# Patient Record
Sex: FEMALE | Race: WHITE | Employment: OTHER | URBAN - NONMETROPOLITAN AREA
[De-identification: names, ages, dates, MRNs, and addresses within clinical notes are randomized per-mention and may not be internally consistent; named-entity substitution may affect disease eponyms.]

---

## 2018-03-26 ENCOUNTER — TELEPHONE (OUTPATIENT)
Dept: NEUROSURGERY | Age: 63
End: 2018-03-26

## 2018-03-27 ENCOUNTER — TELEPHONE (OUTPATIENT)
Dept: NEUROSURGERY | Age: 63
End: 2018-03-27

## 2018-03-27 NOTE — TELEPHONE ENCOUNTER
Tried calling patient twice and no voicemail, to let patient know that we had her new patient appointment set

## 2018-04-30 ENCOUNTER — TELEPHONE (OUTPATIENT)
Dept: NEUROSURGERY | Age: 63
End: 2018-04-30

## 2018-05-17 ENCOUNTER — LAB (OUTPATIENT)
Dept: LAB | Facility: HOSPITAL | Age: 63
End: 2018-05-17
Attending: PEDIATRICS

## 2018-05-17 ENCOUNTER — TRANSCRIBE ORDERS (OUTPATIENT)
Dept: ADMINISTRATIVE | Facility: HOSPITAL | Age: 63
End: 2018-05-17

## 2018-05-17 ENCOUNTER — LAB (OUTPATIENT)
Dept: LAB | Facility: HOSPITAL | Age: 63
End: 2018-05-17

## 2018-05-17 DIAGNOSIS — E83.52 HYPERCALCEMIA: ICD-10-CM

## 2018-05-17 DIAGNOSIS — N18.6 ANEMIA IN END-STAGE RENAL DISEASE (HCC): ICD-10-CM

## 2018-05-17 DIAGNOSIS — I10 ESSENTIAL HYPERTENSION, MALIGNANT: ICD-10-CM

## 2018-05-17 DIAGNOSIS — D63.1 ANEMIA IN END-STAGE RENAL DISEASE (HCC): ICD-10-CM

## 2018-05-17 DIAGNOSIS — D63.1 ANEMIA IN END-STAGE RENAL DISEASE (HCC): Primary | ICD-10-CM

## 2018-05-17 DIAGNOSIS — N18.6 ANEMIA IN END-STAGE RENAL DISEASE (HCC): Primary | ICD-10-CM

## 2018-05-17 LAB
ALBUMIN SERPL-MCNC: 3.3 G/DL (ref 3.5–5)
ALBUMIN/GLOB SERPL: 1 G/DL (ref 1.1–2.5)
ALP SERPL-CCNC: 53 U/L (ref 24–120)
ALT SERPL W P-5'-P-CCNC: 27 U/L (ref 0–54)
ANION GAP SERPL CALCULATED.3IONS-SCNC: 8 MMOL/L (ref 4–13)
AST SERPL-CCNC: 25 U/L (ref 7–45)
AUTO MIXED CELLS #: 1 10*3/MM3 (ref 0.1–2.6)
AUTO MIXED CELLS %: 16.7 % (ref 0.1–24)
BILIRUB SERPL-MCNC: 0.2 MG/DL (ref 0.1–1)
BUN BLD-MCNC: 52 MG/DL (ref 5–21)
BUN/CREAT SERPL: 5.6
CALCIUM SPEC-SCNC: 9 MG/DL (ref 8.4–10.4)
CHLORIDE SERPL-SCNC: 98 MMOL/L (ref 98–110)
CHOLEST SERPL-MCNC: 233 MG/DL (ref 130–200)
CO2 SERPL-SCNC: 29 MMOL/L (ref 24–31)
CREAT BLD-MCNC: 9.35 MG/DL (ref 0.5–1.4)
ERYTHROCYTE [DISTWIDTH] IN BLOOD BY AUTOMATED COUNT: 12.9 % (ref 12–15)
GFR SERPL CREATININE-BSD FRML MDRD: 4 ML/MIN/1.73
GLOBULIN UR ELPH-MCNC: 3.2 GM/DL
GLUCOSE BLD-MCNC: 168 MG/DL (ref 70–100)
HBA1C MFR BLD: 6.4 %
HCT VFR BLD AUTO: 34.9 % (ref 37–47)
HDLC SERPL-MCNC: 46 MG/DL
HGB BLD-MCNC: 12.4 G/DL (ref 12–16)
LDLC SERPL CALC-MCNC: 143 MG/DL (ref 0–99)
LDLC/HDLC SERPL: 3.1 {RATIO}
LYMPHOCYTES # BLD AUTO: 1 10*3/MM3 (ref 0.8–7)
LYMPHOCYTES NFR BLD AUTO: 16.7 % (ref 15–45)
MCH RBC QN AUTO: 34.4 PG (ref 28–32)
MCHC RBC AUTO-ENTMCNC: 35.5 G/DL (ref 33–36)
MCV RBC AUTO: 96.9 FL (ref 82–98)
NEUTROPHILS # BLD AUTO: 4.1 10*3/MM3 (ref 1.5–8.3)
NEUTROPHILS NFR BLD AUTO: 66.6 % (ref 39–78)
PLATELET # BLD AUTO: 268 10*3/MM3 (ref 130–400)
PMV BLD AUTO: 8.8 FL (ref 6–12)
POTASSIUM BLD-SCNC: 4.1 MMOL/L (ref 3.5–5.3)
PROT SERPL-MCNC: 6.5 G/DL (ref 6.3–8.7)
RBC # BLD AUTO: 3.6 10*6/MM3 (ref 4.2–5.4)
SODIUM BLD-SCNC: 135 MMOL/L (ref 135–145)
TRIGL SERPL-MCNC: 222 MG/DL (ref 0–149)
VLDLC SERPL-MCNC: 44.4 MG/DL
WBC NRBC COR # BLD: 6.1 10*3/MM3 (ref 4.8–10.8)

## 2018-05-17 PROCEDURE — 83036 HEMOGLOBIN GLYCOSYLATED A1C: CPT

## 2018-05-17 PROCEDURE — 85025 COMPLETE CBC W/AUTO DIFF WBC: CPT | Performed by: PEDIATRICS

## 2018-05-17 PROCEDURE — 80061 LIPID PANEL: CPT

## 2018-05-17 PROCEDURE — 36415 COLL VENOUS BLD VENIPUNCTURE: CPT

## 2018-05-17 PROCEDURE — 80053 COMPREHEN METABOLIC PANEL: CPT | Performed by: PEDIATRICS

## 2018-05-22 ENCOUNTER — TRANSCRIBE ORDERS (OUTPATIENT)
Dept: ADMINISTRATIVE | Facility: HOSPITAL | Age: 63
End: 2018-05-22

## 2018-05-22 DIAGNOSIS — Z12.39 SCREENING BREAST EXAMINATION: Primary | ICD-10-CM

## 2018-06-13 ENCOUNTER — HOSPITAL ENCOUNTER (OUTPATIENT)
Dept: MAMMOGRAPHY | Facility: HOSPITAL | Age: 63
Discharge: HOME | End: 2018-06-13
Attending: PEDIATRICS | Admitting: PEDIATRICS

## 2018-06-13 DIAGNOSIS — Z12.39 SCREENING BREAST EXAMINATION: ICD-10-CM

## 2018-06-13 PROCEDURE — 77067 SCR MAMMO BI INCL CAD: CPT

## 2018-06-13 PROCEDURE — 77063 BREAST TOMOSYNTHESIS BI: CPT

## 2018-07-23 ENCOUNTER — OFFICE VISIT (OUTPATIENT)
Dept: NEUROSURGERY | Age: 63
End: 2018-07-23
Payer: MEDICARE

## 2018-07-23 VITALS
SYSTOLIC BLOOD PRESSURE: 126 MMHG | WEIGHT: 172.4 LBS | OXYGEN SATURATION: 98 % | HEIGHT: 63 IN | BODY MASS INDEX: 30.55 KG/M2 | DIASTOLIC BLOOD PRESSURE: 70 MMHG | HEART RATE: 68 BPM

## 2018-07-23 DIAGNOSIS — R56.9 SEIZURE (HCC): Primary | ICD-10-CM

## 2018-07-23 PROCEDURE — G8417 CALC BMI ABV UP PARAM F/U: HCPCS | Performed by: PSYCHIATRY & NEUROLOGY

## 2018-07-23 PROCEDURE — 99203 OFFICE O/P NEW LOW 30 MIN: CPT | Performed by: PSYCHIATRY & NEUROLOGY

## 2018-07-23 PROCEDURE — G8427 DOCREV CUR MEDS BY ELIG CLIN: HCPCS | Performed by: PSYCHIATRY & NEUROLOGY

## 2018-07-23 PROCEDURE — 3017F COLORECTAL CA SCREEN DOC REV: CPT | Performed by: PSYCHIATRY & NEUROLOGY

## 2018-07-23 PROCEDURE — 4004F PT TOBACCO SCREEN RCVD TLK: CPT | Performed by: PSYCHIATRY & NEUROLOGY

## 2018-07-23 RX ORDER — PANTOPRAZOLE SODIUM 40 MG/1
1 TABLET, DELAYED RELEASE ORAL DAILY
COMMUNITY
Start: 2018-06-18

## 2018-07-23 RX ORDER — M-VIT,TX,IRON,MINS/CALC/FOLIC 27MG-0.4MG
1 TABLET ORAL DAILY
COMMUNITY

## 2018-07-23 RX ORDER — LOSARTAN POTASSIUM 50 MG/1
1 TABLET ORAL 2 TIMES DAILY
Refills: 5 | COMMUNITY
Start: 2018-06-02 | End: 2021-10-28 | Stop reason: ALTCHOICE

## 2018-07-23 RX ORDER — POTASSIUM CHLORIDE 750 MG/1
1 TABLET, FILM COATED, EXTENDED RELEASE ORAL DAILY
Refills: 3 | COMMUNITY
Start: 2018-06-07 | End: 2021-11-29

## 2018-07-23 RX ORDER — CALCIUM ACETATE 667 MG/1
1 CAPSULE ORAL DAILY
COMMUNITY
Start: 2018-06-18 | End: 2021-11-29

## 2018-07-23 RX ORDER — BUMETANIDE 1 MG/1
2 TABLET ORAL DAILY
COMMUNITY
End: 2021-10-28 | Stop reason: ALTCHOICE

## 2018-07-23 RX ORDER — SUCRALFATE 1 G/1
1 TABLET ORAL NIGHTLY
Refills: 5 | COMMUNITY
Start: 2018-07-14

## 2018-07-23 RX ORDER — INSULIN GLARGINE 100 [IU]/ML
INJECTION, SOLUTION SUBCUTANEOUS NIGHTLY
COMMUNITY
End: 2021-11-03

## 2018-07-23 RX ORDER — CITALOPRAM 40 MG/1
1 TABLET ORAL DAILY
COMMUNITY
Start: 2018-06-18

## 2018-07-23 RX ORDER — DOXAZOSIN 8 MG/1
1 TABLET ORAL 2 TIMES DAILY
Refills: 3 | COMMUNITY
Start: 2018-05-04 | End: 2021-11-03

## 2018-07-23 RX ORDER — ALLOPURINOL 100 MG/1
1 TABLET ORAL DAILY
COMMUNITY
Start: 2018-06-18

## 2018-07-23 RX ORDER — LANOLIN ALCOHOL/MO/W.PET/CERES
3 CREAM (GRAM) TOPICAL DAILY
COMMUNITY
End: 2021-11-03

## 2018-07-23 RX ORDER — NITROGLYCERIN 0.4 MG/1
0.4 TABLET SUBLINGUAL EVERY 5 MIN PRN
COMMUNITY

## 2018-07-23 RX ORDER — GENTAMICIN SULFATE 1 MG/G
OINTMENT TOPICAL 3 TIMES DAILY
COMMUNITY
End: 2021-11-03

## 2018-07-23 NOTE — PROGRESS NOTES
[facility] Neurology Office Note      Patient:   Robin Sanders  MR#:    [id number]  Account Number:                         YOB: 1955  Date of Evaluation:  7/23/2018  Time of Note:                          11:03 AM  Primary/Referring Physician:  Petra Harrison MD   Consulting Physician:  Benja Perales DO    NEW PATIENT CONSULTATION    Chief Complaint   Patient presents with    Seizures     Referral from [facility] last seizure was Dec       HISTORY OF PRESENT ILLNESS    Robin Sanders is a 58y.o. year old female here for patient had a seizure back in December. Second event, first seizure occurred in 2001, patient was in [place], noted possible food poisoning with that episode. No further events until this past December. Patient felt at the time she had low blood pressure or low blood glucose. Then had a sudden MARGUERITE, with GTC activity noted, no incontinence, no tongue biting. No prior history or TBI, menigitis or encephalitis. She was seen in the ED, and transferred to [facility]. She has ESRD, on peritoneal dialysis. She is not currently on AEDs, she was on Keppra and has since stopped. No events since December. MRI brain largely negative. Long term EEG monitoring was normal.  She does not headaches intermittently, but has not worsened and doing better.        Past Medical History:   Diagnosis Date    CAD (coronary artery disease)     Diabetes mellitus ([address]     ESRD (end stage renal disease) ([address]     Hypertension     PONV (postoperative nausea and vomiting)        Past Surgical History:   Procedure Laterality Date    CHOLECYSTECTOMY      GASTRIC BYPASS SURGERY         Family History   Problem Relation Age of Onset    Coronary Art Dis Father     Arrhythmia Sister     Coronary Art Dis Brother        Social History     Social History    Marital status:      Spouse name: N/A    Number of children: 1    Years of education: N/A     Occupational History    Un-employed      Social [Tramadol]          REVIEW OF SYSTEMS    Constitutional: []Fever []Sweat []Chills [] Recent Injury [x] Denies all unless marked  HEENT:[x]Headache  [] Head Injury/Hearing Loss  [] Sore Throat  [] Ear Ache/Dizziness  [x] Denies all unless marked  Spine:  [] Neck pain  [] Back pain  [] Sciaticia  [x] Denies all unless marked  Cardiovascular:[x]Heart Disease []Chest Pain [] Palpitations  [x] Denies all unless marked  Pulmonary: []Shortness of Breath []Cough   [x] Denies all unless marke  Gastrointestinal: []Nausea  []Vomiting  []Abdominal Pain  [x]Constipation  []Diarrhea  []Dark Bloody Stools  [x] Denies all unless marked  Psychiatric/Behavioral:[x] Depression [] Anxiety [x] Denies all unless marked  Genitourinary:   [] Frequency  [] Urgency  [] Incontinence [] Pain with Urination  [x] Denies all unless marked  Extremities: []Pain  []Swelling  [x] Denies all unless marked  Musculoskeletal: [] Muscle Pain  [] Joint Pain  [x] Arthritis [x] Muscle Cramps [] Muscle Twitches  [x] Denies all unless marked  Sleep: [] Insomnia [x] Snoring [] Restless Legs [] Sleep Apnea  [] Daytime Sleepiness  [x] Denies all unless marked  Skin:[] Rash [] Skin Discoloration [x] Denies all unless marked   Neurological: []Visual Disturbance/Memory Loss [x] Loss of Balance [] Slurred Speech/Weakness [] Seizures  [] Vertigo/Dizziness [x] Denies all unless marked    I have reviewed the above ROS with the patient and agree with the ROS as documented above.        PHYSICAL EXAM    Constitutional    /70   Pulse 68   Ht 5' 3\" (1.6 m)   Wt 172 lb 6.4 oz (78.2 kg)   SpO2 98%   BMI 30.54 kg/m²   General appearance: No acute distress   EYES -   Conjunctiva normal  Pupillary exam as below, see CN exam in the neurologic exam  ENT-    No scars, masses, or lesions over external nose or ears  Hearing normal bilaterally to finger rub  Cardiovascular -   RRR  No clubbing, cyanosis, or edema   Pulmonary-   Good expansion, normal effort without use of accessory muscles  CTA  Musculoskeletal    No significant wasting of muscles noted  Gait as below, see gait exam in the neurologic exam  Muscle strength, tone, stability as below. No bony deformities  Skin    Warm, dry, and intact to inspection and palpation. No rash, erythema, or pallor  Psychiatric    Mood, affect, and behavior appear normal    Memory as below see mental status examination in the neurologic exam    NEUROLOGICAL EXAM    Mental status   [x]Awake, alert, oriented   [x]Affect attention and concentration appear appropriate  [x]Recent and remote memory appears unremarkable  [x]Speech normal without dysarthria or aphasia, comprehension and repetition intact. COMMENTS:    Cranial Nerves []No VF deficit to confrontation  [x]PERRLA, EOMI, no nystagmus, conjugate eye movements, no ptosis  [x]Face symmetric  [x]Facial sensation intact  [x]Tongue midline no atrophy or fasciculations present  [x]Palate midline, hearing to finger rub normal bilaterally  [x]Shoulder shrug and SCM testing normal bilaterally  COMMENTS: Decreased VA left eye (chronic)   Motor   [x]5/5 strength x 4 extremities  [x]Normal bulk and tone  [x]No tremor present  [x]No rigidity or bradykinesia noted  COMMENTS:   Sensory  []Sensation intact to light touch, pin prick, vibration, and proprioception BLE  []Sensation intact to light touch, pin prick, vibration, and proprioception BUE  COMMENTS: Decreased LT, PP, Vib    Coordination [x]FTN normal bilaterally   []HTS normal bilaterally  []JETHRO normal bilaterally. COMMENTS:   Reflexes  [x]Symmetric and non-pathological  [x]Toes down going bilaterally  [x]No clonus present  COMMENTS:   Gait                  [x]Normal steady gait    []Ataxic    []Spastic     []Magnetic     []Shuffling  COMMENTS:       LABS RECORD AND IMAGING REVIEW (As below and per HPI)    [facility] records reviewed. MRI and cEEG negative. ASSESSMENT:    Tammie Sandra is a 58y.o. year old female here for seizure.

## 2018-07-30 ENCOUNTER — LAB (OUTPATIENT)
Dept: LAB | Facility: HOSPITAL | Age: 63
End: 2018-07-30
Attending: PEDIATRICS

## 2018-07-30 ENCOUNTER — HOSPITAL ENCOUNTER (OUTPATIENT)
Dept: CT IMAGING | Facility: HOSPITAL | Age: 63
Discharge: HOME | End: 2018-07-30
Attending: PEDIATRICS | Admitting: PEDIATRICS

## 2018-07-30 ENCOUNTER — TRANSCRIBE ORDERS (OUTPATIENT)
Dept: GENERAL RADIOLOGY | Facility: HOSPITAL | Age: 63
End: 2018-07-30

## 2018-07-30 DIAGNOSIS — L02.818 CUTANEOUS ABSCESS OF OTHER SITE: ICD-10-CM

## 2018-07-30 DIAGNOSIS — G44.209 TENSION-TYPE HEADACHE, NOT INTRACTABLE, UNSPECIFIED CHRONICITY PATTERN: Primary | ICD-10-CM

## 2018-07-30 DIAGNOSIS — G44.209 TENSION-TYPE HEADACHE, NOT INTRACTABLE, UNSPECIFIED CHRONICITY PATTERN: ICD-10-CM

## 2018-07-30 PROCEDURE — 87186 SC STD MICRODIL/AGAR DIL: CPT | Performed by: PEDIATRICS

## 2018-07-30 PROCEDURE — 87147 CULTURE TYPE IMMUNOLOGIC: CPT | Performed by: PEDIATRICS

## 2018-07-30 PROCEDURE — 87205 SMEAR GRAM STAIN: CPT | Performed by: PEDIATRICS

## 2018-07-30 PROCEDURE — 87070 CULTURE OTHR SPECIMN AEROBIC: CPT | Performed by: PEDIATRICS

## 2018-07-30 PROCEDURE — 70450 CT HEAD/BRAIN W/O DYE: CPT

## 2018-07-30 PROCEDURE — 87077 CULTURE AEROBIC IDENTIFY: CPT | Performed by: PEDIATRICS

## 2018-08-01 LAB
BACTERIA SPEC AEROBE CULT: ABNORMAL
BACTERIA SPEC AEROBE CULT: ABNORMAL
GRAM STN SPEC: ABNORMAL
GRAM STN SPEC: ABNORMAL

## 2018-11-02 ENCOUNTER — OFFICE VISIT (OUTPATIENT)
Dept: CARDIOLOGY | Facility: CLINIC | Age: 63
End: 2018-11-02

## 2018-11-02 VITALS
OXYGEN SATURATION: 98 % | SYSTOLIC BLOOD PRESSURE: 132 MMHG | HEIGHT: 63 IN | WEIGHT: 170 LBS | BODY MASS INDEX: 30.12 KG/M2 | DIASTOLIC BLOOD PRESSURE: 80 MMHG | HEART RATE: 54 BPM

## 2018-11-02 DIAGNOSIS — R00.2 PALPITATIONS: Primary | ICD-10-CM

## 2018-11-02 DIAGNOSIS — E78.5 DYSLIPIDEMIA: ICD-10-CM

## 2018-11-02 DIAGNOSIS — I10 ESSENTIAL HYPERTENSION: ICD-10-CM

## 2018-11-02 DIAGNOSIS — I25.10 CORONARY ARTERY DISEASE INVOLVING NATIVE CORONARY ARTERY OF NATIVE HEART WITHOUT ANGINA PECTORIS: ICD-10-CM

## 2018-11-02 DIAGNOSIS — I49.1 PAC (PREMATURE ATRIAL CONTRACTION): ICD-10-CM

## 2018-11-02 PROBLEM — Z79.4 TYPE 2 DIABETES MELLITUS WITH KIDNEY COMPLICATION, WITH LONG-TERM CURRENT USE OF INSULIN (HCC): Status: ACTIVE | Noted: 2018-11-02

## 2018-11-02 PROBLEM — E11.29 TYPE 2 DIABETES MELLITUS WITH KIDNEY COMPLICATION, WITH LONG-TERM CURRENT USE OF INSULIN (HCC): Status: ACTIVE | Noted: 2018-11-02

## 2018-11-02 PROBLEM — N18.6 END STAGE KIDNEY DISEASE (HCC): Status: ACTIVE | Noted: 2018-11-02

## 2018-11-02 PROCEDURE — 99204 OFFICE O/P NEW MOD 45 MIN: CPT | Performed by: INTERNAL MEDICINE

## 2018-11-02 PROCEDURE — 93000 ELECTROCARDIOGRAM COMPLETE: CPT | Performed by: INTERNAL MEDICINE

## 2018-11-02 RX ORDER — ASPIRIN 81 MG/1
81 TABLET ORAL DAILY
COMMUNITY

## 2018-11-02 RX ORDER — GENTAMICIN SULFATE 1 MG/G
1 OINTMENT TOPICAL 3 TIMES DAILY
Status: ON HOLD | COMMUNITY
End: 2021-01-01

## 2018-11-02 RX ORDER — POTASSIUM CHLORIDE 750 MG/1
10 TABLET, FILM COATED, EXTENDED RELEASE ORAL DAILY
COMMUNITY
Start: 2018-06-07 | End: 2021-01-01

## 2018-11-02 RX ORDER — BUMETANIDE 2 MG/1
2 TABLET ORAL 2 TIMES DAILY
Status: ON HOLD | COMMUNITY
End: 2021-01-01

## 2018-11-02 RX ORDER — PANTOPRAZOLE SODIUM 40 MG/1
40 TABLET, DELAYED RELEASE ORAL 2 TIMES DAILY
COMMUNITY
Start: 2018-06-18 | End: 2021-01-01 | Stop reason: HOSPADM

## 2018-11-02 RX ORDER — SIMVASTATIN 40 MG
40 TABLET ORAL NIGHTLY
Qty: 30 TABLET | Refills: 11 | Status: SHIPPED | OUTPATIENT
Start: 2018-11-02 | End: 2019-08-29 | Stop reason: SDUPTHER

## 2018-11-02 RX ORDER — CITALOPRAM 40 MG/1
40 TABLET ORAL DAILY
COMMUNITY
End: 2022-01-01 | Stop reason: HOSPADM

## 2018-11-02 RX ORDER — ALLOPURINOL 100 MG/1
100 TABLET ORAL DAILY
COMMUNITY

## 2018-11-02 RX ORDER — LOSARTAN POTASSIUM 50 MG/1
TABLET ORAL
COMMUNITY
Start: 2018-06-02 | End: 2020-06-05 | Stop reason: ALTCHOICE

## 2018-11-02 NOTE — PROGRESS NOTES
Reason for Visit: Irregular heart rhythm.    HPI:  Jennifer Salcido is a 63 y.o. female is being seen for consultation today at the request of Feng Sarabia MD.  She notices some irregular heart beats and an occasional skipped beats.  This happens randomly.  Occurs more frequently when she drinks caffeine.  She stopped drinking caffeine and the symptoms improved.  She is on the kidney transplant list and is currently on peritoneal dialysis.  She had stents placed at [facility] in 2016 when she was undergoing preoperative testing.  She reports the blockage was only 50% but were trying to prevent any problems with possible kidney transplant.  She reports having a stress test on 9/28/18 that she reports was normal.      Previous Cardiac Testing and Procedures:  - [facility] (05/10/2016) PCI to RCA with 3.0 x 30 and 3.0 x 18 mm Integrity BMS  - Lipid panel (05/17/2018) total cholesterol 233, HDL 46, , triglycerides 222  - Nuclear stress test (9/28/18) negative for ischemia     Patient Active Problem List   Diagnosis   • End stage kidney disease (CMS/[facility])   • Dyslipidemia   • Coronary artery disease involving native coronary artery of native heart without angina pectoris   • Essential hypertension   • Type 2 diabetes mellitus with kidney complication, with long-term current use of insulin (CMS/[facility])       Social History   Substance Use Topics   • Smoking status: Former Smoker     Packs/day: 0.50     Years: 2.00   • Smokeless tobacco: Never Used   • Alcohol use No       Family History   Problem Relation Age of Onset   • Heart disease Father    • Breast cancer Neg Hx        The following portions of the patient's history were reviewed and updated as appropriate: allergies, current medications, past family history, past medical history, past social history, past surgical history and problem list.      Current Outpatient Prescriptions:   •  allopurinol (ZYLOPRIM) 100 MG tablet, allopurinol 100 mg tablet  Take 1 tablet every  day by oral route., Disp: , Rfl:   •  aspirin 81 MG EC tablet, Take 81 mg by mouth Daily., Disp: , Rfl:   •  Bisacodyl (CORRECTIVE LAXATIVE PO), Corrective Laxative  PRN, Disp: , Rfl:   •  bumetanide (BUMEX) 2 MG tablet, Every 12 (Twelve) Hours., Disp: , Rfl:   •  citalopram (CeleXA) 40 MG tablet, citalopram 40 mg tablet  Take 1 tablet every day by oral route., Disp: , Rfl:   •  gentamicin (GARAMYCIN) 0.1 % ointment, Apply  topically to the appropriate area as directed., Disp: , Rfl:   •  Insulin Glargine (LANTUS SC), Lantus U-100 Insulin  25 units every am, Disp: , Rfl:   •  losartan (COZAAR) 50 MG tablet, Take  by mouth., Disp: , Rfl:   •  melatonin 3 MG tablet, melatonin  3 mg at HS, Disp: , Rfl:   •  Multiple Vitamins-Minerals (MULTIVITAMIN ADULT PO), multivitamin, Disp: , Rfl:   •  pantoprazole (PROTONIX) 40 MG EC tablet, Take  by mouth., Disp: , Rfl:   •  potassium chloride (KLOR-CON) 10 MEQ CR tablet, Take  by mouth., Disp: , Rfl:   •  Sucralfate (CARAFATE PO), Take  by mouth., Disp: , Rfl:   •  simvastatin (ZOCOR) 40 MG tablet, Take 1 tablet by mouth Every Night., Disp: 30 tablet, Rfl: 11    Review of Systems   Constitution: Negative for chills, fever and weight loss.   HENT: Negative for sore throat.    Eyes: Negative for blurred vision and visual disturbance.   Cardiovascular: Positive for irregular heartbeat and palpitations. Negative for chest pain, dyspnea on exertion, leg swelling, paroxysmal nocturnal dyspnea and syncope.   Respiratory: Negative for cough and shortness of breath.    Endocrine: Negative for cold intolerance and polyuria.   Skin: Negative for itching and rash.   Musculoskeletal: Negative for joint swelling and myalgias.   Gastrointestinal: Negative for abdominal pain, diarrhea and vomiting.   Genitourinary: Negative for dysuria and hematuria.   Neurological: Negative for dizziness, headaches and numbness.   Psychiatric/Behavioral: Negative for depression. The patient is not  "nervous/anxious.    Allergic/Immunologic: Negative for hives.       Objective   /80 (BP Location: Left arm, Patient Position: Sitting, Cuff Size: Adult)   Pulse 54   Ht 160 cm (63\")   Wt 77.1 kg (170 lb)   SpO2 98%   BMI 30.11 kg/m²   Physical Exam   Constitutional: She is oriented to person, place, and time. She appears well-developed and well-nourished.   HENT:   Head: Normocephalic and atraumatic.   Eyes: Pupils are equal, round, and reactive to light. Conjunctivae and EOM are normal.   Neck: Normal range of motion. Neck supple. No JVD present. No thyromegaly present.   Cardiovascular: Normal rate, regular rhythm and normal heart sounds.    No murmur heard.  Pulmonary/Chest: Effort normal and breath sounds normal. She has no wheezes. She has no rales.   Abdominal: Soft. Bowel sounds are normal. She exhibits distension (Obese). There is no tenderness.   Musculoskeletal: Normal range of motion. She exhibits no edema.   Neurological: She is alert and oriented to person, place, and time. Coordination normal.   Skin: Skin is warm and dry. No rash noted.   Psychiatric: She has a normal mood and affect. Her behavior is normal.       ECG 12 Lead  Date/Time: 11/2/2018 11:13 AM  Performed by: JORGE YODER  Authorized by: JORGE YODER   Comparison: compared with previous ECG from 9/28/2018  Comparison to previous ECG: Premature atrial contraction is no longer present  Rhythm: sinus rhythm  Rate: normal  Clinical impression: low voltage              ICD-10-CM ICD-9-CM   1. Palpitations R00.2 785.1   2. PAC (premature atrial contraction) I49.1 427.61   3. Coronary artery disease involving native coronary artery of native heart without angina pectoris I25.10 414.01   4. Dyslipidemia E78.5 272.4   5. Essential hypertension I10 401.9         Assessment/Plan:  1.  Palpitations: Likely secondary to PACs.  Symptoms have improved with stopping caffeine use.  If symptoms return can consider Holter monitor.    2.  " Premature atrial contractions: Noted on previous EKG.   patient that this is a benign finding.  Can consider Holter monitor if palpitations worsen.    3.  Coronary artery disease: History of PCI at [facility] in 2016 as part of preoperative workup for kidney transplant.  We will obtain previous cardiac records.  She reports having a negative nuclear stress since then in September.    4.  Dyslipidemia: Uncontrolled on last lipid panel from May.  Patient reports intolerance to atorvastatin.  We will try simvastatin.    5.  Essential hypertension: Systolic blood pressure is borderline today at 132 mmHg.  Continue current therapy and monitor.

## 2018-12-21 ENCOUNTER — TELEPHONE (OUTPATIENT)
Dept: CARDIOLOGY | Facility: CLINIC | Age: 63
End: 2018-12-21

## 2018-12-21 NOTE — TELEPHONE ENCOUNTER
She called concerning her blood sugar. She said her blood sugar has increased 30-40 points in the AM and PM. She wants to know if simvastatin is the cause of this. She did not noticed the increase until simvastatin was added. Please advise

## 2018-12-21 NOTE — TELEPHONE ENCOUNTER
It is possible that simvastatin could effect blood sugar control slightly.  In general the benefits of statin therapy far outweigh any changes in her blood sugar and should be continued.  Recommend she follow-up with her PCP for adjustment of her diabetes therapy.

## 2019-08-29 DIAGNOSIS — E78.5 DYSLIPIDEMIA: ICD-10-CM

## 2019-08-30 RX ORDER — SIMVASTATIN 40 MG
TABLET ORAL
Qty: 90 TABLET | Refills: 0 | Status: SHIPPED | OUTPATIENT
Start: 2019-08-30 | End: 2019-11-21 | Stop reason: SDUPTHER

## 2019-09-23 ENCOUNTER — PREP FOR SURGERY (OUTPATIENT)
Dept: OTHER | Facility: HOSPITAL | Age: 64
End: 2019-09-23

## 2019-09-23 ENCOUNTER — OFFICE VISIT (OUTPATIENT)
Dept: CARDIOLOGY | Facility: CLINIC | Age: 64
End: 2019-09-23

## 2019-09-23 VITALS
SYSTOLIC BLOOD PRESSURE: 152 MMHG | HEART RATE: 66 BPM | DIASTOLIC BLOOD PRESSURE: 80 MMHG | HEIGHT: 63 IN | BODY MASS INDEX: 31.71 KG/M2 | OXYGEN SATURATION: 98 % | WEIGHT: 179 LBS

## 2019-09-23 DIAGNOSIS — I10 ESSENTIAL HYPERTENSION: ICD-10-CM

## 2019-09-23 DIAGNOSIS — I25.10 CORONARY ARTERY DISEASE INVOLVING NATIVE CORONARY ARTERY OF NATIVE HEART WITHOUT ANGINA PECTORIS: Primary | ICD-10-CM

## 2019-09-23 DIAGNOSIS — Z76.82 PATIENT AWAITING RENAL TRANSPLANT: ICD-10-CM

## 2019-09-23 DIAGNOSIS — N18.5 STAGE 5 CHRONIC KIDNEY DISEASE NOT ON CHRONIC DIALYSIS (HCC): ICD-10-CM

## 2019-09-23 DIAGNOSIS — E78.5 DYSLIPIDEMIA: ICD-10-CM

## 2019-09-23 PROCEDURE — 99214 OFFICE O/P EST MOD 30 MIN: CPT | Performed by: INTERNAL MEDICINE

## 2019-09-23 RX ORDER — CARVEDILOL 25 MG/1
25 TABLET ORAL 2 TIMES DAILY WITH MEALS
COMMUNITY
End: 2020-06-05

## 2019-09-23 RX ORDER — DOXAZOSIN MESYLATE 4 MG/1
4 TABLET ORAL 2 TIMES DAILY
COMMUNITY
End: 2021-01-01

## 2019-09-23 NOTE — PROGRESS NOTES
Reason for Visit: cardiovascular follow up.    HPI:  Jennifer Salcido is a 64 y.o. female is here today for follow-up.  She has been doing well from a cardiac standpoint.  She denies any chest pain,  dizziness, syncope, PND, or orthopnea.  She notes just very rare flutters in her chest that is brief and does not particularly bother her.  She is excited because she has found a live donor for kidney transplant.  They are going through the testing now to make sure everything is compatible.  She plans to have this done in [place] in the near future.  [place] has reported that she needs to have a heart catheterization done given her history of PCI in 2016.  Her blood pressure has been elevated and she feels like she has a little extra fluid on her than usual.    Previous Cardiac Testing and Procedures:  - C (05/10/2016) PCI to RCA with 3.0 x 30 and 3.0 x 18 mm Integrity BMS  - Lipid panel (05/17/2018) total cholesterol 233, HDL 46, , triglycerides 222  - Nuclear stress test (9/28/18) negative for ischemia     Patient Active Problem List   Diagnosis   • End stage kidney disease (CMS/[facility])   • Dyslipidemia   • Coronary artery disease involving native coronary artery of native heart without angina pectoris   • Essential hypertension   • Type 2 diabetes mellitus with kidney complication, with long-term current use of insulin (CMS/[facility])   • Stage 5 chronic kidney disease not on chronic dialysis (CMS/[facility])   • Patient awaiting renal transplant       Social History     Tobacco Use   • Smoking status: Former Smoker     Packs/day: 0.50     Years: 2.00     Pack years: 1.00   • Smokeless tobacco: Never Used   Substance Use Topics   • Alcohol use: No   • Drug use: No       Family History   Problem Relation Age of Onset   • Heart disease Father    • Breast cancer Neg Hx        The following portions of the patient's history were reviewed and updated as appropriate: allergies, current medications, past family history, past  medical history, past social history, past surgical history and problem list.      Current Outpatient Medications:   •  allopurinol (ZYLOPRIM) 100 MG tablet, allopurinol 100 mg tablet  Take 1 tablet every day by oral route., Disp: , Rfl:   •  aspirin 81 MG EC tablet, Take 81 mg by mouth Daily., Disp: , Rfl:   •  Bisacodyl (CORRECTIVE LAXATIVE PO), Corrective Laxative  PRN, Disp: , Rfl:   •  bumetanide (BUMEX) 2 MG tablet, Every 12 (Twelve) Hours., Disp: , Rfl:   •  carvedilol (COREG) 6.25 MG tablet, Take 6.25 mg by mouth 2 (Two) Times a Day With Meals., Disp: , Rfl:   •  citalopram (CeleXA) 40 MG tablet, citalopram 40 mg tablet  Take 1 tablet every day by oral route., Disp: , Rfl:   •  doxazosin (CARDURA) 4 MG tablet, Take 4 mg by mouth 2 (Two) Times a Day., Disp: , Rfl:   •  gentamicin (GARAMYCIN) 0.1 % ointment, Apply  topically to the appropriate area as directed., Disp: , Rfl:   •  insulin detemir (LEVEMIR) 100 UNIT/ML injection, Inject  under the skin into the appropriate area as directed Daily., Disp: , Rfl:   •  losartan (COZAAR) 50 MG tablet, Take  by mouth., Disp: , Rfl:   •  melatonin 3 MG tablet, melatonin  3 mg at HS, Disp: , Rfl:   •  Multiple Vitamins-Minerals (MULTIVITAMIN ADULT PO), multivitamin, Disp: , Rfl:   •  pantoprazole (PROTONIX) 40 MG EC tablet, Take  by mouth., Disp: , Rfl:   •  potassium chloride (KLOR-CON) 10 MEQ CR tablet, Take  by mouth., Disp: , Rfl:   •  simvastatin (ZOCOR) 40 MG tablet, TAKE 1 TABLET BY MOUTH EVERY DAY AT NIGHT, Disp: 90 tablet, Rfl: 0  •  Sucralfate (CARAFATE PO), Take  by mouth., Disp: , Rfl:     Review of Systems   Constitution: Negative for chills and fever.   Cardiovascular: Negative for chest pain and paroxysmal nocturnal dyspnea.   Respiratory: Negative for cough and shortness of breath.    Skin: Negative for rash.   Gastrointestinal: Negative for abdominal pain and heartburn.   Neurological: Negative for dizziness and numbness.       Objective   /80 (BP  "Location: Left arm, Patient Position: Sitting, Cuff Size: Adult)   Pulse 66   Ht 160 cm (62.99\")   Wt 81.2 kg (179 lb)   SpO2 98%   BMI 31.72 kg/m²   Physical Exam   Constitutional: She is oriented to person, place, and time. She appears well-developed and well-nourished.   HENT:   Head: Normocephalic and atraumatic.   Cardiovascular: Normal rate, regular rhythm and normal heart sounds.   No murmur heard.  Pulmonary/Chest: Effort normal and breath sounds normal.   Musculoskeletal: She exhibits no edema.   Neurological: She is alert and oriented to person, place, and time.   Skin: Skin is warm and dry.   Psychiatric: She has a normal mood and affect.     Procedures      ICD-10-CM ICD-9-CM   1. Coronary artery disease involving native coronary artery of native heart without angina pectoris I25.10 414.01   2. Dyslipidemia E78.5 272.4   3. Essential hypertension I10 401.9   4. Stage 5 chronic kidney disease not on chronic dialysis (CMS/[facility]) N18.5 585.5         Assessment/Plan:  1.  Coronary artery disease: History of PCI to RCA at [facility] in 2016 as part of preoperative workup for kidney transplant.  No current chest pain or other anginal symptoms.  Required to have a repeat heart catheterization prior to kidney transplant.  We will schedule this.  Continue aspirin, carvedilol, and simvastatin.     2.  Dyslipidemia: Continue simvastatin.  Repeat lipid panel.     3.  Essential hypertension: Systolic blood pressure is elevated today.  Will adjust therapy if remains elevated.  Blood pressure likely improve once her kidney transplant.    4.  Chronic kidney disease stage V: She has found a lot of kidney  and is awaiting kidney transplant.  Reportedly needs a heart catheterization prior to transplant.  We will schedule this.  "

## 2019-10-01 ENCOUNTER — HOSPITAL ENCOUNTER (OUTPATIENT)
Facility: HOSPITAL | Age: 64
Discharge: HOME | End: 2019-10-01
Attending: INTERNAL MEDICINE | Admitting: INTERNAL MEDICINE

## 2019-10-01 VITALS
SYSTOLIC BLOOD PRESSURE: 151 MMHG | HEART RATE: 68 BPM | DIASTOLIC BLOOD PRESSURE: 67 MMHG | OXYGEN SATURATION: 98 % | RESPIRATION RATE: 15 BRPM

## 2019-10-01 DIAGNOSIS — Z76.82 PATIENT AWAITING RENAL TRANSPLANT: ICD-10-CM

## 2019-10-01 DIAGNOSIS — N18.5 STAGE 5 CHRONIC KIDNEY DISEASE NOT ON CHRONIC DIALYSIS (HCC): ICD-10-CM

## 2019-10-01 DIAGNOSIS — I25.10 CORONARY ARTERY DISEASE INVOLVING NATIVE CORONARY ARTERY OF NATIVE HEART WITHOUT ANGINA PECTORIS: ICD-10-CM

## 2019-10-01 LAB
ALBUMIN SERPL-MCNC: 3.4 G/DL (ref 3.5–5.2)
ALBUMIN/GLOB SERPL: 1.3 G/DL
ALP SERPL-CCNC: 58 U/L (ref 39–117)
ALT SERPL W P-5'-P-CCNC: 20 U/L (ref 1–33)
ANION GAP SERPL CALCULATED.3IONS-SCNC: 16 MMOL/L (ref 5–15)
AST SERPL-CCNC: 22 U/L (ref 1–32)
BASOPHILS # BLD AUTO: 0.05 10*3/MM3 (ref 0–0.2)
BASOPHILS NFR BLD AUTO: 0.9 % (ref 0–1.5)
BILIRUB SERPL-MCNC: 0.3 MG/DL (ref 0.2–1.2)
BUN BLD-MCNC: 48 MG/DL (ref 8–23)
BUN/CREAT SERPL: 4.4 (ref 7–25)
CALCIUM SPEC-SCNC: 8.9 MG/DL (ref 8.6–10.5)
CHLORIDE SERPL-SCNC: 94 MMOL/L (ref 98–107)
CHOLEST SERPL-MCNC: 186 MG/DL (ref 0–200)
CO2 SERPL-SCNC: 27 MMOL/L (ref 22–29)
CREAT BLD-MCNC: 11.01 MG/DL (ref 0.57–1)
DEPRECATED RDW RBC AUTO: 55.2 FL (ref 37–54)
EOSINOPHIL # BLD AUTO: 0.52 10*3/MM3 (ref 0–0.4)
EOSINOPHIL NFR BLD AUTO: 9.1 % (ref 0.3–6.2)
ERYTHROCYTE [DISTWIDTH] IN BLOOD BY AUTOMATED COUNT: 14.1 % (ref 12.3–15.4)
GFR SERPL CREATININE-BSD FRML MDRD: 4 ML/MIN/1.73
GFR SERPL CREATININE-BSD FRML MDRD: ABNORMAL ML/MIN/{1.73_M2}
GLOBULIN UR ELPH-MCNC: 2.7 GM/DL
GLUCOSE BLD-MCNC: 99 MG/DL (ref 65–99)
HCT VFR BLD AUTO: 39.8 % (ref 34–46.6)
HDLC SERPL-MCNC: 49 MG/DL (ref 40–60)
HGB BLD-MCNC: 12.8 G/DL (ref 12–15.9)
LDLC SERPL CALC-MCNC: 106 MG/DL (ref 0–100)
LDLC/HDLC SERPL: 2.16 {RATIO}
LYMPHOCYTES # BLD AUTO: 0.69 10*3/MM3 (ref 0.7–3.1)
LYMPHOCYTES NFR BLD AUTO: 12 % (ref 19.6–45.3)
MCH RBC QN AUTO: 33.9 PG (ref 26.6–33)
MCHC RBC AUTO-ENTMCNC: 32.2 G/DL (ref 31.5–35.7)
MCV RBC AUTO: 105.3 FL (ref 79–97)
MONOCYTES # BLD AUTO: 0.51 10*3/MM3 (ref 0.1–0.9)
MONOCYTES NFR BLD AUTO: 8.9 % (ref 5–12)
NEUTROPHILS # BLD AUTO: 3.96 10*3/MM3 (ref 1.7–7)
NEUTROPHILS NFR BLD AUTO: 68.9 % (ref 42.7–76)
PLATELET # BLD AUTO: 235 10*3/MM3 (ref 140–450)
PMV BLD AUTO: 10.8 FL (ref 6–12)
POTASSIUM BLD-SCNC: 4.8 MMOL/L (ref 3.5–5.2)
PROT SERPL-MCNC: 6.1 G/DL (ref 6–8.5)
RBC # BLD AUTO: 3.78 10*6/MM3 (ref 3.77–5.28)
SODIUM BLD-SCNC: 137 MMOL/L (ref 136–145)
TRIGL SERPL-MCNC: 157 MG/DL (ref 0–150)
VLDLC SERPL-MCNC: 31.4 MG/DL
WBC NRBC COR # BLD: 5.74 10*3/MM3 (ref 3.4–10.8)

## 2019-10-01 PROCEDURE — 99153 MOD SED SAME PHYS/QHP EA: CPT | Performed by: INTERNAL MEDICINE

## 2019-10-01 PROCEDURE — 93454 CORONARY ARTERY ANGIO S&I: CPT | Performed by: INTERNAL MEDICINE

## 2019-10-01 PROCEDURE — 85025 COMPLETE CBC W/AUTO DIFF WBC: CPT | Performed by: INTERNAL MEDICINE

## 2019-10-01 PROCEDURE — 25010000002 FENTANYL CITRATE (PF) 100 MCG/2ML SOLUTION: Performed by: INTERNAL MEDICINE

## 2019-10-01 PROCEDURE — 99152 MOD SED SAME PHYS/QHP 5/>YRS: CPT | Performed by: INTERNAL MEDICINE

## 2019-10-01 PROCEDURE — 25010000002 MIDAZOLAM PER 1 MG: Performed by: INTERNAL MEDICINE

## 2019-10-01 PROCEDURE — 25010000002 IOPAMIDOL 61 % SOLUTION: Performed by: INTERNAL MEDICINE

## 2019-10-01 PROCEDURE — 80061 LIPID PANEL: CPT | Performed by: INTERNAL MEDICINE

## 2019-10-01 PROCEDURE — C1769 GUIDE WIRE: HCPCS | Performed by: INTERNAL MEDICINE

## 2019-10-01 PROCEDURE — 25010000002 HEPARIN (PORCINE): Performed by: INTERNAL MEDICINE

## 2019-10-01 PROCEDURE — 25010000002 HYDRALAZINE PER 20 MG: Performed by: INTERNAL MEDICINE

## 2019-10-01 PROCEDURE — 80053 COMPREHEN METABOLIC PANEL: CPT | Performed by: INTERNAL MEDICINE

## 2019-10-01 PROCEDURE — 25010000002 ONDANSETRON PER 1 MG: Performed by: INTERNAL MEDICINE

## 2019-10-01 PROCEDURE — 25010000002 HEPARIN (PORCINE) PER 1000 UNITS: Performed by: INTERNAL MEDICINE

## 2019-10-01 PROCEDURE — 25010000002 DIPHENHYDRAMINE PER 50 MG: Performed by: INTERNAL MEDICINE

## 2019-10-01 PROCEDURE — C1894 INTRO/SHEATH, NON-LASER: HCPCS | Performed by: INTERNAL MEDICINE

## 2019-10-01 RX ORDER — MIDAZOLAM HYDROCHLORIDE 1 MG/ML
INJECTION INTRAMUSCULAR; INTRAVENOUS AS NEEDED
Status: DISCONTINUED | OUTPATIENT
Start: 2019-10-01 | End: 2019-10-01 | Stop reason: HOSPADM

## 2019-10-01 RX ORDER — HYDRALAZINE HYDROCHLORIDE 20 MG/ML
INJECTION INTRAMUSCULAR; INTRAVENOUS AS NEEDED
Status: DISCONTINUED | OUTPATIENT
Start: 2019-10-01 | End: 2019-10-01 | Stop reason: HOSPADM

## 2019-10-01 RX ORDER — LIDOCAINE HYDROCHLORIDE 20 MG/ML
INJECTION, SOLUTION INFILTRATION; PERINEURAL AS NEEDED
Status: DISCONTINUED | OUTPATIENT
Start: 2019-10-01 | End: 2019-10-01 | Stop reason: HOSPADM

## 2019-10-01 RX ORDER — FENTANYL CITRATE 50 UG/ML
INJECTION, SOLUTION INTRAMUSCULAR; INTRAVENOUS AS NEEDED
Status: DISCONTINUED | OUTPATIENT
Start: 2019-10-01 | End: 2019-10-01 | Stop reason: HOSPADM

## 2019-10-01 RX ORDER — SODIUM CHLORIDE 9 MG/ML
100 INJECTION, SOLUTION INTRAVENOUS CONTINUOUS
Status: CANCELLED | OUTPATIENT
Start: 2019-10-01

## 2019-10-01 RX ORDER — DIPHENHYDRAMINE HYDROCHLORIDE 50 MG/ML
INJECTION INTRAMUSCULAR; INTRAVENOUS AS NEEDED
Status: DISCONTINUED | OUTPATIENT
Start: 2019-10-01 | End: 2019-10-01 | Stop reason: HOSPADM

## 2019-10-01 RX ORDER — ONDANSETRON 2 MG/ML
INJECTION INTRAMUSCULAR; INTRAVENOUS AS NEEDED
Status: DISCONTINUED | OUTPATIENT
Start: 2019-10-01 | End: 2019-10-01 | Stop reason: HOSPADM

## 2019-11-21 DIAGNOSIS — E78.5 DYSLIPIDEMIA: ICD-10-CM

## 2019-11-21 RX ORDER — SIMVASTATIN 40 MG
TABLET ORAL
Qty: 90 TABLET | Refills: 3 | Status: ON HOLD | OUTPATIENT
Start: 2019-11-21 | End: 2020-06-09

## 2020-01-05 ENCOUNTER — HOSPITAL ENCOUNTER (EMERGENCY)
Facility: HOSPITAL | Age: 65
Discharge: HOME | End: 2020-01-05
Attending: EMERGENCY MEDICINE | Admitting: EMERGENCY MEDICINE

## 2020-01-05 ENCOUNTER — APPOINTMENT (OUTPATIENT)
Dept: GENERAL RADIOLOGY | Facility: HOSPITAL | Age: 65
End: 2020-01-05

## 2020-01-05 VITALS
WEIGHT: 164 LBS | HEIGHT: 63 IN | OXYGEN SATURATION: 98 % | BODY MASS INDEX: 29.06 KG/M2 | HEART RATE: 59 BPM | TEMPERATURE: 99 F | RESPIRATION RATE: 16 BRPM | DIASTOLIC BLOOD PRESSURE: 52 MMHG | SYSTOLIC BLOOD PRESSURE: 139 MMHG

## 2020-01-05 DIAGNOSIS — J11.1 INFLUENZA: Primary | ICD-10-CM

## 2020-01-05 DIAGNOSIS — J40 BRONCHITIS: ICD-10-CM

## 2020-01-05 LAB
ALBUMIN SERPL-MCNC: 2.4 G/DL (ref 3.5–5.2)
ALBUMIN/GLOB SERPL: 0.9 G/DL
ALP SERPL-CCNC: 49 U/L (ref 39–117)
ALT SERPL W P-5'-P-CCNC: 25 U/L (ref 1–33)
ANION GAP SERPL CALCULATED.3IONS-SCNC: 14 MMOL/L (ref 5–15)
APTT PPP: 37 SECONDS (ref 24.1–35)
AST SERPL-CCNC: 28 U/L (ref 1–32)
BASOPHILS # BLD AUTO: 0.02 10*3/MM3 (ref 0–0.2)
BASOPHILS NFR BLD AUTO: 0.3 % (ref 0–1.5)
BILIRUB SERPL-MCNC: 0.5 MG/DL (ref 0.2–1.2)
BUN BLD-MCNC: 58 MG/DL (ref 8–23)
BUN/CREAT SERPL: 4.5 (ref 7–25)
CALCIUM SPEC-SCNC: 8.6 MG/DL (ref 8.6–10.5)
CHLORIDE SERPL-SCNC: 90 MMOL/L (ref 98–107)
CO2 SERPL-SCNC: 29 MMOL/L (ref 22–29)
CREAT BLD-MCNC: 12.89 MG/DL (ref 0.57–1)
D-LACTATE SERPL-SCNC: 0.7 MMOL/L (ref 0.5–2)
DEPRECATED RDW RBC AUTO: 49.8 FL (ref 37–54)
EOSINOPHIL # BLD AUTO: 0.37 10*3/MM3 (ref 0–0.4)
EOSINOPHIL NFR BLD AUTO: 6.3 % (ref 0.3–6.2)
ERYTHROCYTE [DISTWIDTH] IN BLOOD BY AUTOMATED COUNT: 15 % (ref 12.3–15.4)
FLUAV AG NPH QL: POSITIVE
FLUBV AG NPH QL IA: NEGATIVE
GFR SERPL CREATININE-BSD FRML MDRD: 3 ML/MIN/1.73
GFR SERPL CREATININE-BSD FRML MDRD: ABNORMAL ML/MIN/{1.73_M2}
GLOBULIN UR ELPH-MCNC: 2.6 GM/DL
GLUCOSE BLD-MCNC: 91 MG/DL (ref 65–99)
GLUCOSE BLDC GLUCOMTR-MCNC: 53 MG/DL (ref 70–130)
HCT VFR BLD AUTO: 21.1 % (ref 34–46.6)
HGB BLD-MCNC: 7.1 G/DL (ref 12–15.9)
HOLD SPECIMEN: NORMAL
IMM GRANULOCYTES # BLD AUTO: 0.05 10*3/MM3 (ref 0–0.05)
IMM GRANULOCYTES NFR BLD AUTO: 0.9 % (ref 0–0.5)
INR PPP: 1 (ref 0.91–1.09)
LYMPHOCYTES # BLD AUTO: 1.54 10*3/MM3 (ref 0.7–3.1)
LYMPHOCYTES NFR BLD AUTO: 26.2 % (ref 19.6–45.3)
MCH RBC QN AUTO: 30.9 PG (ref 26.6–33)
MCHC RBC AUTO-ENTMCNC: 33.6 G/DL (ref 31.5–35.7)
MCV RBC AUTO: 91.7 FL (ref 79–97)
MONOCYTES # BLD AUTO: 0.47 10*3/MM3 (ref 0.1–0.9)
MONOCYTES NFR BLD AUTO: 8 % (ref 5–12)
NEUTROPHILS # BLD AUTO: 3.43 10*3/MM3 (ref 1.7–7)
NEUTROPHILS NFR BLD AUTO: 58.3 % (ref 42.7–76)
NRBC BLD AUTO-RTO: 0 /100 WBC (ref 0–0.2)
PLATELET # BLD AUTO: 221 10*3/MM3 (ref 140–450)
PMV BLD AUTO: 11.6 FL (ref 6–12)
POTASSIUM BLD-SCNC: 4.2 MMOL/L (ref 3.5–5.2)
PROT SERPL-MCNC: 5 G/DL (ref 6–8.5)
PROTHROMBIN TIME: 13.5 SECONDS (ref 11.9–14.6)
RBC # BLD AUTO: 2.3 10*6/MM3 (ref 3.77–5.28)
SODIUM BLD-SCNC: 133 MMOL/L (ref 136–145)
WBC NRBC COR # BLD: 5.88 10*3/MM3 (ref 3.4–10.8)
WHOLE BLOOD HOLD SPECIMEN: NORMAL
WHOLE BLOOD HOLD SPECIMEN: NORMAL

## 2020-01-05 PROCEDURE — 25010000002 METHYLPREDNISOLONE PER 125 MG: Performed by: EMERGENCY MEDICINE

## 2020-01-05 PROCEDURE — 93010 ELECTROCARDIOGRAM REPORT: CPT | Performed by: INTERNAL MEDICINE

## 2020-01-05 PROCEDURE — 93005 ELECTROCARDIOGRAM TRACING: CPT | Performed by: EMERGENCY MEDICINE

## 2020-01-05 PROCEDURE — 99284 EMERGENCY DEPT VISIT MOD MDM: CPT

## 2020-01-05 PROCEDURE — 94640 AIRWAY INHALATION TREATMENT: CPT

## 2020-01-05 PROCEDURE — 83605 ASSAY OF LACTIC ACID: CPT | Performed by: EMERGENCY MEDICINE

## 2020-01-05 PROCEDURE — 87804 INFLUENZA ASSAY W/OPTIC: CPT | Performed by: EMERGENCY MEDICINE

## 2020-01-05 PROCEDURE — 85025 COMPLETE CBC W/AUTO DIFF WBC: CPT | Performed by: EMERGENCY MEDICINE

## 2020-01-05 PROCEDURE — 85730 THROMBOPLASTIN TIME PARTIAL: CPT | Performed by: EMERGENCY MEDICINE

## 2020-01-05 PROCEDURE — 85610 PROTHROMBIN TIME: CPT | Performed by: EMERGENCY MEDICINE

## 2020-01-05 PROCEDURE — 87040 BLOOD CULTURE FOR BACTERIA: CPT | Performed by: EMERGENCY MEDICINE

## 2020-01-05 PROCEDURE — 80053 COMPREHEN METABOLIC PANEL: CPT | Performed by: EMERGENCY MEDICINE

## 2020-01-05 PROCEDURE — 36415 COLL VENOUS BLD VENIPUNCTURE: CPT

## 2020-01-05 PROCEDURE — 96374 THER/PROPH/DIAG INJ IV PUSH: CPT

## 2020-01-05 PROCEDURE — 71046 X-RAY EXAM CHEST 2 VIEWS: CPT

## 2020-01-05 PROCEDURE — 94799 UNLISTED PULMONARY SVC/PX: CPT

## 2020-01-05 PROCEDURE — 82962 GLUCOSE BLOOD TEST: CPT

## 2020-01-05 RX ORDER — IPRATROPIUM BROMIDE AND ALBUTEROL SULFATE 2.5; .5 MG/3ML; MG/3ML
3 SOLUTION RESPIRATORY (INHALATION) ONCE
Status: COMPLETED | OUTPATIENT
Start: 2020-01-05 | End: 2020-01-05

## 2020-01-05 RX ORDER — AZITHROMYCIN 250 MG/1
250 TABLET, FILM COATED ORAL DAILY
Qty: 6 TABLET | Refills: 0 | Status: SHIPPED | OUTPATIENT
Start: 2020-01-05 | End: 2020-01-20

## 2020-01-05 RX ORDER — ALBUTEROL SULFATE 1.25 MG/3ML
1 SOLUTION RESPIRATORY (INHALATION) EVERY 6 HOURS PRN
Qty: 16 VIAL | Refills: 0 | Status: ON HOLD | OUTPATIENT
Start: 2020-01-05 | End: 2021-01-01

## 2020-01-05 RX ORDER — SODIUM CHLORIDE 0.9 % (FLUSH) 0.9 %
10 SYRINGE (ML) INJECTION AS NEEDED
Status: DISCONTINUED | OUTPATIENT
Start: 2020-01-05 | End: 2020-01-05 | Stop reason: HOSPADM

## 2020-01-05 RX ORDER — METHYLPREDNISOLONE 4 MG/1
TABLET ORAL
Qty: 21 TABLET | Refills: 0 | Status: SHIPPED | OUTPATIENT
Start: 2020-01-05 | End: 2020-01-20

## 2020-01-05 RX ORDER — AZITHROMYCIN 250 MG/1
500 TABLET, FILM COATED ORAL ONCE
Status: COMPLETED | OUTPATIENT
Start: 2020-01-05 | End: 2020-01-05

## 2020-01-05 RX ORDER — METHYLPREDNISOLONE SODIUM SUCCINATE 125 MG/2ML
125 INJECTION, POWDER, LYOPHILIZED, FOR SOLUTION INTRAMUSCULAR; INTRAVENOUS ONCE
Status: COMPLETED | OUTPATIENT
Start: 2020-01-05 | End: 2020-01-05

## 2020-01-05 RX ORDER — ALBUTEROL SULFATE 2.5 MG/3ML
2.5 SOLUTION RESPIRATORY (INHALATION) ONCE
Status: COMPLETED | OUTPATIENT
Start: 2020-01-05 | End: 2020-01-05

## 2020-01-05 RX ADMIN — SODIUM CHLORIDE 250 ML: 9 INJECTION, SOLUTION INTRAVENOUS at 20:43

## 2020-01-05 RX ADMIN — IPRATROPIUM BROMIDE AND ALBUTEROL SULFATE 3 ML: 2.5; .5 SOLUTION RESPIRATORY (INHALATION) at 19:55

## 2020-01-05 RX ADMIN — METHYLPREDNISOLONE SODIUM SUCCINATE 125 MG: 125 INJECTION, POWDER, FOR SOLUTION INTRAMUSCULAR; INTRAVENOUS at 20:43

## 2020-01-05 RX ADMIN — AZITHROMYCIN MONOHYDRATE 500 MG: 250 TABLET ORAL at 20:43

## 2020-01-05 RX ADMIN — ALBUTEROL SULFATE 2.5 MG: 2.5 SOLUTION RESPIRATORY (INHALATION) at 21:03

## 2020-01-06 NOTE — ED PROVIDER NOTES
Subjective   64-year-old female patient presents to the emergency department department feeling weak decreased appetite with a cough and congestion.  She has been having gradually worsening symptoms for 2 weeks.  Cough is nonproductive she said on Thursday she had a temperature of 102 degrees.  She also has been having chills and occasionally she will have cough induced vomiting.  Not really nauseated.  No diarrhea.  Normal bowel movements.  Patient is a peritoneal dialysis patient who does her own dialysis every night at home.  She is waiting for possible transplant.          Review of Systems   Constitutional: Positive for chills, fatigue and fever.   HENT: Positive for congestion.    Respiratory: Positive for cough and shortness of breath.    Cardiovascular: Negative for chest pain.   Gastrointestinal: Negative for abdominal pain, diarrhea, nausea and vomiting.   Genitourinary: Negative.    Musculoskeletal: Positive for myalgias.   Neurological: Negative.    Psychiatric/Behavioral: Negative.        Past Medical History:   Diagnosis Date   • Chronic kidney disease     STAGE 5    • Diabetes mellitus (CMS/[facility])    • GERD (gastroesophageal reflux disease)    • Gout    • Hyperlipidemia    • Hypertension    • Peritoneal dialysis status (CMS/[facility])        Allergies   Allergen Reactions   • Antivert [Meclizine] Nausea And Vomiting   • Augmentin [Amoxicillin-Pot Clavulanate] Nausea And Vomiting   • Codeine Nausea And Vomiting   • Demerol [Meperidine] Nausea Only   • Levaquin [Levofloxacin] Nausea And Vomiting   • Lisinopril Swelling   • Morphine Nausea And Vomiting   • Sulfasalazine Nausea And Vomiting   • Ultram [Tramadol] Mental Status Change       Past Surgical History:   Procedure Laterality Date   • BREAST BIOPSY     • CARDIAC CATHETERIZATION N/A 10/1/2019    Procedure: Left Heart Cath;  Surgeon: Srikanth Coker MD;  Location: [facility] CATH INVASIVE LOCATION;  Service: Cardiology   • CORONARY ANGIOPLASTY WITH STENT  PLACEMENT      X 2   • GASTRIC BYPASS     • HYSTERECTOMY     • OOPHORECTOMY     • SINUS SURGERY     • TUBAL ABDOMINAL LIGATION         Family History   Problem Relation Age of Onset   • Heart disease Father    • Breast cancer Neg Hx        Social History     Socioeconomic History   • Marital status:      Spouse name: Not on file   • Number of children: Not on file   • Years of education: Not on file   • Highest education level: Not on file   Tobacco Use   • Smoking status: Former Smoker     Packs/day: 0.50     Years: 2.00     Pack years: 1.00   • Smokeless tobacco: Never Used   Substance and Sexual Activity   • Alcohol use: No   • Drug use: No   • Sexual activity: Defer           Objective   Physical Exam   Constitutional: She is oriented to person, place, and time. She appears well-developed and well-nourished.   HENT:   Head: Normocephalic and atraumatic.   Eyes: Pupils are equal, round, and reactive to light. EOM are normal.   Neck: Normal range of motion.   Cardiovascular: Normal rate, regular rhythm and normal heart sounds.   Pulmonary/Chest: She has wheezes. She has no rales. She exhibits no tenderness.   Abdominal: There is no tenderness.   Musculoskeletal: Normal range of motion.   Neurological: She is alert and oriented to person, place, and time.   Skin: Skin is warm and dry.   Psychiatric: She has a normal mood and affect. Her behavior is normal.   Nursing note and vitals reviewed.      Procedures           ED Course                                               MDM  Number of Diagnoses or Management Options  Diagnosis management comments: Patient feels like the nebulizer treatment has helped her breathe a bit better.  She still has a harsh cough.  Her influenza test is positive however she is more than 2 days from the onset of her illness.  This should make it so she is not really a candidate for use of Tamiflu or Xofluza.    Was given a second nebulizer treatment and she feels like it is also  helped her breathe better.  Does have a home nebulizer unit but she needs a medicine to put in it.  She briefly became a little bit hypoglycemic her blood sugar dropped to 50 but she has been treated by giving her some orange juice and also crackers and peanut butter.  She is feeling better and wants to go home.       Amount and/or Complexity of Data Reviewed  Decide to obtain previous medical records or to obtain history from someone other than the patient: yes        Final diagnoses:   Influenza   Bronchitis            Juaquin Yip DO  01/05/20 8122

## 2020-01-06 NOTE — ED NOTES
POC GLUCOSE 53. DR CRANDALL AWARE, ORANGE JUICE AND GRAM CRACKERS & PEANUT BUTTER GIVEN. PT AWAKE AND ALERT. WILL CONTINUE TO MONITOR.        Niyah Brizuela RN  01/05/20 2124

## 2020-01-10 LAB
BACTERIA SPEC AEROBE CULT: NORMAL
BACTERIA SPEC AEROBE CULT: NORMAL

## 2020-01-20 ENCOUNTER — APPOINTMENT (OUTPATIENT)
Dept: GENERAL RADIOLOGY | Facility: HOSPITAL | Age: 65
End: 2020-01-20

## 2020-01-20 ENCOUNTER — HOSPITAL ENCOUNTER (EMERGENCY)
Facility: HOSPITAL | Age: 65
Discharge: HOME | End: 2020-01-21
Admitting: EMERGENCY MEDICINE

## 2020-01-20 DIAGNOSIS — D64.9 ANEMIA, UNSPECIFIED TYPE: Primary | ICD-10-CM

## 2020-01-20 LAB
ABO GROUP BLD: NORMAL
ALBUMIN SERPL-MCNC: 3.5 G/DL (ref 3.5–5.2)
ALBUMIN/GLOB SERPL: 1.5 G/DL
ALP SERPL-CCNC: 53 U/L (ref 39–117)
ALT SERPL W P-5'-P-CCNC: 20 U/L (ref 1–33)
ANION GAP SERPL CALCULATED.3IONS-SCNC: 18 MMOL/L (ref 5–15)
AST SERPL-CCNC: 38 U/L (ref 1–32)
BASOPHILS # BLD AUTO: 0.06 10*3/MM3 (ref 0–0.2)
BASOPHILS NFR BLD AUTO: 0.6 % (ref 0–1.5)
BILIRUB SERPL-MCNC: 0.3 MG/DL (ref 0.2–1.2)
BLD GP AB SCN SERPL QL: NEGATIVE
BUN BLD-MCNC: 53 MG/DL (ref 8–23)
BUN/CREAT SERPL: 4.4 (ref 7–25)
CALCIUM SPEC-SCNC: 9.4 MG/DL (ref 8.6–10.5)
CHLORIDE SERPL-SCNC: 88 MMOL/L (ref 98–107)
CO2 SERPL-SCNC: 25 MMOL/L (ref 22–29)
CREAT BLD-MCNC: 12.04 MG/DL (ref 0.57–1)
DEPRECATED RDW RBC AUTO: 59.6 FL (ref 37–54)
EOSINOPHIL # BLD AUTO: 0.55 10*3/MM3 (ref 0–0.4)
EOSINOPHIL NFR BLD AUTO: 5.3 % (ref 0.3–6.2)
ERYTHROCYTE [DISTWIDTH] IN BLOOD BY AUTOMATED COUNT: 17.2 % (ref 12.3–15.4)
GFR SERPL CREATININE-BSD FRML MDRD: 3 ML/MIN/1.73
GFR SERPL CREATININE-BSD FRML MDRD: ABNORMAL ML/MIN/{1.73_M2}
GLOBULIN UR ELPH-MCNC: 2.3 GM/DL
GLUCOSE BLD-MCNC: 146 MG/DL (ref 65–99)
HCT VFR BLD AUTO: 20.9 % (ref 34–46.6)
HGB BLD-MCNC: 6.9 G/DL (ref 12–15.9)
HOLD SPECIMEN: NORMAL
HOLD SPECIMEN: NORMAL
IMM GRANULOCYTES # BLD AUTO: 0.05 10*3/MM3 (ref 0–0.05)
IMM GRANULOCYTES NFR BLD AUTO: 0.5 % (ref 0–0.5)
LYMPHOCYTES # BLD AUTO: 2.04 10*3/MM3 (ref 0.7–3.1)
LYMPHOCYTES NFR BLD AUTO: 19.7 % (ref 19.6–45.3)
MCH RBC QN AUTO: 32.1 PG (ref 26.6–33)
MCHC RBC AUTO-ENTMCNC: 33 G/DL (ref 31.5–35.7)
MCV RBC AUTO: 97.2 FL (ref 79–97)
MONOCYTES # BLD AUTO: 0.71 10*3/MM3 (ref 0.1–0.9)
MONOCYTES NFR BLD AUTO: 6.8 % (ref 5–12)
NEUTROPHILS # BLD AUTO: 6.96 10*3/MM3 (ref 1.7–7)
NEUTROPHILS NFR BLD AUTO: 67.1 % (ref 42.7–76)
NRBC BLD AUTO-RTO: 0 /100 WBC (ref 0–0.2)
PLATELET # BLD AUTO: 356 10*3/MM3 (ref 140–450)
PMV BLD AUTO: 11.1 FL (ref 6–12)
POTASSIUM BLD-SCNC: 4.4 MMOL/L (ref 3.5–5.2)
PROT SERPL-MCNC: 5.8 G/DL (ref 6–8.5)
RBC # BLD AUTO: 2.15 10*6/MM3 (ref 3.77–5.28)
RH BLD: POSITIVE
SODIUM BLD-SCNC: 131 MMOL/L (ref 136–145)
T&S EXPIRATION DATE: NORMAL
WBC NRBC COR # BLD: 10.37 10*3/MM3 (ref 3.4–10.8)
WHOLE BLOOD HOLD SPECIMEN: NORMAL
WHOLE BLOOD HOLD SPECIMEN: NORMAL

## 2020-01-20 PROCEDURE — 85025 COMPLETE CBC W/AUTO DIFF WBC: CPT | Performed by: PHYSICIAN ASSISTANT

## 2020-01-20 PROCEDURE — 86920 COMPATIBILITY TEST SPIN: CPT

## 2020-01-20 PROCEDURE — 86850 RBC ANTIBODY SCREEN: CPT | Performed by: PHYSICIAN ASSISTANT

## 2020-01-20 PROCEDURE — 71046 X-RAY EXAM CHEST 2 VIEWS: CPT

## 2020-01-20 PROCEDURE — 86900 BLOOD TYPING SEROLOGIC ABO: CPT | Performed by: PHYSICIAN ASSISTANT

## 2020-01-20 PROCEDURE — P9016 RBC LEUKOCYTES REDUCED: HCPCS

## 2020-01-20 PROCEDURE — 99285 EMERGENCY DEPT VISIT HI MDM: CPT

## 2020-01-20 PROCEDURE — 80053 COMPREHEN METABOLIC PANEL: CPT | Performed by: PHYSICIAN ASSISTANT

## 2020-01-20 PROCEDURE — 86900 BLOOD TYPING SEROLOGIC ABO: CPT

## 2020-01-20 PROCEDURE — 36430 TRANSFUSION BLD/BLD COMPNT: CPT

## 2020-01-20 PROCEDURE — 86901 BLOOD TYPING SEROLOGIC RH(D): CPT | Performed by: PHYSICIAN ASSISTANT

## 2020-01-20 RX ORDER — SODIUM CHLORIDE 0.9 % (FLUSH) 0.9 %
10 SYRINGE (ML) INJECTION AS NEEDED
Status: DISCONTINUED | OUTPATIENT
Start: 2020-01-20 | End: 2020-01-21 | Stop reason: HOSPADM

## 2020-01-20 RX ORDER — FAMOTIDINE 20 MG/1
20 TABLET, FILM COATED ORAL ONCE
Status: COMPLETED | OUTPATIENT
Start: 2020-01-20 | End: 2020-01-20

## 2020-01-20 RX ORDER — RANITIDINE 150 MG/1
150 TABLET ORAL NIGHTLY
Status: ON HOLD | COMMUNITY
End: 2020-07-14

## 2020-01-20 RX ADMIN — FAMOTIDINE 20 MG: 20 TABLET, FILM COATED ORAL at 19:54

## 2020-01-21 VITALS
DIASTOLIC BLOOD PRESSURE: 74 MMHG | HEIGHT: 63 IN | RESPIRATION RATE: 18 BRPM | WEIGHT: 164 LBS | OXYGEN SATURATION: 98 % | BODY MASS INDEX: 29.06 KG/M2 | TEMPERATURE: 99.2 F | SYSTOLIC BLOOD PRESSURE: 134 MMHG | HEART RATE: 76 BPM

## 2020-01-21 RX ORDER — ONDANSETRON 4 MG/1
4 TABLET, ORALLY DISINTEGRATING ORAL ONCE
Status: COMPLETED | OUTPATIENT
Start: 2020-01-21 | End: 2020-01-21

## 2020-01-21 RX ORDER — PANTOPRAZOLE SODIUM 40 MG/1
40 TABLET, DELAYED RELEASE ORAL ONCE
Status: COMPLETED | OUTPATIENT
Start: 2020-01-21 | End: 2020-01-21

## 2020-01-21 RX ORDER — FAMOTIDINE 10 MG/ML
20 INJECTION, SOLUTION INTRAVENOUS ONCE
Status: DISCONTINUED | OUTPATIENT
Start: 2020-01-21 | End: 2020-01-21

## 2020-01-21 RX ORDER — ONDANSETRON 4 MG/1
4 TABLET, ORALLY DISINTEGRATING ORAL EVERY 6 HOURS PRN
Qty: 12 TABLET | Refills: 0 | Status: ON HOLD | OUTPATIENT
Start: 2020-01-21 | End: 2021-01-01

## 2020-01-21 RX ADMIN — PANTOPRAZOLE SODIUM 40 MG: 40 TABLET, DELAYED RELEASE ORAL at 01:50

## 2020-01-21 RX ADMIN — ONDANSETRON 4 MG: 4 TABLET, ORALLY DISINTEGRATING ORAL at 01:39

## 2020-01-21 NOTE — ED NOTES
Blood bank states they sent blood approx  5-10 min ago.... Tube system down in ED.. NOTIFIED blood blank we had not received blood.  They ([facility]) will notify engineering.     Cheryl Jules, RN  01/20/20 2044

## 2020-01-21 NOTE — ED PROVIDER NOTES
"Subjective   History of Present Illness    Patient is a 64-year-old female presenting to ED with abnormal labs.  Patient reports that she does peritoneal dialysis and follows up with a physician in [place].  Patient reported that she gets her labs done locally and then the results are sent to [facility].  Patient reported that \"the way they stuck me up they keep me at a hemoglobin of 13 but [facility] called me today and said my hemoglobin was six-point something so I needed to come in to get a blood transfusion.\"  Patient reported over the last 3 weeks she has had progressively decreased fatigue noting shortness of breath and easy exhaustion.  Patient denies any known bleeding sources including epistaxis, easy gum bleeding, hematuria, hematemesis, hematochezia, black/bloody stools, or easy bruising and bleeding.  Patient denies any blood noted to her peritoneal dialysis catheter or seen in her bags.  Patient reported she does have some bruises to her left lower quadrant abdomen just surrounding her peritoneal dialysis site which are consistent with her recent needle injections.  Patient denies any new edema including lower extremity edema.  Patient reported she has had a recent endoscopy as well as colonoscopy which found no sources of bleeding.  Patient reported she was seen in the hospital December for vomiting with blood and that was what elicited the GI evaluation.  Patient noted that it is time for her peritoneal dialysis and she is very good at being compliant with it.  Patient reported the only reason she has not yet done it tonight as she knew she had to come to the ED.  Patient denies any medication use prior to arrival.  Patient did note that she was recently seen and evaluated for bronchitis so she assumed her symptoms over the past few weeks were just her recovering from the bronchitis symptoms.  Patient reports significant improvement of her cough and URI symptoms.    Review of Systems "   Constitutional: Positive for fatigue. Negative for chills, diaphoresis and fever.   HENT: Negative.  Negative for congestion, sinus pressure and sore throat.    Respiratory: Positive for shortness of breath. Negative for cough and chest tightness.    Cardiovascular: Negative.  Negative for chest pain and leg swelling.   Gastrointestinal: Negative.  Negative for abdominal pain, nausea and vomiting.   Genitourinary: Negative.  Negative for dysuria and hematuria.   Musculoskeletal: Negative.  Negative for arthralgias and myalgias.   Skin: Negative.  Negative for rash and wound.   Neurological: Positive for weakness (Generalized). Negative for dizziness, syncope and headaches.   Hematological: Does not bruise/bleed easily.   Psychiatric/Behavioral: Negative.    All other systems reviewed and are negative.      Past Medical History:   Diagnosis Date   • Chronic kidney disease     STAGE 5    • Diabetes mellitus (CMS/[facility])    • GERD (gastroesophageal reflux disease)    • Gout    • Hyperlipidemia    • Hypertension    • Peritoneal dialysis status (CMS/[facility])        Allergies   Allergen Reactions   • Antivert [Meclizine] Nausea And Vomiting   • Augmentin [Amoxicillin-Pot Clavulanate] Nausea And Vomiting   • Codeine Nausea And Vomiting   • Demerol [Meperidine] Nausea Only   • Levaquin [Levofloxacin] Nausea And Vomiting   • Lisinopril Swelling   • Morphine Nausea And Vomiting   • Sulfasalazine Nausea And Vomiting   • Ultram [Tramadol] Mental Status Change       Past Surgical History:   Procedure Laterality Date   • BREAST BIOPSY     • CARDIAC CATHETERIZATION N/A 10/1/2019    Procedure: Left Heart Cath;  Surgeon: Srikanth Coker MD;  Location: [facility] CATH INVASIVE LOCATION;  Service: Cardiology   • CORONARY ANGIOPLASTY WITH STENT PLACEMENT      X 2   • GASTRIC BYPASS     • HYSTERECTOMY     • OOPHORECTOMY     • SINUS SURGERY     • TUBAL ABDOMINAL LIGATION         Family History   Problem Relation Age of Onset   • Heart disease  Father    • Breast cancer Neg Hx        Social History     Socioeconomic History   • Marital status:      Spouse name: Not on file   • Number of children: Not on file   • Years of education: Not on file   • Highest education level: Not on file   Tobacco Use   • Smoking status: Former Smoker     Packs/day: 0.50     Years: 2.00     Pack years: 1.00   • Smokeless tobacco: Never Used   Substance and Sexual Activity   • Alcohol use: No   • Drug use: No   • Sexual activity: Defer           Objective   Physical Exam   Constitutional: She is oriented to person, place, and time. She appears well-developed and well-nourished. She is cooperative. No distress.   HENT:   Head: Normocephalic and atraumatic.   Right Ear: External ear normal.   Left Ear: External ear normal.   Mouth/Throat: Uvula is midline, oropharynx is clear and moist and mucous membranes are normal.   Eyes: Pupils are equal, round, and reactive to light. Conjunctivae, EOM and lids are normal. Right conjunctiva is not injected. Left conjunctiva is not injected.   Neck: Normal range of motion. Neck supple.   Cardiovascular: Normal rate, regular rhythm, normal heart sounds, intact distal pulses and normal pulses.   No murmur heard.  Pulses:       Radial pulses are 2+ on the right side, and 2+ on the left side.        Dorsalis pedis pulses are 2+ on the right side, and 2+ on the left side.        Posterior tibial pulses are 2+ on the right side, and 2+ on the left side.   Pulmonary/Chest: Effort normal and breath sounds normal. No respiratory distress. She has no decreased breath sounds. She has no wheezes. She has no rhonchi. She has no rales. She exhibits no bony tenderness.   Abdominal: Soft. Normal appearance and bowel sounds are normal. There is no tenderness. There is no guarding and no CVA tenderness.       Peritoneal dialysis catheter noted to left lower quadrant with no blood or surrounding signs of skin infection.  3 areas of ecchymosis consistent  with recent needle injections just inferior to the periumbilical region.   Musculoskeletal:        Cervical back: Normal.        Thoracic back: Normal.        Lumbar back: Normal.   Lymphadenopathy:     She has no cervical adenopathy.   Neurological: She is alert and oriented to person, place, and time. She has normal strength. Gait normal.   Skin: Skin is warm, dry and intact. Capillary refill takes less than 2 seconds. No rash noted. She is not diaphoretic. There is pallor.   Psychiatric: She has a normal mood and affect. Her speech is normal and behavior is normal. Thought content normal.   Nursing note and vitals reviewed.      Procedures           ED Course  ED Course as of Jan 21 1647   Mon Jan 20, 2020 1933 Critical labs noted of hemoglobin 6.9 and hematocrit 20.9.    [JS]   1933 Reviewed with Dr. Luis John patient's medical history, lab work here today, and plan for transfusion.  Dr. John in agreement and is okay with offering peritoneal dialysis here.    [JS]   1935 Patient resting comfortably in bed at this time.  Reviewed with patient hemoglobin and hematocrit for current levels here.  Discussed need for transfusion.  Discussed with patient that we will be able to transfuse her and then send her home.  Patient reported that she will wait at this time and do her peritoneal dialysis as soon as she gets home, and she will not need assistance doing it here.    [JS]   2028 CXR - mild bronchitis    [JS]   2259 Upon reevaluation patient resting comfortably in bed with no complaints at this time.  Blood is still transfusing.    [JS]   Tue Jan 21, 2020   0112 Upon reevaluation patient resting comfortably in bed.  Second unit FPC done transfusing.  Reviewed with patient significant importance of PCP follow-up later this week for repeat lab work and further evaluation of blood counts.  Discussed with patient importance of following with her primary care provider to discuss further need for colonoscopy  "and evaluation of blood loss.  Discussed with patient significant importance of doing her peritoneal dialysis as soon as she gets home.  Patient without any further questions, concerns, or needs at this time and amenable to continued treatment plan.  At this time patient will be stable for discharge upon completion of the second unit.    [JS]   0125 Patient requesting reflux medication at this time reporting she is starting to feel nauseous and spitting up her \"yellow bile.\"  Patient reported this commonly what happens when she gets \"acidic stomach.\"  Pepcid and Zofran ordered at this time.    [JS]   0207 Upon reevaluation patient with improved color.  Once again reviewed with patient lab results, a positive blood type, and that 2 units were transfused at this time.  Discussed with patient importance of completing her peritoneal dialysis upon return to home.  Discussed significant importance of continuing to follow-up with her specialist for reevaluation of her blood work to assure that her anemia does not rebound.  Discussed return precautions and ability to return to ED at anytime as needed.  Patient without any further questions, concerns, or needs at this time and is stable for discharge.    [JS]      ED Course User Index  [JS] Rachid Gifford PA-C                                       EMR Dragon/transcription disclaimer: Much of this encounter note was created utilizing an electronic transcription/translation of spoken language to printed text.  Electronic translation of spoken language may permit erroneous, or at times, nonsensical words or phrases to be in advertently transcribed; although I have reviewed the note for such errors to the best of my ability, some may still exist.          MDM  Number of Diagnoses or Management Options  Anemia, unspecified type:      Amount and/or Complexity of Data Reviewed  Clinical lab tests: ordered and reviewed  Tests in the radiology section of CPT®: ordered and " reviewed  Decide to obtain previous medical records or to obtain history from someone other than the patient: yes  Obtain history from someone other than the patient: yes  Discuss the patient with other providers: yes    Patient Progress  Patient progress: improved      Final diagnoses:   Anemia, unspecified type            Rachid Gifford PA-C  01/21/20 3902

## 2020-01-21 NOTE — ED NOTES
Pt has severe reflux and takes 2 medications at home for this.  She missed her meds tonight and is having reflux/minor vomiting now     Cheryl Jules RN  01/21/20 0141

## 2020-01-21 NOTE — DISCHARGE INSTRUCTIONS
Anemia    Anemia is a condition in which you do not have enough red blood cells or hemoglobin. Hemoglobin is a substance in red blood cells that carries oxygen. When you do not have enough red blood cells or hemoglobin (are anemic), your body cannot get enough oxygen and your organs may not work properly. As a result, you may feel very tired or have other problems.  What are the causes?  Common causes of anemia include:  · Excessive bleeding. Anemia can be caused by excessive bleeding inside or outside the body, including bleeding from the intestine or from periods in women.  · Poor nutrition.  · Long-lasting (chronic) kidney, thyroid, and liver disease.  · Bone marrow disorders.  · Cancer and treatments for cancer.  · HIV (human immunodeficiency virus) and AIDS (acquired immunodeficiency syndrome).  · Treatments for HIV and AIDS.  · Spleen problems.  · Blood disorders.  · Infections, medicines, and autoimmune disorders that destroy red blood cells.  What are the signs or symptoms?  Symptoms of this condition include:  · Minor weakness.  · Dizziness.  · Headache.  · Feeling heartbeats that are irregular or faster than normal (palpitations).  · Shortness of breath, especially with exercise.  · Paleness.  · Cold sensitivity.  · Indigestion.  · Nausea.  · Difficulty sleeping.  · Difficulty concentrating.  Symptoms may occur suddenly or develop slowly. If your anemia is mild, you may not have symptoms.  How is this diagnosed?  This condition is diagnosed based on:  · Blood tests.  · Your medical history.  · A physical exam.  · Bone marrow biopsy.  Your health care provider may also check your stool (feces) for blood and may do additional testing to look for the cause of your bleeding.  You may also have other tests, including:  · Imaging tests, such as a CT scan or MRI.  · Endoscopy.  · Colonoscopy.  How is this treated?  Treatment for this condition depends on the cause. If you continue to lose a lot of blood, you may  need to be treated at a hospital. Treatment may include:  · Taking supplements of iron, vitamin B12, or folic acid.  · Taking a hormone medicine (erythropoietin) that can help to stimulate red blood cell growth.  · Having a blood transfusion. This may be needed if you lose a lot of blood.  · Making changes to your diet.  · Having surgery to remove your spleen.  Follow these instructions at home:  · Take over-the-counter and prescription medicines only as told by your health care provider.  · Take supplements only as told by your health care provider.  · Follow any diet instructions that you were given.  · Keep all follow-up visits as told by your health care provider. This is important.  Contact a health care provider if:  · You develop new bleeding anywhere in the body.  Get help right away if:  · You are very weak.  · You are short of breath.  · You have pain in your abdomen or chest.  · You are dizzy or feel faint.  · You have trouble concentrating.  · You have bloody or black, tarry stools.  · You vomit repeatedly or you vomit up blood.  Summary  · Anemia is a condition in which you do not have enough red blood cells or enough of a substance in your red blood cells that carries oxygen (hemoglobin).  · Symptoms may occur suddenly or develop slowly.  · If your anemia is mild, you may not have symptoms.  · This condition is diagnosed with blood tests as well as a medical history and physical exam. Other tests may be needed.  · Treatment for this condition depends on the cause of the anemia.  This information is not intended to replace advice given to you by your health care provider. Make sure you discuss any questions you have with your health care provider.  Document Released: 01/25/2006 Document Revised: 01/19/2018 Document Reviewed: 01/19/2018  [employer] Interactive Patient Education © 2019 [employer]    Blood Transfusion, Adult    A blood transfusion is a procedure in which you receive donated blood,  including plasma, platelets, and red blood cells, through an IV tube. You may need a blood transfusion because of illness, surgery, or injury. The blood may come from a donor. You may also be able to donate blood for yourself (autologous blood donation) before a surgery if you know that you might require a blood transfusion.  The blood given in a transfusion is made up of different types of cells. You may receive:  · Red blood cells. These carry oxygen to the cells in the body.  · White blood cells. These help you fight infections.  · Platelets. These help your blood to clot.  · Plasma. This is the liquid part of your blood and it helps with fluid imbalances.  If you have hemophilia or another clotting disorder, you may also receive other types of blood products.  Tell a health care provider about:  · Any allergies you have.  · All medicines you are taking, including vitamins, herbs, eye drops, creams, and over-the-counter medicines.  · Any problems you or family members have had with anesthetic medicines.  · Any blood disorders you have.  · Any surgeries you have had.  · Any medical conditions you have, including any recent fever or cold symptoms.  · Whether you are pregnant or may be pregnant.  · Any previous reactions you have had during a blood transfusion.  What are the risks?  Generally, this is a safe procedure. However, problems may occur, including:  · Having an allergic reaction to something in the donated blood. Hives and itching may be symptoms of this type of reaction.  · Fever. This may be a reaction to the white blood cells in the transfused blood. Nausea or chest pain may accompany a fever.  · Iron overload. This can happen from having many transfusions.  · Transfusion-related acute lung injury (TRALI). This is a rare reaction that causes lung damage. The cause is not known. TRALI can occur within hours of a transfusion or several days later.  · Sudden (acute) or delayed hemolytic reactions. This  happens if your blood does not match the cells in your transfusion. Your body’s defense system (immune system) may try to attack the new cells. This complication is rare. The symptoms include fever, chills, nausea, and low back pain or chest pain.  · Infection or disease transmission. This is rare.  What happens before the procedure?  · You will have a blood test to determine your blood type. This is necessary to know what kind of blood your body will accept and to match it to the donor blood.  · If you are going to have a planned surgery, you may be able to do an autologous blood donation. This may be done in case you need to have a transfusion.  · If you have had an allergic reaction to a transfusion in the past, you may be given medicine to help prevent a reaction. This medicine may be given to you by mouth or through an IV tube.  · You will have your temperature, blood pressure, and pulse monitored before the transfusion.  · Follow instructions from your health care provider about eating and drinking restrictions.  · Ask your health care provider about:  ? Changing or stopping your regular medicines. This is especially important if you are taking diabetes medicines or blood thinners.  ? Taking medicines such as aspirin and ibuprofen. These medicines can thin your blood. Do not take these medicines before your procedure if your health care provider instructs you not to.  What happens during the procedure?  · An IV tube will be inserted into one of your veins.  · The bag of donated blood will be attached to your IV tube. The blood will then enter through your vein.  · Your temperature, blood pressure, and pulse will be monitored regularly during the transfusion. This monitoring is done to detect early signs of a transfusion reaction.  · If you have any signs or symptoms of a reaction, your transfusion will be stopped and you may be given medicine.  · When the transfusion is complete, your IV tube will be  removed.  · Pressure may be applied to the IV site for a few minutes.  · A bandage (dressing) will be applied.  The procedure may vary among health care providers and hospitals.  What happens after the procedure?  · Your temperature, blood pressure, heart rate, breathing rate, and blood oxygen level will be monitored often.  · Your blood may be tested to see how you are responding to the transfusion.  · You may be warmed with fluids or blankets to maintain a normal body temperature.  Summary  · A blood transfusion is a procedure in which you receive donated blood, including plasma, platelets, and red blood cells, through an IV tube.  · Your temperature, blood pressure, and pulse will be monitored before, during, and after the transfusion.  · Your blood may be tested after the transfusion to see how your body has responded.  This information is not intended to replace advice given to you by your health care provider. Make sure you discuss any questions you have with your health care provider.  Document Released: 12/15/2001 Document Revised: 09/14/2017 Document Reviewed: 09/14/2017  [employer] Interactive Patient Education © 2019 [employer]

## 2020-01-22 LAB
ABO + RH BLD: NORMAL
ABO + RH BLD: NORMAL
BH BB BLOOD EXPIRATION DATE: NORMAL
BH BB BLOOD EXPIRATION DATE: NORMAL
BH BB BLOOD TYPE BARCODE: 6200
BH BB BLOOD TYPE BARCODE: 6200
BH BB DISPENSE STATUS: NORMAL
BH BB DISPENSE STATUS: NORMAL
BH BB PRODUCT CODE: NORMAL
BH BB PRODUCT CODE: NORMAL
BH BB UNIT NUMBER: NORMAL
BH BB UNIT NUMBER: NORMAL
CROSSMATCH INTERPRETATION: NORMAL
CROSSMATCH INTERPRETATION: NORMAL
UNIT  ABO: NORMAL
UNIT  ABO: NORMAL
UNIT  RH: NORMAL
UNIT  RH: NORMAL

## 2020-06-04 ENCOUNTER — TELEPHONE (OUTPATIENT)
Dept: CARDIOLOGY | Facility: CLINIC | Age: 65
End: 2020-06-04

## 2020-06-04 NOTE — TELEPHONE ENCOUNTER
Patient called and left a voicemail stating she was recently at [place] and had a heart attack.  She is scheduled to see Dr. Coker Monday, but is experiencing weakness and does not feel well.  She is asking if she can be seen sooner than Monday.  Can you check and call her if so?  She can be reached at [phone].  Thank you!

## 2020-06-05 ENCOUNTER — OFFICE VISIT (OUTPATIENT)
Dept: CARDIOLOGY | Facility: CLINIC | Age: 65
End: 2020-06-05

## 2020-06-05 VITALS
WEIGHT: 160 LBS | DIASTOLIC BLOOD PRESSURE: 58 MMHG | SYSTOLIC BLOOD PRESSURE: 98 MMHG | OXYGEN SATURATION: 99 % | HEART RATE: 76 BPM | HEIGHT: 63 IN | BODY MASS INDEX: 28.35 KG/M2

## 2020-06-05 DIAGNOSIS — I10 ESSENTIAL HYPERTENSION: ICD-10-CM

## 2020-06-05 DIAGNOSIS — E78.5 DYSLIPIDEMIA: ICD-10-CM

## 2020-06-05 DIAGNOSIS — I31.39 PERICARDIAL EFFUSION: Primary | ICD-10-CM

## 2020-06-05 DIAGNOSIS — N18.6 STAGE 5 CHRONIC KIDNEY DISEASE ON CHRONIC DIALYSIS (HCC): ICD-10-CM

## 2020-06-05 DIAGNOSIS — I25.10 CORONARY ARTERY DISEASE INVOLVING NATIVE CORONARY ARTERY OF NATIVE HEART WITHOUT ANGINA PECTORIS: ICD-10-CM

## 2020-06-05 DIAGNOSIS — Z99.2 STAGE 5 CHRONIC KIDNEY DISEASE ON CHRONIC DIALYSIS (HCC): ICD-10-CM

## 2020-06-05 PROCEDURE — 99214 OFFICE O/P EST MOD 30 MIN: CPT | Performed by: INTERNAL MEDICINE

## 2020-06-05 PROCEDURE — 93000 ELECTROCARDIOGRAM COMPLETE: CPT | Performed by: INTERNAL MEDICINE

## 2020-06-05 RX ORDER — FAMOTIDINE 20 MG/1
20 TABLET, FILM COATED ORAL DAILY
Status: ON HOLD | COMMUNITY
End: 2021-01-01 | Stop reason: SDUPTHER

## 2020-06-05 RX ORDER — HYDRALAZINE HYDROCHLORIDE 25 MG/1
25 TABLET, FILM COATED ORAL DAILY PRN
COMMUNITY
End: 2021-01-01

## 2020-06-05 RX ORDER — IRBESARTAN 300 MG/1
300 TABLET ORAL DAILY
Status: ON HOLD | COMMUNITY
End: 2021-01-01 | Stop reason: SDUPTHER

## 2020-06-05 NOTE — PROGRESS NOTES
Reason for Visit: cardiovascular follow up.    HPI:  Jennifer Salcido is a 64 y.o. female is here today for follow-up.  She previously had a heart catheterization in September prior to her kidney transplant.  This demonstrated severe three-vessel coronary artery disease.  She reports she was recently transferred to [facility] over Mother's Day weekend due to cardiac issues.  She reports that an emergent pericardiocentesis was performed at [facility].  She has not felt well since discharge.  She is told if symptoms return she would need a pericardial window.  There are only limited records available from [facility].  Since discharge she has been feeling tired, weak, no energy, pleuritic chest pain.  She has continued to undergo dialysis regularly.    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    Patient Active Problem List   Diagnosis   • End stage kidney disease (CMS/[facility])   • Dyslipidemia   • Coronary artery disease involving native coronary artery of native heart without angina pectoris   • Essential hypertension   • Type 2 diabetes mellitus with kidney complication, with long-term current use of insulin (CMS/[facility])   • Stage 5 chronic kidney disease on chronic dialysis (CMS/[facility])   • Patient awaiting renal transplant       Social History     Tobacco Use   • Smoking status: Former Smoker     Packs/day: 0.50     Years: 2.00     Pack years: 1.00   • Smokeless tobacco: Never Used   Substance Use Topics   • Alcohol use: No   • Drug use: No       Family History   Problem Relation Age of Onset   • Heart  disease Father    • Breast cancer Neg Hx        The following portions of the patient's history were reviewed and updated as appropriate: allergies, current medications, past family history, past medical history, past social history, past surgical history and problem list.      Current Outpatient Medications:   •  albuterol (ACCUNEB) 1.25 MG/3ML nebulizer solution, Take 3 mL by nebulization Every 6 (Six) Hours As Needed for Wheezing., Disp: 16 vial, Rfl: 0  •  allopurinol (ZYLOPRIM) 100 MG tablet, allopurinol 100 mg tablet  Take 1 tablet every day by oral route., Disp: , Rfl:   •  aspirin 81 MG EC tablet, Take 81 mg by mouth Daily., Disp: , Rfl:   •  Bisacodyl (CORRECTIVE LAXATIVE PO), Corrective Laxative  PRN, Disp: , Rfl:   •  bumetanide (BUMEX) 2 MG tablet, Every 12 (Twelve) Hours., Disp: , Rfl:   •  Calcium 500-125 MG-UNIT tablet, Take  by mouth., Disp: , Rfl:   •  citalopram (CeleXA) 40 MG tablet, citalopram 40 mg tablet  Take 1 tablet every day by oral route., Disp: , Rfl:   •  doxazosin (CARDURA) 4 MG tablet, Take 4 mg by mouth 2 (Two) Times a Day., Disp: , Rfl:   •  famotidine (PEPCID) 10 MG tablet, Take 10 mg by mouth 2 (Two) Times a Day., Disp: , Rfl:   •  gentamicin (GARAMYCIN) 0.1 % ointment, Apply  topically to the appropriate area as directed., Disp: , Rfl:   •  hydrALAZINE (APRESOLINE) 25 MG tablet, Take 25 mg by mouth 3 (Three) Times a Day., Disp: , Rfl:   •  insulin detemir (LEVEMIR) 100 UNIT/ML injection, Inject  under the skin into the appropriate area as directed Daily., Disp: , Rfl:   •  irbesartan (AVAPRO) 150 MG tablet, Take 150 mg by mouth Every Night., Disp: , Rfl:   •  melatonin 3 MG tablet, melatonin  3 mg at HS, Disp: , Rfl:   •  Multiple Vitamins-Minerals (MULTIVITAMIN ADULT PO), multivitamin, Disp: , Rfl:   •  ondansetron ODT (ZOFRAN-ODT) 4 MG disintegrating tablet, Take 1 tablet by mouth Every 6 (Six) Hours As Needed for Nausea or Vomiting., Disp: 12 tablet, Rfl: 0  •  pantoprazole  "(PROTONIX) 40 MG EC tablet, Take  by mouth., Disp: , Rfl:   •  potassium chloride (KLOR-CON) 10 MEQ CR tablet, Take  by mouth., Disp: , Rfl:   •  raNITIdine (ZANTAC) 150 MG tablet, Take 150 mg by mouth Every Night., Disp: , Rfl:   •  simvastatin (ZOCOR) 40 MG tablet, TAKE 1 TABLET BY MOUTH EVERY DAY AT NIGHT, Disp: 90 tablet, Rfl: 3    Review of Systems   Constitution: Positive for malaise/fatigue. Negative for chills and fever.   Cardiovascular: Positive for chest pain (pleuritic). Negative for paroxysmal nocturnal dyspnea.   Respiratory: Positive for shortness of breath. Negative for cough.    Skin: Negative for rash.   Musculoskeletal: Positive for muscle weakness.   Gastrointestinal: Positive for nausea. Negative for abdominal pain and heartburn.   Neurological: Negative for dizziness and numbness.   Psychiatric/Behavioral: Positive for depression.       Objective   BP 98/58 (BP Location: Left arm, Patient Position: Sitting, Cuff Size: Adult)   Pulse 76   Ht 160 cm (62.99\")   Wt 72.6 kg (160 lb)   SpO2 99%   BMI 28.35 kg/m²   Physical Exam   Constitutional: She is oriented to person, place, and time. She appears well-developed and well-nourished.   HENT:   Head: Normocephalic and atraumatic.   Cardiovascular: Normal rate, regular rhythm and normal heart sounds.   No murmur heard.  Pulmonary/Chest: Effort normal and breath sounds normal.   Abdominal: She exhibits no distension.   Musculoskeletal: She exhibits no edema.   Neurological: She is alert and oriented to person, place, and time.   Skin: Skin is warm and dry.   Psychiatric: She has a normal mood and affect.       ECG 12 Lead  Date/Time: 6/5/2020 10:25 AM  Performed by: Srikanth Coker MD  Authorized by: Srikanth Coker MD   Comparison: compared with previous ECG from 1/5/2020  Rhythm: sinus rhythm  Rate: normal  QRS axis: right  Other findings: non-specific ST-T wave changes and poor R wave progression              ICD-10-CM ICD-9-CM   1. " Pericardial effusion I31.3 423.9   2. Coronary artery disease involving native coronary artery of native heart without angina pectoris I25.10 414.01   3. Dyslipidemia E78.5 272.4   4. Essential hypertension I10 401.9   5. Stage 5 chronic kidney disease on chronic dialysis (CMS/[facility]) N18.6 585.6    Z99.2 V45.11         Assessment/Plan:  1.  Pericardial effusion: Recent episode of pericardial tamponade status post pericardiocentesis at [place].  Will obtain recent cardiac records from [place].  Order repeat echo for surveillance, particularly since she has felt poorly recently with a variety of nonspecific symptoms.    2.  Coronary artery disease: History of PCI to RCA at [place] in 2016 as part of preoperative workup for kidney transplant.  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   She denies any anginal symptoms and her only chest pain complaints are pleuritic is most likely due to her pericardial disease.  Continue aspirin and simvastatin.     3.  Dyslipidemia: Continue simvastatin.  Repeat lipid panel.     4.  Essential hypertension: Blood pressure remains low normal today for medication.     5.  Chronic kidney disease stage V: She had found a kidney donor but kidney transplant is on hold due to current issues.  Continue dialysis.

## 2020-06-07 ENCOUNTER — APPOINTMENT (OUTPATIENT)
Dept: CT IMAGING | Facility: HOSPITAL | Age: 65
End: 2020-06-07

## 2020-06-07 ENCOUNTER — APPOINTMENT (OUTPATIENT)
Dept: GENERAL RADIOLOGY | Facility: HOSPITAL | Age: 65
End: 2020-06-07

## 2020-06-07 ENCOUNTER — HOSPITAL ENCOUNTER (EMERGENCY)
Facility: HOSPITAL | Age: 65
Discharge: HOME | End: 2020-06-07
Admitting: EMERGENCY MEDICINE

## 2020-06-07 VITALS
SYSTOLIC BLOOD PRESSURE: 147 MMHG | BODY MASS INDEX: 28.35 KG/M2 | RESPIRATION RATE: 16 BRPM | DIASTOLIC BLOOD PRESSURE: 81 MMHG | OXYGEN SATURATION: 100 % | HEART RATE: 68 BPM | HEIGHT: 63 IN | WEIGHT: 160 LBS | TEMPERATURE: 98.1 F

## 2020-06-07 DIAGNOSIS — R06.02 SOB (SHORTNESS OF BREATH): ICD-10-CM

## 2020-06-07 DIAGNOSIS — R07.9 CHEST PAIN, UNSPECIFIED TYPE: ICD-10-CM

## 2020-06-07 DIAGNOSIS — I30.9 ACUTE PERICARDITIS, UNSPECIFIED TYPE: Primary | ICD-10-CM

## 2020-06-07 LAB
ALBUMIN SERPL-MCNC: 3.2 G/DL (ref 3.5–5.2)
ALBUMIN/GLOB SERPL: 0.9 G/DL
ALP SERPL-CCNC: 65 U/L (ref 39–117)
ALT SERPL W P-5'-P-CCNC: 10 U/L (ref 1–33)
ANION GAP SERPL CALCULATED.3IONS-SCNC: 17 MMOL/L (ref 5–15)
APTT PPP: 32 SECONDS (ref 24.1–35)
AST SERPL-CCNC: 17 U/L (ref 1–32)
BACTERIA UR QL AUTO: ABNORMAL /HPF
BASOPHILS # BLD AUTO: 0.08 10*3/MM3 (ref 0–0.2)
BASOPHILS NFR BLD AUTO: 0.8 % (ref 0–1.5)
BILIRUB SERPL-MCNC: 0.5 MG/DL (ref 0.2–1.2)
BILIRUB UR QL STRIP: NEGATIVE
BUN BLD-MCNC: 47 MG/DL (ref 8–23)
BUN/CREAT SERPL: 4.1 (ref 7–25)
CALCIUM SPEC-SCNC: 10.1 MG/DL (ref 8.6–10.5)
CHLORIDE SERPL-SCNC: 88 MMOL/L (ref 98–107)
CLARITY UR: CLEAR
CO2 SERPL-SCNC: 27 MMOL/L (ref 22–29)
COLOR UR: YELLOW
CREAT BLD-MCNC: 11.56 MG/DL (ref 0.57–1)
CRP SERPL-MCNC: 11.97 MG/DL (ref 0–0.5)
D-LACTATE SERPL-SCNC: 1.1 MMOL/L (ref 0.5–2)
DEPRECATED RDW RBC AUTO: 54.1 FL (ref 37–54)
EOSINOPHIL # BLD AUTO: 0.53 10*3/MM3 (ref 0–0.4)
EOSINOPHIL NFR BLD AUTO: 5 % (ref 0.3–6.2)
ERYTHROCYTE [DISTWIDTH] IN BLOOD BY AUTOMATED COUNT: 16.2 % (ref 12.3–15.4)
ERYTHROCYTE [SEDIMENTATION RATE] IN BLOOD: 53 MM/HR (ref 0–20)
GFR SERPL CREATININE-BSD FRML MDRD: 3 ML/MIN/1.73
GFR SERPL CREATININE-BSD FRML MDRD: ABNORMAL ML/MIN/{1.73_M2}
GLOBULIN UR ELPH-MCNC: 3.7 GM/DL
GLUCOSE BLD-MCNC: 132 MG/DL (ref 65–99)
GLUCOSE UR STRIP-MCNC: NEGATIVE MG/DL
HCT VFR BLD AUTO: 33.6 % (ref 34–46.6)
HGB BLD-MCNC: 11.2 G/DL (ref 12–15.9)
HGB UR QL STRIP.AUTO: NEGATIVE
HOLD SPECIMEN: NORMAL
HOLD SPECIMEN: NORMAL
HYALINE CASTS UR QL AUTO: ABNORMAL /LPF
IMM GRANULOCYTES # BLD AUTO: 0.13 10*3/MM3 (ref 0–0.05)
IMM GRANULOCYTES NFR BLD AUTO: 1.2 % (ref 0–0.5)
INR PPP: 1.05 (ref 0.91–1.09)
KETONES UR QL STRIP: NEGATIVE
LEUKOCYTE ESTERASE UR QL STRIP.AUTO: ABNORMAL
LYMPHOCYTES # BLD AUTO: 1.39 10*3/MM3 (ref 0.7–3.1)
LYMPHOCYTES NFR BLD AUTO: 13.1 % (ref 19.6–45.3)
MCH RBC QN AUTO: 30.7 PG (ref 26.6–33)
MCHC RBC AUTO-ENTMCNC: 33.3 G/DL (ref 31.5–35.7)
MCV RBC AUTO: 92.1 FL (ref 79–97)
MONOCYTES # BLD AUTO: 0.84 10*3/MM3 (ref 0.1–0.9)
MONOCYTES NFR BLD AUTO: 7.9 % (ref 5–12)
NEUTROPHILS # BLD AUTO: 7.66 10*3/MM3 (ref 1.7–7)
NEUTROPHILS NFR BLD AUTO: 72 % (ref 42.7–76)
NITRITE UR QL STRIP: NEGATIVE
NRBC BLD AUTO-RTO: 0 /100 WBC (ref 0–0.2)
NT-PROBNP SERPL-MCNC: ABNORMAL PG/ML (ref 5–900)
PH UR STRIP.AUTO: >=9 [PH] (ref 5–8)
PLATELET # BLD AUTO: 347 10*3/MM3 (ref 140–450)
PMV BLD AUTO: 10.5 FL (ref 6–12)
POTASSIUM BLD-SCNC: 4.6 MMOL/L (ref 3.5–5.2)
PROCALCITONIN SERPL-MCNC: 0.22 NG/ML (ref 0.1–0.25)
PROT SERPL-MCNC: 6.9 G/DL (ref 6–8.5)
PROT UR QL STRIP: ABNORMAL
PROTHROMBIN TIME: 13.3 SECONDS (ref 11.9–14.6)
RBC # BLD AUTO: 3.65 10*6/MM3 (ref 3.77–5.28)
RBC # UR: ABNORMAL /HPF
REF LAB TEST METHOD: ABNORMAL
SODIUM BLD-SCNC: 132 MMOL/L (ref 136–145)
SP GR UR STRIP: 1.01 (ref 1–1.03)
SQUAMOUS #/AREA URNS HPF: ABNORMAL /HPF
TROPONIN T SERPL-MCNC: 0.27 NG/ML (ref 0–0.03)
UROBILINOGEN UR QL STRIP: ABNORMAL
WBC NRBC COR # BLD: 10.63 10*3/MM3 (ref 3.4–10.8)
WBC UR QL AUTO: ABNORMAL /HPF
WHOLE BLOOD HOLD SPECIMEN: NORMAL
WHOLE BLOOD HOLD SPECIMEN: NORMAL

## 2020-06-07 PROCEDURE — 71046 X-RAY EXAM CHEST 2 VIEWS: CPT

## 2020-06-07 PROCEDURE — 71275 CT ANGIOGRAPHY CHEST: CPT

## 2020-06-07 PROCEDURE — 96375 TX/PRO/DX INJ NEW DRUG ADDON: CPT

## 2020-06-07 PROCEDURE — 81001 URINALYSIS AUTO W/SCOPE: CPT | Performed by: PHYSICIAN ASSISTANT

## 2020-06-07 PROCEDURE — 96374 THER/PROPH/DIAG INJ IV PUSH: CPT

## 2020-06-07 PROCEDURE — 70450 CT HEAD/BRAIN W/O DYE: CPT

## 2020-06-07 PROCEDURE — 85651 RBC SED RATE NONAUTOMATED: CPT | Performed by: PHYSICIAN ASSISTANT

## 2020-06-07 PROCEDURE — 84145 PROCALCITONIN (PCT): CPT | Performed by: PHYSICIAN ASSISTANT

## 2020-06-07 PROCEDURE — 93005 ELECTROCARDIOGRAM TRACING: CPT | Performed by: FAMILY MEDICINE

## 2020-06-07 PROCEDURE — 84484 ASSAY OF TROPONIN QUANT: CPT | Performed by: PHYSICIAN ASSISTANT

## 2020-06-07 PROCEDURE — 83880 ASSAY OF NATRIURETIC PEPTIDE: CPT | Performed by: PHYSICIAN ASSISTANT

## 2020-06-07 PROCEDURE — 93010 ELECTROCARDIOGRAM REPORT: CPT | Performed by: INTERNAL MEDICINE

## 2020-06-07 PROCEDURE — 85025 COMPLETE CBC W/AUTO DIFF WBC: CPT | Performed by: PHYSICIAN ASSISTANT

## 2020-06-07 PROCEDURE — 87040 BLOOD CULTURE FOR BACTERIA: CPT | Performed by: PHYSICIAN ASSISTANT

## 2020-06-07 PROCEDURE — 85730 THROMBOPLASTIN TIME PARTIAL: CPT | Performed by: PHYSICIAN ASSISTANT

## 2020-06-07 PROCEDURE — 80053 COMPREHEN METABOLIC PANEL: CPT | Performed by: PHYSICIAN ASSISTANT

## 2020-06-07 PROCEDURE — 83605 ASSAY OF LACTIC ACID: CPT | Performed by: PHYSICIAN ASSISTANT

## 2020-06-07 PROCEDURE — 99284 EMERGENCY DEPT VISIT MOD MDM: CPT

## 2020-06-07 PROCEDURE — 85610 PROTHROMBIN TIME: CPT | Performed by: PHYSICIAN ASSISTANT

## 2020-06-07 PROCEDURE — 25010000002 MORPHINE PER 10 MG: Performed by: FAMILY MEDICINE

## 2020-06-07 PROCEDURE — 93005 ELECTROCARDIOGRAM TRACING: CPT | Performed by: PHYSICIAN ASSISTANT

## 2020-06-07 PROCEDURE — 0 IOPAMIDOL PER 1 ML: Performed by: PHYSICIAN ASSISTANT

## 2020-06-07 PROCEDURE — 86140 C-REACTIVE PROTEIN: CPT | Performed by: PHYSICIAN ASSISTANT

## 2020-06-07 PROCEDURE — 25010000002 ONDANSETRON PER 1 MG: Performed by: FAMILY MEDICINE

## 2020-06-07 RX ORDER — ASPIRIN 81 MG/1
324 TABLET, CHEWABLE ORAL ONCE
Status: COMPLETED | OUTPATIENT
Start: 2020-06-07 | End: 2020-06-07

## 2020-06-07 RX ORDER — ONDANSETRON 2 MG/ML
4 INJECTION INTRAMUSCULAR; INTRAVENOUS ONCE
Status: COMPLETED | OUTPATIENT
Start: 2020-06-07 | End: 2020-06-07

## 2020-06-07 RX ORDER — COLCHICINE 0.6 MG/1
0.6 TABLET ORAL ONCE
Status: COMPLETED | OUTPATIENT
Start: 2020-06-07 | End: 2020-06-07

## 2020-06-07 RX ORDER — COLCHICINE 0.6 MG/1
0.6 TABLET ORAL DAILY
Qty: 10 TABLET | Refills: 0 | Status: SHIPPED | OUTPATIENT
Start: 2020-06-07 | End: 2020-06-17

## 2020-06-07 RX ORDER — SODIUM CHLORIDE 0.9 % (FLUSH) 0.9 %
10 SYRINGE (ML) INJECTION AS NEEDED
Status: DISCONTINUED | OUTPATIENT
Start: 2020-06-07 | End: 2020-06-08 | Stop reason: HOSPADM

## 2020-06-07 RX ADMIN — IOPAMIDOL 125 ML: 755 INJECTION, SOLUTION INTRAVENOUS at 20:55

## 2020-06-07 RX ADMIN — COLCHICINE 0.6 MG: 0.6 TABLET, FILM COATED ORAL at 21:32

## 2020-06-07 RX ADMIN — ONDANSETRON HYDROCHLORIDE 4 MG: 2 SOLUTION INTRAMUSCULAR; INTRAVENOUS at 21:20

## 2020-06-07 RX ADMIN — MORPHINE SULFATE 4 MG: 4 INJECTION, SOLUTION INTRAMUSCULAR; INTRAVENOUS at 21:20

## 2020-06-07 RX ADMIN — ASPIRIN 81 MG 324 MG: 81 TABLET ORAL at 20:02

## 2020-06-08 ENCOUNTER — HOSPITAL ENCOUNTER (OUTPATIENT)
Dept: CARDIOLOGY | Facility: HOSPITAL | Age: 65
Discharge: HOME | End: 2020-06-08
Admitting: INTERNAL MEDICINE

## 2020-06-08 VITALS
SYSTOLIC BLOOD PRESSURE: 137 MMHG | DIASTOLIC BLOOD PRESSURE: 57 MMHG | WEIGHT: 160.05 LBS | HEIGHT: 63 IN | BODY MASS INDEX: 28.36 KG/M2

## 2020-06-08 LAB
BH CV ECHO MEAS - AO MAX PG (FULL): 4 MMHG
BH CV ECHO MEAS - AO MAX PG: 9.6 MMHG
BH CV ECHO MEAS - AO MEAN PG (FULL): 3 MMHG
BH CV ECHO MEAS - AO MEAN PG: 6 MMHG
BH CV ECHO MEAS - AO ROOT AREA (BSA CORRECTED): 1.4
BH CV ECHO MEAS - AO ROOT AREA: 4.9 CM^2
BH CV ECHO MEAS - AO ROOT DIAM: 2.5 CM
BH CV ECHO MEAS - AO V2 MAX: 155 CM/SEC
BH CV ECHO MEAS - AO V2 MEAN: 114 CM/SEC
BH CV ECHO MEAS - AO V2 VTI: 34 CM
BH CV ECHO MEAS - AVA(I,A): 2.3 CM^2
BH CV ECHO MEAS - AVA(I,D): 2.3 CM^2
BH CV ECHO MEAS - AVA(V,A): 2.4 CM^2
BH CV ECHO MEAS - AVA(V,D): 2.4 CM^2
BH CV ECHO MEAS - BSA(HAYCOCK): 1.8 M^2
BH CV ECHO MEAS - BSA: 1.8 M^2
BH CV ECHO MEAS - BZI_BMI: 28.3 KILOGRAMS/M^2
BH CV ECHO MEAS - BZI_METRIC_HEIGHT: 160 CM
BH CV ECHO MEAS - BZI_METRIC_WEIGHT: 72.6 KG
BH CV ECHO MEAS - EDV(CUBED): 181.3 ML
BH CV ECHO MEAS - EDV(MOD-SP4): 82.1 ML
BH CV ECHO MEAS - EDV(TEICH): 157.5 ML
BH CV ECHO MEAS - EF(CUBED): 72.7 %
BH CV ECHO MEAS - EF(MOD-SP4): 51 %
BH CV ECHO MEAS - EF(TEICH): 63.8 %
BH CV ECHO MEAS - ESV(CUBED): 49.4 ML
BH CV ECHO MEAS - ESV(MOD-SP4): 40.2 ML
BH CV ECHO MEAS - ESV(TEICH): 57 ML
BH CV ECHO MEAS - FS: 35.2 %
BH CV ECHO MEAS - IVS/LVPW: 0.96
BH CV ECHO MEAS - IVSD: 1.2 CM
BH CV ECHO MEAS - LA DIMENSION: 4.6 CM
BH CV ECHO MEAS - LA/AO: 1.8
BH CV ECHO MEAS - LV DIASTOLIC VOL/BSA (35-75): 46.7 ML/M^2
BH CV ECHO MEAS - LV MASS(C)D: 283.8 GRAMS
BH CV ECHO MEAS - LV MASS(C)DI: 161.3 GRAMS/M^2
BH CV ECHO MEAS - LV MAX PG: 5.6 MMHG
BH CV ECHO MEAS - LV MEAN PG: 3 MMHG
BH CV ECHO MEAS - LV SYSTOLIC VOL/BSA (12-30): 22.9 ML/M^2
BH CV ECHO MEAS - LV V1 MAX: 118 CM/SEC
BH CV ECHO MEAS - LV V1 MEAN: 85.3 CM/SEC
BH CV ECHO MEAS - LV V1 VTI: 25 CM
BH CV ECHO MEAS - LVIDD: 5.7 CM
BH CV ECHO MEAS - LVIDS: 3.7 CM
BH CV ECHO MEAS - LVLD AP4: 7.3 CM
BH CV ECHO MEAS - LVLS AP4: 5.7 CM
BH CV ECHO MEAS - LVOT AREA (M): 3.1 CM^2
BH CV ECHO MEAS - LVOT AREA: 3.1 CM^2
BH CV ECHO MEAS - LVOT DIAM: 2 CM
BH CV ECHO MEAS - LVPWD: 1.2 CM
BH CV ECHO MEAS - MV A MAX VEL: 119 CM/SEC
BH CV ECHO MEAS - MV DEC TIME: 0.34 SEC
BH CV ECHO MEAS - MV E MAX VEL: 76 CM/SEC
BH CV ECHO MEAS - MV E/A: 0.64
BH CV ECHO MEAS - RAP SYSTOLE: 5 MMHG
BH CV ECHO MEAS - RVSP: 24.9 MMHG
BH CV ECHO MEAS - SI(AO): 94.9 ML/M^2
BH CV ECHO MEAS - SI(CUBED): 75 ML/M^2
BH CV ECHO MEAS - SI(LVOT): 44.7 ML/M^2
BH CV ECHO MEAS - SI(MOD-SP4): 23.8 ML/M^2
BH CV ECHO MEAS - SI(TEICH): 57.1 ML/M^2
BH CV ECHO MEAS - SV(AO): 166.9 ML
BH CV ECHO MEAS - SV(CUBED): 131.9 ML
BH CV ECHO MEAS - SV(LVOT): 78.5 ML
BH CV ECHO MEAS - SV(MOD-SP4): 41.9 ML
BH CV ECHO MEAS - SV(TEICH): 100.5 ML
BH CV ECHO MEAS - TR MAX VEL: 223 CM/SEC
LEFT ATRIUM VOLUME INDEX: 32.5 ML/M2
LEFT ATRIUM VOLUME: 57.2 CM3
MAXIMAL PREDICTED HEART RATE: 156 BPM
STRESS TARGET HR: 133 BPM

## 2020-06-08 PROCEDURE — 93321 DOPPLER ECHO F-UP/LMTD STD: CPT | Performed by: INTERNAL MEDICINE

## 2020-06-08 PROCEDURE — 93325 DOPPLER ECHO COLOR FLOW MAPG: CPT | Performed by: INTERNAL MEDICINE

## 2020-06-08 PROCEDURE — 93321 DOPPLER ECHO F-UP/LMTD STD: CPT

## 2020-06-08 PROCEDURE — 93308 TTE F-UP OR LMTD: CPT

## 2020-06-08 PROCEDURE — 93325 DOPPLER ECHO COLOR FLOW MAPG: CPT

## 2020-06-08 PROCEDURE — 93308 TTE F-UP OR LMTD: CPT | Performed by: INTERNAL MEDICINE

## 2020-06-08 NOTE — DISCHARGE INSTRUCTIONS
It is very important that you follow up with Dr. Coker's office to let them know of your findings of pericarditis and for close follow up and monitoring.   Pericarditis, as we discussed, is an irritation/inflammation of the lining around your heart. This was caused by the manipulation and stress your heart has recently undergone.   Please read below for further information regarding pericarditis.         Pericarditis    Pericarditis is inflammation of the pericardium. The pericardium is a thin, double-layered, fluid-filled sac that surrounds your heart. The pericardium protects and holds your heart in your chest cavity.  Inflammation of your pericardium can cause rubbing (friction) between the two layers when your heart beats. Fluid may also build up between the layers of the sac (pericardial effusion).  There are three types of this condition:  · Acute pericarditis. Inflammation develops suddenly and causes pericardial effusion.  · Chronic pericarditis. Inflammation may develop slowly, or it may continue after acute pericarditis and last longer than 6 months.  · Constrictive pericarditis (rare). The layers of the pericardium stiffen and develop scar tissue. The scar tissue thickens and sticks together. This makes it difficult for the heart to work in a normal way.  In most cases, pericarditis is acute and not serious. Chronic pericarditis and constrictive pericarditis are more serious and may require treatment.  What are the causes?  In most cases, the cause of this condition is not known. If a cause is found, it may be:  · An infection from a virus.  · A heart attack (myocardial infarction).  · Problems after open-heart surgery.  · Chest injury.  · Autoimmune disorder. The body's defense system (immune system) attacks healthy tissues. Examples include lupus or rheumatoid arthritis.  · Kidney failure.  · Underactive thyroid gland (hypothyroidism).  · Cancer that has spread (metastasized) to the pericardium from  another part of the body.  · Treatment using high-energy X-rays (radiation).  · Certain medicines, including some seizure medicines, blood thinners, heart medicines, and antibiotics.  · Infection from a fungus or bacteria (rare).  What increases the risk?  The following factors may make you more likely to develop this condition:  · Being male.  · Being 20-50 years old.  · Having had pericarditis before.  · Having had a recent upper respiratory tract infection.  What are the signs or symptoms?  The main symptom of this condition is chest pain. The chest pain may:  · Be in the center or the left side of your chest.  · Not go away with rest.  · Last for many hours or days.  · Worsen when you lie down and get better when you sit up and lean forward.  · Worsen when you swallow.  · Move to your back, neck, or shoulder.  Other symptoms may include:  · A chronic, dry cough.  · Heart palpitations. These may feel like rapid, fluttering, or pounding heartbeats.  · Dizziness or fainting.  · Tiredness or fatigue.  · Fever.  · Rapid breathing.  · Shortness of breath when lying down.  How is this diagnosed?  This condition is diagnosed based on medical history, physical exam, and diagnostic tests. During your physical exam, your health care provider will listen for friction while your heart beats (pericardial rub). You may also have tests, including:  · Blood tests to look for signs of infection and inflammation.  · Electrocardiogram (ECG). This test measures the electrical activity in your heart.  · Echocardiogram. This uses sound waves to make images of your heart.  · CT scan.  · MRI.  · Culture of pericardial fluid.  · Removing a tissue sample of the pericardium to look at under a microscope (biopsy).  If tests show that you may have constrictive pericarditis, a small, thin tube may be inserted into your heart to confirm the diagnosis (cardiac catheterization).  How is this treated?  Treatment for this condition depends on the  cause and type of pericarditis that you have. In most cases, acute pericarditis will clear up on its own. Treatment may include:  · Limiting physical activity.  · Medicines, such as:  ? NSAIDs to relieve pain and inflammation.  ? Steroids to reduce inflammation.  ? Colchicine to relieve pain and inflammation.  · A procedure to remove fluid using a needle (pericardiocentesis) if fluid buildup puts pressure on the heart.  · Surgery to remove part of the pericardium if constrictive pericarditis develops.  If another condition is causing your pericarditis, you may also need treatment for that condition.  Follow these instructions at home:  · Limit your activity as told by your health care provider until your symptoms improve. This usually includes:  ? Resting or sitting for most of the day.  ? No long walks.  ? No exercise.  ? No sports.  · Athletes may need to limit competition for several months.  · Do not use any products that contain nicotine or tobacco, such as cigarettes and e-cigarettes. If you need help quitting, ask your health care provider.  · Eat a heart-healthy diet. Ask your dietitian what foods are healthy for your heart.  · Take over-the-counter and prescription medicines only as told by your health care provider.  ? If you were prescribed an antibiotic medicine, take it as told by your health care provider. Do not stop taking the antibiotic even if you start to feel better.  · Keep all follow-up visits as told by your health care provider. This is important.  Contact a health care provider if:  · You continue to have symptoms of pericarditis.  · You develop new symptoms of pericarditis.  · Your symptoms get worse.  · You have a fever.  Get help right away if:  · You have worsening chest pain.   · You have difficulty breathing.  · You pass out.  These symptoms may represent a serious problem that is an emergency. Do not wait to see if the symptoms will go away. Get medical help right away. Call your  local emergency services (911 in the [place] Do not drive yourself to the hospital.  Summary  · Pericarditis is inflammation of the pericardium.  · The pericardium is a thin, double-layered, fluid-filled sac that surrounds your heart.  · The main symptom of this condition is chest pain.  · Treatment for this condition depends on the cause and type of pericarditis that you have.  This information is not intended to replace advice given to you by your health care provider. Make sure you discuss any questions you have with your health care provider.  Document Released: 06/13/2002 Document Revised: 01/24/2019 Document Reviewed: 01/24/2019  [employer] Patient Education © 2020 [employer]

## 2020-06-08 NOTE — ED PROVIDER NOTES
"Subjective   History of Present Illness    Patient is a 64-year-old female presenting to ED with chest pain.  Patient has a known history significant for ESRD on peritoneal dialysis, type 2 diabetes, coronary artery disease, cardiac stents, hypertension, hyperlipidemia, as well as gastric bypass.  Patient presents today with midsternal chest pain as well as worsening left-sided neck pain for the past 3 days.  Patient reported that her pain comes and goes however it never completely resolves.  Patient reported that she is also had significant nausea but denies any emesis.  Patient reports she has had progressively worsening shortness of breath as well as weakness which is exacerbated upon exertion.  Patient reported she is concerned that she is collecting fluid around her heart or in her lungs.  2 weeks ago patient was seen and had to be transferred emergently to [place] over Mother's Day weekend due to pericarditis as well as pericardial effusions.  At that time patient had a pericardiocentesis performed and reported that while she did feel better after this procedure since being discharged from [place] she has never felt \"well.\"  Patient is concerned because she was advised that if the fluid continues to reaccumulate she would be a candidate for a pericardial window and she feels that she is developing symptoms similar to this.  Patient denies any new development of fevers, chills, diaphoresis, /GI complaints, or any new injuries sustained.      Records reviewed show patient saw her cardiologist in the office on 6/5/2020.  At that time there was noted to be limited records available from [place] however patient did complain that since being discharged she had felt tired, weak, no energy, as well as pleuritic chest pain.  Patient is still routinely undergoing dialysis.    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  - LVEF is 40% by the Cunningham's method.  - Normal RV size with low normal systolic function  - Small pericardial effusion posterolateral to the LV. No tamponade physiology.    Labs:   - Peritoneal fluid 5/22: no evidence of peritonitis on cell counts, bacterial cultures finalized: no growth (5/21 and 5/22)  - Pericardial fluid 5/21: AFB and fungal culture NGTD, pending      Review of Systems   Constitutional: Positive for fatigue. Negative for chills, diaphoresis and fever.   HENT: Negative.    Respiratory: Positive for shortness of breath. Negative for cough and chest tightness.    Cardiovascular: Positive for chest pain and leg swelling (No change).   Gastrointestinal: Positive for nausea. Negative for abdominal pain and vomiting.   Genitourinary: Negative.    Musculoskeletal: Positive for neck pain (Left-sided).   Skin: Negative.  Negative for rash and wound.   Neurological: Positive for weakness (Generalized). Negative for dizziness, syncope and headaches.   Psychiatric/Behavioral: Negative.    All other systems reviewed and are negative.      Past Medical History:   Diagnosis Date   • Chronic kidney disease     STAGE 5    • Coronary artery disease    • Diabetes mellitus (CMS/[facility])    • Dialysis patient (CMS/[facility])    • Dyslipidemia 11/2/2018   • GERD (gastroesophageal reflux disease)    • Gout    • Hyperlipidemia    • Hypertension    • Peritoneal dialysis status (CMS/[facility])    • Type 2 diabetes mellitus with  kidney complication, with long-term current use of insulin (CMS/[facility]) 11/2/2018       Allergies   Allergen Reactions   • Antivert [Meclizine] Nausea And Vomiting   • Augmentin [Amoxicillin-Pot Clavulanate] Nausea And Vomiting   • Codeine Nausea And Vomiting   • Demerol [Meperidine] Nausea Only   • Levaquin [Levofloxacin] Nausea And Vomiting   • Lisinopril Swelling   • Morphine Nausea And Vomiting   • Sulfasalazine Nausea And Vomiting   • Ultram [Tramadol] Mental Status Change       Past Surgical History:   Procedure Laterality Date   • BREAST BIOPSY     • CARDIAC CATHETERIZATION N/A 10/1/2019    Procedure: Left Heart Cath;  Surgeon: Srikanth Coker MD;  Location:  [facility] CATH INVASIVE LOCATION;  Service: Cardiology   • CORONARY ANGIOPLASTY WITH STENT PLACEMENT      X 2   • GASTRIC BYPASS     • HYSTERECTOMY     • OOPHORECTOMY     • SINUS SURGERY     • TUBAL ABDOMINAL LIGATION         Family History   Problem Relation Age of Onset   • Heart disease Father    • Breast cancer Neg Hx        Social History     Socioeconomic History   • Marital status:      Spouse name: Not on file   • Number of children: Not on file   • Years of education: Not on file   • Highest education level: Not on file   Tobacco Use   • Smoking status: Former Smoker     Packs/day: 0.00     Years: 2.00     Pack years: 0.00   • Smokeless tobacco: Never Used   Substance and Sexual Activity   • Alcohol use: No   • Drug use: No   • Sexual activity: Defer           Objective   Physical Exam   Constitutional: She is oriented to person, place, and time. She appears well-developed and well-nourished.  Non-toxic appearance. She appears ill. She appears distressed.   HENT:   Head: Normocephalic and atraumatic.   Eyes: Pupils are equal, round, and reactive to light. EOM are normal.   Neck: Normal range of motion. Neck supple.   Cardiovascular: Normal rate, regular rhythm, intact distal pulses and normal pulses. Exam reveals distant heart sounds.   Murmur  heard.   Systolic murmur is present with a grade of 2/6.  Pulses:       Radial pulses are 2+ on the right side, and 2+ on the left side.        Posterior tibial pulses are 2+ on the right side, and 2+ on the left side.   Pulmonary/Chest: Effort normal. She has decreased breath sounds in the right lower field and the left lower field. She has no wheezes.   Abdominal: Soft. Bowel sounds are normal. There is no tenderness.   Musculoskeletal: Normal range of motion.        Right lower leg: Normal. She exhibits edema (1+). She exhibits no tenderness.        Left lower leg: Normal. She exhibits edema (1+). She exhibits no tenderness.   Neurological: She is alert and oriented to person, place, and time.   Skin: Skin is warm and dry. Capillary refill takes less than 2 seconds. She is not diaphoretic. No cyanosis. No pallor.   Psychiatric: Her behavior is normal. Her mood appears anxious. She is not agitated.   Nursing note and vitals reviewed.      Procedures           ED Course  ED Course as of Jun 17 1130   Sun Jun 07, 2020 1916 Patient in XR at this time.     [JS]   1949 Troponin elevated to 0.273.    [JS]   1950 CMP with evidence of elevated BUN at 47, elevated creatinine 11.56, hyponatremia 132, decreased chloride 88, GFR 3, anion gap 17.  CBC with no leukocytosis, H&H stables, platlets WNL.  CXR shows: Relative shallow inspiration with diminished lung volumes.  Interstitial prominence in perihilar lower lobe regions, suspect  hypoventilation and mild atelectatic changes. No parenchymal  infiltration or evidence of overt heart failure.    [JS]   1953 BNP elevated at [address]957.    [JS]   1959 Cardiology consulted    [JS]   2004 Phone discussion with Dr. Varma, cardiologist.  Feels that he is in agreement that this is acute pericarditis.  Recommends treatment with high-dose NSAIDs being that patient's pain is pleuritic in nature.  Given patient's recent needle to the chest procedure she is now having the pleuritic pain  which could have likely caused the pericarditis.  Dr. Varma request call back once remainder of labs, including inflammatory markers have resulted for further discussion of pericarditis versus other acute cardiac pathology.  Dr. Varma in agreement with continued work-up at this time with no further recommendations.    [JS]   2015 CRP elevated at 11.97. Sed rate elevated at 53.    [JS]   2017 Inflammatory markers elevated at this time with a CRP of 11.97 and sed rate of 53.  Additional phone discussion with Dr. Varma.  Is in agreement with CTA scans for further evaluation.  Dr. Varma reports that if the effusion pericardial is large, that means the pericardial fluid has reaccumulated quickly and will need to require patient to be transferred to [place] for further intervention.  Dr. Varma reports that if the pericardial fluid is small that is okay at this time and consistent with patient's recent interventions as well as consistent with diagnosis of acute pericarditis and patient at that time is safe and stable for pericarditis treatment with colchicine as well as high-dose NSAIDs and outpatient follow-up.    [JS]   2020 Discussed case with Dr. Luis John.  Dr. John in agreement with treatment plan at this time.    [JS]   2031 Per RN patient is reporting increased pain.  Will order morphine at this time.  Reviewed with patient previous reactions and patient reports that as long as she takes Zofran the morphine is fine as it just causes her GI distress and no anaphylactic or hive-like reactions.    [JS]   2108 Lactic acid WNL.    [JS]   2112 Upon reevaluation at this time discussed with patient continued pending CTA dictation.  Patient is amenable at this time with continued outpatient treatment should the pericardial effusion be small in nature and is aware that if there is large fluid reaccumulating or any other new concerns that she may need an required transfer back to [place].  Patient  with no further questions, concerns, or needs at this time.    [JS]   2120 Chest CTA shows: . No CT angiographic evidence of pulmonary embolus or acute aortic  pathology.  2. Small pericardial effusion versus thickening measuring 7 mm in  thickness.  3. Small bilateral pleural effusions with mild bilateral groundglass  opacities. Acute interstitial infectious or inflammatory change or mild  pulmonary edema considered. Correlate with patient presentation.  4. Mild abdominal ascites.  5. Hiatal hernia.    Head CT shows:  No CT evidence of an acute intracranial process.        [JS]   2121 Reviewed with Dr. John diagnosis of acute pericarditis as well as chest CTA findings.  Dr. John in agreement that patient is safe and stable for outpatient follow-up with close cardiac follow-up.    [JS]   2148 Upon reevaluation at this time patient reports she is feeling better.  Reviewed with patient once again all the labs performed here as well as the chest CTA.  Discussed with patient importance of treatment for acute pericarditis as well as described mechanism of acquiring this pathology.  Discussed with patient importance of very strict and close follow-up with her not only her primary care provider but as well as her cardiologist and specialist at [place].  Discussed very strict return precautions and need for immediate return to ED should she develop any new or worsening symptoms.  Discussed with patient that she needs to be reevaluated within the next 24 to 48 hours and if she cannot get in with her primary care provider or cardiologist she will need to return to the ED for reevaluation to assure continued improvement if not resolution of symptoms.  Patient is very appreciative for help her today with no further questions, concerns, or needs at this time and is now stable for discharge.    [JS]      ED Course User Index  [JS] Rachid Gifford PA-C                                           MDM  Number of Diagnoses or  Management Options  Acute pericarditis, unspecified type:   Chest pain, unspecified type:   SOB (shortness of breath):      Amount and/or Complexity of Data Reviewed  Clinical lab tests: ordered and reviewed  Tests in the radiology section of CPT®: ordered and reviewed  Tests in the medicine section of CPT®: reviewed and ordered  Decide to obtain previous medical records or to obtain history from someone other than the patient: yes  Review and summarize past medical records: yes  Discuss the patient with other providers: yes (Dr. John (attending), Dr. Varma (cardiologist))    Patient Progress  Patient progress: improved      Final diagnoses:   Acute pericarditis, unspecified type   SOB (shortness of breath)   Chest pain, unspecified type            Rachid Gifford PA-C  06/17/20 [id number]

## 2020-06-09 ENCOUNTER — APPOINTMENT (OUTPATIENT)
Dept: GENERAL RADIOLOGY | Facility: HOSPITAL | Age: 65
End: 2020-06-09

## 2020-06-09 ENCOUNTER — HOSPITAL ENCOUNTER (INPATIENT)
Facility: HOSPITAL | Age: 65
LOS: 1 days | Discharge: HOME | End: 2020-06-10
Attending: EMERGENCY MEDICINE | Admitting: FAMILY MEDICINE

## 2020-06-09 DIAGNOSIS — N39.0 URINARY TRACT INFECTION WITHOUT HEMATURIA, SITE UNSPECIFIED: ICD-10-CM

## 2020-06-09 DIAGNOSIS — A41.9 SEPSIS, DUE TO UNSPECIFIED ORGANISM, UNSPECIFIED WHETHER ACUTE ORGAN DYSFUNCTION PRESENT (HCC): Primary | ICD-10-CM

## 2020-06-09 PROBLEM — E11.649 TYPE 2 DIABETES MELLITUS WITH HYPOGLYCEMIA, WITHOUT LONG-TERM CURRENT USE OF INSULIN (HCC): Status: ACTIVE | Noted: 2020-06-09

## 2020-06-09 LAB
ALBUMIN SERPL-MCNC: 3.2 G/DL (ref 3.5–5.2)
ALBUMIN/GLOB SERPL: 0.9 G/DL
ALP SERPL-CCNC: 64 U/L (ref 39–117)
ALT SERPL W P-5'-P-CCNC: 9 U/L (ref 1–33)
ANION GAP SERPL CALCULATED.3IONS-SCNC: 19 MMOL/L (ref 5–15)
APPEARANCE FLD: CLEAR
AST SERPL-CCNC: 15 U/L (ref 1–32)
B PARAPERT DNA SPEC QL NAA+PROBE: NOT DETECTED
B PERT DNA SPEC QL NAA+PROBE: NOT DETECTED
BACTERIA UR QL AUTO: ABNORMAL /HPF
BASOPHILS # BLD AUTO: 0.06 10*3/MM3 (ref 0–0.2)
BASOPHILS NFR BLD AUTO: 0.5 % (ref 0–1.5)
BILIRUB SERPL-MCNC: 0.3 MG/DL (ref 0.2–1.2)
BILIRUB UR QL STRIP: NEGATIVE
BUN BLD-MCNC: 56 MG/DL (ref 8–23)
BUN/CREAT SERPL: 4.7 (ref 7–25)
C PNEUM DNA NPH QL NAA+NON-PROBE: NOT DETECTED
CALCIUM SPEC-SCNC: 9.7 MG/DL (ref 8.6–10.5)
CHLORIDE SERPL-SCNC: 89 MMOL/L (ref 98–107)
CLARITY UR: CLEAR
CO2 SERPL-SCNC: 24 MMOL/L (ref 22–29)
COLOR FLD: COLORLESS
COLOR UR: YELLOW
CREAT BLD-MCNC: 11.87 MG/DL (ref 0.57–1)
D-LACTATE SERPL-SCNC: 0.9 MMOL/L (ref 0.5–2)
DEPRECATED RDW RBC AUTO: 55 FL (ref 37–54)
EOSINOPHIL # BLD AUTO: 0.23 10*3/MM3 (ref 0–0.4)
EOSINOPHIL NFR BLD AUTO: 1.8 % (ref 0.3–6.2)
ERYTHROCYTE [DISTWIDTH] IN BLOOD BY AUTOMATED COUNT: 16.5 % (ref 12.3–15.4)
FLUAV H1 2009 PAND RNA NPH QL NAA+PROBE: NOT DETECTED
FLUAV H1 HA GENE NPH QL NAA+PROBE: NOT DETECTED
FLUAV H3 RNA NPH QL NAA+PROBE: NOT DETECTED
FLUAV SUBTYP SPEC NAA+PROBE: NOT DETECTED
FLUBV RNA ISLT QL NAA+PROBE: NOT DETECTED
GFR SERPL CREATININE-BSD FRML MDRD: 3 ML/MIN/1.73
GFR SERPL CREATININE-BSD FRML MDRD: ABNORMAL ML/MIN/{1.73_M2}
GLOBULIN UR ELPH-MCNC: 3.6 GM/DL
GLUCOSE BLD-MCNC: 83 MG/DL (ref 65–99)
GLUCOSE BLDC GLUCOMTR-MCNC: 100 MG/DL (ref 70–130)
GLUCOSE BLDC GLUCOMTR-MCNC: 113 MG/DL (ref 70–130)
GLUCOSE BLDC GLUCOMTR-MCNC: 71 MG/DL (ref 70–130)
GLUCOSE BLDC GLUCOMTR-MCNC: 97 MG/DL (ref 70–130)
GLUCOSE UR STRIP-MCNC: NEGATIVE MG/DL
HADV DNA SPEC NAA+PROBE: NOT DETECTED
HBA1C MFR BLD: 6.7 % (ref 4.8–5.6)
HCOV 229E RNA SPEC QL NAA+PROBE: NOT DETECTED
HCOV HKU1 RNA SPEC QL NAA+PROBE: NOT DETECTED
HCOV NL63 RNA SPEC QL NAA+PROBE: NOT DETECTED
HCOV OC43 RNA SPEC QL NAA+PROBE: NOT DETECTED
HCT VFR BLD AUTO: 33.8 % (ref 34–46.6)
HGB BLD-MCNC: 11 G/DL (ref 12–15.9)
HGB UR QL STRIP.AUTO: NEGATIVE
HMPV RNA NPH QL NAA+NON-PROBE: NOT DETECTED
HOLD SPECIMEN: NORMAL
HPIV1 RNA SPEC QL NAA+PROBE: NOT DETECTED
HPIV2 RNA SPEC QL NAA+PROBE: NOT DETECTED
HPIV3 RNA NPH QL NAA+PROBE: NOT DETECTED
HPIV4 P GENE NPH QL NAA+PROBE: NOT DETECTED
HYALINE CASTS UR QL AUTO: ABNORMAL /LPF
IMM GRANULOCYTES # BLD AUTO: 0.17 10*3/MM3 (ref 0–0.05)
IMM GRANULOCYTES NFR BLD AUTO: 1.3 % (ref 0–0.5)
KETONES UR QL STRIP: NEGATIVE
LEUKOCYTE ESTERASE UR QL STRIP.AUTO: ABNORMAL
LYMPHOCYTES # BLD AUTO: 0.96 10*3/MM3 (ref 0.7–3.1)
LYMPHOCYTES NFR BLD AUTO: 7.5 % (ref 19.6–45.3)
M PNEUMO IGG SER IA-ACNC: NOT DETECTED
MCH RBC QN AUTO: 30.1 PG (ref 26.6–33)
MCHC RBC AUTO-ENTMCNC: 32.5 G/DL (ref 31.5–35.7)
MCV RBC AUTO: 92.3 FL (ref 79–97)
MONOCYTES # BLD AUTO: 0.54 10*3/MM3 (ref 0.1–0.9)
MONOCYTES NFR BLD AUTO: 4.2 % (ref 5–12)
NEUTROPHILS # BLD AUTO: 10.85 10*3/MM3 (ref 1.7–7)
NEUTROPHILS NFR BLD AUTO: 84.7 % (ref 42.7–76)
NITRITE UR QL STRIP: NEGATIVE
NRBC BLD AUTO-RTO: 0 /100 WBC (ref 0–0.2)
PH UR STRIP.AUTO: 8.5 [PH] (ref 5–8)
PLATELET # BLD AUTO: 361 10*3/MM3 (ref 140–450)
PMV BLD AUTO: 10.3 FL (ref 6–12)
POTASSIUM BLD-SCNC: 4.9 MMOL/L (ref 3.5–5.2)
PROCALCITONIN SERPL-MCNC: 0.22 NG/ML (ref 0.1–0.25)
PROT SERPL-MCNC: 6.8 G/DL (ref 6–8.5)
PROT UR QL STRIP: ABNORMAL
PTH-INTACT SERPL-MCNC: 116.3 PG/ML (ref 15–65)
RBC # BLD AUTO: 3.66 10*6/MM3 (ref 3.77–5.28)
RBC # FLD AUTO: 0 /MM3
RBC # UR: ABNORMAL /HPF
REF LAB TEST METHOD: ABNORMAL
RHINOVIRUS RNA SPEC NAA+PROBE: NOT DETECTED
RSV RNA NPH QL NAA+NON-PROBE: NOT DETECTED
SODIUM BLD-SCNC: 132 MMOL/L (ref 136–145)
SP GR UR STRIP: 1.02 (ref 1–1.03)
SQUAMOUS #/AREA URNS HPF: ABNORMAL /HPF
TRANS CELLS #/AREA URNS HPF: ABNORMAL /HPF
TROPONIN T SERPL-MCNC: 0.21 NG/ML (ref 0–0.03)
UROBILINOGEN UR QL STRIP: ABNORMAL
WBC # FLD AUTO: 1 /MM3
WBC CLUMPS # UR AUTO: ABNORMAL /HPF
WBC NRBC COR # BLD: 12.81 10*3/MM3 (ref 3.4–10.8)
WBC UR QL AUTO: ABNORMAL /HPF
WHOLE BLOOD HOLD SPECIMEN: NORMAL
WHOLE BLOOD HOLD SPECIMEN: NORMAL

## 2020-06-09 PROCEDURE — 85025 COMPLETE CBC W/AUTO DIFF WBC: CPT | Performed by: EMERGENCY MEDICINE

## 2020-06-09 PROCEDURE — 71045 X-RAY EXAM CHEST 1 VIEW: CPT

## 2020-06-09 PROCEDURE — 81001 URINALYSIS AUTO W/SCOPE: CPT | Performed by: EMERGENCY MEDICINE

## 2020-06-09 PROCEDURE — 87040 BLOOD CULTURE FOR BACTERIA: CPT | Performed by: EMERGENCY MEDICINE

## 2020-06-09 PROCEDURE — 87070 CULTURE OTHR SPECIMN AEROBIC: CPT | Performed by: FAMILY MEDICINE

## 2020-06-09 PROCEDURE — 99284 EMERGENCY DEPT VISIT MOD MDM: CPT

## 2020-06-09 PROCEDURE — 84145 PROCALCITONIN (PCT): CPT | Performed by: EMERGENCY MEDICINE

## 2020-06-09 PROCEDURE — 80053 COMPREHEN METABOLIC PANEL: CPT | Performed by: EMERGENCY MEDICINE

## 2020-06-09 PROCEDURE — 25010000002 PIPERACILLIN SOD-TAZOBACTAM PER 1 G: Performed by: EMERGENCY MEDICINE

## 2020-06-09 PROCEDURE — 83605 ASSAY OF LACTIC ACID: CPT | Performed by: EMERGENCY MEDICINE

## 2020-06-09 PROCEDURE — 83036 HEMOGLOBIN GLYCOSYLATED A1C: CPT | Performed by: FAMILY MEDICINE

## 2020-06-09 PROCEDURE — 25010000002 VANCOMYCIN 10 G RECONSTITUTED SOLUTION: Performed by: EMERGENCY MEDICINE

## 2020-06-09 PROCEDURE — 0100U HC BIOFIRE FILMARRAY RESP PANEL 2: CPT | Performed by: EMERGENCY MEDICINE

## 2020-06-09 PROCEDURE — 93005 ELECTROCARDIOGRAM TRACING: CPT | Performed by: EMERGENCY MEDICINE

## 2020-06-09 PROCEDURE — 87205 SMEAR GRAM STAIN: CPT | Performed by: FAMILY MEDICINE

## 2020-06-09 PROCEDURE — 89050 BODY FLUID CELL COUNT: CPT | Performed by: FAMILY MEDICINE

## 2020-06-09 PROCEDURE — 83970 ASSAY OF PARATHORMONE: CPT | Performed by: INTERNAL MEDICINE

## 2020-06-09 PROCEDURE — 93010 ELECTROCARDIOGRAM REPORT: CPT | Performed by: INTERNAL MEDICINE

## 2020-06-09 PROCEDURE — 87086 URINE CULTURE/COLONY COUNT: CPT | Performed by: EMERGENCY MEDICINE

## 2020-06-09 PROCEDURE — 82962 GLUCOSE BLOOD TEST: CPT

## 2020-06-09 PROCEDURE — 84484 ASSAY OF TROPONIN QUANT: CPT | Performed by: EMERGENCY MEDICINE

## 2020-06-09 RX ORDER — CITALOPRAM 20 MG/1
40 TABLET ORAL DAILY
Status: DISCONTINUED | OUTPATIENT
Start: 2020-06-09 | End: 2020-06-10 | Stop reason: HOSPADM

## 2020-06-09 RX ORDER — SODIUM CHLORIDE 0.9 % (FLUSH) 0.9 %
10 SYRINGE (ML) INJECTION AS NEEDED
Status: DISCONTINUED | OUTPATIENT
Start: 2020-06-09 | End: 2020-06-10 | Stop reason: HOSPADM

## 2020-06-09 RX ORDER — LOSARTAN POTASSIUM 50 MG/1
50 TABLET ORAL NIGHTLY
Status: DISCONTINUED | OUTPATIENT
Start: 2020-06-09 | End: 2020-06-10 | Stop reason: HOSPADM

## 2020-06-09 RX ORDER — BUMETANIDE 1 MG/1
2 TABLET ORAL 2 TIMES DAILY
Status: DISCONTINUED | OUTPATIENT
Start: 2020-06-09 | End: 2020-06-10 | Stop reason: HOSPADM

## 2020-06-09 RX ORDER — SODIUM CHLORIDE 0.9 % (FLUSH) 0.9 %
10 SYRINGE (ML) INJECTION EVERY 12 HOURS SCHEDULED
Status: DISCONTINUED | OUTPATIENT
Start: 2020-06-09 | End: 2020-06-10 | Stop reason: HOSPADM

## 2020-06-09 RX ORDER — FAMOTIDINE 20 MG/1
20 TABLET, FILM COATED ORAL DAILY
Status: DISCONTINUED | OUTPATIENT
Start: 2020-06-10 | End: 2020-06-09

## 2020-06-09 RX ORDER — ACETAMINOPHEN 325 MG/1
650 TABLET ORAL EVERY 4 HOURS PRN
Status: DISCONTINUED | OUTPATIENT
Start: 2020-06-09 | End: 2020-06-10 | Stop reason: HOSPADM

## 2020-06-09 RX ORDER — ASPIRIN 81 MG/1
81 TABLET ORAL DAILY
Status: DISCONTINUED | OUTPATIENT
Start: 2020-06-09 | End: 2020-06-10 | Stop reason: HOSPADM

## 2020-06-09 RX ORDER — LEVOCETIRIZINE DIHYDROCHLORIDE 5 MG/1
5 TABLET, FILM COATED ORAL EVERY EVENING
COMMUNITY
End: 2021-01-01

## 2020-06-09 RX ORDER — FOLIC ACID/VIT B COMPLEX AND C 0.8 MG
1 TABLET ORAL DAILY
COMMUNITY
End: 2021-01-01

## 2020-06-09 RX ORDER — DEXTROSE MONOHYDRATE 25 G/50ML
25 INJECTION, SOLUTION INTRAVENOUS
Status: DISCONTINUED | OUTPATIENT
Start: 2020-06-09 | End: 2020-06-10 | Stop reason: HOSPADM

## 2020-06-09 RX ORDER — NICOTINE POLACRILEX 4 MG
15 LOZENGE BUCCAL
Status: DISCONTINUED | OUTPATIENT
Start: 2020-06-09 | End: 2020-06-10 | Stop reason: HOSPADM

## 2020-06-09 RX ORDER — FAMOTIDINE 20 MG/1
20 TABLET, FILM COATED ORAL NIGHTLY
Status: DISCONTINUED | OUTPATIENT
Start: 2020-06-09 | End: 2020-06-10 | Stop reason: HOSPADM

## 2020-06-09 RX ORDER — BISACODYL 10 MG
10 SUPPOSITORY, RECTAL RECTAL DAILY PRN
COMMUNITY

## 2020-06-09 RX ORDER — ALLOPURINOL 100 MG/1
100 TABLET ORAL DAILY
Status: DISCONTINUED | OUTPATIENT
Start: 2020-06-09 | End: 2020-06-10 | Stop reason: HOSPADM

## 2020-06-09 RX ORDER — SIMVASTATIN 40 MG
40 TABLET ORAL NIGHTLY
COMMUNITY
End: 2020-12-21

## 2020-06-09 RX ORDER — FAMOTIDINE 20 MG/1
20 TABLET, FILM COATED ORAL
Status: DISCONTINUED | OUTPATIENT
Start: 2020-06-09 | End: 2020-06-09

## 2020-06-09 RX ORDER — ATORVASTATIN CALCIUM 10 MG/1
20 TABLET, FILM COATED ORAL DAILY
Status: DISCONTINUED | OUTPATIENT
Start: 2020-06-09 | End: 2020-06-10 | Stop reason: HOSPADM

## 2020-06-09 RX ADMIN — LOSARTAN POTASSIUM 50 MG: 50 TABLET, FILM COATED ORAL at 20:31

## 2020-06-09 RX ADMIN — BUMETANIDE 2 MG: 1 TABLET ORAL at 20:31

## 2020-06-09 RX ADMIN — ATORVASTATIN CALCIUM 20 MG: 10 TABLET, FILM COATED ORAL at 08:30

## 2020-06-09 RX ADMIN — BUMETANIDE 2 MG: 1 TABLET ORAL at 08:30

## 2020-06-09 RX ADMIN — VANCOMYCIN HYDROCHLORIDE 1500 MG: 10 INJECTION, POWDER, LYOPHILIZED, FOR SOLUTION INTRAVENOUS at 05:08

## 2020-06-09 RX ADMIN — ASPIRIN 81 MG: 81 TABLET ORAL at 08:30

## 2020-06-09 RX ADMIN — SODIUM CHLORIDE 1000 ML: 9 INJECTION, SOLUTION INTRAVENOUS at 03:59

## 2020-06-09 RX ADMIN — ALLOPURINOL 100 MG: 100 TABLET ORAL at 08:30

## 2020-06-09 RX ADMIN — FAMOTIDINE 20 MG: 20 TABLET, FILM COATED ORAL at 08:30

## 2020-06-09 RX ADMIN — FAMOTIDINE 20 MG: 20 TABLET, FILM COATED ORAL at 20:37

## 2020-06-09 RX ADMIN — PIPERACILLIN SODIUM AND TAZOBACTAM SODIUM 3.38 G: 3; .375 INJECTION, POWDER, LYOPHILIZED, FOR SOLUTION INTRAVENOUS at 04:01

## 2020-06-09 RX ADMIN — CITALOPRAM 40 MG: 20 TABLET, FILM COATED ORAL at 08:30

## 2020-06-09 NOTE — PLAN OF CARE
Problem: Patient Care Overview  Goal: Plan of Care Review  Outcome: Ongoing (interventions implemented as appropriate)  Flowsheets (Taken 6/9/2020 2147)  Progress: improving  Outcome Summary: Patient alert and orientated today, scant urine, BG less than 150 today, tolerating diet, IID, awaiting urine culture to determine antibiotics, VSS, patient on peritoneal dialysis today started per Dialysis, no c.o pain, will continue to monitor.

## 2020-06-09 NOTE — PROGRESS NOTES
The patient was admitted earlier today by Dr. Kelsey with a chief complaint of weakness likely secondary to a persistently low blood sugar secondary to long-acting insulin.  She has been placed on a consistent carbohydrate diet and long-acting insulin has been held.  Hemoglobin A1c is in good control at 6.7%.  Nephrology has been consulted for continuation of peritoneal dialysis.  There was initial concern for urinary tract infection however there was only trace of bacteria on the urinalysis with 7-12 squamous epithelial cells and 21-30 WBCs indicating a contaminated specimen.  Urine culture is in progress.  IV antibiotics can be discontinued empirically until cultures confirm presence or absence of bacterial growth.  If her blood sugars remain stable, it is likely she can be discharged home tomorrow.    Cl Heart,   06/09/20  17:20

## 2020-06-09 NOTE — ED PROVIDER NOTES
Subjective   63 y/o female arrives for evaluation of generalized weakness. She is a pertioneal dialysis patient with treatments daily. She states around 2300 on 6/8 she was getting ready to do her treatments when she felt very weak and laid on her bed. She states she awoke a few hours later sweaty and thus called 911. Upon EMS arrival they did find her to have a low temperature (unabel to get orally). She arrives in [facility], she notes only weakness but denies any specific pain, fevers, chills, nausea, vomiting or diarrhea. She notes she was diagnosed recently by DR. Coker with percarditis and notes pain upon deep inspiration but states this is unchanged. She denies abdominal pain, flank or back pain or urinary complaints. She denies any cough or ill contacts, COVID-19 exposure. She arrives in [facility].         Family, social and past history reviewed as below, prior documentation of H and Ps and other documentation are reviewed:    Past Medical History:  No date: Chronic kidney disease      Comment:  STAGE 5   No date: Coronary artery disease  No date: Diabetes mellitus (CMS/[facility])  No date: Dialysis patient (CMS/[facility])  No date: GERD (gastroesophageal reflux disease)  No date: Gout  No date: Hyperlipidemia  No date: Hypertension  No date: Peritoneal dialysis status (CMS/[facility])    Past Surgical History:  No date: BREAST BIOPSY  10/1/2019: CARDIAC CATHETERIZATION; N/A      Comment:  Procedure: Left Heart Cath;  Surgeon: Srikanth Coker MD;  Location: [facility] CATH INVASIVE LOCATION;  Service:                Cardiology  No date: CORONARY ANGIOPLASTY WITH STENT PLACEMENT      Comment:  X 2  No date: GASTRIC BYPASS  No date: HYSTERECTOMY  No date: OOPHORECTOMY  No date: SINUS SURGERY  No date: TUBAL ABDOMINAL LIGATION    Social History    Socioeconomic History      Marital status:       Spouse name: Not on file      Number of children: Not on file      Years of education: Not on file      Highest education level:  Not on file    Tobacco Use      Smoking status: Former Smoker        Packs/day: 0.00        Years: 2.00        Pack years: 0      Smokeless tobacco: Never Used    Substance and Sexual Activity      Alcohol use: No      Drug use: No      Sexual activity: Defer      Family history: reviewed and noncontributgory           Review of Systems   All other systems reviewed and are negative.      Past Medical History:   Diagnosis Date   • Chronic kidney disease     STAGE 5    • Coronary artery disease    • Diabetes mellitus (CMS/[facility])    • Dialysis patient (CMS/[facility])    • GERD (gastroesophageal reflux disease)    • Gout    • Hyperlipidemia    • Hypertension    • Peritoneal dialysis status (CMS/[facility])        Allergies   Allergen Reactions   • Antivert [Meclizine] Nausea And Vomiting   • Augmentin [Amoxicillin-Pot Clavulanate] Nausea And Vomiting   • Codeine Nausea And Vomiting   • Demerol [Meperidine] Nausea Only   • Levaquin [Levofloxacin] Nausea And Vomiting   • Lisinopril Swelling   • Morphine Nausea And Vomiting   • Sulfasalazine Nausea And Vomiting   • Ultram [Tramadol] Mental Status Change       Past Surgical History:   Procedure Laterality Date   • BREAST BIOPSY     • CARDIAC CATHETERIZATION N/A 10/1/2019    Procedure: Left Heart Cath;  Surgeon: Srikanth Coker MD;  Location:  [facility] CATH INVASIVE LOCATION;  Service: Cardiology   • CORONARY ANGIOPLASTY WITH STENT PLACEMENT      X 2   • GASTRIC BYPASS     • HYSTERECTOMY     • OOPHORECTOMY     • SINUS SURGERY     • TUBAL ABDOMINAL LIGATION         Family History   Problem Relation Age of Onset   • Heart disease Father    • Breast cancer Neg Hx        Social History     Socioeconomic History   • Marital status:      Spouse name: Not on file   • Number of children: Not on file   • Years of education: Not on file   • Highest education level: Not on file   Tobacco Use   • Smoking status: Former Smoker     Packs/day: 0.00     Years: 2.00     Pack years: 0.00   • Smokeless  tobacco: Never Used   Substance and Sexual Activity   • Alcohol use: No   • Drug use: No   • Sexual activity: Defer           Objective   Physical Exam   Constitutional: She is oriented to person, place, and time. She appears well-developed and well-nourished.   HENT:   Head: Normocephalic and atraumatic.   Eyes: Pupils are equal, round, and reactive to light. Conjunctivae and EOM are normal.   Neck: Normal range of motion. Neck supple.   Cardiovascular: Regular rhythm, normal heart sounds and intact distal pulses. Bradycardia present.   Pulmonary/Chest: Effort normal and breath sounds normal. No stridor. No respiratory distress. She has no wheezes. She has no rales. She exhibits no tenderness.   Abdominal: Soft. Bowel sounds are normal. She exhibits no distension and no mass. There is no tenderness. There is no rebound and no guarding. No hernia.   PD cath seen without overt signs of infection, no drainage.    Musculoskeletal: Normal range of motion.   Neurological: She is alert and oriented to person, place, and time.   Skin: Skin is warm. Capillary refill takes less than 2 seconds. No rash noted.   Psychiatric: She has a normal mood and affect.   Vitals reviewed.      ECG 12 Lead    Date/Time: 6/9/2020 4:08 AM  Performed by: Abhay Morin MD  Authorized by: Abhay Morin MD   Interpreted by physician  Rhythm: sinus bradycardia  Rate: bradycardic  BPM: 57  QRS axis: normal  Comments: QTC: 523                 ED Course  ED Course as of Jun 09 0524   Tue Jun 09, 2020   0314 Given hypothermia will start broad spectum abx.     []   0357 Unchanged troponin I    []   0441 UTI seen likely explaining her sepsis. Will admit for IVF and abx.     []   0523 She has no abdominal pain to make me think of SBP.     []      ED Course User Index  [] Abhay Morin MD            XR Chest 1 View   ED Interpretation   No acute cardiopulmonary process.               Labs Reviewed   COMPREHENSIVE  METABOLIC PANEL - Abnormal; Notable for the following components:       Result Value    BUN 56 (*)     Creatinine 11.87 (*)     Sodium 132 (*)     Chloride 89 (*)     Albumin 3.20 (*)     eGFR Non African Amer 3 (*)     BUN/Creatinine Ratio 4.7 (*)     Anion Gap 19.0 (*)     All other components within normal limits    Narrative:     GFR Normal >60  Chronic Kidney Disease <60  Kidney Failure <15     URINALYSIS W/ CULTURE IF INDICATED - Abnormal; Notable for the following components:    pH, UA 8.5 (*)     Protein, UA >=300 mg/dL (3+) (*)     Leuk Esterase, UA Moderate (2+) (*)     All other components within normal limits   TROPONIN (IN-HOUSE) - Abnormal; Notable for the following components:    Troponin T 0.214 (*)     All other components within normal limits    Narrative:     Troponin T Reference Range:  <= 0.03 ng/mL-   Negative for AMI  >0.03 ng/mL-     Abnormal for myocardial necrosis.  Clinicians would have to utilize clinical acumen, EKG, Troponin and serial changes to determine if it is an Acute Myocardial Infarction or myocardial injury due to an underlying chronic condition.       Results may be falsely decreased if patient taking Biotin.     CBC WITH AUTO DIFFERENTIAL - Abnormal; Notable for the following components:    WBC 12.81 (*)     RBC 3.66 (*)     Hemoglobin 11.0 (*)     Hematocrit 33.8 (*)     RDW 16.5 (*)     RDW-SD 55.0 (*)     Neutrophil % 84.7 (*)     Lymphocyte % 7.5 (*)     Monocyte % 4.2 (*)     Immature Grans % 1.3 (*)     Neutrophils, Absolute 10.85 (*)     Immature Grans, Absolute 0.17 (*)     All other components within normal limits   URINALYSIS, MICROSCOPIC ONLY - Abnormal; Notable for the following components:    RBC, UA 0-2 (*)     WBC, UA 21-30 (*)     Bacteria, UA Trace (*)     Squamous Epithelial Cells, UA 7-12 (*)     Transitional Epithelial Cells, UA 7-12 (*)     All other components within normal limits   RESPIRATORY PANEL, PCR - Normal    Narrative:     The coronavirus on the  "RVP is NOT COVID-19 and is NOT indicative of infection with COVID-19.    PROCALCITONIN - Normal    Narrative:     As a Marker for Sepsis (Non-Neonates):   1. <0.5 ng/mL represents a low risk of severe sepsis and/or septic shock.  1. >2 ng/mL represents a high risk of severe sepsis and/or septic shock.    As a Marker for Lower Respiratory Tract Infections that require antibiotic therapy:  PCT on Admission     Antibiotic Therapy             6-12 Hrs later  > 0.5                Strongly Recommended            >0.25 - <0.5         Recommended  0.1 - 0.25           Discouraged                   Remeasure/reassess PCT  <0.1                 Strongly Discouraged          Remeasure/reassess PCT      As 28 day mortality risk marker: \"Change in Procalcitonin Result\" (> 80 % or <=80 %) if Day 0 (or Day 1) and Day 4 values are available. Refer to http://www.Hiveoopct-calculator.com/   Change in PCT <=80 %   A decrease of PCT levels below or equal to 80 % defines a positive change in PCT test result representing a higher risk for 28-day all-cause mortality of patients diagnosed with severe sepsis or septic shock.  Change in PCT > 80 %   A decrease of PCT levels of more than 80 % defines a negative change in PCT result representing a lower risk for 28-day all-cause mortality of patients diagnosed with severe sepsis or septic shock.                Results may be falsely decreased if patient taking Biotin.    LACTIC ACID, PLASMA - Normal   POCT GLUCOSE FINGERSTICK - Normal   BLOOD CULTURE WITH LEIDY   BLOOD CULTURE WITH LEIDY   URINE CULTURE   BODY FLUID CULTURE   RAINBOW DRAW    Narrative:     The following orders were created for panel order [place] Draw.  Procedure                               Abnormality         Status                     ---------                               -----------         ------                     Light Blue Top[[id number]]                                   Final result               Green Top " (Gel)[[id number]]                                  Final result               Lavender Top[[id number]]                                     Final result               Red Top[[id number]]                                          Final result               Gray Top - Ice[[id number]]                                   Final result                 Please view results for these tests on the individual orders.   GRAY TOP - ICE   BODY FLUID CELL COUNT WITH DIFFERENTIAL    Narrative:     The following orders were created for panel order Body Fluid Cell Count With Differential - Peritoneal Fluid, Peritoneum.  Procedure                               Abnormality         Status                     ---------                               -----------         ------                     Body fluid cell count - ...[[id number]]                                                   Please view results for these tests on the individual orders.   CELL COUNT CSF   BODY FLUID CELL COUNT   POCT GLUCOSE FINGERSTICK   LIGHT BLUE TOP   GREEN TOP   LAVENDER TOP   RED TOP   CBC AND DIFFERENTIAL    Narrative:     The following orders were created for panel order CBC & Differential.  Procedure                               Abnormality         Status                     ---------                               -----------         ------                     CBC Auto Differential[[id number]]        Abnormal            Final result                 Please view results for these tests on the individual orders.   CELL COUNT WITH DIFFERENTIAL, CSF    Narrative:     The following orders were created for panel order Cell Count With Differential, CSF.  Procedure                               Abnormality         Status                     ---------                               -----------         ------                     Cell Count, CSF - Cerebr...[[id number]]                                                   Please view results for these tests on the individual  orders.                                       MDM    Final diagnoses:   Sepsis, due to unspecified organism, unspecified whether acute organ dysfunction present (CMS/[facility])   Urinary tract infection without hematuria, site unspecified            Abhay Morin MD  06/09/20 4781       Abhay Morin MD  06/09/20 3212

## 2020-06-09 NOTE — PROGRESS NOTES
"Pharmacy Renal Dose Conversion    Jennifer Salcido is a 64 y.o. year old female  160 cm (63\") 73.7 kg (162 lb 6.4 oz)    Estimated Creatinine Clearance: 4.6 mL/min (A) (by C-G formula based on SCr of 11.87 mg/dL (H)).   Lab Results   Component Value Date    CREATININE 11.87 (H) 06/09/2020    CREATININE 11.56 (H) 06/07/2020    CREATININE 12.04 (H) 01/20/2020   End stage renal disease. Patient is on peritoneal dialysis nightly for last several years.     PLAN  Based on prescribing information provided by the drug , Famotidine 20mg po BIDac,  has been changed to Famotidine 20mg po daily.    Adjusted per the directives and guidelines established by [facility] Committee and [facility] Medical Executive Committee.  Pharmacy will continue to monitor daily and make further adjustment(s) accordingly.    Sravan Hernandez, PharmD  06/09/2012:29    "

## 2020-06-09 NOTE — PLAN OF CARE
Problem: Patient Care Overview  Goal: Plan of Care Review  Outcome: Ongoing (interventions implemented as appropriate)  Flowsheets (Taken 6/9/2020 4806)  Progress: no change  Plan of Care Reviewed With: patient  Outcome Summary: Patient brought to the ER by ems patient went to start peritoneal dialysis but woke up sweaty in bed and had not started dialysis; patient believes her bg dropped; ate something before ems arrived bg was 71 upon arrival and patient was hypothermic; admitting dx sepsis due to uti; up with assist; iv abx; troponin elevated; patient states she had a heart attack on mothers day; awaiting orders

## 2020-06-09 NOTE — CONSULTS
Nephrology ([facility]) Consult Note      Patient:  Jennifer Salcido  YOB: 1955  Date of Service: 6/9/2020  MRN: [id number]   Acct: [id number]   Primary Care Physician: Akhil Golden DO  Advance Directive:   Code Status and Medical Interventions:   Ordered at: 06/09/20 0621     Code Status:    CPR     Medical Interventions (Level of Support Prior to Arrest):    Full     Admit Date: 6/9/2020       Hospital Day: 0  Referring Provider: Magen Kelsey,*      Patient personally seen and examined.  Complete chart including Consults, Notes, Operative Reports, Labs, Cardiology, and Radiology studies reviewed as able.        Subjective:  Jennifer Salcido is a 64 y.o. female  whom we were consulted for end stage renal disease. Patient is on peritoneal dialysis nightly for last several years. She gets her PD through Dr Hyatt at [facility]. No recent issue with dialysis. History of type 2 diabetes, hypertension, coronary artery disease.  Patient has had a complex recent medical history she was admitted to [facility] following a NSTEMI last month further complicated by cardiac tamponade; required emergent pericardiocentesis on 5/21.  Last night she felt weak and apparently passed out for a few hours; when she woke up she called 911.  On arrival to emergency department she was hypoglycemic and hypothermic. Denies recent fever/chills. Currently patient is awake and alert. Denies pain or dyspnea at time of exam. No abdominal pain, denies n/v/d.      Allergies:  Antivert [meclizine]; Augmentin [amoxicillin-pot clavulanate]; Codeine; Demerol [meperidine]; Levaquin [levofloxacin]; Lisinopril; Morphine; Sulfasalazine; and Ultram [tramadol]    Home Meds:  Medications Prior to Admission   Medication Sig Dispense Refill Last Dose   • albuterol (ACCUNEB) 1.25 MG/3ML nebulizer solution Take 3 mL by nebulization Every 6 (Six) Hours As Needed for Wheezing. 16 vial 0 Taking    • allopurinol (ZYLOPRIM) 100 MG tablet allopurinol 100 mg tablet   Take 1 tablet every day by oral route.   Taking   • aspirin 81 MG EC tablet Take 81 mg by mouth Daily.   Taking   • Bisacodyl (CORRECTIVE LAXATIVE PO) Corrective Laxative   PRN   Taking   • bumetanide (BUMEX) 2 MG tablet Every 12 (Twelve) Hours.   Taking   • Calcium 500-125 MG-UNIT tablet Take  by mouth.   Taking   • citalopram (CeleXA) 40 MG tablet citalopram 40 mg tablet   Take 1 tablet every day by oral route.   Taking   • colchicine 0.6 MG tablet Take 1 tablet by mouth Daily for 10 days. 10 tablet 0    • doxazosin (CARDURA) 4 MG tablet Take 4 mg by mouth 2 (Two) Times a Day.   Taking   • famotidine (PEPCID) 10 MG tablet Take 10 mg by mouth 2 (Two) Times a Day.   Taking   • gentamicin (GARAMYCIN) 0.1 % ointment Apply  topically to the appropriate area as directed.   Taking   • hydrALAZINE (APRESOLINE) 25 MG tablet Take 25 mg by mouth Daily As Needed.   Taking   • insulin detemir (LEVEMIR) 100 UNIT/ML injection Inject 18 Units under the skin into the appropriate area as directed Daily.   Taking   • irbesartan (AVAPRO) 150 MG tablet Take 150 mg by mouth Every Night.   Taking   • melatonin 3 MG tablet melatonin   3 mg at HS   Taking   • ondansetron ODT (ZOFRAN-ODT) 4 MG disintegrating tablet Take 1 tablet by mouth Every 6 (Six) Hours As Needed for Nausea or Vomiting. 12 tablet 0 Taking   • pantoprazole (PROTONIX) 40 MG EC tablet Take  by mouth 2 (two) times a day.   Taking   • potassium chloride (KLOR-CON) 10 MEQ CR tablet Take  by mouth.   Taking   • raNITIdine (ZANTAC) 150 MG tablet Take 150 mg by mouth Every Night.   Taking   • simvastatin (ZOCOR) 40 MG tablet TAKE 1 TABLET BY MOUTH EVERY DAY AT NIGHT 90 tablet 3 Taking   • Multiple Vitamins-Minerals (MULTIVITAMIN ADULT PO) multivitamin   Taking       Medicines:  Current Facility-Administered Medications   Medication Dose Route Frequency Provider Last Rate Last Dose   • acetaminophen (TYLENOL)  tablet 650 mg  650 mg Oral Q4H PRN Magen Kelsey MD       • allopurinol (ZYLOPRIM) tablet 100 mg  100 mg Oral Daily Magen Kelsey MD   100 mg at 06/09/20 0830   • aspirin EC tablet 81 mg  81 mg Oral Daily Magen Kelsey MD   81 mg at 06/09/20 0830   • atorvastatin (LIPITOR) tablet 20 mg  20 mg Oral Daily Magen Kelsey MD   20 mg at 06/09/20 0830   • bumetanide (BUMEX) tablet 2 mg  2 mg Oral BID Magen Kelsey MD   2 mg at 06/09/20 0830   • citalopram (CeleXA) tablet 40 mg  40 mg Oral Daily Magen Kelsey MD   40 mg at 06/09/20 0830   • dextrose (D50W) 25 g/ 50mL Intravenous Solution 25 g  25 g Intravenous Q15 Min PRN Magen Kelsey MD       • dextrose (GLUTOSE) oral gel 15 g  15 g Oral Q15 Min PRN Magen Kelsey MD       • famotidine (PEPCID) tablet 20 mg  20 mg Oral BID AC Magen Kelsey MD   20 mg at 06/09/20 0830   • glucagon (human recombinant) (GLUCAGEN DIAGNOSTIC) injection 1 mg  1 mg Subcutaneous Q15 Min PRN Magen Kelsey MD       • insulin lispro (humaLOG) injection 2-7 Units  2-7 Units Subcutaneous TID AC Magen Kelsey MD       • losartan (COZAAR) tablet 50 mg  50 mg Oral Nightly Magen Kelsey MD       • sodium chloride 0.9 % flush 10 mL  10 mL Intravenous PRN Emergency, Triage Protocol, MD       • sodium chloride 0.9 % flush 10 mL  10 mL Intravenous Q12H Magen Kelsey MD       • sodium chloride 0.9 % flush 10 mL  10 mL Intravenous PRN Magen Kelsey MD           Past Medical History:  Past Medical History:   Diagnosis Date   • Chronic kidney disease     STAGE 5    • Coronary artery disease    • Diabetes mellitus (CMS/HCC)    • Dialysis patient (CMS/HCC)    • GERD (gastroesophageal reflux disease)    • Gout    • Hyperlipidemia    • Hypertension    • Peritoneal dialysis status (CMS/HCC)        Past Surgical History:  Past Surgical History:   Procedure Laterality  "Date   • BREAST BIOPSY     • CARDIAC CATHETERIZATION N/A 10/1/2019    Procedure: Left Heart Cath;  Surgeon: Srikanth Coker MD;  Location: [facility] CATH INVASIVE LOC[facility];  Service: Cardiology   • CORONARY ANGIOPLASTY WITH STENT PLACEMENT      X 2   • GASTRIC BYPASS     • HYSTERECTOMY     • OOPHORECTOMY     • SINUS SURGERY     • TUBAL ABDOMINAL LIGATION         Family History  Family History   Problem Relation Age of Onset   • Heart disease Father    • Breast cancer Neg Hx        Social History  Social History     Socioeconomic History   • Marital status:      Spouse name: Not on file   • Number of children: Not on file   • Years of education: Not on file   • Highest education level: Not on file   Tobacco Use   • Smoking status: Former Smoker     Packs/day: 0.00     Years: 2.00     Pack years: 0.00   • Smokeless tobacco: Never Used   Substance and Sexual Activity   • Alcohol use: No   • Drug use: No   • Sexual activity: Defer         Review of Systems:  History obtained from chart review and the patient  General ROS: No fever or chills  Respiratory ROS: No cough, shortness of breath, wheezing  Cardiovascular ROS: No chest pain or palpitations  Gastrointestinal ROS: No abdominal pain or melena  Genito-Urinary ROS: No dysuria or hematuria  Neurological ROS: no headache or dizziness  14 point ROS reviewed with the patient and negative except as noted above and in the HPI unless unable to obtain.    Objective:  Patient Vitals for the past 24 hrs:   BP Temp Temp src Pulse Resp SpO2 Height Weight   06/09/20 0805 142/58 97.8 °F (36.6 °C) -- 62 16 100 % -- --   06/09/20 0604 144/54 97.6 °F (36.4 °C) Oral 69 16 100 % 160 cm (63\") 73.7 kg (162 lb 6.4 oz)   06/09/20 0543 -- 97.4 °F (36.3 °C) Oral -- -- -- -- --   06/09/20 0530 114/61 -- -- -- -- -- -- --   06/09/20 0529 -- -- -- 65 -- 95 % -- --   06/09/20 0500 108/62 -- -- -- -- -- -- --   06/09/20 0459 -- -- -- 63 -- 94 % -- --   06/09/20 0406 -- 97.4 °F (36.3 °C) " "Oral -- -- -- -- --   06/09/20 0304 110/55 -- -- -- -- -- -- --   06/09/20 0300 110/55 -- -- 59 -- 99 % -- --   06/09/20 0246 -- 94.3 °F (34.6 °C) Rectal -- -- -- -- --   06/09/20 0235 -- 94.3 °F (34.6 °C) Rectal 59 13 100 % 160 cm (63\") 72.6 kg (160 lb)       Intake/Output Summary (Last 24 hours) at 6/9/2020 0933  Last data filed at 6/9/2020 0539  Gross per 24 hour   Intake 2000 ml   Output --   Net 2000 ml     General: awake/alert    Neck: supple, no JVD  Chest:  clear to auscultation bilaterally without respiratory distress  CVS: regular rate and rhythm  Abdominal: soft, nontender, positive bowel sounds  Extremities: no cyanosis or edema  Skin: warm and dry without rash  Neuro: no focal motor deficits    Labs:  Results from last 7 days   Lab Units 06/09/20  0239 06/07/20  1905   WBC 10*3/mm3 12.81* 10.63   HEMOGLOBIN g/dL 11.0* 11.2*   HEMATOCRIT % 33.8* 33.6*   PLATELETS 10*3/mm3 361 347         Results from last 7 days   Lab Units 06/09/20  0239 06/07/20  1905   SODIUM mmol/L 132* 132*   POTASSIUM mmol/L 4.9 4.6   CHLORIDE mmol/L 89* 88*   CO2 mmol/L 24.0 27.0   BUN mg/dL 56* 47*   CREATININE mg/dL 11.87* 11.56*   CALCIUM mg/dL 9.7 10.1   BILIRUBIN mg/dL 0.3 0.5   ALK PHOS U/L 64 65   ALT (SGPT) U/L 9 10   AST (SGOT) U/L 15 17   GLUCOSE mg/dL 83 132*       Radiology:   Imaging Results (Last 72 Hours)     Procedure Component Value Units Date/Time    XR Chest 1 View [[id number]] Collected:  06/09/20 0657     Updated:  06/09/20 0701    Narrative:       XR CHEST 1 VW-     Indication: sepsis     Comparison: 06/07/2020     Findings:     Cardiac silhouette appears mildly enlarged but stable. No pleural  effusion, visible pneumothorax, or focal consolidation. No acute osseous  finding.       Impression:       Impression:     No acute findings. Mild cardiomegaly.  This report was finalized on 06/09/2020 06:58 by Dr. Sravan Carlisle MD.          Culture:  Blood Culture   Date Value Ref Range Status   06/07/2020 No growth " at 24 hours  Preliminary   06/07/2020 No growth at 24 hours  Preliminary         Assessment   1.  End stage renal disease on peritoneal dialysis  2.  Type 2 diabetes  3.  Benign hypertension  4.  Recent prior NSTEMI  5.  Pericarditis  6.  Hyponatremia     Plan:  1.  Will resume routine PD treatments tonight  2.  Monitor labs  3.  Further assessment and plan pending Dr Stallings's evaluation of patient      Thank you for the consult, we appreciate the opportunity to provide care to your patients.  Feel free to contact me if I can be of any further assistance.      Dick Fisher, APRN  6/9/2020  09:33

## 2020-06-09 NOTE — H&P
[facility]  HISTORY AND PHYSICAL    Date of Admission: 6/9/2020  Primary Care Physician: Akhil Golden DO    Subjective     Chief Complaint: Weakness    History of Present Illness  64 year old female with PMH of CKD V on PD, CAD, DM ID, GERD, gout, HTN that presents with complaints of weakness and near syncope last night feeling like having low blood sugar. She states felt very weak and could not call for help for a few hours, when she was able to called her son from next door and he took her blood sugar. She ate a protein bar and by then it was 68.     Denies fever or chills. Has a little pain in abdomen and reflux.    Review of Systems   Otherwise complete ROS reviewed and negative except as mentioned in the HPI.    Past Medical History:   Past Medical History:   Diagnosis Date   • Chronic kidney disease     STAGE 5    • Coronary artery disease    • Diabetes mellitus (CMS/HCC)    • Dialysis patient (CMS/[facility])    • GERD (gastroesophageal reflux disease)    • Gout    • Hyperlipidemia    • Hypertension    • Peritoneal dialysis status (CMS/[facility])      Past Surgical History:  Past Surgical History:   Procedure Laterality Date   • BREAST BIOPSY     • CARDIAC CATHETERIZATION N/A 10/1/2019    Procedure: Left Heart Cath;  Surgeon: Srikanth Coker MD;  Location: [facility] CATH INVASIVE LOCATION;  Service: Cardiology   • CORONARY ANGIOPLASTY WITH STENT PLACEMENT      X 2   • GASTRIC BYPASS     • HYSTERECTOMY     • OOPHORECTOMY     • SINUS SURGERY     • TUBAL ABDOMINAL LIGATION       Social History:  reports that she has quit smoking. She smoked 0.00 packs per day for 2.00 years. She has never used smokeless tobacco. She reports that she does not drink alcohol or use drugs.    Family History: family history includes Heart disease in her father.       Allergies:  Allergies   Allergen Reactions   • Antivert [Meclizine] Nausea And Vomiting   • Augmentin [Amoxicillin-Pot  Clavulanate] Nausea And Vomiting   • Codeine Nausea And Vomiting   • Demerol [Meperidine] Nausea Only   • Levaquin [Levofloxacin] Nausea And Vomiting   • Lisinopril Swelling   • Morphine Nausea And Vomiting   • Sulfasalazine Nausea And Vomiting   • Ultram [Tramadol] Mental Status Change     Medications:  Prior to Admission medications    Medication Sig Start Date End Date Taking? Authorizing Provider   albuterol (ACCUNEB) 1.25 MG/3ML nebulizer solution Take 3 mL by nebulization Every 6 (Six) Hours As Needed for Wheezing. 1/5/20  Yes Juaquin Yip DO   allopurinol (ZYLOPRIM) 100 MG tablet allopurinol 100 mg tablet   Take 1 tablet every day by oral route.   Yes Elmer Thao MD   aspirin 81 MG EC tablet Take 81 mg by mouth Daily.   Yes Elmer Thao MD   Bisacodyl (CORRECTIVE LAXATIVE PO) Corrective Laxative   PRN   Yes Elmer Thao MD   bumetanide (BUMEX) 2 MG tablet Every 12 (Twelve) Hours.   Yes Elmer Thao MD   Calcium 500-125 MG-UNIT tablet Take  by mouth.   Yes Elmer Thao MD   citalopram (CeleXA) 40 MG tablet citalopram 40 mg tablet   Take 1 tablet every day by oral route.   Yes Elmer Thao MD   colchicine 0.6 MG tablet Take 1 tablet by mouth Daily for 10 days. 6/7/20 6/17/20 Yes Rachid Gifford PA-C   doxazosin (CARDURA) 4 MG tablet Take 4 mg by mouth 2 (Two) Times a Day.   Yes Elmer Thao MD   famotidine (PEPCID) 10 MG tablet Take 10 mg by mouth 2 (Two) Times a Day.   Yes Elmer Thao MD   gentamicin (GARAMYCIN) 0.1 % ointment Apply  topically to the appropriate area as directed.   Yes Elmer Thao MD   hydrALAZINE (APRESOLINE) 25 MG tablet Take 25 mg by mouth Daily As Needed.   Yes Elmer Thao MD   insulin detemir (LEVEMIR) 100 UNIT/ML injection Inject 18 Units under the skin into the appropriate area as directed Daily.   Yes Elmer Thao MD   irbesartan (AVAPRO) 150 MG tablet Take 150 mg by mouth Every  "Night.   Yes Elmer Thao MD   melatonin 3 MG tablet melatonin   3 mg at HS   Yes Elmer Thao MD   ondansetron ODT (ZOFRAN-ODT) 4 MG disintegrating tablet Take 1 tablet by mouth Every 6 (Six) Hours As Needed for Nausea or Vomiting. 1/21/20  Yes Rachid Gifford PA-C   pantoprazole (PROTONIX) 40 MG EC tablet Take  by mouth 2 (two) times a day. 6/18/18  Yes Elmer Thao MD   potassium chloride (KLOR-CON) 10 MEQ CR tablet Take  by mouth. 6/7/18  Yes Elmer Thao MD   raNITIdine (ZANTAC) 150 MG tablet Take 150 mg by mouth Every Night.   Yes Elmer Thao MD   simvastatin (ZOCOR) 40 MG tablet TAKE 1 TABLET BY MOUTH EVERY DAY AT NIGHT 11/21/19  Yes Srikanth Coker MD   Multiple Vitamins-Minerals (MULTIVITAMIN ADULT PO) multivitamin    ProviderElmer MD     Objective     Vital Signs: /55 (BP Location: Right arm, Patient Position: Sitting)   Pulse 59   Temp 97.4 °F (36.3 °C) (Oral)   Resp 13   Ht 160 cm (63\")   Wt 72.6 kg (160 lb)   SpO2 99%   BMI 28.34 kg/m²   Physical Exam   Constitutional: She is oriented to person, place, and time. She appears well-developed and well-nourished. No distress.   HENT:   Head: Normocephalic and atraumatic.   Right Ear: External ear normal.   Left Ear: External ear normal.   Eyes: Pupils are equal, round, and reactive to light. EOM are normal. No scleral icterus.   Neck: Normal range of motion. Neck supple. No JVD present. No thyromegaly present.   Cardiovascular: Normal rate, regular rhythm and normal heart sounds.   No murmur heard.  Pulmonary/Chest: Effort normal and breath sounds normal. No stridor. No respiratory distress. She has no wheezes.   Abdominal: Soft. Bowel sounds are normal. She exhibits no distension and no mass. There is no tenderness. There is no guarding.   PD catheter in place, fluid appears clear   Musculoskeletal: Normal range of motion. She exhibits no edema.   Neurological: She is alert and oriented to " person, place, and time. She displays normal reflexes. No cranial nerve deficit. She exhibits normal muscle tone. Coordination normal.   Skin: Skin is warm. Capillary refill takes less than 2 seconds. She is not diaphoretic. No erythema.   Psychiatric: She has a normal mood and affect.     Results Reviewed:  Lab Results (last 24 hours)     Procedure Component Value Units Date/Time    Urinalysis With Culture If Indicated - Urine, Clean Catch [[id number]]  (Abnormal) Collected:  06/09/20 0344    Specimen:  Urine, Clean Catch Updated:  06/09/20 0431     Color, UA Yellow     Appearance, UA Clear     pH, UA 8.5     Specific Gravity, UA 1.016     Glucose, UA Negative     Ketones, UA Negative     Bilirubin, UA Negative     Blood, UA Negative     Protein, UA >=300 mg/dL (3+)     Leuk Esterase, UA Moderate (2+)     Nitrite, UA Negative     Urobilinogen, UA 0.2 E.U./dL    Urinalysis, Microscopic Only - Urine, Clean Catch [[id number]]  (Abnormal) Collected:  06/09/20 0344    Specimen:  Urine, Clean Catch Updated:  06/09/20 0430     RBC, UA 0-2 /HPF      WBC, UA 21-30 /HPF      Bacteria, UA Trace /HPF      Squamous Epithelial Cells, UA 7-12 /HPF      Transitional Epithelial Cells, UA 7-12 /HPF      Hyaline Casts, UA None Seen /LPF      WBC Clumps, UA Small/1+ /HPF      Methodology Manual Light Microscopy    Urine Culture - Urine, Urine, Clean Catch [[id number]] Collected:  06/09/20 0344    Specimen:  Urine, Clean Catch Updated:  06/09/20 0430    Respiratory Panel, PCR - Swab, Nasopharynx [[id number]] Collected:  06/09/20 0406    Specimen:  Swab from Nasopharynx Updated:  06/09/20 0413    Blood Culture With LEIDY - Blood, Arm, Left [[id number]] Collected:  06/09/20 0351    Specimen:  Blood from Arm, Left Updated:  06/09/20 0406    Blood Culture With LEIDY - Blood, Arm, Left [[id number]] Collected:  06/09/20 0250    Specimen:  Blood from Arm, Left Updated:  06/09/20 0405    [place] Draw [[id number]] Collected:  06/09/20 0239    Specimen:   Blood Updated:  06/09/20 0346    Narrative:       The following orders were created for panel order [place] Draw.  Procedure                               Abnormality         Status                     ---------                               -----------         ------                     Light Blue Top[[id number]]                                   Final result               Green Top (Gel)[[id number]]                                  Final result               Lavender Top[[id number]]                                     Final result               Red Top[[id number]]                                          Final result               Gray Top - Ice[[id number]]                                   Final result                 Please view results for these tests on the individual orders.    Light Blue Top [[id number]] Collected:  06/09/20 0239    Specimen:  Blood Updated:  06/09/20 0346     Extra Tube hold for add-on     Comment: Auto resulted       Lavender Top [[id number]] Collected:  06/09/20 0239    Specimen:  Blood Updated:  06/09/20 0346     Extra Tube hold for add-on     Comment: Auto resulted       Gray Top - Ice [[id number]] Collected:  06/09/20 0239    Specimen:  Blood Updated:  06/09/20 0346     Extra Tube Hold for add-ons.     Comment: Auto resulted.       Green Top (Gel) [[id number]] Collected:  06/09/20 0239    Specimen:  Blood Updated:  06/09/20 0346     Extra Tube Hold for add-ons.     Comment: Auto resulted.       Red Top [[id number]] Collected:  06/09/20 0239    Specimen:  Blood Updated:  06/09/20 0346     Extra Tube Hold for add-ons.     Comment: Auto resulted.       Troponin [[id number]]  (Abnormal) Collected:  06/09/20 0239    Specimen:  Blood Updated:  06/09/20 0344     Troponin T 0.214 ng/mL     Narrative:       Troponin T Reference Range:  <= 0.03 ng/mL-   Negative for AMI  >0.03 ng/mL-     Abnormal for myocardial necrosis.  Clinicians would have to utilize clinical acumen, EKG, Troponin and serial changes to  "determine if it is an Acute Myocardial Infarction or myocardial injury due to an underlying chronic condition.       Results may be falsely decreased if patient taking Biotin.      Procalcitonin [[id number]]  (Normal) Collected:  06/09/20 0239    Specimen:  Blood Updated:  06/09/20 0334     Procalcitonin 0.22 ng/mL     Narrative:       As a Marker for Sepsis (Non-Neonates):   1. <0.5 ng/mL represents a low risk of severe sepsis and/or septic shock.  1. >2 ng/mL represents a high risk of severe sepsis and/or septic shock.    As a Marker for Lower Respiratory Tract Infections that require antibiotic therapy:  PCT on Admission     Antibiotic Therapy             6-12 Hrs later  > 0.5                Strongly Recommended            >0.25 - <0.5         Recommended  0.1 - 0.25           Discouraged                   Remeasure/reassess PCT  <0.1                 Strongly Discouraged          Remeasure/reassess PCT      As 28 day mortality risk marker: \"Change in Procalcitonin Result\" (> 80 % or <=80 %) if Day 0 (or Day 1) and Day 4 values are available. Refer to http://www.[employer]-pct-calculator.com/   Change in PCT <=80 %   A decrease of PCT levels below or equal to 80 % defines a positive change in PCT test result representing a higher risk for 28-day all-cause mortality of patients diagnosed with severe sepsis or septic shock.  Change in PCT > 80 %   A decrease of PCT levels of more than 80 % defines a negative change in PCT result representing a lower risk for 28-day all-cause mortality of patients diagnosed with severe sepsis or septic shock.                Results may be falsely decreased if patient taking Biotin.     Comprehensive Metabolic Panel [[id number]]  (Abnormal) Collected:  06/09/20 0239    Specimen:  Blood Updated:  06/09/20 0329     Glucose 83 mg/dL      BUN 56 mg/dL      Creatinine 11.87 mg/dL      Sodium 132 mmol/L      Potassium 4.9 mmol/L      Chloride 89 mmol/L      CO2 24.0 mmol/L      Calcium 9.7 mg/dL  "     Total Protein 6.8 g/dL      Albumin 3.20 g/dL      ALT (SGPT) 9 U/L      AST (SGOT) 15 U/L      Alkaline Phosphatase 64 U/L      Total Bilirubin 0.3 mg/dL      eGFR Non African Amer 3 mL/min/1.73      Comment: <15 Indicative of kidney failure.        eGFR   Amer --     Comment: <15 Indicative of kidney failure.        Globulin 3.6 gm/dL      A/G Ratio 0.9 g/dL      BUN/Creatinine Ratio 4.7     Anion Gap 19.0 mmol/L     Narrative:       GFR Normal >60  Chronic Kidney Disease <60  Kidney Failure <15      Lactic Acid, Plasma [[id number]]  (Normal) Collected:  06/09/20 0239    Specimen:  Blood Updated:  06/09/20 0326     Lactate 0.9 mmol/L     CBC & Differential [[id number]] Collected:  06/09/20 0239    Specimen:  Blood Updated:  06/09/20 0314    Narrative:       The following orders were created for panel order CBC & Differential.  Procedure                               Abnormality         Status                     ---------                               -----------         ------                     CBC Auto Differential[[id number]]        Abnormal            Final result                 Please view results for these tests on the individual orders.    CBC Auto Differential [[id number]]  (Abnormal) Collected:  06/09/20 0239    Specimen:  Blood Updated:  06/09/20 0314     WBC 12.81 10*3/mm3      RBC 3.66 10*6/mm3      Hemoglobin 11.0 g/dL      Hematocrit 33.8 %      MCV 92.3 fL      MCH 30.1 pg      MCHC 32.5 g/dL      RDW 16.5 %      RDW-SD 55.0 fl      MPV 10.3 fL      Platelets 361 10*3/mm3      Neutrophil % 84.7 %      Lymphocyte % 7.5 %      Monocyte % 4.2 %      Eosinophil % 1.8 %      Basophil % 0.5 %      Immature Grans % 1.3 %      Neutrophils, Absolute 10.85 10*3/mm3      Lymphocytes, Absolute 0.96 10*3/mm3      Monocytes, Absolute 0.54 10*3/mm3      Eosinophils, Absolute 0.23 10*3/mm3      Basophils, Absolute 0.06 10*3/mm3      Immature Grans, Absolute 0.17 10*3/mm3      nRBC 0.0 /100 WBC     POC  Glucose Once [[id number]]  (Normal) Collected:  06/09/20 0244    Specimen:  Blood Updated:  06/09/20 0255     Glucose 71 mg/dL      Comment: : [id number] Lizzy Dean ID: [id number]           Imaging Results (Last 24 Hours)     Procedure Component Value Units Date/Time    XR Chest 1 View [[id number]] Resulted:  06/09/20 0340     Updated:  06/09/20 0341        I have personally reviewed and interpreted the radiology studies and ECG obtained at time of admission.     Assessment / Plan     Assessment:   Active Hospital Problems    Diagnosis   • **Type 2 diabetes mellitus with hypoglycemia, without long-term current use of insulin (CMS/[facility])   • End stage kidney disease (CMS/[facility])   • Coronary artery disease involving native coronary artery of native heart without angina pectoris   • Essential hypertension     ? Sepsis  ? UTI      Plan:    Admit to medical floor. Vitals every 4 hours.  Up add nancy. Diabetic diet.  Hypoglycemia protocol. Hold Levemir.  Urine culture. Rocephin empirically. Check PD fluid cell count and differential.   Home medications reconciled.        Code Status: Full     I discussed the patient's findings and my recommendations with the patient    Estimated length of stay over 2 midnights    Patient seen and examined by me on see below.    Magen Kelsey MD   06/09/20   05:08

## 2020-06-10 VITALS
SYSTOLIC BLOOD PRESSURE: 129 MMHG | TEMPERATURE: 98.9 F | WEIGHT: 162.4 LBS | DIASTOLIC BLOOD PRESSURE: 59 MMHG | HEIGHT: 63 IN | OXYGEN SATURATION: 96 % | HEART RATE: 63 BPM | RESPIRATION RATE: 16 BRPM | BODY MASS INDEX: 28.77 KG/M2

## 2020-06-10 PROBLEM — Z99.2 ESRD ON DIALYSIS (HCC): Status: ACTIVE | Noted: 2018-11-02

## 2020-06-10 LAB
ANION GAP SERPL CALCULATED.3IONS-SCNC: 15 MMOL/L (ref 5–15)
BACTERIA SPEC AEROBE CULT: NORMAL
BASOPHILS # BLD AUTO: 0.08 10*3/MM3 (ref 0–0.2)
BASOPHILS NFR BLD AUTO: 1.4 % (ref 0–1.5)
BUN BLD-MCNC: 50 MG/DL (ref 8–23)
BUN/CREAT SERPL: 4.4 (ref 7–25)
CALCIUM SPEC-SCNC: 8.2 MG/DL (ref 8.6–10.5)
CHLORIDE SERPL-SCNC: 93 MMOL/L (ref 98–107)
CO2 SERPL-SCNC: 25 MMOL/L (ref 22–29)
CREAT BLD-MCNC: 11.29 MG/DL (ref 0.57–1)
DEPRECATED RDW RBC AUTO: 55.3 FL (ref 37–54)
EOSINOPHIL # BLD AUTO: 0.61 10*3/MM3 (ref 0–0.4)
EOSINOPHIL NFR BLD AUTO: 10.7 % (ref 0.3–6.2)
ERYTHROCYTE [DISTWIDTH] IN BLOOD BY AUTOMATED COUNT: 16.6 % (ref 12.3–15.4)
GFR SERPL CREATININE-BSD FRML MDRD: 3 ML/MIN/1.73
GFR SERPL CREATININE-BSD FRML MDRD: ABNORMAL ML/MIN/{1.73_M2}
GLUCOSE BLD-MCNC: 121 MG/DL (ref 65–99)
GLUCOSE BLDC GLUCOMTR-MCNC: 107 MG/DL (ref 70–130)
GLUCOSE BLDC GLUCOMTR-MCNC: 107 MG/DL (ref 70–130)
HCT VFR BLD AUTO: 26.9 % (ref 34–46.6)
HGB BLD-MCNC: 8.8 G/DL (ref 12–15.9)
IMM GRANULOCYTES # BLD AUTO: 0.09 10*3/MM3 (ref 0–0.05)
IMM GRANULOCYTES NFR BLD AUTO: 1.6 % (ref 0–0.5)
LYMPHOCYTES # BLD AUTO: 1.82 10*3/MM3 (ref 0.7–3.1)
LYMPHOCYTES NFR BLD AUTO: 31.8 % (ref 19.6–45.3)
MCH RBC QN AUTO: 30.7 PG (ref 26.6–33)
MCHC RBC AUTO-ENTMCNC: 32.7 G/DL (ref 31.5–35.7)
MCV RBC AUTO: 93.7 FL (ref 79–97)
MONOCYTES # BLD AUTO: 0.47 10*3/MM3 (ref 0.1–0.9)
MONOCYTES NFR BLD AUTO: 8.2 % (ref 5–12)
NEUTROPHILS # BLD AUTO: 2.65 10*3/MM3 (ref 1.7–7)
NEUTROPHILS NFR BLD AUTO: 46.3 % (ref 42.7–76)
NRBC BLD AUTO-RTO: 0 /100 WBC (ref 0–0.2)
PHOSPHATE SERPL-MCNC: 5.2 MG/DL (ref 2.5–4.5)
PLATELET # BLD AUTO: 308 10*3/MM3 (ref 140–450)
PMV BLD AUTO: 10.6 FL (ref 6–12)
POTASSIUM BLD-SCNC: 4.9 MMOL/L (ref 3.5–5.2)
RBC # BLD AUTO: 2.87 10*6/MM3 (ref 3.77–5.28)
SODIUM BLD-SCNC: 133 MMOL/L (ref 136–145)
WBC NRBC COR # BLD: 5.72 10*3/MM3 (ref 3.4–10.8)

## 2020-06-10 PROCEDURE — 85025 COMPLETE CBC W/AUTO DIFF WBC: CPT | Performed by: FAMILY MEDICINE

## 2020-06-10 PROCEDURE — 82962 GLUCOSE BLOOD TEST: CPT

## 2020-06-10 PROCEDURE — 80048 BASIC METABOLIC PNL TOTAL CA: CPT | Performed by: FAMILY MEDICINE

## 2020-06-10 PROCEDURE — 84100 ASSAY OF PHOSPHORUS: CPT | Performed by: INTERNAL MEDICINE

## 2020-06-10 RX ADMIN — ACETAMINOPHEN 650 MG: 325 TABLET, FILM COATED ORAL at 12:02

## 2020-06-10 RX ADMIN — ASPIRIN 81 MG: 81 TABLET ORAL at 08:15

## 2020-06-10 RX ADMIN — ATORVASTATIN CALCIUM 20 MG: 10 TABLET, FILM COATED ORAL at 08:15

## 2020-06-10 RX ADMIN — SODIUM CHLORIDE, PRESERVATIVE FREE 10 ML: 5 INJECTION INTRAVENOUS at 08:15

## 2020-06-10 RX ADMIN — BUMETANIDE 2 MG: 1 TABLET ORAL at 08:15

## 2020-06-10 RX ADMIN — CITALOPRAM 40 MG: 20 TABLET, FILM COATED ORAL at 08:15

## 2020-06-10 RX ADMIN — ALLOPURINOL 100 MG: 100 TABLET ORAL at 08:15

## 2020-06-10 NOTE — DISCHARGE SUMMARY
[facility]  DISCHARGE SUMMARY       Date of Admission: 6/9/2020  Date of Discharge:  6/10/2020  Primary Care Physician: Akhil Golden,     Discharge Diagnoses:  Active Hospital Problems    Diagnosis   • **Type 2 diabetes mellitus with hypoglycemia, without long-term current use of insulin (CMS/[facility])   • ESRD on dialysis (CMS/[facility])   • Coronary artery disease involving native coronary artery of native heart without angina pectoris   • Essential hypertension         Presenting Problem/History of Present Illness:  Sepsis, due to unspecified organism, unspecified whether acute organ dysfunction present (CMS/[facility]) [A41.9]     Chief Complaint on Day of Discharge:   No complaint    History of Present Illness on Day of Discharge:   Patient has had no further hypoglycemia.  She tolerated her diet well and is appropriate for discharge home.    Hospital Course  64 year old female with PMH of CKD V on PD, CAD, DM ID, GERD, gout, HTN that presents with complaints of weakness and near syncope last night feeling like having low blood sugar. She states felt very weak and could not call for help for a few hours, when she was able to called her son from next door and he took her blood sugar. She ate a protein bar and by then it was 68.      Denies fever or chills. Has a little pain in abdomen and reflux.  Plan:    Admit to medical floor. Vitals every 4 hours.  Up add nancy. Diabetic diet.  Hypoglycemia protocol. Hold Levemir.  Urine culture. Rocephin empirically. Check PD fluid cell count and differential.   Home medications reconciled.      Consults:   Nephrology:  Assessment and plan:   1.  End stage renal disease on peritoneal dialysis  2.  Type 2 diabetes  3.  Benign hypertension  4.  Recent prior NSTEMI  5.  Pericarditis  6.  Hyponatremia      Plan:   1.  Will resume routine PD treatments tonight  2.  Monitor labs    Pertinent Test Results:   Lab Results (last 7 days)       Procedure Component Value Units Date/Time    Body Fluid Culture - Body Fluid, Peritoneum [[id number]] Collected:  06/09/20 1640    Specimen:  Body Fluid from Peritoneum Updated:  06/10/20 1155     Body Fluid Culture No growth     Gram Stain No WBCs or organisms seen    POC Glucose Once [[id number]]  (Normal) Collected:  06/10/20 1113    Specimen:  Blood Updated:  06/10/20 1143     Glucose 107 mg/dL      Comment: : [id number] [employer] ID: [id number]       Urine Culture - Urine, Urine, Clean Catch [[id number]] Collected:  06/09/20 0344    Specimen:  Urine, Clean Catch Updated:  06/10/20 1043     Urine Culture >100,000 CFU/mL Mixed Nixon Isolated    Narrative:       Specimen contains mixed organisms of questionable pathogenicity which indicates contamination with commensal nixon.  Further identification is unlikely to provide clinically useful information.  Suggest recollection.    POC Glucose Once [[id number]]  (Normal) Collected:  06/10/20 0759    Specimen:  Blood Updated:  06/10/20 0819     Glucose 107 mg/dL      Comment: : [id number] [employer] ID: [id number]       CBC & Differential [[id number]] Collected:  06/10/20 0631    Specimen:  Blood Updated:  06/10/20 0753    Narrative:       The following orders were created for panel order CBC & Differential.  Procedure                               Abnormality         Status                     ---------                               -----------         ------                     CBC Auto Differential[[id number]]        Abnormal            Final result                 Please view results for these tests on the individual orders.    CBC Auto Differential [[id number]]  (Abnormal) Collected:  06/10/20 0631    Specimen:  Blood Updated:  06/10/20 0753     WBC 5.72 10*3/mm3      RBC 2.87 10*6/mm3      Hemoglobin 8.8 g/dL      Hematocrit 26.9 %      MCV 93.7 fL      MCH 30.7 pg      MCHC 32.7 g/dL      RDW 16.6 %      RDW-SD 55.3 fl      MPV 10.6 fL       Platelets 308 10*3/mm3      Neutrophil % 46.3 %      Lymphocyte % 31.8 %      Monocyte % 8.2 %      Eosinophil % 10.7 %      Basophil % 1.4 %      Immature Grans % 1.6 %      Neutrophils, Absolute 2.65 10*3/mm3      Lymphocytes, Absolute 1.82 10*3/mm3      Monocytes, Absolute 0.47 10*3/mm3      Eosinophils, Absolute 0.61 10*3/mm3      Basophils, Absolute 0.08 10*3/mm3      Immature Grans, Absolute 0.09 10*3/mm3      nRBC 0.0 /100 WBC     Narrative:       Er admit yesterday    Basic Metabolic Panel [[id number]]  (Abnormal) Collected:  06/10/20 0631    Specimen:  Blood Updated:  06/10/20 0727     Glucose 121 mg/dL      BUN 50 mg/dL      Creatinine 11.29 mg/dL      Sodium 133 mmol/L      Potassium 4.9 mmol/L      Chloride 93 mmol/L      CO2 25.0 mmol/L      Calcium 8.2 mg/dL      eGFR   Amer --     Comment: <15 Indicative of kidney failure.        eGFR Non African Amer 3 mL/min/1.73      Comment: <15 Indicative of kidney failure.        BUN/Creatinine Ratio 4.4     Anion Gap 15.0 mmol/L     Narrative:       GFR Normal >60  Chronic Kidney Disease <60  Kidney Failure <15      Phosphorus [[id number]]  (Abnormal) Collected:  06/10/20 0631    Specimen:  Blood Updated:  06/10/20 0727     Phosphorus 5.2 mg/dL     Blood Culture With LEIDY - Blood, Arm, Left [[id number]] Collected:  06/09/20 0250    Specimen:  Blood from Arm, Left Updated:  06/10/20 0415     Blood Culture No growth at 24 hours    Blood Culture With LEIDY - Blood, Arm, Left [[id number]] Collected:  06/09/20 0351    Specimen:  Blood from Arm, Left Updated:  06/10/20 0415     Blood Culture No growth at 24 hours    Body Fluid Cell Count With Differential - Peritoneal Fluid, Peritoneum [[id number]] Collected:  06/09/20 1640    Specimen:  Peritoneal Fluid from Peritoneum Updated:  06/09/20 1810    Narrative:       The following orders were created for panel order Body Fluid Cell Count With Differential - Peritoneal Fluid, Peritoneum.  Procedure                                Abnormality         Status                     ---------                               -----------         ------                     Body fluid cell count - ...[[id number]]                      Final result                 Please view results for these tests on the individual orders.    Body fluid cell count - Peritoneal Fluid, Peritoneum [[id number]] Collected:  06/09/20 1640    Specimen:  Peritoneal Fluid from Peritoneum Updated:  06/09/20 1810     Color, Fluid Colorless     Appearance, Fluid Clear     WBC, Fluid 1 /mm3      RBC, Fluid 0 /mm3     Narrative:       Differential not indicated.    POC Glucose Once [[id number]]  (Normal) Collected:  06/09/20 1650    Specimen:  Blood Updated:  06/09/20 1712     Glucose 100 mg/dL      Comment: : [id number] [employer] ID: [id number]       PTH, Intact [[id number]]  (Abnormal) Collected:  06/09/20 1441    Specimen:  Blood Updated:  06/09/20 1505     PTH, Intact 116.3 pg/mL     Narrative:       Results may be falsely decreased if patient taking Biotin.      POC Glucose Once [[id number]]  (Normal) Collected:  06/09/20 1058    Specimen:  Blood Updated:  06/09/20 1121     Glucose 113 mg/dL      Comment: : [id number] [employer] ID: [id number]       Hemoglobin A1c [[id number]]  (Abnormal) Collected:  06/09/20 0808    Specimen:  Blood Updated:  06/09/20 0912     Hemoglobin A1C 6.70 %     Narrative:       Hemoglobin A1C Ranges:    Increased Risk for Diabetes  5.7% to 6.4%  Diabetes                     >= 6.5%  Diabetic Goal                < 7.0%    POC Glucose Once [[id number]]  (Normal) Collected:  06/09/20 0753    Specimen:  Blood Updated:  06/09/20 0814     Glucose 97 mg/dL      Comment: : [id number] [employer] ID: [id number]       Respiratory Panel, PCR - Swab, Nasopharynx [[id number]]  (Normal) Collected:  06/09/20 0406    Specimen:  Swab from Nasopharynx Updated:  06/09/20 0515     ADENOVIRUS, PCR Not Detected     Coronavirus 229E Not  Detected     Coronavirus HKU1 Not Detected     Coronavirus NL63 Not Detected     Coronavirus OC43 Not Detected     Human Metapneumovirus Not Detected     Human Rhinovirus/Enterovirus Not Detected     Influenza B PCR Not Detected     Parainfluenza Virus 1 Not Detected     Parainfluenza Virus 2 Not Detected     Parainfluenza Virus 3 Not Detected     Parainfluenza Virus 4 Not Detected     Bordetella pertussis pcr Not Detected     Influenza A H1 2009 PCR Not Detected     Chlamydophila pneumoniae PCR Not Detected     Mycoplasma pneumo by PCR Not Detected     Influenza A PCR Not Detected     Influenza A H3 Not Detected     Influenza A H1 Not Detected     RSV, PCR Not Detected     Bordetella parapertussis PCR Not Detected    Narrative:       The coronavirus on the RVP is NOT COVID-19 and is NOT indicative of infection with COVID-19.     Urinalysis With Culture If Indicated - Urine, Clean Catch [[id number]]  (Abnormal) Collected:  06/09/20 0344    Specimen:  Urine, Clean Catch Updated:  06/09/20 0431     Color, UA Yellow     Appearance, UA Clear     pH, UA 8.5     Specific Gravity, UA 1.016     Glucose, UA Negative     Ketones, UA Negative     Bilirubin, UA Negative     Blood, UA Negative     Protein, UA >=300 mg/dL (3+)     Leuk Esterase, UA Moderate (2+)     Nitrite, UA Negative     Urobilinogen, UA 0.2 E.U./dL    Urinalysis, Microscopic Only - Urine, Clean Catch [[id number]]  (Abnormal) Collected:  06/09/20 0344    Specimen:  Urine, Clean Catch Updated:  06/09/20 0430     RBC, UA 0-2 /HPF      WBC, UA 21-30 /HPF      Bacteria, UA Trace /HPF      Squamous Epithelial Cells, UA 7-12 /HPF      Transitional Epithelial Cells, UA 7-12 /HPF      Hyaline Casts, UA None Seen /LPF      WBC Clumps, UA Small/1+ /HPF      Methodology Manual Light Microscopy    [place] Draw [[id number]] Collected:  06/09/20 0239    Specimen:  Blood Updated:  06/09/20 0346    Narrative:       The following orders were created for panel order [place]  Draw.  Procedure                               Abnormality         Status                     ---------                               -----------         ------                     Light Blue Top[[id number]]                                   Final result               Green Top (Gel)[[id number]]                                  Final result               Lavender Top[[id number]]                                     Final result               Red Top[[id number]]                                          Final result               Gray Top - Ice[[id number]]                                   Final result                 Please view results for these tests on the individual orders.    Light Blue Top [[id number]] Collected:  06/09/20 0239    Specimen:  Blood Updated:  06/09/20 0346     Extra Tube hold for add-on     Comment: Auto resulted       Lavender Top [[id number]] Collected:  06/09/20 0239    Specimen:  Blood Updated:  06/09/20 0346     Extra Tube hold for add-on     Comment: Auto resulted       Gray Top - Ice [[id number]] Collected:  06/09/20 0239    Specimen:  Blood Updated:  06/09/20 0346     Extra Tube Hold for add-ons.     Comment: Auto resulted.       Green Top (Gel) [[id number]] Collected:  06/09/20 0239    Specimen:  Blood Updated:  06/09/20 0346     Extra Tube Hold for add-ons.     Comment: Auto resulted.       Red Top [[id number]] Collected:  06/09/20 0239    Specimen:  Blood Updated:  06/09/20 0346     Extra Tube Hold for add-ons.     Comment: Auto resulted.       Troponin [[id number]]  (Abnormal) Collected:  06/09/20 0239    Specimen:  Blood Updated:  06/09/20 0344     Troponin T 0.214 ng/mL     Narrative:       Troponin T Reference Range:  <= 0.03 ng/mL-   Negative for AMI  >0.03 ng/mL-     Abnormal for myocardial necrosis.  Clinicians would have to utilize clinical acumen, EKG, Troponin and serial changes to determine if it is an Acute Myocardial Infarction or myocardial injury due to an underlying chronic  "condition.       Results may be falsely decreased if patient taking Biotin.      Procalcitonin [[id number]]  (Normal) Collected:  06/09/20 0239    Specimen:  Blood Updated:  06/09/20 0334     Procalcitonin 0.22 ng/mL     Narrative:       As a Marker for Sepsis (Non-Neonates):   1. <0.5 ng/mL represents a low risk of severe sepsis and/or septic shock.  1. >2 ng/mL represents a high risk of severe sepsis and/or septic shock.    As a Marker for Lower Respiratory Tract Infections that require antibiotic therapy:  PCT on Admission     Antibiotic Therapy             6-12 Hrs later  > 0.5                Strongly Recommended            >0.25 - <0.5         Recommended  0.1 - 0.25           Discouraged                   Remeasure/reassess PCT  <0.1                 Strongly Discouraged          Remeasure/reassess PCT      As 28 day mortality risk marker: \"Change in Procalcitonin Result\" (> 80 % or <=80 %) if Day 0 (or Day 1) and Day 4 values are available. Refer to http://[email or web address]ipct-calculator.com/   Change in PCT <=80 %   A decrease of PCT levels below or equal to 80 % defines a positive change in PCT test result representing a higher risk for 28-day all-cause mortality of patients diagnosed with severe sepsis or septic shock.  Change in PCT > 80 %   A decrease of PCT levels of more than 80 % defines a negative change in PCT result representing a lower risk for 28-day all-cause mortality of patients diagnosed with severe sepsis or septic shock.                Results may be falsely decreased if patient taking Biotin.     Comprehensive Metabolic Panel [[id number]]  (Abnormal) Collected:  06/09/20 0239    Specimen:  Blood Updated:  06/09/20 0329     Glucose 83 mg/dL      BUN 56 mg/dL      Creatinine 11.87 mg/dL      Sodium 132 mmol/L      Potassium 4.9 mmol/L      Chloride 89 mmol/L      CO2 24.0 mmol/L      Calcium 9.7 mg/dL      Total Protein 6.8 g/dL      Albumin 3.20 g/dL      ALT (SGPT) 9 U/L      AST (SGOT) 15 U/L      " Alkaline Phosphatase 64 U/L      Total Bilirubin 0.3 mg/dL      eGFR Non African Amer 3 mL/min/1.73      Comment: <15 Indicative of kidney failure.        eGFR   Amer --     Comment: <15 Indicative of kidney failure.        Globulin 3.6 gm/dL      A/G Ratio 0.9 g/dL      BUN/Creatinine Ratio 4.7     Anion Gap 19.0 mmol/L     Narrative:       GFR Normal >60  Chronic Kidney Disease <60  Kidney Failure <15      Lactic Acid, Plasma [[id number]]  (Normal) Collected:  06/09/20 0239    Specimen:  Blood Updated:  06/09/20 0326     Lactate 0.9 mmol/L     CBC & Differential [[id number]] Collected:  06/09/20 0239    Specimen:  Blood Updated:  06/09/20 0314    Narrative:       The following orders were created for panel order CBC & Differential.  Procedure                               Abnormality         Status                     ---------                               -----------         ------                     CBC Auto Differential[[id number]]        Abnormal            Final result                 Please view results for these tests on the individual orders.    CBC Auto Differential [[id number]]  (Abnormal) Collected:  06/09/20 0239    Specimen:  Blood Updated:  06/09/20 0314     WBC 12.81 10*3/mm3      RBC 3.66 10*6/mm3      Hemoglobin 11.0 g/dL      Hematocrit 33.8 %      MCV 92.3 fL      MCH 30.1 pg      MCHC 32.5 g/dL      RDW 16.5 %      RDW-SD 55.0 fl      MPV 10.3 fL      Platelets 361 10*3/mm3      Neutrophil % 84.7 %      Lymphocyte % 7.5 %      Monocyte % 4.2 %      Eosinophil % 1.8 %      Basophil % 0.5 %      Immature Grans % 1.3 %      Neutrophils, Absolute 10.85 10*3/mm3      Lymphocytes, Absolute 0.96 10*3/mm3      Monocytes, Absolute 0.54 10*3/mm3      Eosinophils, Absolute 0.23 10*3/mm3      Basophils, Absolute 0.06 10*3/mm3      Immature Grans, Absolute 0.17 10*3/mm3      nRBC 0.0 /100 WBC     POC Glucose Once [[id number]]  (Normal) Collected:  06/09/20 0244    Specimen:  Blood Updated:  06/09/20  "0255     Glucose 71 mg/dL      Comment: : [id number] [employer]eter ID: [id number]           Imaging Results (Last 7 Days)     Procedure Component Value Units Date/Time    XR Chest 1 View [[id number]] Collected:  06/09/20 0657     Updated:  06/09/20 0701    Narrative:       XR CHEST 1 VW-     Indication: sepsis     Comparison: 06/07/2020     Findings:     Cardiac silhouette appears mildly enlarged but stable. No pleural  effusion, visible pneumothorax, or focal consolidation. No acute osseous  finding.       Impression:       Impression:     No acute findings. Mild cardiomegaly.  This report was finalized on 06/09/2020 06:58 by Dr. Sravan Carlisle MD.            Condition on Discharge:    Stable with no further hypoglycemia    Physical Exam on Discharge:  /59 (BP Location: Left arm, Patient Position: Lying)   Pulse 63   Temp 98.9 °F (37.2 °C)   Resp 16   Ht 160 cm (63\")   Wt 73.7 kg (162 lb 6.4 oz)   SpO2 96%   BMI 28.77 kg/m²   Physical Exam     Constitutional: She is oriented to person, place, and time. She appears well-developed and well-nourished. No distress.   HENT:   Head: Normocephalic and atraumatic.   Right Ear: External ear normal.   Left Ear: External ear normal.   Eyes: Pupils are equal, round, and reactive to light. EOM are normal. No scleral icterus.   Neck: Normal range of motion. Neck supple. No JVD present. No thyromegaly present.   Cardiovascular: Normal rate, regular rhythm and normal heart sounds.   No murmur heard.  Pulmonary/Chest: Effort normal and breath sounds normal. No stridor. No respiratory distress. She has no wheezes.   Abdominal: Soft. Bowel sounds are normal. She exhibits no distension and no mass. There is no tenderness. There is no guarding.   PD catheter in place, fluid appears clear   Musculoskeletal: Normal range of motion. She exhibits no edema.   Neurological: She is alert and oriented to person, place, and time. She displays normal reflexes. No cranial " nerve deficit. She exhibits normal muscle tone. Coordination normal.   Skin: Skin is warm. Capillary refill takes less than 2 seconds. She is not diaphoretic. No erythema.   Psychiatric: She has a normal mood and affect.       Discharge Disposition:  Home or Self Care    Discharge Medications:     Discharge Medications      Changes to Medications      Instructions Start Date   insulin detemir 100 UNIT/ML injection  Commonly known as:  LEVEMIR  What changed:  how much to take   12 Units, Subcutaneous, Daily         Continue These Medications      Instructions Start Date   albuterol 1.25 MG/3ML nebulizer solution  Commonly known as:  ACCUNEB   1.25 mg, Nebulization, Every 6 Hours PRN      allopurinol 100 MG tablet  Commonly known as:  ZYLOPRIM   100 mg, Oral, Daily      aspirin 81 MG EC tablet   81 mg, Oral, Daily      b complex-vitamin c-folic acid 0.8 MG tablet tablet   1 tablet, Oral, Daily      bumetanide 2 MG tablet  Commonly known as:  BUMEX   2 mg, Oral, 2 Times Daily      Calcium 500-125 MG-UNIT tablet   1 tablet, Oral, Daily      citalopram 40 MG tablet  Commonly known as:  CeleXA   40 mg, Oral, Daily      colchicine 0.6 MG tablet   0.6 mg, Oral, Daily      doxazosin 4 MG tablet  Commonly known as:  CARDURA   4 mg, Oral, 2 Times Daily      Dulcolax 10 MG suppository  Generic drug:  bisacodyl   10 mg, Rectal, Daily PRN      famotidine 20 MG tablet  Commonly known as:  PEPCID   20 mg, Oral, 2 Times Daily      gentamicin 0.1 % ointment  Commonly known as:  GARAMYCIN   1 application, Topical, 3 Times Daily      hydrALAZINE 25 MG tablet  Commonly known as:  APRESOLINE   25 mg, Oral, Daily PRN      irbesartan 150 MG tablet  Commonly known as:  AVAPRO   150 mg, Oral, Nightly      KLOR-CON 10 MEQ CR tablet  Generic drug:  potassium chloride   10 mEq, Oral, Daily      levocetirizine 5 MG tablet  Commonly known as:  XYZAL   5 mg, Oral, Every Evening      melatonin 3 MG tablet   3 mg, Oral, Nightly PRN      MULTIVITAMIN  ADULT PO   1 tablet, Oral, Daily      ondansetron ODT 4 MG disintegrating tablet  Commonly known as:  ZOFRAN-ODT   4 mg, Oral, Every 6 Hours PRN      pantoprazole 40 MG EC tablet  Commonly known as:  PROTONIX   40 mg, Oral, 2 times daily      raNITIdine 150 MG tablet  Commonly known as:  ZANTAC   150 mg, Oral, Nightly      Zocor 40 MG tablet  Generic drug:  simvastatin   40 mg, Oral, Nightly             Discharge Diet:   Diet Instructions     Diet: Consistent Carbohydrate; Thin      Discharge Diet:  Consistent Carbohydrate    Fluid Consistency:  Thin          Discharge Care Plan / Instructions:   Discharge home    Activity at Discharge:   Activity Instructions     Activity as Tolerated            Follow-up Appointments:  Follow-up with primary care physician next week       Cl Heart DO  06/10/20  15:06    Time: Discharge Less than 30 min    Please note that part of this note may be an electronic transcription/translation of spoken language to printed text using the Dragon Dictation System. Efforts were made to edit the dictations, but occasionally words are mistranscribed.

## 2020-06-10 NOTE — PROGRESS NOTES
Nephrology ([facility]) Progress Note      Patient:  Jennifer Salcido  YOB: 1955  Date of Service: 6/10/2020  MRN: [id number]   Acct: [id number]   Primary Care Physician: Akhil Golden DO  Advance Directive:   Code Status and Medical Interventions:   Ordered at: 06/09/20 0621     Code Status:    CPR     Medical Interventions (Level of Support Prior to Arrest):    Full     Admit Date: 6/9/2020       Hospital Day: 1  Referring Provider: Magen Kelsey,*      Patient personally seen and examined.  Complete chart including Consults, Notes, Operative Reports, Labs, Cardiology, and Radiology studies reviewed as able.        Subjective:  Jennifer Salcido is a 64 y.o. female  whom we were consulted for end stage renal disease. Patient is on peritoneal dialysis nightly for last several years. She gets her PD through Dr Hyatt at [facility]. No recent issue with dialysis. History of type 2 diabetes, hypertension, coronary artery disease.  Patient has had a complex recent medical history she was admitted to [facility] following a NSTEMI last month further complicated by cardiac tamponade; required emergent pericardiocentesis on 5/21.  Last night she felt weak and apparently passed out for a few hours; when she woke up she called 911.  On arrival to emergency department she was hypoglycemic and hypothermic. Denies recent fever/chills.     Today feeling better. Tolerated PD well overnight.    Allergies:  Antivert [meclizine]; Augmentin [amoxicillin-pot clavulanate]; Codeine; Demerol [meperidine]; Levaquin [levofloxacin]; Lisinopril; Morphine; Sulfasalazine; and Ultram [tramadol]    Home Meds:  Medications Prior to Admission   Medication Sig Dispense Refill Last Dose   • albuterol (ACCUNEB) 1.25 MG/3ML nebulizer solution Take 3 mL by nebulization Every 6 (Six) Hours As Needed for Wheezing. 16 vial 0 Taking   • allopurinol (ZYLOPRIM) 100 MG tablet Take 100 mg by  mouth Daily.   Taking   • aspirin 81 MG EC tablet Take 81 mg by mouth Daily.   Taking   • b complex-vitamin c-folic acid (NEPHRO-ORIANA) 0.8 MG tablet tablet Take 1 tablet by mouth Daily.      • bisacodyl (Dulcolax) 10 MG suppository Insert 10 mg into the rectum Daily As Needed for Constipation.      • bumetanide (BUMEX) 2 MG tablet Take 2 mg by mouth 2 (Two) Times a Day.   Taking   • Calcium 500-125 MG-UNIT tablet Take 1 tablet by mouth Daily.   Taking   • citalopram (CeleXA) 40 MG tablet Take 40 mg by mouth Daily.   Taking   • colchicine 0.6 MG tablet Take 1 tablet by mouth Daily for 10 days. 10 tablet 0    • doxazosin (CARDURA) 4 MG tablet Take 4 mg by mouth 2 (Two) Times a Day.   Taking   • famotidine (PEPCID) 20 MG tablet Take 20 mg by mouth 2 (Two) Times a Day.   Taking   • gentamicin (GARAMYCIN) 0.1 % ointment Apply 1 application topically to the appropriate area as directed 3 (Three) Times a Day.   Taking   • hydrALAZINE (APRESOLINE) 25 MG tablet Take 25 mg by mouth Daily As Needed.   Taking   • insulin detemir (LEVEMIR) 100 UNIT/ML injection Inject 18 Units under the skin into the appropriate area as directed Daily.   Taking   • irbesartan (AVAPRO) 150 MG tablet Take 150 mg by mouth Every Night.   Taking   • levocetirizine (XYZAL) 5 MG tablet Take 5 mg by mouth Every Evening.      • melatonin 3 MG tablet Take 3 mg by mouth At Night As Needed for Sleep.   Taking   • ondansetron ODT (ZOFRAN-ODT) 4 MG disintegrating tablet Take 1 tablet by mouth Every 6 (Six) Hours As Needed for Nausea or Vomiting. 12 tablet 0 Taking   • pantoprazole (PROTONIX) 40 MG EC tablet Take 40 mg by mouth 2 (two) times a day.   Taking   • potassium chloride (KLOR-CON) 10 MEQ CR tablet Take 10 mEq by mouth Daily.   Taking   • raNITIdine (ZANTAC) 150 MG tablet Take 150 mg by mouth Every Night.   Taking   • simvastatin (Zocor) 40 MG tablet Take 40 mg by mouth Every Night.      • Multiple Vitamins-Minerals (MULTIVITAMIN ADULT PO) Take 1  tablet by mouth Daily.   Taking       Medicines:  Current Facility-Administered Medications   Medication Dose Route Frequency Provider Last Rate Last Dose   • acetaminophen (TYLENOL) tablet 650 mg  650 mg Oral Q4H PRN Magen Kelsey MD       • allopurinol (ZYLOPRIM) tablet 100 mg  100 mg Oral Daily Magen Kelsey MD   100 mg at 06/10/20 0815   • aspirin EC tablet 81 mg  81 mg Oral Daily Magen Kelsey MD   81 mg at 06/10/20 0815   • atorvastatin (LIPITOR) tablet 20 mg  20 mg Oral Daily Magen Kelsey MD   20 mg at 06/10/20 0815   • bumetanide (BUMEX) tablet 2 mg  2 mg Oral BID Magen Kelsey MD   2 mg at 06/10/20 0815   • citalopram (CeleXA) tablet 40 mg  40 mg Oral Daily Magen Kelsey MD   40 mg at 06/10/20 0815   • dextrose (D50W) 25 g/ 50mL Intravenous Solution 25 g  25 g Intravenous Q15 Min PRN Magen Kelsey MD       • dextrose (GLUTOSE) oral gel 15 g  15 g Oral Q15 Min PRN Magen Kelsey MD       • famotidine (PEPCID) tablet 20 mg  20 mg Oral Nightly Cl Heart DO   20 mg at 06/09/20 2037   • glucagon (human recombinant) (GLUCAGEN DIAGNOSTIC) injection 1 mg  1 mg Subcutaneous Q15 Min PRN Magen Kelsey MD       • insulin lispro (humaLOG) injection 2-7 Units  2-7 Units Subcutaneous TID AC Magen Kelsey MD       • losartan (COZAAR) tablet 50 mg  50 mg Oral Nightly Magen Kelsey MD   50 mg at 06/09/20 2031   • sodium chloride 0.9 % flush 10 mL  10 mL Intravenous PRN Emergency, Triage Protocol, MD       • sodium chloride 0.9 % flush 10 mL  10 mL Intravenous Q12H Magen Kelsey MD   10 mL at 06/10/20 0815   • sodium chloride 0.9 % flush 10 mL  10 mL Intravenous PRN Magen Kelsey MD           Past Medical History:  Past Medical History:   Diagnosis Date   • Chronic kidney disease     STAGE 5    • Coronary artery disease    • Diabetes mellitus (CMS/HCC)    • Dialysis patient  (CMS/HCC)    • GERD (gastroesophageal reflux disease)    • Gout    • Hyperlipidemia    • Hypertension    • Peritoneal dialysis status (CMS/HCC)        Past Surgical History:  Past Surgical History:   Procedure Laterality Date   • BREAST BIOPSY     • CARDIAC CATHETERIZATION N/A 10/1/2019    Procedure: Left Heart Cath;  Surgeon: Srikanth Coker MD;  Location:  [facility] CATH INVASIVE LOCATION;  Service: Cardiology   • CORONARY ANGIOPLASTY WITH STENT PLACEMENT      X 2   • GASTRIC BYPASS     • HYSTERECTOMY     • OOPHORECTOMY     • SINUS SURGERY     • TUBAL ABDOMINAL LIGATION         Family History  Family History   Problem Relation Age of Onset   • Heart disease Father    • Breast cancer Neg Hx        Social History  Social History     Socioeconomic History   • Marital status:      Spouse name: Not on file   • Number of children: Not on file   • Years of education: Not on file   • Highest education level: Not on file   Tobacco Use   • Smoking status: Former Smoker     Packs/day: 0.00     Years: 2.00     Pack years: 0.00   • Smokeless tobacco: Never Used   Substance and Sexual Activity   • Alcohol use: No   • Drug use: No   • Sexual activity: Defer         Review of Systems:  History obtained from chart review and the patient  General ROS: No fever or chills  Respiratory ROS: No cough, shortness of breath, wheezing  Cardiovascular ROS: No chest pain or palpitations  Gastrointestinal ROS: No abdominal pain or melena  Genito-Urinary ROS: No dysuria or hematuria    Objective:  Patient Vitals for the past 24 hrs:   BP Temp Temp src Pulse Resp SpO2   06/10/20 0835 135/58 98 °F (36.7 °C) -- 66 16 98 %   06/10/20 0321 110/56 98.4 °F (36.9 °C) Oral 57 14 96 %   06/09/20 2343 116/54 98.7 °F (37.1 °C) Oral 61 14 95 %   06/09/20 1941 120/61 98.7 °F (37.1 °C) Oral 63 16 98 %   06/09/20 1526 111/48 98.5 °F (36.9 °C) -- 64 16 97 %   06/09/20 1119 117/59 98.2 °F (36.8 °C) -- 51 16 95 %       Intake/Output Summary (Last 24 hours)  at 6/10/2020 0935  Last data filed at 6/10/2020 0321  Gross per 24 hour   Intake 600 ml   Output --   Net 600 ml     General: awake/alert    Neck: supple, no JVD  Chest:  clear to auscultation bilaterally without respiratory distress  CVS: regular rate and rhythm  Abdominal: soft, nontender, positive bowel sounds  Extremities: no cyanosis or edema  Skin: warm and dry without rash  Neuro: no focal motor deficits    Labs:  Results from last 7 days   Lab Units 06/10/20  0631 06/09/20  0239 06/07/20  1905   WBC 10*3/mm3 5.72 12.81* 10.63   HEMOGLOBIN g/dL 8.8* 11.0* 11.2*   HEMATOCRIT % 26.9* 33.8* 33.6*   PLATELETS 10*3/mm3 308 361 347         Results from last 7 days   Lab Units 06/10/20  0631 06/09/20  0239 06/07/20  1905   SODIUM mmol/L 133* 132* 132*   POTASSIUM mmol/L 4.9 4.9 4.6   CHLORIDE mmol/L 93* 89* 88*   CO2 mmol/L 25.0 24.0 27.0   BUN mg/dL 50* 56* 47*   CREATININE mg/dL 11.29* 11.87* 11.56*   CALCIUM mg/dL 8.2* 9.7 10.1   BILIRUBIN mg/dL  --  0.3 0.5   ALK PHOS U/L  --  64 65   ALT (SGPT) U/L  --  9 10   AST (SGOT) U/L  --  15 17   GLUCOSE mg/dL 121* 83 132*       Radiology:   Imaging Results (Last 72 Hours)     Procedure Component Value Units Date/Time    XR Chest 1 View [[id number]] Collected:  06/09/20 0657     Updated:  06/09/20 0701    Narrative:       XR CHEST 1 VW-     Indication: sepsis     Comparison: 06/07/2020     Findings:     Cardiac silhouette appears mildly enlarged but stable. No pleural  effusion, visible pneumothorax, or focal consolidation. No acute osseous  finding.       Impression:       Impression:     No acute findings. Mild cardiomegaly.  This report was finalized on 06/09/2020 06:58 by Dr. Sravan Carlisle MD.          Culture:  Blood Culture   Date Value Ref Range Status   06/09/2020 No growth at 24 hours  Preliminary   06/09/2020 No growth at 24 hours  Preliminary   06/07/2020 No growth at 2 days  Preliminary   06/07/2020 No growth at 2 days  Preliminary         Assessment   1.   End stage renal disease on peritoneal dialysis  2.  Type 2 diabetes  3.  Benign hypertension  4.  Recent prior NSTEMI  5.  anemia of CKD  6.  Hyponatremia     Plan:  1.  Continue routine PD nightly  2.  Monitor labs      Dick Fisher, JESSIE  6/10/2020  09:35

## 2020-06-10 NOTE — PROGRESS NOTES
[facility] Planning Assessment  [facility]     Patient Name: Jennifer Salcido  MRN: [id number]  Today's Date: 6/10/2020    Admit Date: 6/9/2020    Discharge Needs Assessment     Row Name 06/10/20 1040       Living Environment    Lives With  alone    Current Living Arrangements  home/apartment/condo    Primary Care Provided by  self    Provides Primary Care For  no one    Family Caregiver if Needed  child(jeromy), adult    Quality of Family Relationships  helpful;involved;supportive    Able to Return to Prior Arrangements  yes       Resource/Environmental Concerns    Resource/Environmental Concerns  none       Transition Planning    Patient/Family Anticipates Transition to  home    Patient/Family Anticipated Services at Transition  none    Transportation Anticipated  family or friend will provide       Discharge Needs Assessment    Readmission Within the Last 30 Days  no previous admission in last 30 days    Concerns to be Addressed  denies needs/concerns at this time    Equipment Currently Used at Home  shower chair    Anticipated Changes Related to Illness  none    Equipment Needed After Discharge  none    Discharge Coordination/Progress  Pt lives at home alone. She lives in an apartment and her son lives next door. He is able to help her if needed. Pt denies needs and does not want home health. She does have rx coverage.         Discharge Plan    No documentation.       Destination      Coordination has not been started for this encounter.      Durable Medical Equipment      Coordination has not been started for this encounter.      Dialysis/Infusion      Coordination has not been started for this encounter.      Home Medical Care      Coordination has not been started for this encounter.      Therapy      Coordination has not been started for this encounter.      Community Resources      Coordination has not been started for this encounter.          Demographic Summary    No documentation.       Functional Status    No  documentation.       Psychosocial    No documentation.       Abuse/Neglect    No documentation.       Legal    No documentation.       Substance Abuse    No documentation.       Patient Forms    No documentation.           KELSEY Portillo

## 2020-06-10 NOTE — NURSING NOTE
No complaints of pain or nausea; IID; Peritoneal dialysis; up with assist; resting between care; VSS; Will cont to monitor

## 2020-06-11 ENCOUNTER — READMISSION MANAGEMENT (OUTPATIENT)
Dept: CALL CENTER | Facility: HOSPITAL | Age: 65
End: 2020-06-11

## 2020-06-11 ENCOUNTER — HOSPITAL ENCOUNTER (OUTPATIENT)
Dept: CARDIOLOGY | Facility: HOSPITAL | Age: 65
Discharge: HOME | End: 2020-06-11

## 2020-06-11 DIAGNOSIS — R07.9 CHEST PAIN, UNSPECIFIED TYPE: ICD-10-CM

## 2020-06-11 RX ORDER — DOBUTAMINE HYDROCHLORIDE 100 MG/100ML
10-50 INJECTION INTRAVENOUS CONTINUOUS
Status: DISCONTINUED | OUTPATIENT
Start: 2020-06-11 | End: 2020-06-12 | Stop reason: HOSPADM

## 2020-06-11 NOTE — NURSING NOTE
Patient arrived for stress echo today after being discharged from hospital for pericarditis.  Has appointment with Dr. Coker in am.  Dr Coker notified states to postpone stress for today till after he sees the patient.  Patient updated of above and discharged home.

## 2020-06-11 NOTE — OUTREACH NOTE
Prep Survey      Responses   [facility] patient discharged from?  [place]   Is LACE score < 7 ?  No   Eligibility  Readm Mgmt   Discharge diagnosis  Type 2 diabetes mellitus with hypoglycemia, without long-term current use of insulin    Does the patient have one of the following disease processes/diagnoses(primary or secondary)?  Other   Does the patient have Home health ordered?  No   Is there a DME ordered?  No   Prep survey completed?  Yes          Cece Conroy RN

## 2020-06-12 ENCOUNTER — OFFICE VISIT (OUTPATIENT)
Dept: CARDIOLOGY | Facility: CLINIC | Age: 65
End: 2020-06-12

## 2020-06-12 ENCOUNTER — READMISSION MANAGEMENT (OUTPATIENT)
Dept: CALL CENTER | Facility: HOSPITAL | Age: 65
End: 2020-06-12

## 2020-06-12 VITALS
WEIGHT: 167 LBS | SYSTOLIC BLOOD PRESSURE: 112 MMHG | HEART RATE: 76 BPM | OXYGEN SATURATION: 98 % | BODY MASS INDEX: 29.59 KG/M2 | DIASTOLIC BLOOD PRESSURE: 60 MMHG | HEIGHT: 63 IN

## 2020-06-12 DIAGNOSIS — I25.119 CORONARY ARTERY DISEASE INVOLVING NATIVE CORONARY ARTERY OF NATIVE HEART WITH ANGINA PECTORIS (HCC): ICD-10-CM

## 2020-06-12 DIAGNOSIS — I31.39 PERICARDIAL EFFUSION: ICD-10-CM

## 2020-06-12 DIAGNOSIS — I30.9 ACUTE PERICARDITIS, UNSPECIFIED TYPE: Primary | ICD-10-CM

## 2020-06-12 DIAGNOSIS — N18.6 ESRD ON DIALYSIS (HCC): ICD-10-CM

## 2020-06-12 DIAGNOSIS — Z99.2 ESRD ON DIALYSIS (HCC): ICD-10-CM

## 2020-06-12 DIAGNOSIS — I10 ESSENTIAL HYPERTENSION: ICD-10-CM

## 2020-06-12 LAB
BACTERIA FLD CULT: NORMAL
BACTERIA SPEC AEROBE CULT: NORMAL
BACTERIA SPEC AEROBE CULT: NORMAL
GRAM STN SPEC: NORMAL

## 2020-06-12 PROCEDURE — 99214 OFFICE O/P EST MOD 30 MIN: CPT | Performed by: INTERNAL MEDICINE

## 2020-06-12 NOTE — OUTREACH NOTE
Medical Week 1 Survey      Responses   [facility] patient discharged from?  [place]   COVID-19 Test Status  Not tested   Does the patient have one of the following disease processes/diagnoses(primary or secondary)?  Other   Is there a successful TCM telephone encounter documented?  No   Week 1 attempt successful?  No   Unsuccessful attempts  Attempt 1          Kimberlee Reardon RN

## 2020-06-12 NOTE — PROGRESS NOTES
Reason for Visit: cardiovascular follow up.    HPI:  Jennifer Salcido is a 64 y.o. female is here today for hospital follow-up.  She was recently admitted from 6/9/2020 to 6/10/2020.  She was told she might have a UTI and sepsis.  Patient also reports some hypoglycemia.  She was given colchicine for possible pericarditis.  She has about 5 more days of this.  She feels like her energy level has been getting better.  She has some mild discomfort within her chest when she moves a certain way.  Overall, she feels much better.  Her breathing is at baseline.  She reports mild dyspnea on exertion.  She denies any palpitations, dizziness, syncope, PND, or orthopnea.  Her blood pressure has been normal.  She previously followed with CT surgery at [place] and wants to see CT surgery here.  She reports she can't get her kidney transplant until her CAD is fixed.      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  - Nuclear stress (12/6/2019) normal myocardial perfusion with no evidence of ischemia, EF 70%  - Echo (5/22/2020) EF 40%, normal RV size with low normal systolic function, small pericardial effusion the posterior lateral LV with no tamponade physiology  - Echo (5/24/2020) compared to echo from 5/20/2020 the pericardial collection along the posterior lateral LV appears similar, no evidence of hemodynamic compromise  - Echo (6/8/2020) EF 51-55%, grade 1 diastolic dysfunction, mild LA enlargement, mild MR, no significant pericardial effusion    Patient Active Problem List   Diagnosis   • ESRD on  dialysis (CMS/[facility])   • Coronary artery disease involving native coronary artery of native heart with angina pectoris (CMS/[facility])   • Essential hypertension   • Type 2 diabetes mellitus with hypoglycemia, without long-term current use of insulin (CMS/[facility])       Social History     Tobacco Use   • Smoking status: Former Smoker     Packs/day: 0.00     Years: 2.00     Pack years: 0.00   • Smokeless tobacco: Never Used   Substance Use Topics   • Alcohol use: No   • Drug use: No       Family History   Problem Relation Age of Onset   • Heart disease Father    • Breast cancer Neg Hx        The following portions of the patient's history were reviewed and updated as appropriate: allergies, current medications, past family history, past medical history, past social history, past surgical history and problem list.      Current Outpatient Medications:   •  albuterol (ACCUNEB) 1.25 MG/3ML nebulizer solution, Take 3 mL by nebulization Every 6 (Six) Hours As Needed for Wheezing., Disp: 16 vial, Rfl: 0  •  allopurinol (ZYLOPRIM) 100 MG tablet, Take 100 mg by mouth Daily., Disp: , Rfl:   •  aspirin 81 MG EC tablet, Take 81 mg by mouth Daily., Disp: , Rfl:   •  b complex-vitamin c-folic acid (NEPHRO-ORIANA) 0.8 MG tablet tablet, Take 1 tablet by mouth Daily., Disp: , Rfl:   •  bisacodyl (Dulcolax) 10 MG suppository, Insert 10 mg into the rectum Daily As Needed for Constipation., Disp: , Rfl:   •  bumetanide (BUMEX) 2 MG tablet, Take 2 mg by mouth 2 (Two) Times a Day., Disp: , Rfl:   •  Calcium 500-125 MG-UNIT tablet, Take 1 tablet by mouth Daily., Disp: , Rfl:   •  citalopram (CeleXA) 40 MG tablet, Take 40 mg by mouth Daily., Disp: , Rfl:   •  colchicine 0.6 MG tablet, Take 1 tablet by mouth Daily for 10 days., Disp: 10 tablet, Rfl: 0  •  doxazosin (CARDURA) 4 MG tablet, Take 4 mg by mouth 2 (Two) Times a Day., Disp: , Rfl:   •  famotidine (PEPCID) 20 MG tablet, Take 20 mg by mouth 2 (Two) Times a Day., Disp: , Rfl:   •  gentamicin  "(GARAMYCIN) 0.1 % ointment, Apply 1 application topically to the appropriate area as directed 3 (Three) Times a Day., Disp: , Rfl:   •  hydrALAZINE (APRESOLINE) 25 MG tablet, Take 25 mg by mouth Daily As Needed., Disp: , Rfl:   •  insulin detemir (LEVEMIR) 100 UNIT/ML injection, Inject 12 Units under the skin into the appropriate area as directed Daily., Disp: , Rfl: 12  •  irbesartan (AVAPRO) 150 MG tablet, Take 150 mg by mouth Every Night., Disp: , Rfl:   •  levocetirizine (XYZAL) 5 MG tablet, Take 5 mg by mouth Every Evening., Disp: , Rfl:   •  melatonin 3 MG tablet, Take 3 mg by mouth At Night As Needed for Sleep., Disp: , Rfl:   •  Multiple Vitamins-Minerals (MULTIVITAMIN ADULT PO), Take 1 tablet by mouth Daily., Disp: , Rfl:   •  ondansetron ODT (ZOFRAN-ODT) 4 MG disintegrating tablet, Take 1 tablet by mouth Every 6 (Six) Hours As Needed for Nausea or Vomiting., Disp: 12 tablet, Rfl: 0  •  pantoprazole (PROTONIX) 40 MG EC tablet, Take 40 mg by mouth 2 (two) times a day., Disp: , Rfl:   •  potassium chloride (KLOR-CON) 10 MEQ CR tablet, Take 10 mEq by mouth Daily., Disp: , Rfl:   •  raNITIdine (ZANTAC) 150 MG tablet, Take 150 mg by mouth Every Night., Disp: , Rfl:   •  simvastatin (Zocor) 40 MG tablet, Take 40 mg by mouth Every Night., Disp: , Rfl:   No current facility-administered medications for this visit.     Review of Systems   Constitution: Negative for chills and fever.   Cardiovascular: Positive for dyspnea on exertion. Negative for chest pain and paroxysmal nocturnal dyspnea.   Respiratory: Positive for shortness of breath. Negative for cough.    Skin: Negative for rash.   Gastrointestinal: Negative for abdominal pain and heartburn.   Neurological: Negative for dizziness and numbness.       Objective   /60 (BP Location: Left arm, Patient Position: Sitting, Cuff Size: Adult)   Pulse 76   Ht 160 cm (62.99\")   Wt 75.8 kg (167 lb)   SpO2 98%   BMI 29.59 kg/m²   Physical Exam   Constitutional: " She is oriented to person, place, and time. She appears well-developed and well-nourished.   HENT:   Head: Normocephalic and atraumatic.   Cardiovascular: Normal rate, regular rhythm and normal heart sounds.   No murmur heard.  Pulmonary/Chest: Effort normal and breath sounds normal.   Musculoskeletal: She exhibits no edema.   Neurological: She is alert and oriented to person, place, and time.   Skin: Skin is warm and dry.   Psychiatric: She has a normal mood and affect.     Procedures      ICD-10-CM ICD-9-CM   1. Acute pericarditis, unspecified type I30.9 420.90   2. Pericardial effusion I31.3 423.9   3. Coronary artery disease involving native coronary artery of native heart with angina pectoris (CMS/HCC) I25.119 414.01     413.9   4. Essential hypertension I10 401.9   5. ESRD on dialysis (CMS/HCC) N18.6 585.6    Z99.2 V45.11         Assessment/Plan:  1. Pericarditis: Symptoms consistent with pericarditis and are improving on colchicine.  Complete therapy as prescribed.      2.  Pericardial effusion: Recent episode of pericardial tamponade status post pericardiocentesis at [facility] in May.  Pericardial effusion has resolved on repeat echo.       3.  Coronary artery disease: History of PCI to RCA at [facility] in 2016.  Three vessel disease on recent C from 9/2019.  Has mild dyspnea on exertional and also appears to have mildly reduced systolic function on recent echo from [facility] in May and borderline low on repeat echo from 5/8/2020 which may be ischemic.  Previously was seen by CT surgery at [facility] for revascularization but wants to transition care here.  She can't get her kidney transplant until she undergoes revascularization.   CT surgery referral placed.  Continue aspirin and simvastatin.     3.  Dyslipidemia: Continue simvastatin.       4.  Essential hypertension: Blood pressure is within normal limits.     5.  Chronic kidney disease stage V: She had found a kidney donor but kidney transplant is  on hold until cardiac issues are resolved.

## 2020-06-14 LAB
BACTERIA SPEC AEROBE CULT: NORMAL
BACTERIA SPEC AEROBE CULT: NORMAL

## 2020-06-15 ENCOUNTER — TELEPHONE (OUTPATIENT)
Dept: CARDIOLOGY | Facility: CLINIC | Age: 65
End: 2020-06-15

## 2020-06-15 ENCOUNTER — READMISSION MANAGEMENT (OUTPATIENT)
Dept: CALL CENTER | Facility: HOSPITAL | Age: 65
End: 2020-06-15

## 2020-06-15 NOTE — OUTREACH NOTE
Medical Week 1 Survey      Responses   [facility] patient discharged from?  [facility]   COVID-19 Test Status  Not tested   Does the patient have one of the following disease processes/diagnoses(primary or secondary)?  Other   Is there a successful TCM telephone encounter documented?  No   Week 1 attempt successful?  Yes   Call start time  1151   Call end time  1203   General alerts for this patient  Patient is RN.   Meds reviewed with patient/caregiver?  Yes   Is the patient having any side effects they believe may be caused by any medication additions or changes?  No   Does the patient have all medications ordered at discharge?  N/A   Is the patient taking all medications as directed (includes completed medication regime)?  Yes   Comments regarding appointments  PCP appt 06/18/20, nephrologist at [facility] 06/20/20   Does the patient have a primary care provider?   Yes   Does the patient have an appointment with their PCP within 7 days of discharge?  Greater than 7 days   What is preventing the patient from scheduling follow up appointments within 7 days of discharge?  Earlier appointment not available   Nursing Interventions  Verified appointment date/time/provider   Has the patient kept scheduled appointments due by today?  Yes   Comments  Has already seen Dr Coker. Has appt with Dr Wood 09/02/20 for evaluation for CABG or stents.   Has home health visited the patient within 72 hours of discharge?  N/A   Pulse Ox monitoring  None   Psychosocial issues?  No   Psychosocial comments  Son lives in apartment next door-has several friends to help her.   Did the patient receive a copy of their discharge instructions?  Yes   Nursing interventions  Reviewed instructions with patient   What is the patient's perception of their health status since discharge?  Improving   Is the patient/caregiver able to teach back signs and symptoms related to disease process for when to call PCP?  Yes   Is the patient/caregiver able to  "teach back signs and symptoms related to disease process for when to call 911?  Yes   Is the patient/caregiver able to teach back the hierarchy of who to call/visit for symptoms/problems? PCP, Specialist, Home health nurse, Urgent Care, ED, 911  Yes   Week 1 call completed?  Yes   Wrap up additional comments  States is improving-states does peritoneal dialysis every night along with BP, weight checks. States weighs 165 today, BS 84, BP \"good\". States appetite better. 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          Mellisa Kirby, RN  "

## 2020-06-15 NOTE — TELEPHONE ENCOUNTER
----- Message from Srikanth Coker MD sent at 6/5/2020 10:24 AM CDT -----  Please obtain records from [facility] including cardiology consult note, echo, any cath procedures, etc.  I can't get them in Care Everywhere.

## 2020-06-19 ENCOUNTER — APPOINTMENT (OUTPATIENT)
Dept: CARDIOLOGY | Facility: HOSPITAL | Age: 65
End: 2020-06-19

## 2020-06-22 ENCOUNTER — READMISSION MANAGEMENT (OUTPATIENT)
Dept: CALL CENTER | Facility: HOSPITAL | Age: 65
End: 2020-06-22

## 2020-06-22 NOTE — OUTREACH NOTE
Medical Week 2 Survey      Responses   [facility] patient discharged from?  [facility]   COVID-19 Test Status  Not tested   Does the patient have one of the following disease processes/diagnoses(primary or secondary)?  Other   Week 2 attempt successful?  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   Discharge diagnosis  Type 2 diabetes mellitus with hypoglycemia, without long-term current use of insulin    Call end time  1403   Is patient permission given to speak with other caregiver?  No   Meds reviewed with patient/caregiver?  Yes   Is the patient having any side effects they believe may be caused by any medication additions or changes?  No   Does the patient have all medications ordered at discharge?  N/A   Is the patient taking all medications as directed (includes completed medication regime)?  Yes   Comments regarding appointments  She has seen the PCP and the nephrologist.   Does the patient have a primary care provider?   Yes   Does the patient have an appointment with their PCP within 7 days of discharge?  Greater than 7 days   What is preventing the patient from scheduling follow up appointments within 7 days of discharge?  Earlier appointment not available   Nursing Interventions  Verified appointment date/time/provider   Has the patient kept scheduled appointments due by today?  Yes   Comments  She has an appt 09/02/20 for evalution to see Dr. Wood. The next time we spaek with her she would like the phone number to the office.    Has home health visited the patient within 72 hours of discharge?  N/A   Pulse Ox monitoring  None   Psychosocial issues?  No   Psychosocial comments  She is visiting her brother in [facility].    Did the patient receive a copy of their discharge instructions?  Yes   Nursing interventions  Reviewed instructions with patient   What is the patient's perception of their health status since discharge?  Improving   Is the patient/caregiver able to  teach back signs and symptoms related to disease process for when to call PCP?  Yes   Is the patient/caregiver able to teach back signs and symptoms related to disease process for when to call 911?  Yes   Is the patient/caregiver able to teach back the hierarchy of who to call/visit for symptoms/problems? PCP, Specialist, Home health nurse, Urgent Care, ED, 911  Yes   Week 2 Call Completed?  Yes   Wrap up additional comments  She is an RN doing well with peritoneal dialysis.           Trista Lopez, RN

## 2020-06-23 ENCOUNTER — APPOINTMENT (OUTPATIENT)
Dept: CARDIOLOGY | Facility: HOSPITAL | Age: 65
End: 2020-06-23

## 2020-06-29 ENCOUNTER — READMISSION MANAGEMENT (OUTPATIENT)
Dept: CALL CENTER | Facility: HOSPITAL | Age: 65
End: 2020-06-29

## 2020-06-29 NOTE — OUTREACH NOTE
Medical Week 3 Survey      Responses   [facility] patient discharged from?  [facility]   COVID-19 Test Status  Not tested   Does the patient have one of the following disease processes/diagnoses(primary or secondary)?  Other   Week 3 attempt successful?  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   Discharge diagnosis  Type 2 diabetes mellitus with hypoglycemia, without long-term current use of insulin    Meds reviewed with patient/caregiver?  Yes   Is the patient having any side effects they believe may be caused by any medication additions or changes?  No   Does the patient have all medications ordered at discharge?  Yes   Is the patient taking all medications as directed (includes completed medication regime)?  Yes   Does the patient have a primary care provider?   Yes   Comments regarding PCP  Saw Dr. Golden a couple of weeks ago and has seen Dr. Coker.     Has the patient kept scheduled appointments due by today?  Yes   Comments  Telephone number for Dr. Wood given.     Has home health visited the patient within 72 hours of discharge?  N/A   Pulse Ox monitoring  None   Did the patient receive a copy of their discharge instructions?  Yes   Nursing interventions  Reviewed instructions with patient   What is the patient's perception of their health status since discharge?  Same   Week 3 Call Completed?  Yes   Wrap up additional comments  She is going to call her PCP for an appointment tomorrow to check out her episodes of dizziness.  She states she does not have anyone to take her today but will call 911 if things get worse.            Isis Morales RN

## 2020-06-30 ENCOUNTER — TELEPHONE (OUTPATIENT)
Dept: CARDIOLOGY | Facility: CLINIC | Age: 65
End: 2020-06-30

## 2020-07-01 NOTE — TELEPHONE ENCOUNTER
Patient has been placed on call list. Should a cancellation occur, we will move up her appointment. Thanks.

## 2020-07-06 ENCOUNTER — READMISSION MANAGEMENT (OUTPATIENT)
Dept: CALL CENTER | Facility: HOSPITAL | Age: 65
End: 2020-07-06

## 2020-07-06 NOTE — OUTREACH NOTE
Medical Week 4 Survey      Responses   [facility] patient discharged from?  [facility]   Does the patient have one of the following disease processes/diagnoses(primary or secondary)?  Other   Week 4 attempt successful?  No          Rupa Reza RN

## 2020-07-07 ENCOUNTER — PREP FOR SURGERY (OUTPATIENT)
Dept: OTHER | Facility: HOSPITAL | Age: 65
End: 2020-07-07

## 2020-07-07 ENCOUNTER — TRANSCRIBE ORDERS (OUTPATIENT)
Dept: ADMINISTRATIVE | Facility: HOSPITAL | Age: 65
End: 2020-07-07

## 2020-07-07 ENCOUNTER — TELEPHONE (OUTPATIENT)
Dept: CARDIOLOGY | Facility: CLINIC | Age: 65
End: 2020-07-07

## 2020-07-07 DIAGNOSIS — I25.118 CORONARY ARTERY DISEASE OF NATIVE ARTERY OF NATIVE HEART WITH STABLE ANGINA PECTORIS (HCC): Primary | ICD-10-CM

## 2020-07-07 DIAGNOSIS — Z01.818 PREOP TESTING: Primary | ICD-10-CM

## 2020-07-07 RX ORDER — SODIUM CHLORIDE 0.9 % (FLUSH) 0.9 %
3 SYRINGE (ML) INJECTION EVERY 12 HOURS SCHEDULED
Status: CANCELLED | OUTPATIENT
Start: 2020-07-07

## 2020-07-07 RX ORDER — SODIUM CHLORIDE 0.9 % (FLUSH) 0.9 %
10 SYRINGE (ML) INJECTION AS NEEDED
Status: CANCELLED | OUTPATIENT
Start: 2020-07-07

## 2020-07-07 NOTE — TELEPHONE ENCOUNTER
Abiola MOSHER from cardiothoracic came by to let you know that this pt has appt with them on 7/14. Dr. Wood wants pt to have another heart cath prior to appt. Please advise

## 2020-07-07 NOTE — TELEPHONE ENCOUNTER
Srikanth Coker MD Boone, Mary G, MA   Caller: Unspecified (Today,  9:27 AM)             Next available, would have to catheter would be on 7/14/2020, which would be the day of the appointment.  If that works I could get it scheduled for that morning, possibly before the appointment.  If not, then the heart cath would have to be after the appointment.

## 2020-07-11 ENCOUNTER — LAB (OUTPATIENT)
Dept: LAB | Facility: HOSPITAL | Age: 65
End: 2020-07-11

## 2020-07-11 PROCEDURE — C9803 HOPD COVID-19 SPEC COLLECT: HCPCS | Performed by: INTERNAL MEDICINE

## 2020-07-11 PROCEDURE — U0003 INFECTIOUS AGENT DETECTION BY NUCLEIC ACID (DNA OR RNA); SEVERE ACUTE RESPIRATORY SYNDROME CORONAVIRUS 2 (SARS-COV-2) (CORONAVIRUS DISEASE [COVID-19]), AMPLIFIED PROBE TECHNIQUE, MAKING USE OF HIGH THROUGHPUT TECHNOLOGIES AS DESCRIBED BY CMS-2020-01-R: HCPCS | Performed by: INTERNAL MEDICINE

## 2020-07-12 LAB
COVID LABCORP PRIORITY: NORMAL
SARS-COV-2 RNA RESP QL NAA+PROBE: NOT DETECTED

## 2020-07-14 ENCOUNTER — HOSPITAL ENCOUNTER (OUTPATIENT)
Facility: HOSPITAL | Age: 65
Discharge: HOME | End: 2020-07-14
Attending: INTERNAL MEDICINE | Admitting: INTERNAL MEDICINE

## 2020-07-14 VITALS
BODY MASS INDEX: 28.56 KG/M2 | HEART RATE: 59 BPM | HEIGHT: 63 IN | SYSTOLIC BLOOD PRESSURE: 151 MMHG | RESPIRATION RATE: 13 BRPM | WEIGHT: 161.2 LBS | TEMPERATURE: 97.6 F | OXYGEN SATURATION: 96 % | DIASTOLIC BLOOD PRESSURE: 71 MMHG

## 2020-07-14 DIAGNOSIS — I25.118 CORONARY ARTERY DISEASE OF NATIVE ARTERY OF NATIVE HEART WITH STABLE ANGINA PECTORIS (HCC): ICD-10-CM

## 2020-07-14 LAB
ALBUMIN SERPL-MCNC: 3.6 G/DL (ref 3.5–5.2)
ALBUMIN/GLOB SERPL: 1.2 G/DL
ALP SERPL-CCNC: 47 U/L (ref 39–117)
ALT SERPL W P-5'-P-CCNC: 16 U/L (ref 1–33)
ANION GAP SERPL CALCULATED.3IONS-SCNC: 19 MMOL/L (ref 5–15)
AST SERPL-CCNC: 24 U/L (ref 1–32)
BASOPHILS # BLD AUTO: 0.08 10*3/MM3 (ref 0–0.2)
BASOPHILS NFR BLD AUTO: 0.8 % (ref 0–1.5)
BILIRUB SERPL-MCNC: 0.4 MG/DL (ref 0–1.2)
BUN SERPL-MCNC: 47 MG/DL (ref 8–23)
BUN/CREAT SERPL: 4.4 (ref 7–25)
CALCIUM SPEC-SCNC: 10.6 MG/DL (ref 8.6–10.5)
CHLORIDE SERPL-SCNC: 90 MMOL/L (ref 98–107)
CHOLEST SERPL-MCNC: 153 MG/DL (ref 0–200)
CO2 SERPL-SCNC: 26 MMOL/L (ref 22–29)
CREAT SERPL-MCNC: 10.76 MG/DL (ref 0.57–1)
DEPRECATED RDW RBC AUTO: 55.6 FL (ref 37–54)
EOSINOPHIL # BLD AUTO: 0.72 10*3/MM3 (ref 0–0.4)
EOSINOPHIL NFR BLD AUTO: 7.6 % (ref 0.3–6.2)
ERYTHROCYTE [DISTWIDTH] IN BLOOD BY AUTOMATED COUNT: 15.3 % (ref 12.3–15.4)
GFR SERPL CREATININE-BSD FRML MDRD: 4 ML/MIN/1.73
GFR SERPL CREATININE-BSD FRML MDRD: ABNORMAL ML/MIN/{1.73_M2}
GLOBULIN UR ELPH-MCNC: 3.1 GM/DL
GLUCOSE SERPL-MCNC: 74 MG/DL (ref 65–99)
HCT VFR BLD AUTO: 25.9 % (ref 34–46.6)
HDLC SERPL-MCNC: 50 MG/DL (ref 40–60)
HGB BLD-MCNC: 8.7 G/DL (ref 12–15.9)
IMM GRANULOCYTES # BLD AUTO: 0.07 10*3/MM3 (ref 0–0.05)
IMM GRANULOCYTES NFR BLD AUTO: 0.7 % (ref 0–0.5)
LDLC SERPL CALC-MCNC: 75 MG/DL (ref 0–100)
LDLC/HDLC SERPL: 1.5 {RATIO}
LYMPHOCYTES # BLD AUTO: 1.52 10*3/MM3 (ref 0.7–3.1)
LYMPHOCYTES NFR BLD AUTO: 16 % (ref 19.6–45.3)
MCH RBC QN AUTO: 33.3 PG (ref 26.6–33)
MCHC RBC AUTO-ENTMCNC: 33.6 G/DL (ref 31.5–35.7)
MCV RBC AUTO: 99.2 FL (ref 79–97)
MONOCYTES # BLD AUTO: 0.47 10*3/MM3 (ref 0.1–0.9)
MONOCYTES NFR BLD AUTO: 4.9 % (ref 5–12)
NEUTROPHILS NFR BLD AUTO: 6.66 10*3/MM3 (ref 1.7–7)
NEUTROPHILS NFR BLD AUTO: 70 % (ref 42.7–76)
NRBC BLD AUTO-RTO: 0 /100 WBC (ref 0–0.2)
PLATELET # BLD AUTO: 332 10*3/MM3 (ref 140–450)
PMV BLD AUTO: 10.1 FL (ref 6–12)
POTASSIUM SERPL-SCNC: 3.5 MMOL/L (ref 3.5–5.2)
PROT SERPL-MCNC: 6.7 G/DL (ref 6–8.5)
RBC # BLD AUTO: 2.61 10*6/MM3 (ref 3.77–5.28)
SODIUM SERPL-SCNC: 135 MMOL/L (ref 136–145)
TRIGL SERPL-MCNC: 141 MG/DL (ref 0–150)
VLDLC SERPL-MCNC: 28.2 MG/DL
WBC # BLD AUTO: 9.52 10*3/MM3 (ref 3.4–10.8)

## 2020-07-14 PROCEDURE — 99152 MOD SED SAME PHYS/QHP 5/>YRS: CPT | Performed by: INTERNAL MEDICINE

## 2020-07-14 PROCEDURE — C1894 INTRO/SHEATH, NON-LASER: HCPCS | Performed by: INTERNAL MEDICINE

## 2020-07-14 PROCEDURE — 85025 COMPLETE CBC W/AUTO DIFF WBC: CPT | Performed by: INTERNAL MEDICINE

## 2020-07-14 PROCEDURE — 25010000002 DIPHENHYDRAMINE PER 50 MG: Performed by: INTERNAL MEDICINE

## 2020-07-14 PROCEDURE — 25010000002 FENTANYL CITRATE (PF) 100 MCG/2ML SOLUTION: Performed by: INTERNAL MEDICINE

## 2020-07-14 PROCEDURE — 0 IOPAMIDOL PER 1 ML: Performed by: INTERNAL MEDICINE

## 2020-07-14 PROCEDURE — 80061 LIPID PANEL: CPT | Performed by: INTERNAL MEDICINE

## 2020-07-14 PROCEDURE — 25010000002 HEPARIN (PORCINE): Performed by: INTERNAL MEDICINE

## 2020-07-14 PROCEDURE — 93458 L HRT ARTERY/VENTRICLE ANGIO: CPT | Performed by: INTERNAL MEDICINE

## 2020-07-14 PROCEDURE — 99153 MOD SED SAME PHYS/QHP EA: CPT | Performed by: INTERNAL MEDICINE

## 2020-07-14 PROCEDURE — S0260 H&P FOR SURGERY: HCPCS | Performed by: INTERNAL MEDICINE

## 2020-07-14 PROCEDURE — C1769 GUIDE WIRE: HCPCS | Performed by: INTERNAL MEDICINE

## 2020-07-14 PROCEDURE — 25010000002 MIDAZOLAM HCL (PF) 5 MG/5ML SOLUTION: Performed by: INTERNAL MEDICINE

## 2020-07-14 PROCEDURE — 80053 COMPREHEN METABOLIC PANEL: CPT | Performed by: INTERNAL MEDICINE

## 2020-07-14 PROCEDURE — 25010000002 HEPARIN (PORCINE) PER 1000 UNITS: Performed by: INTERNAL MEDICINE

## 2020-07-14 RX ORDER — MIDAZOLAM HYDROCHLORIDE 1 MG/ML
INJECTION, SOLUTION INTRAMUSCULAR; INTRAVENOUS AS NEEDED
Status: DISCONTINUED | OUTPATIENT
Start: 2020-07-14 | End: 2020-07-14 | Stop reason: HOSPADM

## 2020-07-14 RX ORDER — FENTANYL CITRATE 50 UG/ML
INJECTION, SOLUTION INTRAMUSCULAR; INTRAVENOUS AS NEEDED
Status: DISCONTINUED | OUTPATIENT
Start: 2020-07-14 | End: 2020-07-14 | Stop reason: HOSPADM

## 2020-07-14 RX ORDER — SODIUM CHLORIDE 0.9 % (FLUSH) 0.9 %
3 SYRINGE (ML) INJECTION EVERY 12 HOURS SCHEDULED
Status: CANCELLED | OUTPATIENT
Start: 2020-07-14

## 2020-07-14 RX ORDER — SODIUM CHLORIDE 0.9 % (FLUSH) 0.9 %
10 SYRINGE (ML) INJECTION AS NEEDED
Status: CANCELLED | OUTPATIENT
Start: 2020-07-14

## 2020-07-14 RX ORDER — SODIUM CHLORIDE 0.9 % (FLUSH) 0.9 %
10 SYRINGE (ML) INJECTION AS NEEDED
Status: DISCONTINUED | OUTPATIENT
Start: 2020-07-14 | End: 2020-07-14 | Stop reason: HOSPADM

## 2020-07-14 RX ORDER — SODIUM CHLORIDE 0.9 % (FLUSH) 0.9 %
3 SYRINGE (ML) INJECTION EVERY 12 HOURS SCHEDULED
Status: DISCONTINUED | OUTPATIENT
Start: 2020-07-14 | End: 2020-07-14 | Stop reason: HOSPADM

## 2020-07-14 RX ORDER — DIPHENHYDRAMINE HYDROCHLORIDE 50 MG/ML
INJECTION INTRAMUSCULAR; INTRAVENOUS AS NEEDED
Status: DISCONTINUED | OUTPATIENT
Start: 2020-07-14 | End: 2020-07-14 | Stop reason: HOSPADM

## 2020-07-14 RX ORDER — ACETAMINOPHEN 325 MG/1
650 TABLET ORAL EVERY 4 HOURS PRN
Status: CANCELLED | OUTPATIENT
Start: 2020-07-14

## 2020-07-14 NOTE — H&P
Chief Complaint: chest pain    HPI: Ms. Salcido is a 64-year-old female with significant past medical history of end-stage kidney disease on peritoneal dialysis and awaiting kidney transplant, coronary artery disease, hypertension, diabetes mellitus type 2, hyperlipidemia, GERD, and gout.  She has a kidney donor for her kidney transplant but needs revascularization before she can receive her kidney transplant.  She was previously seen by cardiothoracic surgery at [facility] and recently decided transferred her CT surgery care here to [facility].  Repeat cardiac catheterization has been requested for further evaluation of her coronary artery disease for interval change compared to her last cardiac catheterization on 9/23/2019.      Patient Active Problem List   Diagnosis   • ESRD on dialysis (CMS/[facility])   • Coronary artery disease involving native coronary artery of native heart with angina pectoris (CMS/[facility])   • Essential hypertension   • Type 2 diabetes mellitus with hypoglycemia, without long-term current use of insulin (CMS/[facility])   • Coronary artery disease of native artery of native heart with stable angina pectoris (CMS/[facility])       Past Medical History:   Diagnosis Date   • Chronic kidney disease     STAGE 5    • Coronary artery disease    • Diabetes mellitus (CMS/[facility])    • Dialysis patient (CMS/[facility])    • Dyslipidemia 11/2/2018   • GERD (gastroesophageal reflux disease)    • Gout    • Hyperlipidemia    • Hypertension    • Peritoneal dialysis status (CMS/[facility])    • Type 2 diabetes mellitus with kidney complication, with long-term current use of insulin (CMS/[facility]) 11/2/2018     Past Surgical History:   Procedure Laterality Date   • BREAST BIOPSY     • CARDIAC CATHETERIZATION N/A 10/1/2019    Procedure: Left Heart Cath;  Surgeon: Srikanth Coker MD;  Location: [facility] CATH INVASIVE LOCATION;  Service: Cardiology   • CORONARY ANGIOPLASTY WITH STENT PLACEMENT      X 2   • GASTRIC BYPASS     • HYSTERECTOMY    "  • OOPHORECTOMY     • SINUS SURGERY     • TUBAL ABDOMINAL LIGATION         The following portions of the patient's history were reviewed and updated as appropriate: allergies, current medications, past family history, past medical history, past social history, past surgical history and problem list.    Social History     Socioeconomic History   • Marital status:      Spouse name: Not on file   • Number of children: Not on file   • Years of education: Not on file   • Highest education level: Not on file   Tobacco Use   • Smoking status: Former Smoker     Packs/day: 0.00     Years: 2.00     Pack years: 0.00   • Smokeless tobacco: Never Used   Substance and Sexual Activity   • Alcohol use: No   • Drug use: No   • Sexual activity: Defer       Family History   Problem Relation Age of Onset   • Heart disease Father    • Breast cancer Neg Hx        Review of Systems: A 12 system review of systems was completed and is negative except stated in HPI.     Objective   /61   Pulse 61 Comment: sinus  Temp 97.6 °F (36.4 °C) (Temporal)   Resp 12   Ht 160 cm (63\")   Wt 73.1 kg (161 lb 3.2 oz)   SpO2 100%   BMI 28.56 kg/m²     Physical Exam:  Physical Exam   Constitutional: She is oriented to person, place, and time. She appears well-developed and well-nourished.   HENT:   Head: Normocephalic and atraumatic.   Cardiovascular: Normal rate, regular rhythm and normal heart sounds.   No murmur heard.  Pulmonary/Chest: Effort normal and breath sounds normal.   Musculoskeletal: She exhibits no edema.   Neurological: She is alert and oriented to person, place, and time.   Skin: Skin is warm and dry.   Psychiatric: She has a normal mood and affect.       Lab Results   Component Value Date    TROPONINI 0.18 (H) 05/21/2020    TROPONINT 0.214 (C) 06/09/2020     Lab Results   Component Value Date    GLUCOSE 74 07/14/2020    CALCIUM 10.6 (H) 07/14/2020     (L) 07/14/2020    K 3.5 07/14/2020    CO2 26.0 07/14/2020    CL " 90 (L) 07/14/2020    BUN 47 (H) 07/14/2020    CREATININE 10.76 (H) 07/14/2020    EGFRIFAFRI  07/14/2020      Comment:      <15 Indicative of kidney failure.    EGFRIFNONA 4 (L) 07/14/2020    BCR 4.4 (L) 07/14/2020    ANIONGAP 19.0 (H) 07/14/2020     Lab Results   Component Value Date    WBC 9.52 07/14/2020    HGB 8.7 (L) 07/14/2020    HCT 25.9 (L) 07/14/2020    MCV 99.2 (H) 07/14/2020     07/14/2020     Lab Results   Component Value Date    CHOL 153 07/14/2020    CHOL 186 10/01/2019    CHOL 233 (H) 05/17/2018     Lab Results   Component Value Date    TRIG 141 07/14/2020    TRIG 47 05/21/2020    TRIG 157 (H) 10/01/2019     Lab Results   Component Value Date    HDL 50 07/14/2020    HDL 37 (L) 05/21/2020    HDL 49 10/01/2019     No components found for: LDLCALC  Lab Results   Component Value Date    LDL 75 07/14/2020    LDL 54 05/21/2020     (H) 10/01/2019         Assessment:  1.  Coronary artery disease: Three-vessel disease on last cardiac catheterization from 9/2019.  2.  End-stage kidney disease on peritoneal dialysis: Awaiting kidney transplant.  3.  Dyslipidemia  4.  Essential pretension  5.  Recent pericarditis    Plan:  -Coronary angiography today

## 2020-07-22 ENCOUNTER — OFFICE VISIT (OUTPATIENT)
Dept: CARDIAC SURGERY | Facility: CLINIC | Age: 65
End: 2020-07-22

## 2020-07-22 VITALS
WEIGHT: 164.2 LBS | OXYGEN SATURATION: 95 % | HEIGHT: 63 IN | BODY MASS INDEX: 29.09 KG/M2 | DIASTOLIC BLOOD PRESSURE: 60 MMHG | HEART RATE: 62 BPM | SYSTOLIC BLOOD PRESSURE: 114 MMHG

## 2020-07-22 DIAGNOSIS — I25.118 CORONARY ARTERY DISEASE OF NATIVE ARTERY OF NATIVE HEART WITH STABLE ANGINA PECTORIS (HCC): Primary | ICD-10-CM

## 2020-07-22 DIAGNOSIS — I79.8 OTHER DISORDERS OF ARTERIES, ARTERIOLES AND CAPILLARIES IN DISEASES CLASSIFIED ELSEWHERE (HCC): ICD-10-CM

## 2020-07-22 DIAGNOSIS — E11.649 TYPE 2 DIABETES MELLITUS WITH HYPOGLYCEMIA WITHOUT COMA, WITH LONG-TERM CURRENT USE OF INSULIN (HCC): ICD-10-CM

## 2020-07-22 DIAGNOSIS — Z99.2 ESRD ON DIALYSIS (HCC): ICD-10-CM

## 2020-07-22 DIAGNOSIS — Z79.4 TYPE 2 DIABETES MELLITUS WITH HYPOGLYCEMIA WITHOUT COMA, WITH LONG-TERM CURRENT USE OF INSULIN (HCC): ICD-10-CM

## 2020-07-22 DIAGNOSIS — I10 ESSENTIAL HYPERTENSION: ICD-10-CM

## 2020-07-22 DIAGNOSIS — N18.6 ESRD ON DIALYSIS (HCC): ICD-10-CM

## 2020-07-22 PROCEDURE — 99214 OFFICE O/P EST MOD 30 MIN: CPT | Performed by: NURSE PRACTITIONER

## 2020-07-22 NOTE — PROGRESS NOTES
Subjective   Chief Complaint   Patient presents with   • Coronary Artery Disease     New patient from [facility].        Patient ID: Jennifer Salcido is a 64 y.o. female who is here to discuss consideration for coronary artery bypass grafting.     History of Present Illness  Ms. Salcido is a 64 year old female with a complex medical history significant for hypertension, type 2 diabetes (most recent A1C 6.7), end stage renal disease requiring peritoneal dialysis, GERD, gout, and coronary artery disease.  Gastric bypass in 2004, total hysterectomy 2003, cholecystectomy, sinus surgery.  She did recently have an episode of hypoglycemia that required admission.  She routinely follows with cardiology and nephrology at [facility].  In 09/2019 she was considered for kidney transplant at [facility] for which they did find a compatible donor.  It was recommended that she have left heart catheterization prior to transplant and this was performed 10/2019.  It was determined at that time that she had severe three vessel disease.  She was further evaluated by [facility] specialist to determine the best plan for her moving forward in regard to CAD in light of the need for kidney transplant.  She has since then decided she would like her cardiac care done here locally due to her inability to drive and the need to set up transportation.  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  She does follow with an eye dr regularly.   It was recommended that she have an updated cardiac cath which demonstrated ongoing 3 vessel disease.  She does have a history of PCI in 2016.  She denies chest pain, palpitations, or dyspnea.  She was referred to cardiothoracic surgery for consideration of surgical revascularization.  She is a former smoker quitting 12 years ago, does not drink alcohol, no recreational drug use.      The following portions of the patient's history were reviewed and updated as appropriate:  "allergies, current medications, past family history, past medical history, past social history, past surgical history and problem list.    Review of Systems   Constitutional: Negative for activity change, diaphoresis, fatigue and unexpected weight change.   HENT: Negative for trouble swallowing and voice change.    Eyes: Negative for visual disturbance (unchanged).   Respiratory: Negative for cough, chest tightness, shortness of breath and wheezing.    Cardiovascular: Negative for chest pain, palpitations and leg swelling.   Gastrointestinal: Positive for constipation. Negative for abdominal distention, abdominal pain, diarrhea, nausea and vomiting.   Musculoskeletal: Negative for arthralgias and myalgias.   Skin: Negative for color change, pallor, rash and wound.   Allergic/Immunologic: Negative for immunocompromised state.   Neurological: Negative for dizziness, tremors and seizures.   Hematological: Does not bruise/bleed easily.   Psychiatric/Behavioral: Negative for agitation, confusion and sleep disturbance.       Objective   Visit Vitals  /60 (BP Location: Left arm, Patient Position: Sitting, Cuff Size: Adult)   Pulse 62   Ht 160 cm (63\")   Wt 74.5 kg (164 lb 3.2 oz)   SpO2 95%   BMI 29.09 kg/m²       Physical Exam   Constitutional: She is oriented to person, place, and time. She appears well-developed and well-nourished.   HENT:   Head: Normocephalic.   Eyes: Pupils are equal, round, and reactive to light.   Neck: No JVD present.   Cardiovascular: Normal rate, regular rhythm, normal heart sounds and intact distal pulses.   No murmur heard.  Pulmonary/Chest: Effort normal and breath sounds normal. She has no wheezes. She has no rales.   Abdominal: Soft. She exhibits no distension. There is no tenderness.   PD site is C/D/I   Musculoskeletal: She exhibits no edema or tenderness.   Lymphadenopathy:     She has no cervical adenopathy.   Neurological: She is alert and oriented to person, place, and time.   "   Skin: Skin is warm and dry.   Paper thin skin with poor turgor   Psychiatric: She has a normal mood and affect. Her behavior is normal. Judgment and thought content normal.   Vitals reviewed.            Assessment/Plan      Independent review of left heart cath by myself and Dr. Wood demonstrated severe triple vessel disease.      Jennifer was seen today for coronary artery disease.    Diagnoses and all orders for this visit:    Coronary artery disease of native artery of native heart with stable angina pectoris (CMS/[facility])  -     US Carotid Bilateral; Future  -     Pulmonary Function Test; Future  -     US Vein Mapping Bilateral; Future    Essential hypertension    ESRD on dialysis (CMS/[facility])  -     Ambulatory Referral to Nephrology    Type 2 diabetes mellitus with hypoglycemia without coma, with long-term current use of insulin (CMS/[facility])    Other disorders of arteries, arterioles and capillaries in diseases classified elsewhere (CMS/[facility])   -     US Carotid Bilateral; Future         I discussed the patient's findings and my recommendations with the patient.  We discussed the options for treatment of coronary artery disease to include medical therapy, coronary stenting, and surgical revascularization.  We discussed the pros and cons of each option and how it pertains to the current case.  The STS Risk score was calculated using the STS Risk Calculator and discussed with the patient.  Coronary artery bypass grafting is best option given patient's findings and risk factors.  We discussed the operative conduct and expected hospital and outpatient recovery.  Risks were discussed to include but not limited to bleeding, infection, stroke, heart attack, need for additional procedures, anesthesia risk, organ dysfunction and/or death, prolonged mechanical ventilation, prolonged ICU stay, chronic pain syndromes, sternal nonunion, and/or death.  We discussed the need to utilize all medical treatments additionally prescribed post  surgery.  I have discussed the need to establish care with nephrology locally in order to manage dialysis immediately prior to surgery as well as during the recovery process.  She is agreeable to this plan.  She will need additional pre cardiac surgery planning tests to include bilateral carotid ultrasound, pulmonary function tests, and bilateral vein mapping.  She has recently had left heart cath, transthoracic echocardiogram, and CTA of the chest.  These results were reviewed in the office with the patient as well as Dr. Wood. The patient understands the risks, benefits, and alternatives and she wishes to proceed forward with surgery.    Patient's Body mass index is 29.09 kg/m². BMI is above normal parameters. Recommendations include: educational material, nutrition counseling and referral to primary care.    I have congratulated Jennifer Salcido on successful smoking cessation.   She is a former smoker having quit 12 years ago.  I have educated her on the risk of diseases from using tobacco products such as cancer, COPD and heart diease.     I spent 5 minutes counseling the patient.    Advance Care Planning   ACP discussion was held with the patient during this visit. Patient does not have an advance directive, declines further assistance.    I have ordered needed testing and we will call the patient when this has been scheduled.   Refer to nephrology locally, this has also been ordered  Will plan for follow up in the office with Dr. Wood after all testing has been completed and she has been evaluated by nephrology.  The patient is agreeable to this plan.

## 2020-07-24 ENCOUNTER — TRANSCRIBE ORDERS (OUTPATIENT)
Dept: LAB | Facility: HOSPITAL | Age: 65
End: 2020-07-24

## 2020-07-24 DIAGNOSIS — Z01.818 OTHER SPECIFIED PRE-OPERATIVE EXAMINATION: Primary | ICD-10-CM

## 2020-07-27 ENCOUNTER — TELEPHONE (OUTPATIENT)
Dept: CARDIOLOGY | Facility: CLINIC | Age: 65
End: 2020-07-27

## 2020-07-27 NOTE — TELEPHONE ENCOUNTER
"Pt calling requesting to speak with \"Dr Wood's nurse\" about a situation that has come up.  Pt states she may have to postpone surgery until after the first of the year because she has found out she may have to move.  Requests call back.  Can reach her at #[phone]/maykel  "

## 2020-07-27 NOTE — TELEPHONE ENCOUNTER
Called and spoke with patient. Patient is having to move but will be staying local. I recommended that she go ahead and keep upcoming appointments for testing and to meet with Dr. Wood and they can determine a plan from there. Patient is agreeable.

## 2020-08-24 ENCOUNTER — TELEPHONE (OUTPATIENT)
Dept: CARDIOLOGY | Facility: CLINIC | Age: 65
End: 2020-08-24

## 2020-08-25 ENCOUNTER — LAB (OUTPATIENT)
Dept: LAB | Facility: HOSPITAL | Age: 65
End: 2020-08-25

## 2020-08-25 DIAGNOSIS — Z01.818 OTHER SPECIFIED PRE-OPERATIVE EXAMINATION: ICD-10-CM

## 2020-08-25 PROCEDURE — U0003 INFECTIOUS AGENT DETECTION BY NUCLEIC ACID (DNA OR RNA); SEVERE ACUTE RESPIRATORY SYNDROME CORONAVIRUS 2 (SARS-COV-2) (CORONAVIRUS DISEASE [COVID-19]), AMPLIFIED PROBE TECHNIQUE, MAKING USE OF HIGH THROUGHPUT TECHNOLOGIES AS DESCRIBED BY CMS-2020-01-R: HCPCS

## 2020-08-25 PROCEDURE — C9803 HOPD COVID-19 SPEC COLLECT: HCPCS

## 2020-08-26 LAB
COVID LABCORP PRIORITY: NORMAL
SARS-COV-2 RNA RESP QL NAA+PROBE: NOT DETECTED

## 2020-08-27 NOTE — TELEPHONE ENCOUNTER
Pt calling to check status on her nephrology referral.  States she has not heard anything on this.  Can reach her at #[phone]/maykel  
Pt was also notified that she should hear something from their office soon.   
Referral has been re-faxed. 
Yes

## 2020-08-28 ENCOUNTER — HOSPITAL ENCOUNTER (OUTPATIENT)
Dept: ULTRASOUND IMAGING | Facility: HOSPITAL | Age: 65
Discharge: HOME | End: 2020-08-28

## 2020-08-28 ENCOUNTER — HOSPITAL ENCOUNTER (OUTPATIENT)
Dept: PULMONOLOGY | Facility: HOSPITAL | Age: 65
Discharge: HOME | End: 2020-08-28

## 2020-08-28 ENCOUNTER — HOSPITAL ENCOUNTER (OUTPATIENT)
Dept: ULTRASOUND IMAGING | Facility: HOSPITAL | Age: 65
Discharge: HOME | End: 2020-08-28
Admitting: NURSE PRACTITIONER

## 2020-08-28 DIAGNOSIS — I25.118 CORONARY ARTERY DISEASE OF NATIVE ARTERY OF NATIVE HEART WITH STABLE ANGINA PECTORIS (HCC): ICD-10-CM

## 2020-08-28 DIAGNOSIS — I79.8 OTHER DISORDERS OF ARTERIES, ARTERIOLES AND CAPILLARIES IN DISEASES CLASSIFIED ELSEWHERE (HCC): ICD-10-CM

## 2020-08-28 LAB
ARTERIAL PATENCY WRIST A: ABNORMAL
ATMOSPHERIC PRESS: 744 MMHG
BASE EXCESS BLDA CALC-SCNC: -1 MMOL/L (ref 0–2)
BDY SITE: ABNORMAL
BODY TEMPERATURE: 37 C
HCO3 BLDA-SCNC: 24 MMOL/L (ref 20–26)
Lab: ABNORMAL
MODALITY: ABNORMAL
PCO2 BLDA: 40.4 MM HG (ref 35–45)
PCO2 TEMP ADJ BLD: 40.4 MM HG (ref 35–45)
PH BLDA: 7.38 PH UNITS (ref 7.35–7.45)
PH, TEMP CORRECTED: 7.38 PH UNITS (ref 7.35–7.45)
PO2 BLDA: 90.2 MM HG (ref 83–108)
PO2 TEMP ADJ BLD: 90.2 MM HG (ref 83–108)
SAO2 % BLDCOA: 95.9 % (ref 94–99)
VENTILATOR MODE: ABNORMAL

## 2020-08-28 PROCEDURE — 94060 EVALUATION OF WHEEZING: CPT

## 2020-08-28 PROCEDURE — 94060 EVALUATION OF WHEEZING: CPT | Performed by: INTERNAL MEDICINE

## 2020-08-28 PROCEDURE — 94726 PLETHYSMOGRAPHY LUNG VOLUMES: CPT

## 2020-08-28 PROCEDURE — 93880 EXTRACRANIAL BILAT STUDY: CPT

## 2020-08-28 PROCEDURE — 93880 EXTRACRANIAL BILAT STUDY: CPT | Performed by: SURGERY

## 2020-08-28 PROCEDURE — 93970 EXTREMITY STUDY: CPT | Performed by: SURGERY

## 2020-08-28 PROCEDURE — 94729 DIFFUSING CAPACITY: CPT

## 2020-08-28 PROCEDURE — 94729 DIFFUSING CAPACITY: CPT | Performed by: INTERNAL MEDICINE

## 2020-08-28 PROCEDURE — 94726 PLETHYSMOGRAPHY LUNG VOLUMES: CPT | Performed by: INTERNAL MEDICINE

## 2020-08-28 PROCEDURE — 36600 WITHDRAWAL OF ARTERIAL BLOOD: CPT

## 2020-08-28 PROCEDURE — 93970 EXTREMITY STUDY: CPT

## 2020-08-28 PROCEDURE — 82803 BLOOD GASES ANY COMBINATION: CPT

## 2020-08-28 RX ORDER — ALBUTEROL SULFATE 2.5 MG/3ML
2.5 SOLUTION RESPIRATORY (INHALATION) ONCE
Status: COMPLETED | OUTPATIENT
Start: 2020-08-28 | End: 2020-08-28

## 2020-08-28 RX ADMIN — ALBUTEROL SULFATE 2.5 MG: 2.5 SOLUTION RESPIRATORY (INHALATION) at 11:39

## 2020-09-11 ENCOUNTER — TELEPHONE (OUTPATIENT)
Dept: CARDIAC SURGERY | Facility: CLINIC | Age: 65
End: 2020-09-11

## 2020-09-28 ENCOUNTER — OFFICE VISIT (OUTPATIENT)
Dept: CARDIOLOGY | Facility: CLINIC | Age: 65
End: 2020-09-28

## 2020-09-28 VITALS
DIASTOLIC BLOOD PRESSURE: 66 MMHG | OXYGEN SATURATION: 98 % | BODY MASS INDEX: 29.59 KG/M2 | SYSTOLIC BLOOD PRESSURE: 118 MMHG | HEIGHT: 63 IN | HEART RATE: 51 BPM | WEIGHT: 167 LBS

## 2020-09-28 DIAGNOSIS — I25.118 CORONARY ARTERY DISEASE OF NATIVE ARTERY OF NATIVE HEART WITH STABLE ANGINA PECTORIS (HCC): Primary | ICD-10-CM

## 2020-09-28 DIAGNOSIS — Z99.2 ESRD ON DIALYSIS (HCC): ICD-10-CM

## 2020-09-28 DIAGNOSIS — E78.5 DYSLIPIDEMIA: ICD-10-CM

## 2020-09-28 DIAGNOSIS — I10 ESSENTIAL HYPERTENSION: ICD-10-CM

## 2020-09-28 DIAGNOSIS — N18.6 ESRD ON DIALYSIS (HCC): ICD-10-CM

## 2020-09-28 PROCEDURE — 99214 OFFICE O/P EST MOD 30 MIN: CPT | Performed by: INTERNAL MEDICINE

## 2020-09-28 NOTE — PROGRESS NOTES
Reason for Visit: cardiovascular follow up.    HPI:  Jennifer Salcido is a 65 y.o. female is here today for 3-month follow-up.  Left heart catheterization was repeated in July at the request of CT surgery to further evaluate her coronary artery disease.  It appears unchanged compared to previous heart cath 9/2019.  Her appointment with CT surgery was rescheduled to October.  She has been doing well for the most part.  She remains chest pain free.  Her nephrologist put her back on carvedilol to control her blood pressure.  She hasn't felt a difference other than her heart rate has been lower.  She is planning on moving in the near future but not sure if she can wait to do this till after her surgery or before given lifting restrictions that will be in place.    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  - Nuclear stress (12/6/2019) normal myocardial perfusion with no evidence of ischemia, EF 70%  - Echo (5/22/2020) EF 40%, normal RV size with low normal systolic function, small pericardial effusion the posterior lateral LV with no tamponade physiology  - Echo (5/24/2020) compared to echo from 5/20/2020 the pericardial collection along the posterior lateral LV appears similar, no evidence of hemodynamic compromise  - Echo (6/8/2020) EF 51-55%, grade 1 diastolic dysfunction, mild LA enlargement, mild MR, no significant pericardial effusion  - LHC (7/14/2020) severe three-vessel CAD, 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, normal left heart filling pressures  - Carotid ultrasound  (8/28/2020) less than 50% bilaterally  - PFT (8/28/2020) normal spirometry, normal lung volumes, diffusion capacity    Patient Active Problem List   Diagnosis   • ESRD on dialysis (CMS/[facility])   • Coronary artery disease involving native coronary artery of native heart with angina pectoris (CMS/[facility])   • Essential hypertension   • Type 2 diabetes mellitus with hypoglycemia, without long-term current use of insulin (CMS/[facility])   • Coronary artery disease of native artery of native heart with stable angina pectoris (CMS/[facility])   • Dyslipidemia       Social History     Tobacco Use   • Smoking status: Former Smoker     Packs/day: 0.00     Years: 2.00     Pack years: 0.00   • Smokeless tobacco: Never Used   Substance Use Topics   • Alcohol use: No   • Drug use: No       Family History   Problem Relation Age of Onset   • Heart disease Father    • Breast cancer Neg Hx        The following portions of the patient's history were reviewed and updated as appropriate: allergies, current medications, past family history, past medical history, past social history, past surgical history and problem list.      Current Outpatient Medications:   •  albuterol (ACCUNEB) 1.25 MG/3ML nebulizer solution, Take 3 mL by nebulization Every 6 (Six) Hours As Needed for Wheezing., Disp: 16 vial, Rfl: 0  •  allopurinol (ZYLOPRIM) 100 MG tablet, Take 100 mg by mouth Daily., Disp: , Rfl:   •  aspirin 81 MG EC tablet, Take 81 mg by mouth Daily., Disp: , Rfl:   •  b complex-vitamin c-folic acid (NEPHRO-ORIANA) 0.8 MG tablet tablet, Take 1 tablet by mouth Daily., Disp: , Rfl:   •  bisacodyl (Dulcolax) 10 MG suppository, Insert 10 mg into the rectum Daily As Needed for Constipation., Disp: , Rfl:   •  bumetanide (BUMEX) 2 MG tablet, Take 2 mg by mouth 2 (Two) Times a Day., Disp: , Rfl:   •  Calcium 500-125 MG-UNIT tablet, Take 1 tablet by mouth Daily., Disp: , Rfl:   •  citalopram (CeleXA) 40 MG tablet, Take 40 mg by mouth Daily., Disp: , Rfl:   •  doxazosin  "(CARDURA) 4 MG tablet, Take 4 mg by mouth 2 (Two) Times a Day., Disp: , Rfl:   •  famotidine (PEPCID) 20 MG tablet, Take 20 mg by mouth 2 (Two) Times a Day., Disp: , Rfl:   •  gentamicin (GARAMYCIN) 0.1 % ointment, Apply 1 application topically to the appropriate area as directed 3 (Three) Times a Day., Disp: , Rfl:   •  hydrALAZINE (APRESOLINE) 25 MG tablet, Take 25 mg by mouth Daily As Needed., Disp: , Rfl:   •  insulin detemir (LEVEMIR) 100 UNIT/ML injection, Inject 12 Units under the skin into the appropriate area as directed Daily., Disp: , Rfl: 12  •  irbesartan (AVAPRO) 150 MG tablet, Take 150 mg by mouth Every Night., Disp: , Rfl:   •  levocetirizine (XYZAL) 5 MG tablet, Take 5 mg by mouth Every Evening., Disp: , Rfl:   •  melatonin 3 MG tablet, Take 3 mg by mouth At Night As Needed for Sleep., Disp: , Rfl:   •  Multiple Vitamins-Minerals (MULTIVITAMIN ADULT PO), Take 1 tablet by mouth Daily., Disp: , Rfl:   •  ondansetron ODT (ZOFRAN-ODT) 4 MG disintegrating tablet, Take 1 tablet by mouth Every 6 (Six) Hours As Needed for Nausea or Vomiting., Disp: 12 tablet, Rfl: 0  •  pantoprazole (PROTONIX) 40 MG EC tablet, Take 40 mg by mouth 2 (two) times a day., Disp: , Rfl:   •  potassium chloride (KLOR-CON) 10 MEQ CR tablet, Take 10 mEq by mouth Daily., Disp: , Rfl:   •  simvastatin (Zocor) 40 MG tablet, Take 40 mg by mouth Every Night., Disp: , Rfl:     Review of Systems   Constitution: Negative for chills and fever.   Cardiovascular: Negative for chest pain and paroxysmal nocturnal dyspnea.   Respiratory: Negative for cough and shortness of breath.    Skin: Negative for rash.   Gastrointestinal: Negative for abdominal pain and heartburn.   Neurological: Negative for dizziness and numbness.       Objective   /66 (BP Location: Left arm, Patient Position: Sitting, Cuff Size: Adult)   Pulse 51   Ht 160 cm (62.99\")   Wt 75.8 kg (167 lb)   SpO2 98%   BMI 29.59 kg/m²   Constitutional:       Appearance: " Well-developed.   Eyes:      General: Lids are normal. Vision grossly intact.   HENT:      Head: Normocephalic and atraumatic.   Neck:      Musculoskeletal: Neck supple.      Vascular: No carotid bruit.   Pulmonary:      Effort: Pulmonary effort is normal.      Breath sounds: Normal breath sounds.   Cardiovascular:      Normal rate. Regular rhythm.   Skin:     General: Skin is warm and dry.   Neurological:      Mental Status: Alert and oriented to person, place, and time.   Psychiatric:         Attention and Perception: Attention normal.         Mood and Affect: Mood normal.         Speech: Speech normal.       Procedures      ICD-10-CM ICD-9-CM   1. Coronary artery disease of native artery of native heart with stable angina pectoris (CMS/[facility])  I25.118 414.01     413.9   2. Dyslipidemia  E78.5 272.4   3. Essential hypertension  I10 401.9   4. ESRD on dialysis (CMS/[facility])  N18.6 585.6    Z99.2 V45.11         Assessment/Plan:  1. Coronary artery disease: History of PCI to RCA at [place] in 2016.  Three vessel disease on [facility] from 9/2019 and 7/2020.  Only mild dyspnea on exertion with any chest pain.   Upcoming appointment with CT surgery in October.  Continue aspirin, carvedilol, and simvastatin.     2.  Dyslipidemia: Continue simvastatin.       3.  Essential hypertension: Patient reports blood pressure is better controlled after the addition of carvedilol by nephrology.  Blood pressure is normal today.     4.  Chronic kidney disease stage V: On dialysis.  He has a kidney donor but transplant is on hold until she gets revascularized via CABG.

## 2020-10-21 ENCOUNTER — OFFICE VISIT (OUTPATIENT)
Dept: CARDIAC SURGERY | Facility: CLINIC | Age: 65
End: 2020-10-21

## 2020-10-21 VITALS
SYSTOLIC BLOOD PRESSURE: 126 MMHG | WEIGHT: 170.4 LBS | HEART RATE: 54 BPM | OXYGEN SATURATION: 99 % | HEIGHT: 63 IN | DIASTOLIC BLOOD PRESSURE: 74 MMHG | BODY MASS INDEX: 30.19 KG/M2

## 2020-10-21 DIAGNOSIS — I25.119 CORONARY ARTERY DISEASE INVOLVING NATIVE CORONARY ARTERY OF NATIVE HEART WITH ANGINA PECTORIS (HCC): Primary | ICD-10-CM

## 2020-10-21 DIAGNOSIS — N18.6 ESRD ON DIALYSIS (HCC): ICD-10-CM

## 2020-10-21 DIAGNOSIS — E11.649 TYPE 2 DIABETES MELLITUS WITH HYPOGLYCEMIA WITHOUT COMA, WITHOUT LONG-TERM CURRENT USE OF INSULIN (HCC): ICD-10-CM

## 2020-10-21 DIAGNOSIS — Z99.2 ESRD ON DIALYSIS (HCC): ICD-10-CM

## 2020-10-21 PROCEDURE — 99214 OFFICE O/P EST MOD 30 MIN: CPT | Performed by: THORACIC SURGERY (CARDIOTHORACIC VASCULAR SURGERY)

## 2020-10-21 RX ORDER — CARVEDILOL 25 MG/1
25 TABLET ORAL 2 TIMES DAILY WITH MEALS
COMMUNITY
End: 2021-01-01 | Stop reason: HOSPADM

## 2020-11-09 NOTE — PROGRESS NOTES
Subjective   Chief Complaint   Patient presents with   • Coronary Artery Disease     Patient is here for a follow up.        Patient ID: Jennifer Salcido is a 65 y.o. female who is here to discuss consideration for coronary artery bypass grafting.     Coronary Artery Disease  Pertinent negatives include no chest pain, chest tightness, dizziness, leg swelling, palpitations or shortness of breath.     She has been previously seen by Katheryn history is best summarized below by her:  Ms. Salcido is a 64 year old female with a complex medical history significant for hypertension, type 2 diabetes (most recent A1C 6.7), end stage renal disease requiring peritoneal dialysis, GERD, gout, and coronary artery disease.  Gastric bypass in 2004, total hysterectomy 2003, cholecystectomy, sinus surgery.  She did recently have an episode of hypoglycemia that required admission.  She routinely follows with cardiology and nephrology at [place].  In 09/2019 she was considered for kidney transplant at [facility] for which they did find a compatible donor.  It was recommended that she have left heart catheterization prior to transplant and this was performed 10/2019.  It was determined at that time that she had severe three vessel disease.  She was further evaluated by [facility] specialist to determine the best plan for her moving forward in regard to CAD in light of the need for kidney transplant.  She has since then decided she would like her cardiac care done here locally due to her inability to drive and the need to set up transportation.  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  She does follow with an eye dr regularly.   It was recommended that she have an updated cardiac cath which demonstrated ongoing 3 vessel disease.  She does have a history of PCI in 2016.  She denies chest pain, palpitations, or dyspnea.  She was referred to cardiothoracic surgery for consideration of surgical  "revascularization.  Her work-up has been completed.  She is a former smoker quitting 12 years ago, does not drink alcohol, no recreational drug use.      In the interim her work-up has been completed and she returns to discuss final decision making as to CABG.  She has been declined an offer for kidney transplantation.  She denies chest pain.  She denies dyspnea.  No lower extremity edema.    The following portions of the patient's history were reviewed and updated as appropriate: allergies, current medications, past family history, past medical history, past social history, past surgical history and problem list.    Review of Systems   Constitutional: Negative for activity change, diaphoresis, fatigue and unexpected weight change.   HENT: Negative for trouble swallowing and voice change.    Eyes: Negative for visual disturbance (unchanged).   Respiratory: Negative for cough, chest tightness, shortness of breath and wheezing.    Cardiovascular: Negative for chest pain, palpitations and leg swelling.   Gastrointestinal: Positive for constipation. Negative for abdominal distention, abdominal pain, diarrhea, nausea and vomiting.   Musculoskeletal: Negative for arthralgias and myalgias.   Skin: Negative for color change, pallor, rash and wound.   Allergic/Immunologic: Negative for immunocompromised state.   Neurological: Negative for dizziness, tremors and seizures.   Hematological: Does not bruise/bleed easily.   Psychiatric/Behavioral: Negative for agitation, confusion and sleep disturbance.       Objective   Visit Vitals  /74 (BP Location: Left arm, Patient Position: Sitting, Cuff Size: Adult)   Pulse 54   Ht 160 cm (62.99\")   Wt 77.3 kg (170 lb 6.4 oz)   SpO2 99%   BMI 30.19 kg/m²       Physical Exam   Constitutional: She is oriented to person, place, and time. She appears well-developed.   HENT:   Head: Normocephalic.   Eyes: Pupils are equal, round, and reactive to light.   Neck: No JVD present.   "   Cardiovascular: Normal rate, regular rhythm and normal heart sounds.   No murmur heard.  Pulmonary/Chest: Effort normal and breath sounds normal. She has no wheezes. She has no rales.   Abdominal: Soft. She exhibits no distension. There is no abdominal tenderness.   PD site is C/D/I   Musculoskeletal: No tenderness.   Lymphadenopathy:     She has no cervical adenopathy.   Neurological: She is alert and oriented to person, place, and time.   Skin: Skin is warm and dry.   Paper thin skin   Psychiatric: Her behavior is normal. Judgment and thought content normal.   Vitals reviewed.            Assessment/Plan        Diagnoses and all orders for this visit:    1. Coronary artery disease involving native coronary artery of native heart with angina pectoris (CMS/[facility]) (Primary)    2. Type 2 diabetes mellitus with hypoglycemia without coma, without long-term current use of insulin (CMS/[facility])    3. ESRD on dialysis (CMS/[facility])         I discussed the patient's findings and my recommendations with the patient.  It was her impression that there is no longer need to undergo coronary artery bypass grafting since she has been declined the offer for kidney transplantation.  I discussed that she does have severe three-vessel coronary artery disease in the setting of diabetes mellitus.  I discussed that bypass grafting would confer a survival advantage.  From a coronary point of view she is an elective case.  We discussed the options for treatment of coronary artery disease to include medical therapy, coronary stenting, and surgical revascularization.  We discussed the pros and cons of each option and how it pertains to the current case.  We discussed that I would agree that she should undergo maximal medical therapy regardless of decision-making and that PCI is not of benefit at this time.  That being said bypass grafting is stated above would confer benefit.  The STS Risk score was calculated using the STS Risk Calculator and  discussed with the patient.  Coronary artery bypass grafting is best option given patient's findings and risk factors.  We discussed the operative conduct and expected hospital and outpatient recovery.  Risks were discussed to include but not limited to bleeding, infection, stroke, heart attack, need for additional procedures, anesthesia risk, organ dysfunction and/or failure, prolonged mechanical ventilation, prolonged ICU stay, chronic pain syndromes, sternal nonunion, and/or death.  We discussed the need to utilize all medical treatments additionally prescribed post surgery.    At this time she does not want to undergo bypass grafting.  She may be agreeable to reconsider after [facility] Angel.  As such we will follow-up with her at that time but I have asked that she contact me if she has any change in her symptoms specifically chest pain, dyspnea, or lower extremity edema etc. etc.  All question answered the best my ability and she is agreeable to consider continue medical manage at this time with the possibility of bypass grafting early next year.  This plan is not unreasonable as long as she does communicate changes in her symptomatology.  I encouraged her to speak with Dr. Coker as well.      Patient's Body mass index is 30.19 kg/m². BMI is above normal parameters. Recommendations include: educational material, nutrition counseling and referral to primary care.    I have congratulated Jennifer Salcido on successful smoking cessation.   She is a former smoker having quit 12 years ago.  I have educated her on the risk of diseases from using tobacco products such as cancer, COPD and heart diease2 minutes smoking cessation confirmation.     Advance Care Planning   ACP discussion was held with the patient during this visit. Patient does not have an advance directive, declines further assistance.

## 2020-12-16 ENCOUNTER — APPOINTMENT (OUTPATIENT)
Dept: CT IMAGING | Facility: HOSPITAL | Age: 65
End: 2020-12-16

## 2020-12-16 ENCOUNTER — APPOINTMENT (OUTPATIENT)
Dept: GENERAL RADIOLOGY | Facility: HOSPITAL | Age: 65
End: 2020-12-16

## 2020-12-16 ENCOUNTER — HOSPITAL ENCOUNTER (EMERGENCY)
Facility: HOSPITAL | Age: 65
Discharge: HOME | End: 2020-12-16
Attending: EMERGENCY MEDICINE | Admitting: EMERGENCY MEDICINE

## 2020-12-16 VITALS
BODY MASS INDEX: 29.77 KG/M2 | WEIGHT: 168 LBS | SYSTOLIC BLOOD PRESSURE: 168 MMHG | HEIGHT: 63 IN | TEMPERATURE: 98.2 F | RESPIRATION RATE: 18 BRPM | OXYGEN SATURATION: 100 % | HEART RATE: 67 BPM | DIASTOLIC BLOOD PRESSURE: 80 MMHG

## 2020-12-16 DIAGNOSIS — R55 NEAR SYNCOPE: Primary | ICD-10-CM

## 2020-12-16 LAB
ALBUMIN SERPL-MCNC: 3 G/DL (ref 3.5–5.2)
ALBUMIN/GLOB SERPL: 1.1 G/DL
ALP SERPL-CCNC: 62 U/L (ref 39–117)
ALT SERPL W P-5'-P-CCNC: 21 U/L (ref 1–33)
ANION GAP SERPL CALCULATED.3IONS-SCNC: 13 MMOL/L (ref 5–15)
AST SERPL-CCNC: 25 U/L (ref 1–32)
BASOPHILS # BLD AUTO: 0.06 10*3/MM3 (ref 0–0.2)
BASOPHILS NFR BLD AUTO: 0.8 % (ref 0–1.5)
BILIRUB SERPL-MCNC: 0.3 MG/DL (ref 0–1.2)
BUN SERPL-MCNC: 49 MG/DL (ref 8–23)
BUN/CREAT SERPL: 4.6 (ref 7–25)
CALCIUM SPEC-SCNC: 7.8 MG/DL (ref 8.6–10.5)
CHLORIDE SERPL-SCNC: 99 MMOL/L (ref 98–107)
CO2 SERPL-SCNC: 27 MMOL/L (ref 22–29)
CREAT SERPL-MCNC: 10.72 MG/DL (ref 0.57–1)
DEPRECATED RDW RBC AUTO: 48.9 FL (ref 37–54)
EOSINOPHIL # BLD AUTO: 0.81 10*3/MM3 (ref 0–0.4)
EOSINOPHIL NFR BLD AUTO: 10.7 % (ref 0.3–6.2)
ERYTHROCYTE [DISTWIDTH] IN BLOOD BY AUTOMATED COUNT: 14.6 % (ref 12.3–15.4)
GFR SERPL CREATININE-BSD FRML MDRD: 4 ML/MIN/1.73
GFR SERPL CREATININE-BSD FRML MDRD: ABNORMAL ML/MIN/{1.73_M2}
GLOBULIN UR ELPH-MCNC: 2.8 GM/DL
GLUCOSE BLDC GLUCOMTR-MCNC: 222 MG/DL (ref 70–130)
GLUCOSE BLDC GLUCOMTR-MCNC: 69 MG/DL (ref 70–130)
GLUCOSE SERPL-MCNC: 120 MG/DL (ref 65–99)
HCT VFR BLD AUTO: 30.7 % (ref 34–46.6)
HGB BLD-MCNC: 9.9 G/DL (ref 12–15.9)
HOLD SPECIMEN: NORMAL
HOLD SPECIMEN: NORMAL
IMM GRANULOCYTES # BLD AUTO: 0.03 10*3/MM3 (ref 0–0.05)
IMM GRANULOCYTES NFR BLD AUTO: 0.4 % (ref 0–0.5)
LYMPHOCYTES # BLD AUTO: 1.79 10*3/MM3 (ref 0.7–3.1)
LYMPHOCYTES NFR BLD AUTO: 23.7 % (ref 19.6–45.3)
MAGNESIUM SERPL-MCNC: 3.3 MG/DL (ref 1.6–2.4)
MCH RBC QN AUTO: 30.3 PG (ref 26.6–33)
MCHC RBC AUTO-ENTMCNC: 32.2 G/DL (ref 31.5–35.7)
MCV RBC AUTO: 93.9 FL (ref 79–97)
MONOCYTES # BLD AUTO: 0.67 10*3/MM3 (ref 0.1–0.9)
MONOCYTES NFR BLD AUTO: 8.9 % (ref 5–12)
NEUTROPHILS NFR BLD AUTO: 4.2 10*3/MM3 (ref 1.7–7)
NEUTROPHILS NFR BLD AUTO: 55.5 % (ref 42.7–76)
NRBC BLD AUTO-RTO: 0 /100 WBC (ref 0–0.2)
PHOSPHATE SERPL-MCNC: 6.3 MG/DL (ref 2.5–4.5)
PLATELET # BLD AUTO: 192 10*3/MM3 (ref 140–450)
PMV BLD AUTO: 11.1 FL (ref 6–12)
POTASSIUM SERPL-SCNC: 4.7 MMOL/L (ref 3.5–5.2)
PROT SERPL-MCNC: 5.8 G/DL (ref 6–8.5)
RBC # BLD AUTO: 3.27 10*6/MM3 (ref 3.77–5.28)
SODIUM SERPL-SCNC: 139 MMOL/L (ref 136–145)
TROPONIN T SERPL-MCNC: 0.09 NG/ML (ref 0–0.03)
WBC # BLD AUTO: 7.56 10*3/MM3 (ref 3.4–10.8)
WHOLE BLOOD HOLD SPECIMEN: NORMAL
WHOLE BLOOD HOLD SPECIMEN: NORMAL

## 2020-12-16 PROCEDURE — 83735 ASSAY OF MAGNESIUM: CPT | Performed by: EMERGENCY MEDICINE

## 2020-12-16 PROCEDURE — 71045 X-RAY EXAM CHEST 1 VIEW: CPT

## 2020-12-16 PROCEDURE — 84484 ASSAY OF TROPONIN QUANT: CPT | Performed by: EMERGENCY MEDICINE

## 2020-12-16 PROCEDURE — 80053 COMPREHEN METABOLIC PANEL: CPT | Performed by: EMERGENCY MEDICINE

## 2020-12-16 PROCEDURE — 93005 ELECTROCARDIOGRAM TRACING: CPT | Performed by: EMERGENCY MEDICINE

## 2020-12-16 PROCEDURE — 93010 ELECTROCARDIOGRAM REPORT: CPT | Performed by: INTERNAL MEDICINE

## 2020-12-16 PROCEDURE — 99284 EMERGENCY DEPT VISIT MOD MDM: CPT

## 2020-12-16 PROCEDURE — 82962 GLUCOSE BLOOD TEST: CPT

## 2020-12-16 PROCEDURE — 85025 COMPLETE CBC W/AUTO DIFF WBC: CPT

## 2020-12-16 PROCEDURE — 99283 EMERGENCY DEPT VISIT LOW MDM: CPT

## 2020-12-16 PROCEDURE — 70450 CT HEAD/BRAIN W/O DYE: CPT

## 2020-12-16 PROCEDURE — 84100 ASSAY OF PHOSPHORUS: CPT | Performed by: EMERGENCY MEDICINE

## 2020-12-16 RX ORDER — SODIUM CHLORIDE 0.9 % (FLUSH) 0.9 %
10 SYRINGE (ML) INJECTION AS NEEDED
Status: DISCONTINUED | OUTPATIENT
Start: 2020-12-16 | End: 2020-12-16 | Stop reason: HOSPADM

## 2020-12-17 LAB
QT INTERVAL: 478 MS
QTC INTERVAL: 448 MS

## 2020-12-17 NOTE — ED PROVIDER NOTES
Subjective   Patient says that she had an episode today where she bent over to do something and when she stood back up she felt very lightheaded and dizzy like she might pass out.  Her right eye seem to go great where she could not see out of it well for a while.  This lasted for 30 minutes to an hour but is gradually resolved.  It is mostly gone though there may still be a little abnormal vision in her right eye.  She says she been feeling generally bad for the last 3 days with just malaise and fatigue.  She does not know why.  She already has diabetes and diabetic retinopathy and has a lot of visual difficulties anyway.  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  She denies any chest pains or palpitations or cough or congestion or fever or chills.  She denies any vomiting or diarrhea.  She does do peritoneal dialysis overnight.  She did that last night also.      History provided by:  Patient   used: No    Syncope  Episode history:  Single  Most recent episode:  Today  Duration:  1 hour  Timing:  Constant  Progression:  Resolved  Chronicity:  New  Context: normal activity    Context: not blood draw, not bowel movement, not dehydration, not exertion, not inactivity, not medication change, not sight of blood, not sitting down, not standing up and not urination    Witnessed: no    Relieved by:  Nothing  Worsened by:  Nothing  Ineffective treatments:  None tried  Associated symptoms: dizziness, malaise/fatigue, visual change and weakness    Associated symptoms: no anxiety, no confusion, no diaphoresis, no focal sensory loss, no focal weakness, no palpitations, no recent fall, no recent surgery, no rectal bleeding and no seizures        Review of Systems   Constitutional: Positive for malaise/fatigue. Negative for diaphoresis.   HENT: Negative.    Respiratory: Negative.    Cardiovascular: Positive for syncope. Negative for palpitations.   Gastrointestinal: Negative.    Genitourinary: Negative.     Musculoskeletal: Negative.    Skin: Negative.    Neurological: Positive for dizziness and weakness. Negative for focal weakness and seizures.   Psychiatric/Behavioral: Negative.  Negative for confusion.   All other systems reviewed and are negative.      Past Medical History:   Diagnosis Date   • Chronic kidney disease     STAGE 5    • Coronary artery disease    • Diabetes mellitus (CMS/[facility])    • Dialysis patient (CMS/HCC)    • Dyslipidemia 11/2/2018   • GERD (gastroesophageal reflux disease)    • Gout    • Hyperlipidemia    • Hypertension    • Peritoneal dialysis status (CMS/HCC)    • Type 2 diabetes mellitus with kidney complication, with long-term current use of insulin (CMS/HCC) 11/2/2018       Allergies   Allergen Reactions   • Augmentin [Amoxicillin-Pot Clavulanate] Nausea And Vomiting   • Codeine Nausea And Vomiting   • Demerol [Meperidine] Nausea Only   • Levaquin [Levofloxacin] Nausea And Vomiting   • Lisinopril Swelling   • Morphine Nausea And Vomiting   • Sulfasalazine Nausea And Vomiting   • Ultram [Tramadol] Mental Status Change       Past Surgical History:   Procedure Laterality Date   • BREAST BIOPSY     • CARDIAC CATHETERIZATION N/A 10/1/2019    Procedure: Left Heart Cath;  Surgeon: Srikanth Coker MD;  Location:  [facility] CATH INVASIVE LOCATION;  Service: Cardiology   • CARDIAC CATHETERIZATION N/A 7/14/2020    Procedure: Left Heart Cath;  Surgeon: Srikanth Coker MD;  Location:  [facility] CATH INV[facility] LOC[facility];  Service: Cardiology;  Laterality: N/A;   • CORONARY ANGIOPLASTY WITH STENT PLACEMENT      X 2   • GASTRIC BYPASS     • HYSTERECTOMY     • OOPHORECTOMY     • SINUS SURGERY     • TUBAL ABDOMINAL LIGATION         Family History   Problem Relation Age of Onset   • Heart disease Father    • Breast cancer Neg Hx        Social History     Socioeconomic History   • Marital status:      Spouse name: Not on file   • Number of children: Not on file   • Years of education: Not on file   • Highest  education level: Not on file   Tobacco Use   • Smoking status: Former Smoker     Packs/day: 0.00     Years: 2.00     Pack years: 0.00   • Smokeless tobacco: Never Used   Substance and Sexual Activity   • Alcohol use: No   • Drug use: No   • Sexual activity: Defer       Prior to Admission medications    Medication Sig Start Date End Date Taking? Authorizing Provider   albuterol (ACCUNEB) 1.25 MG/3ML nebulizer solution Take 3 mL by nebulization Every 6 (Six) Hours As Needed for Wheezing. 1/5/20   Juaquin Yip DO   allopurinol (ZYLOPRIM) 100 MG tablet Take 100 mg by mouth Daily.    Elmer Thao MD   aspirin 81 MG EC tablet Take 81 mg by mouth Daily.    Elmer Thao MD   b complex-vitamin c-folic acid (NEPHRO-ORIANA) 0.8 MG tablet tablet Take 1 tablet by mouth Daily.    Elmer Thao MD   bisacodyl (Dulcolax) 10 MG suppository Insert 10 mg into the rectum Daily As Needed for Constipation.    Elmer Thao MD   bumetanide (BUMEX) 2 MG tablet Take 2 mg by mouth 2 (Two) Times a Day.    Elmer Thao MD   Calcium 500-125 MG-UNIT tablet Take 1 tablet by mouth Daily.    Elmer Thao MD   carvedilol (COREG) 25 MG tablet Take 25 mg by mouth 2 (Two) Times a Day With Meals.    Elmer Thao MD   citalopram (CeleXA) 40 MG tablet Take 40 mg by mouth Daily.    Elmer Thao MD   doxazosin (CARDURA) 4 MG tablet Take 4 mg by mouth 2 (Two) Times a Day.    Elmer Thao MD   famotidine (PEPCID) 20 MG tablet Take 20 mg by mouth 2 (Two) Times a Day.    Elmer Thao MD   gentamicin (GARAMYCIN) 0.1 % ointment Apply 1 application topically to the appropriate area as directed 3 (Three) Times a Day.    Elmer Thao MD   hydrALAZINE (APRESOLINE) 25 MG tablet Take 25 mg by mouth Daily As Needed.    Elmer Thao MD   insulin detemir (LEVEMIR) 100 UNIT/ML injection Inject 12 Units under the skin into the appropriate area as directed Daily.  6/10/20   Cl Heart DO   irbesartan (AVAPRO) 150 MG tablet Take 150 mg by mouth Every Night.    Elmer Thao MD   levocetirizine (XYZAL) 5 MG tablet Take 5 mg by mouth Every Evening.    Elmer Thao MD   melatonin 3 MG tablet Take 3 mg by mouth At Night As Needed for Sleep.    Elmer Thao MD   Multiple Vitamins-Minerals (MULTIVITAMIN ADULT PO) Take 1 tablet by mouth Daily.    Elmer Thao MD   ondansetron ODT (ZOFRAN-ODT) 4 MG disintegrating tablet Take 1 tablet by mouth Every 6 (Six) Hours As Needed for Nausea or Vomiting. 1/21/20   Rachid Gifford PA-C   pantoprazole (PROTONIX) 40 MG EC tablet Take 40 mg by mouth 2 (two) times a day. 6/18/18   Elmer Thao MD   potassium chloride (KLOR-CON) 10 MEQ CR tablet Take 10 mEq by mouth Daily. 6/7/18   Elmer Thao MD   simvastatin (Zocor) 40 MG tablet Take 40 mg by mouth Every Night.    Elmer Thao MD       Medications   sodium chloride 0.9 % flush 10 mL (has no administration in time range)       Vitals:    12/16/20 1941   BP: 149/64   Pulse: 65   Resp: 18   Temp:    SpO2: 100%         Objective   Physical Exam  Vitals signs and nursing note reviewed.   Constitutional:       Appearance: Normal appearance.   HENT:      Head: Normocephalic and atraumatic.   Eyes:      Extraocular Movements: Extraocular movements intact.      Pupils: Pupils are equal, round, and reactive to light.   Neck:      Musculoskeletal: Normal range of motion and neck supple.   Cardiovascular:      Rate and Rhythm: Normal rate and regular rhythm.   Pulmonary:      Effort: Pulmonary effort is normal.      Breath sounds: Normal breath sounds.   Musculoskeletal: Normal range of motion.   Skin:     General: Skin is warm and dry.   Neurological:      General: No focal deficit present.      Mental Status: She is alert and oriented to person, place, and time.   Psychiatric:         Mood and Affect: Mood normal.         Behavior:  Behavior normal.         Procedures         Lab Results (last 24 hours)     Procedure Component Value Units Date/Time    CBC & Differential [[id number]]  (Abnormal) Collected: 12/16/20 1651    Specimen: Blood Updated: 12/16/20 1704    Narrative:      The following orders were created for panel order CBC & Differential.  Procedure                               Abnormality         Status                     ---------                               -----------         ------                     CBC Auto Differential[[id number]]        Abnormal            Final result                 Please view results for these tests on the individual orders.    Comprehensive Metabolic Panel [[id number]]  (Abnormal) Collected: 12/16/20 1651    Specimen: Blood Updated: 12/16/20 1729     Glucose 120 mg/dL      BUN 49 mg/dL      Creatinine 10.72 mg/dL      Sodium 139 mmol/L      Potassium 4.7 mmol/L      Chloride 99 mmol/L      CO2 27.0 mmol/L      Calcium 7.8 mg/dL      Total Protein 5.8 g/dL      Albumin 3.00 g/dL      ALT (SGPT) 21 U/L      AST (SGOT) 25 U/L      Alkaline Phosphatase 62 U/L      Total Bilirubin 0.3 mg/dL      eGFR Non African Amer 4 mL/min/1.73      Comment: <15 Indicative of kidney failure.        eGFR   Amer --     Comment: <15 Indicative of kidney failure.        Globulin 2.8 gm/dL      A/G Ratio 1.1 g/dL      BUN/Creatinine Ratio 4.6     Anion Gap 13.0 mmol/L     Narrative:      GFR Normal >60  Chronic Kidney Disease <60  Kidney Failure <15      Troponin [[id number]]  (Abnormal) Collected: 12/16/20 1651    Specimen: Blood Updated: 12/16/20 1731     Troponin T 0.089 ng/mL     Narrative:      Troponin T Reference Range:  <= 0.03 ng/mL-   Negative for AMI  >0.03 ng/mL-     Abnormal for myocardial necrosis.  Clinicians would have to utilize clinical acumen, EKG, Troponin and serial changes to determine if it is an Acute Myocardial Infarction or myocardial injury due to an underlying chronic condition.       Results  may be falsely decreased if patient taking Biotin.      Magnesium [[id number]]  (Abnormal) Collected: 12/16/20 1651    Specimen: Blood Updated: 12/16/20 1723     Magnesium 3.3 mg/dL     CBC Auto Differential [[id number]]  (Abnormal) Collected: 12/16/20 1651    Specimen: Blood Updated: 12/16/20 1704     WBC 7.56 10*3/mm3      RBC 3.27 10*6/mm3      Hemoglobin 9.9 g/dL      Hematocrit 30.7 %      MCV 93.9 fL      MCH 30.3 pg      MCHC 32.2 g/dL      RDW 14.6 %      RDW-SD 48.9 fl      MPV 11.1 fL      Platelets 192 10*3/mm3      Neutrophil % 55.5 %      Lymphocyte % 23.7 %      Monocyte % 8.9 %      Eosinophil % 10.7 %      Basophil % 0.8 %      Immature Grans % 0.4 %      Neutrophils, Absolute 4.20 10*3/mm3      Lymphocytes, Absolute 1.79 10*3/mm3      Monocytes, Absolute 0.67 10*3/mm3      Eosinophils, Absolute 0.81 10*3/mm3      Basophils, Absolute 0.06 10*3/mm3      Immature Grans, Absolute 0.03 10*3/mm3      nRBC 0.0 /100 WBC     Phosphorus [[id number]]  (Abnormal) Collected: 12/16/20 1651    Specimen: Blood Updated: 12/16/20 1754     Phosphorus 6.3 mg/dL     POC Glucose Once [[id number]]  (Abnormal) Collected: 12/16/20 1818    Specimen: Blood Updated: 12/16/20 1830     Glucose 69 mg/dL      Comment: : [id number] Kiran Willson Wally)Meter ID: [id number]       POC Glucose Once [[id number]]  (Abnormal) Collected: 12/16/20 1924    Specimen: Blood Updated: 12/16/20 1935     Glucose 222 mg/dL      Comment: : [id number] Paul MyersMeter ID: [id number]             CT Head Without Contrast   Final Result       1. Mild cerebral and cerebellar volume loss with chronic microvascular   disease but no evidence of acute intracranial process.           This report was finalized on 12/16/2020 19:59 by Dr. Je Lyn MD.      XR Chest 1 View   Final Result   Impression: No evidence of acute cardiopulmonary disease.   This report was finalized on 12/16/2020 17:38 by Dr. Je Lyn MD.          ED Course  ED  Course as of Dec 16 2015   Wed Dec 16, 2020   2014 I told the patient that her testing all look baseline for her.  Her renal functions are elevated but no more than usual.  That also is treated for her other lab work.  Her CT scan does not show anything acute.  I told her her near syncopal episode is probably just an episode where her blood pressure needed to her head when she stood up.  Quickly but I did not have a good explanation for the fatigue and things that she been feeling lately.  I did not do a Covid test because she did not present with Covid symptoms and she does not really want one done.  She is discharged in stable condition.    [TR]      ED Course User Index  [TR] Juaquin John Jr., MD          MDM  Number of Diagnoses or Management Options  Near syncope: new and requires workup     Amount and/or Complexity of Data Reviewed  Clinical lab tests: ordered and reviewed  Tests in the radiology section of CPT®: reviewed and ordered  Tests in the medicine section of CPT®: ordered and reviewed  Decide to obtain previous medical records or to obtain history from someone other than the patient: yes    Risk of Complications, Morbidity, and/or Mortality  Presenting problems: moderate  Diagnostic procedures: moderate  Management options: moderate    Patient Progress  Patient progress: stable      Final diagnoses:   Near syncope          Juaquin John Jr., MD  12/16/20 2016

## 2020-12-21 RX ORDER — SIMVASTATIN 40 MG
TABLET ORAL
Qty: 90 TABLET | Refills: 3 | Status: ON HOLD | OUTPATIENT
Start: 2020-12-21 | End: 2021-01-01

## 2020-12-30 ENCOUNTER — HOSPITAL ENCOUNTER (EMERGENCY)
Facility: HOSPITAL | Age: 65
Discharge: HOME | End: 2020-12-31
Attending: INTERNAL MEDICINE | Admitting: INTERNAL MEDICINE

## 2020-12-30 DIAGNOSIS — D64.9 ACUTE ON CHRONIC ANEMIA: Primary | ICD-10-CM

## 2020-12-30 LAB
ALBUMIN SERPL-MCNC: 2.9 G/DL (ref 3.5–5.2)
ALBUMIN/GLOB SERPL: 1.1 G/DL
ALP SERPL-CCNC: 61 U/L (ref 39–117)
ALT SERPL W P-5'-P-CCNC: 17 U/L (ref 1–33)
ANION GAP SERPL CALCULATED.3IONS-SCNC: 14 MMOL/L (ref 5–15)
AST SERPL-CCNC: 19 U/L (ref 1–32)
BACTERIA UR QL AUTO: ABNORMAL /HPF
BASOPHILS # BLD AUTO: 0.04 10*3/MM3 (ref 0–0.2)
BASOPHILS NFR BLD AUTO: 0.5 % (ref 0–1.5)
BILIRUB SERPL-MCNC: 0.4 MG/DL (ref 0–1.2)
BILIRUB UR QL STRIP: NEGATIVE
BUN SERPL-MCNC: 57 MG/DL (ref 8–23)
BUN/CREAT SERPL: 4.9 (ref 7–25)
CALCIUM SPEC-SCNC: 10.5 MG/DL (ref 8.6–10.5)
CHLORIDE SERPL-SCNC: 93 MMOL/L (ref 98–107)
CLARITY UR: ABNORMAL
CO2 SERPL-SCNC: 27 MMOL/L (ref 22–29)
COLOR UR: YELLOW
CREAT SERPL-MCNC: 11.62 MG/DL (ref 0.57–1)
DEPRECATED RDW RBC AUTO: 50.5 FL (ref 37–54)
EOSINOPHIL # BLD AUTO: 0.67 10*3/MM3 (ref 0–0.4)
EOSINOPHIL NFR BLD AUTO: 8.9 % (ref 0.3–6.2)
ERYTHROCYTE [DISTWIDTH] IN BLOOD BY AUTOMATED COUNT: 14.8 % (ref 12.3–15.4)
GFR SERPL CREATININE-BSD FRML MDRD: 3 ML/MIN/1.73
GFR SERPL CREATININE-BSD FRML MDRD: ABNORMAL ML/MIN/{1.73_M2}
GLOBULIN UR ELPH-MCNC: 2.6 GM/DL
GLUCOSE SERPL-MCNC: 87 MG/DL (ref 65–99)
GLUCOSE UR STRIP-MCNC: NEGATIVE MG/DL
HCT VFR BLD AUTO: 23.9 % (ref 34–46.6)
HGB BLD-MCNC: 7.8 G/DL (ref 12–15.9)
HGB UR QL STRIP.AUTO: NEGATIVE
HOLD SPECIMEN: NORMAL
HOLD SPECIMEN: NORMAL
HYALINE CASTS UR QL AUTO: ABNORMAL /LPF
IMM GRANULOCYTES # BLD AUTO: 0.02 10*3/MM3 (ref 0–0.05)
IMM GRANULOCYTES NFR BLD AUTO: 0.3 % (ref 0–0.5)
KETONES UR QL STRIP: NEGATIVE
LEUKOCYTE ESTERASE UR QL STRIP.AUTO: ABNORMAL
LYMPHOCYTES # BLD AUTO: 1.9 10*3/MM3 (ref 0.7–3.1)
LYMPHOCYTES NFR BLD AUTO: 25.4 % (ref 19.6–45.3)
MCH RBC QN AUTO: 30.7 PG (ref 26.6–33)
MCHC RBC AUTO-ENTMCNC: 32.6 G/DL (ref 31.5–35.7)
MCV RBC AUTO: 94.1 FL (ref 79–97)
MONOCYTES # BLD AUTO: 0.62 10*3/MM3 (ref 0.1–0.9)
MONOCYTES NFR BLD AUTO: 8.3 % (ref 5–12)
NEUTROPHILS NFR BLD AUTO: 4.24 10*3/MM3 (ref 1.7–7)
NEUTROPHILS NFR BLD AUTO: 56.6 % (ref 42.7–76)
NITRITE UR QL STRIP: NEGATIVE
NRBC BLD AUTO-RTO: 0 /100 WBC (ref 0–0.2)
PH UR STRIP.AUTO: 7.5 [PH] (ref 5–8)
PLATELET # BLD AUTO: 205 10*3/MM3 (ref 140–450)
PMV BLD AUTO: 11.2 FL (ref 6–12)
POTASSIUM SERPL-SCNC: 4.2 MMOL/L (ref 3.5–5.2)
PROT SERPL-MCNC: 5.5 G/DL (ref 6–8.5)
PROT UR QL STRIP: ABNORMAL
RBC # BLD AUTO: 2.54 10*6/MM3 (ref 3.77–5.28)
RBC # UR: ABNORMAL /HPF
REF LAB TEST METHOD: ABNORMAL
SARS-COV-2 RNA PNL SPEC NAA+PROBE: NOT DETECTED
SODIUM SERPL-SCNC: 134 MMOL/L (ref 136–145)
SP GR UR STRIP: 1.01 (ref 1–1.03)
SQUAMOUS #/AREA URNS HPF: ABNORMAL /HPF
TROPONIN T SERPL-MCNC: 0.11 NG/ML (ref 0–0.03)
UROBILINOGEN UR QL STRIP: ABNORMAL
WBC # BLD AUTO: 7.49 10*3/MM3 (ref 3.4–10.8)
WBC UR QL AUTO: ABNORMAL /HPF
WHOLE BLOOD HOLD SPECIMEN: NORMAL
WHOLE BLOOD HOLD SPECIMEN: NORMAL

## 2020-12-30 PROCEDURE — 86923 COMPATIBILITY TEST ELECTRIC: CPT

## 2020-12-30 PROCEDURE — 86850 RBC ANTIBODY SCREEN: CPT | Performed by: INTERNAL MEDICINE

## 2020-12-30 PROCEDURE — 81001 URINALYSIS AUTO W/SCOPE: CPT | Performed by: INTERNAL MEDICINE

## 2020-12-30 PROCEDURE — 87635 SARS-COV-2 COVID-19 AMP PRB: CPT | Performed by: INTERNAL MEDICINE

## 2020-12-30 PROCEDURE — 99283 EMERGENCY DEPT VISIT LOW MDM: CPT

## 2020-12-30 PROCEDURE — 93005 ELECTROCARDIOGRAM TRACING: CPT | Performed by: INTERNAL MEDICINE

## 2020-12-30 PROCEDURE — 84484 ASSAY OF TROPONIN QUANT: CPT | Performed by: INTERNAL MEDICINE

## 2020-12-30 PROCEDURE — 86900 BLOOD TYPING SEROLOGIC ABO: CPT | Performed by: INTERNAL MEDICINE

## 2020-12-30 PROCEDURE — 93010 ELECTROCARDIOGRAM REPORT: CPT | Performed by: EMERGENCY MEDICINE

## 2020-12-30 PROCEDURE — 86901 BLOOD TYPING SEROLOGIC RH(D): CPT | Performed by: INTERNAL MEDICINE

## 2020-12-30 PROCEDURE — 80053 COMPREHEN METABOLIC PANEL: CPT | Performed by: INTERNAL MEDICINE

## 2020-12-30 PROCEDURE — C9803 HOPD COVID-19 SPEC COLLECT: HCPCS

## 2020-12-30 PROCEDURE — 36415 COLL VENOUS BLD VENIPUNCTURE: CPT

## 2020-12-30 PROCEDURE — 85025 COMPLETE CBC W/AUTO DIFF WBC: CPT | Performed by: INTERNAL MEDICINE

## 2020-12-30 RX ORDER — CALCIUM ACETATE 667 MG/1
667 CAPSULE ORAL 3 TIMES DAILY
COMMUNITY
End: 2021-01-01

## 2020-12-30 RX ORDER — DOCUSATE SODIUM 100 MG/1
200 CAPSULE, LIQUID FILLED ORAL DAILY PRN
COMMUNITY

## 2020-12-31 ENCOUNTER — APPOINTMENT (OUTPATIENT)
Dept: CT IMAGING | Facility: HOSPITAL | Age: 65
End: 2020-12-31

## 2020-12-31 VITALS
SYSTOLIC BLOOD PRESSURE: 160 MMHG | DIASTOLIC BLOOD PRESSURE: 67 MMHG | RESPIRATION RATE: 17 BRPM | OXYGEN SATURATION: 98 % | HEIGHT: 63 IN | WEIGHT: 172 LBS | HEART RATE: 54 BPM | TEMPERATURE: 97.9 F | BODY MASS INDEX: 30.48 KG/M2

## 2020-12-31 LAB
ABO GROUP BLD: NORMAL
BLD GP AB SCN SERPL QL: NEGATIVE
DEPRECATED RDW RBC AUTO: 49.9 FL (ref 37–54)
ERYTHROCYTE [DISTWIDTH] IN BLOOD BY AUTOMATED COUNT: 15 % (ref 12.3–15.4)
HCT VFR BLD AUTO: 23.8 % (ref 34–46.6)
HGB BLD-MCNC: 8.2 G/DL (ref 12–15.9)
MCH RBC QN AUTO: 31.5 PG (ref 26.6–33)
MCHC RBC AUTO-ENTMCNC: 34.5 G/DL (ref 31.5–35.7)
MCV RBC AUTO: 91.5 FL (ref 79–97)
PLATELET # BLD AUTO: 165 10*3/MM3 (ref 140–450)
PMV BLD AUTO: 11.7 FL (ref 6–12)
QT INTERVAL: 458 MS
QTC INTERVAL: 438 MS
RBC # BLD AUTO: 2.6 10*6/MM3 (ref 3.77–5.28)
RH BLD: POSITIVE
T&S EXPIRATION DATE: NORMAL
WBC # BLD AUTO: 7.89 10*3/MM3 (ref 3.4–10.8)

## 2020-12-31 PROCEDURE — 70450 CT HEAD/BRAIN W/O DYE: CPT

## 2020-12-31 PROCEDURE — 85027 COMPLETE CBC AUTOMATED: CPT | Performed by: INTERNAL MEDICINE

## 2020-12-31 PROCEDURE — 36430 TRANSFUSION BLD/BLD COMPNT: CPT

## 2020-12-31 PROCEDURE — 86900 BLOOD TYPING SEROLOGIC ABO: CPT

## 2020-12-31 PROCEDURE — 96374 THER/PROPH/DIAG INJ IV PUSH: CPT

## 2020-12-31 PROCEDURE — P9016 RBC LEUKOCYTES REDUCED: HCPCS

## 2020-12-31 PROCEDURE — 25010000002 HYDRALAZINE PER 20 MG: Performed by: INTERNAL MEDICINE

## 2020-12-31 RX ORDER — HYDRALAZINE HYDROCHLORIDE 20 MG/ML
10 INJECTION INTRAMUSCULAR; INTRAVENOUS ONCE
Status: COMPLETED | OUTPATIENT
Start: 2020-12-31 | End: 2020-12-31

## 2020-12-31 RX ADMIN — HYDRALAZINE HYDROCHLORIDE 10 MG: 20 INJECTION INTRAMUSCULAR; INTRAVENOUS at 01:22

## 2021-01-01 ENCOUNTER — PREP FOR SURGERY (OUTPATIENT)
Dept: OTHER | Facility: HOSPITAL | Age: 66
End: 2021-01-01

## 2021-01-01 ENCOUNTER — APPOINTMENT (OUTPATIENT)
Dept: GENERAL RADIOLOGY | Facility: HOSPITAL | Age: 66
End: 2021-01-01

## 2021-01-01 ENCOUNTER — TELEPHONE (OUTPATIENT)
Dept: VASCULAR SURGERY | Facility: CLINIC | Age: 66
End: 2021-01-01

## 2021-01-01 ENCOUNTER — APPOINTMENT (OUTPATIENT)
Dept: ULTRASOUND IMAGING | Facility: HOSPITAL | Age: 66
End: 2021-01-01

## 2021-01-01 ENCOUNTER — HOSPITAL ENCOUNTER (OUTPATIENT)
Facility: HOSPITAL | Age: 66
Setting detail: OBSERVATION
LOS: 1 days | Discharge: HOME | End: 2021-05-20
Attending: FAMILY MEDICINE | Admitting: INTERNAL MEDICINE

## 2021-01-01 ENCOUNTER — ANESTHESIA EVENT (OUTPATIENT)
Dept: PERIOP | Facility: HOSPITAL | Age: 66
End: 2021-01-01

## 2021-01-01 ENCOUNTER — HOSPITAL ENCOUNTER (OUTPATIENT)
Dept: GENERAL RADIOLOGY | Facility: HOSPITAL | Age: 66
Discharge: HOME | End: 2021-07-28
Admitting: NURSE PRACTITIONER

## 2021-01-01 ENCOUNTER — TELEPHONE (OUTPATIENT)
Dept: CARDIAC SURGERY | Facility: CLINIC | Age: 66
End: 2021-01-01

## 2021-01-01 ENCOUNTER — APPOINTMENT (OUTPATIENT)
Dept: INTERVENTIONAL RADIOLOGY/VASCULAR | Facility: HOSPITAL | Age: 66
End: 2021-01-01

## 2021-01-01 ENCOUNTER — READMISSION MANAGEMENT (OUTPATIENT)
Dept: CALL CENTER | Facility: HOSPITAL | Age: 66
End: 2021-01-01

## 2021-01-01 ENCOUNTER — OFFICE VISIT (OUTPATIENT)
Dept: CARDIAC SURGERY | Facility: CLINIC | Age: 66
End: 2021-01-01

## 2021-01-01 ENCOUNTER — HOSPITAL ENCOUNTER (EMERGENCY)
Facility: HOSPITAL | Age: 66
Discharge: HOME | End: 2021-10-03
Attending: EMERGENCY MEDICINE | Admitting: EMERGENCY MEDICINE

## 2021-01-01 ENCOUNTER — OFFICE VISIT (OUTPATIENT)
Dept: WOUND CARE | Facility: HOSPITAL | Age: 66
End: 2021-01-01

## 2021-01-01 ENCOUNTER — APPOINTMENT (OUTPATIENT)
Dept: CARDIOLOGY | Facility: HOSPITAL | Age: 66
End: 2021-01-01

## 2021-01-01 ENCOUNTER — APPOINTMENT (OUTPATIENT)
Dept: PREADMISSION TESTING | Facility: HOSPITAL | Age: 66
End: 2021-01-01

## 2021-01-01 ENCOUNTER — TRANSCRIBE ORDERS (OUTPATIENT)
Dept: ADMINISTRATIVE | Facility: HOSPITAL | Age: 66
End: 2021-01-01

## 2021-01-01 ENCOUNTER — HOSPITAL ENCOUNTER (OUTPATIENT)
Dept: GENERAL RADIOLOGY | Facility: HOSPITAL | Age: 66
Discharge: HOME | End: 2021-05-05

## 2021-01-01 ENCOUNTER — ANESTHESIA (OUTPATIENT)
Dept: PERIOP | Facility: HOSPITAL | Age: 66
End: 2021-01-01

## 2021-01-01 ENCOUNTER — OFFICE VISIT (OUTPATIENT)
Dept: VASCULAR SURGERY | Facility: CLINIC | Age: 66
End: 2021-01-01

## 2021-01-01 ENCOUNTER — HOSPITAL ENCOUNTER (INPATIENT)
Facility: HOSPITAL | Age: 66
LOS: 14 days | Discharge: NURSING, REHAB OR LONG-TERM CARE | End: 2021-12-23
Attending: EMERGENCY MEDICINE | Admitting: FAMILY MEDICINE

## 2021-01-01 ENCOUNTER — TELEPHONE (OUTPATIENT)
Dept: CARDIOLOGY | Facility: CLINIC | Age: 66
End: 2021-01-01

## 2021-01-01 ENCOUNTER — APPOINTMENT (OUTPATIENT)
Dept: CT IMAGING | Facility: HOSPITAL | Age: 66
End: 2021-01-01

## 2021-01-01 ENCOUNTER — HOSPITAL ENCOUNTER (EMERGENCY)
Facility: HOSPITAL | Age: 66
Discharge: HOME | End: 2021-06-21
Admitting: EMERGENCY MEDICINE

## 2021-01-01 ENCOUNTER — LAB (OUTPATIENT)
Dept: LAB | Facility: HOSPITAL | Age: 66
End: 2021-01-01

## 2021-01-01 ENCOUNTER — HOSPITAL ENCOUNTER (OUTPATIENT)
Dept: GENERAL RADIOLOGY | Facility: HOSPITAL | Age: 66
Discharge: HOME | End: 2021-04-16

## 2021-01-01 ENCOUNTER — HOSPITAL ENCOUNTER (OUTPATIENT)
Dept: CT IMAGING | Facility: HOSPITAL | Age: 66
Discharge: HOME | End: 2021-03-11
Admitting: NURSE PRACTITIONER

## 2021-01-01 ENCOUNTER — TRANSCRIBE ORDERS (OUTPATIENT)
Dept: LAB | Facility: HOSPITAL | Age: 66
End: 2021-01-01

## 2021-01-01 ENCOUNTER — HOSPITAL ENCOUNTER (OUTPATIENT)
Dept: GENERAL RADIOLOGY | Facility: HOSPITAL | Age: 66
Discharge: HOME | End: 2021-02-26
Admitting: NURSE PRACTITIONER

## 2021-01-01 ENCOUNTER — HOSPITAL ENCOUNTER (OUTPATIENT)
Dept: PULMONOLOGY | Facility: HOSPITAL | Age: 66
Discharge: HOME | End: 2021-04-16

## 2021-01-01 ENCOUNTER — HOSPITAL ENCOUNTER (INPATIENT)
Facility: HOSPITAL | Age: 66
LOS: 6 days | Discharge: HOME HEALTH | End: 2021-04-28
Attending: THORACIC SURGERY (CARDIOTHORACIC VASCULAR SURGERY) | Admitting: THORACIC SURGERY (CARDIOTHORACIC VASCULAR SURGERY)

## 2021-01-01 ENCOUNTER — HOSPITAL ENCOUNTER (OUTPATIENT)
Dept: CARDIOLOGY | Facility: HOSPITAL | Age: 66
Setting detail: SURGERY ADMIT
Discharge: HOME | End: 2021-04-22

## 2021-01-01 ENCOUNTER — HOSPITAL ENCOUNTER (INPATIENT)
Facility: HOSPITAL | Age: 66
LOS: 7 days | Discharge: HOME HEALTH | End: 2021-06-11
Attending: EMERGENCY MEDICINE | Admitting: THORACIC SURGERY (CARDIOTHORACIC VASCULAR SURGERY)

## 2021-01-01 ENCOUNTER — HOSPITAL ENCOUNTER (OUTPATIENT)
Facility: HOSPITAL | Age: 66
Discharge: HOME | End: 2021-03-23
Attending: INTERNAL MEDICINE | Admitting: INTERNAL MEDICINE

## 2021-01-01 ENCOUNTER — OFFICE VISIT (OUTPATIENT)
Dept: CARDIOLOGY | Facility: CLINIC | Age: 66
End: 2021-01-01

## 2021-01-01 ENCOUNTER — PRE-ADMISSION TESTING (OUTPATIENT)
Dept: PREADMISSION TESTING | Facility: HOSPITAL | Age: 66
End: 2021-01-01

## 2021-01-01 ENCOUNTER — HOSPITAL ENCOUNTER (OUTPATIENT)
Facility: HOSPITAL | Age: 66
Setting detail: HOSPITAL OUTPATIENT SURGERY
Discharge: HOME | End: 2021-04-01
Attending: SURGERY | Admitting: SURGERY

## 2021-01-01 ENCOUNTER — OUTSIDE FACILITY SERVICE (OUTPATIENT)
Dept: CARDIOLOGY | Facility: CLINIC | Age: 66
End: 2021-01-01

## 2021-01-01 VITALS
WEIGHT: 168.2 LBS | DIASTOLIC BLOOD PRESSURE: 76 MMHG | HEIGHT: 63 IN | OXYGEN SATURATION: 99 % | BODY MASS INDEX: 29.8 KG/M2 | HEART RATE: 75 BPM | SYSTOLIC BLOOD PRESSURE: 152 MMHG

## 2021-01-01 VITALS
WEIGHT: 174.16 LBS | DIASTOLIC BLOOD PRESSURE: 60 MMHG | HEART RATE: 60 BPM | BODY MASS INDEX: 30.86 KG/M2 | HEIGHT: 63 IN | SYSTOLIC BLOOD PRESSURE: 147 MMHG | RESPIRATION RATE: 18 BRPM | OXYGEN SATURATION: 100 %

## 2021-01-01 VITALS
OXYGEN SATURATION: 100 % | HEART RATE: 65 BPM | BODY MASS INDEX: 29.23 KG/M2 | SYSTOLIC BLOOD PRESSURE: 139 MMHG | DIASTOLIC BLOOD PRESSURE: 80 MMHG | HEIGHT: 63 IN | WEIGHT: 165 LBS

## 2021-01-01 VITALS
HEART RATE: 58 BPM | TEMPERATURE: 97.8 F | BODY MASS INDEX: 30.23 KG/M2 | SYSTOLIC BLOOD PRESSURE: 151 MMHG | OXYGEN SATURATION: 98 % | HEIGHT: 63 IN | WEIGHT: 170.6 LBS | RESPIRATION RATE: 18 BRPM | DIASTOLIC BLOOD PRESSURE: 75 MMHG

## 2021-01-01 VITALS
BODY MASS INDEX: 30.55 KG/M2 | SYSTOLIC BLOOD PRESSURE: 153 MMHG | WEIGHT: 172.4 LBS | OXYGEN SATURATION: 99 % | HEIGHT: 63 IN | HEART RATE: 64 BPM | RESPIRATION RATE: 20 BRPM | DIASTOLIC BLOOD PRESSURE: 61 MMHG

## 2021-01-01 VITALS
WEIGHT: 152 LBS | SYSTOLIC BLOOD PRESSURE: 110 MMHG | OXYGEN SATURATION: 94 % | DIASTOLIC BLOOD PRESSURE: 60 MMHG | HEART RATE: 96 BPM | HEIGHT: 63 IN | BODY MASS INDEX: 26.93 KG/M2

## 2021-01-01 VITALS
HEIGHT: 63 IN | HEART RATE: 87 BPM | SYSTOLIC BLOOD PRESSURE: 152 MMHG | WEIGHT: 183.2 LBS | OXYGEN SATURATION: 95 % | DIASTOLIC BLOOD PRESSURE: 90 MMHG | BODY MASS INDEX: 32.46 KG/M2

## 2021-01-01 VITALS
DIASTOLIC BLOOD PRESSURE: 86 MMHG | RESPIRATION RATE: 16 BRPM | OXYGEN SATURATION: 100 % | HEART RATE: 72 BPM | SYSTOLIC BLOOD PRESSURE: 130 MMHG | WEIGHT: 150 LBS | TEMPERATURE: 98.6 F | HEIGHT: 63 IN | BODY MASS INDEX: 26.58 KG/M2

## 2021-01-01 VITALS
SYSTOLIC BLOOD PRESSURE: 110 MMHG | BODY MASS INDEX: 27.36 KG/M2 | OXYGEN SATURATION: 97 % | WEIGHT: 154.4 LBS | HEART RATE: 87 BPM | HEIGHT: 63 IN | DIASTOLIC BLOOD PRESSURE: 70 MMHG

## 2021-01-01 VITALS
TEMPERATURE: 97 F | HEART RATE: 51 BPM | DIASTOLIC BLOOD PRESSURE: 53 MMHG | SYSTOLIC BLOOD PRESSURE: 133 MMHG | OXYGEN SATURATION: 98 % | RESPIRATION RATE: 16 BRPM

## 2021-01-01 VITALS
SYSTOLIC BLOOD PRESSURE: 109 MMHG | WEIGHT: 186 LBS | RESPIRATION RATE: 18 BRPM | TEMPERATURE: 98.3 F | BODY MASS INDEX: 32.96 KG/M2 | OXYGEN SATURATION: 100 % | HEART RATE: 61 BPM | HEIGHT: 63 IN | DIASTOLIC BLOOD PRESSURE: 52 MMHG

## 2021-01-01 VITALS
HEIGHT: 63 IN | WEIGHT: 170 LBS | BODY MASS INDEX: 30.12 KG/M2 | OXYGEN SATURATION: 98 % | DIASTOLIC BLOOD PRESSURE: 70 MMHG | SYSTOLIC BLOOD PRESSURE: 124 MMHG | HEART RATE: 56 BPM

## 2021-01-01 VITALS
OXYGEN SATURATION: 99 % | BODY MASS INDEX: 26.22 KG/M2 | SYSTOLIC BLOOD PRESSURE: 110 MMHG | WEIGHT: 148 LBS | HEART RATE: 87 BPM | HEIGHT: 63 IN | DIASTOLIC BLOOD PRESSURE: 60 MMHG

## 2021-01-01 VITALS
BODY MASS INDEX: 30.79 KG/M2 | HEIGHT: 63 IN | WEIGHT: 173.8 LBS | DIASTOLIC BLOOD PRESSURE: 54 MMHG | OXYGEN SATURATION: 98 % | HEART RATE: 55 BPM | SYSTOLIC BLOOD PRESSURE: 122 MMHG

## 2021-01-01 VITALS
DIASTOLIC BLOOD PRESSURE: 68 MMHG | WEIGHT: 171.2 LBS | SYSTOLIC BLOOD PRESSURE: 136 MMHG | HEIGHT: 63 IN | OXYGEN SATURATION: 100 % | BODY MASS INDEX: 30.33 KG/M2 | HEART RATE: 54 BPM

## 2021-01-01 VITALS
RESPIRATION RATE: 20 BRPM | WEIGHT: 156.1 LBS | HEIGHT: 63 IN | DIASTOLIC BLOOD PRESSURE: 67 MMHG | SYSTOLIC BLOOD PRESSURE: 179 MMHG | BODY MASS INDEX: 27.66 KG/M2 | OXYGEN SATURATION: 95 % | HEART RATE: 65 BPM | TEMPERATURE: 98.6 F

## 2021-01-01 VITALS
SYSTOLIC BLOOD PRESSURE: 152 MMHG | BODY MASS INDEX: 32.48 KG/M2 | HEART RATE: 74 BPM | OXYGEN SATURATION: 99 % | HEIGHT: 63 IN | RESPIRATION RATE: 18 BRPM | WEIGHT: 183.3 LBS | DIASTOLIC BLOOD PRESSURE: 66 MMHG | TEMPERATURE: 98.6 F

## 2021-01-01 VITALS
HEIGHT: 63 IN | RESPIRATION RATE: 16 BRPM | SYSTOLIC BLOOD PRESSURE: 127 MMHG | HEART RATE: 70 BPM | BODY MASS INDEX: 25.48 KG/M2 | TEMPERATURE: 98.6 F | OXYGEN SATURATION: 97 % | WEIGHT: 143.8 LBS | DIASTOLIC BLOOD PRESSURE: 56 MMHG

## 2021-01-01 VITALS
DIASTOLIC BLOOD PRESSURE: 52 MMHG | RESPIRATION RATE: 16 BRPM | HEART RATE: 86 BPM | BODY MASS INDEX: 27.73 KG/M2 | HEIGHT: 63 IN | SYSTOLIC BLOOD PRESSURE: 116 MMHG | WEIGHT: 156.5 LBS | TEMPERATURE: 98 F | OXYGEN SATURATION: 97 %

## 2021-01-01 VITALS
DIASTOLIC BLOOD PRESSURE: 50 MMHG | SYSTOLIC BLOOD PRESSURE: 93 MMHG | BODY MASS INDEX: 25.76 KG/M2 | OXYGEN SATURATION: 98 % | HEART RATE: 61 BPM | HEIGHT: 63 IN | WEIGHT: 145.4 LBS

## 2021-01-01 VITALS
BODY MASS INDEX: 25.87 KG/M2 | WEIGHT: 146 LBS | OXYGEN SATURATION: 96 % | SYSTOLIC BLOOD PRESSURE: 112 MMHG | HEIGHT: 63 IN | DIASTOLIC BLOOD PRESSURE: 72 MMHG | HEART RATE: 62 BPM

## 2021-01-01 DIAGNOSIS — Z99.2 ESRD ON DIALYSIS: ICD-10-CM

## 2021-01-01 DIAGNOSIS — N18.6 TYPE 2 DIABETES MELLITUS WITH CHRONIC KIDNEY DISEASE ON CHRONIC DIALYSIS, WITHOUT LONG-TERM CURRENT USE OF INSULIN: ICD-10-CM

## 2021-01-01 DIAGNOSIS — E11.22 TYPE 2 DIABETES MELLITUS WITH CHRONIC KIDNEY DISEASE ON CHRONIC DIALYSIS, WITHOUT LONG-TERM CURRENT USE OF INSULIN (HCC): ICD-10-CM

## 2021-01-01 DIAGNOSIS — Z11.59 SCREENING FOR VIRAL DISEASE: Primary | ICD-10-CM

## 2021-01-01 DIAGNOSIS — I25.119 CORONARY ARTERY DISEASE INVOLVING NATIVE CORONARY ARTERY OF NATIVE HEART WITH ANGINA PECTORIS (HCC): Primary | ICD-10-CM

## 2021-01-01 DIAGNOSIS — Z74.09 IMPAIRED MOBILITY: ICD-10-CM

## 2021-01-01 DIAGNOSIS — Z99.2 ESRD ON DIALYSIS (HCC): ICD-10-CM

## 2021-01-01 DIAGNOSIS — R55 SYNCOPE, UNSPECIFIED SYNCOPE TYPE: Primary | ICD-10-CM

## 2021-01-01 DIAGNOSIS — J90 PLEURAL EFFUSION ON RIGHT: Primary | ICD-10-CM

## 2021-01-01 DIAGNOSIS — I73.9 PERIPHERAL VASCULAR DISEASE: ICD-10-CM

## 2021-01-01 DIAGNOSIS — Z99.2 END STAGE RENAL DISEASE ON DIALYSIS (HCC): ICD-10-CM

## 2021-01-01 DIAGNOSIS — I25.10 CORONARY ARTERY DISEASE INVOLVING NATIVE CORONARY ARTERY OF NATIVE HEART WITHOUT ANGINA PECTORIS: ICD-10-CM

## 2021-01-01 DIAGNOSIS — I25.10 CORONARY ARTERY DISEASE INVOLVING NATIVE CORONARY ARTERY OF NATIVE HEART WITHOUT ANGINA PECTORIS: Primary | ICD-10-CM

## 2021-01-01 DIAGNOSIS — K22.2 STRICTURE AND STENOSIS OF ESOPHAGUS: Primary | ICD-10-CM

## 2021-01-01 DIAGNOSIS — I25.118 CORONARY ARTERY DISEASE OF NATIVE ARTERY OF NATIVE HEART WITH STABLE ANGINA PECTORIS (HCC): ICD-10-CM

## 2021-01-01 DIAGNOSIS — N18.6 END STAGE RENAL DISEASE ON DIALYSIS (HCC): ICD-10-CM

## 2021-01-01 DIAGNOSIS — I10 ESSENTIAL HYPERTENSION: ICD-10-CM

## 2021-01-01 DIAGNOSIS — Z01.818 PRE-OP TESTING: Primary | ICD-10-CM

## 2021-01-01 DIAGNOSIS — T87.89 NON-HEALING WOUND OF AMPUTATION STUMP (HCC): Primary | ICD-10-CM

## 2021-01-01 DIAGNOSIS — N18.6 ESRD ON DIALYSIS: ICD-10-CM

## 2021-01-01 DIAGNOSIS — I31.9 CHRONIC PERICARDITIS, UNSPECIFIED COMPLICATION STATUS, UNSPECIFIED TYPE: ICD-10-CM

## 2021-01-01 DIAGNOSIS — Z79.899 ON STATIN THERAPY: ICD-10-CM

## 2021-01-01 DIAGNOSIS — N18.6 END STAGE RENAL DISEASE: ICD-10-CM

## 2021-01-01 DIAGNOSIS — Z78.9 DECREASED ACTIVITIES OF DAILY LIVING (ADL): ICD-10-CM

## 2021-01-01 DIAGNOSIS — E78.5 DYSLIPIDEMIA: ICD-10-CM

## 2021-01-01 DIAGNOSIS — N18.6 ESRD ON DIALYSIS (HCC): ICD-10-CM

## 2021-01-01 DIAGNOSIS — R06.02 SOB (SHORTNESS OF BREATH): ICD-10-CM

## 2021-01-01 DIAGNOSIS — R06.02 SHORTNESS OF BREATH: ICD-10-CM

## 2021-01-01 DIAGNOSIS — I25.119 CORONARY ARTERY DISEASE INVOLVING NATIVE CORONARY ARTERY OF NATIVE HEART WITH ANGINA PECTORIS (HCC): ICD-10-CM

## 2021-01-01 DIAGNOSIS — E87.70 HYPERVOLEMIA, UNSPECIFIED HYPERVOLEMIA TYPE: Primary | ICD-10-CM

## 2021-01-01 DIAGNOSIS — D64.9 CHRONIC ANEMIA: ICD-10-CM

## 2021-01-01 DIAGNOSIS — Z99.2 TYPE 2 DIABETES MELLITUS WITH CHRONIC KIDNEY DISEASE ON CHRONIC DIALYSIS, WITHOUT LONG-TERM CURRENT USE OF INSULIN (HCC): ICD-10-CM

## 2021-01-01 DIAGNOSIS — I99.8 ISCHEMIA OF FOOT: ICD-10-CM

## 2021-01-01 DIAGNOSIS — Z87.891 FORMER SMOKER: ICD-10-CM

## 2021-01-01 DIAGNOSIS — E11.649 TYPE 2 DIABETES MELLITUS WITH HYPOGLYCEMIA WITHOUT COMA, WITHOUT LONG-TERM CURRENT USE OF INSULIN (HCC): ICD-10-CM

## 2021-01-01 DIAGNOSIS — Z98.84 STATUS POST GASTRIC BYPASS FOR OBESITY: ICD-10-CM

## 2021-01-01 DIAGNOSIS — E66.09 CLASS 1 OBESITY DUE TO EXCESS CALORIES WITH SERIOUS COMORBIDITY AND BODY MASS INDEX (BMI) OF 31.0 TO 31.9 IN ADULT: ICD-10-CM

## 2021-01-01 DIAGNOSIS — Z99.2 END STAGE RENAL DISEASE ON DIALYSIS (HCC): Primary | ICD-10-CM

## 2021-01-01 DIAGNOSIS — L89.313 PRESSURE ULCER OF RIGHT BUTTOCK, STAGE 3: ICD-10-CM

## 2021-01-01 DIAGNOSIS — R42 DIZZINESS: ICD-10-CM

## 2021-01-01 DIAGNOSIS — I25.118 CORONARY ARTERY DISEASE OF NATIVE ARTERY OF NATIVE HEART WITH STABLE ANGINA PECTORIS (HCC): Primary | ICD-10-CM

## 2021-01-01 DIAGNOSIS — Z01.818 PREOPERATIVE EVALUATION TO RULE OUT SURGICAL CONTRAINDICATION: ICD-10-CM

## 2021-01-01 DIAGNOSIS — Z99.2 TYPE 2 DIABETES MELLITUS WITH CHRONIC KIDNEY DISEASE ON CHRONIC DIALYSIS, WITHOUT LONG-TERM CURRENT USE OF INSULIN: ICD-10-CM

## 2021-01-01 DIAGNOSIS — E11.51 SMALL VESSEL ARTERIAL DISEASE DUE TO TYPE 2 DIABETES MELLITUS: ICD-10-CM

## 2021-01-01 DIAGNOSIS — N18.6 END STAGE RENAL DISEASE ON DIALYSIS (HCC): Primary | ICD-10-CM

## 2021-01-01 DIAGNOSIS — N18.9 CHRONIC KIDNEY DISEASE, UNSPECIFIED CKD STAGE: ICD-10-CM

## 2021-01-01 DIAGNOSIS — E11.22 TYPE 2 DIABETES MELLITUS WITH CHRONIC KIDNEY DISEASE ON CHRONIC DIALYSIS, WITHOUT LONG-TERM CURRENT USE OF INSULIN: ICD-10-CM

## 2021-01-01 DIAGNOSIS — E66.3 OVERWEIGHT (BMI 25.0-29.9): ICD-10-CM

## 2021-01-01 DIAGNOSIS — T87.89 OTHER COMPLICATIONS OF AMPUTATION STUMP: ICD-10-CM

## 2021-01-01 DIAGNOSIS — J90 PLEURAL EFFUSION, LEFT: Primary | ICD-10-CM

## 2021-01-01 DIAGNOSIS — E87.6 HYPOKALEMIA: Primary | ICD-10-CM

## 2021-01-01 DIAGNOSIS — R55 SYNCOPE AND COLLAPSE: ICD-10-CM

## 2021-01-01 DIAGNOSIS — J90 PLEURAL EFFUSION: ICD-10-CM

## 2021-01-01 DIAGNOSIS — E87.70 HYPERVOLEMIA, UNSPECIFIED HYPERVOLEMIA TYPE: ICD-10-CM

## 2021-01-01 DIAGNOSIS — E87.1 HYPONATREMIA: ICD-10-CM

## 2021-01-01 DIAGNOSIS — E11.628 TYPE 2 DIABETES MELLITUS WITH OTHER SKIN COMPLICATIONS: ICD-10-CM

## 2021-01-01 DIAGNOSIS — I99.8 ISCHEMIC LEG: ICD-10-CM

## 2021-01-01 DIAGNOSIS — J90 PLEURAL EFFUSION ON RIGHT: ICD-10-CM

## 2021-01-01 DIAGNOSIS — K22.2 STRICTURE AND STENOSIS OF ESOPHAGUS: ICD-10-CM

## 2021-01-01 DIAGNOSIS — N18.6 TYPE 2 DIABETES MELLITUS WITH CHRONIC KIDNEY DISEASE ON CHRONIC DIALYSIS, WITHOUT LONG-TERM CURRENT USE OF INSULIN (HCC): ICD-10-CM

## 2021-01-01 LAB
25(OH)D3 SERPL-MCNC: 63 NG/ML (ref 30–100)
A-A DO2: 171.9 MMHG
A-A DO2: 186.4 MMHG
A-A DO2: 259.3 MMHG
A-A DO2: 329.4 MMHG
A-A DO2: 417.8 MMHG
A-A DO2: 97.1 MMHG
ABO GROUP BLD: NORMAL
ALBUMIN FLD-MCNC: 2.2 G/DL
ALBUMIN SERPL-MCNC: 1.7 G/DL (ref 3.5–5.2)
ALBUMIN SERPL-MCNC: 1.9 G/DL (ref 3.5–5.2)
ALBUMIN SERPL-MCNC: 1.9 G/DL (ref 3.5–5.2)
ALBUMIN SERPL-MCNC: 2.4 G/DL (ref 3.5–5.2)
ALBUMIN SERPL-MCNC: 2.4 G/DL (ref 3.5–5.2)
ALBUMIN SERPL-MCNC: 2.5 G/DL (ref 3.5–5.2)
ALBUMIN SERPL-MCNC: 2.6 G/DL (ref 3.5–5.2)
ALBUMIN SERPL-MCNC: 2.8 G/DL (ref 3.5–5.2)
ALBUMIN SERPL-MCNC: 2.9 G/DL (ref 3.5–5.2)
ALBUMIN SERPL-MCNC: 3 G/DL (ref 3.5–5.2)
ALBUMIN SERPL-MCNC: 3 G/DL (ref 3.5–5.2)
ALBUMIN SERPL-MCNC: 3.1 G/DL (ref 3.5–5.2)
ALBUMIN SERPL-MCNC: 3.1 G/DL (ref 3.5–5.2)
ALBUMIN SERPL-MCNC: 3.2 G/DL (ref 3.5–5.2)
ALBUMIN/GLOB SERPL: 0.6 G/DL
ALBUMIN/GLOB SERPL: 0.7 G/DL
ALBUMIN/GLOB SERPL: 0.9 G/DL
ALBUMIN/GLOB SERPL: 1 G/DL
ALBUMIN/GLOB SERPL: 1.2 G/DL
ALBUMIN/GLOB SERPL: 1.2 G/DL
ALP SERPL-CCNC: 106 U/L (ref 39–117)
ALP SERPL-CCNC: 108 U/L (ref 39–117)
ALP SERPL-CCNC: 114 U/L (ref 39–117)
ALP SERPL-CCNC: 116 U/L (ref 39–117)
ALP SERPL-CCNC: 119 U/L (ref 39–117)
ALP SERPL-CCNC: 122 U/L (ref 39–117)
ALP SERPL-CCNC: 124 U/L (ref 39–117)
ALP SERPL-CCNC: 124 U/L (ref 39–117)
ALP SERPL-CCNC: 69 U/L (ref 39–117)
ALP SERPL-CCNC: 70 U/L (ref 39–117)
ALP SERPL-CCNC: 80 U/L (ref 39–117)
ALP SERPL-CCNC: 89 U/L (ref 39–117)
ALP SERPL-CCNC: 98 U/L (ref 39–117)
ALT SERPL W P-5'-P-CCNC: 13 U/L (ref 1–33)
ALT SERPL W P-5'-P-CCNC: 14 U/L (ref 1–33)
ALT SERPL W P-5'-P-CCNC: 15 U/L (ref 1–33)
ALT SERPL W P-5'-P-CCNC: 6 U/L (ref 1–33)
ALT SERPL W P-5'-P-CCNC: 6 U/L (ref 1–33)
ALT SERPL W P-5'-P-CCNC: 7 U/L (ref 1–33)
ALT SERPL W P-5'-P-CCNC: 7 U/L (ref 1–33)
ALT SERPL W P-5'-P-CCNC: <5 U/L (ref 1–33)
ANION GAP SERPL CALCULATED.3IONS-SCNC: 10 MMOL/L (ref 5–15)
ANION GAP SERPL CALCULATED.3IONS-SCNC: 11 MMOL/L (ref 5–15)
ANION GAP SERPL CALCULATED.3IONS-SCNC: 12 MMOL/L (ref 5–15)
ANION GAP SERPL CALCULATED.3IONS-SCNC: 13 MMOL/L (ref 5–15)
ANION GAP SERPL CALCULATED.3IONS-SCNC: 14 MMOL/L (ref 5–15)
ANION GAP SERPL CALCULATED.3IONS-SCNC: 15 MMOL/L (ref 5–15)
ANION GAP SERPL CALCULATED.3IONS-SCNC: 16 MMOL/L (ref 5–15)
ANION GAP SERPL CALCULATED.3IONS-SCNC: 17 MMOL/L (ref 5–15)
ANION GAP SERPL CALCULATED.3IONS-SCNC: 19 MMOL/L (ref 5–15)
ANION GAP SERPL CALCULATED.3IONS-SCNC: 7 MMOL/L (ref 5–15)
ANION GAP SERPL CALCULATED.3IONS-SCNC: 8 MMOL/L (ref 5–15)
ANION GAP SERPL CALCULATED.3IONS-SCNC: 9 MMOL/L (ref 5–15)
ANION GAP SERPL CALCULATED.3IONS-SCNC: 9 MMOL/L (ref 5–15)
APPEARANCE FLD: ABNORMAL
APTT PPP: 34.6 SECONDS (ref 24.1–35)
APTT PPP: 38.3 SECONDS (ref 24.1–35)
APTT PPP: 40 SECONDS (ref 24.1–35)
APTT PPP: 40.4 SECONDS (ref 24.1–35)
APTT PPP: 46.2 SECONDS (ref 24.1–35)
APTT PPP: 49 SECONDS (ref 24.1–35)
APTT PPP: 49.7 SECONDS (ref 24.1–35)
APTT PPP: 56.6 SECONDS (ref 24.1–35)
ARTERIAL PATENCY WRIST A: ABNORMAL
ARTERIAL PATENCY WRIST A: POSITIVE
AST SERPL-CCNC: 15 U/L (ref 1–32)
AST SERPL-CCNC: 17 U/L (ref 1–32)
AST SERPL-CCNC: 18 U/L (ref 1–32)
AST SERPL-CCNC: 18 U/L (ref 1–32)
AST SERPL-CCNC: 19 U/L (ref 1–32)
AST SERPL-CCNC: 19 U/L (ref 1–32)
AST SERPL-CCNC: 20 U/L (ref 1–32)
AST SERPL-CCNC: 23 U/L (ref 1–32)
AST SERPL-CCNC: 23 U/L (ref 1–32)
AST SERPL-CCNC: 26 U/L (ref 1–32)
AST SERPL-CCNC: 31 U/L (ref 1–32)
ATMOSPHERIC PRESS: 749 MMHG
ATMOSPHERIC PRESS: 753 MMHG
ATMOSPHERIC PRESS: 754 MMHG
ATMOSPHERIC PRESS: 755 MMHG
ATMOSPHERIC PRESS: 756 MMHG
ATMOSPHERIC PRESS: 757 MMHG
ATMOSPHERIC PRESS: 757 MMHG
BACTERIA FLD CULT: NORMAL
BACTERIA SPEC ANAEROBE CULT: NORMAL
BASE EXCESS BLDA CALC-SCNC: -0.7 MMOL/L (ref 0–2)
BASE EXCESS BLDA CALC-SCNC: -1.2 MMOL/L (ref 0–2)
BASE EXCESS BLDA CALC-SCNC: -2 MMOL/L (ref 0–2)
BASE EXCESS BLDA CALC-SCNC: -2.2 MMOL/L (ref 0–2)
BASE EXCESS BLDA CALC-SCNC: -2.3 MMOL/L (ref 0–2)
BASE EXCESS BLDA CALC-SCNC: -2.8 MMOL/L (ref 0–2)
BASE EXCESS BLDA CALC-SCNC: -4.1 MMOL/L (ref 0–2)
BASE EXCESS BLDA CALC-SCNC: 0.3 MMOL/L (ref 0–2)
BASE EXCESS BLDA CALC-SCNC: 0.8 MMOL/L (ref 0–2)
BASE EXCESS BLDA CALC-SCNC: 0.8 MMOL/L (ref 0–2)
BASE EXCESS BLDA CALC-SCNC: 1.6 MMOL/L (ref 0–2)
BASE EXCESS BLDA CALC-SCNC: 1.7 MMOL/L (ref 0–2)
BASE EXCESS BLDA CALC-SCNC: 3 MMOL/L (ref 0–2)
BASOPHILS # BLD AUTO: 0.01 10*3/MM3 (ref 0–0.2)
BASOPHILS # BLD AUTO: 0.02 10*3/MM3 (ref 0–0.2)
BASOPHILS # BLD AUTO: 0.02 10*3/MM3 (ref 0–0.2)
BASOPHILS # BLD AUTO: 0.05 10*3/MM3 (ref 0–0.2)
BASOPHILS # BLD AUTO: 0.06 10*3/MM3 (ref 0–0.2)
BASOPHILS # BLD AUTO: 0.07 10*3/MM3 (ref 0–0.2)
BASOPHILS # BLD AUTO: 0.09 10*3/MM3 (ref 0–0.2)
BASOPHILS NFR BLD AUTO: 0.1 % (ref 0–1.5)
BASOPHILS NFR BLD AUTO: 0.2 % (ref 0–1.5)
BASOPHILS NFR BLD AUTO: 0.3 % (ref 0–1.5)
BASOPHILS NFR BLD AUTO: 0.4 % (ref 0–1.5)
BASOPHILS NFR BLD AUTO: 0.7 % (ref 0–1.5)
BASOPHILS NFR BLD AUTO: 0.7 % (ref 0–1.5)
BASOPHILS NFR BLD AUTO: 0.8 % (ref 0–1.5)
BASOPHILS NFR BLD AUTO: 1 % (ref 0–1.5)
BASOPHILS NFR BLD AUTO: 1.1 % (ref 0–1.5)
BDY SITE: ABNORMAL
BDY SITE: NORMAL
BH BB BLOOD EXPIRATION DATE: NORMAL
BH BB BLOOD TYPE BARCODE: 6200
BH BB DISPENSE STATUS: NORMAL
BH BB PRODUCT CODE: NORMAL
BH BB UNIT NUMBER: NORMAL
BILIRUB SERPL-MCNC: 0.3 MG/DL (ref 0–1.2)
BILIRUB SERPL-MCNC: 0.4 MG/DL (ref 0–1.2)
BILIRUB SERPL-MCNC: 0.6 MG/DL (ref 0–1.2)
BLD GP AB SCN SERPL QL: NEGATIVE
BODY TEMPERATURE: 37 C
BUN SERPL-MCNC: 10 MG/DL (ref 8–23)
BUN SERPL-MCNC: 12 MG/DL (ref 8–23)
BUN SERPL-MCNC: 14 MG/DL (ref 8–23)
BUN SERPL-MCNC: 22 MG/DL (ref 8–23)
BUN SERPL-MCNC: 23 MG/DL (ref 8–23)
BUN SERPL-MCNC: 25 MG/DL (ref 8–23)
BUN SERPL-MCNC: 26 MG/DL (ref 8–23)
BUN SERPL-MCNC: 26 MG/DL (ref 8–23)
BUN SERPL-MCNC: 28 MG/DL (ref 8–23)
BUN SERPL-MCNC: 29 MG/DL (ref 8–23)
BUN SERPL-MCNC: 31 MG/DL (ref 8–23)
BUN SERPL-MCNC: 34 MG/DL (ref 8–23)
BUN SERPL-MCNC: 36 MG/DL (ref 8–23)
BUN SERPL-MCNC: 37 MG/DL (ref 8–23)
BUN SERPL-MCNC: 40 MG/DL (ref 8–23)
BUN SERPL-MCNC: 40 MG/DL (ref 8–23)
BUN SERPL-MCNC: 41 MG/DL (ref 8–23)
BUN SERPL-MCNC: 43 MG/DL (ref 8–23)
BUN SERPL-MCNC: 44 MG/DL (ref 8–23)
BUN SERPL-MCNC: 46 MG/DL (ref 8–23)
BUN SERPL-MCNC: 47 MG/DL (ref 8–23)
BUN SERPL-MCNC: 48 MG/DL (ref 8–23)
BUN SERPL-MCNC: 49 MG/DL (ref 8–23)
BUN SERPL-MCNC: 50 MG/DL (ref 8–23)
BUN SERPL-MCNC: 52 MG/DL (ref 8–23)
BUN SERPL-MCNC: 53 MG/DL (ref 8–23)
BUN SERPL-MCNC: 58 MG/DL (ref 8–23)
BUN SERPL-MCNC: 60 MG/DL (ref 8–23)
BUN SERPL-MCNC: 8 MG/DL (ref 8–23)
BUN/CREAT SERPL: 2.5 (ref 7–25)
BUN/CREAT SERPL: 3.5 (ref 7–25)
BUN/CREAT SERPL: 3.7 (ref 7–25)
BUN/CREAT SERPL: 3.9 (ref 7–25)
BUN/CREAT SERPL: 4 (ref 7–25)
BUN/CREAT SERPL: 4.1 (ref 7–25)
BUN/CREAT SERPL: 4.2 (ref 7–25)
BUN/CREAT SERPL: 4.3 (ref 7–25)
BUN/CREAT SERPL: 4.4 (ref 7–25)
BUN/CREAT SERPL: 4.5 (ref 7–25)
BUN/CREAT SERPL: 4.7 (ref 7–25)
BUN/CREAT SERPL: 4.7 (ref 7–25)
BUN/CREAT SERPL: 4.8 (ref 7–25)
BUN/CREAT SERPL: 4.9 (ref 7–25)
BUN/CREAT SERPL: 5 (ref 7–25)
BUN/CREAT SERPL: 5 (ref 7–25)
BUN/CREAT SERPL: 5.1 (ref 7–25)
BUN/CREAT SERPL: 5.3 (ref 7–25)
BUN/CREAT SERPL: 5.6 (ref 7–25)
BUN/CREAT SERPL: 5.6 (ref 7–25)
BUN/CREAT SERPL: 5.7 (ref 7–25)
BUN/CREAT SERPL: 5.8 (ref 7–25)
BUN/CREAT SERPL: 5.9 (ref 7–25)
BUN/CREAT SERPL: 6.1 (ref 7–25)
CA-I BLD-MCNC: 3.85 MG/DL (ref 4.6–5.4)
CA-I BLD-MCNC: 3.93 MG/DL (ref 4.6–5.4)
CA-I BLD-MCNC: 4 MG/DL (ref 4.6–5.4)
CA-I BLD-MCNC: 4.36 MG/DL (ref 4.6–5.4)
CA-I BLD-MCNC: 4.38 MG/DL (ref 4.6–5.4)
CA-I BLD-MCNC: 4.5 MG/DL (ref 4.6–5.4)
CA-I BLD-MCNC: 4.54 MG/DL (ref 4.6–5.4)
CALCIUM SPEC-SCNC: 7.6 MG/DL (ref 8.6–10.5)
CALCIUM SPEC-SCNC: 7.9 MG/DL (ref 8.6–10.5)
CALCIUM SPEC-SCNC: 7.9 MG/DL (ref 8.6–10.5)
CALCIUM SPEC-SCNC: 8.1 MG/DL (ref 8.6–10.5)
CALCIUM SPEC-SCNC: 8.2 MG/DL (ref 8.6–10.5)
CALCIUM SPEC-SCNC: 8.4 MG/DL (ref 8.6–10.5)
CALCIUM SPEC-SCNC: 8.5 MG/DL (ref 8.6–10.5)
CALCIUM SPEC-SCNC: 8.6 MG/DL (ref 8.6–10.5)
CALCIUM SPEC-SCNC: 8.7 MG/DL (ref 8.6–10.5)
CALCIUM SPEC-SCNC: 8.8 MG/DL (ref 8.6–10.5)
CALCIUM SPEC-SCNC: 8.9 MG/DL (ref 8.6–10.5)
CALCIUM SPEC-SCNC: 9 MG/DL (ref 8.6–10.5)
CALCIUM SPEC-SCNC: 9 MG/DL (ref 8.6–10.5)
CALCIUM SPEC-SCNC: 9.1 MG/DL (ref 8.6–10.5)
CALCIUM SPEC-SCNC: 9.2 MG/DL (ref 8.6–10.5)
CALCIUM SPEC-SCNC: 9.3 MG/DL (ref 8.6–10.5)
CALCIUM SPEC-SCNC: 9.3 MG/DL (ref 8.6–10.5)
CALCIUM SPEC-SCNC: 9.4 MG/DL (ref 8.6–10.5)
CALCIUM SPEC-SCNC: 9.4 MG/DL (ref 8.6–10.5)
CHLORIDE SERPL-SCNC: 101 MMOL/L (ref 98–107)
CHLORIDE SERPL-SCNC: 102 MMOL/L (ref 98–107)
CHLORIDE SERPL-SCNC: 103 MMOL/L (ref 98–107)
CHLORIDE SERPL-SCNC: 104 MMOL/L (ref 98–107)
CHLORIDE SERPL-SCNC: 90 MMOL/L (ref 98–107)
CHLORIDE SERPL-SCNC: 91 MMOL/L (ref 98–107)
CHLORIDE SERPL-SCNC: 92 MMOL/L (ref 98–107)
CHLORIDE SERPL-SCNC: 93 MMOL/L (ref 98–107)
CHLORIDE SERPL-SCNC: 94 MMOL/L (ref 98–107)
CHLORIDE SERPL-SCNC: 95 MMOL/L (ref 98–107)
CHLORIDE SERPL-SCNC: 96 MMOL/L (ref 98–107)
CHLORIDE SERPL-SCNC: 96 MMOL/L (ref 98–107)
CHLORIDE SERPL-SCNC: 97 MMOL/L (ref 98–107)
CHLORIDE SERPL-SCNC: 97 MMOL/L (ref 98–107)
CHLORIDE SERPL-SCNC: 98 MMOL/L (ref 98–107)
CHLORIDE SERPL-SCNC: 99 MMOL/L (ref 98–107)
CHOLEST SERPL-MCNC: 141 MG/DL (ref 0–200)
CK SERPL-CCNC: 36 U/L (ref 20–180)
CK SERPL-CCNC: 85 U/L (ref 20–180)
CO2 SERPL-SCNC: 20 MMOL/L (ref 22–29)
CO2 SERPL-SCNC: 21 MMOL/L (ref 22–29)
CO2 SERPL-SCNC: 21 MMOL/L (ref 22–29)
CO2 SERPL-SCNC: 22 MMOL/L (ref 22–29)
CO2 SERPL-SCNC: 23 MMOL/L (ref 22–29)
CO2 SERPL-SCNC: 23 MMOL/L (ref 22–29)
CO2 SERPL-SCNC: 24 MMOL/L (ref 22–29)
CO2 SERPL-SCNC: 25 MMOL/L (ref 22–29)
CO2 SERPL-SCNC: 26 MMOL/L (ref 22–29)
CO2 SERPL-SCNC: 27 MMOL/L (ref 22–29)
CO2 SERPL-SCNC: 28 MMOL/L (ref 22–29)
CO2 SERPL-SCNC: 29 MMOL/L (ref 22–29)
CO2 SERPL-SCNC: 30 MMOL/L (ref 22–29)
COHGB MFR BLD: 0.3 % (ref 0–5)
COHGB MFR BLD: 0.4 % (ref 0–5)
COHGB MFR BLD: 0.6 % (ref 0–5)
COHGB MFR BLD: 0.7 % (ref 0–5)
COHGB MFR BLD: 0.7 % (ref 0–5)
COHGB MFR BLD: 1 % (ref 0–5)
COLOR FLD: ABNORMAL
CPAP: 5 CMH2O
CREAT SERPL-MCNC: 10.33 MG/DL (ref 0.57–1)
CREAT SERPL-MCNC: 10.44 MG/DL (ref 0.57–1)
CREAT SERPL-MCNC: 10.53 MG/DL (ref 0.57–1)
CREAT SERPL-MCNC: 10.54 MG/DL (ref 0.57–1)
CREAT SERPL-MCNC: 10.67 MG/DL (ref 0.57–1)
CREAT SERPL-MCNC: 11.35 MG/DL (ref 0.57–1)
CREAT SERPL-MCNC: 11.63 MG/DL (ref 0.57–1)
CREAT SERPL-MCNC: 11.8 MG/DL (ref 0.57–1)
CREAT SERPL-MCNC: 11.81 MG/DL (ref 0.57–1)
CREAT SERPL-MCNC: 2.14 MG/DL (ref 0.57–1)
CREAT SERPL-MCNC: 2.78 MG/DL (ref 0.57–1)
CREAT SERPL-MCNC: 3.15 MG/DL (ref 0.57–1)
CREAT SERPL-MCNC: 4.05 MG/DL (ref 0.57–1)
CREAT SERPL-MCNC: 4.88 MG/DL (ref 0.57–1)
CREAT SERPL-MCNC: 4.99 MG/DL (ref 0.57–1)
CREAT SERPL-MCNC: 5.05 MG/DL (ref 0.57–1)
CREAT SERPL-MCNC: 5.71 MG/DL (ref 0.57–1)
CREAT SERPL-MCNC: 6.1 MG/DL (ref 0.57–1)
CREAT SERPL-MCNC: 6.13 MG/DL (ref 0.57–1)
CREAT SERPL-MCNC: 6.17 MG/DL (ref 0.57–1)
CREAT SERPL-MCNC: 6.28 MG/DL (ref 0.57–1)
CREAT SERPL-MCNC: 6.55 MG/DL (ref 0.57–1)
CREAT SERPL-MCNC: 6.57 MG/DL (ref 0.57–1)
CREAT SERPL-MCNC: 6.82 MG/DL (ref 0.57–1)
CREAT SERPL-MCNC: 8.22 MG/DL (ref 0.57–1)
CREAT SERPL-MCNC: 8.5 MG/DL (ref 0.57–1)
CREAT SERPL-MCNC: 8.63 MG/DL (ref 0.57–1)
CREAT SERPL-MCNC: 8.74 MG/DL (ref 0.57–1)
CREAT SERPL-MCNC: 8.96 MG/DL (ref 0.57–1)
CREAT SERPL-MCNC: 9.16 MG/DL (ref 0.57–1)
CREAT SERPL-MCNC: 9.2 MG/DL (ref 0.57–1)
CREAT SERPL-MCNC: 9.27 MG/DL (ref 0.57–1)
CREAT SERPL-MCNC: 9.53 MG/DL (ref 0.57–1)
CREAT SERPL-MCNC: 9.67 MG/DL (ref 0.57–1)
CREAT SERPL-MCNC: 9.67 MG/DL (ref 0.57–1)
CREAT SERPL-MCNC: 9.89 MG/DL (ref 0.57–1)
CROSSMATCH INTERPRETATION: NORMAL
CYTO UR: NORMAL
D-LACTATE SERPL-SCNC: 1.2 MMOL/L (ref 0.5–2)
D-LACTATE SERPL-SCNC: 1.3 MMOL/L (ref 0.5–2)
D-LACTATE SERPL-SCNC: 3.2 MMOL/L (ref 0.5–2)
DEPRECATED RDW RBC AUTO: 48.8 FL (ref 37–54)
DEPRECATED RDW RBC AUTO: 49.4 FL (ref 37–54)
DEPRECATED RDW RBC AUTO: 50.2 FL (ref 37–54)
DEPRECATED RDW RBC AUTO: 50.4 FL (ref 37–54)
DEPRECATED RDW RBC AUTO: 50.6 FL (ref 37–54)
DEPRECATED RDW RBC AUTO: 50.7 FL (ref 37–54)
DEPRECATED RDW RBC AUTO: 51.3 FL (ref 37–54)
DEPRECATED RDW RBC AUTO: 51.8 FL (ref 37–54)
DEPRECATED RDW RBC AUTO: 52 FL (ref 37–54)
DEPRECATED RDW RBC AUTO: 52.1 FL (ref 37–54)
DEPRECATED RDW RBC AUTO: 52.2 FL (ref 37–54)
DEPRECATED RDW RBC AUTO: 52.3 FL (ref 37–54)
DEPRECATED RDW RBC AUTO: 52.5 FL (ref 37–54)
DEPRECATED RDW RBC AUTO: 53.1 FL (ref 37–54)
DEPRECATED RDW RBC AUTO: 54 FL (ref 37–54)
DEPRECATED RDW RBC AUTO: 54.4 FL (ref 37–54)
DEPRECATED RDW RBC AUTO: 54.8 FL (ref 37–54)
DEPRECATED RDW RBC AUTO: 55.5 FL (ref 37–54)
DEPRECATED RDW RBC AUTO: 55.9 FL (ref 37–54)
DEPRECATED RDW RBC AUTO: 56.2 FL (ref 37–54)
DEPRECATED RDW RBC AUTO: 56.3 FL (ref 37–54)
DEPRECATED RDW RBC AUTO: 56.3 FL (ref 37–54)
DEPRECATED RDW RBC AUTO: 56.7 FL (ref 37–54)
DEPRECATED RDW RBC AUTO: 57.3 FL (ref 37–54)
DEPRECATED RDW RBC AUTO: 58.1 FL (ref 37–54)
DEPRECATED RDW RBC AUTO: 58.2 FL (ref 37–54)
DEPRECATED RDW RBC AUTO: 58.9 FL (ref 37–54)
DEPRECATED RDW RBC AUTO: 61.5 FL (ref 37–54)
DEPRECATED RDW RBC AUTO: 62.2 FL (ref 37–54)
EOSINOPHIL # BLD AUTO: 0.13 10*3/MM3 (ref 0–0.4)
EOSINOPHIL # BLD AUTO: 0.33 10*3/MM3 (ref 0–0.4)
EOSINOPHIL # BLD AUTO: 0.4 10*3/MM3 (ref 0–0.4)
EOSINOPHIL # BLD AUTO: 0.43 10*3/MM3 (ref 0–0.4)
EOSINOPHIL # BLD AUTO: 0.47 10*3/MM3 (ref 0–0.4)
EOSINOPHIL # BLD AUTO: 0.47 10*3/MM3 (ref 0–0.4)
EOSINOPHIL # BLD AUTO: 0.54 10*3/MM3 (ref 0–0.4)
EOSINOPHIL # BLD AUTO: 0.58 10*3/MM3 (ref 0–0.4)
EOSINOPHIL # BLD AUTO: 0.61 10*3/MM3 (ref 0–0.4)
EOSINOPHIL # BLD AUTO: 0.64 10*3/MM3 (ref 0–0.4)
EOSINOPHIL # BLD AUTO: 0.65 10*3/MM3 (ref 0–0.4)
EOSINOPHIL # BLD AUTO: 0.66 10*3/MM3 (ref 0–0.4)
EOSINOPHIL # BLD AUTO: 0.7 10*3/MM3 (ref 0–0.4)
EOSINOPHIL # BLD AUTO: 0.71 10*3/MM3 (ref 0–0.4)
EOSINOPHIL NFR BLD AUTO: 1.5 % (ref 0.3–6.2)
EOSINOPHIL NFR BLD AUTO: 10.4 % (ref 0.3–6.2)
EOSINOPHIL NFR BLD AUTO: 2.6 % (ref 0.3–6.2)
EOSINOPHIL NFR BLD AUTO: 3.8 % (ref 0.3–6.2)
EOSINOPHIL NFR BLD AUTO: 4.5 % (ref 0.3–6.2)
EOSINOPHIL NFR BLD AUTO: 4.7 % (ref 0.3–6.2)
EOSINOPHIL NFR BLD AUTO: 5.1 % (ref 0.3–6.2)
EOSINOPHIL NFR BLD AUTO: 5.4 % (ref 0.3–6.2)
EOSINOPHIL NFR BLD AUTO: 5.7 % (ref 0.3–6.2)
EOSINOPHIL NFR BLD AUTO: 6.7 % (ref 0.3–6.2)
EOSINOPHIL NFR BLD AUTO: 6.8 % (ref 0.3–6.2)
EOSINOPHIL NFR BLD AUTO: 7.2 % (ref 0.3–6.2)
EOSINOPHIL NFR BLD AUTO: 8 % (ref 0.3–6.2)
EOSINOPHIL NFR BLD AUTO: 9.7 % (ref 0.3–6.2)
EOSINOPHIL NFR FLD MANUAL: 12 %
ERYTHROCYTE [DISTWIDTH] IN BLOOD BY AUTOMATED COUNT: 13.6 % (ref 12.3–15.4)
ERYTHROCYTE [DISTWIDTH] IN BLOOD BY AUTOMATED COUNT: 13.8 % (ref 12.3–15.4)
ERYTHROCYTE [DISTWIDTH] IN BLOOD BY AUTOMATED COUNT: 13.9 % (ref 12.3–15.4)
ERYTHROCYTE [DISTWIDTH] IN BLOOD BY AUTOMATED COUNT: 13.9 % (ref 12.3–15.4)
ERYTHROCYTE [DISTWIDTH] IN BLOOD BY AUTOMATED COUNT: 14 % (ref 12.3–15.4)
ERYTHROCYTE [DISTWIDTH] IN BLOOD BY AUTOMATED COUNT: 14.1 % (ref 12.3–15.4)
ERYTHROCYTE [DISTWIDTH] IN BLOOD BY AUTOMATED COUNT: 14.2 % (ref 12.3–15.4)
ERYTHROCYTE [DISTWIDTH] IN BLOOD BY AUTOMATED COUNT: 14.3 % (ref 12.3–15.4)
ERYTHROCYTE [DISTWIDTH] IN BLOOD BY AUTOMATED COUNT: 14.3 % (ref 12.3–15.4)
ERYTHROCYTE [DISTWIDTH] IN BLOOD BY AUTOMATED COUNT: 14.4 % (ref 12.3–15.4)
ERYTHROCYTE [DISTWIDTH] IN BLOOD BY AUTOMATED COUNT: 14.5 % (ref 12.3–15.4)
ERYTHROCYTE [DISTWIDTH] IN BLOOD BY AUTOMATED COUNT: 14.5 % (ref 12.3–15.4)
ERYTHROCYTE [DISTWIDTH] IN BLOOD BY AUTOMATED COUNT: 14.7 % (ref 12.3–15.4)
ERYTHROCYTE [DISTWIDTH] IN BLOOD BY AUTOMATED COUNT: 14.8 % (ref 12.3–15.4)
ERYTHROCYTE [DISTWIDTH] IN BLOOD BY AUTOMATED COUNT: 14.9 % (ref 12.3–15.4)
ERYTHROCYTE [DISTWIDTH] IN BLOOD BY AUTOMATED COUNT: 15 % (ref 12.3–15.4)
ERYTHROCYTE [DISTWIDTH] IN BLOOD BY AUTOMATED COUNT: 15.1 % (ref 12.3–15.4)
ERYTHROCYTE [DISTWIDTH] IN BLOOD BY AUTOMATED COUNT: 15.4 % (ref 12.3–15.4)
ERYTHROCYTE [DISTWIDTH] IN BLOOD BY AUTOMATED COUNT: 15.5 % (ref 12.3–15.4)
ERYTHROCYTE [DISTWIDTH] IN BLOOD BY AUTOMATED COUNT: 15.7 % (ref 12.3–15.4)
ERYTHROCYTE [DISTWIDTH] IN BLOOD BY AUTOMATED COUNT: 16 % (ref 12.3–15.4)
ERYTHROCYTE [DISTWIDTH] IN BLOOD BY AUTOMATED COUNT: 16 % (ref 12.3–15.4)
ERYTHROCYTE [DISTWIDTH] IN BLOOD BY AUTOMATED COUNT: 16.1 % (ref 12.3–15.4)
ERYTHROCYTE [DISTWIDTH] IN BLOOD BY AUTOMATED COUNT: 16.2 % (ref 12.3–15.4)
ERYTHROCYTE [DISTWIDTH] IN BLOOD BY AUTOMATED COUNT: 16.5 % (ref 12.3–15.4)
ERYTHROCYTE [DISTWIDTH] IN BLOOD BY AUTOMATED COUNT: 16.7 % (ref 12.3–15.4)
ERYTHROCYTE [DISTWIDTH] IN BLOOD BY AUTOMATED COUNT: 17.2 % (ref 12.3–15.4)
ERYTHROCYTE [DISTWIDTH] IN BLOOD BY AUTOMATED COUNT: 17.2 % (ref 12.3–15.4)
ERYTHROCYTE [DISTWIDTH] IN BLOOD BY AUTOMATED COUNT: 17.4 % (ref 12.3–15.4)
ERYTHROCYTE [DISTWIDTH] IN BLOOD BY AUTOMATED COUNT: 17.6 % (ref 12.3–15.4)
ERYTHROCYTE [DISTWIDTH] IN BLOOD BY AUTOMATED COUNT: 17.9 % (ref 12.3–15.4)
FIBRINOGEN PPP-MCNC: 250 MG/DL (ref 240–460)
FUNGUS WND CULT: NORMAL
GAS FLOW AIRWAY: 1.3 LPM
GAS FLOW AIRWAY: 2 LPM
GFR SERPL CREATININE-BSD FRML MDRD: 11 ML/MIN/1.73
GFR SERPL CREATININE-BSD FRML MDRD: 15 ML/MIN/1.73
GFR SERPL CREATININE-BSD FRML MDRD: 17 ML/MIN/1.73
GFR SERPL CREATININE-BSD FRML MDRD: 23 ML/MIN/1.73
GFR SERPL CREATININE-BSD FRML MDRD: 3 ML/MIN/1.73
GFR SERPL CREATININE-BSD FRML MDRD: 4 ML/MIN/1.73
GFR SERPL CREATININE-BSD FRML MDRD: 5 ML/MIN/1.73
GFR SERPL CREATININE-BSD FRML MDRD: 6 ML/MIN/1.73
GFR SERPL CREATININE-BSD FRML MDRD: 7 ML/MIN/1.73
GFR SERPL CREATININE-BSD FRML MDRD: 9 ML/MIN/1.73
GFR SERPL CREATININE-BSD FRML MDRD: ABNORMAL ML/MIN/{1.73_M2}
GLOBULIN UR ELPH-MCNC: 2.5 GM/DL
GLOBULIN UR ELPH-MCNC: 2.6 GM/DL
GLOBULIN UR ELPH-MCNC: 2.6 GM/DL
GLOBULIN UR ELPH-MCNC: 2.8 GM/DL
GLOBULIN UR ELPH-MCNC: 3 GM/DL
GLOBULIN UR ELPH-MCNC: 3.1 GM/DL
GLOBULIN UR ELPH-MCNC: 3.2 GM/DL
GLOBULIN UR ELPH-MCNC: 3.5 GM/DL
GLOBULIN UR ELPH-MCNC: 3.5 GM/DL
GLOBULIN UR ELPH-MCNC: 3.7 GM/DL
GLOBULIN UR ELPH-MCNC: 3.8 GM/DL
GLUCOSE BLDC GLUCOMTR-MCNC: 100 MG/DL (ref 70–130)
GLUCOSE BLDC GLUCOMTR-MCNC: 101 MG/DL (ref 70–130)
GLUCOSE BLDC GLUCOMTR-MCNC: 104 MG/DL (ref 70–130)
GLUCOSE BLDC GLUCOMTR-MCNC: 105 MG/DL (ref 70–130)
GLUCOSE BLDC GLUCOMTR-MCNC: 106 MG/DL (ref 70–130)
GLUCOSE BLDC GLUCOMTR-MCNC: 106 MG/DL (ref 70–130)
GLUCOSE BLDC GLUCOMTR-MCNC: 107 MG/DL (ref 70–130)
GLUCOSE BLDC GLUCOMTR-MCNC: 109 MG/DL (ref 70–130)
GLUCOSE BLDC GLUCOMTR-MCNC: 111 MG/DL (ref 70–130)
GLUCOSE BLDC GLUCOMTR-MCNC: 112 MG/DL (ref 70–130)
GLUCOSE BLDC GLUCOMTR-MCNC: 113 MG/DL (ref 70–130)
GLUCOSE BLDC GLUCOMTR-MCNC: 114 MG/DL (ref 70–130)
GLUCOSE BLDC GLUCOMTR-MCNC: 114 MG/DL (ref 70–130)
GLUCOSE BLDC GLUCOMTR-MCNC: 117 MG/DL (ref 70–130)
GLUCOSE BLDC GLUCOMTR-MCNC: 117 MG/DL (ref 70–130)
GLUCOSE BLDC GLUCOMTR-MCNC: 118 MG/DL (ref 70–130)
GLUCOSE BLDC GLUCOMTR-MCNC: 119 MG/DL (ref 70–130)
GLUCOSE BLDC GLUCOMTR-MCNC: 120 MG/DL (ref 70–130)
GLUCOSE BLDC GLUCOMTR-MCNC: 122 MG/DL (ref 70–130)
GLUCOSE BLDC GLUCOMTR-MCNC: 122 MG/DL (ref 70–130)
GLUCOSE BLDC GLUCOMTR-MCNC: 123 MG/DL (ref 70–130)
GLUCOSE BLDC GLUCOMTR-MCNC: 123 MG/DL (ref 70–130)
GLUCOSE BLDC GLUCOMTR-MCNC: 124 MG/DL (ref 70–130)
GLUCOSE BLDC GLUCOMTR-MCNC: 125 MG/DL (ref 70–130)
GLUCOSE BLDC GLUCOMTR-MCNC: 127 MG/DL (ref 70–130)
GLUCOSE BLDC GLUCOMTR-MCNC: 129 MG/DL (ref 70–130)
GLUCOSE BLDC GLUCOMTR-MCNC: 131 MG/DL (ref 70–130)
GLUCOSE BLDC GLUCOMTR-MCNC: 132 MG/DL (ref 70–130)
GLUCOSE BLDC GLUCOMTR-MCNC: 135 MG/DL (ref 70–130)
GLUCOSE BLDC GLUCOMTR-MCNC: 136 MG/DL (ref 70–130)
GLUCOSE BLDC GLUCOMTR-MCNC: 136 MG/DL (ref 70–130)
GLUCOSE BLDC GLUCOMTR-MCNC: 140 MG/DL (ref 70–130)
GLUCOSE BLDC GLUCOMTR-MCNC: 140 MG/DL (ref 70–130)
GLUCOSE BLDC GLUCOMTR-MCNC: 141 MG/DL (ref 70–130)
GLUCOSE BLDC GLUCOMTR-MCNC: 141 MG/DL (ref 70–130)
GLUCOSE BLDC GLUCOMTR-MCNC: 142 MG/DL (ref 70–130)
GLUCOSE BLDC GLUCOMTR-MCNC: 142 MG/DL (ref 70–130)
GLUCOSE BLDC GLUCOMTR-MCNC: 143 MG/DL (ref 70–130)
GLUCOSE BLDC GLUCOMTR-MCNC: 144 MG/DL (ref 70–130)
GLUCOSE BLDC GLUCOMTR-MCNC: 145 MG/DL (ref 70–130)
GLUCOSE BLDC GLUCOMTR-MCNC: 146 MG/DL (ref 70–130)
GLUCOSE BLDC GLUCOMTR-MCNC: 149 MG/DL (ref 70–130)
GLUCOSE BLDC GLUCOMTR-MCNC: 150 MG/DL (ref 70–130)
GLUCOSE BLDC GLUCOMTR-MCNC: 150 MG/DL (ref 70–130)
GLUCOSE BLDC GLUCOMTR-MCNC: 152 MG/DL (ref 70–130)
GLUCOSE BLDC GLUCOMTR-MCNC: 153 MG/DL (ref 70–130)
GLUCOSE BLDC GLUCOMTR-MCNC: 153 MG/DL (ref 70–130)
GLUCOSE BLDC GLUCOMTR-MCNC: 154 MG/DL (ref 70–130)
GLUCOSE BLDC GLUCOMTR-MCNC: 155 MG/DL (ref 70–130)
GLUCOSE BLDC GLUCOMTR-MCNC: 155 MG/DL (ref 70–130)
GLUCOSE BLDC GLUCOMTR-MCNC: 156 MG/DL (ref 70–130)
GLUCOSE BLDC GLUCOMTR-MCNC: 157 MG/DL (ref 70–130)
GLUCOSE BLDC GLUCOMTR-MCNC: 158 MG/DL (ref 70–130)
GLUCOSE BLDC GLUCOMTR-MCNC: 160 MG/DL (ref 70–130)
GLUCOSE BLDC GLUCOMTR-MCNC: 162 MG/DL (ref 70–130)
GLUCOSE BLDC GLUCOMTR-MCNC: 163 MG/DL (ref 70–130)
GLUCOSE BLDC GLUCOMTR-MCNC: 165 MG/DL (ref 70–130)
GLUCOSE BLDC GLUCOMTR-MCNC: 166 MG/DL (ref 70–130)
GLUCOSE BLDC GLUCOMTR-MCNC: 168 MG/DL (ref 70–130)
GLUCOSE BLDC GLUCOMTR-MCNC: 168 MG/DL (ref 70–130)
GLUCOSE BLDC GLUCOMTR-MCNC: 169 MG/DL (ref 70–130)
GLUCOSE BLDC GLUCOMTR-MCNC: 171 MG/DL (ref 70–130)
GLUCOSE BLDC GLUCOMTR-MCNC: 173 MG/DL (ref 70–130)
GLUCOSE BLDC GLUCOMTR-MCNC: 174 MG/DL (ref 70–130)
GLUCOSE BLDC GLUCOMTR-MCNC: 176 MG/DL (ref 70–130)
GLUCOSE BLDC GLUCOMTR-MCNC: 181 MG/DL (ref 70–130)
GLUCOSE BLDC GLUCOMTR-MCNC: 183 MG/DL (ref 70–130)
GLUCOSE BLDC GLUCOMTR-MCNC: 187 MG/DL (ref 70–130)
GLUCOSE BLDC GLUCOMTR-MCNC: 207 MG/DL (ref 70–130)
GLUCOSE BLDC GLUCOMTR-MCNC: 208 MG/DL (ref 70–130)
GLUCOSE BLDC GLUCOMTR-MCNC: 215 MG/DL (ref 70–130)
GLUCOSE BLDC GLUCOMTR-MCNC: 216 MG/DL (ref 70–130)
GLUCOSE BLDC GLUCOMTR-MCNC: 227 MG/DL (ref 70–130)
GLUCOSE BLDC GLUCOMTR-MCNC: 236 MG/DL (ref 70–130)
GLUCOSE BLDC GLUCOMTR-MCNC: 239 MG/DL (ref 70–130)
GLUCOSE BLDC GLUCOMTR-MCNC: 245 MG/DL (ref 70–130)
GLUCOSE BLDC GLUCOMTR-MCNC: 251 MG/DL (ref 70–130)
GLUCOSE BLDC GLUCOMTR-MCNC: 264 MG/DL (ref 70–130)
GLUCOSE BLDC GLUCOMTR-MCNC: 267 MG/DL (ref 70–130)
GLUCOSE BLDC GLUCOMTR-MCNC: 28 MG/DL (ref 70–130)
GLUCOSE BLDC GLUCOMTR-MCNC: 30 MG/DL (ref 70–130)
GLUCOSE BLDC GLUCOMTR-MCNC: 31 MG/DL (ref 70–130)
GLUCOSE BLDC GLUCOMTR-MCNC: 31 MG/DL (ref 70–130)
GLUCOSE BLDC GLUCOMTR-MCNC: 35 MG/DL (ref 70–130)
GLUCOSE BLDC GLUCOMTR-MCNC: 36 MG/DL (ref 70–130)
GLUCOSE BLDC GLUCOMTR-MCNC: 391 MG/DL (ref 70–130)
GLUCOSE BLDC GLUCOMTR-MCNC: 43 MG/DL (ref 70–130)
GLUCOSE BLDC GLUCOMTR-MCNC: 53 MG/DL (ref 70–130)
GLUCOSE BLDC GLUCOMTR-MCNC: 56 MG/DL (ref 70–130)
GLUCOSE BLDC GLUCOMTR-MCNC: 56 MG/DL (ref 70–130)
GLUCOSE BLDC GLUCOMTR-MCNC: 58 MG/DL (ref 70–130)
GLUCOSE BLDC GLUCOMTR-MCNC: 59 MG/DL (ref 70–130)
GLUCOSE BLDC GLUCOMTR-MCNC: 71 MG/DL (ref 70–130)
GLUCOSE BLDC GLUCOMTR-MCNC: 75 MG/DL (ref 70–130)
GLUCOSE BLDC GLUCOMTR-MCNC: 77 MG/DL (ref 70–130)
GLUCOSE BLDC GLUCOMTR-MCNC: 80 MG/DL (ref 70–130)
GLUCOSE BLDC GLUCOMTR-MCNC: 81 MG/DL (ref 70–130)
GLUCOSE BLDC GLUCOMTR-MCNC: 81 MG/DL (ref 70–130)
GLUCOSE BLDC GLUCOMTR-MCNC: 82 MG/DL (ref 70–130)
GLUCOSE BLDC GLUCOMTR-MCNC: 83 MG/DL (ref 70–130)
GLUCOSE BLDC GLUCOMTR-MCNC: 86 MG/DL (ref 70–130)
GLUCOSE BLDC GLUCOMTR-MCNC: 88 MG/DL (ref 70–130)
GLUCOSE BLDC GLUCOMTR-MCNC: 94 MG/DL (ref 70–130)
GLUCOSE BLDC GLUCOMTR-MCNC: 97 MG/DL (ref 70–130)
GLUCOSE BLDC GLUCOMTR-MCNC: 98 MG/DL (ref 70–130)
GLUCOSE FLD-MCNC: 104 MG/DL
GLUCOSE SERPL-MCNC: 101 MG/DL (ref 65–99)
GLUCOSE SERPL-MCNC: 103 MG/DL (ref 65–99)
GLUCOSE SERPL-MCNC: 110 MG/DL (ref 65–99)
GLUCOSE SERPL-MCNC: 111 MG/DL (ref 65–99)
GLUCOSE SERPL-MCNC: 117 MG/DL (ref 65–99)
GLUCOSE SERPL-MCNC: 124 MG/DL (ref 65–99)
GLUCOSE SERPL-MCNC: 125 MG/DL (ref 65–99)
GLUCOSE SERPL-MCNC: 126 MG/DL (ref 65–99)
GLUCOSE SERPL-MCNC: 129 MG/DL (ref 65–99)
GLUCOSE SERPL-MCNC: 135 MG/DL (ref 65–99)
GLUCOSE SERPL-MCNC: 136 MG/DL (ref 65–99)
GLUCOSE SERPL-MCNC: 137 MG/DL (ref 65–99)
GLUCOSE SERPL-MCNC: 137 MG/DL (ref 65–99)
GLUCOSE SERPL-MCNC: 138 MG/DL (ref 65–99)
GLUCOSE SERPL-MCNC: 139 MG/DL (ref 65–99)
GLUCOSE SERPL-MCNC: 143 MG/DL (ref 65–99)
GLUCOSE SERPL-MCNC: 147 MG/DL (ref 65–99)
GLUCOSE SERPL-MCNC: 151 MG/DL (ref 65–99)
GLUCOSE SERPL-MCNC: 152 MG/DL (ref 65–99)
GLUCOSE SERPL-MCNC: 152 MG/DL (ref 65–99)
GLUCOSE SERPL-MCNC: 154 MG/DL (ref 65–99)
GLUCOSE SERPL-MCNC: 154 MG/DL (ref 65–99)
GLUCOSE SERPL-MCNC: 156 MG/DL (ref 65–99)
GLUCOSE SERPL-MCNC: 156 MG/DL (ref 65–99)
GLUCOSE SERPL-MCNC: 177 MG/DL (ref 65–99)
GLUCOSE SERPL-MCNC: 182 MG/DL (ref 65–99)
GLUCOSE SERPL-MCNC: 186 MG/DL (ref 65–99)
GLUCOSE SERPL-MCNC: 193 MG/DL (ref 65–99)
GLUCOSE SERPL-MCNC: 201 MG/DL (ref 65–99)
GLUCOSE SERPL-MCNC: 205 MG/DL (ref 65–99)
GLUCOSE SERPL-MCNC: 212 MG/DL (ref 65–99)
GLUCOSE SERPL-MCNC: 214 MG/DL (ref 65–99)
GLUCOSE SERPL-MCNC: 378 MG/DL (ref 65–99)
GLUCOSE SERPL-MCNC: 41 MG/DL (ref 65–99)
GLUCOSE SERPL-MCNC: 62 MG/DL (ref 65–99)
GLUCOSE SERPL-MCNC: 84 MG/DL (ref 65–99)
GLUCOSE SERPL-MCNC: 93 MG/DL (ref 65–99)
GLUCOSE SERPL-MCNC: 95 MG/DL (ref 65–99)
GRAM STN SPEC: NORMAL
GRAM STN SPEC: NORMAL
HBA1C MFR BLD: 5.2 % (ref 4.8–5.6)
HBV CORE IGM SERPL QL IA: NORMAL
HBV SURFACE AB SER RIA-ACNC: REACTIVE
HBV SURFACE AG SERPL QL IA: NORMAL
HCO3 BLDA-SCNC: 20.4 MMOL/L (ref 20–26)
HCO3 BLDA-SCNC: 21 MMOL/L (ref 20–26)
HCO3 BLDA-SCNC: 21.7 MMOL/L (ref 20–26)
HCO3 BLDA-SCNC: 22.3 MMOL/L (ref 20–26)
HCO3 BLDA-SCNC: 22.5 MMOL/L (ref 20–26)
HCO3 BLDA-SCNC: 23.2 MMOL/L (ref 20–26)
HCO3 BLDA-SCNC: 23.8 MMOL/L (ref 20–26)
HCO3 BLDA-SCNC: 25 MMOL/L (ref 20–26)
HCO3 BLDA-SCNC: 25.3 MMOL/L (ref 20–26)
HCO3 BLDA-SCNC: 25.4 MMOL/L (ref 20–26)
HCO3 BLDA-SCNC: 25.5 MMOL/L (ref 20–26)
HCO3 BLDA-SCNC: 27.2 MMOL/L (ref 20–26)
HCO3 BLDA-SCNC: 27.8 MMOL/L (ref 20–26)
HCT VFR BLD AUTO: 19.6 % (ref 34–46.6)
HCT VFR BLD AUTO: 21 % (ref 34–46.6)
HCT VFR BLD AUTO: 21.3 % (ref 34–46.6)
HCT VFR BLD AUTO: 21.7 % (ref 34–46.6)
HCT VFR BLD AUTO: 21.8 % (ref 34–46.6)
HCT VFR BLD AUTO: 22.1 % (ref 34–46.6)
HCT VFR BLD AUTO: 22.6 % (ref 34–46.6)
HCT VFR BLD AUTO: 23.2 % (ref 34–46.6)
HCT VFR BLD AUTO: 23.9 % (ref 34–46.6)
HCT VFR BLD AUTO: 23.9 % (ref 34–46.6)
HCT VFR BLD AUTO: 24.9 % (ref 34–46.6)
HCT VFR BLD AUTO: 25.4 % (ref 34–46.6)
HCT VFR BLD AUTO: 25.9 % (ref 34–46.6)
HCT VFR BLD AUTO: 26 % (ref 34–46.6)
HCT VFR BLD AUTO: 26.1 % (ref 34–46.6)
HCT VFR BLD AUTO: 26.3 % (ref 34–46.6)
HCT VFR BLD AUTO: 26.3 % (ref 34–46.6)
HCT VFR BLD AUTO: 26.6 % (ref 34–46.6)
HCT VFR BLD AUTO: 26.7 % (ref 34–46.6)
HCT VFR BLD AUTO: 26.9 % (ref 34–46.6)
HCT VFR BLD AUTO: 27 % (ref 34–46.6)
HCT VFR BLD AUTO: 28.6 % (ref 34–46.6)
HCT VFR BLD AUTO: 29.7 % (ref 34–46.6)
HCT VFR BLD AUTO: 30.3 % (ref 34–46.6)
HCT VFR BLD AUTO: 30.3 % (ref 34–46.6)
HCT VFR BLD AUTO: 30.5 % (ref 34–46.6)
HCT VFR BLD AUTO: 31.2 % (ref 34–46.6)
HCT VFR BLD AUTO: 32 % (ref 34–46.6)
HCT VFR BLD AUTO: 32.3 % (ref 34–46.6)
HCT VFR BLD AUTO: 32.5 % (ref 34–46.6)
HCT VFR BLD AUTO: 32.6 % (ref 34–46.6)
HCT VFR BLD AUTO: 33 % (ref 34–46.6)
HCT VFR BLD CALC: 20.6 % (ref 38–51)
HCT VFR BLD CALC: 23.7 % (ref 38–51)
HCT VFR BLD CALC: 24.5 % (ref 38–51)
HCT VFR BLD CALC: 24.5 % (ref 38–51)
HCT VFR BLD CALC: 25.4 % (ref 38–51)
HCT VFR BLD CALC: 26.2 % (ref 38–51)
HDLC SERPL-MCNC: 43 MG/DL (ref 40–60)
HGB BLD-MCNC: 10.2 G/DL (ref 12–15.9)
HGB BLD-MCNC: 10.2 G/DL (ref 12–15.9)
HGB BLD-MCNC: 10.5 G/DL (ref 12–15.9)
HGB BLD-MCNC: 10.6 G/DL (ref 12–15.9)
HGB BLD-MCNC: 10.6 G/DL (ref 12–15.9)
HGB BLD-MCNC: 10.8 G/DL (ref 12–15.9)
HGB BLD-MCNC: 6.2 G/DL (ref 12–15.9)
HGB BLD-MCNC: 7.1 G/DL (ref 12–15.9)
HGB BLD-MCNC: 7.2 G/DL (ref 12–15.9)
HGB BLD-MCNC: 7.4 G/DL (ref 12–15.9)
HGB BLD-MCNC: 7.5 G/DL (ref 12–15.9)
HGB BLD-MCNC: 7.7 G/DL (ref 12–15.9)
HGB BLD-MCNC: 7.8 G/DL (ref 12–15.9)
HGB BLD-MCNC: 8.1 G/DL (ref 12–15.9)
HGB BLD-MCNC: 8.4 G/DL (ref 12–15.9)
HGB BLD-MCNC: 8.5 G/DL (ref 12–15.9)
HGB BLD-MCNC: 8.6 G/DL (ref 12–15.9)
HGB BLD-MCNC: 8.7 G/DL (ref 12–15.9)
HGB BLD-MCNC: 9 G/DL (ref 12–15.9)
HGB BLD-MCNC: 9.5 G/DL (ref 12–15.9)
HGB BLD-MCNC: 9.7 G/DL (ref 12–15.9)
HGB BLD-MCNC: 9.7 G/DL (ref 12–15.9)
HGB BLD-MCNC: 9.8 G/DL (ref 12–15.9)
HGB BLDA-MCNC: 6.7 G/DL (ref 12–16)
HGB BLDA-MCNC: 7.7 G/DL (ref 12–16)
HGB BLDA-MCNC: 8 G/DL (ref 12–16)
HGB BLDA-MCNC: 8 G/DL (ref 12–16)
HGB BLDA-MCNC: 8.3 G/DL (ref 12–16)
HGB BLDA-MCNC: 8.5 G/DL (ref 12–16)
HOLD SPECIMEN: NORMAL
IMM GRANULOCYTES # BLD AUTO: 0.04 10*3/MM3 (ref 0–0.05)
IMM GRANULOCYTES # BLD AUTO: 0.04 10*3/MM3 (ref 0–0.05)
IMM GRANULOCYTES # BLD AUTO: 0.05 10*3/MM3 (ref 0–0.05)
IMM GRANULOCYTES # BLD AUTO: 0.06 10*3/MM3 (ref 0–0.05)
IMM GRANULOCYTES # BLD AUTO: 0.07 10*3/MM3 (ref 0–0.05)
IMM GRANULOCYTES # BLD AUTO: 0.08 10*3/MM3 (ref 0–0.05)
IMM GRANULOCYTES # BLD AUTO: 0.09 10*3/MM3 (ref 0–0.05)
IMM GRANULOCYTES # BLD AUTO: 0.09 10*3/MM3 (ref 0–0.05)
IMM GRANULOCYTES # BLD AUTO: 0.12 10*3/MM3 (ref 0–0.05)
IMM GRANULOCYTES # BLD AUTO: 0.12 10*3/MM3 (ref 0–0.05)
IMM GRANULOCYTES # BLD AUTO: 0.26 10*3/MM3 (ref 0–0.05)
IMM GRANULOCYTES # BLD AUTO: 0.28 10*3/MM3 (ref 0–0.05)
IMM GRANULOCYTES # BLD AUTO: 0.34 10*3/MM3 (ref 0–0.05)
IMM GRANULOCYTES NFR BLD AUTO: 0.4 % (ref 0–0.5)
IMM GRANULOCYTES NFR BLD AUTO: 0.6 % (ref 0–0.5)
IMM GRANULOCYTES NFR BLD AUTO: 0.6 % (ref 0–0.5)
IMM GRANULOCYTES NFR BLD AUTO: 0.7 % (ref 0–0.5)
IMM GRANULOCYTES NFR BLD AUTO: 0.8 % (ref 0–0.5)
IMM GRANULOCYTES NFR BLD AUTO: 0.8 % (ref 0–0.5)
IMM GRANULOCYTES NFR BLD AUTO: 1 % (ref 0–0.5)
IMM GRANULOCYTES NFR BLD AUTO: 1.5 % (ref 0–0.5)
IMM GRANULOCYTES NFR BLD AUTO: 2.1 % (ref 0–0.5)
IMM GRANULOCYTES NFR BLD AUTO: 2.5 % (ref 0–0.5)
IMM GRANULOCYTES NFR BLD AUTO: 3.2 % (ref 0–0.5)
INHALED O2 CONCENTRATION: 100 %
INHALED O2 CONCENTRATION: 30 %
INHALED O2 CONCENTRATION: 40 %
INHALED O2 CONCENTRATION: 60 %
INHALED O2 CONCENTRATION: 80 %
INHALED O2 CONCENTRATION: 90 %
INHALED O2 CONCENTRATION: 90 %
INR PPP: 1.09 (ref 0.91–1.09)
INR PPP: 1.15 (ref 0.91–1.09)
INR PPP: 1.17 (ref 0.91–1.09)
INR PPP: 1.17 (ref 0.91–1.09)
INR PPP: 1.25 (ref 0.91–1.09)
INR PPP: 1.31 (ref 0.91–1.09)
INR PPP: 1.81 (ref 0.91–1.09)
INR PPP: 1.83 (ref 0.91–1.09)
IRON 24H UR-MRATE: 35 MCG/DL (ref 37–145)
IRON SATN MFR SERPL: 29 % (ref 20–50)
LAB AP CASE REPORT: NORMAL
LAB AP CASE REPORT: NORMAL
LDH FLD-CCNC: 297 U/L
LDLC SERPL CALC-MCNC: 70 MG/DL (ref 0–100)
LDLC/HDLC SERPL: 1.51 {RATIO}
LIPASE SERPL-CCNC: 145 U/L (ref 13–60)
LIPASE SERPL-CCNC: 217 U/L (ref 13–60)
LYMPHOCYTES # BLD AUTO: 0.28 10*3/MM3 (ref 0.7–3.1)
LYMPHOCYTES # BLD AUTO: 0.61 10*3/MM3 (ref 0.7–3.1)
LYMPHOCYTES # BLD AUTO: 0.84 10*3/MM3 (ref 0.7–3.1)
LYMPHOCYTES # BLD AUTO: 1.15 10*3/MM3 (ref 0.7–3.1)
LYMPHOCYTES # BLD AUTO: 1.16 10*3/MM3 (ref 0.7–3.1)
LYMPHOCYTES # BLD AUTO: 1.27 10*3/MM3 (ref 0.7–3.1)
LYMPHOCYTES # BLD AUTO: 1.44 10*3/MM3 (ref 0.7–3.1)
LYMPHOCYTES # BLD AUTO: 1.65 10*3/MM3 (ref 0.7–3.1)
LYMPHOCYTES # BLD AUTO: 1.69 10*3/MM3 (ref 0.7–3.1)
LYMPHOCYTES # BLD AUTO: 1.79 10*3/MM3 (ref 0.7–3.1)
LYMPHOCYTES # BLD AUTO: 2.08 10*3/MM3 (ref 0.7–3.1)
LYMPHOCYTES # BLD AUTO: 2.19 10*3/MM3 (ref 0.7–3.1)
LYMPHOCYTES NFR BLD AUTO: 11.7 % (ref 19.6–45.3)
LYMPHOCYTES NFR BLD AUTO: 13.2 % (ref 19.6–45.3)
LYMPHOCYTES NFR BLD AUTO: 14.5 % (ref 19.6–45.3)
LYMPHOCYTES NFR BLD AUTO: 15 % (ref 19.6–45.3)
LYMPHOCYTES NFR BLD AUTO: 15.3 % (ref 19.6–45.3)
LYMPHOCYTES NFR BLD AUTO: 16.1 % (ref 19.6–45.3)
LYMPHOCYTES NFR BLD AUTO: 16.2 % (ref 19.6–45.3)
LYMPHOCYTES NFR BLD AUTO: 19.5 % (ref 19.6–45.3)
LYMPHOCYTES NFR BLD AUTO: 20 % (ref 19.6–45.3)
LYMPHOCYTES NFR BLD AUTO: 20.1 % (ref 19.6–45.3)
LYMPHOCYTES NFR BLD AUTO: 25 % (ref 19.6–45.3)
LYMPHOCYTES NFR BLD AUTO: 3.1 % (ref 19.6–45.3)
LYMPHOCYTES NFR BLD AUTO: 6.7 % (ref 19.6–45.3)
LYMPHOCYTES NFR BLD AUTO: 9 % (ref 19.6–45.3)
LYMPHOCYTES NFR FLD MANUAL: 8 %
Lab: ABNORMAL
MAGNESIUM SERPL-MCNC: 1.8 MG/DL (ref 1.6–2.4)
MAGNESIUM SERPL-MCNC: 2.2 MG/DL (ref 1.6–2.4)
MAGNESIUM SERPL-MCNC: 2.3 MG/DL (ref 1.6–2.4)
MAGNESIUM SERPL-MCNC: 2.4 MG/DL (ref 1.6–2.4)
MAGNESIUM SERPL-MCNC: 2.6 MG/DL (ref 1.6–2.4)
MAGNESIUM SERPL-MCNC: 2.7 MG/DL (ref 1.6–2.4)
MCH RBC QN AUTO: 31 PG (ref 26.6–33)
MCH RBC QN AUTO: 31.1 PG (ref 26.6–33)
MCH RBC QN AUTO: 31.2 PG (ref 26.6–33)
MCH RBC QN AUTO: 31.2 PG (ref 26.6–33)
MCH RBC QN AUTO: 31.3 PG (ref 26.6–33)
MCH RBC QN AUTO: 31.4 PG (ref 26.6–33)
MCH RBC QN AUTO: 31.4 PG (ref 26.6–33)
MCH RBC QN AUTO: 31.5 PG (ref 26.6–33)
MCH RBC QN AUTO: 31.6 PG (ref 26.6–33)
MCH RBC QN AUTO: 31.7 PG (ref 26.6–33)
MCH RBC QN AUTO: 31.8 PG (ref 26.6–33)
MCH RBC QN AUTO: 31.8 PG (ref 26.6–33)
MCH RBC QN AUTO: 32 PG (ref 26.6–33)
MCH RBC QN AUTO: 32.1 PG (ref 26.6–33)
MCH RBC QN AUTO: 32.2 PG (ref 26.6–33)
MCH RBC QN AUTO: 32.3 PG (ref 26.6–33)
MCH RBC QN AUTO: 32.3 PG (ref 26.6–33)
MCH RBC QN AUTO: 32.4 PG (ref 26.6–33)
MCH RBC QN AUTO: 32.8 PG (ref 26.6–33)
MCH RBC QN AUTO: 33.1 PG (ref 26.6–33)
MCH RBC QN AUTO: 33.2 PG (ref 26.6–33)
MCH RBC QN AUTO: 33.3 PG (ref 26.6–33)
MCH RBC QN AUTO: 33.6 PG (ref 26.6–33)
MCH RBC QN AUTO: 33.6 PG (ref 26.6–33)
MCHC RBC AUTO-ENTMCNC: 30.8 G/DL (ref 31.5–35.7)
MCHC RBC AUTO-ENTMCNC: 31.4 G/DL (ref 31.5–35.7)
MCHC RBC AUTO-ENTMCNC: 31.5 G/DL (ref 31.5–35.7)
MCHC RBC AUTO-ENTMCNC: 31.6 G/DL (ref 31.5–35.7)
MCHC RBC AUTO-ENTMCNC: 31.6 G/DL (ref 31.5–35.7)
MCHC RBC AUTO-ENTMCNC: 31.8 G/DL (ref 31.5–35.7)
MCHC RBC AUTO-ENTMCNC: 31.8 G/DL (ref 31.5–35.7)
MCHC RBC AUTO-ENTMCNC: 31.9 G/DL (ref 31.5–35.7)
MCHC RBC AUTO-ENTMCNC: 31.9 G/DL (ref 31.5–35.7)
MCHC RBC AUTO-ENTMCNC: 32 G/DL (ref 31.5–35.7)
MCHC RBC AUTO-ENTMCNC: 32.1 G/DL (ref 31.5–35.7)
MCHC RBC AUTO-ENTMCNC: 32.2 G/DL (ref 31.5–35.7)
MCHC RBC AUTO-ENTMCNC: 32.3 G/DL (ref 31.5–35.7)
MCHC RBC AUTO-ENTMCNC: 32.5 G/DL (ref 31.5–35.7)
MCHC RBC AUTO-ENTMCNC: 32.5 G/DL (ref 31.5–35.7)
MCHC RBC AUTO-ENTMCNC: 32.6 G/DL (ref 31.5–35.7)
MCHC RBC AUTO-ENTMCNC: 32.7 G/DL (ref 31.5–35.7)
MCHC RBC AUTO-ENTMCNC: 33 G/DL (ref 31.5–35.7)
MCHC RBC AUTO-ENTMCNC: 33.2 G/DL (ref 31.5–35.7)
MCHC RBC AUTO-ENTMCNC: 33.3 G/DL (ref 31.5–35.7)
MCHC RBC AUTO-ENTMCNC: 33.5 G/DL (ref 31.5–35.7)
MCHC RBC AUTO-ENTMCNC: 33.8 G/DL (ref 31.5–35.7)
MCHC RBC AUTO-ENTMCNC: 33.9 G/DL (ref 31.5–35.7)
MCHC RBC AUTO-ENTMCNC: 34.1 G/DL (ref 31.5–35.7)
MCHC RBC AUTO-ENTMCNC: 34.3 G/DL (ref 31.5–35.7)
MCV RBC AUTO: 100 FL (ref 79–97)
MCV RBC AUTO: 100.3 FL (ref 79–97)
MCV RBC AUTO: 100.5 FL (ref 79–97)
MCV RBC AUTO: 100.5 FL (ref 79–97)
MCV RBC AUTO: 100.7 FL (ref 79–97)
MCV RBC AUTO: 101.2 FL (ref 79–97)
MCV RBC AUTO: 101.3 FL (ref 79–97)
MCV RBC AUTO: 101.3 FL (ref 79–97)
MCV RBC AUTO: 101.5 FL (ref 79–97)
MCV RBC AUTO: 101.6 FL (ref 79–97)
MCV RBC AUTO: 101.9 FL (ref 79–97)
MCV RBC AUTO: 102.5 FL (ref 79–97)
MCV RBC AUTO: 92.4 FL (ref 79–97)
MCV RBC AUTO: 92.9 FL (ref 79–97)
MCV RBC AUTO: 93 FL (ref 79–97)
MCV RBC AUTO: 93.9 FL (ref 79–97)
MCV RBC AUTO: 94.9 FL (ref 79–97)
MCV RBC AUTO: 95.3 FL (ref 79–97)
MCV RBC AUTO: 96.4 FL (ref 79–97)
MCV RBC AUTO: 96.8 FL (ref 79–97)
MCV RBC AUTO: 97 FL (ref 79–97)
MCV RBC AUTO: 97.1 FL (ref 79–97)
MCV RBC AUTO: 97.1 FL (ref 79–97)
MCV RBC AUTO: 97.2 FL (ref 79–97)
MCV RBC AUTO: 98.2 FL (ref 79–97)
MCV RBC AUTO: 98.5 FL (ref 79–97)
MCV RBC AUTO: 98.6 FL (ref 79–97)
MCV RBC AUTO: 98.8 FL (ref 79–97)
MCV RBC AUTO: 99.4 FL (ref 79–97)
MCV RBC AUTO: 99.5 FL (ref 79–97)
MCV RBC AUTO: 99.7 FL (ref 79–97)
MESOTHL CELL NFR FLD MANUAL: 56 %
METHGB BLD QL: 1.6 % (ref 0–3)
METHGB BLD QL: 1.7 % (ref 0–3)
METHGB BLD QL: 1.8 % (ref 0–3)
METHGB BLD QL: 1.9 % (ref 0–3)
MODALITY: ABNORMAL
MODALITY: NORMAL
MONOCYTES # BLD AUTO: 0.48 10*3/MM3 (ref 0.1–0.9)
MONOCYTES # BLD AUTO: 0.54 10*3/MM3 (ref 0.1–0.9)
MONOCYTES # BLD AUTO: 0.6 10*3/MM3 (ref 0.1–0.9)
MONOCYTES # BLD AUTO: 0.6 10*3/MM3 (ref 0.1–0.9)
MONOCYTES # BLD AUTO: 0.66 10*3/MM3 (ref 0.1–0.9)
MONOCYTES # BLD AUTO: 0.72 10*3/MM3 (ref 0.1–0.9)
MONOCYTES # BLD AUTO: 0.73 10*3/MM3 (ref 0.1–0.9)
MONOCYTES # BLD AUTO: 0.75 10*3/MM3 (ref 0.1–0.9)
MONOCYTES # BLD AUTO: 0.81 10*3/MM3 (ref 0.1–0.9)
MONOCYTES # BLD AUTO: 0.85 10*3/MM3 (ref 0.1–0.9)
MONOCYTES # BLD AUTO: 0.9 10*3/MM3 (ref 0.1–0.9)
MONOCYTES # BLD AUTO: 1.23 10*3/MM3 (ref 0.1–0.9)
MONOCYTES # BLD AUTO: 1.24 10*3/MM3 (ref 0.1–0.9)
MONOCYTES # BLD AUTO: 1.29 10*3/MM3 (ref 0.1–0.9)
MONOCYTES NFR BLD AUTO: 10 % (ref 5–12)
MONOCYTES NFR BLD AUTO: 11 % (ref 5–12)
MONOCYTES NFR BLD AUTO: 12.1 % (ref 5–12)
MONOCYTES NFR BLD AUTO: 5.4 % (ref 5–12)
MONOCYTES NFR BLD AUTO: 6.7 % (ref 5–12)
MONOCYTES NFR BLD AUTO: 6.9 % (ref 5–12)
MONOCYTES NFR BLD AUTO: 7.2 % (ref 5–12)
MONOCYTES NFR BLD AUTO: 7.3 % (ref 5–12)
MONOCYTES NFR BLD AUTO: 8 % (ref 5–12)
MONOCYTES NFR BLD AUTO: 8.5 % (ref 5–12)
MONOCYTES NFR BLD AUTO: 8.6 % (ref 5–12)
MONOCYTES NFR BLD AUTO: 9 % (ref 5–12)
MONOCYTES NFR BLD AUTO: 9.7 % (ref 5–12)
MONOCYTES NFR BLD AUTO: 9.8 % (ref 5–12)
MONOCYTES NFR FLD: 6 %
NEUTROPHILS NFR BLD AUTO: 10.35 10*3/MM3 (ref 1.7–7)
NEUTROPHILS NFR BLD AUTO: 3.79 10*3/MM3 (ref 1.7–7)
NEUTROPHILS NFR BLD AUTO: 4.76 10*3/MM3 (ref 1.7–7)
NEUTROPHILS NFR BLD AUTO: 5 10*3/MM3 (ref 1.7–7)
NEUTROPHILS NFR BLD AUTO: 5.18 10*3/MM3 (ref 1.7–7)
NEUTROPHILS NFR BLD AUTO: 56.9 % (ref 42.7–76)
NEUTROPHILS NFR BLD AUTO: 59.9 % (ref 42.7–76)
NEUTROPHILS NFR BLD AUTO: 6 10*3/MM3 (ref 1.7–7)
NEUTROPHILS NFR BLD AUTO: 6.27 10*3/MM3 (ref 1.7–7)
NEUTROPHILS NFR BLD AUTO: 6.33 10*3/MM3 (ref 1.7–7)
NEUTROPHILS NFR BLD AUTO: 6.36 10*3/MM3 (ref 1.7–7)
NEUTROPHILS NFR BLD AUTO: 6.37 10*3/MM3 (ref 1.7–7)
NEUTROPHILS NFR BLD AUTO: 60 % (ref 42.7–76)
NEUTROPHILS NFR BLD AUTO: 62.9 % (ref 42.7–76)
NEUTROPHILS NFR BLD AUTO: 66.9 % (ref 42.7–76)
NEUTROPHILS NFR BLD AUTO: 66.9 % (ref 42.7–76)
NEUTROPHILS NFR BLD AUTO: 67.3 % (ref 42.7–76)
NEUTROPHILS NFR BLD AUTO: 67.9 % (ref 42.7–76)
NEUTROPHILS NFR BLD AUTO: 7.43 10*3/MM3 (ref 1.7–7)
NEUTROPHILS NFR BLD AUTO: 7.56 10*3/MM3 (ref 1.7–7)
NEUTROPHILS NFR BLD AUTO: 7.95 10*3/MM3 (ref 1.7–7)
NEUTROPHILS NFR BLD AUTO: 70 % (ref 42.7–76)
NEUTROPHILS NFR BLD AUTO: 71.4 % (ref 42.7–76)
NEUTROPHILS NFR BLD AUTO: 71.8 % (ref 42.7–76)
NEUTROPHILS NFR BLD AUTO: 74.8 % (ref 42.7–76)
NEUTROPHILS NFR BLD AUTO: 8.38 10*3/MM3 (ref 1.7–7)
NEUTROPHILS NFR BLD AUTO: 82.4 % (ref 42.7–76)
NEUTROPHILS NFR BLD AUTO: 89.2 % (ref 42.7–76)
NEUTROPHILS NFR FLD MANUAL: 18 %
NOTE: ABNORMAL
NOTIFIED BY: ABNORMAL
NOTIFIED WHO: ABNORMAL
NRBC BLD AUTO-RTO: 0 /100 WBC (ref 0–0.2)
NRBC BLD AUTO-RTO: 0.3 /100 WBC (ref 0–0.2)
NRBC BLD AUTO-RTO: 0.6 /100 WBC (ref 0–0.2)
NRBC BLD AUTO-RTO: 0.7 /100 WBC (ref 0–0.2)
NRBC BLD AUTO-RTO: 0.8 /100 WBC (ref 0–0.2)
NRBC BLD AUTO-RTO: 1.1 /100 WBC (ref 0–0.2)
NT-PROBNP SERPL-MCNC: 7544 PG/ML (ref 0–900)
NT-PROBNP SERPL-MCNC: ABNORMAL PG/ML (ref 0–900)
OXYHGB MFR BLDV: 97 % (ref 94–99)
OXYHGB MFR BLDV: 97.2 % (ref 94–99)
OXYHGB MFR BLDV: 97.3 % (ref 94–99)
OXYHGB MFR BLDV: 97.3 % (ref 94–99)
OXYHGB MFR BLDV: 97.5 % (ref 94–99)
OXYHGB MFR BLDV: 97.6 % (ref 94–99)
PATH REPORT.FINAL DX SPEC: NORMAL
PATH REPORT.FINAL DX SPEC: NORMAL
PATH REPORT.GROSS SPEC: NORMAL
PATH REPORT.GROSS SPEC: NORMAL
PCO2 BLDA: 31.2 MM HG (ref 35–45)
PCO2 BLDA: 32.1 MM HG (ref 35–45)
PCO2 BLDA: 32.3 MM HG (ref 35–45)
PCO2 BLDA: 33.5 MM HG (ref 35–45)
PCO2 BLDA: 35.6 MM HG (ref 35–45)
PCO2 BLDA: 36.5 MM HG (ref 35–45)
PCO2 BLDA: 37.6 MM HG (ref 35–45)
PCO2 BLDA: 37.8 MM HG (ref 35–45)
PCO2 BLDA: 39.3 MM HG (ref 35–45)
PCO2 BLDA: 41.5 MM HG (ref 35–45)
PCO2 BLDA: 42.4 MM HG (ref 35–45)
PCO2 BLDA: 42.7 MM HG (ref 35–45)
PCO2 BLDA: 47.1 MM HG (ref 35–45)
PCO2 TEMP ADJ BLD: 31.2 MM HG (ref 35–45)
PCO2 TEMP ADJ BLD: 32.1 MM HG (ref 35–45)
PCO2 TEMP ADJ BLD: 32.3 MM HG (ref 35–45)
PCO2 TEMP ADJ BLD: 33.5 MM HG (ref 35–45)
PCO2 TEMP ADJ BLD: 35.6 MM HG (ref 35–45)
PCO2 TEMP ADJ BLD: 36.5 MM HG (ref 35–45)
PCO2 TEMP ADJ BLD: 37.8 MM HG (ref 35–45)
PCO2 TEMP ADJ BLD: 39.3 MM HG (ref 35–45)
PCO2 TEMP ADJ BLD: 41.5 MM HG (ref 35–45)
PCO2 TEMP ADJ BLD: 42.4 MM HG (ref 35–45)
PCO2 TEMP ADJ BLD: 42.7 MM HG (ref 35–45)
PCO2 TEMP ADJ BLD: 47.1 MM HG (ref 35–45)
PEEP RESPIRATORY: 5 CM[H2O]
PH BLDA: 7.35 PH UNITS (ref 7.35–7.45)
PH BLDA: 7.37 PH UNITS (ref 7.35–7.45)
PH BLDA: 7.39 PH UNITS (ref 7.35–7.45)
PH BLDA: 7.39 PH UNITS (ref 7.35–7.45)
PH BLDA: 7.41 PH UNITS (ref 7.35–7.45)
PH BLDA: 7.41 PH UNITS (ref 7.35–7.45)
PH BLDA: 7.42 PH UNITS (ref 7.35–7.45)
PH BLDA: 7.42 PH UNITS (ref 7.35–7.45)
PH BLDA: 7.43 PH UNITS (ref 7.35–7.45)
PH BLDA: 7.44 PH UNITS (ref 7.35–7.45)
PH BLDA: 7.44 PH UNITS (ref 7.35–7.45)
PH BLDA: 7.45 PH UNITS (ref 7.35–7.45)
PH BLDA: 7.45 PH UNITS (ref 7.35–7.45)
PH, TEMP CORRECTED: 7.35 PH UNITS (ref 7.35–7.45)
PH, TEMP CORRECTED: 7.37 PH UNITS (ref 7.35–7.45)
PH, TEMP CORRECTED: 7.39 PH UNITS (ref 7.35–7.45)
PH, TEMP CORRECTED: 7.39 PH UNITS (ref 7.35–7.45)
PH, TEMP CORRECTED: 7.41 PH UNITS (ref 7.35–7.45)
PH, TEMP CORRECTED: 7.41 PH UNITS (ref 7.35–7.45)
PH, TEMP CORRECTED: 7.42 PH UNITS (ref 7.35–7.45)
PH, TEMP CORRECTED: 7.42 PH UNITS (ref 7.35–7.45)
PH, TEMP CORRECTED: 7.44 PH UNITS (ref 7.35–7.45)
PH, TEMP CORRECTED: 7.44 PH UNITS (ref 7.35–7.45)
PH, TEMP CORRECTED: 7.45 PH UNITS (ref 7.35–7.45)
PH, TEMP CORRECTED: 7.45 PH UNITS (ref 7.35–7.45)
PHOSPHATE SERPL-MCNC: 2.6 MG/DL (ref 2.5–4.5)
PHOSPHATE SERPL-MCNC: 3 MG/DL (ref 2.5–4.5)
PHOSPHATE SERPL-MCNC: 3.1 MG/DL (ref 2.5–4.5)
PHOSPHATE SERPL-MCNC: 3.5 MG/DL (ref 2.5–4.5)
PHOSPHATE SERPL-MCNC: 3.9 MG/DL (ref 2.5–4.5)
PHOSPHATE SERPL-MCNC: 4 MG/DL (ref 2.5–4.5)
PHOSPHATE SERPL-MCNC: 4.4 MG/DL (ref 2.5–4.5)
PLATELET # BLD AUTO: 100 10*3/MM3 (ref 140–450)
PLATELET # BLD AUTO: 129 10*3/MM3 (ref 140–450)
PLATELET # BLD AUTO: 150 10*3/MM3 (ref 140–450)
PLATELET # BLD AUTO: 157 10*3/MM3 (ref 140–450)
PLATELET # BLD AUTO: 230 10*3/MM3 (ref 140–450)
PLATELET # BLD AUTO: 253 10*3/MM3 (ref 140–450)
PLATELET # BLD AUTO: 278 10*3/MM3 (ref 140–450)
PLATELET # BLD AUTO: 284 10*3/MM3 (ref 140–450)
PLATELET # BLD AUTO: 324 10*3/MM3 (ref 140–450)
PLATELET # BLD AUTO: 327 10*3/MM3 (ref 140–450)
PLATELET # BLD AUTO: 342 10*3/MM3 (ref 140–450)
PLATELET # BLD AUTO: 354 10*3/MM3 (ref 140–450)
PLATELET # BLD AUTO: 373 10*3/MM3 (ref 140–450)
PLATELET # BLD AUTO: 40 10*3/MM3 (ref 140–450)
PLATELET # BLD AUTO: 400 10*3/MM3 (ref 140–450)
PLATELET # BLD AUTO: 421 10*3/MM3 (ref 140–450)
PLATELET # BLD AUTO: 431 10*3/MM3 (ref 140–450)
PLATELET # BLD AUTO: 445 10*3/MM3 (ref 140–450)
PLATELET # BLD AUTO: 465 10*3/MM3 (ref 140–450)
PLATELET # BLD AUTO: 474 10*3/MM3 (ref 140–450)
PLATELET # BLD AUTO: 501 10*3/MM3 (ref 140–450)
PLATELET # BLD AUTO: 506 10*3/MM3 (ref 140–450)
PLATELET # BLD AUTO: 508 10*3/MM3 (ref 140–450)
PLATELET # BLD AUTO: 521 10*3/MM3 (ref 140–450)
PLATELET # BLD AUTO: 521 10*3/MM3 (ref 140–450)
PLATELET # BLD AUTO: 522 10*3/MM3 (ref 140–450)
PLATELET # BLD AUTO: 551 10*3/MM3 (ref 140–450)
PLATELET # BLD AUTO: 75 10*3/MM3 (ref 140–450)
PLATELET # BLD AUTO: 83 10*3/MM3 (ref 140–450)
PLATELET # BLD AUTO: 96 10*3/MM3 (ref 140–450)
PLATELET # BLD AUTO: 96 10*3/MM3 (ref 140–450)
PMV BLD AUTO: 10 FL (ref 6–12)
PMV BLD AUTO: 10 FL (ref 6–12)
PMV BLD AUTO: 10.1 FL (ref 6–12)
PMV BLD AUTO: 10.1 FL (ref 6–12)
PMV BLD AUTO: 10.2 FL (ref 6–12)
PMV BLD AUTO: 10.2 FL (ref 6–12)
PMV BLD AUTO: 10.3 FL (ref 6–12)
PMV BLD AUTO: 10.3 FL (ref 6–12)
PMV BLD AUTO: 10.4 FL (ref 6–12)
PMV BLD AUTO: 10.5 FL (ref 6–12)
PMV BLD AUTO: 10.6 FL (ref 6–12)
PMV BLD AUTO: 10.7 FL (ref 6–12)
PMV BLD AUTO: 10.8 FL (ref 6–12)
PMV BLD AUTO: 10.8 FL (ref 6–12)
PMV BLD AUTO: 10.9 FL (ref 6–12)
PMV BLD AUTO: 10.9 FL (ref 6–12)
PMV BLD AUTO: 11 FL (ref 6–12)
PMV BLD AUTO: 11 FL (ref 6–12)
PMV BLD AUTO: 11.3 FL (ref 6–12)
PMV BLD AUTO: 11.4 FL (ref 6–12)
PMV BLD AUTO: 11.5 FL (ref 6–12)
PMV BLD AUTO: 9.7 FL (ref 6–12)
PMV BLD AUTO: 9.9 FL (ref 6–12)
PMV BLD AUTO: 9.9 FL (ref 6–12)
PO2 BLDA: 102 MM HG (ref 83–108)
PO2 BLDA: 120 MM HG (ref 83–108)
PO2 BLDA: 143 MM HG (ref 83–108)
PO2 BLDA: 258 MM HG (ref 83–108)
PO2 BLDA: 344 MM HG (ref 83–108)
PO2 BLDA: 411 MM HG (ref 83–108)
PO2 BLDA: 419 MM HG (ref 83–108)
PO2 BLDA: 430 MM HG (ref 83–108)
PO2 BLDA: 435 MM HG (ref 83–108)
PO2 BLDA: 89.1 MM HG (ref 83–108)
PO2 BLDA: 90.1 MM HG (ref 83–108)
PO2 BLDA: 94.2 MM HG (ref 83–108)
PO2 BLDA: 96 MM HG (ref 83–108)
PO2 TEMP ADJ BLD: 120 MM HG (ref 83–108)
PO2 TEMP ADJ BLD: 143 MM HG (ref 83–108)
PO2 TEMP ADJ BLD: 258 MM HG (ref 83–108)
PO2 TEMP ADJ BLD: 344 MM HG (ref 83–108)
PO2 TEMP ADJ BLD: 411 MM HG (ref 83–108)
PO2 TEMP ADJ BLD: 419 MM HG (ref 83–108)
PO2 TEMP ADJ BLD: 430 MM HG (ref 83–108)
PO2 TEMP ADJ BLD: 435 MM HG (ref 83–108)
PO2 TEMP ADJ BLD: 89.1 MM HG (ref 83–108)
PO2 TEMP ADJ BLD: 90.1 MM HG (ref 83–108)
PO2 TEMP ADJ BLD: 94.2 MM HG (ref 83–108)
PO2 TEMP ADJ BLD: 96 MM HG (ref 83–108)
POTASSIUM BLDA-SCNC: 4 MMOL/L (ref 3.5–5.2)
POTASSIUM BLDA-SCNC: 4.2 MMOL/L (ref 3.5–5.2)
POTASSIUM BLDA-SCNC: 5 MMOL/L (ref 3.5–5.2)
POTASSIUM BLDA-SCNC: 5.1 MMOL/L (ref 3.5–5.2)
POTASSIUM BLDA-SCNC: 5.3 MMOL/L (ref 3.5–5.2)
POTASSIUM BLDA-SCNC: 5.3 MMOL/L (ref 3.5–5.2)
POTASSIUM SERPL-SCNC: 2.4 MMOL/L (ref 3.5–5.2)
POTASSIUM SERPL-SCNC: 3 MMOL/L (ref 3.5–5.2)
POTASSIUM SERPL-SCNC: 3.1 MMOL/L (ref 3.5–5.2)
POTASSIUM SERPL-SCNC: 3.1 MMOL/L (ref 3.5–5.2)
POTASSIUM SERPL-SCNC: 3.5 MMOL/L (ref 3.5–5.2)
POTASSIUM SERPL-SCNC: 3.6 MMOL/L (ref 3.5–5.2)
POTASSIUM SERPL-SCNC: 3.7 MMOL/L (ref 3.5–5.2)
POTASSIUM SERPL-SCNC: 3.9 MMOL/L (ref 3.5–5.2)
POTASSIUM SERPL-SCNC: 4 MMOL/L (ref 3.5–5.2)
POTASSIUM SERPL-SCNC: 4 MMOL/L (ref 3.5–5.2)
POTASSIUM SERPL-SCNC: 4.1 MMOL/L (ref 3.5–5.2)
POTASSIUM SERPL-SCNC: 4.2 MMOL/L (ref 3.5–5.2)
POTASSIUM SERPL-SCNC: 4.2 MMOL/L (ref 3.5–5.2)
POTASSIUM SERPL-SCNC: 4.4 MMOL/L (ref 3.5–5.2)
POTASSIUM SERPL-SCNC: 4.4 MMOL/L (ref 3.5–5.2)
POTASSIUM SERPL-SCNC: 4.5 MMOL/L (ref 3.5–5.2)
POTASSIUM SERPL-SCNC: 4.5 MMOL/L (ref 3.5–5.2)
POTASSIUM SERPL-SCNC: 4.6 MMOL/L (ref 3.5–5.2)
POTASSIUM SERPL-SCNC: 4.7 MMOL/L (ref 3.5–5.2)
POTASSIUM SERPL-SCNC: 4.7 MMOL/L (ref 3.5–5.2)
POTASSIUM SERPL-SCNC: 5.1 MMOL/L (ref 3.5–5.2)
POTASSIUM SERPL-SCNC: 5.1 MMOL/L (ref 3.5–5.2)
POTASSIUM SERPL-SCNC: 5.3 MMOL/L (ref 3.5–5.2)
PROT FLD-MCNC: 4 G/DL
PROT SERPL-MCNC: 4.2 G/DL (ref 6–8.5)
PROT SERPL-MCNC: 4.5 G/DL (ref 6–8.5)
PROT SERPL-MCNC: 4.5 G/DL (ref 6–8.5)
PROT SERPL-MCNC: 5.6 G/DL (ref 6–8.5)
PROT SERPL-MCNC: 5.9 G/DL (ref 6–8.5)
PROT SERPL-MCNC: 6 G/DL (ref 6–8.5)
PROT SERPL-MCNC: 6.1 G/DL (ref 6–8.5)
PROT SERPL-MCNC: 6.3 G/DL (ref 6–8.5)
PROT SERPL-MCNC: 6.3 G/DL (ref 6–8.5)
PROTHROMBIN TIME: 13.3 SECONDS (ref 11.5–13.4)
PROTHROMBIN TIME: 14 SECONDS (ref 11.5–13.4)
PROTHROMBIN TIME: 14.3 SECONDS (ref 11.9–14.6)
PROTHROMBIN TIME: 14.5 SECONDS (ref 11.9–14.6)
PROTHROMBIN TIME: 14.7 SECONDS (ref 11.5–13.4)
PROTHROMBIN TIME: 15.3 SECONDS (ref 11.5–13.4)
PROTHROMBIN TIME: 19.6 SECONDS (ref 11.5–13.4)
PROTHROMBIN TIME: 19.8 SECONDS (ref 11.5–13.4)
PSV: 10 CMH2O
PTH-INTACT SERPL-MCNC: 78.1 PG/ML (ref 15–65)
QT INTERVAL: 418 MS
QT INTERVAL: 434 MS
QT INTERVAL: 474 MS
QT INTERVAL: 500 MS
QT INTERVAL: 510 MS
QT INTERVAL: 512 MS
QT INTERVAL: 528 MS
QT INTERVAL: 614 MS
QTC INTERVAL: 451 MS
QTC INTERVAL: 463 MS
QTC INTERVAL: 489 MS
QTC INTERVAL: 495 MS
QTC INTERVAL: 507 MS
QTC INTERVAL: 511 MS
QTC INTERVAL: 534 MS
QTC INTERVAL: 648 MS
RBC # BLD AUTO: 1.93 10*6/MM3 (ref 3.77–5.28)
RBC # BLD AUTO: 2.11 10*6/MM3 (ref 3.77–5.28)
RBC # BLD AUTO: 2.17 10*6/MM3 (ref 3.77–5.28)
RBC # BLD AUTO: 2.2 10*6/MM3 (ref 3.77–5.28)
RBC # BLD AUTO: 2.2 10*6/MM3 (ref 3.77–5.28)
RBC # BLD AUTO: 2.21 10*6/MM3 (ref 3.77–5.28)
RBC # BLD AUTO: 2.33 10*6/MM3 (ref 3.77–5.28)
RBC # BLD AUTO: 2.36 10*6/MM3 (ref 3.77–5.28)
RBC # BLD AUTO: 2.47 10*6/MM3 (ref 3.77–5.28)
RBC # BLD AUTO: 2.57 10*6/MM3 (ref 3.77–5.28)
RBC # BLD AUTO: 2.58 10*6/MM3 (ref 3.77–5.28)
RBC # BLD AUTO: 2.6 10*6/MM3 (ref 3.77–5.28)
RBC # BLD AUTO: 2.65 10*6/MM3 (ref 3.77–5.28)
RBC # BLD AUTO: 2.66 10*6/MM3 (ref 3.77–5.28)
RBC # BLD AUTO: 2.71 10*6/MM3 (ref 3.77–5.28)
RBC # BLD AUTO: 2.73 10*6/MM3 (ref 3.77–5.28)
RBC # BLD AUTO: 2.74 10*6/MM3 (ref 3.77–5.28)
RBC # BLD AUTO: 2.75 10*6/MM3 (ref 3.77–5.28)
RBC # BLD AUTO: 2.79 10*6/MM3 (ref 3.77–5.28)
RBC # BLD AUTO: 2.8 10*6/MM3 (ref 3.77–5.28)
RBC # BLD AUTO: 2.84 10*6/MM3 (ref 3.77–5.28)
RBC # BLD AUTO: 2.99 10*6/MM3 (ref 3.77–5.28)
RBC # BLD AUTO: 3.02 10*6/MM3 (ref 3.77–5.28)
RBC # BLD AUTO: 3.06 10*6/MM3 (ref 3.77–5.28)
RBC # BLD AUTO: 3.06 10*6/MM3 (ref 3.77–5.28)
RBC # BLD AUTO: 3.18 10*6/MM3 (ref 3.77–5.28)
RBC # BLD AUTO: 3.21 10*6/MM3 (ref 3.77–5.28)
RBC # BLD AUTO: 3.26 10*6/MM3 (ref 3.77–5.28)
RBC # BLD AUTO: 3.27 10*6/MM3 (ref 3.77–5.28)
RBC # BLD AUTO: 3.27 10*6/MM3 (ref 3.77–5.28)
RBC # BLD AUTO: 3.4 10*6/MM3 (ref 3.77–5.28)
RBC # FLD AUTO: ABNORMAL /MM3
RH BLD: POSITIVE
SAO2 % BLDCOA: 96.2 % (ref 94–99)
SAO2 % BLDCOA: 96.3 % (ref 94–99)
SAO2 % BLDCOA: 96.7 % (ref 94–99)
SAO2 % BLDCOA: 97.3 % (ref 94–99)
SAO2 % BLDCOA: 97.3 % (ref 94–99)
SAO2 % BLDCOA: 98.5 % (ref 94–99)
SAO2 % BLDCOA: 99 % (ref 94–99)
SAO2 % BLDCOA: 99.3 % (ref 94–99)
SAO2 % BLDCOA: 99.5 % (ref 94–99)
SAO2 % BLDCOA: 99.6 % (ref 94–99)
SAO2 % BLDCOA: 99.7 % (ref 94–99)
SAO2 % BLDCOA: 99.7 % (ref 94–99)
SAO2 % BLDCOA: 99.8 % (ref 94–99)
SARS-COV-2 ORF1AB RESP QL NAA+PROBE: NOT DETECTED
SARS-COV-2 ORF1AB RESP QL NAA+PROBE: NOT DETECTED
SARS-COV-2 RDRP RESP QL NAA+PROBE: NORMAL
SARS-COV-2 RNA PNL SPEC NAA+PROBE: NOT DETECTED
SET MECH RESP RATE: 14
SET MECH RESP RATE: 14
SET MECH RESP RATE: 16
SET MECH RESP RATE: 16
SET MECH RESP RATE: 18
SODIUM BLDA-SCNC: 135 MMOL/L (ref 136–145)
SODIUM BLDA-SCNC: 135 MMOL/L (ref 136–145)
SODIUM BLDA-SCNC: 136 MMOL/L (ref 136–145)
SODIUM SERPL-SCNC: 129 MMOL/L (ref 136–145)
SODIUM SERPL-SCNC: 130 MMOL/L (ref 136–145)
SODIUM SERPL-SCNC: 131 MMOL/L (ref 136–145)
SODIUM SERPL-SCNC: 132 MMOL/L (ref 136–145)
SODIUM SERPL-SCNC: 133 MMOL/L (ref 136–145)
SODIUM SERPL-SCNC: 134 MMOL/L (ref 136–145)
SODIUM SERPL-SCNC: 135 MMOL/L (ref 136–145)
SODIUM SERPL-SCNC: 136 MMOL/L (ref 136–145)
SODIUM SERPL-SCNC: 136 MMOL/L (ref 136–145)
SODIUM SERPL-SCNC: 137 MMOL/L (ref 136–145)
SODIUM SERPL-SCNC: 138 MMOL/L (ref 136–145)
SODIUM SERPL-SCNC: 139 MMOL/L (ref 136–145)
SODIUM SERPL-SCNC: 139 MMOL/L (ref 136–145)
SODIUM SERPL-SCNC: 142 MMOL/L (ref 136–145)
T&S EXPIRATION DATE: NORMAL
TIBC SERPL-MCNC: 119 MCG/DL (ref 298–536)
TRANSFERRIN SERPL-MCNC: 80 MG/DL (ref 200–360)
TRIGL SERPL-MCNC: 166 MG/DL (ref 0–150)
TROPONIN T SERPL-MCNC: 0.15 NG/ML (ref 0–0.03)
TROPONIN T SERPL-MCNC: 0.15 NG/ML (ref 0–0.03)
TROPONIN T SERPL-MCNC: 0.16 NG/ML (ref 0–0.03)
TROPONIN T SERPL-MCNC: 0.17 NG/ML (ref 0–0.03)
TROPONIN T SERPL-MCNC: 0.17 NG/ML (ref 0–0.03)
TROPONIN T SERPL-MCNC: 0.18 NG/ML (ref 0–0.03)
UNIT  ABO: NORMAL
UNIT  RH: NORMAL
VENTILATOR MODE: ABNORMAL
VLDLC SERPL-MCNC: 28 MG/DL (ref 5–40)
VT ON VENT VENT: 500 ML
VT ON VENT VENT: 550 ML
WBC # BLD AUTO: 4.6 10*3/MM3 (ref 3.4–10.8)
WBC # BLD AUTO: 4.63 10*3/MM3 (ref 3.4–10.8)
WBC # BLD AUTO: 5.83 10*3/MM3 (ref 3.4–10.8)
WBC # BLD AUTO: 6.28 10*3/MM3 (ref 3.4–10.8)
WBC # BLD AUTO: 6.32 10*3/MM3 (ref 3.4–10.8)
WBC # BLD AUTO: 6.8 10*3/MM3 (ref 3.4–10.8)
WBC # BLD AUTO: 8.11 10*3/MM3 (ref 3.4–10.8)
WBC # BLD AUTO: 8.43 10*3/MM3 (ref 3.4–10.8)
WBC # BLD AUTO: 8.61 10*3/MM3 (ref 3.4–10.8)
WBC # BLD AUTO: 8.72 10*3/MM3 (ref 3.4–10.8)
WBC # BLD AUTO: 8.76 10*3/MM3 (ref 3.4–10.8)
WBC # BLD AUTO: 8.77 10*3/MM3 (ref 3.4–10.8)
WBC # BLD AUTO: 8.91 10*3/MM3 (ref 3.4–10.8)
WBC # BLD AUTO: 8.91 10*3/MM3 (ref 3.4–10.8)
WBC # BLD AUTO: 8.97 10*3/MM3 (ref 3.4–10.8)
WBC # BLD AUTO: 9.37 10*3/MM3 (ref 3.4–10.8)
WBC # BLD AUTO: 9.44 10*3/MM3 (ref 3.4–10.8)
WBC # BLD AUTO: 9.72 10*3/MM3 (ref 3.4–10.8)
WBC # BLD AUTO: 9.94 10*3/MM3 (ref 3.4–10.8)
WBC # FLD AUTO: 525 /MM3
WBC NRBC COR # BLD: 10.64 10*3/MM3 (ref 3.4–10.8)
WBC NRBC COR # BLD: 11.25 10*3/MM3 (ref 3.4–10.8)
WBC NRBC COR # BLD: 11.92 10*3/MM3 (ref 3.4–10.8)
WBC NRBC COR # BLD: 12.12 10*3/MM3 (ref 3.4–10.8)
WBC NRBC COR # BLD: 12.33 10*3/MM3 (ref 3.4–10.8)
WBC NRBC COR # BLD: 12.55 10*3/MM3 (ref 3.4–10.8)
WBC NRBC COR # BLD: 13.34 10*3/MM3 (ref 3.4–10.8)
WBC NRBC COR # BLD: 14 10*3/MM3 (ref 3.4–10.8)
WBC NRBC COR # BLD: 14.31 10*3/MM3 (ref 3.4–10.8)
WBC NRBC COR # BLD: 15.11 10*3/MM3 (ref 3.4–10.8)
WBC NRBC COR # BLD: 20.12 10*3/MM3 (ref 3.4–10.8)
WBC NRBC COR # BLD: 8.24 10*3/MM3 (ref 3.4–10.8)
WHOLE BLOOD HOLD SPECIMEN: NORMAL

## 2021-01-01 PROCEDURE — 84484 ASSAY OF TROPONIN QUANT: CPT

## 2021-01-01 PROCEDURE — A9270 NON-COVERED ITEM OR SERVICE: HCPCS | Performed by: NURSE PRACTITIONER

## 2021-01-01 PROCEDURE — 80053 COMPREHEN METABOLIC PANEL: CPT | Performed by: INTERNAL MEDICINE

## 2021-01-01 PROCEDURE — 25010000002 ENOXAPARIN PER 10 MG: Performed by: FAMILY MEDICINE

## 2021-01-01 PROCEDURE — U0005 INFEC AGEN DETEC AMPLI PROBE: HCPCS | Performed by: THORACIC SURGERY (CARDIOTHORACIC VASCULAR SURGERY)

## 2021-01-01 PROCEDURE — 93005 ELECTROCARDIOGRAM TRACING: CPT | Performed by: THORACIC SURGERY (CARDIOTHORACIC VASCULAR SURGERY)

## 2021-01-01 PROCEDURE — G0378 HOSPITAL OBSERVATION PER HR: HCPCS

## 2021-01-01 PROCEDURE — 80048 BASIC METABOLIC PNL TOTAL CA: CPT | Performed by: NURSE PRACTITIONER

## 2021-01-01 PROCEDURE — 25010000002 MIDAZOLAM PER 1 MG: Performed by: ANESTHESIOLOGY

## 2021-01-01 PROCEDURE — 86901 BLOOD TYPING SEROLOGIC RH(D): CPT

## 2021-01-01 PROCEDURE — 94799 UNLISTED PULMONARY SVC/PX: CPT

## 2021-01-01 PROCEDURE — 25010000002 PAPAVERINE PER 60 MG: Performed by: THORACIC SURGERY (CARDIOTHORACIC VASCULAR SURGERY)

## 2021-01-01 PROCEDURE — 80069 RENAL FUNCTION PANEL: CPT | Performed by: THORACIC SURGERY (CARDIOTHORACIC VASCULAR SURGERY)

## 2021-01-01 PROCEDURE — 83880 ASSAY OF NATRIURETIC PEPTIDE: CPT | Performed by: EMERGENCY MEDICINE

## 2021-01-01 PROCEDURE — 96374 THER/PROPH/DIAG INJ IV PUSH: CPT

## 2021-01-01 PROCEDURE — 97116 GAIT TRAINING THERAPY: CPT

## 2021-01-01 PROCEDURE — 80048 BASIC METABOLIC PNL TOTAL CA: CPT | Performed by: FAMILY MEDICINE

## 2021-01-01 PROCEDURE — 25010000002 PROPOFOL 10 MG/ML EMULSION: Performed by: NURSE ANESTHETIST, CERTIFIED REGISTERED

## 2021-01-01 PROCEDURE — 71045 X-RAY EXAM CHEST 1 VIEW: CPT

## 2021-01-01 PROCEDURE — 93005 ELECTROCARDIOGRAM TRACING: CPT | Performed by: EMERGENCY MEDICINE

## 2021-01-01 PROCEDURE — G0257 UNSCHED DIALYSIS ESRD PT HOS: HCPCS

## 2021-01-01 PROCEDURE — 86900 BLOOD TYPING SEROLOGIC ABO: CPT

## 2021-01-01 PROCEDURE — 97166 OT EVAL MOD COMPLEX 45 MIN: CPT

## 2021-01-01 PROCEDURE — 74018 RADEX ABDOMEN 1 VIEW: CPT

## 2021-01-01 PROCEDURE — 86923 COMPATIBILITY TEST ELECTRIC: CPT

## 2021-01-01 PROCEDURE — C1750 CATH, HEMODIALYSIS,LONG-TERM: HCPCS | Performed by: SURGERY

## 2021-01-01 PROCEDURE — 77001 FLUOROGUIDE FOR VEIN DEVICE: CPT | Performed by: SURGERY

## 2021-01-01 PROCEDURE — 80053 COMPREHEN METABOLIC PANEL: CPT | Performed by: EMERGENCY MEDICINE

## 2021-01-01 PROCEDURE — 97530 THERAPEUTIC ACTIVITIES: CPT

## 2021-01-01 PROCEDURE — C1729 CATH, DRAINAGE: HCPCS | Performed by: THORACIC SURGERY (CARDIOTHORACIC VASCULAR SURGERY)

## 2021-01-01 PROCEDURE — 93458 L HRT ARTERY/VENTRICLE ANGIO: CPT | Performed by: INTERNAL MEDICINE

## 2021-01-01 PROCEDURE — 25010000002 HEPARIN (PORCINE) PER 1000 UNITS: Performed by: NURSE ANESTHETIST, CERTIFIED REGISTERED

## 2021-01-01 PROCEDURE — 63710000001 BARIUM SULFATE 98 % RECONSTITUTED SUSPENSION: Performed by: NURSE PRACTITIONER

## 2021-01-01 PROCEDURE — 25010000002 HEPARIN (PORCINE) PER 1000 UNITS: Performed by: SURGERY

## 2021-01-01 PROCEDURE — 82962 GLUCOSE BLOOD TEST: CPT

## 2021-01-01 PROCEDURE — 83735 ASSAY OF MAGNESIUM: CPT | Performed by: EMERGENCY MEDICINE

## 2021-01-01 PROCEDURE — 25010000002 IOPAMIDOL 61 % SOLUTION: Performed by: INTERNAL MEDICINE

## 2021-01-01 PROCEDURE — 80048 BASIC METABOLIC PNL TOTAL CA: CPT | Performed by: SURGERY

## 2021-01-01 PROCEDURE — 33508 ENDOSCOPIC VEIN HARVEST: CPT | Performed by: THORACIC SURGERY (CARDIOTHORACIC VASCULAR SURGERY)

## 2021-01-01 PROCEDURE — 25010000002 VANCOMYCIN 1 G RECONSTITUTED SOLUTION: Performed by: THORACIC SURGERY (CARDIOTHORACIC VASCULAR SURGERY)

## 2021-01-01 PROCEDURE — 99221 1ST HOSP IP/OBS SF/LOW 40: CPT | Performed by: THORACIC SURGERY (CARDIOTHORACIC VASCULAR SURGERY)

## 2021-01-01 PROCEDURE — 83605 ASSAY OF LACTIC ACID: CPT | Performed by: EMERGENCY MEDICINE

## 2021-01-01 PROCEDURE — 83735 ASSAY OF MAGNESIUM: CPT | Performed by: NURSE PRACTITIONER

## 2021-01-01 PROCEDURE — 86705 HEP B CORE ANTIBODY IGM: CPT | Performed by: INTERNAL MEDICINE

## 2021-01-01 PROCEDURE — C1751 CATH, INF, PER/CENT/MIDLINE: HCPCS | Performed by: NURSE ANESTHETIST, CERTIFIED REGISTERED

## 2021-01-01 PROCEDURE — 25010000003 CEFAZOLIN PER 500 MG: Performed by: NURSE ANESTHETIST, CERTIFIED REGISTERED

## 2021-01-01 PROCEDURE — 93312 ECHO TRANSESOPHAGEAL: CPT | Performed by: INTERNAL MEDICINE

## 2021-01-01 PROCEDURE — 25010000002 VANCOMYCIN PER 500 MG: Performed by: THORACIC SURGERY (CARDIOTHORACIC VASCULAR SURGERY)

## 2021-01-01 PROCEDURE — 25810000003 DEXTROSE 5 % WITH KCL 20 MEQ 20-5 MEQ/L-% SOLUTION: Performed by: THORACIC SURGERY (CARDIOTHORACIC VASCULAR SURGERY)

## 2021-01-01 PROCEDURE — 85025 COMPLETE CBC W/AUTO DIFF WBC: CPT | Performed by: INTERNAL MEDICINE

## 2021-01-01 PROCEDURE — 36600 WITHDRAWAL OF ARTERIAL BLOOD: CPT | Performed by: THORACIC SURGERY (CARDIOTHORACIC VASCULAR SURGERY)

## 2021-01-01 PROCEDURE — 06BP4ZZ EXCISION OF RIGHT SAPHENOUS VEIN, PERCUTANEOUS ENDOSCOPIC APPROACH: ICD-10-PCS | Performed by: THORACIC SURGERY (CARDIOTHORACIC VASCULAR SURGERY)

## 2021-01-01 PROCEDURE — 83880 ASSAY OF NATRIURETIC PEPTIDE: CPT | Performed by: FAMILY MEDICINE

## 2021-01-01 PROCEDURE — 82805 BLOOD GASES W/O2 SATURATION: CPT

## 2021-01-01 PROCEDURE — 25010000002 VANCOMYCIN 1 G RECONSTITUTED SOLUTION: Performed by: NURSE ANESTHETIST, CERTIFIED REGISTERED

## 2021-01-01 PROCEDURE — C1713 ANCHOR/SCREW BN/BN,TIS/BN: HCPCS | Performed by: THORACIC SURGERY (CARDIOTHORACIC VASCULAR SURGERY)

## 2021-01-01 PROCEDURE — 76937 US GUIDE VASCULAR ACCESS: CPT

## 2021-01-01 PROCEDURE — 25010000002 EPOETIN ALFA-EPBX 10000 UNIT/ML SOLUTION: Performed by: INTERNAL MEDICINE

## 2021-01-01 PROCEDURE — 88307 TISSUE EXAM BY PATHOLOGIST: CPT | Performed by: SURGERY

## 2021-01-01 PROCEDURE — 99214 OFFICE O/P EST MOD 30 MIN: CPT | Performed by: INTERNAL MEDICINE

## 2021-01-01 PROCEDURE — 86900 BLOOD TYPING SEROLOGIC ABO: CPT | Performed by: NURSE PRACTITIONER

## 2021-01-01 PROCEDURE — 84100 ASSAY OF PHOSPHORUS: CPT | Performed by: NURSE PRACTITIONER

## 2021-01-01 PROCEDURE — 87015 SPECIMEN INFECT AGNT CONCNTJ: CPT | Performed by: INTERNAL MEDICINE

## 2021-01-01 PROCEDURE — 0 IOPAMIDOL PER 1 ML: Performed by: NURSE PRACTITIONER

## 2021-01-01 PROCEDURE — 99214 OFFICE O/P EST MOD 30 MIN: CPT | Performed by: THORACIC SURGERY (CARDIOTHORACIC VASCULAR SURGERY)

## 2021-01-01 PROCEDURE — 87205 SMEAR GRAM STAIN: CPT | Performed by: INTERNAL MEDICINE

## 2021-01-01 PROCEDURE — U0005 INFEC AGEN DETEC AMPLI PROBE: HCPCS | Performed by: INTERNAL MEDICINE

## 2021-01-01 PROCEDURE — 99214 OFFICE O/P EST MOD 30 MIN: CPT | Performed by: SURGERY

## 2021-01-01 PROCEDURE — 25010000002 FENTANYL CITRATE (PF) 50 MCG/ML SOLUTION: Performed by: ANESTHESIOLOGY

## 2021-01-01 PROCEDURE — 84484 ASSAY OF TROPONIN QUANT: CPT | Performed by: FAMILY MEDICINE

## 2021-01-01 PROCEDURE — P9017 PLASMA 1 DONOR FRZ W/IN 8 HR: HCPCS

## 2021-01-01 PROCEDURE — 83735 ASSAY OF MAGNESIUM: CPT | Performed by: FAMILY MEDICINE

## 2021-01-01 PROCEDURE — 99024 POSTOP FOLLOW-UP VISIT: CPT | Performed by: SURGERY

## 2021-01-01 PROCEDURE — 25010000002 HEPARIN (PORCINE) PER 1000 UNITS: Performed by: INTERNAL MEDICINE

## 2021-01-01 PROCEDURE — 85027 COMPLETE CBC AUTOMATED: CPT | Performed by: FAMILY MEDICINE

## 2021-01-01 PROCEDURE — 85730 THROMBOPLASTIN TIME PARTIAL: CPT | Performed by: THORACIC SURGERY (CARDIOTHORACIC VASCULAR SURGERY)

## 2021-01-01 PROCEDURE — 86900 BLOOD TYPING SEROLOGIC ABO: CPT | Performed by: SURGERY

## 2021-01-01 PROCEDURE — 25010000002 PROPOFOL 10 MG/ML EMULSION: Performed by: THORACIC SURGERY (CARDIOTHORACIC VASCULAR SURGERY)

## 2021-01-01 PROCEDURE — 27880 AMPUTATION OF LOWER LEG: CPT | Performed by: SURGERY

## 2021-01-01 PROCEDURE — 83615 LACTATE (LD) (LDH) ENZYME: CPT | Performed by: INTERNAL MEDICINE

## 2021-01-01 PROCEDURE — 99232 SBSQ HOSP IP/OBS MODERATE 35: CPT | Performed by: SURGERY

## 2021-01-01 PROCEDURE — 93005 ELECTROCARDIOGRAM TRACING: CPT

## 2021-01-01 PROCEDURE — U0004 COV-19 TEST NON-CDC HGH THRU: HCPCS | Performed by: INTERNAL MEDICINE

## 2021-01-01 PROCEDURE — 84484 ASSAY OF TROPONIN QUANT: CPT | Performed by: INTERNAL MEDICINE

## 2021-01-01 PROCEDURE — 93010 ELECTROCARDIOGRAM REPORT: CPT | Performed by: INTERNAL MEDICINE

## 2021-01-01 PROCEDURE — 0 CEFAZOLIN PER 500 MG: Performed by: NURSE ANESTHETIST, CERTIFIED REGISTERED

## 2021-01-01 PROCEDURE — 25010000002 PROTAMINE SULFATE PER 10 MG

## 2021-01-01 PROCEDURE — 82945 GLUCOSE OTHER FLUID: CPT | Performed by: INTERNAL MEDICINE

## 2021-01-01 PROCEDURE — 71046 X-RAY EXAM CHEST 2 VIEWS: CPT

## 2021-01-01 PROCEDURE — 25010000002 MIDAZOLAM HCL (PF) 5 MG/5ML SOLUTION: Performed by: INTERNAL MEDICINE

## 2021-01-01 PROCEDURE — 25010000002 ENOXAPARIN PER 10 MG: Performed by: SURGERY

## 2021-01-01 PROCEDURE — 86850 RBC ANTIBODY SCREEN: CPT | Performed by: NURSE PRACTITIONER

## 2021-01-01 PROCEDURE — 88305 TISSUE EXAM BY PATHOLOGIST: CPT | Performed by: INTERNAL MEDICINE

## 2021-01-01 PROCEDURE — 85027 COMPLETE CBC AUTOMATED: CPT | Performed by: INTERNAL MEDICINE

## 2021-01-01 PROCEDURE — 02PYX3Z REMOVAL OF INFUSION DEVICE FROM GREAT VESSEL, EXTERNAL APPROACH: ICD-10-PCS | Performed by: SURGERY

## 2021-01-01 PROCEDURE — 83605 ASSAY OF LACTIC ACID: CPT | Performed by: FAMILY MEDICINE

## 2021-01-01 PROCEDURE — 5A1D70Z PERFORMANCE OF URINARY FILTRATION, INTERMITTENT, LESS THAN 6 HOURS PER DAY: ICD-10-PCS | Performed by: INTERNAL MEDICINE

## 2021-01-01 PROCEDURE — 36558 INSERT TUNNELED CV CATH: CPT | Performed by: SURGERY

## 2021-01-01 PROCEDURE — 36415 COLL VENOUS BLD VENIPUNCTURE: CPT

## 2021-01-01 PROCEDURE — 86850 RBC ANTIBODY SCREEN: CPT | Performed by: SURGERY

## 2021-01-01 PROCEDURE — 76000 FLUOROSCOPY <1 HR PHYS/QHP: CPT

## 2021-01-01 PROCEDURE — 25010000002 CEFAZOLIN PER 500 MG: Performed by: SURGERY

## 2021-01-01 PROCEDURE — 85027 COMPLETE CBC AUTOMATED: CPT | Performed by: NURSE PRACTITIONER

## 2021-01-01 PROCEDURE — 63710000001 BARIUM SULFATE 700 MG TABLET: Performed by: NURSE PRACTITIONER

## 2021-01-01 PROCEDURE — 25010000002 FENTANYL CITRATE (PF) 50 MCG/ML SOLUTION: Performed by: EMERGENCY MEDICINE

## 2021-01-01 PROCEDURE — 0JH63XZ INSERTION OF TUNNELED VASCULAR ACCESS DEVICE INTO CHEST SUBCUTANEOUS TISSUE AND FASCIA, PERCUTANEOUS APPROACH: ICD-10-PCS | Performed by: SURGERY

## 2021-01-01 PROCEDURE — 85018 HEMOGLOBIN: CPT | Performed by: FAMILY MEDICINE

## 2021-01-01 PROCEDURE — 86927 PLASMA FRESH FROZEN: CPT

## 2021-01-01 PROCEDURE — L1830 KO IMMOB CANVAS LONG PRE OTS: HCPCS | Performed by: SURGERY

## 2021-01-01 PROCEDURE — P9047 ALBUMIN (HUMAN), 25%, 50ML: HCPCS

## 2021-01-01 PROCEDURE — 25010000002 HEPARIN (PORCINE) PER 1000 UNITS: Performed by: NURSE PRACTITIONER

## 2021-01-01 PROCEDURE — 85025 COMPLETE CBC W/AUTO DIFF WBC: CPT | Performed by: EMERGENCY MEDICINE

## 2021-01-01 PROCEDURE — 85730 THROMBOPLASTIN TIME PARTIAL: CPT | Performed by: EMERGENCY MEDICINE

## 2021-01-01 PROCEDURE — 97162 PT EVAL MOD COMPLEX 30 MIN: CPT

## 2021-01-01 PROCEDURE — 84484 ASSAY OF TROPONIN QUANT: CPT | Performed by: NURSE PRACTITIONER

## 2021-01-01 PROCEDURE — 63710000001 INSULIN DETEMIR PER 5 UNITS: Performed by: NURSE PRACTITIONER

## 2021-01-01 PROCEDURE — 83880 ASSAY OF NATRIURETIC PEPTIDE: CPT

## 2021-01-01 PROCEDURE — 97597 DBRDMT OPN WND 1ST 20 CM/<: CPT | Performed by: SURGERY

## 2021-01-01 PROCEDURE — 36589 REMOVAL TUNNELED CV CATH: CPT | Performed by: SURGERY

## 2021-01-01 PROCEDURE — 25010000002 MIDAZOLAM PER 1 MG: Performed by: NURSE ANESTHETIST, CERTIFIED REGISTERED

## 2021-01-01 PROCEDURE — 94640 AIRWAY INHALATION TREATMENT: CPT

## 2021-01-01 PROCEDURE — 74176 CT ABD & PELVIS W/O CONTRAST: CPT

## 2021-01-01 PROCEDURE — 85025 COMPLETE CBC W/AUTO DIFF WBC: CPT

## 2021-01-01 PROCEDURE — 99284 EMERGENCY DEPT VISIT MOD MDM: CPT

## 2021-01-01 PROCEDURE — 72125 CT NECK SPINE W/O DYE: CPT

## 2021-01-01 PROCEDURE — 25010000002 FENTANYL CITRATE (PF) 100 MCG/2ML SOLUTION: Performed by: NURSE ANESTHETIST, CERTIFIED REGISTERED

## 2021-01-01 PROCEDURE — 25010000002 FENTANYL CITRATE (PF) 100 MCG/2ML SOLUTION: Performed by: THORACIC SURGERY (CARDIOTHORACIC VASCULAR SURGERY)

## 2021-01-01 PROCEDURE — 97110 THERAPEUTIC EXERCISES: CPT

## 2021-01-01 PROCEDURE — 83540 ASSAY OF IRON: CPT | Performed by: INTERNAL MEDICINE

## 2021-01-01 PROCEDURE — 89051 BODY FLUID CELL COUNT: CPT | Performed by: INTERNAL MEDICINE

## 2021-01-01 PROCEDURE — 25010000002 ONDANSETRON PER 1 MG: Performed by: NURSE PRACTITIONER

## 2021-01-01 PROCEDURE — 80061 LIPID PANEL: CPT | Performed by: INTERNAL MEDICINE

## 2021-01-01 PROCEDURE — 77001 FLUOROGUIDE FOR VEIN DEVICE: CPT

## 2021-01-01 PROCEDURE — 0 HYDROMORPHONE PER 4 MG: Performed by: SURGERY

## 2021-01-01 PROCEDURE — 84100 ASSAY OF PHOSPHORUS: CPT | Performed by: INTERNAL MEDICINE

## 2021-01-01 PROCEDURE — 25010000003 LIDOCAINE 1 % SOLUTION 20 ML VIAL: Performed by: THORACIC SURGERY (CARDIOTHORACIC VASCULAR SURGERY)

## 2021-01-01 PROCEDURE — 99152 MOD SED SAME PHYS/QHP 5/>YRS: CPT | Performed by: INTERNAL MEDICINE

## 2021-01-01 PROCEDURE — 99221 1ST HOSP IP/OBS SF/LOW 40: CPT | Performed by: SURGERY

## 2021-01-01 PROCEDURE — 82306 VITAMIN D 25 HYDROXY: CPT | Performed by: INTERNAL MEDICINE

## 2021-01-01 PROCEDURE — 02110A3 BYPASS CORONARY ARTERY, TWO ARTERIES FROM CORONARY ARTERY WITH AUTOLOGOUS ARTERIAL TISSUE, OPEN APPROACH: ICD-10-PCS | Performed by: THORACIC SURGERY (CARDIOTHORACIC VASCULAR SURGERY)

## 2021-01-01 PROCEDURE — C9803 HOPD COVID-19 SPEC COLLECT: HCPCS

## 2021-01-01 PROCEDURE — 99024 POSTOP FOLLOW-UP VISIT: CPT | Performed by: NURSE PRACTITIONER

## 2021-01-01 PROCEDURE — 25010000002 ONDANSETRON PER 1 MG: Performed by: SURGERY

## 2021-01-01 PROCEDURE — 82803 BLOOD GASES ANY COMBINATION: CPT

## 2021-01-01 PROCEDURE — 0Y6J0Z2 DETACHMENT AT LEFT LOWER LEG, MID, OPEN APPROACH: ICD-10-PCS | Performed by: SURGERY

## 2021-01-01 PROCEDURE — 25010000002 FUROSEMIDE PER 20 MG

## 2021-01-01 PROCEDURE — 87635 SARS-COV-2 COVID-19 AMP PRB: CPT | Performed by: FAMILY MEDICINE

## 2021-01-01 PROCEDURE — 63710000001 INSULIN DETEMIR PER 5 UNITS: Performed by: SURGERY

## 2021-01-01 PROCEDURE — 82042 OTHER SOURCE ALBUMIN QUAN EA: CPT | Performed by: INTERNAL MEDICINE

## 2021-01-01 PROCEDURE — 83690 ASSAY OF LIPASE: CPT | Performed by: INTERNAL MEDICINE

## 2021-01-01 PROCEDURE — 85027 COMPLETE CBC AUTOMATED: CPT

## 2021-01-01 PROCEDURE — 82550 ASSAY OF CK (CPK): CPT | Performed by: FAMILY MEDICINE

## 2021-01-01 PROCEDURE — 70450 CT HEAD/BRAIN W/O DYE: CPT

## 2021-01-01 PROCEDURE — 25010000002 MANNITOL PER 50 ML

## 2021-01-01 PROCEDURE — 85610 PROTHROMBIN TIME: CPT | Performed by: FAMILY MEDICINE

## 2021-01-01 PROCEDURE — 83036 HEMOGLOBIN GLYCOSYLATED A1C: CPT

## 2021-01-01 PROCEDURE — 85025 COMPLETE CBC W/AUTO DIFF WBC: CPT | Performed by: THORACIC SURGERY (CARDIOTHORACIC VASCULAR SURGERY)

## 2021-01-01 PROCEDURE — 63710000001 INSULIN LISPRO (HUMAN) PER 5 UNITS: Performed by: SURGERY

## 2021-01-01 PROCEDURE — 5A1221Z PERFORMANCE OF CARDIAC OUTPUT, CONTINUOUS: ICD-10-PCS | Performed by: THORACIC SURGERY (CARDIOTHORACIC VASCULAR SURGERY)

## 2021-01-01 PROCEDURE — C1889 IMPLANT/INSERT DEVICE, NOC: HCPCS | Performed by: SURGERY

## 2021-01-01 PROCEDURE — 93325 DOPPLER ECHO COLOR FLOW MAPG: CPT | Performed by: INTERNAL MEDICINE

## 2021-01-01 PROCEDURE — 87635 SARS-COV-2 COVID-19 AMP PRB: CPT | Performed by: EMERGENCY MEDICINE

## 2021-01-01 PROCEDURE — 85027 COMPLETE CBC AUTOMATED: CPT | Performed by: THORACIC SURGERY (CARDIOTHORACIC VASCULAR SURGERY)

## 2021-01-01 PROCEDURE — 99204 OFFICE O/P NEW MOD 45 MIN: CPT | Performed by: SURGERY

## 2021-01-01 PROCEDURE — 87635 SARS-COV-2 COVID-19 AMP PRB: CPT | Performed by: SURGERY

## 2021-01-01 PROCEDURE — 3E080GC INTRODUCTION OF OTHER THERAPEUTIC SUBSTANCE INTO HEART, OPEN APPROACH: ICD-10-PCS | Performed by: THORACIC SURGERY (CARDIOTHORACIC VASCULAR SURGERY)

## 2021-01-01 PROCEDURE — 25010000002 EPOETIN ALFA-EPBX 10000 UNIT/ML SOLUTION: Performed by: SURGERY

## 2021-01-01 PROCEDURE — 63710000001 INSULIN DETEMIR PER 5 UNITS: Performed by: FAMILY MEDICINE

## 2021-01-01 PROCEDURE — C1889 IMPLANT/INSERT DEVICE, NOC: HCPCS | Performed by: THORACIC SURGERY (CARDIOTHORACIC VASCULAR SURGERY)

## 2021-01-01 PROCEDURE — 25010000002 PHENYLEPHRINE HCL 0.8 MG/10ML SOLUTION PREFILLED SYRINGE: Performed by: NURSE ANESTHETIST, CERTIFIED REGISTERED

## 2021-01-01 PROCEDURE — P9041 ALBUMIN (HUMAN),5%, 50ML: HCPCS

## 2021-01-01 PROCEDURE — 87075 CULTR BACTERIA EXCEPT BLOOD: CPT | Performed by: INTERNAL MEDICINE

## 2021-01-01 PROCEDURE — 82803 BLOOD GASES ANY COMBINATION: CPT | Performed by: THORACIC SURGERY (CARDIOTHORACIC VASCULAR SURGERY)

## 2021-01-01 PROCEDURE — 0 IOPAMIDOL PER 1 ML: Performed by: INTERNAL MEDICINE

## 2021-01-01 PROCEDURE — 85610 PROTHROMBIN TIME: CPT | Performed by: THORACIC SURGERY (CARDIOTHORACIC VASCULAR SURGERY)

## 2021-01-01 PROCEDURE — 97162 PT EVAL MOD COMPLEX 30 MIN: CPT | Performed by: PHYSICAL THERAPIST

## 2021-01-01 PROCEDURE — 25010000002 HEPARIN (PORCINE) 1000-0.9 UT/500ML-% SOLUTION: Performed by: INTERNAL MEDICINE

## 2021-01-01 PROCEDURE — 88112 CYTOPATH CELL ENHANCE TECH: CPT | Performed by: INTERNAL MEDICINE

## 2021-01-01 PROCEDURE — 25010000002 DEXAMETHASONE PER 1 MG: Performed by: ANESTHESIOLOGY

## 2021-01-01 PROCEDURE — 02110Z9 BYPASS CORONARY ARTERY, TWO ARTERIES FROM LEFT INTERNAL MAMMARY, OPEN APPROACH: ICD-10-PCS | Performed by: THORACIC SURGERY (CARDIOTHORACIC VASCULAR SURGERY)

## 2021-01-01 PROCEDURE — 93010 ELECTROCARDIOGRAM REPORT: CPT | Performed by: EMERGENCY MEDICINE

## 2021-01-01 PROCEDURE — 63710000001 INSULIN LISPRO (HUMAN) PER 5 UNITS: Performed by: THORACIC SURGERY (CARDIOTHORACIC VASCULAR SURGERY)

## 2021-01-01 PROCEDURE — 84100 ASSAY OF PHOSPHORUS: CPT | Performed by: EMERGENCY MEDICINE

## 2021-01-01 PROCEDURE — 63710000001 INSULIN DETEMIR PER 5 UNITS: Performed by: THORACIC SURGERY (CARDIOTHORACIC VASCULAR SURGERY)

## 2021-01-01 PROCEDURE — 63710000001 INSULIN LISPRO (HUMAN) PER 5 UNITS: Performed by: NURSE PRACTITIONER

## 2021-01-01 PROCEDURE — 80053 COMPREHEN METABOLIC PANEL: CPT

## 2021-01-01 PROCEDURE — 85610 PROTHROMBIN TIME: CPT | Performed by: EMERGENCY MEDICINE

## 2021-01-01 PROCEDURE — 87340 HEPATITIS B SURFACE AG IA: CPT | Performed by: INTERNAL MEDICINE

## 2021-01-01 PROCEDURE — 3E1M39Z IRRIGATION OF PERITONEAL CAVITY USING DIALYSATE, PERCUTANEOUS APPROACH: ICD-10-PCS | Performed by: INTERNAL MEDICINE

## 2021-01-01 PROCEDURE — 82375 ASSAY CARBOXYHB QUANT: CPT

## 2021-01-01 PROCEDURE — 33533 CABG ARTERIAL SINGLE: CPT | Performed by: THORACIC SURGERY (CARDIOTHORACIC VASCULAR SURGERY)

## 2021-01-01 PROCEDURE — 99283 EMERGENCY DEPT VISIT LOW MDM: CPT

## 2021-01-01 PROCEDURE — 11042 DBRDMT SUBQ TIS 1ST 20SQCM/<: CPT | Performed by: SURGERY

## 2021-01-01 PROCEDURE — 84484 ASSAY OF TROPONIN QUANT: CPT | Performed by: EMERGENCY MEDICINE

## 2021-01-01 PROCEDURE — 36558 INSERT TUNNELED CV CATH: CPT

## 2021-01-01 PROCEDURE — 90945 DIALYSIS ONE EVALUATION: CPT

## 2021-01-01 PROCEDURE — 93000 ELECTROCARDIOGRAM COMPLETE: CPT | Performed by: INTERNAL MEDICINE

## 2021-01-01 PROCEDURE — 25010000002 ONDANSETRON PER 1 MG: Performed by: EMERGENCY MEDICINE

## 2021-01-01 PROCEDURE — 85025 COMPLETE CBC W/AUTO DIFF WBC: CPT | Performed by: FAMILY MEDICINE

## 2021-01-01 PROCEDURE — 02HV33Z INSERTION OF INFUSION DEVICE INTO SUPERIOR VENA CAVA, PERCUTANEOUS APPROACH: ICD-10-PCS | Performed by: SURGERY

## 2021-01-01 PROCEDURE — 87635 SARS-COV-2 COVID-19 AMP PRB: CPT | Performed by: NURSE PRACTITIONER

## 2021-01-01 PROCEDURE — 96372 THER/PROPH/DIAG INJ SC/IM: CPT

## 2021-01-01 PROCEDURE — 71275 CT ANGIOGRAPHY CHEST: CPT

## 2021-01-01 PROCEDURE — 85730 THROMBOPLASTIN TIME PARTIAL: CPT | Performed by: NURSE PRACTITIONER

## 2021-01-01 PROCEDURE — 86901 BLOOD TYPING SEROLOGIC RH(D): CPT | Performed by: NURSE PRACTITIONER

## 2021-01-01 PROCEDURE — 32551 INSERTION OF CHEST TUBE: CPT | Performed by: THORACIC SURGERY (CARDIOTHORACIC VASCULAR SURGERY)

## 2021-01-01 PROCEDURE — 87102 FUNGUS ISOLATION CULTURE: CPT | Performed by: INTERNAL MEDICINE

## 2021-01-01 PROCEDURE — 80048 BASIC METABOLIC PNL TOTAL CA: CPT | Performed by: THORACIC SURGERY (CARDIOTHORACIC VASCULAR SURGERY)

## 2021-01-01 PROCEDURE — C9803 HOPD COVID-19 SPEC COLLECT: HCPCS | Performed by: NURSE PRACTITIONER

## 2021-01-01 PROCEDURE — 36415 COLL VENOUS BLD VENIPUNCTURE: CPT | Performed by: EMERGENCY MEDICINE

## 2021-01-01 PROCEDURE — 85610 PROTHROMBIN TIME: CPT

## 2021-01-01 PROCEDURE — 25010000003 METHYLENE BLUE 50 MG/10ML SOLUTION: Performed by: THORACIC SURGERY (CARDIOTHORACIC VASCULAR SURGERY)

## 2021-01-01 PROCEDURE — 99213 OFFICE O/P EST LOW 20 MIN: CPT | Performed by: THORACIC SURGERY (CARDIOTHORACIC VASCULAR SURGERY)

## 2021-01-01 PROCEDURE — 25010000002 ONDANSETRON PER 1 MG: Performed by: FAMILY MEDICINE

## 2021-01-01 PROCEDURE — 0 POTASSIUM CHLORIDE 10 MEQ/100ML SOLUTION: Performed by: EMERGENCY MEDICINE

## 2021-01-01 PROCEDURE — 86901 BLOOD TYPING SEROLOGIC RH(D): CPT | Performed by: SURGERY

## 2021-01-01 PROCEDURE — 63710000001 BARIUM SULFATE 96 % RECONSTITUTED SUSPENSION: Performed by: NURSE PRACTITIONER

## 2021-01-01 PROCEDURE — P9035 PLATELET PHERES LEUKOREDUCED: HCPCS

## 2021-01-01 PROCEDURE — 25010000002 ALTEPLASE 2 MG RECONSTITUTED SOLUTION: Performed by: INTERNAL MEDICINE

## 2021-01-01 PROCEDURE — 84157 ASSAY OF PROTEIN OTHER: CPT | Performed by: INTERNAL MEDICINE

## 2021-01-01 PROCEDURE — 86900 BLOOD TYPING SEROLOGIC ABO: CPT | Performed by: ANESTHESIOLOGY

## 2021-01-01 PROCEDURE — 93318 ECHO TRANSESOPHAGEAL INTRAOP: CPT

## 2021-01-01 PROCEDURE — 85730 THROMBOPLASTIN TIME PARTIAL: CPT | Performed by: FAMILY MEDICINE

## 2021-01-01 PROCEDURE — 25010000002 PROPOFOL 1000 MG/100ML EMULSION: Performed by: NURSE ANESTHETIST, CERTIFIED REGISTERED

## 2021-01-01 PROCEDURE — C9803 HOPD COVID-19 SPEC COLLECT: HCPCS | Performed by: THORACIC SURGERY (CARDIOTHORACIC VASCULAR SURGERY)

## 2021-01-01 PROCEDURE — C1894 INTRO/SHEATH, NON-LASER: HCPCS | Performed by: INTERNAL MEDICINE

## 2021-01-01 PROCEDURE — U0004 COV-19 TEST NON-CDC HGH THRU: HCPCS | Performed by: THORACIC SURGERY (CARDIOTHORACIC VASCULAR SURGERY)

## 2021-01-01 PROCEDURE — 33518 CABG ARTERY-VEIN TWO: CPT | Performed by: THORACIC SURGERY (CARDIOTHORACIC VASCULAR SURGERY)

## 2021-01-01 PROCEDURE — G0463 HOSPITAL OUTPT CLINIC VISIT: HCPCS

## 2021-01-01 PROCEDURE — 76604 US EXAM CHEST: CPT

## 2021-01-01 PROCEDURE — 25010000003 CEFAZOLIN SODIUM-DEXTROSE 2-3 GM-%(50ML) RECONSTITUTED SOLUTION: Performed by: NURSE PRACTITIONER

## 2021-01-01 PROCEDURE — 74220 X-RAY XM ESOPHAGUS 1CNTRST: CPT

## 2021-01-01 PROCEDURE — 86850 RBC ANTIBODY SCREEN: CPT

## 2021-01-01 PROCEDURE — 25010000002 HEPARIN (PORCINE) PER 1000 UNITS: Performed by: THORACIC SURGERY (CARDIOTHORACIC VASCULAR SURGERY)

## 2021-01-01 PROCEDURE — A4648 IMPLANTABLE TISSUE MARKER: HCPCS | Performed by: THORACIC SURGERY (CARDIOTHORACIC VASCULAR SURGERY)

## 2021-01-01 PROCEDURE — 86706 HEP B SURFACE ANTIBODY: CPT | Performed by: INTERNAL MEDICINE

## 2021-01-01 PROCEDURE — P9016 RBC LEUKOCYTES REDUCED: HCPCS

## 2021-01-01 PROCEDURE — 0W993ZZ DRAINAGE OF RIGHT PLEURAL CAVITY, PERCUTANEOUS APPROACH: ICD-10-PCS | Performed by: INTERNAL MEDICINE

## 2021-01-01 PROCEDURE — 82947 ASSAY GLUCOSE BLOOD QUANT: CPT | Performed by: INTERNAL MEDICINE

## 2021-01-01 PROCEDURE — 25010000002 HEPARIN (PORCINE) 2000-0.9 UNIT/L-% SOLUTION: Performed by: INTERNAL MEDICINE

## 2021-01-01 PROCEDURE — 85384 FIBRINOGEN ACTIVITY: CPT | Performed by: THORACIC SURGERY (CARDIOTHORACIC VASCULAR SURGERY)

## 2021-01-01 PROCEDURE — 0W9930Z DRAINAGE OF RIGHT PLEURAL CAVITY WITH DRAINAGE DEVICE, PERCUTANEOUS APPROACH: ICD-10-PCS | Performed by: THORACIC SURGERY (CARDIOTHORACIC VASCULAR SURGERY)

## 2021-01-01 PROCEDURE — 83880 ASSAY OF NATRIURETIC PEPTIDE: CPT | Performed by: NURSE PRACTITIONER

## 2021-01-01 PROCEDURE — 94010 BREATHING CAPACITY TEST: CPT | Performed by: INTERNAL MEDICINE

## 2021-01-01 PROCEDURE — 82330 ASSAY OF CALCIUM: CPT

## 2021-01-01 PROCEDURE — 82550 ASSAY OF CK (CPK): CPT | Performed by: EMERGENCY MEDICINE

## 2021-01-01 PROCEDURE — 25010000002 PROTAMINE SULFATE PER 10 MG: Performed by: THORACIC SURGERY (CARDIOTHORACIC VASCULAR SURGERY)

## 2021-01-01 PROCEDURE — 82947 ASSAY GLUCOSE BLOOD QUANT: CPT | Performed by: FAMILY MEDICINE

## 2021-01-01 PROCEDURE — 84100 ASSAY OF PHOSPHORUS: CPT | Performed by: FAMILY MEDICINE

## 2021-01-01 PROCEDURE — 25010000002 FENTANYL CITRATE (PF) 100 MCG/2ML SOLUTION: Performed by: INTERNAL MEDICINE

## 2021-01-01 PROCEDURE — 25010000002 HYDROMORPHONE PER 4 MG: Performed by: ANESTHESIOLOGY

## 2021-01-01 PROCEDURE — 63710000001 SOD BICARB-CITRIC ACID-SIMETHICONE 2.21-1.53-0.04 G PACK: Performed by: NURSE PRACTITIONER

## 2021-01-01 PROCEDURE — 25010000002 POTASSIUM CHLORIDE PER 2 MEQ OF POTASSIUM

## 2021-01-01 PROCEDURE — 85730 THROMBOPLASTIN TIME PARTIAL: CPT

## 2021-01-01 PROCEDURE — 85014 HEMATOCRIT: CPT | Performed by: FAMILY MEDICINE

## 2021-01-01 PROCEDURE — 86901 BLOOD TYPING SEROLOGIC RH(D): CPT | Performed by: ANESTHESIOLOGY

## 2021-01-01 PROCEDURE — 25010000002 PHENYLEPHRINE 10 MG/ML SOLUTION

## 2021-01-01 PROCEDURE — 94002 VENT MGMT INPAT INIT DAY: CPT

## 2021-01-01 PROCEDURE — 25010000002 DIPHENHYDRAMINE PER 50 MG: Performed by: INTERNAL MEDICINE

## 2021-01-01 PROCEDURE — 88311 DECALCIFY TISSUE: CPT | Performed by: SURGERY

## 2021-01-01 PROCEDURE — 83735 ASSAY OF MAGNESIUM: CPT | Performed by: THORACIC SURGERY (CARDIOTHORACIC VASCULAR SURGERY)

## 2021-01-01 PROCEDURE — 0 CEFAZOLIN PER 500 MG: Performed by: SURGERY

## 2021-01-01 PROCEDURE — 25010000002 PHENYLEPHRINE 10 MG/ML SOLUTION 5 ML VIAL: Performed by: THORACIC SURGERY (CARDIOTHORACIC VASCULAR SURGERY)

## 2021-01-01 PROCEDURE — 25010000002 PROTAMINE SULFATE PER 10 MG: Performed by: NURSE ANESTHETIST, CERTIFIED REGISTERED

## 2021-01-01 PROCEDURE — 84466 ASSAY OF TRANSFERRIN: CPT | Performed by: INTERNAL MEDICINE

## 2021-01-01 PROCEDURE — 25010000002 ALBUMIN HUMAN 5% PER 50 ML

## 2021-01-01 PROCEDURE — C9803 HOPD COVID-19 SPEC COLLECT: HCPCS | Performed by: SURGERY

## 2021-01-01 PROCEDURE — 85025 COMPLETE CBC W/AUTO DIFF WBC: CPT | Performed by: SURGERY

## 2021-01-01 PROCEDURE — 25010000002 ONDANSETRON PER 1 MG: Performed by: NURSE ANESTHETIST, CERTIFIED REGISTERED

## 2021-01-01 PROCEDURE — 83690 ASSAY OF LIPASE: CPT | Performed by: FAMILY MEDICINE

## 2021-01-01 PROCEDURE — 25010000002 ALBUMIN HUMAN 25% PER 50 ML

## 2021-01-01 PROCEDURE — C1769 GUIDE WIRE: HCPCS | Performed by: INTERNAL MEDICINE

## 2021-01-01 PROCEDURE — 80053 COMPREHEN METABOLIC PANEL: CPT | Performed by: FAMILY MEDICINE

## 2021-01-01 PROCEDURE — 83050 HGB METHEMOGLOBIN QUAN: CPT

## 2021-01-01 PROCEDURE — 80053 COMPREHEN METABOLIC PANEL: CPT | Performed by: NURSE PRACTITIONER

## 2021-01-01 PROCEDURE — 25010000002 CEFAZOLIN PER 500 MG: Performed by: THORACIC SURGERY (CARDIOTHORACIC VASCULAR SURGERY)

## 2021-01-01 PROCEDURE — C9803 HOPD COVID-19 SPEC COLLECT: HCPCS | Performed by: INTERNAL MEDICINE

## 2021-01-01 PROCEDURE — 25010000002 HEPARIN (PORCINE) PER 1000 UNITS

## 2021-01-01 PROCEDURE — 25010000002 ONDANSETRON PER 1 MG: Performed by: INTERNAL MEDICINE

## 2021-01-01 PROCEDURE — 85610 PROTHROMBIN TIME: CPT | Performed by: NURSE PRACTITIONER

## 2021-01-01 PROCEDURE — 87070 CULTURE OTHR SPECIMN AEROBIC: CPT | Performed by: INTERNAL MEDICINE

## 2021-01-01 PROCEDURE — 93306 TTE W/DOPPLER COMPLETE: CPT | Performed by: INTERNAL MEDICINE

## 2021-01-01 PROCEDURE — 86850 RBC ANTIBODY SCREEN: CPT | Performed by: ANESTHESIOLOGY

## 2021-01-01 PROCEDURE — 94010 BREATHING CAPACITY TEST: CPT

## 2021-01-01 PROCEDURE — 83970 ASSAY OF PARATHORMONE: CPT | Performed by: INTERNAL MEDICINE

## 2021-01-01 DEVICE — LIGACLIP MCA MULTIPLE CLIP APPLIERS, 20 MEDIUM CLIPS
Type: IMPLANTABLE DEVICE | Site: LEG | Status: FUNCTIONAL
Brand: LIGACLIP

## 2021-01-01 DEVICE — LIGACLIP MCA MULTIPLE CLIP APPLIERS, 20 SMALL CLIPS
Type: IMPLANTABLE DEVICE | Site: LEG | Status: FUNCTIONAL
Brand: LIGACLIP

## 2021-01-01 DEVICE — SUT STL 2/0 CV SH 24IN TPW32: Type: IMPLANTABLE DEVICE | Site: HEART | Status: FUNCTIONAL

## 2021-01-01 DEVICE — WR SUT NONABS MF SS CCS 1/2CIR CUT/CONV 5/0 18IN M657G: Type: IMPLANTABLE DEVICE | Site: STERNUM | Status: FUNCTIONAL

## 2021-01-01 DEVICE — PLEDGET INCISIONLINE REINF TFE SFT PTFE 1.5X3X7MM WHT: Type: IMPLANTABLE DEVICE | Site: HEART | Status: FUNCTIONAL

## 2021-01-01 DEVICE — SUT MF STL K60 SZ5 18X6IN PK/6: Type: IMPLANTABLE DEVICE | Site: STERNUM | Status: FUNCTIONAL

## 2021-01-01 DEVICE — DISK-SHAPED STYLE, SILICONE (1 PER STERILE PKG)
Type: IMPLANTABLE DEVICE | Site: AORTA | Status: FUNCTIONAL
Brand: SCANLAN® RADIOMARK® GRAFT MARKERS

## 2021-01-01 DEVICE — WAX,BONE,NATURAL
Type: IMPLANTABLE DEVICE | Site: LEG | Status: FUNCTIONAL
Brand: MEDLINE INDUSTRIES

## 2021-01-01 RX ORDER — NEOSTIGMINE METHYLSULFATE 5 MG/5 ML
SYRINGE (ML) INTRAVENOUS AS NEEDED
Status: DISCONTINUED | OUTPATIENT
Start: 2021-01-01 | End: 2021-01-01 | Stop reason: SURG

## 2021-01-01 RX ORDER — ALUMINA, MAGNESIA, AND SIMETHICONE 2400; 2400; 240 MG/30ML; MG/30ML; MG/30ML
15 SUSPENSION ORAL EVERY 6 HOURS PRN
Status: DISCONTINUED | OUTPATIENT
Start: 2021-01-01 | End: 2021-01-01 | Stop reason: HOSPADM

## 2021-01-01 RX ORDER — BUPIVACAINE HCL/0.9 % NACL/PF 0.1 %
2 PLASTIC BAG, INJECTION (ML) EPIDURAL ONCE
Status: COMPLETED | OUTPATIENT
Start: 2021-01-01 | End: 2021-01-01

## 2021-01-01 RX ORDER — ONDANSETRON 2 MG/ML
4 INJECTION INTRAMUSCULAR; INTRAVENOUS AS NEEDED
Status: DISCONTINUED | OUTPATIENT
Start: 2021-01-01 | End: 2021-01-01 | Stop reason: HOSPADM

## 2021-01-01 RX ORDER — LIDOCAINE HYDROCHLORIDE 20 MG/ML
INJECTION, SOLUTION EPIDURAL; INFILTRATION; INTRACAUDAL; PERINEURAL AS NEEDED
Status: DISCONTINUED | OUTPATIENT
Start: 2021-01-01 | End: 2021-01-01 | Stop reason: SURG

## 2021-01-01 RX ORDER — ONDANSETRON 2 MG/ML
4 INJECTION INTRAMUSCULAR; INTRAVENOUS EVERY 6 HOURS PRN
Status: DISCONTINUED | OUTPATIENT
Start: 2021-01-01 | End: 2021-01-01 | Stop reason: HOSPADM

## 2021-01-01 RX ORDER — NICOTINE POLACRILEX 4 MG
15 LOZENGE BUCCAL
Status: DISCONTINUED | OUTPATIENT
Start: 2021-01-01 | End: 2021-01-01

## 2021-01-01 RX ORDER — PROPOFOL 10 MG/ML
INJECTION, EMULSION INTRAVENOUS AS NEEDED
Status: DISCONTINUED | OUTPATIENT
Start: 2021-01-01 | End: 2021-01-01 | Stop reason: SURG

## 2021-01-01 RX ORDER — SODIUM CHLORIDE 0.9 % (FLUSH) 0.9 %
10 SYRINGE (ML) INJECTION AS NEEDED
Status: DISCONTINUED | OUTPATIENT
Start: 2021-01-01 | End: 2021-01-01 | Stop reason: HOSPADM

## 2021-01-01 RX ORDER — POTASSIUM CHLORIDE 7.45 MG/ML
10 INJECTION INTRAVENOUS
Status: COMPLETED | OUTPATIENT
Start: 2021-01-01 | End: 2021-01-01

## 2021-01-01 RX ORDER — PROPOFOL 10 MG/ML
VIAL (ML) INTRAVENOUS AS NEEDED
Status: DISCONTINUED | OUTPATIENT
Start: 2021-01-01 | End: 2021-01-01 | Stop reason: SURG

## 2021-01-01 RX ORDER — BUMETANIDE 0.25 MG/ML
2 INJECTION INTRAMUSCULAR; INTRAVENOUS ONCE
Status: COMPLETED | OUTPATIENT
Start: 2021-01-01 | End: 2021-01-01

## 2021-01-01 RX ORDER — CEFAZOLIN SODIUM 2 G/50ML
2 SOLUTION INTRAVENOUS ONCE
Status: CANCELLED | OUTPATIENT
Start: 2021-01-01

## 2021-01-01 RX ORDER — LABETALOL HYDROCHLORIDE 5 MG/ML
5 INJECTION, SOLUTION INTRAVENOUS
Status: DISCONTINUED | OUTPATIENT
Start: 2021-01-01 | End: 2021-01-01 | Stop reason: HOSPADM

## 2021-01-01 RX ORDER — DOCUSATE SODIUM 100 MG/1
200 CAPSULE, LIQUID FILLED ORAL DAILY PRN
Status: DISCONTINUED | OUTPATIENT
Start: 2021-01-01 | End: 2021-01-01 | Stop reason: HOSPADM

## 2021-01-01 RX ORDER — LIDOCAINE HYDROCHLORIDE 10 MG/ML
0.5 INJECTION, SOLUTION EPIDURAL; INFILTRATION; INTRACAUDAL; PERINEURAL ONCE AS NEEDED
Status: DISCONTINUED | OUTPATIENT
Start: 2021-01-01 | End: 2021-01-01 | Stop reason: HOSPADM

## 2021-01-01 RX ORDER — SODIUM CHLORIDE, SODIUM LACTATE, POTASSIUM CHLORIDE, CALCIUM CHLORIDE 600; 310; 30; 20 MG/100ML; MG/100ML; MG/100ML; MG/100ML
100 INJECTION, SOLUTION INTRAVENOUS CONTINUOUS
Status: DISCONTINUED | OUTPATIENT
Start: 2021-01-01 | End: 2021-01-01

## 2021-01-01 RX ORDER — CYCLOBENZAPRINE HCL 10 MG
5 TABLET ORAL 3 TIMES DAILY PRN
Status: DISCONTINUED | OUTPATIENT
Start: 2021-01-01 | End: 2021-01-01 | Stop reason: HOSPADM

## 2021-01-01 RX ORDER — CALCIUM CARBONATE 200(500)MG
2 TABLET,CHEWABLE ORAL NIGHTLY
Status: DISCONTINUED | OUTPATIENT
Start: 2021-01-01 | End: 2021-01-01 | Stop reason: HOSPADM

## 2021-01-01 RX ORDER — FENTANYL CITRATE 50 UG/ML
INJECTION, SOLUTION INTRAMUSCULAR; INTRAVENOUS AS NEEDED
Status: DISCONTINUED | OUTPATIENT
Start: 2021-01-01 | End: 2021-01-01 | Stop reason: SURG

## 2021-01-01 RX ORDER — SODIUM CHLORIDE 0.9 % (FLUSH) 0.9 %
10 SYRINGE (ML) INJECTION EVERY 12 HOURS SCHEDULED
Status: DISCONTINUED | OUTPATIENT
Start: 2021-01-01 | End: 2021-01-01 | Stop reason: HOSPADM

## 2021-01-01 RX ORDER — SODIUM CHLORIDE 0.9 % (FLUSH) 0.9 %
3-10 SYRINGE (ML) INJECTION AS NEEDED
Status: DISCONTINUED | OUTPATIENT
Start: 2021-01-01 | End: 2021-01-01 | Stop reason: HOSPADM

## 2021-01-01 RX ORDER — MIDODRINE HYDROCHLORIDE 10 MG/1
10 TABLET ORAL
Start: 2021-01-01 | End: 2022-01-01 | Stop reason: HOSPADM

## 2021-01-01 RX ORDER — HYDROMORPHONE HYDROCHLORIDE 1 MG/ML
0.5 INJECTION, SOLUTION INTRAMUSCULAR; INTRAVENOUS; SUBCUTANEOUS
Status: DISCONTINUED | OUTPATIENT
Start: 2021-01-01 | End: 2021-01-01 | Stop reason: ALTCHOICE

## 2021-01-01 RX ORDER — HEPARIN SODIUM 1000 [USP'U]/ML
INJECTION, SOLUTION INTRAVENOUS; SUBCUTANEOUS AS NEEDED
Status: DISCONTINUED | OUTPATIENT
Start: 2021-01-01 | End: 2021-01-01 | Stop reason: SURG

## 2021-01-01 RX ORDER — FAMOTIDINE 10 MG/ML
20 INJECTION, SOLUTION INTRAVENOUS
Status: COMPLETED | OUTPATIENT
Start: 2021-01-01 | End: 2021-01-01

## 2021-01-01 RX ORDER — ASPIRIN 81 MG/1
81 TABLET ORAL DAILY
Status: DISCONTINUED | OUTPATIENT
Start: 2021-01-01 | End: 2021-01-01 | Stop reason: HOSPADM

## 2021-01-01 RX ORDER — HEPARIN SODIUM 1000 [USP'U]/ML
INJECTION, SOLUTION INTRAVENOUS; SUBCUTANEOUS AS NEEDED
Status: DISCONTINUED | OUTPATIENT
Start: 2021-01-01 | End: 2021-01-01 | Stop reason: HOSPADM

## 2021-01-01 RX ORDER — DEXTROSE MONOHYDRATE 25 G/50ML
25-50 INJECTION, SOLUTION INTRAVENOUS
Status: DISCONTINUED | OUTPATIENT
Start: 2021-01-01 | End: 2021-01-01 | Stop reason: HOSPADM

## 2021-01-01 RX ORDER — SIMVASTATIN 40 MG
40 TABLET ORAL NIGHTLY
COMMUNITY
End: 2021-01-01 | Stop reason: HOSPADM

## 2021-01-01 RX ORDER — ONDANSETRON 2 MG/ML
4 INJECTION INTRAMUSCULAR; INTRAVENOUS ONCE
Status: COMPLETED | OUTPATIENT
Start: 2021-01-01 | End: 2021-01-01

## 2021-01-01 RX ORDER — ASCORBIC ACID 500 MG
500 TABLET ORAL DAILY
Status: DISCONTINUED | OUTPATIENT
Start: 2021-01-01 | End: 2021-01-01 | Stop reason: HOSPADM

## 2021-01-01 RX ORDER — DEXTROSE MONOHYDRATE 25 G/50ML
25 INJECTION, SOLUTION INTRAVENOUS
Status: DISCONTINUED | OUTPATIENT
Start: 2021-01-01 | End: 2021-01-01

## 2021-01-01 RX ORDER — ACETAMINOPHEN 650 MG/1
650 SUPPOSITORY RECTAL EVERY 6 HOURS PRN
Status: DISCONTINUED | OUTPATIENT
Start: 2021-01-01 | End: 2021-01-01 | Stop reason: HOSPADM

## 2021-01-01 RX ORDER — POTASSIUM CHLORIDE 750 MG/1
40 CAPSULE, EXTENDED RELEASE ORAL ONCE
Status: COMPLETED | OUTPATIENT
Start: 2021-01-01 | End: 2021-01-01

## 2021-01-01 RX ORDER — MIDAZOLAM HYDROCHLORIDE 1 MG/ML
1 INJECTION INTRAMUSCULAR; INTRAVENOUS
Status: DISCONTINUED | OUTPATIENT
Start: 2021-01-01 | End: 2021-01-01 | Stop reason: HOSPADM

## 2021-01-01 RX ORDER — SODIUM CHLORIDE 0.9 % (FLUSH) 0.9 %
3 SYRINGE (ML) INJECTION EVERY 12 HOURS SCHEDULED
Status: DISCONTINUED | OUTPATIENT
Start: 2021-01-01 | End: 2021-01-01 | Stop reason: HOSPADM

## 2021-01-01 RX ORDER — CITALOPRAM 20 MG/1
40 TABLET ORAL DAILY
Status: DISCONTINUED | OUTPATIENT
Start: 2021-01-01 | End: 2021-01-01

## 2021-01-01 RX ORDER — CEFAZOLIN SODIUM 2 G/50ML
2 SOLUTION INTRAVENOUS EVERY 8 HOURS
Status: DISCONTINUED | OUTPATIENT
Start: 2021-01-01 | End: 2021-01-01

## 2021-01-01 RX ORDER — ACETAMINOPHEN 650 MG/1
650 SUPPOSITORY RECTAL EVERY 4 HOURS PRN
Status: DISCONTINUED | OUTPATIENT
Start: 2021-01-01 | End: 2021-01-01 | Stop reason: HOSPADM

## 2021-01-01 RX ORDER — NICOTINE POLACRILEX 4 MG
15 LOZENGE BUCCAL
Status: DISCONTINUED | OUTPATIENT
Start: 2021-01-01 | End: 2021-01-01 | Stop reason: HOSPADM

## 2021-01-01 RX ORDER — HEPARIN SODIUM 1000 [USP'U]/ML
3200 INJECTION, SOLUTION INTRAVENOUS; SUBCUTANEOUS ONCE
Status: DISCONTINUED | OUTPATIENT
Start: 2021-01-01 | End: 2021-01-01 | Stop reason: HOSPADM

## 2021-01-01 RX ORDER — DIPHENHYDRAMINE HYDROCHLORIDE 50 MG/ML
INJECTION INTRAMUSCULAR; INTRAVENOUS AS NEEDED
Status: DISCONTINUED | OUTPATIENT
Start: 2021-01-01 | End: 2021-01-01 | Stop reason: HOSPADM

## 2021-01-01 RX ORDER — MIDAZOLAM HYDROCHLORIDE 1 MG/ML
1 INJECTION INTRAMUSCULAR; INTRAVENOUS
Status: DISCONTINUED | OUTPATIENT
Start: 2021-01-01 | End: 2021-01-01 | Stop reason: SDUPTHER

## 2021-01-01 RX ORDER — ALBUTEROL SULFATE 2.5 MG/3ML
2.5 SOLUTION RESPIRATORY (INHALATION) EVERY 4 HOURS PRN
Status: ACTIVE | OUTPATIENT
Start: 2021-01-01 | End: 2021-01-01

## 2021-01-01 RX ORDER — MIDODRINE HYDROCHLORIDE 2.5 MG/1
10 TABLET ORAL
Status: DISCONTINUED | OUTPATIENT
Start: 2021-01-01 | End: 2021-01-01

## 2021-01-01 RX ORDER — OXYCODONE HYDROCHLORIDE AND ACETAMINOPHEN 5; 325 MG/1; MG/1
1 TABLET ORAL EVERY 4 HOURS PRN
Qty: 24 TABLET | Refills: 0 | OUTPATIENT
Start: 2021-01-01 | End: 2021-01-01

## 2021-01-01 RX ORDER — FAMOTIDINE 20 MG/1
20 TABLET, FILM COATED ORAL DAILY
Status: DISCONTINUED | OUTPATIENT
Start: 2021-01-01 | End: 2021-01-01 | Stop reason: HOSPADM

## 2021-01-01 RX ORDER — ATORVASTATIN CALCIUM 10 MG/1
20 TABLET, FILM COATED ORAL NIGHTLY
Status: DISCONTINUED | OUTPATIENT
Start: 2021-01-01 | End: 2021-01-01 | Stop reason: HOSPADM

## 2021-01-01 RX ORDER — FENTANYL CITRATE 50 UG/ML
25 INJECTION, SOLUTION INTRAMUSCULAR; INTRAVENOUS
Status: DISCONTINUED | OUTPATIENT
Start: 2021-01-01 | End: 2021-01-01 | Stop reason: HOSPADM

## 2021-01-01 RX ORDER — SODIUM CHLORIDE 9 MG/ML
INJECTION, SOLUTION INTRAVENOUS CONTINUOUS PRN
Status: DISCONTINUED | OUTPATIENT
Start: 2021-01-01 | End: 2021-01-01 | Stop reason: SURG

## 2021-01-01 RX ORDER — DEXTROSE MONOHYDRATE 25 G/50ML
12.5 INJECTION, SOLUTION INTRAVENOUS AS NEEDED
Status: DISCONTINUED | OUTPATIENT
Start: 2021-01-01 | End: 2021-01-01 | Stop reason: HOSPADM

## 2021-01-01 RX ORDER — SODIUM CHLORIDE 0.9 % (FLUSH) 0.9 %
3 SYRINGE (ML) INJECTION AS NEEDED
Status: DISCONTINUED | OUTPATIENT
Start: 2021-01-01 | End: 2021-01-01 | Stop reason: HOSPADM

## 2021-01-01 RX ORDER — DOCUSATE SODIUM 100 MG/1
200 CAPSULE, LIQUID FILLED ORAL AS NEEDED
Status: DISCONTINUED | OUTPATIENT
Start: 2021-01-01 | End: 2021-01-01

## 2021-01-01 RX ORDER — POTASSIUM CHLORIDE 29.8 MG/ML
20 INJECTION INTRAVENOUS
Status: DISCONTINUED | OUTPATIENT
Start: 2021-01-01 | End: 2021-01-01 | Stop reason: HOSPADM

## 2021-01-01 RX ORDER — ALBUMIN, HUMAN INJ 5% 5 %
SOLUTION INTRAVENOUS
Status: COMPLETED
Start: 2021-01-01 | End: 2021-01-01

## 2021-01-01 RX ORDER — HYDROMORPHONE HYDROCHLORIDE 1 MG/ML
0.5 INJECTION, SOLUTION INTRAMUSCULAR; INTRAVENOUS; SUBCUTANEOUS
Status: COMPLETED | OUTPATIENT
Start: 2021-01-01 | End: 2021-01-01

## 2021-01-01 RX ORDER — MULTIPLE VITAMINS W/ MINERALS TAB 9MG-400MCG
1 TAB ORAL DAILY
Status: DISCONTINUED | OUTPATIENT
Start: 2021-01-01 | End: 2021-01-01 | Stop reason: HOSPADM

## 2021-01-01 RX ORDER — CHLORHEXIDINE GLUCONATE 0.12 MG/ML
15 RINSE ORAL EVERY 12 HOURS
Status: DISCONTINUED | OUTPATIENT
Start: 2021-01-01 | End: 2021-01-01

## 2021-01-01 RX ORDER — DIPHENHYDRAMINE HCL 25 MG
25 CAPSULE ORAL EVERY 4 HOURS PRN
Status: DISCONTINUED | OUTPATIENT
Start: 2021-01-01 | End: 2021-01-01 | Stop reason: HOSPADM

## 2021-01-01 RX ORDER — VANCOMYCIN HYDROCHLORIDE 1 G/20ML
INJECTION, POWDER, LYOPHILIZED, FOR SOLUTION INTRAVENOUS AS NEEDED
Status: DISCONTINUED | OUTPATIENT
Start: 2021-01-01 | End: 2021-01-01 | Stop reason: HOSPADM

## 2021-01-01 RX ORDER — POTASSIUM CHLORIDE 750 MG/1
20 CAPSULE, EXTENDED RELEASE ORAL ONCE
Status: COMPLETED | OUTPATIENT
Start: 2021-01-01 | End: 2021-01-01

## 2021-01-01 RX ORDER — GABAPENTIN 300 MG/1
300 CAPSULE ORAL EVERY 12 HOURS SCHEDULED
Qty: 24 CAPSULE | Refills: 0 | OUTPATIENT
Start: 2021-01-01 | End: 2021-01-01

## 2021-01-01 RX ORDER — VANCOMYCIN HYDROCHLORIDE 1 G/20ML
INJECTION, POWDER, LYOPHILIZED, FOR SOLUTION INTRAVENOUS AS NEEDED
Status: DISCONTINUED | OUTPATIENT
Start: 2021-01-01 | End: 2021-01-01 | Stop reason: SURG

## 2021-01-01 RX ORDER — BISACODYL 10 MG
10 SUPPOSITORY, RECTAL RECTAL ONCE
Status: COMPLETED | OUTPATIENT
Start: 2021-01-01 | End: 2021-01-01

## 2021-01-01 RX ORDER — ATORVASTATIN CALCIUM 20 MG/1
20 TABLET, FILM COATED ORAL NIGHTLY
Qty: 30 TABLET | Refills: 2 | Status: SHIPPED | OUTPATIENT
Start: 2021-01-01 | End: 2021-01-01 | Stop reason: SDUPTHER

## 2021-01-01 RX ORDER — ONDANSETRON 4 MG/1
4 TABLET, FILM COATED ORAL EVERY 6 HOURS PRN
Status: DISCONTINUED | OUTPATIENT
Start: 2021-01-01 | End: 2021-01-01 | Stop reason: HOSPADM

## 2021-01-01 RX ORDER — HYDROCORTISONE ACETATE 25 MG/1
25 SUPPOSITORY RECTAL 2 TIMES DAILY
Status: ON HOLD
Start: 2021-01-01 | End: 2022-01-01

## 2021-01-01 RX ORDER — GABAPENTIN 300 MG/1
300 CAPSULE ORAL EVERY 12 HOURS SCHEDULED
Qty: 24 CAPSULE | Refills: 0 | Status: ON HOLD | OUTPATIENT
Start: 2021-01-01 | End: 2022-01-01 | Stop reason: SDUPTHER

## 2021-01-01 RX ORDER — NALOXONE HCL 0.4 MG/ML
0.04 VIAL (ML) INJECTION AS NEEDED
Status: DISCONTINUED | OUTPATIENT
Start: 2021-01-01 | End: 2021-01-01 | Stop reason: HOSPADM

## 2021-01-01 RX ORDER — ACETAMINOPHEN 325 MG/1
650 TABLET ORAL EVERY 6 HOURS PRN
Status: DISCONTINUED | OUTPATIENT
Start: 2021-01-01 | End: 2021-01-01 | Stop reason: HOSPADM

## 2021-01-01 RX ORDER — OXYCODONE HYDROCHLORIDE AND ACETAMINOPHEN 5; 325 MG/1; MG/1
1 TABLET ORAL EVERY 4 HOURS PRN
Status: DISPENSED | OUTPATIENT
Start: 2021-01-01 | End: 2021-01-01

## 2021-01-01 RX ORDER — ALBUMIN (HUMAN) 12.5 G/50ML
12.5 SOLUTION INTRAVENOUS AS NEEDED
Status: CANCELLED | OUTPATIENT
Start: 2021-01-01 | End: 2021-01-01

## 2021-01-01 RX ORDER — DEXTROSE MONOHYDRATE 25 G/50ML
25 INJECTION, SOLUTION INTRAVENOUS
Status: DISCONTINUED | OUTPATIENT
Start: 2021-01-01 | End: 2021-01-01 | Stop reason: HOSPADM

## 2021-01-01 RX ORDER — FAMOTIDINE 20 MG/1
20 TABLET, FILM COATED ORAL DAILY
Qty: 30 TABLET | Refills: 0 | Status: SHIPPED | OUTPATIENT
Start: 2021-01-01 | End: 2022-01-01 | Stop reason: HOSPADM

## 2021-01-01 RX ORDER — CYCLOBENZAPRINE HCL 5 MG
5 TABLET ORAL 3 TIMES DAILY PRN
Start: 2021-01-01

## 2021-01-01 RX ORDER — HEPARIN SODIUM 1000 [USP'U]/ML
3200 INJECTION, SOLUTION INTRAVENOUS; SUBCUTANEOUS AS NEEDED
Status: DISCONTINUED | OUTPATIENT
Start: 2021-01-01 | End: 2021-01-01 | Stop reason: HOSPADM

## 2021-01-01 RX ORDER — ALBUTEROL SULFATE 2.5 MG/3ML
SOLUTION RESPIRATORY (INHALATION)
COMMUNITY
Start: 2021-01-01 | End: 2021-01-01

## 2021-01-01 RX ORDER — MIDAZOLAM HYDROCHLORIDE 1 MG/ML
0.5 INJECTION INTRAMUSCULAR; INTRAVENOUS
Status: DISCONTINUED | OUTPATIENT
Start: 2021-01-01 | End: 2021-01-01 | Stop reason: HOSPADM

## 2021-01-01 RX ORDER — SODIUM CHLORIDE 9 MG/ML
50 INJECTION, SOLUTION INTRAVENOUS CONTINUOUS
Status: DISCONTINUED | OUTPATIENT
Start: 2021-01-01 | End: 2021-01-01

## 2021-01-01 RX ORDER — CHLORHEXIDINE GLUCONATE 0.12 MG/ML
15 RINSE ORAL EVERY 12 HOURS SCHEDULED
Status: DISCONTINUED | OUTPATIENT
Start: 2021-01-01 | End: 2021-01-01 | Stop reason: SDUPTHER

## 2021-01-01 RX ORDER — DEXAMETHASONE SODIUM PHOSPHATE 4 MG/ML
4 INJECTION, SOLUTION INTRA-ARTICULAR; INTRALESIONAL; INTRAMUSCULAR; INTRAVENOUS; SOFT TISSUE ONCE AS NEEDED
Status: COMPLETED | OUTPATIENT
Start: 2021-01-01 | End: 2021-01-01

## 2021-01-01 RX ORDER — SCOLOPAMINE TRANSDERMAL SYSTEM 1 MG/1
1 PATCH, EXTENDED RELEASE TRANSDERMAL CONTINUOUS
Status: DISPENSED | OUTPATIENT
Start: 2021-01-01 | End: 2021-01-01

## 2021-01-01 RX ORDER — ALBUMIN, HUMAN INJ 5% 5 %
250 SOLUTION INTRAVENOUS ONCE
Status: COMPLETED | OUTPATIENT
Start: 2021-01-01 | End: 2021-01-01

## 2021-01-01 RX ORDER — VANCOMYCIN HYDROCHLORIDE 1 G/200ML
15 INJECTION, SOLUTION INTRAVENOUS EVERY 12 HOURS
Status: DISCONTINUED | OUTPATIENT
Start: 2021-01-01 | End: 2021-01-01

## 2021-01-01 RX ORDER — CARVEDILOL 25 MG/1
25 TABLET ORAL 2 TIMES DAILY WITH MEALS
Status: ON HOLD | COMMUNITY
End: 2021-01-01

## 2021-01-01 RX ORDER — BISACODYL 10 MG
10 SUPPOSITORY, RECTAL RECTAL DAILY PRN
Status: DISCONTINUED | OUTPATIENT
Start: 2021-01-01 | End: 2021-01-01 | Stop reason: HOSPADM

## 2021-01-01 RX ORDER — HEPARIN SODIUM 200 [USP'U]/100ML
INJECTION, SOLUTION INTRAVENOUS AS NEEDED
Status: DISCONTINUED | OUTPATIENT
Start: 2021-01-01 | End: 2021-01-01 | Stop reason: HOSPADM

## 2021-01-01 RX ORDER — ONDANSETRON 2 MG/ML
4 INJECTION INTRAMUSCULAR; INTRAVENOUS
Status: DISCONTINUED | OUTPATIENT
Start: 2021-01-01 | End: 2021-01-01 | Stop reason: HOSPADM

## 2021-01-01 RX ORDER — FAMOTIDINE 20 MG/1
20 TABLET, FILM COATED ORAL DAILY
Status: DISCONTINUED | OUTPATIENT
Start: 2021-01-01 | End: 2021-01-01

## 2021-01-01 RX ORDER — SODIUM CHLORIDE 0.9 % (FLUSH) 0.9 %
10 SYRINGE (ML) INJECTION AS NEEDED
Status: CANCELLED | OUTPATIENT
Start: 2021-01-01

## 2021-01-01 RX ORDER — FAMOTIDINE 10 MG/ML
20 INJECTION, SOLUTION INTRAVENOUS
Status: DISCONTINUED | OUTPATIENT
Start: 2021-01-01 | End: 2021-01-01 | Stop reason: HOSPADM

## 2021-01-01 RX ORDER — HYDROCODONE BITARTRATE AND ACETAMINOPHEN 5; 325 MG/1; MG/1
1 TABLET ORAL EVERY 8 HOURS PRN
Status: DISCONTINUED | OUTPATIENT
Start: 2021-01-01 | End: 2021-01-01 | Stop reason: HOSPADM

## 2021-01-01 RX ORDER — OXYCODONE AND ACETAMINOPHEN 10; 325 MG/1; MG/1
1 TABLET ORAL
Status: DISCONTINUED | OUTPATIENT
Start: 2021-01-01 | End: 2021-01-01

## 2021-01-01 RX ORDER — NITROGLYCERIN 0.4 MG/1
0.4 TABLET SUBLINGUAL
Status: DISCONTINUED | OUTPATIENT
Start: 2021-01-01 | End: 2021-01-01 | Stop reason: HOSPADM

## 2021-01-01 RX ORDER — ALBUMIN (HUMAN) 12.5 G/50ML
12.5 SOLUTION INTRAVENOUS AS NEEDED
Status: ACTIVE | OUTPATIENT
Start: 2021-01-01 | End: 2021-01-01

## 2021-01-01 RX ORDER — OXYCODONE AND ACETAMINOPHEN 10; 325 MG/1; MG/1
1 TABLET ORAL EVERY 4 HOURS PRN
Status: DISPENSED | OUTPATIENT
Start: 2021-01-01 | End: 2021-01-01

## 2021-01-01 RX ORDER — POTASSIUM CHLORIDE, DEXTROSE MONOHYDRATE 150; 5 MG/100ML; G/100ML
30 INJECTION, SOLUTION INTRAVENOUS CONTINUOUS
Status: DISCONTINUED | OUTPATIENT
Start: 2021-01-01 | End: 2021-01-01

## 2021-01-01 RX ORDER — FAMOTIDINE 20 MG/1
TABLET, FILM COATED ORAL
Qty: 30 TABLET | Refills: 0 | OUTPATIENT
Start: 2021-01-01

## 2021-01-01 RX ORDER — HEPARIN SODIUM 1000 [USP'U]/ML
1600 INJECTION, SOLUTION INTRAVENOUS; SUBCUTANEOUS ONCE
Status: DISCONTINUED | OUTPATIENT
Start: 2021-01-01 | End: 2021-01-01

## 2021-01-01 RX ORDER — PENTOXIFYLLINE 400 MG/1
400 TABLET, EXTENDED RELEASE ORAL 2 TIMES DAILY
COMMUNITY
End: 2021-01-01 | Stop reason: SINTOL

## 2021-01-01 RX ORDER — SUCRALFATE 1 G/1
1 TABLET ORAL 4 TIMES DAILY
Status: DISCONTINUED | OUTPATIENT
Start: 2021-01-01 | End: 2021-01-01 | Stop reason: HOSPADM

## 2021-01-01 RX ORDER — NALOXONE HCL 0.4 MG/ML
0.4 VIAL (ML) INJECTION AS NEEDED
Status: DISCONTINUED | OUTPATIENT
Start: 2021-01-01 | End: 2021-01-01 | Stop reason: HOSPADM

## 2021-01-01 RX ORDER — FLUMAZENIL 0.1 MG/ML
0.2 INJECTION INTRAVENOUS AS NEEDED
Status: DISCONTINUED | OUTPATIENT
Start: 2021-01-01 | End: 2021-01-01 | Stop reason: HOSPADM

## 2021-01-01 RX ORDER — SODIUM CHLORIDE 9 MG/ML
50 INJECTION, SOLUTION INTRAVENOUS CONTINUOUS
Status: DISCONTINUED | OUTPATIENT
Start: 2021-01-01 | End: 2021-01-01 | Stop reason: HOSPADM

## 2021-01-01 RX ORDER — SODIUM CHLORIDE 0.9 % (FLUSH) 0.9 %
3-10 SYRINGE (ML) INJECTION AS NEEDED
Status: CANCELLED | OUTPATIENT
Start: 2021-01-01

## 2021-01-01 RX ORDER — LANOLIN ALCOHOL/MO/W.PET/CERES
3 CREAM (GRAM) TOPICAL NIGHTLY
Status: DISCONTINUED | OUTPATIENT
Start: 2021-01-01 | End: 2021-01-01 | Stop reason: HOSPADM

## 2021-01-01 RX ORDER — MAGNESIUM HYDROXIDE 1200 MG/15ML
LIQUID ORAL AS NEEDED
Status: DISCONTINUED | OUTPATIENT
Start: 2021-01-01 | End: 2021-01-01 | Stop reason: HOSPADM

## 2021-01-01 RX ORDER — NITROGLYCERIN 0.4 MG/1
TABLET SUBLINGUAL
Qty: 25 TABLET | Refills: 3 | Status: SHIPPED | OUTPATIENT
Start: 2021-01-01

## 2021-01-01 RX ORDER — BENZONATATE 100 MG/1
200 CAPSULE ORAL EVERY 4 HOURS PRN
Status: DISCONTINUED | OUTPATIENT
Start: 2021-01-01 | End: 2021-01-01 | Stop reason: HOSPADM

## 2021-01-01 RX ORDER — CITALOPRAM 20 MG/1
40 TABLET ORAL NIGHTLY
Status: DISCONTINUED | OUTPATIENT
Start: 2021-01-01 | End: 2021-01-01 | Stop reason: HOSPADM

## 2021-01-01 RX ORDER — SODIUM CHLORIDE 9 MG/ML
500 INJECTION, SOLUTION INTRAVENOUS CONTINUOUS
Status: DISCONTINUED | OUTPATIENT
Start: 2021-01-01 | End: 2021-01-01 | Stop reason: HOSPADM

## 2021-01-01 RX ORDER — LANOLIN ALCOHOL/MO/W.PET/CERES
6 CREAM (GRAM) TOPICAL NIGHTLY
Status: DISCONTINUED | OUTPATIENT
Start: 2021-01-01 | End: 2021-01-01 | Stop reason: HOSPADM

## 2021-01-01 RX ORDER — IBUPROFEN 600 MG/1
600 TABLET ORAL ONCE AS NEEDED
Status: DISCONTINUED | OUTPATIENT
Start: 2021-01-01 | End: 2021-01-01 | Stop reason: HOSPADM

## 2021-01-01 RX ORDER — CLOPIDOGREL BISULFATE 75 MG/1
75 TABLET ORAL DAILY
Status: DISCONTINUED | OUTPATIENT
Start: 2021-01-01 | End: 2021-01-01

## 2021-01-01 RX ORDER — ROCURONIUM BROMIDE 10 MG/ML
INJECTION, SOLUTION INTRAVENOUS AS NEEDED
Status: DISCONTINUED | OUTPATIENT
Start: 2021-01-01 | End: 2021-01-01 | Stop reason: SURG

## 2021-01-01 RX ORDER — CEFAZOLIN SODIUM 1 G/3ML
INJECTION, POWDER, FOR SOLUTION INTRAMUSCULAR; INTRAVENOUS AS NEEDED
Status: DISCONTINUED | OUTPATIENT
Start: 2021-01-01 | End: 2021-01-01 | Stop reason: SURG

## 2021-01-01 RX ORDER — MIDAZOLAM HYDROCHLORIDE 1 MG/ML
2 INJECTION INTRAMUSCULAR; INTRAVENOUS
Status: DISCONTINUED | OUTPATIENT
Start: 2021-01-01 | End: 2021-01-01 | Stop reason: HOSPADM

## 2021-01-01 RX ORDER — ACETAMINOPHEN 325 MG/1
650 TABLET ORAL EVERY 4 HOURS PRN
Status: DISCONTINUED | OUTPATIENT
Start: 2021-01-01 | End: 2021-01-01 | Stop reason: HOSPADM

## 2021-01-01 RX ORDER — SUCRALFATE 1 G/1
1 TABLET ORAL
Status: DISCONTINUED | OUTPATIENT
Start: 2021-01-01 | End: 2021-01-01 | Stop reason: HOSPADM

## 2021-01-01 RX ORDER — HYDROCODONE BITARTRATE AND ACETAMINOPHEN 5; 325 MG/1; MG/1
1 TABLET ORAL EVERY 4 HOURS PRN
Status: DISCONTINUED | OUTPATIENT
Start: 2021-01-01 | End: 2021-01-01

## 2021-01-01 RX ORDER — FENTANYL CITRATE 50 UG/ML
50 INJECTION, SOLUTION INTRAMUSCULAR; INTRAVENOUS
Status: DISCONTINUED | OUTPATIENT
Start: 2021-01-01 | End: 2021-01-01

## 2021-01-01 RX ORDER — POLYETHYLENE GLYCOL 3350 17 G/17G
17 POWDER, FOR SOLUTION ORAL DAILY
Status: ON HOLD
Start: 2021-01-01 | End: 2022-01-01

## 2021-01-01 RX ORDER — MIDODRINE HYDROCHLORIDE 5 MG/1
5 TABLET ORAL 2 TIMES DAILY
COMMUNITY
End: 2021-01-01

## 2021-01-01 RX ORDER — ALUMINA, MAGNESIA, AND SIMETHICONE 2400; 2400; 240 MG/30ML; MG/30ML; MG/30ML
15 SUSPENSION ORAL EVERY 6 HOURS PRN
Start: 2021-01-01

## 2021-01-01 RX ORDER — AMLODIPINE BESYLATE 5 MG/1
5 TABLET ORAL DAILY
COMMUNITY
Start: 2021-01-01 | End: 2021-01-01 | Stop reason: HOSPADM

## 2021-01-01 RX ORDER — PROTAMINE SULFATE 10 MG/ML
25 INJECTION, SOLUTION INTRAVENOUS ONCE
Status: COMPLETED | OUTPATIENT
Start: 2021-01-01 | End: 2021-01-01

## 2021-01-01 RX ORDER — DOCUSATE SODIUM 100 MG/1
100 CAPSULE, LIQUID FILLED ORAL 2 TIMES DAILY PRN
Status: DISCONTINUED | OUTPATIENT
Start: 2021-01-01 | End: 2021-01-01 | Stop reason: HOSPADM

## 2021-01-01 RX ORDER — ONDANSETRON 2 MG/ML
4 INJECTION INTRAMUSCULAR; INTRAVENOUS ONCE AS NEEDED
Status: DISCONTINUED | OUTPATIENT
Start: 2021-01-01 | End: 2021-01-01 | Stop reason: HOSPADM

## 2021-01-01 RX ORDER — MIDODRINE HYDROCHLORIDE 2.5 MG/1
10 TABLET ORAL
Status: DISCONTINUED | OUTPATIENT
Start: 2021-01-01 | End: 2021-01-01 | Stop reason: HOSPADM

## 2021-01-01 RX ORDER — PANTOPRAZOLE SODIUM 40 MG/1
40 TABLET, DELAYED RELEASE ORAL
Status: DISCONTINUED | OUTPATIENT
Start: 2021-01-01 | End: 2021-01-01 | Stop reason: HOSPADM

## 2021-01-01 RX ORDER — HYDROMORPHONE HYDROCHLORIDE 1 MG/ML
0.25 INJECTION, SOLUTION INTRAMUSCULAR; INTRAVENOUS; SUBCUTANEOUS
Status: DISCONTINUED | OUTPATIENT
Start: 2021-01-01 | End: 2021-01-01 | Stop reason: HOSPADM

## 2021-01-01 RX ORDER — IBUPROFEN 600 MG/1
600 TABLET ORAL EVERY 6 HOURS PRN
Status: CANCELLED | OUTPATIENT
Start: 2021-01-01

## 2021-01-01 RX ORDER — IRBESARTAN 75 MG/1
75 TABLET ORAL DAILY
Qty: 30 TABLET | Refills: 2 | Status: SHIPPED | OUTPATIENT
Start: 2021-01-01 | End: 2021-01-01

## 2021-01-01 RX ORDER — CLOPIDOGREL BISULFATE 75 MG/1
75 TABLET ORAL DAILY
COMMUNITY
End: 2022-01-01 | Stop reason: HOSPADM

## 2021-01-01 RX ORDER — SODIUM CHLORIDE, SODIUM LACTATE, POTASSIUM CHLORIDE, CALCIUM CHLORIDE 600; 310; 30; 20 MG/100ML; MG/100ML; MG/100ML; MG/100ML
100 INJECTION, SOLUTION INTRAVENOUS CONTINUOUS
Status: DISCONTINUED | OUTPATIENT
Start: 2021-01-01 | End: 2021-01-01 | Stop reason: HOSPADM

## 2021-01-01 RX ORDER — ATORVASTATIN CALCIUM 20 MG/1
20 TABLET, FILM COATED ORAL NIGHTLY
Qty: 90 TABLET | Refills: 4 | Status: SHIPPED | OUTPATIENT
Start: 2021-01-01

## 2021-01-01 RX ORDER — GABAPENTIN 300 MG/1
300 CAPSULE ORAL EVERY 12 HOURS SCHEDULED
Status: DISCONTINUED | OUTPATIENT
Start: 2021-01-01 | End: 2021-01-01 | Stop reason: HOSPADM

## 2021-01-01 RX ORDER — DEXTROSE MONOHYDRATE 50 MG/ML
30 INJECTION, SOLUTION INTRAVENOUS CONTINUOUS
Status: DISCONTINUED | OUTPATIENT
Start: 2021-01-01 | End: 2021-01-01

## 2021-01-01 RX ORDER — HYDROCORTISONE ACETATE 25 MG/1
25 SUPPOSITORY RECTAL 2 TIMES DAILY
Status: DISCONTINUED | OUTPATIENT
Start: 2021-01-01 | End: 2021-01-01 | Stop reason: HOSPADM

## 2021-01-01 RX ORDER — PANTOPRAZOLE SODIUM 40 MG/1
40 TABLET, DELAYED RELEASE ORAL
Status: DISCONTINUED | OUTPATIENT
Start: 2021-01-01 | End: 2021-01-01 | Stop reason: ALTCHOICE

## 2021-01-01 RX ORDER — BUPIVACAINE HCL/0.9 % NACL/PF 0.1 %
2 PLASTIC BAG, INJECTION (ML) EPIDURAL EVERY 12 HOURS
Status: COMPLETED | OUTPATIENT
Start: 2021-01-01 | End: 2021-01-01

## 2021-01-01 RX ORDER — FENTANYL CITRATE 50 UG/ML
INJECTION, SOLUTION INTRAMUSCULAR; INTRAVENOUS AS NEEDED
Status: DISCONTINUED | OUTPATIENT
Start: 2021-01-01 | End: 2021-01-01 | Stop reason: HOSPADM

## 2021-01-01 RX ORDER — CALCIUM CHLORIDE 100 MG/ML
1 INJECTION INTRAVENOUS; INTRAVENTRICULAR AS NEEDED
Status: DISCONTINUED | OUTPATIENT
Start: 2021-01-01 | End: 2021-01-01

## 2021-01-01 RX ORDER — LIDOCAINE HYDROCHLORIDE 10 MG/ML
INJECTION, SOLUTION EPIDURAL; INFILTRATION; INTRACAUDAL; PERINEURAL AS NEEDED
Status: DISCONTINUED | OUTPATIENT
Start: 2021-01-01 | End: 2021-01-01 | Stop reason: HOSPADM

## 2021-01-01 RX ORDER — OXYCODONE AND ACETAMINOPHEN 10; 325 MG/1; MG/1
1 TABLET ORAL ONCE AS NEEDED
Status: DISCONTINUED | OUTPATIENT
Start: 2021-01-01 | End: 2021-01-01 | Stop reason: HOSPADM

## 2021-01-01 RX ORDER — SODIUM CHLORIDE 9 MG/ML
500 INJECTION, SOLUTION INTRAVENOUS CONTINUOUS
Status: DISPENSED | OUTPATIENT
Start: 2021-01-01 | End: 2021-01-01

## 2021-01-01 RX ORDER — MIDAZOLAM HYDROCHLORIDE 1 MG/ML
INJECTION, SOLUTION INTRAMUSCULAR; INTRAVENOUS AS NEEDED
Status: DISCONTINUED | OUTPATIENT
Start: 2021-01-01 | End: 2021-01-01 | Stop reason: HOSPADM

## 2021-01-01 RX ORDER — HEPARIN SODIUM 1000 [USP'U]/ML
3200 INJECTION, SOLUTION INTRAVENOUS; SUBCUTANEOUS
Status: COMPLETED | OUTPATIENT
Start: 2021-01-01 | End: 2021-01-01

## 2021-01-01 RX ORDER — PROTAMINE SULFATE 10 MG/ML
INJECTION, SOLUTION INTRAVENOUS AS NEEDED
Status: DISCONTINUED | OUTPATIENT
Start: 2021-01-01 | End: 2021-01-01 | Stop reason: SURG

## 2021-01-01 RX ORDER — GENTAMICIN SULFATE 1 MG/G
1 CREAM TOPICAL AS NEEDED
COMMUNITY
Start: 2021-01-01

## 2021-01-01 RX ORDER — GUAIFENESIN 600 MG/1
600 TABLET, EXTENDED RELEASE ORAL EVERY 12 HOURS SCHEDULED
Status: DISCONTINUED | OUTPATIENT
Start: 2021-01-01 | End: 2021-01-01 | Stop reason: HOSPADM

## 2021-01-01 RX ORDER — MIDAZOLAM HYDROCHLORIDE 1 MG/ML
INJECTION INTRAMUSCULAR; INTRAVENOUS AS NEEDED
Status: DISCONTINUED | OUTPATIENT
Start: 2021-01-01 | End: 2021-01-01 | Stop reason: SURG

## 2021-01-01 RX ORDER — OXYCODONE HYDROCHLORIDE AND ACETAMINOPHEN 5; 325 MG/1; MG/1
1 TABLET ORAL
Status: DISCONTINUED | OUTPATIENT
Start: 2021-01-01 | End: 2021-01-01

## 2021-01-01 RX ORDER — ACETAMINOPHEN 500 MG
1000 TABLET ORAL ONCE
Status: COMPLETED | OUTPATIENT
Start: 2021-01-01 | End: 2021-01-01

## 2021-01-01 RX ORDER — METOPROLOL TARTRATE 5 MG/5ML
INJECTION INTRAVENOUS AS NEEDED
Status: DISCONTINUED | OUTPATIENT
Start: 2021-01-01 | End: 2021-01-01 | Stop reason: SURG

## 2021-01-01 RX ORDER — SODIUM CHLORIDE 9 MG/ML
125 INJECTION, SOLUTION INTRAVENOUS CONTINUOUS
Status: DISCONTINUED | OUTPATIENT
Start: 2021-01-01 | End: 2021-01-01

## 2021-01-01 RX ORDER — SODIUM CHLORIDE 9 MG/ML
INJECTION, SOLUTION INTRAVENOUS AS NEEDED
Status: DISCONTINUED | OUTPATIENT
Start: 2021-01-01 | End: 2021-01-01 | Stop reason: HOSPADM

## 2021-01-01 RX ORDER — VANCOMYCIN HYDROCHLORIDE 1 G/200ML
15 INJECTION, SOLUTION INTRAVENOUS EVERY 24 HOURS
Status: COMPLETED | OUTPATIENT
Start: 2021-01-01 | End: 2021-01-01

## 2021-01-01 RX ORDER — MAGNESIUM SULFATE HEPTAHYDRATE 40 MG/ML
2 INJECTION, SOLUTION INTRAVENOUS AS NEEDED
Status: DISCONTINUED | OUTPATIENT
Start: 2021-01-01 | End: 2021-01-01

## 2021-01-01 RX ORDER — NITROGLYCERIN 20 MG/100ML
5 INJECTION INTRAVENOUS CONTINUOUS
Status: DISCONTINUED | OUTPATIENT
Start: 2021-01-01 | End: 2021-01-01

## 2021-01-01 RX ORDER — HEPARIN SODIUM 1000 [USP'U]/ML
3200 INJECTION, SOLUTION INTRAVENOUS; SUBCUTANEOUS ONCE
Status: COMPLETED | OUTPATIENT
Start: 2021-01-01 | End: 2021-01-01

## 2021-01-01 RX ORDER — POLYETHYLENE GLYCOL 3350 17 G/17G
17 POWDER, FOR SOLUTION ORAL DAILY
Status: DISCONTINUED | OUTPATIENT
Start: 2021-01-01 | End: 2021-01-01 | Stop reason: HOSPADM

## 2021-01-01 RX ORDER — CEFAZOLIN SODIUM 2 G/50ML
2 SOLUTION INTRAVENOUS ONCE
Status: COMPLETED | OUTPATIENT
Start: 2021-01-01 | End: 2021-01-01

## 2021-01-01 RX ORDER — CITALOPRAM 20 MG/1
40 TABLET ORAL DAILY
Status: DISCONTINUED | OUTPATIENT
Start: 2021-01-01 | End: 2021-01-01 | Stop reason: HOSPADM

## 2021-01-01 RX ORDER — FAMOTIDINE 20 MG/1
20 TABLET, FILM COATED ORAL 2 TIMES DAILY
Status: DISCONTINUED | OUTPATIENT
Start: 2021-01-01 | End: 2021-01-01

## 2021-01-01 RX ORDER — HEPARIN SODIUM 5000 [USP'U]/ML
5000 INJECTION, SOLUTION INTRAVENOUS; SUBCUTANEOUS EVERY 8 HOURS SCHEDULED
Status: DISCONTINUED | OUTPATIENT
Start: 2021-01-01 | End: 2021-01-01 | Stop reason: HOSPADM

## 2021-01-01 RX ORDER — CALCIUM CHLORIDE 100 MG/ML
INJECTION INTRAVENOUS; INTRAVENTRICULAR AS NEEDED
Status: DISCONTINUED | OUTPATIENT
Start: 2021-01-01 | End: 2021-01-01 | Stop reason: SURG

## 2021-01-01 RX ORDER — FAMOTIDINE 20 MG/1
20 TABLET, FILM COATED ORAL
Status: DISCONTINUED | OUTPATIENT
Start: 2021-01-01 | End: 2021-01-01 | Stop reason: HOSPADM

## 2021-01-01 RX ORDER — GABAPENTIN 100 MG/1
100 CAPSULE ORAL EVERY 12 HOURS SCHEDULED
Status: DISCONTINUED | OUTPATIENT
Start: 2021-01-01 | End: 2021-01-01

## 2021-01-01 RX ORDER — AMINOCAPROIC ACID 250 MG/ML
INJECTION, SOLUTION INTRAVENOUS AS NEEDED
Status: DISCONTINUED | OUTPATIENT
Start: 2021-01-01 | End: 2021-01-01 | Stop reason: SURG

## 2021-01-01 RX ORDER — ALLOPURINOL 100 MG/1
100 TABLET ORAL DAILY
Status: DISCONTINUED | OUTPATIENT
Start: 2021-01-01 | End: 2021-01-01 | Stop reason: HOSPADM

## 2021-01-01 RX ORDER — SODIUM CHLORIDE 0.9 % (FLUSH) 0.9 %
3 SYRINGE (ML) INJECTION EVERY 12 HOURS SCHEDULED
Status: CANCELLED | OUTPATIENT
Start: 2021-01-01

## 2021-01-01 RX ORDER — OXYCODONE HYDROCHLORIDE AND ACETAMINOPHEN 5; 325 MG/1; MG/1
1 TABLET ORAL EVERY 4 HOURS PRN
Qty: 24 TABLET | Refills: 0 | Status: SHIPPED | OUTPATIENT
Start: 2021-01-01 | End: 2022-01-01 | Stop reason: HOSPADM

## 2021-01-01 RX ORDER — LOSARTAN POTASSIUM 50 MG/1
25 TABLET ORAL
Status: DISCONTINUED | OUTPATIENT
Start: 2021-01-01 | End: 2021-01-01

## 2021-01-01 RX ORDER — LOSARTAN POTASSIUM 50 MG/1
25 TABLET ORAL
Status: DISCONTINUED | OUTPATIENT
Start: 2021-01-01 | End: 2021-01-01 | Stop reason: HOSPADM

## 2021-01-01 RX ORDER — SODIUM CHLORIDE, SODIUM LACTATE, POTASSIUM CHLORIDE, CALCIUM CHLORIDE 600; 310; 30; 20 MG/100ML; MG/100ML; MG/100ML; MG/100ML
9 INJECTION, SOLUTION INTRAVENOUS CONTINUOUS
Status: DISCONTINUED | OUTPATIENT
Start: 2021-01-01 | End: 2021-01-01 | Stop reason: HOSPADM

## 2021-01-01 RX ORDER — OXYCODONE AND ACETAMINOPHEN 7.5; 325 MG/1; MG/1
2 TABLET ORAL EVERY 4 HOURS PRN
Status: DISCONTINUED | OUTPATIENT
Start: 2021-01-01 | End: 2021-01-01 | Stop reason: HOSPADM

## 2021-01-01 RX ORDER — ASCORBIC ACID 500 MG
500 TABLET ORAL DAILY
COMMUNITY
Start: 2020-12-08 | End: 2022-01-01 | Stop reason: HOSPADM

## 2021-01-01 RX ORDER — DIPHENHYDRAMINE HCL 25 MG
25 CAPSULE ORAL EVERY 4 HOURS PRN
Status: CANCELLED | OUTPATIENT
Start: 2021-01-01

## 2021-01-01 RX ORDER — POTASSIUM CHLORIDE 750 MG/1
20 CAPSULE, EXTENDED RELEASE ORAL 2 TIMES DAILY WITH MEALS
Status: COMPLETED | OUTPATIENT
Start: 2021-01-01 | End: 2021-01-01

## 2021-01-01 RX ORDER — OXYCODONE HYDROCHLORIDE AND ACETAMINOPHEN 5; 325 MG/1; MG/1
1 TABLET ORAL EVERY 4 HOURS PRN
Status: DISCONTINUED | OUTPATIENT
Start: 2021-01-01 | End: 2021-01-01

## 2021-01-01 RX ORDER — LIDOCAINE HYDROCHLORIDE 10 MG/ML
0.5 INJECTION, SOLUTION EPIDURAL; INFILTRATION; INTRACAUDAL; PERINEURAL ONCE AS NEEDED
Status: COMPLETED | OUTPATIENT
Start: 2021-01-01 | End: 2021-01-01

## 2021-01-01 RX ORDER — BISACODYL 5 MG/1
10 TABLET, DELAYED RELEASE ORAL DAILY
Status: DISCONTINUED | OUTPATIENT
Start: 2021-01-01 | End: 2021-01-01 | Stop reason: HOSPADM

## 2021-01-01 RX ORDER — SODIUM CHLORIDE 9 MG/ML
50 INJECTION, SOLUTION INTRAVENOUS ONCE
Status: COMPLETED | OUTPATIENT
Start: 2021-01-01 | End: 2021-01-01

## 2021-01-01 RX ORDER — ONDANSETRON 2 MG/ML
INJECTION INTRAMUSCULAR; INTRAVENOUS AS NEEDED
Status: DISCONTINUED | OUTPATIENT
Start: 2021-01-01 | End: 2021-01-01 | Stop reason: SURG

## 2021-01-01 RX ORDER — ALLOPURINOL 100 MG/1
100 TABLET ORAL NIGHTLY
Status: DISCONTINUED | OUTPATIENT
Start: 2021-01-01 | End: 2021-01-01 | Stop reason: HOSPADM

## 2021-01-01 RX ORDER — NITROGLYCERIN 20 MG/100ML
INJECTION INTRAVENOUS CONTINUOUS PRN
Status: DISCONTINUED | OUTPATIENT
Start: 2021-01-01 | End: 2021-01-01 | Stop reason: SURG

## 2021-01-01 RX ORDER — HYDROCODONE BITARTRATE AND ACETAMINOPHEN 5; 325 MG/1; MG/1
1 TABLET ORAL EVERY 8 HOURS PRN
Qty: 10 TABLET | Refills: 0 | Status: SHIPPED | OUTPATIENT
Start: 2021-01-01 | End: 2021-01-01

## 2021-01-01 RX ORDER — CALCIUM ACETATE 667 MG/1
667 CAPSULE ORAL
Status: DISCONTINUED | OUTPATIENT
Start: 2021-01-01 | End: 2021-01-01 | Stop reason: HOSPADM

## 2021-01-01 RX ORDER — SUCROFERRIC OXYHYDROXIDE 500 MG/1
2 TABLET, CHEWABLE ORAL
COMMUNITY
End: 2022-01-01 | Stop reason: HOSPADM

## 2021-01-01 RX ORDER — IRBESARTAN 75 MG/1
TABLET ORAL
Qty: 30 TABLET | Refills: 2 | Status: SHIPPED | OUTPATIENT
Start: 2021-01-01 | End: 2021-01-01

## 2021-01-01 RX ORDER — METOPROLOL SUCCINATE 25 MG/1
25 TABLET, EXTENDED RELEASE ORAL DAILY
Qty: 30 TABLET | Refills: 2 | Status: SHIPPED | OUTPATIENT
Start: 2021-01-01 | End: 2021-01-01 | Stop reason: CLARIF

## 2021-01-01 RX ORDER — DIPHENHYDRAMINE HYDROCHLORIDE 50 MG/ML
25 INJECTION INTRAMUSCULAR; INTRAVENOUS EVERY 4 HOURS PRN
Status: CANCELLED | OUTPATIENT
Start: 2021-01-01

## 2021-01-01 RX ORDER — IPRATROPIUM BROMIDE AND ALBUTEROL SULFATE 2.5; .5 MG/3ML; MG/3ML
3 SOLUTION RESPIRATORY (INHALATION)
Status: DISCONTINUED | OUTPATIENT
Start: 2021-01-01 | End: 2021-01-01

## 2021-01-01 RX ORDER — LORAZEPAM 0.5 MG/1
0.5 TABLET ORAL EVERY 8 HOURS PRN
COMMUNITY
End: 2021-01-01 | Stop reason: HOSPADM

## 2021-01-01 RX ORDER — ACETAMINOPHEN 160 MG/5ML
650 SOLUTION ORAL EVERY 4 HOURS PRN
Status: DISCONTINUED | OUTPATIENT
Start: 2021-01-01 | End: 2021-01-01 | Stop reason: HOSPADM

## 2021-01-01 RX ORDER — HYDROCODONE BITARTRATE AND ACETAMINOPHEN 5; 325 MG/1; MG/1
1 TABLET ORAL EVERY 6 HOURS PRN
Qty: 20 TABLET | Refills: 0 | Status: SHIPPED | OUTPATIENT
Start: 2021-01-01 | End: 2021-01-01 | Stop reason: SDUPTHER

## 2021-01-01 RX ORDER — ALLOPURINOL 100 MG/1
100 TABLET ORAL DAILY
Status: DISCONTINUED | OUTPATIENT
Start: 2021-01-01 | End: 2021-01-01

## 2021-01-01 RX ORDER — SUFENTANIL CITRATE 50 UG/ML
INJECTION EPIDURAL; INTRAVENOUS AS NEEDED
Status: DISCONTINUED | OUTPATIENT
Start: 2021-01-01 | End: 2021-01-01 | Stop reason: SURG

## 2021-01-01 RX ORDER — NALOXONE HCL 0.4 MG/ML
0.1 VIAL (ML) INJECTION
Status: DISCONTINUED | OUTPATIENT
Start: 2021-01-01 | End: 2021-01-01 | Stop reason: HOSPADM

## 2021-01-01 RX ORDER — SUCRALFATE 1 G/1
1 TABLET ORAL
COMMUNITY
End: 2022-01-01 | Stop reason: HOSPADM

## 2021-01-01 RX ORDER — DIPHENHYDRAMINE HYDROCHLORIDE 50 MG/ML
25 INJECTION INTRAMUSCULAR; INTRAVENOUS EVERY 4 HOURS PRN
Status: DISCONTINUED | OUTPATIENT
Start: 2021-01-01 | End: 2021-01-01 | Stop reason: HOSPADM

## 2021-01-01 RX ORDER — CALCIUM CARBONATE 200(500)MG
2 TABLET,CHEWABLE ORAL NIGHTLY
COMMUNITY

## 2021-01-01 RX ORDER — ONDANSETRON 2 MG/ML
4 INJECTION INTRAMUSCULAR; INTRAVENOUS
Status: CANCELLED | OUTPATIENT
Start: 2021-01-01

## 2021-01-01 RX ORDER — FAMOTIDINE 20 MG/1
20 TABLET, FILM COATED ORAL NIGHTLY
Status: DISCONTINUED | OUTPATIENT
Start: 2021-01-01 | End: 2021-01-01 | Stop reason: HOSPADM

## 2021-01-01 RX ORDER — POTASSIUM CHLORIDE 14.9 MG/ML
20 INJECTION INTRAVENOUS ONCE
Status: DISCONTINUED | OUTPATIENT
Start: 2021-01-01 | End: 2021-01-01

## 2021-01-01 RX ORDER — PHENYLEPHRINE HCL IN 0.9% NACL 1 MG/10 ML
SYRINGE (ML) INTRAVENOUS AS NEEDED
Status: DISCONTINUED | OUTPATIENT
Start: 2021-01-01 | End: 2021-01-01 | Stop reason: SURG

## 2021-01-01 RX ORDER — HYDROCODONE BITARTRATE AND ACETAMINOPHEN 5; 325 MG/1; MG/1
1 TABLET ORAL EVERY 6 HOURS PRN
Status: DISCONTINUED | OUTPATIENT
Start: 2021-01-01 | End: 2021-01-01 | Stop reason: HOSPADM

## 2021-01-01 RX ORDER — ASPIRIN 81 MG/1
162 TABLET, CHEWABLE ORAL ONCE
Status: COMPLETED | OUTPATIENT
Start: 2021-01-01 | End: 2021-01-01

## 2021-01-01 RX ORDER — PHENYLEPHRINE HCL IN 0.9% NACL 0.8MG/10ML
SYRINGE (ML) INTRAVENOUS AS NEEDED
Status: DISCONTINUED | OUTPATIENT
Start: 2021-01-01 | End: 2021-01-01 | Stop reason: SURG

## 2021-01-01 RX ORDER — ATRACURIUM BESYLATE 10 MG/ML
INJECTION, SOLUTION INTRAVENOUS AS NEEDED
Status: DISCONTINUED | OUTPATIENT
Start: 2021-01-01 | End: 2021-01-01 | Stop reason: SURG

## 2021-01-01 RX ORDER — FENTANYL CITRATE 50 UG/ML
25 INJECTION, SOLUTION INTRAMUSCULAR; INTRAVENOUS ONCE
Status: COMPLETED | OUTPATIENT
Start: 2021-01-01 | End: 2021-01-01

## 2021-01-01 RX ORDER — MIDODRINE HYDROCHLORIDE 2.5 MG/1
10 TABLET ORAL ONCE
Status: COMPLETED | OUTPATIENT
Start: 2021-01-01 | End: 2021-01-01

## 2021-01-01 RX ORDER — FENTANYL CITRATE 50 UG/ML
25 INJECTION, SOLUTION INTRAMUSCULAR; INTRAVENOUS
Status: COMPLETED | OUTPATIENT
Start: 2021-01-01 | End: 2021-01-01

## 2021-01-01 RX ORDER — ACETAMINOPHEN 160 MG/5ML
650 SOLUTION ORAL EVERY 6 HOURS PRN
Status: DISCONTINUED | OUTPATIENT
Start: 2021-01-01 | End: 2021-01-01 | Stop reason: HOSPADM

## 2021-01-01 RX ADMIN — INSULIN DETEMIR 18 UNITS: 100 INJECTION, SOLUTION SUBCUTANEOUS at 09:04

## 2021-01-01 RX ADMIN — SODIUM CHLORIDE, PRESERVATIVE FREE 10 ML: 5 INJECTION INTRAVENOUS at 14:41

## 2021-01-01 RX ADMIN — PROPOFOL 25 MG: 10 INJECTION, EMULSION INTRAVENOUS at 08:30

## 2021-01-01 RX ADMIN — Medication 6 MG: at 20:38

## 2021-01-01 RX ADMIN — ONDANSETRON HYDROCHLORIDE 4 MG: 2 SOLUTION INTRAMUSCULAR; INTRAVENOUS at 02:00

## 2021-01-01 RX ADMIN — HEPARIN SODIUM 1800 UNITS: 1000 INJECTION, SOLUTION INTRAVENOUS; SUBCUTANEOUS at 12:18

## 2021-01-01 RX ADMIN — METOPROLOL TARTRATE 25 MG: 25 TABLET, FILM COATED ORAL at 08:06

## 2021-01-01 RX ADMIN — FAMOTIDINE 20 MG: 20 TABLET, FILM COATED ORAL at 09:42

## 2021-01-01 RX ADMIN — FAMOTIDINE 20 MG: 20 TABLET, FILM COATED ORAL at 20:43

## 2021-01-01 RX ADMIN — VANCOMYCIN HYDROCHLORIDE 1000 MG: 1 INJECTION, SOLUTION INTRAVENOUS at 11:58

## 2021-01-01 RX ADMIN — METOPROLOL TARTRATE 1 MG: 1 INJECTION, SOLUTION INTRAVENOUS at 09:57

## 2021-01-01 RX ADMIN — SUCRALFATE 1 G: 1 TABLET ORAL at 08:38

## 2021-01-01 RX ADMIN — SODIUM CHLORIDE, PRESERVATIVE FREE 10 ML: 5 INJECTION INTRAVENOUS at 21:02

## 2021-01-01 RX ADMIN — SUCRALFATE 1 G: 1 TABLET ORAL at 17:32

## 2021-01-01 RX ADMIN — HYDROCODONE BITARTRATE AND ACETAMINOPHEN 1 TABLET: 5; 325 TABLET ORAL at 18:45

## 2021-01-01 RX ADMIN — ATORVASTATIN CALCIUM 20 MG: 10 TABLET, FILM COATED ORAL at 20:16

## 2021-01-01 RX ADMIN — FAMOTIDINE 20 MG: 20 TABLET, FILM COATED ORAL at 12:36

## 2021-01-01 RX ADMIN — IPRATROPIUM BROMIDE AND ALBUTEROL SULFATE 3 ML: 2.5; .5 SOLUTION RESPIRATORY (INHALATION) at 20:09

## 2021-01-01 RX ADMIN — ALBUMIN, HUMAN INJ 5% 250 ML: 5 SOLUTION at 16:10

## 2021-01-01 RX ADMIN — INSULIN DETEMIR 10 UNITS: 100 INJECTION, SOLUTION SUBCUTANEOUS at 08:43

## 2021-01-01 RX ADMIN — Medication 1 APPLICATION: at 19:33

## 2021-01-01 RX ADMIN — ACETAMINOPHEN 1000 MG: 500 TABLET, FILM COATED ORAL at 06:54

## 2021-01-01 RX ADMIN — IPRATROPIUM BROMIDE AND ALBUTEROL SULFATE 3 ML: 2.5; .5 SOLUTION RESPIRATORY (INHALATION) at 15:55

## 2021-01-01 RX ADMIN — SODIUM CHLORIDE 50 ML/HR: 9 INJECTION, SOLUTION INTRAVENOUS at 13:53

## 2021-01-01 RX ADMIN — ATORVASTATIN CALCIUM 20 MG: 10 TABLET, FILM COATED ORAL at 21:22

## 2021-01-01 RX ADMIN — ACETAMINOPHEN 650 MG: 325 TABLET ORAL at 05:13

## 2021-01-01 RX ADMIN — FAMOTIDINE 20 MG: 20 TABLET, FILM COATED ORAL at 08:51

## 2021-01-01 RX ADMIN — SODIUM CHLORIDE, PRESERVATIVE FREE 10 ML: 5 INJECTION INTRAVENOUS at 09:54

## 2021-01-01 RX ADMIN — Medication 1 TABLET: at 18:14

## 2021-01-01 RX ADMIN — GUAIFENESIN 600 MG: 600 TABLET, EXTENDED RELEASE ORAL at 17:51

## 2021-01-01 RX ADMIN — ASPIRIN 81 MG: 81 TABLET, COATED ORAL at 08:18

## 2021-01-01 RX ADMIN — FAMOTIDINE 20 MG: 20 TABLET, FILM COATED ORAL at 21:05

## 2021-01-01 RX ADMIN — ALLOPURINOL 100 MG: 100 TABLET ORAL at 09:52

## 2021-01-01 RX ADMIN — ROCURONIUM BROMIDE 50 MG: 10 INJECTION INTRAVENOUS at 10:33

## 2021-01-01 RX ADMIN — CITALOPRAM 40 MG: 20 TABLET, FILM COATED ORAL at 21:08

## 2021-01-01 RX ADMIN — INSULIN LISPRO 3 UNITS: 100 INJECTION, SOLUTION INTRAVENOUS; SUBCUTANEOUS at 18:54

## 2021-01-01 RX ADMIN — FAMOTIDINE 20 MG: 10 INJECTION INTRAVENOUS at 06:54

## 2021-01-01 RX ADMIN — POTASSIUM CHLORIDE 20 MEQ: 10 CAPSULE, COATED, EXTENDED RELEASE ORAL at 17:05

## 2021-01-01 RX ADMIN — OXYCODONE HYDROCHLORIDE AND ACETAMINOPHEN 1 TABLET: 5; 325 TABLET ORAL at 11:37

## 2021-01-01 RX ADMIN — VASOPRESSIN 0.5 UNITS: 20 INJECTION INTRAVENOUS at 11:28

## 2021-01-01 RX ADMIN — EPOETIN ALFA-EPBX 10000 UNITS: 10000 INJECTION, SOLUTION INTRAVENOUS; SUBCUTANEOUS at 14:41

## 2021-01-01 RX ADMIN — METOPROLOL TARTRATE 25 MG: 25 TABLET, FILM COATED ORAL at 20:43

## 2021-01-01 RX ADMIN — CEFAZOLIN SODIUM 2 G: 10 INJECTION, POWDER, FOR SOLUTION INTRAVENOUS at 00:06

## 2021-01-01 RX ADMIN — SUCRALFATE 1 G: 1 TABLET ORAL at 06:36

## 2021-01-01 RX ADMIN — SODIUM CHLORIDE, PRESERVATIVE FREE 10 ML: 5 INJECTION INTRAVENOUS at 20:13

## 2021-01-01 RX ADMIN — HYDROMORPHONE HYDROCHLORIDE: 10 INJECTION, SOLUTION INTRAMUSCULAR; INTRAVENOUS; SUBCUTANEOUS at 13:45

## 2021-01-01 RX ADMIN — SUCRALFATE 1 G: 1 TABLET ORAL at 17:18

## 2021-01-01 RX ADMIN — HYDROCODONE BITARTRATE AND ACETAMINOPHEN 1 TABLET: 5; 325 TABLET ORAL at 05:47

## 2021-01-01 RX ADMIN — SODIUM CHLORIDE 50 ML/HR: 9 INJECTION, SOLUTION INTRAVENOUS at 04:18

## 2021-01-01 RX ADMIN — ACETAMINOPHEN 650 MG: 325 TABLET, FILM COATED ORAL at 06:04

## 2021-01-01 RX ADMIN — GUAIFENESIN 600 MG: 600 TABLET, EXTENDED RELEASE ORAL at 21:05

## 2021-01-01 RX ADMIN — ANTACID TABLETS 2 TABLET: 500 TABLET, CHEWABLE ORAL at 20:54

## 2021-01-01 RX ADMIN — GUAIFENESIN 600 MG: 600 TABLET, EXTENDED RELEASE ORAL at 08:37

## 2021-01-01 RX ADMIN — DEXTROSE MONOHYDRATE 30 ML/HR: 50 INJECTION, SOLUTION INTRAVENOUS at 16:00

## 2021-01-01 RX ADMIN — CALCIUM ACETATE 667 MG: 667 CAPSULE ORAL at 09:06

## 2021-01-01 RX ADMIN — ALLOPURINOL 100 MG: 100 TABLET ORAL at 08:06

## 2021-01-01 RX ADMIN — CITALOPRAM 40 MG: 20 TABLET, FILM COATED ORAL at 21:33

## 2021-01-01 RX ADMIN — SODIUM CHLORIDE, PRESERVATIVE FREE 10 ML: 5 INJECTION INTRAVENOUS at 12:39

## 2021-01-01 RX ADMIN — METOPROLOL TARTRATE 12.5 MG: 25 TABLET, FILM COATED ORAL at 08:05

## 2021-01-01 RX ADMIN — ASPIRIN 162 MG: 81 TABLET, CHEWABLE ORAL at 08:42

## 2021-01-01 RX ADMIN — PROPOFOL 20 MG: 10 INJECTION, EMULSION INTRAVENOUS at 16:50

## 2021-01-01 RX ADMIN — FAMOTIDINE 20 MG: 20 TABLET, FILM COATED ORAL at 20:39

## 2021-01-01 RX ADMIN — LOSARTAN POTASSIUM 25 MG: 50 TABLET, FILM COATED ORAL at 09:52

## 2021-01-01 RX ADMIN — CITALOPRAM 40 MG: 20 TABLET, FILM COATED ORAL at 21:22

## 2021-01-01 RX ADMIN — FENTANYL CITRATE 25 MCG: 50 INJECTION INTRAMUSCULAR; INTRAVENOUS at 12:03

## 2021-01-01 RX ADMIN — OXYCODONE HYDROCHLORIDE AND ACETAMINOPHEN 1 TABLET: 5; 325 TABLET ORAL at 19:51

## 2021-01-01 RX ADMIN — METOPROLOL TARTRATE 25 MG: 25 TABLET, FILM COATED ORAL at 20:58

## 2021-01-01 RX ADMIN — ASPIRIN 81 MG: 81 TABLET, COATED ORAL at 14:40

## 2021-01-01 RX ADMIN — ATORVASTATIN CALCIUM 20 MG: 10 TABLET, FILM COATED ORAL at 21:05

## 2021-01-01 RX ADMIN — CYCLOBENZAPRINE 5 MG: 10 TABLET, FILM COATED ORAL at 12:35

## 2021-01-01 RX ADMIN — SODIUM CHLORIDE, PRESERVATIVE FREE 10 ML: 5 INJECTION INTRAVENOUS at 20:54

## 2021-01-01 RX ADMIN — METOPROLOL TARTRATE 25 MG: 25 TABLET, FILM COATED ORAL at 21:08

## 2021-01-01 RX ADMIN — CHLORHEXIDINE GLUCONATE 0.12% ORAL RINSE 15 ML: 1.2 LIQUID ORAL at 20:38

## 2021-01-01 RX ADMIN — SODIUM CHLORIDE, PRESERVATIVE FREE 10 ML: 5 INJECTION INTRAVENOUS at 09:07

## 2021-01-01 RX ADMIN — SUCRALFATE 1 G: 1 TABLET ORAL at 12:35

## 2021-01-01 RX ADMIN — SODIUM CHLORIDE, PRESERVATIVE FREE 10 ML: 5 INJECTION INTRAVENOUS at 21:19

## 2021-01-01 RX ADMIN — POTASSIUM CHLORIDE 40 MEQ: 10 CAPSULE, COATED, EXTENDED RELEASE ORAL at 17:41

## 2021-01-01 RX ADMIN — HYDROMORPHONE HYDROCHLORIDE 0.5 MG: 1 INJECTION, SOLUTION INTRAMUSCULAR; INTRAVENOUS; SUBCUTANEOUS at 12:19

## 2021-01-01 RX ADMIN — ASPIRIN 81 MG: 81 TABLET, COATED ORAL at 09:52

## 2021-01-01 RX ADMIN — ATORVASTATIN CALCIUM 20 MG: 10 TABLET, FILM COATED ORAL at 20:44

## 2021-01-01 RX ADMIN — SUCRALFATE 1 G: 1 TABLET ORAL at 22:57

## 2021-01-01 RX ADMIN — BISACODYL 10 MG: 5 TABLET ORAL at 08:06

## 2021-01-01 RX ADMIN — ONDANSETRON 4 MG: 2 INJECTION INTRAMUSCULAR; INTRAVENOUS at 14:51

## 2021-01-01 RX ADMIN — MIDODRINE HYDROCHLORIDE 10 MG: 2.5 TABLET ORAL at 10:23

## 2021-01-01 RX ADMIN — SODIUM CHLORIDE 300 ML: 9 INJECTION, SOLUTION INTRAVENOUS at 21:45

## 2021-01-01 RX ADMIN — MIDAZOLAM 3 MG: 1 INJECTION INTRAMUSCULAR; INTRAVENOUS at 08:50

## 2021-01-01 RX ADMIN — ATORVASTATIN CALCIUM 20 MG: 10 TABLET, FILM COATED ORAL at 20:39

## 2021-01-01 RX ADMIN — CEFAZOLIN SODIUM 2 G: 10 INJECTION, POWDER, FOR SOLUTION INTRAVENOUS at 12:47

## 2021-01-01 RX ADMIN — HYDROCODONE BITARTRATE AND ACETAMINOPHEN 1 TABLET: 5; 325 TABLET ORAL at 21:36

## 2021-01-01 RX ADMIN — ENOXAPARIN SODIUM 30 MG: 30 INJECTION SUBCUTANEOUS at 17:05

## 2021-01-01 RX ADMIN — PROPOFOL 50 MG: 10 INJECTION, EMULSION INTRAVENOUS at 08:56

## 2021-01-01 RX ADMIN — BISACODYL 10 MG: 5 TABLET ORAL at 11:37

## 2021-01-01 RX ADMIN — SODIUM CHLORIDE, PRESERVATIVE FREE 10 ML: 5 INJECTION INTRAVENOUS at 08:18

## 2021-01-01 RX ADMIN — ASPIRIN 81 MG: 81 TABLET, COATED ORAL at 09:42

## 2021-01-01 RX ADMIN — SODIUM CHLORIDE, PRESERVATIVE FREE 10 ML: 5 INJECTION INTRAVENOUS at 09:43

## 2021-01-01 RX ADMIN — SUCRALFATE 1 G: 1 TABLET ORAL at 17:05

## 2021-01-01 RX ADMIN — CITALOPRAM 40 MG: 20 TABLET, FILM COATED ORAL at 09:42

## 2021-01-01 RX ADMIN — MIDODRINE HYDROCHLORIDE 10 MG: 2.5 TABLET ORAL at 12:35

## 2021-01-01 RX ADMIN — CALCIUM CHLORIDE 1 G: 100 INJECTION INTRAVENOUS; INTRAVENTRICULAR at 17:49

## 2021-01-01 RX ADMIN — HEPARIN SODIUM 24000 UNITS: 1000 INJECTION, SOLUTION INTRAVENOUS; SUBCUTANEOUS at 10:56

## 2021-01-01 RX ADMIN — ALLOPURINOL 100 MG: 100 TABLET ORAL at 21:21

## 2021-01-01 RX ADMIN — FAMOTIDINE 20 MG: 20 TABLET, FILM COATED ORAL at 22:23

## 2021-01-01 RX ADMIN — GABAPENTIN 300 MG: 300 CAPSULE ORAL at 12:43

## 2021-01-01 RX ADMIN — ALTEPLASE: 2.2 INJECTION, POWDER, LYOPHILIZED, FOR SOLUTION INTRAVENOUS at 14:20

## 2021-01-01 RX ADMIN — FAMOTIDINE 20 MG: 10 INJECTION INTRAVENOUS at 09:15

## 2021-01-01 RX ADMIN — SUCRALFATE 1 G: 1 TABLET ORAL at 05:47

## 2021-01-01 RX ADMIN — METOPROLOL TARTRATE 12.5 MG: 25 TABLET, FILM COATED ORAL at 20:39

## 2021-01-01 RX ADMIN — FAMOTIDINE 20 MG: 20 TABLET, FILM COATED ORAL at 09:06

## 2021-01-01 RX ADMIN — Medication 3 MG: at 21:05

## 2021-01-01 RX ADMIN — SODIUM CHLORIDE, PRESERVATIVE FREE 10 ML: 5 INJECTION INTRAVENOUS at 21:08

## 2021-01-01 RX ADMIN — VANCOMYCIN HYDROCHLORIDE 1000 MG: 1 INJECTION, SOLUTION INTRAVENOUS at 12:47

## 2021-01-01 RX ADMIN — ANTACID TABLETS 2 TABLET: 500 TABLET, CHEWABLE ORAL at 00:32

## 2021-01-01 RX ADMIN — SUCRALFATE 1 G: 1 TABLET ORAL at 17:39

## 2021-01-01 RX ADMIN — CEFAZOLIN 1 G: 330 INJECTION, POWDER, FOR SOLUTION INTRAMUSCULAR; INTRAVENOUS at 08:22

## 2021-01-01 RX ADMIN — OXYCODONE HYDROCHLORIDE AND ACETAMINOPHEN 1 TABLET: 5; 325 TABLET ORAL at 06:30

## 2021-01-01 RX ADMIN — INSULIN LISPRO 2 UNITS: 100 INJECTION, SOLUTION INTRAVENOUS; SUBCUTANEOUS at 20:27

## 2021-01-01 RX ADMIN — INSULIN LISPRO 2 UNITS: 100 INJECTION, SOLUTION INTRAVENOUS; SUBCUTANEOUS at 12:25

## 2021-01-01 RX ADMIN — METOPROLOL TARTRATE 25 MG: 25 TABLET, FILM COATED ORAL at 08:37

## 2021-01-01 RX ADMIN — ENOXAPARIN SODIUM 30 MG: 30 INJECTION SUBCUTANEOUS at 17:22

## 2021-01-01 RX ADMIN — SODIUM CHLORIDE, PRESERVATIVE FREE 10 ML: 5 INJECTION INTRAVENOUS at 20:43

## 2021-01-01 RX ADMIN — ACETAMINOPHEN 650 MG: 325 TABLET ORAL at 13:51

## 2021-01-01 RX ADMIN — FAMOTIDINE 20 MG: 20 TABLET, FILM COATED ORAL at 13:28

## 2021-01-01 RX ADMIN — SUCRALFATE 1 G: 1 TABLET ORAL at 20:54

## 2021-01-01 RX ADMIN — LOSARTAN POTASSIUM 25 MG: 50 TABLET, FILM COATED ORAL at 09:06

## 2021-01-01 RX ADMIN — ACETAMINOPHEN 650 MG: 325 TABLET, FILM COATED ORAL at 17:25

## 2021-01-01 RX ADMIN — CALCIUM CHLORIDE 250 MG: 100 INJECTION INTRAVENOUS; INTRAVENTRICULAR at 14:31

## 2021-01-01 RX ADMIN — GUAIFENESIN 600 MG: 600 TABLET, EXTENDED RELEASE ORAL at 20:59

## 2021-01-01 RX ADMIN — ENOXAPARIN SODIUM 30 MG: 30 INJECTION SUBCUTANEOUS at 17:32

## 2021-01-01 RX ADMIN — SUCRALFATE 1 G: 1 TABLET ORAL at 18:45

## 2021-01-01 RX ADMIN — GABAPENTIN 100 MG: 100 CAPSULE ORAL at 21:26

## 2021-01-01 RX ADMIN — INSULIN LISPRO 4 UNITS: 100 INJECTION, SOLUTION INTRAVENOUS; SUBCUTANEOUS at 17:19

## 2021-01-01 RX ADMIN — METOPROLOL TARTRATE 12.5 MG: 25 TABLET, FILM COATED ORAL at 20:38

## 2021-01-01 RX ADMIN — OXYCODONE HYDROCHLORIDE AND ACETAMINOPHEN 500 MG: 500 TABLET ORAL at 08:37

## 2021-01-01 RX ADMIN — INSULIN LISPRO 2 UNITS: 100 INJECTION, SOLUTION INTRAVENOUS; SUBCUTANEOUS at 13:13

## 2021-01-01 RX ADMIN — ENOXAPARIN SODIUM 30 MG: 30 INJECTION SUBCUTANEOUS at 17:34

## 2021-01-01 RX ADMIN — BARIUM SULFATE 120 ML: 980 POWDER, FOR SUSPENSION ORAL at 09:24

## 2021-01-01 RX ADMIN — ASPIRIN 81 MG: 81 TABLET, COATED ORAL at 12:43

## 2021-01-01 RX ADMIN — EPOETIN ALFA-EPBX 10000 UNITS: 10000 INJECTION, SOLUTION INTRAVENOUS; SUBCUTANEOUS at 08:56

## 2021-01-01 RX ADMIN — FAMOTIDINE 20 MG: 20 TABLET, FILM COATED ORAL at 09:53

## 2021-01-01 RX ADMIN — SODIUM CHLORIDE, PRESERVATIVE FREE 10 ML: 5 INJECTION INTRAVENOUS at 21:06

## 2021-01-01 RX ADMIN — HYDROMORPHONE HYDROCHLORIDE 0.5 MG: 1 INJECTION, SOLUTION INTRAMUSCULAR; INTRAVENOUS; SUBCUTANEOUS at 12:29

## 2021-01-01 RX ADMIN — POLYETHYLENE GLYCOL 3350 17 G: 17 POWDER, FOR SOLUTION ORAL at 08:05

## 2021-01-01 RX ADMIN — PHENYLEPHRINE HYDROCHLORIDE 0.5 MCG/KG/MIN: 10 INJECTION INTRAVENOUS at 16:10

## 2021-01-01 RX ADMIN — VANCOMYCIN HYDROCHLORIDE 1250 MG: 1 INJECTION, POWDER, LYOPHILIZED, FOR SOLUTION INTRAVENOUS at 09:06

## 2021-01-01 RX ADMIN — LIDOCAINE HYDROCHLORIDE 100 MG: 20 INJECTION, SOLUTION EPIDURAL; INFILTRATION; INTRACAUDAL; PERINEURAL at 10:33

## 2021-01-01 RX ADMIN — SODIUM CHLORIDE, PRESERVATIVE FREE 10 ML: 5 INJECTION INTRAVENOUS at 20:45

## 2021-01-01 RX ADMIN — PROPOFOL 125 MCG/KG/MIN: 10 INJECTION, EMULSION INTRAVENOUS at 07:05

## 2021-01-01 RX ADMIN — EPOETIN ALFA-EPBX 10000 UNITS: 10000 INJECTION, SOLUTION INTRAVENOUS; SUBCUTANEOUS at 08:12

## 2021-01-01 RX ADMIN — SUCRALFATE 1 G: 1 TABLET ORAL at 11:47

## 2021-01-01 RX ADMIN — METOPROLOL TARTRATE 25 MG: 25 TABLET, FILM COATED ORAL at 22:42

## 2021-01-01 RX ADMIN — BUMETANIDE 2 MG: 0.25 INJECTION, SOLUTION INTRAMUSCULAR; INTRAVENOUS at 15:33

## 2021-01-01 RX ADMIN — IOPAMIDOL 68 ML: 755 INJECTION, SOLUTION INTRAVENOUS at 18:17

## 2021-01-01 RX ADMIN — ENOXAPARIN SODIUM 30 MG: 30 INJECTION SUBCUTANEOUS at 18:54

## 2021-01-01 RX ADMIN — FAMOTIDINE 20 MG: 20 TABLET, FILM COATED ORAL at 20:37

## 2021-01-01 RX ADMIN — ALLOPURINOL 100 MG: 100 TABLET ORAL at 20:53

## 2021-01-01 RX ADMIN — SUFENTANIL CITRATE 50 MCG: 50 INJECTION EPIDURAL; INTRAVENOUS at 08:55

## 2021-01-01 RX ADMIN — ANTACID TABLETS 2 TABLET: 500 TABLET, CHEWABLE ORAL at 20:59

## 2021-01-01 RX ADMIN — CLOPIDOGREL 75 MG: 75 TABLET, FILM COATED ORAL at 17:25

## 2021-01-01 RX ADMIN — HYDROMORPHONE HYDROCHLORIDE 0.5 MG: 1 INJECTION, SOLUTION INTRAMUSCULAR; INTRAVENOUS; SUBCUTANEOUS at 11:59

## 2021-01-01 RX ADMIN — ALLOPURINOL 100 MG: 100 TABLET ORAL at 09:06

## 2021-01-01 RX ADMIN — GUAIFENESIN 600 MG: 600 TABLET, EXTENDED RELEASE ORAL at 21:08

## 2021-01-01 RX ADMIN — SUFENTANIL CITRATE 50 MCG: 50 INJECTION EPIDURAL; INTRAVENOUS at 08:56

## 2021-01-01 RX ADMIN — METOPROLOL TARTRATE 12.5 MG: 25 TABLET, FILM COATED ORAL at 20:16

## 2021-01-01 RX ADMIN — BISACODYL 10 MG: 5 TABLET ORAL at 08:29

## 2021-01-01 RX ADMIN — FAMOTIDINE 20 MG: 20 TABLET, FILM COATED ORAL at 08:38

## 2021-01-01 RX ADMIN — SODIUM CHLORIDE, PRESERVATIVE FREE 10 ML: 5 INJECTION INTRAVENOUS at 10:18

## 2021-01-01 RX ADMIN — HEPARIN SODIUM 1600 UNITS: 1000 INJECTION, SOLUTION INTRAVENOUS; SUBCUTANEOUS at 17:50

## 2021-01-01 RX ADMIN — SUCRALFATE 1 G: 1 TABLET ORAL at 17:21

## 2021-01-01 RX ADMIN — OXYCODONE HYDROCHLORIDE AND ACETAMINOPHEN 1 TABLET: 5; 325 TABLET ORAL at 11:00

## 2021-01-01 RX ADMIN — SODIUM CHLORIDE, PRESERVATIVE FREE 10 ML: 5 INJECTION INTRAVENOUS at 20:05

## 2021-01-01 RX ADMIN — POLYETHYLENE GLYCOL 3350 17 G: 17 POWDER, FOR SOLUTION ORAL at 08:06

## 2021-01-01 RX ADMIN — CLOPIDOGREL 75 MG: 75 TABLET, FILM COATED ORAL at 20:37

## 2021-01-01 RX ADMIN — BISACODYL 10 MG: 5 TABLET ORAL at 08:05

## 2021-01-01 RX ADMIN — ACETAMINOPHEN 1000 MG: 500 TABLET, FILM COATED ORAL at 09:15

## 2021-01-01 RX ADMIN — GABAPENTIN 300 MG: 300 CAPSULE ORAL at 08:36

## 2021-01-01 RX ADMIN — IPRATROPIUM BROMIDE AND ALBUTEROL SULFATE 3 ML: 2.5; .5 SOLUTION RESPIRATORY (INHALATION) at 10:54

## 2021-01-01 RX ADMIN — SUCRALFATE 1 G: 1 TABLET ORAL at 05:36

## 2021-01-01 RX ADMIN — ASPIRIN 81 MG: 81 TABLET, COATED ORAL at 08:07

## 2021-01-01 RX ADMIN — CHLORHEXIDINE GLUCONATE 0.12% ORAL RINSE 15 ML: 1.2 LIQUID ORAL at 05:37

## 2021-01-01 RX ADMIN — ONDANSETRON HYDROCHLORIDE 4 MG: 2 SOLUTION INTRAMUSCULAR; INTRAVENOUS at 08:06

## 2021-01-01 RX ADMIN — Medication 3 MG: at 21:00

## 2021-01-01 RX ADMIN — CEFAZOLIN SODIUM 2 G: 10 INJECTION, POWDER, FOR SOLUTION INTRAVENOUS at 00:12

## 2021-01-01 RX ADMIN — SUCRALFATE 1 G: 1 TABLET ORAL at 05:56

## 2021-01-01 RX ADMIN — ASPIRIN 81 MG: 81 TABLET, FILM COATED ORAL at 08:37

## 2021-01-01 RX ADMIN — GABAPENTIN 300 MG: 300 CAPSULE ORAL at 20:22

## 2021-01-01 RX ADMIN — SODIUM CHLORIDE, PRESERVATIVE FREE 10 ML: 5 INJECTION INTRAVENOUS at 21:33

## 2021-01-01 RX ADMIN — ONDANSETRON 4 MG: 2 INJECTION INTRAMUSCULAR; INTRAVENOUS at 23:11

## 2021-01-01 RX ADMIN — SODIUM CHLORIDE: 9 INJECTION, SOLUTION INTRAVENOUS at 08:45

## 2021-01-01 RX ADMIN — ATORVASTATIN CALCIUM 20 MG: 10 TABLET, FILM COATED ORAL at 20:20

## 2021-01-01 RX ADMIN — ASPIRIN 81 MG: 81 TABLET, COATED ORAL at 08:12

## 2021-01-01 RX ADMIN — SODIUM CHLORIDE, PRESERVATIVE FREE 10 ML: 5 INJECTION INTRAVENOUS at 13:23

## 2021-01-01 RX ADMIN — FAMOTIDINE 20 MG: 20 TABLET, FILM COATED ORAL at 20:45

## 2021-01-01 RX ADMIN — FENTANYL CITRATE 25 MCG: 50 INJECTION INTRAMUSCULAR; INTRAVENOUS at 09:45

## 2021-01-01 RX ADMIN — SUCRALFATE 1 G: 1 TABLET ORAL at 08:44

## 2021-01-01 RX ADMIN — CEFAZOLIN SODIUM 2 G: 10 INJECTION, POWDER, FOR SOLUTION INTRAVENOUS at 14:46

## 2021-01-01 RX ADMIN — SUCRALFATE 1 G: 1 TABLET ORAL at 20:56

## 2021-01-01 RX ADMIN — ALLOPURINOL 100 MG: 100 TABLET ORAL at 20:21

## 2021-01-01 RX ADMIN — DEXTROSE MONOHYDRATE 25 G: 500 INJECTION PARENTERAL at 09:39

## 2021-01-01 RX ADMIN — SUFENTANIL CITRATE 50 MCG: 50 INJECTION EPIDURAL; INTRAVENOUS at 10:31

## 2021-01-01 RX ADMIN — Medication 300 MCG: at 10:41

## 2021-01-01 RX ADMIN — SODIUM CHLORIDE, PRESERVATIVE FREE 10 ML: 5 INJECTION INTRAVENOUS at 00:39

## 2021-01-01 RX ADMIN — ENOXAPARIN SODIUM 30 MG: 30 INJECTION SUBCUTANEOUS at 17:18

## 2021-01-01 RX ADMIN — SODIUM CHLORIDE 500 ML: 9 INJECTION, SOLUTION INTRAVENOUS at 14:53

## 2021-01-01 RX ADMIN — ONDANSETRON HYDROCHLORIDE 4 MG: 2 SOLUTION INTRAMUSCULAR; INTRAVENOUS at 12:07

## 2021-01-01 RX ADMIN — FENTANYL CITRATE 25 MCG: 50 INJECTION INTRAMUSCULAR; INTRAVENOUS at 09:29

## 2021-01-01 RX ADMIN — POTASSIUM CHLORIDE 40 MEQ: 10 CAPSULE, COATED, EXTENDED RELEASE ORAL at 10:04

## 2021-01-01 RX ADMIN — OXYCODONE HYDROCHLORIDE AND ACETAMINOPHEN 500 MG: 500 TABLET ORAL at 09:45

## 2021-01-01 RX ADMIN — IPRATROPIUM BROMIDE AND ALBUTEROL SULFATE 3 ML: 2.5; .5 SOLUTION RESPIRATORY (INHALATION) at 07:30

## 2021-01-01 RX ADMIN — BISACODYL 10 MG: 10 SUPPOSITORY RECTAL at 00:55

## 2021-01-01 RX ADMIN — GUAIFENESIN 600 MG: 600 TABLET, EXTENDED RELEASE ORAL at 20:55

## 2021-01-01 RX ADMIN — ATORVASTATIN CALCIUM 20 MG: 10 TABLET, FILM COATED ORAL at 20:43

## 2021-01-01 RX ADMIN — DEXTROSE 15 G: 15 GEL ORAL at 17:38

## 2021-01-01 RX ADMIN — ASPIRIN 81 MG: 81 TABLET, COATED ORAL at 08:36

## 2021-01-01 RX ADMIN — CITALOPRAM 40 MG: 20 TABLET, FILM COATED ORAL at 20:22

## 2021-01-01 RX ADMIN — GABAPENTIN 100 MG: 100 CAPSULE ORAL at 08:18

## 2021-01-01 RX ADMIN — CLOPIDOGREL 75 MG: 75 TABLET, FILM COATED ORAL at 17:09

## 2021-01-01 RX ADMIN — ALLOPURINOL 100 MG: 100 TABLET ORAL at 21:22

## 2021-01-01 RX ADMIN — SODIUM CHLORIDE 500 ML: 9 INJECTION, SOLUTION INTRAVENOUS at 06:24

## 2021-01-01 RX ADMIN — ALLOPURINOL 100 MG: 100 TABLET ORAL at 08:12

## 2021-01-01 RX ADMIN — POTASSIUM CHLORIDE 20 MEQ: 10 CAPSULE, COATED, EXTENDED RELEASE ORAL at 16:35

## 2021-01-01 RX ADMIN — SUCRALFATE 1 G: 1 TABLET ORAL at 20:21

## 2021-01-01 RX ADMIN — POLYETHYLENE GLYCOL 3350 17 G: 17 POWDER, FOR SOLUTION ORAL at 09:37

## 2021-01-01 RX ADMIN — SODIUM CHLORIDE 50 ML/HR: 9 INJECTION, SOLUTION INTRAVENOUS at 07:39

## 2021-01-01 RX ADMIN — DEXTROSE MONOHYDRATE 30 ML/HR: 50 INJECTION, SOLUTION INTRAVENOUS at 16:01

## 2021-01-01 RX ADMIN — ALLOPURINOL 100 MG: 100 TABLET ORAL at 20:29

## 2021-01-01 RX ADMIN — OXYCODONE HYDROCHLORIDE AND ACETAMINOPHEN 500 MG: 500 TABLET ORAL at 09:06

## 2021-01-01 RX ADMIN — OXYCODONE AND ACETAMINOPHEN 1 TABLET: 325; 10 TABLET ORAL at 17:28

## 2021-01-01 RX ADMIN — HYDROCODONE BITARTRATE AND ACETAMINOPHEN 1 TABLET: 5; 325 TABLET ORAL at 00:01

## 2021-01-01 RX ADMIN — AMINOCAPROIC ACID 5 G: 250 INJECTION, SOLUTION INTRAVENOUS at 09:47

## 2021-01-01 RX ADMIN — SODIUM CHLORIDE: 9 INJECTION, SOLUTION INTRAVENOUS at 08:15

## 2021-01-01 RX ADMIN — OXYCODONE HYDROCHLORIDE AND ACETAMINOPHEN 1 TABLET: 5; 325 TABLET ORAL at 20:13

## 2021-01-01 RX ADMIN — PROPOFOL 10 MCG/KG/MIN: 10 INJECTION, EMULSION INTRAVENOUS at 16:12

## 2021-01-01 RX ADMIN — SUCRALFATE 1 G: 1 TABLET ORAL at 21:22

## 2021-01-01 RX ADMIN — HEPARIN SODIUM 1800 UNITS: 1000 INJECTION, SOLUTION INTRAVENOUS; SUBCUTANEOUS at 12:20

## 2021-01-01 RX ADMIN — CITALOPRAM 40 MG: 20 TABLET, FILM COATED ORAL at 20:05

## 2021-01-01 RX ADMIN — IPRATROPIUM BROMIDE AND ALBUTEROL SULFATE 3 ML: 2.5; .5 SOLUTION RESPIRATORY (INHALATION) at 16:24

## 2021-01-01 RX ADMIN — ALBUMIN HUMAN 250 ML: 0.05 INJECTION, SOLUTION INTRAVENOUS at 16:10

## 2021-01-01 RX ADMIN — ACETAMINOPHEN 650 MG: 325 TABLET, FILM COATED ORAL at 09:48

## 2021-01-01 RX ADMIN — IOPAMIDOL 80 ML: 755 INJECTION, SOLUTION INTRAVENOUS at 14:45

## 2021-01-01 RX ADMIN — ATORVASTATIN CALCIUM 20 MG: 10 TABLET, FILM COATED ORAL at 20:04

## 2021-01-01 RX ADMIN — LIDOCAINE HYDROCHLORIDE 50 MG: 20 INJECTION, SOLUTION EPIDURAL; INFILTRATION; INTRACAUDAL; PERINEURAL at 16:44

## 2021-01-01 RX ADMIN — IPRATROPIUM BROMIDE AND ALBUTEROL SULFATE 3 ML: 2.5; .5 SOLUTION RESPIRATORY (INHALATION) at 15:27

## 2021-01-01 RX ADMIN — ASPIRIN 81 MG: 81 TABLET, FILM COATED ORAL at 08:36

## 2021-01-01 RX ADMIN — METOPROLOL TARTRATE 25 MG: 25 TABLET, FILM COATED ORAL at 21:05

## 2021-01-01 RX ADMIN — IPRATROPIUM BROMIDE AND ALBUTEROL SULFATE 3 ML: 2.5; .5 SOLUTION RESPIRATORY (INHALATION) at 10:18

## 2021-01-01 RX ADMIN — PROPOFOL 10 MG: 10 INJECTION, EMULSION INTRAVENOUS at 16:52

## 2021-01-01 RX ADMIN — CEFAZOLIN SODIUM 1 G: 2 SOLUTION INTRAVENOUS at 14:03

## 2021-01-01 RX ADMIN — SODIUM CHLORIDE, PRESERVATIVE FREE 10 ML: 5 INJECTION INTRAVENOUS at 09:45

## 2021-01-01 RX ADMIN — SODIUM CHLORIDE 50 ML/HR: 9 INJECTION, SOLUTION INTRAVENOUS at 13:42

## 2021-01-01 RX ADMIN — ASPIRIN 81 MG: 81 TABLET, COATED ORAL at 08:38

## 2021-01-01 RX ADMIN — METOPROLOL TARTRATE 25 MG: 25 TABLET, FILM COATED ORAL at 09:44

## 2021-01-01 RX ADMIN — OXYCODONE HYDROCHLORIDE AND ACETAMINOPHEN 1 TABLET: 5; 325 TABLET ORAL at 11:13

## 2021-01-01 RX ADMIN — POTASSIUM CHLORIDE 10 MEQ: 7.46 INJECTION, SOLUTION INTRAVENOUS at 12:04

## 2021-01-01 RX ADMIN — ANTACID TABLETS 2 TABLET: 500 TABLET, CHEWABLE ORAL at 20:43

## 2021-01-01 RX ADMIN — MIDODRINE HYDROCHLORIDE 10 MG: 2.5 TABLET ORAL at 09:06

## 2021-01-01 RX ADMIN — SODIUM CHLORIDE, PRESERVATIVE FREE 10 ML: 5 INJECTION INTRAVENOUS at 20:26

## 2021-01-01 RX ADMIN — ENOXAPARIN SODIUM 30 MG: 30 INJECTION SUBCUTANEOUS at 17:53

## 2021-01-01 RX ADMIN — ALLOPURINOL 100 MG: 100 TABLET ORAL at 20:38

## 2021-01-01 RX ADMIN — ATRACURIUM BESYLATE 30 MG: 10 INJECTION, SOLUTION INTRAVENOUS at 14:05

## 2021-01-01 RX ADMIN — IPRATROPIUM BROMIDE AND ALBUTEROL SULFATE 3 ML: 2.5; .5 SOLUTION RESPIRATORY (INHALATION) at 21:46

## 2021-01-01 RX ADMIN — FAMOTIDINE 20 MG: 20 TABLET, FILM COATED ORAL at 08:36

## 2021-01-01 RX ADMIN — ALLOPURINOL 100 MG: 100 TABLET ORAL at 08:37

## 2021-01-01 RX ADMIN — FENTANYL CITRATE 50 MCG: 50 INJECTION, SOLUTION INTRAMUSCULAR; INTRAVENOUS at 02:21

## 2021-01-01 RX ADMIN — Medication 6 MG: at 00:39

## 2021-01-01 RX ADMIN — Medication 1 TABLET: at 09:52

## 2021-01-01 RX ADMIN — BARIUM SULFATE 700 MG: 700 TABLET ORAL at 09:23

## 2021-01-01 RX ADMIN — SODIUM CHLORIDE, PRESERVATIVE FREE 10 ML: 5 INJECTION INTRAVENOUS at 09:02

## 2021-01-01 RX ADMIN — GABAPENTIN 300 MG: 300 CAPSULE ORAL at 10:16

## 2021-01-01 RX ADMIN — ATORVASTATIN CALCIUM 20 MG: 10 TABLET, FILM COATED ORAL at 00:39

## 2021-01-01 RX ADMIN — SODIUM CHLORIDE, PRESERVATIVE FREE 10 ML: 5 INJECTION INTRAVENOUS at 08:45

## 2021-01-01 RX ADMIN — Medication 50 MCG: at 08:31

## 2021-01-01 RX ADMIN — INSULIN LISPRO 2 UNITS: 100 INJECTION, SOLUTION INTRAVENOUS; SUBCUTANEOUS at 11:58

## 2021-01-01 RX ADMIN — LIDOCAINE HYDROCHLORIDE 100 MG: 20 INJECTION, SOLUTION EPIDURAL; INFILTRATION; INTRACAUDAL; PERINEURAL at 08:55

## 2021-01-01 RX ADMIN — Medication 3 MG: at 20:43

## 2021-01-01 RX ADMIN — DEXAMETHASONE SODIUM PHOSPHATE 4 MG: 4 INJECTION, SOLUTION INTRA-ARTICULAR; INTRALESIONAL; INTRAMUSCULAR; INTRAVENOUS; SOFT TISSUE at 09:15

## 2021-01-01 RX ADMIN — SUCRALFATE 1 G: 1 TABLET ORAL at 06:18

## 2021-01-01 RX ADMIN — ENOXAPARIN SODIUM 30 MG: 30 INJECTION SUBCUTANEOUS at 18:44

## 2021-01-01 RX ADMIN — ATORVASTATIN CALCIUM 20 MG: 10 TABLET, FILM COATED ORAL at 20:38

## 2021-01-01 RX ADMIN — FAMOTIDINE 20 MG: 20 TABLET, FILM COATED ORAL at 00:39

## 2021-01-01 RX ADMIN — SODIUM CHLORIDE, PRESERVATIVE FREE 10 ML: 5 INJECTION INTRAVENOUS at 08:37

## 2021-01-01 RX ADMIN — ALLOPURINOL 100 MG: 100 TABLET ORAL at 09:45

## 2021-01-01 RX ADMIN — IOPAMIDOL 100 ML: 755 INJECTION, SOLUTION INTRAVENOUS at 10:06

## 2021-01-01 RX ADMIN — MIDAZOLAM 1 MG: 1 INJECTION INTRAMUSCULAR; INTRAVENOUS at 08:00

## 2021-01-01 RX ADMIN — ALLOPURINOL 100 MG: 100 TABLET ORAL at 21:33

## 2021-01-01 RX ADMIN — CITALOPRAM 40 MG: 20 TABLET, FILM COATED ORAL at 20:59

## 2021-01-01 RX ADMIN — ASPIRIN 81 MG: 81 TABLET, COATED ORAL at 12:35

## 2021-01-01 RX ADMIN — EPOETIN ALFA-EPBX 10000 UNITS: 10000 INJECTION, SOLUTION INTRAVENOUS; SUBCUTANEOUS at 09:06

## 2021-01-01 RX ADMIN — SUCRALFATE 1 G: 1 TABLET ORAL at 12:18

## 2021-01-01 RX ADMIN — SUCRALFATE 1 G: 1 TABLET ORAL at 17:55

## 2021-01-01 RX ADMIN — CITALOPRAM 40 MG: 20 TABLET, FILM COATED ORAL at 20:56

## 2021-01-01 RX ADMIN — ATORVASTATIN CALCIUM 20 MG: 10 TABLET, FILM COATED ORAL at 22:57

## 2021-01-01 RX ADMIN — SODIUM CHLORIDE, PRESERVATIVE FREE 10 ML: 5 INJECTION INTRAVENOUS at 08:36

## 2021-01-01 RX ADMIN — ASPIRIN 81 MG: 81 TABLET, FILM COATED ORAL at 09:52

## 2021-01-01 RX ADMIN — Medication 160 MCG: at 11:27

## 2021-01-01 RX ADMIN — ANTACID TABLETS 2 TABLET: 500 TABLET, CHEWABLE ORAL at 00:39

## 2021-01-01 RX ADMIN — PROPOFOL 10 MG: 10 INJECTION, EMULSION INTRAVENOUS at 16:57

## 2021-01-01 RX ADMIN — ATORVASTATIN CALCIUM 20 MG: 10 TABLET, FILM COATED ORAL at 20:26

## 2021-01-01 RX ADMIN — FAMOTIDINE 20 MG: 20 TABLET, FILM COATED ORAL at 20:55

## 2021-01-01 RX ADMIN — SODIUM CHLORIDE 5 MG/HR: 9 INJECTION, SOLUTION INTRAVENOUS at 05:42

## 2021-01-01 RX ADMIN — CITALOPRAM 40 MG: 20 TABLET, FILM COATED ORAL at 00:39

## 2021-01-01 RX ADMIN — GABAPENTIN 300 MG: 300 CAPSULE ORAL at 21:33

## 2021-01-01 RX ADMIN — LIDOCAINE HYDROCHLORIDE 0.5 ML: 10 INJECTION, SOLUTION EPIDURAL; INFILTRATION; INTRACAUDAL; PERINEURAL at 08:00

## 2021-01-01 RX ADMIN — FENTANYL CITRATE 50 MCG: 50 INJECTION, SOLUTION INTRAMUSCULAR; INTRAVENOUS at 00:11

## 2021-01-01 RX ADMIN — METOPROLOL TARTRATE 12.5 MG: 25 TABLET, FILM COATED ORAL at 09:36

## 2021-01-01 RX ADMIN — IPRATROPIUM BROMIDE AND ALBUTEROL SULFATE 3 ML: 2.5; .5 SOLUTION RESPIRATORY (INHALATION) at 19:17

## 2021-01-01 RX ADMIN — GUAIFENESIN 600 MG: 600 TABLET, EXTENDED RELEASE ORAL at 09:46

## 2021-01-01 RX ADMIN — CALCIUM CHLORIDE 250 MG: 100 INJECTION INTRAVENOUS; INTRAVENTRICULAR at 14:35

## 2021-01-01 RX ADMIN — ROCURONIUM BROMIDE 50 MG: 10 INJECTION INTRAVENOUS at 08:56

## 2021-01-01 RX ADMIN — HYDROCODONE BITARTRATE AND ACETAMINOPHEN 1 TABLET: 5; 325 TABLET ORAL at 20:39

## 2021-01-01 RX ADMIN — ATORVASTATIN CALCIUM 20 MG: 10 TABLET, FILM COATED ORAL at 20:06

## 2021-01-01 RX ADMIN — ENOXAPARIN SODIUM 30 MG: 30 INJECTION SUBCUTANEOUS at 18:08

## 2021-01-01 RX ADMIN — METOPROLOL TARTRATE 12.5 MG: 25 TABLET, FILM COATED ORAL at 20:06

## 2021-01-01 RX ADMIN — FAMOTIDINE 20 MG: 20 TABLET, FILM COATED ORAL at 21:08

## 2021-01-01 RX ADMIN — LOSARTAN POTASSIUM 25 MG: 50 TABLET, FILM COATED ORAL at 12:28

## 2021-01-01 RX ADMIN — ATORVASTATIN CALCIUM 20 MG: 10 TABLET, FILM COATED ORAL at 20:29

## 2021-01-01 RX ADMIN — BARIUM SULFATE 240 ML: 960 POWDER, FOR SUSPENSION ORAL at 09:23

## 2021-01-01 RX ADMIN — FENTANYL CITRATE 25 MCG: 50 INJECTION INTRAMUSCULAR; INTRAVENOUS at 12:23

## 2021-01-01 RX ADMIN — HEPARIN SODIUM 3200 UNITS: 1000 INJECTION, SOLUTION INTRAVENOUS; SUBCUTANEOUS at 22:46

## 2021-01-01 RX ADMIN — METOPROLOL TARTRATE 25 MG: 25 TABLET, FILM COATED ORAL at 00:39

## 2021-01-01 RX ADMIN — FAMOTIDINE 20 MG: 20 TABLET, FILM COATED ORAL at 08:05

## 2021-01-01 RX ADMIN — ONDANSETRON 4 MG: 2 INJECTION INTRAMUSCULAR; INTRAVENOUS at 00:23

## 2021-01-01 RX ADMIN — METOPROLOL TARTRATE 25 MG: 25 TABLET, FILM COATED ORAL at 08:12

## 2021-01-01 RX ADMIN — GUAIFENESIN 600 MG: 600 TABLET, EXTENDED RELEASE ORAL at 20:43

## 2021-01-01 RX ADMIN — ALLOPURINOL 100 MG: 100 TABLET ORAL at 21:18

## 2021-01-01 RX ADMIN — ACETAMINOPHEN 650 MG: 325 TABLET ORAL at 23:22

## 2021-01-01 RX ADMIN — METOPROLOL TARTRATE 25 MG: 25 TABLET, FILM COATED ORAL at 00:33

## 2021-01-01 RX ADMIN — SUCRALFATE 1 G: 1 TABLET ORAL at 17:34

## 2021-01-01 RX ADMIN — ASPIRIN 81 MG: 81 TABLET, COATED ORAL at 08:44

## 2021-01-01 RX ADMIN — INSULIN DETEMIR 10 UNITS: 100 INJECTION, SOLUTION SUBCUTANEOUS at 09:03

## 2021-01-01 RX ADMIN — METOPROLOL TARTRATE 25 MG: 25 TABLET, FILM COATED ORAL at 09:52

## 2021-01-01 RX ADMIN — GABAPENTIN 300 MG: 300 CAPSULE ORAL at 21:36

## 2021-01-01 RX ADMIN — ANTACID TABLETS 2 TABLET: 500 TABLET, CHEWABLE ORAL at 21:05

## 2021-01-01 RX ADMIN — SODIUM CHLORIDE, PRESERVATIVE FREE 10 ML: 5 INJECTION INTRAVENOUS at 22:58

## 2021-01-01 RX ADMIN — INSULIN LISPRO 4 UNITS: 100 INJECTION, SOLUTION INTRAVENOUS; SUBCUTANEOUS at 21:06

## 2021-01-01 RX ADMIN — GABAPENTIN 300 MG: 300 CAPSULE ORAL at 21:22

## 2021-01-01 RX ADMIN — Medication 3 MG: at 21:08

## 2021-01-01 RX ADMIN — CHLORHEXIDINE GLUCONATE 0.12% ORAL RINSE 15 ML: 1.2 LIQUID ORAL at 05:26

## 2021-01-01 RX ADMIN — CITALOPRAM 40 MG: 20 TABLET, FILM COATED ORAL at 21:05

## 2021-01-01 RX ADMIN — FAMOTIDINE 20 MG: 20 TABLET, FILM COATED ORAL at 08:44

## 2021-01-01 RX ADMIN — OXYCODONE HYDROCHLORIDE AND ACETAMINOPHEN 1 TABLET: 5; 325 TABLET ORAL at 17:15

## 2021-01-01 RX ADMIN — GABAPENTIN 100 MG: 100 CAPSULE ORAL at 08:44

## 2021-01-01 RX ADMIN — INSULIN LISPRO 6 UNITS: 100 INJECTION, SOLUTION INTRAVENOUS; SUBCUTANEOUS at 21:05

## 2021-01-01 RX ADMIN — FAMOTIDINE 20 MG: 20 TABLET, FILM COATED ORAL at 09:36

## 2021-01-01 RX ADMIN — SUCRALFATE 1 G: 1 TABLET ORAL at 12:06

## 2021-01-01 RX ADMIN — GABAPENTIN 100 MG: 100 CAPSULE ORAL at 22:57

## 2021-01-01 RX ADMIN — GABAPENTIN 300 MG: 300 CAPSULE ORAL at 20:38

## 2021-01-01 RX ADMIN — GABAPENTIN 300 MG: 300 CAPSULE ORAL at 20:53

## 2021-01-01 RX ADMIN — ASPIRIN 81 MG: 81 TABLET, FILM COATED ORAL at 17:51

## 2021-01-01 RX ADMIN — SODIUM CHLORIDE, PRESERVATIVE FREE 10 ML: 5 INJECTION INTRAVENOUS at 20:39

## 2021-01-01 RX ADMIN — OXYCODONE AND ACETAMINOPHEN 1 TABLET: 325; 10 TABLET ORAL at 15:13

## 2021-01-01 RX ADMIN — ALLOPURINOL 100 MG: 100 TABLET ORAL at 20:05

## 2021-01-01 RX ADMIN — Medication 6 MG: at 21:33

## 2021-01-01 RX ADMIN — FAMOTIDINE 20 MG: 20 TABLET, FILM COATED ORAL at 08:29

## 2021-01-01 RX ADMIN — SUCRALFATE 1 G: 1 TABLET ORAL at 20:38

## 2021-01-01 RX ADMIN — SUCRALFATE 1 G: 1 TABLET ORAL at 18:54

## 2021-01-01 RX ADMIN — Medication 6 MG: at 20:20

## 2021-01-01 RX ADMIN — Medication 1 TABLET: at 09:02

## 2021-01-01 RX ADMIN — ASPIRIN 81 MG: 81 TABLET, COATED ORAL at 09:36

## 2021-01-01 RX ADMIN — MIDAZOLAM 2 MG: 1 INJECTION INTRAMUSCULAR; INTRAVENOUS at 11:23

## 2021-01-01 RX ADMIN — ANTACID/ANTIFLATULENT 1 TABLET: 380; 550; 10; 10 GRANULE, EFFERVESCENT ORAL at 09:24

## 2021-01-01 RX ADMIN — MIDAZOLAM 3 MG: 1 INJECTION INTRAMUSCULAR; INTRAVENOUS at 13:16

## 2021-01-01 RX ADMIN — OXYCODONE HYDROCHLORIDE AND ACETAMINOPHEN 1 TABLET: 5; 325 TABLET ORAL at 14:20

## 2021-01-01 RX ADMIN — Medication 2 G: at 10:43

## 2021-01-01 RX ADMIN — METOPROLOL TARTRATE 12.5 MG: 25 TABLET, FILM COATED ORAL at 08:29

## 2021-01-01 RX ADMIN — SODIUM CHLORIDE, PRESERVATIVE FREE 10 ML: 5 INJECTION INTRAVENOUS at 20:20

## 2021-01-01 RX ADMIN — SUCRALFATE 1 G: 1 TABLET ORAL at 12:43

## 2021-01-01 RX ADMIN — CITALOPRAM 40 MG: 20 TABLET, FILM COATED ORAL at 21:18

## 2021-01-01 RX ADMIN — Medication 1 TABLET: at 08:37

## 2021-01-01 RX ADMIN — SODIUM CHLORIDE, PRESERVATIVE FREE 10 ML: 5 INJECTION INTRAVENOUS at 08:38

## 2021-01-01 RX ADMIN — ATORVASTATIN CALCIUM 20 MG: 10 TABLET, FILM COATED ORAL at 21:08

## 2021-01-01 RX ADMIN — BISACODYL 10 MG: 5 TABLET ORAL at 09:36

## 2021-01-01 RX ADMIN — PROPOFOL 40 MCG/KG/MIN: 10 INJECTION, EMULSION INTRAVENOUS at 22:15

## 2021-01-01 RX ADMIN — PROPOFOL 50 MG: 10 INJECTION, EMULSION INTRAVENOUS at 16:46

## 2021-01-01 RX ADMIN — SUCRALFATE 1 G: 1 TABLET ORAL at 20:29

## 2021-01-01 RX ADMIN — EPOETIN ALFA-EPBX 10000 UNITS: 10000 INJECTION, SOLUTION INTRAVENOUS; SUBCUTANEOUS at 15:34

## 2021-01-01 RX ADMIN — ASPIRIN 81 MG: 81 TABLET, COATED ORAL at 08:29

## 2021-01-01 RX ADMIN — CYCLOBENZAPRINE 5 MG: 10 TABLET, FILM COATED ORAL at 11:21

## 2021-01-01 RX ADMIN — SUCRALFATE 1 G: 1 TABLET ORAL at 18:08

## 2021-01-01 RX ADMIN — SUCRALFATE 1 G: 1 TABLET ORAL at 05:13

## 2021-01-01 RX ADMIN — POLYETHYLENE GLYCOL 3350 17 G: 17 POWDER, FOR SOLUTION ORAL at 18:43

## 2021-01-01 RX ADMIN — CHLORHEXIDINE GLUCONATE 0.12% ORAL RINSE 15 ML: 1.2 LIQUID ORAL at 05:21

## 2021-01-01 RX ADMIN — SUCRALFATE 1 G: 1 TABLET ORAL at 10:16

## 2021-01-01 RX ADMIN — SUCRALFATE 1 G: 1 TABLET ORAL at 17:15

## 2021-01-01 RX ADMIN — ONDANSETRON 4 MG: 2 INJECTION INTRAMUSCULAR; INTRAVENOUS at 11:25

## 2021-01-01 RX ADMIN — SODIUM CHLORIDE, PRESERVATIVE FREE 10 ML: 5 INJECTION INTRAVENOUS at 08:00

## 2021-01-01 RX ADMIN — HEPARIN SODIUM 3200 UNITS: 1000 INJECTION, SOLUTION INTRAVENOUS; SUBCUTANEOUS at 00:27

## 2021-01-01 RX ADMIN — FENTANYL CITRATE 100 MCG: 50 INJECTION, SOLUTION INTRAMUSCULAR; INTRAVENOUS at 10:29

## 2021-01-01 RX ADMIN — FAMOTIDINE 20 MG: 20 TABLET, FILM COATED ORAL at 08:07

## 2021-01-01 RX ADMIN — SUCRALFATE 1 G: 1 TABLET ORAL at 08:17

## 2021-01-01 RX ADMIN — SODIUM CHLORIDE 50 ML/HR: 9 INJECTION, SOLUTION INTRAVENOUS at 17:24

## 2021-01-01 RX ADMIN — HYDROCODONE BITARTRATE AND ACETAMINOPHEN 1 TABLET: 5; 325 TABLET ORAL at 21:07

## 2021-01-01 RX ADMIN — FENTANYL CITRATE 25 MCG: 50 INJECTION INTRAMUSCULAR; INTRAVENOUS at 12:14

## 2021-01-01 RX ADMIN — Medication 1 APPLICATION: at 05:21

## 2021-01-01 RX ADMIN — ONDANSETRON HYDROCHLORIDE 4 MG: 2 SOLUTION INTRAMUSCULAR; INTRAVENOUS at 08:29

## 2021-01-01 RX ADMIN — FENTANYL CITRATE 25 MCG: 50 INJECTION INTRAMUSCULAR; INTRAVENOUS at 09:59

## 2021-01-01 RX ADMIN — CITALOPRAM 40 MG: 20 TABLET, FILM COATED ORAL at 20:44

## 2021-01-01 RX ADMIN — ATORVASTATIN CALCIUM 20 MG: 10 TABLET, FILM COATED ORAL at 21:32

## 2021-01-01 RX ADMIN — HYDROCODONE BITARTRATE AND ACETAMINOPHEN 1 TABLET: 5; 325 TABLET ORAL at 14:39

## 2021-01-01 RX ADMIN — ATORVASTATIN CALCIUM 20 MG: 10 TABLET, FILM COATED ORAL at 20:54

## 2021-01-01 RX ADMIN — MIDODRINE HYDROCHLORIDE 10 MG: 2.5 TABLET ORAL at 17:33

## 2021-01-01 RX ADMIN — SUCRALFATE 1 G: 1 TABLET ORAL at 09:52

## 2021-01-01 RX ADMIN — FAMOTIDINE 20 MG: 20 TABLET, FILM COATED ORAL at 10:16

## 2021-01-01 RX ADMIN — CHLORHEXIDINE GLUCONATE 0.12% ORAL RINSE 15 ML: 1.2 LIQUID ORAL at 06:20

## 2021-01-01 RX ADMIN — ATORVASTATIN CALCIUM 20 MG: 10 TABLET, FILM COATED ORAL at 20:59

## 2021-01-01 RX ADMIN — FAMOTIDINE 20 MG: 20 TABLET, FILM COATED ORAL at 08:00

## 2021-01-01 RX ADMIN — SUCRALFATE 1 G: 1 TABLET ORAL at 11:17

## 2021-01-01 RX ADMIN — SODIUM CHLORIDE 50 ML/HR: 9 INJECTION, SOLUTION INTRAVENOUS at 09:30

## 2021-01-01 RX ADMIN — SUFENTANIL CITRATE 50 MCG: 50 INJECTION EPIDURAL; INTRAVENOUS at 09:30

## 2021-01-01 RX ADMIN — SUCRALFATE 1 G: 1 TABLET ORAL at 12:27

## 2021-01-01 RX ADMIN — ALLOPURINOL 100 MG: 100 TABLET ORAL at 09:42

## 2021-01-01 RX ADMIN — ONDANSETRON HYDROCHLORIDE 4 MG: 2 SOLUTION INTRAMUSCULAR; INTRAVENOUS at 12:43

## 2021-01-01 RX ADMIN — PANTOPRAZOLE SODIUM 40 MG: 40 TABLET, DELAYED RELEASE ORAL at 08:12

## 2021-01-01 RX ADMIN — SUCRALFATE 1 G: 1 TABLET ORAL at 17:50

## 2021-01-01 RX ADMIN — PANTOPRAZOLE SODIUM 40 MG: 40 TABLET, DELAYED RELEASE ORAL at 05:34

## 2021-01-01 RX ADMIN — METOPROLOL TARTRATE 25 MG: 25 TABLET, FILM COATED ORAL at 09:02

## 2021-01-01 RX ADMIN — OXYCODONE HYDROCHLORIDE AND ACETAMINOPHEN 500 MG: 500 TABLET ORAL at 17:51

## 2021-01-01 RX ADMIN — GABAPENTIN 100 MG: 100 CAPSULE ORAL at 12:27

## 2021-01-01 RX ADMIN — ONDANSETRON 4 MG: 2 INJECTION INTRAMUSCULAR; INTRAVENOUS at 21:06

## 2021-01-01 RX ADMIN — ACETAMINOPHEN 650 MG: 325 TABLET, FILM COATED ORAL at 20:44

## 2021-01-01 RX ADMIN — ALLOPURINOL 100 MG: 100 TABLET ORAL at 20:20

## 2021-01-01 RX ADMIN — SUCRALFATE 1 G: 1 TABLET ORAL at 21:18

## 2021-01-01 RX ADMIN — MIDODRINE HYDROCHLORIDE 10 MG: 2.5 TABLET ORAL at 18:43

## 2021-01-01 RX ADMIN — ASPIRIN 81 MG: 81 TABLET, COATED ORAL at 10:16

## 2021-01-01 RX ADMIN — FAMOTIDINE 20 MG: 20 TABLET, FILM COATED ORAL at 12:43

## 2021-01-01 RX ADMIN — CITALOPRAM 40 MG: 20 TABLET, FILM COATED ORAL at 20:54

## 2021-01-01 RX ADMIN — METOPROLOL TARTRATE 25 MG: 25 TABLET, FILM COATED ORAL at 20:54

## 2021-01-01 RX ADMIN — Medication 1 TABLET: at 09:45

## 2021-01-01 RX ADMIN — SUCRALFATE 1 G: 1 TABLET ORAL at 17:41

## 2021-01-01 RX ADMIN — SUCRALFATE 1 G: 1 TABLET ORAL at 11:13

## 2021-01-01 RX ADMIN — ALLOPURINOL 100 MG: 100 TABLET ORAL at 20:56

## 2021-01-01 RX ADMIN — GUAIFENESIN 600 MG: 600 TABLET, EXTENDED RELEASE ORAL at 20:45

## 2021-01-01 RX ADMIN — ALLOPURINOL 100 MG: 100 TABLET ORAL at 18:14

## 2021-01-01 RX ADMIN — Medication 3 MG: at 00:33

## 2021-01-01 RX ADMIN — OXYCODONE AND ACETAMINOPHEN 1 TABLET: 325; 10 TABLET ORAL at 08:39

## 2021-01-01 RX ADMIN — ATRACURIUM BESYLATE 50 MG: 10 INJECTION, SOLUTION INTRAVENOUS at 11:16

## 2021-01-01 RX ADMIN — MIDODRINE HYDROCHLORIDE 10 MG: 2.5 TABLET ORAL at 11:48

## 2021-01-01 RX ADMIN — CHLORHEXIDINE GLUCONATE 0.12% ORAL RINSE 15 ML: 1.2 LIQUID ORAL at 17:09

## 2021-01-01 RX ADMIN — ALLOPURINOL 100 MG: 100 TABLET ORAL at 09:39

## 2021-01-01 RX ADMIN — ENOXAPARIN SODIUM 30 MG: 30 INJECTION SUBCUTANEOUS at 18:40

## 2021-01-01 RX ADMIN — PROPOFOL 50 MG: 10 INJECTION, EMULSION INTRAVENOUS at 07:05

## 2021-01-01 RX ADMIN — SUCRALFATE 1 G: 1 TABLET ORAL at 12:38

## 2021-01-01 RX ADMIN — ENOXAPARIN SODIUM 30 MG: 30 INJECTION SUBCUTANEOUS at 17:21

## 2021-01-01 RX ADMIN — ENOXAPARIN SODIUM 30 MG: 30 INJECTION SUBCUTANEOUS at 17:41

## 2021-01-01 RX ADMIN — FENTANYL CITRATE 25 MCG: 50 INJECTION INTRAMUSCULAR; INTRAVENOUS at 11:25

## 2021-01-01 RX ADMIN — SUFENTANIL CITRATE 2 MCG/KG/HR: 50 INJECTION EPIDURAL; INTRAVENOUS at 10:10

## 2021-01-01 RX ADMIN — SCOLOPAMINE TRANSDERMAL SYSTEM 1 PATCH: 1 PATCH, EXTENDED RELEASE TRANSDERMAL at 09:15

## 2021-01-01 RX ADMIN — ASPIRIN 81 MG: 81 TABLET, COATED ORAL at 08:00

## 2021-01-01 RX ADMIN — ASPIRIN 81 MG: 81 TABLET, COATED ORAL at 08:51

## 2021-01-01 RX ADMIN — IPRATROPIUM BROMIDE AND ALBUTEROL SULFATE 3 ML: 2.5; .5 SOLUTION RESPIRATORY (INHALATION) at 19:04

## 2021-01-01 RX ADMIN — PROTAMINE SULFATE 25 MG: 10 INJECTION, SOLUTION INTRAVENOUS at 17:08

## 2021-01-01 RX ADMIN — NITROGLYCERIN 16 MCG/MIN: 20 INJECTION INTRAVENOUS at 14:34

## 2021-01-01 RX ADMIN — HYDROMORPHONE HYDROCHLORIDE 0.5 MG: 1 INJECTION, SOLUTION INTRAMUSCULAR; INTRAVENOUS; SUBCUTANEOUS at 12:09

## 2021-01-01 RX ADMIN — ALLOPURINOL 100 MG: 100 TABLET ORAL at 09:03

## 2021-01-01 RX ADMIN — GABAPENTIN 300 MG: 300 CAPSULE ORAL at 08:00

## 2021-01-01 RX ADMIN — Medication 6 MG: at 21:22

## 2021-01-01 RX ADMIN — CHLORHEXIDINE GLUCONATE 0.12% ORAL RINSE 15 ML: 1.2 LIQUID ORAL at 19:33

## 2021-01-01 RX ADMIN — INSULIN DETEMIR 20 UNITS: 100 INJECTION, SOLUTION SUBCUTANEOUS at 08:01

## 2021-01-01 RX ADMIN — GUAIFENESIN 600 MG: 600 TABLET, EXTENDED RELEASE ORAL at 00:36

## 2021-01-01 RX ADMIN — HEPARIN SODIUM 5000 UNITS: 5000 INJECTION, SOLUTION INTRAVENOUS; SUBCUTANEOUS at 05:34

## 2021-01-01 RX ADMIN — METOPROLOL TARTRATE 25 MG: 25 TABLET, FILM COATED ORAL at 20:45

## 2021-01-01 RX ADMIN — Medication 1 APPLICATION: at 05:26

## 2021-01-01 RX ADMIN — ASPIRIN 81 MG: 81 TABLET, COATED ORAL at 08:05

## 2021-01-01 RX ADMIN — ALBUMIN HUMAN 250 ML: 0.05 INJECTION, SOLUTION INTRAVENOUS at 15:45

## 2021-01-01 RX ADMIN — DEXTROSE 15 G: 15 GEL ORAL at 12:31

## 2021-01-01 RX ADMIN — OXYCODONE HYDROCHLORIDE AND ACETAMINOPHEN 1 TABLET: 5; 325 TABLET ORAL at 11:17

## 2021-01-01 RX ADMIN — SUCRALFATE 1 G: 1 TABLET ORAL at 05:34

## 2021-01-01 RX ADMIN — ATORVASTATIN CALCIUM 20 MG: 10 TABLET, FILM COATED ORAL at 20:13

## 2021-01-01 RX ADMIN — CYCLOBENZAPRINE 5 MG: 10 TABLET, FILM COATED ORAL at 10:16

## 2021-01-01 RX ADMIN — SODIUM CHLORIDE 300 ML: 9 INJECTION, SOLUTION INTRAVENOUS at 21:50

## 2021-01-01 RX ADMIN — VASOPRESSIN 1 ML: 20 INJECTION INTRAVENOUS at 10:42

## 2021-01-01 RX ADMIN — FAMOTIDINE 20 MG: 20 TABLET, FILM COATED ORAL at 21:00

## 2021-01-01 RX ADMIN — ONDANSETRON HYDROCHLORIDE 4 MG: 2 SOLUTION INTRAMUSCULAR; INTRAVENOUS at 18:21

## 2021-01-01 RX ADMIN — SODIUM CHLORIDE 500 ML: 0.9 INJECTION, SOLUTION INTRAVENOUS at 00:20

## 2021-01-01 RX ADMIN — SODIUM CHLORIDE, PRESERVATIVE FREE 10 ML: 5 INJECTION INTRAVENOUS at 20:55

## 2021-01-01 RX ADMIN — CITALOPRAM 40 MG: 20 TABLET, FILM COATED ORAL at 20:38

## 2021-01-01 RX ADMIN — HEPARIN SODIUM 3200 UNITS: 1000 INJECTION, SOLUTION INTRAVENOUS; SUBCUTANEOUS at 18:30

## 2021-01-01 RX ADMIN — SUCRALFATE 1 G: 1 TABLET ORAL at 08:00

## 2021-01-01 RX ADMIN — CEFAZOLIN SODIUM 2 G: 2 SOLUTION INTRAVENOUS at 09:06

## 2021-01-01 RX ADMIN — ANTACID TABLETS 2 TABLET: 500 TABLET, CHEWABLE ORAL at 20:44

## 2021-01-01 RX ADMIN — Medication 1 TABLET: at 08:36

## 2021-01-01 RX ADMIN — CITALOPRAM 40 MG: 20 TABLET, FILM COATED ORAL at 22:57

## 2021-01-01 RX ADMIN — CITALOPRAM 40 MG: 20 TABLET, FILM COATED ORAL at 20:43

## 2021-01-01 RX ADMIN — ATORVASTATIN CALCIUM 20 MG: 10 TABLET, FILM COATED ORAL at 20:22

## 2021-01-01 RX ADMIN — HEPARIN SODIUM 1000 UNITS: 1000 INJECTION, SOLUTION INTRAVENOUS; SUBCUTANEOUS at 01:02

## 2021-01-01 RX ADMIN — SUCRALFATE 1 G: 1 TABLET ORAL at 08:51

## 2021-01-01 RX ADMIN — FENTANYL CITRATE 50 MCG: 50 INJECTION, SOLUTION INTRAMUSCULAR; INTRAVENOUS at 01:24

## 2021-01-01 RX ADMIN — HYDROCODONE BITARTRATE AND ACETAMINOPHEN 1 TABLET: 5; 325 TABLET ORAL at 16:24

## 2021-01-01 RX ADMIN — CITALOPRAM 40 MG: 20 TABLET, FILM COATED ORAL at 20:29

## 2021-01-01 RX ADMIN — CALCIUM ACETATE 667 MG: 667 CAPSULE ORAL at 11:09

## 2021-01-01 RX ADMIN — FENTANYL CITRATE 25 MCG: 50 INJECTION INTRAMUSCULAR; INTRAVENOUS at 12:33

## 2021-01-01 RX ADMIN — CITALOPRAM 40 MG: 20 TABLET, FILM COATED ORAL at 20:20

## 2021-01-01 RX ADMIN — MIDAZOLAM 2 MG: 1 INJECTION INTRAMUSCULAR; INTRAVENOUS at 09:10

## 2021-01-01 RX ADMIN — ATORVASTATIN CALCIUM 20 MG: 10 TABLET, FILM COATED ORAL at 22:42

## 2021-01-01 RX ADMIN — FAMOTIDINE 20 MG: 20 TABLET, FILM COATED ORAL at 16:35

## 2021-01-01 RX ADMIN — ASPIRIN 81 MG: 81 TABLET, COATED ORAL at 09:06

## 2021-01-01 RX ADMIN — ACETAMINOPHEN 650 MG: 325 TABLET, FILM COATED ORAL at 21:57

## 2021-01-01 RX ADMIN — POTASSIUM CHLORIDE 20 MEQ: 10 CAPSULE, COATED, EXTENDED RELEASE ORAL at 08:38

## 2021-01-01 RX ADMIN — IPRATROPIUM BROMIDE AND ALBUTEROL SULFATE 3 ML: 2.5; .5 SOLUTION RESPIRATORY (INHALATION) at 06:21

## 2021-01-01 RX ADMIN — SUCRALFATE 1 G: 1 TABLET ORAL at 12:13

## 2021-01-01 RX ADMIN — SODIUM CHLORIDE, PRESERVATIVE FREE 10 ML: 5 INJECTION INTRAVENOUS at 20:58

## 2021-01-01 RX ADMIN — GUAIFENESIN 600 MG: 600 TABLET, EXTENDED RELEASE ORAL at 09:02

## 2021-01-01 RX ADMIN — ONDANSETRON 4 MG: 2 INJECTION INTRAMUSCULAR; INTRAVENOUS at 11:30

## 2021-01-01 RX ADMIN — ONDANSETRON 4 MG: 2 INJECTION INTRAMUSCULAR; INTRAVENOUS at 20:34

## 2021-01-01 RX ADMIN — CYCLOBENZAPRINE 5 MG: 10 TABLET, FILM COATED ORAL at 05:09

## 2021-01-01 RX ADMIN — SUCRALFATE 1 G: 1 TABLET ORAL at 11:21

## 2021-01-01 RX ADMIN — SUCRALFATE 1 G: 1 TABLET ORAL at 17:22

## 2021-01-01 RX ADMIN — SUCRALFATE 1 G: 1 TABLET ORAL at 21:32

## 2021-01-01 RX ADMIN — OXYCODONE HYDROCHLORIDE AND ACETAMINOPHEN 500 MG: 500 TABLET ORAL at 09:52

## 2021-01-01 RX ADMIN — POLYETHYLENE GLYCOL 3350 17 G: 17 POWDER, FOR SOLUTION ORAL at 11:37

## 2021-01-01 RX ADMIN — POLYETHYLENE GLYCOL 3350 17 G: 17 POWDER, FOR SOLUTION ORAL at 08:29

## 2021-01-01 RX ADMIN — IPRATROPIUM BROMIDE AND ALBUTEROL SULFATE 3 ML: 2.5; .5 SOLUTION RESPIRATORY (INHALATION) at 06:58

## 2021-01-01 RX ADMIN — SUCRALFATE 1 G: 1 TABLET ORAL at 16:41

## 2021-01-01 RX ADMIN — METOPROLOL TARTRATE 25 MG: 25 TABLET, FILM COATED ORAL at 09:06

## 2021-01-01 RX ADMIN — OXYCODONE HYDROCHLORIDE AND ACETAMINOPHEN 500 MG: 500 TABLET ORAL at 09:02

## 2021-01-01 RX ADMIN — ACETAMINOPHEN 650 MG: 325 TABLET, FILM COATED ORAL at 11:58

## 2021-01-01 RX ADMIN — FAMOTIDINE 20 MG: 20 TABLET, FILM COATED ORAL at 17:54

## 2021-01-01 RX ADMIN — SODIUM CHLORIDE, PRESERVATIVE FREE 10 ML: 5 INJECTION INTRAVENOUS at 20:22

## 2021-01-01 RX ADMIN — OXYCODONE HYDROCHLORIDE AND ACETAMINOPHEN 1 TABLET: 5; 325 TABLET ORAL at 21:18

## 2021-01-01 RX ADMIN — POTASSIUM CHLORIDE 40 MEQ: 10 CAPSULE, COATED, EXTENDED RELEASE ORAL at 09:43

## 2021-01-01 RX ADMIN — SODIUM CHLORIDE, PRESERVATIVE FREE 10 ML: 5 INJECTION INTRAVENOUS at 21:23

## 2021-01-01 RX ADMIN — Medication 3 MG: at 20:45

## 2021-01-01 RX ADMIN — CITALOPRAM 40 MG: 20 TABLET, FILM COATED ORAL at 20:55

## 2021-01-01 RX ADMIN — PROPOFOL 25 MG: 10 INJECTION, EMULSION INTRAVENOUS at 08:24

## 2021-01-01 RX ADMIN — ATORVASTATIN CALCIUM 20 MG: 10 TABLET, FILM COATED ORAL at 00:34

## 2021-01-01 RX ADMIN — FAMOTIDINE 20 MG: 20 TABLET, FILM COATED ORAL at 08:17

## 2021-01-01 RX ADMIN — IPRATROPIUM BROMIDE AND ALBUTEROL SULFATE 3 ML: 2.5; .5 SOLUTION RESPIRATORY (INHALATION) at 11:12

## 2021-01-01 RX ADMIN — LIDOCAINE HYDROCHLORIDE 50 MG: 20 INJECTION, SOLUTION EPIDURAL; INFILTRATION; INTRACAUDAL; PERINEURAL at 08:24

## 2021-01-01 RX ADMIN — EPOETIN ALFA-EPBX 10000 UNITS: 10000 INJECTION, SOLUTION INTRAVENOUS; SUBCUTANEOUS at 08:00

## 2021-01-01 RX ADMIN — ASPIRIN 81 MG: 81 TABLET, FILM COATED ORAL at 09:06

## 2021-01-01 RX ADMIN — CEFAZOLIN 2 G: 330 INJECTION, POWDER, FOR SOLUTION INTRAMUSCULAR; INTRAVENOUS at 16:18

## 2021-01-01 RX ADMIN — GABAPENTIN 300 MG: 300 CAPSULE ORAL at 09:07

## 2021-01-01 RX ADMIN — FAMOTIDINE 20 MG: 20 TABLET, FILM COATED ORAL at 00:34

## 2021-01-01 RX ADMIN — ATORVASTATIN CALCIUM 20 MG: 10 TABLET, FILM COATED ORAL at 20:56

## 2021-01-01 RX ADMIN — Medication 3 MG: at 11:30

## 2021-01-01 RX ADMIN — Medication 2 G: at 07:09

## 2021-01-01 RX ADMIN — ENOXAPARIN SODIUM 30 MG: 30 INJECTION SUBCUTANEOUS at 17:55

## 2021-01-01 RX ADMIN — SODIUM CHLORIDE, PRESERVATIVE FREE 10 ML: 5 INJECTION INTRAVENOUS at 21:22

## 2021-01-01 RX ADMIN — ASPIRIN 81 MG: 81 TABLET, FILM COATED ORAL at 09:02

## 2021-01-01 RX ADMIN — IPRATROPIUM BROMIDE AND ALBUTEROL SULFATE 3 ML: 2.5; .5 SOLUTION RESPIRATORY (INHALATION) at 06:39

## 2021-01-01 RX ADMIN — NITROGLYCERIN 5 MCG/MIN: 20 INJECTION INTRAVENOUS at 16:10

## 2021-01-01 RX ADMIN — HEPARIN SODIUM 3000 UNITS: 1000 INJECTION, SOLUTION INTRAVENOUS; SUBCUTANEOUS at 09:57

## 2021-01-01 RX ADMIN — GLYCOPYRROLATE 0.4 MG: 0.2 INJECTION, SOLUTION INTRAMUSCULAR; INTRAVENOUS at 11:30

## 2021-01-01 RX ADMIN — FAMOTIDINE 20 MG: 20 TABLET, FILM COATED ORAL at 08:12

## 2021-01-01 RX ADMIN — SUCRALFATE 1 G: 1 TABLET ORAL at 09:06

## 2021-01-01 RX ADMIN — GUAIFENESIN 600 MG: 600 TABLET, EXTENDED RELEASE ORAL at 09:52

## 2021-01-01 RX ADMIN — SUCRALFATE 1 G: 1 TABLET ORAL at 20:20

## 2021-01-01 RX ADMIN — EPOETIN ALFA-EPBX 10000 UNITS: 10000 INJECTION, SOLUTION INTRAVENOUS; SUBCUTANEOUS at 02:36

## 2021-01-01 RX ADMIN — SUFENTANIL CITRATE 50 MCG: 50 INJECTION EPIDURAL; INTRAVENOUS at 14:46

## 2021-01-01 RX ADMIN — Medication 3 MG: at 20:54

## 2021-01-01 RX ADMIN — SUCRALFATE 1 G: 1 TABLET ORAL at 20:05

## 2021-01-01 RX ADMIN — CLOPIDOGREL 75 MG: 75 TABLET, FILM COATED ORAL at 20:39

## 2021-01-01 RX ADMIN — SUCRALFATE 1 G: 1 TABLET ORAL at 17:53

## 2021-01-01 RX ADMIN — ANTACID TABLETS 2 TABLET: 500 TABLET, CHEWABLE ORAL at 21:08

## 2021-01-01 RX ADMIN — GABAPENTIN 300 MG: 300 CAPSULE ORAL at 12:34

## 2021-01-01 RX ADMIN — ASPIRIN 81 MG: 81 TABLET, FILM COATED ORAL at 09:45

## 2021-01-01 RX ADMIN — SUCRALFATE 1 G: 1 TABLET ORAL at 17:19

## 2021-01-01 RX ADMIN — CALCIUM CHLORIDE 1 G: 100 INJECTION INTRAVENOUS; INTRAVENTRICULAR at 19:33

## 2021-01-01 RX ADMIN — SUCRALFATE 1 G: 1 TABLET ORAL at 08:01

## 2021-01-01 RX ADMIN — Medication 200 MCG: at 11:38

## 2021-01-01 RX ADMIN — METOPROLOL TARTRATE 12.5 MG: 25 TABLET, FILM COATED ORAL at 08:43

## 2021-01-01 RX ADMIN — INSULIN LISPRO 3 UNITS: 100 INJECTION, SOLUTION INTRAVENOUS; SUBCUTANEOUS at 21:06

## 2021-01-01 RX ADMIN — ACETAMINOPHEN 650 MG: 325 TABLET, FILM COATED ORAL at 08:36

## 2021-01-01 RX ADMIN — ALLOPURINOL 100 MG: 100 TABLET ORAL at 22:57

## 2021-01-01 RX ADMIN — ATORVASTATIN CALCIUM 20 MG: 10 TABLET, FILM COATED ORAL at 21:18

## 2021-01-01 RX ADMIN — HEPARIN SODIUM 3200 UNITS: 1000 INJECTION, SOLUTION INTRAVENOUS; SUBCUTANEOUS at 12:00

## 2021-01-01 RX ADMIN — PROPOFOL 10 MG: 10 INJECTION, EMULSION INTRAVENOUS at 16:55

## 2021-01-01 RX ADMIN — ALBUMIN, HUMAN INJ 5% 250 ML: 5 SOLUTION at 15:45

## 2021-01-01 RX ADMIN — SODIUM CHLORIDE: 9 INJECTION, SOLUTION INTRAVENOUS at 09:01

## 2021-01-01 RX ADMIN — POTASSIUM CHLORIDE 10 MEQ: 7.46 INJECTION, SOLUTION INTRAVENOUS at 13:27

## 2021-01-01 RX ADMIN — GABAPENTIN 300 MG: 300 CAPSULE ORAL at 20:20

## 2021-01-01 RX ADMIN — FENTANYL CITRATE 25 MCG: 50 INJECTION INTRAMUSCULAR; INTRAVENOUS at 09:00

## 2021-01-01 RX ADMIN — FENTANYL CITRATE 50 MCG: 50 INJECTION, SOLUTION INTRAMUSCULAR; INTRAVENOUS at 21:35

## 2021-01-01 RX ADMIN — OXYCODONE HYDROCHLORIDE AND ACETAMINOPHEN 1 TABLET: 5; 325 TABLET ORAL at 11:47

## 2021-01-01 RX ADMIN — SUCRALFATE 1 G: 1 TABLET ORAL at 08:36

## 2021-01-01 RX ADMIN — HEPARIN SODIUM 3200 UNITS: 1000 INJECTION, SOLUTION INTRAVENOUS; SUBCUTANEOUS at 11:32

## 2021-01-01 RX ADMIN — DEXMEDETOMIDINE 0.5 MCG/KG/HR: 100 INJECTION, SOLUTION, CONCENTRATE INTRAVENOUS at 13:19

## 2021-01-01 RX ADMIN — OXYCODONE AND ACETAMINOPHEN 1 TABLET: 325; 10 TABLET ORAL at 12:09

## 2021-01-01 RX ADMIN — PANTOPRAZOLE SODIUM 40 MG: 40 TABLET, DELAYED RELEASE ORAL at 05:56

## 2021-01-01 RX ADMIN — GABAPENTIN 300 MG: 300 CAPSULE ORAL at 13:28

## 2021-01-01 RX ADMIN — PROTAMINE SULFATE 150 MG: 10 INJECTION, SOLUTION INTRAVENOUS at 14:03

## 2021-01-13 ENCOUNTER — OFFICE VISIT (OUTPATIENT)
Dept: CARDIAC SURGERY | Facility: CLINIC | Age: 66
End: 2021-01-13

## 2021-01-13 VITALS
DIASTOLIC BLOOD PRESSURE: 70 MMHG | HEIGHT: 63 IN | SYSTOLIC BLOOD PRESSURE: 138 MMHG | WEIGHT: 170.6 LBS | OXYGEN SATURATION: 97 % | BODY MASS INDEX: 30.23 KG/M2 | HEART RATE: 68 BPM

## 2021-01-13 DIAGNOSIS — N18.6 ESRD ON DIALYSIS (HCC): ICD-10-CM

## 2021-01-13 DIAGNOSIS — E11.649 TYPE 2 DIABETES MELLITUS WITH HYPOGLYCEMIA WITHOUT COMA, WITHOUT LONG-TERM CURRENT USE OF INSULIN (HCC): ICD-10-CM

## 2021-01-13 DIAGNOSIS — I25.118 CORONARY ARTERY DISEASE OF NATIVE ARTERY OF NATIVE HEART WITH STABLE ANGINA PECTORIS (HCC): ICD-10-CM

## 2021-01-13 DIAGNOSIS — Z99.2 ESRD ON DIALYSIS (HCC): ICD-10-CM

## 2021-01-13 DIAGNOSIS — I25.119 CORONARY ARTERY DISEASE INVOLVING NATIVE CORONARY ARTERY OF NATIVE HEART WITH ANGINA PECTORIS (HCC): Primary | ICD-10-CM

## 2021-01-13 PROCEDURE — 99214 OFFICE O/P EST MOD 30 MIN: CPT | Performed by: THORACIC SURGERY (CARDIOTHORACIC VASCULAR SURGERY)

## 2021-01-18 ENCOUNTER — TELEPHONE (OUTPATIENT)
Dept: CARDIOLOGY | Facility: CLINIC | Age: 66
End: 2021-01-18

## 2021-01-18 NOTE — TELEPHONE ENCOUNTER
Pt calling to report she would like to go ahead with surgery.  States she will need Dr Wood to coordinate this with Dr Stallings as she is currently on dialysis and doing this at home but she won't be able to do that for a while so will need to get dialysis access arranged so she can have dialysis at the \[facility]\"" or dialysis clinic.  Can reach pt at #[phone]/maykel

## 2021-01-20 NOTE — TELEPHONE ENCOUNTER
Kimberlee from [facility] called regarding surgery date for patient. She states that Dr Hernandez is needing the patient to have a Permcath insertion. Dr Hernandez would like to have that done 2 weeks prior to surgery date. Call back # is [phone]

## 2021-01-21 DIAGNOSIS — Z99.2 ANEMIA IN CHRONIC KIDNEY DISEASE, ON CHRONIC DIALYSIS (HCC): ICD-10-CM

## 2021-01-21 DIAGNOSIS — N18.6 ANEMIA IN CHRONIC KIDNEY DISEASE, ON CHRONIC DIALYSIS (HCC): ICD-10-CM

## 2021-01-21 DIAGNOSIS — D63.1 ANEMIA IN CHRONIC KIDNEY DISEASE, ON CHRONIC DIALYSIS (HCC): ICD-10-CM

## 2021-01-21 RX ORDER — ACETAMINOPHEN 325 MG/1
650 TABLET ORAL ONCE
Status: CANCELLED | OUTPATIENT
Start: 2021-01-22 | End: 2021-01-22

## 2021-01-21 RX ORDER — SODIUM CHLORIDE 9 MG/ML
INJECTION, SOLUTION INTRAVENOUS CONTINUOUS
Status: CANCELLED | OUTPATIENT
Start: 2021-01-22

## 2021-01-21 RX ORDER — DIPHENHYDRAMINE HYDROCHLORIDE 50 MG/ML
50 INJECTION INTRAMUSCULAR; INTRAVENOUS ONCE
Status: CANCELLED | OUTPATIENT
Start: 2021-01-22 | End: 2021-01-22

## 2021-01-21 RX ORDER — SODIUM CHLORIDE 0.9 % (FLUSH) 0.9 %
20 SYRINGE (ML) INJECTION PRN
Status: CANCELLED | OUTPATIENT
Start: 2021-01-22

## 2021-01-21 RX ORDER — EPINEPHRINE 1 MG/ML
0.3 INJECTION, SOLUTION, CONCENTRATE INTRAVENOUS PRN
Status: CANCELLED | OUTPATIENT
Start: 2021-01-22

## 2021-01-21 RX ORDER — METHYLPREDNISOLONE SODIUM SUCCINATE 125 MG/2ML
125 INJECTION, POWDER, LYOPHILIZED, FOR SOLUTION INTRAMUSCULAR; INTRAVENOUS ONCE
Status: CANCELLED | OUTPATIENT
Start: 2021-01-22 | End: 2021-01-22

## 2021-01-21 RX ORDER — 0.9 % SODIUM CHLORIDE 0.9 %
250 INTRAVENOUS SOLUTION INTRAVENOUS CONTINUOUS
Status: CANCELLED | OUTPATIENT
Start: 2021-01-22 | End: 2021-01-24

## 2021-01-21 RX ORDER — DIPHENHYDRAMINE HYDROCHLORIDE 50 MG/ML
25 INJECTION INTRAMUSCULAR; INTRAVENOUS ONCE
Status: CANCELLED | OUTPATIENT
Start: 2021-01-22 | End: 2021-01-22

## 2021-01-22 ENCOUNTER — HOSPITAL ENCOUNTER (OUTPATIENT)
Dept: INFUSION THERAPY | Age: 66
Setting detail: INFUSION SERIES
Discharge: HOME | End: 2021-01-22
Payer: MEDICARE

## 2021-01-22 VITALS
TEMPERATURE: 98 F | DIASTOLIC BLOOD PRESSURE: 68 MMHG | HEART RATE: 50 BPM | RESPIRATION RATE: 18 BRPM | OXYGEN SATURATION: 98 % | SYSTOLIC BLOOD PRESSURE: 167 MMHG

## 2021-01-22 DIAGNOSIS — N18.6 ANEMIA IN CHRONIC KIDNEY DISEASE, ON CHRONIC DIALYSIS (HCC): Primary | ICD-10-CM

## 2021-01-22 DIAGNOSIS — D63.1 ANEMIA IN CHRONIC KIDNEY DISEASE, ON CHRONIC DIALYSIS (HCC): Primary | ICD-10-CM

## 2021-01-22 DIAGNOSIS — Z99.2 ANEMIA IN CHRONIC KIDNEY DISEASE, ON CHRONIC DIALYSIS (HCC): Primary | ICD-10-CM

## 2021-01-22 LAB
ABO/RH: NORMAL
ANTIBODY SCREEN: NORMAL
BASOPHILS ABSOLUTE: 0 K/UL (ref 0–0.2)
BASOPHILS RELATIVE PERCENT: 0.5 % (ref 0–1)
BLOOD BANK DISPENSE STATUS: NORMAL
BLOOD BANK DISPENSE STATUS: NORMAL
BLOOD BANK PRODUCT CODE: NORMAL
BLOOD BANK PRODUCT CODE: NORMAL
BPU ID: NORMAL
BPU ID: NORMAL
DESCRIPTION BLOOD BANK: NORMAL
DESCRIPTION BLOOD BANK: NORMAL
EOSINOPHILS ABSOLUTE: 0.6 K/UL (ref 0–0.6)
EOSINOPHILS RELATIVE PERCENT: 7.9 % (ref 0–5)
HCT VFR BLD CALC: 24.6 % (ref 37–47)
HEMOGLOBIN: 7.7 G/DL (ref 12–16)
IMMATURE GRANULOCYTES #: 0.1 K/UL
LYMPHOCYTES ABSOLUTE: 1 K/UL (ref 1.1–4.5)
LYMPHOCYTES RELATIVE PERCENT: 12.2 % (ref 20–40)
MCH RBC QN AUTO: 31.8 PG (ref 27–31)
MCHC RBC AUTO-ENTMCNC: 31.3 G/DL (ref 33–37)
MCV RBC AUTO: 101.7 FL (ref 81–99)
MONOCYTES ABSOLUTE: 0.6 K/UL (ref 0–0.9)
MONOCYTES RELATIVE PERCENT: 7.4 % (ref 0–10)
NEUTROPHILS ABSOLUTE: 5.6 K/UL (ref 1.5–7.5)
NEUTROPHILS RELATIVE PERCENT: 71.4 % (ref 50–65)
PDW BLD-RTO: 15.9 % (ref 11.5–14.5)
PLATELET # BLD: 219 K/UL (ref 130–400)
PMV BLD AUTO: 11.3 FL (ref 9.4–12.3)
RBC # BLD: 2.42 M/UL (ref 4.2–5.4)
WBC # BLD: 7.9 K/UL (ref 4.8–10.8)

## 2021-01-22 PROCEDURE — 2580000003 HC RX 258: Performed by: CLINICAL NURSE SPECIALIST

## 2021-01-22 PROCEDURE — 36430 TRANSFUSION BLD/BLD COMPNT: CPT

## 2021-01-22 PROCEDURE — 86923 COMPATIBILITY TEST ELECTRIC: CPT

## 2021-01-22 PROCEDURE — 96374 THER/PROPH/DIAG INJ IV PUSH: CPT

## 2021-01-22 PROCEDURE — 86900 BLOOD TYPING SEROLOGIC ABO: CPT

## 2021-01-22 PROCEDURE — 96375 TX/PRO/DX INJ NEW DRUG ADDON: CPT

## 2021-01-22 PROCEDURE — 86901 BLOOD TYPING SEROLOGIC RH(D): CPT

## 2021-01-22 PROCEDURE — 36415 COLL VENOUS BLD VENIPUNCTURE: CPT

## 2021-01-22 PROCEDURE — 6360000002 HC RX W HCPCS: Performed by: CLINICAL NURSE SPECIALIST

## 2021-01-22 PROCEDURE — 85025 COMPLETE CBC W/AUTO DIFF WBC: CPT

## 2021-01-22 PROCEDURE — 6370000000 HC RX 637 (ALT 250 FOR IP): Performed by: CLINICAL NURSE SPECIALIST

## 2021-01-22 PROCEDURE — P9016 RBC LEUKOCYTES REDUCED: HCPCS

## 2021-01-22 PROCEDURE — 86850 RBC ANTIBODY SCREEN: CPT

## 2021-01-22 RX ORDER — SODIUM CHLORIDE 0.9 % (FLUSH) 0.9 %
20 SYRINGE (ML) INJECTION PRN
Status: CANCELLED | OUTPATIENT
Start: 2021-01-22

## 2021-01-22 RX ORDER — EPINEPHRINE 1 MG/ML
0.3 INJECTION, SOLUTION, CONCENTRATE INTRAVENOUS PRN
Status: CANCELLED | OUTPATIENT
Start: 2021-01-22

## 2021-01-22 RX ORDER — DIPHENHYDRAMINE HYDROCHLORIDE 50 MG/ML
25 INJECTION INTRAMUSCULAR; INTRAVENOUS ONCE
Status: CANCELLED | OUTPATIENT
Start: 2021-01-22 | End: 2021-01-22

## 2021-01-22 RX ORDER — SODIUM CHLORIDE 9 MG/ML
INJECTION, SOLUTION INTRAVENOUS CONTINUOUS
Status: CANCELLED | OUTPATIENT
Start: 2021-01-22

## 2021-01-22 RX ORDER — DIPHENHYDRAMINE HYDROCHLORIDE 50 MG/ML
50 INJECTION INTRAMUSCULAR; INTRAVENOUS ONCE
Status: CANCELLED | OUTPATIENT
Start: 2021-01-22 | End: 2021-01-22

## 2021-01-22 RX ORDER — 0.9 % SODIUM CHLORIDE 0.9 %
250 INTRAVENOUS SOLUTION INTRAVENOUS CONTINUOUS
Status: DISCONTINUED | OUTPATIENT
Start: 2021-01-22 | End: 2021-01-24 | Stop reason: HOSPADM

## 2021-01-22 RX ORDER — SODIUM CHLORIDE 0.9 % (FLUSH) 0.9 %
20 SYRINGE (ML) INJECTION PRN
Status: DISCONTINUED | OUTPATIENT
Start: 2021-01-22 | End: 2021-01-24 | Stop reason: HOSPADM

## 2021-01-22 RX ORDER — ACETAMINOPHEN 325 MG/1
650 TABLET ORAL ONCE
Status: CANCELLED | OUTPATIENT
Start: 2021-01-22 | End: 2021-01-22

## 2021-01-22 RX ORDER — METHYLPREDNISOLONE SODIUM SUCCINATE 125 MG/2ML
125 INJECTION, POWDER, LYOPHILIZED, FOR SOLUTION INTRAMUSCULAR; INTRAVENOUS ONCE
Status: CANCELLED | OUTPATIENT
Start: 2021-01-22 | End: 2021-01-22

## 2021-01-22 RX ORDER — DIPHENHYDRAMINE HYDROCHLORIDE 50 MG/ML
25 INJECTION INTRAMUSCULAR; INTRAVENOUS ONCE
Status: COMPLETED | OUTPATIENT
Start: 2021-01-22 | End: 2021-01-22

## 2021-01-22 RX ORDER — ACETAMINOPHEN 325 MG/1
650 TABLET ORAL ONCE
Status: COMPLETED | OUTPATIENT
Start: 2021-01-22 | End: 2021-01-22

## 2021-01-22 RX ORDER — 0.9 % SODIUM CHLORIDE 0.9 %
250 INTRAVENOUS SOLUTION INTRAVENOUS CONTINUOUS
Status: CANCELLED | OUTPATIENT
Start: 2021-01-22 | End: 2021-01-24

## 2021-01-22 RX ADMIN — DIPHENHYDRAMINE HYDROCHLORIDE 25 MG: 50 INJECTION, SOLUTION INTRAMUSCULAR; INTRAVENOUS at 08:40

## 2021-01-22 RX ADMIN — HYDROCORTISONE SODIUM SUCCINATE 100 MG: 100 INJECTION, POWDER, FOR SOLUTION INTRAMUSCULAR; INTRAVENOUS at 08:40

## 2021-01-22 RX ADMIN — SODIUM CHLORIDE 250 ML: 9 INJECTION, SOLUTION INTRAVENOUS at 08:13

## 2021-01-22 RX ADMIN — ACETAMINOPHEN 650 MG: 325 TABLET ORAL at 08:40

## 2021-01-22 ASSESSMENT — PAIN SCALES - GENERAL: PAINLEVEL_OUTOF10: 0

## 2021-01-22 NOTE — TELEPHONE ENCOUNTER
Danni with [facility] message requesting to speak with one of the providers re: Ms. Loly.  She is wanting to give an update on this pt.  Can reach her at #[phone]/maykel

## 2021-01-22 NOTE — TELEPHONE ENCOUNTER
Danni at [facility] left another  message requesting to speak with someone re: this pt.  Can reach her at #[phone]/maykel

## 2021-01-25 NOTE — TELEPHONE ENCOUNTER
Called to follow up on this message. Danni states patient has had low hemoglobin and received prbc transfusion last week. She is scheduled to see GI med tomorrow. States Dr. Hernandez is wanting to coordinate permacath insertion prior to CABG. Told her we do not have a surgery date as of yet but will keep her updated.

## 2021-01-29 NOTE — TELEPHONE ENCOUNTER
Discussed with Dr. Wood yesterday. Called patient today to check on her. States she is having an endoscopy and colonoscopy on Monday 2/1 by Dr. Carias. She will call back after that to check in with us. She does wish to proceed with CABG in near future.

## 2021-02-17 NOTE — PROGRESS NOTES
Subjective   Chief Complaint   Patient presents with   • Coronary Artery Disease     Pt is here for follow up        Patient ID: Jennifer Salcido is a 65 y.o. female who is here to discuss consideration for coronary artery bypass grafting.     Ms. Salcido is a 64 year old female with a complex medical history significant for hypertension, type 2 diabetes (most recent A1C 6.7), end stage renal disease requiring peritoneal dialysis, GERD, gout, and coronary artery disease.  Gastric bypass in 2004, total hysterectomy 2003, cholecystectomy, sinus surgery.  She did recently have an episode of hypoglycemia that required admission.  She routinely follows with cardiology and nephrology at [facility].  In 09/2019 she was considered for kidney transplant at [facility] for which they did find a compatible donor.  It was recommended that she have left heart catheterization prior to transplant and this was performed 10/2019.  It was determined at that time that she had severe three vessel disease.  She was further evaluated by [facility] specialist to determine the best plan for her moving forward in regard to CAD in light of the need for kidney transplant.  She has since then decided she would like her cardiac care done here locally due to her inability to drive and the need to set up transportation.  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  She does follow with an eye dr regularly.   It was recommended that she have an updated cardiac cath which demonstrated ongoing 3 vessel disease.  She does have a history of PCI in 2016.  She denies chest pain, palpitations, or dyspnea.  She was referred to cardiothoracic surgery for consideration of surgical revascularization.  Her work-up has been completed.  She is a former smoker quitting 12 years ago, does not drink alcohol, no recreational drug use.    She did present previously and we discussed pros/cons of CABG verses medical management of  "3V CAD in the setting of diabetes mellitus.  She was unsure as to proceeding forward with surgical revascularization versus medical therapy.    From a clinical point of view she remains asymptomatic without chest pain, dyspnea, lower extremity edema, paroxysmal nocturnal dyspnea orthopnea.  No admissions for heart failure or acute coronary syndrome.    She did see Dr. Coker who advised consideration for CABG.      The following portions of the patient's history were reviewed and updated as appropriate: allergies, current medications, past family history, past medical history, past social history, past surgical history and problem list.    Review of Systems   Constitutional: Negative for activity change, diaphoresis, fatigue and unexpected weight change.   HENT: Negative for trouble swallowing and voice change.    Eyes: Negative for visual disturbance (unchanged).   Respiratory: Negative for cough, chest tightness, shortness of breath and wheezing.    Cardiovascular: Negative for chest pain, palpitations and leg swelling.   Gastrointestinal: Positive for constipation. Negative for abdominal distention, abdominal pain, diarrhea, nausea and vomiting.   Musculoskeletal: Negative for arthralgias and myalgias.   Skin: Negative for color change, pallor, rash and wound.   Allergic/Immunologic: Negative for immunocompromised state.   Neurological: Negative for dizziness, tremors and seizures.   Hematological: Does not bruise/bleed easily.   Psychiatric/Behavioral: Negative for agitation, confusion and sleep disturbance.       Objective   Visit Vitals  /70 (BP Location: Left arm, Patient Position: Sitting, Cuff Size: Adult)   Pulse 68   Ht 160 cm (63\")   Wt 77.4 kg (170 lb 9.6 oz)   SpO2 97%   BMI 30.22 kg/m²       Physical Exam   Constitutional: She is oriented to person, place, and time. She appears well-developed.   HENT:   Head: Normocephalic.   Eyes: Pupils are equal, round, and reactive to light.   Neck: No JVD " present.   Cardiovascular: Normal rate, regular rhythm and normal heart sounds.   No murmur heard.  Pulmonary/Chest: Effort normal and breath sounds normal. She has no wheezes. She has no rales.   Abdominal: Soft. She exhibits no distension. There is no abdominal tenderness.   PD site is C/D/I   Musculoskeletal: No tenderness.   Lymphadenopathy:     She has no cervical adenopathy.   Neurological: She is alert and oriented to person, place, and time.   Skin: Skin is warm and dry.   Paper thin skin   Psychiatric: Her behavior is normal. Judgment and thought content normal.   Vitals reviewed.            Assessment/Plan        Diagnoses and all orders for this visit:    1. Coronary artery disease involving native coronary artery of native heart with angina pectoris (CMS/[facility]) (Primary)    2. Coronary artery disease of native artery of native heart with stable angina pectoris (CMS/[facility])    3. Type 2 diabetes mellitus with hypoglycemia without coma, without long-term current use of insulin (CMS/[facility])    4. ESRD on dialysis (CMS/[facility])         I discussed the patient's findings and my recommendations with the patient.  We discussed the pros and cons of surgical revascularization in the setting of three-vessel coronary disease with diabetes mellitus.  I did emphasize a survival advantage is conferred.  She remains an elective operation.  She is agreeable to consider coronary artery bypass grafting.  We discussed the options for treatment of coronary artery disease to include medical therapy, coronary stenting, and surgical revascularization.  We discussed the pros and cons of each option and how it pertains to the current case.  We discussed that I would agree that she should undergo maximal medical therapy regardless of decision-making and that PCI is not of benefit at this time.  The STS Risk score was calculated using the STS Risk Calculator and discussed with the patient.  Coronary artery bypass grafting is best option given  patient's findings and risk factors.  We discussed the operative conduct and expected hospital and outpatient recovery.  Risks were discussed to include but not limited to bleeding, infection, stroke, heart attack, need for additional procedures, anesthesia risk, organ dysfunction and/or failure, prolonged mechanical ventilation, prolonged ICU stay, chronic pain syndromes, sternal nonunion, and/or death.  We discussed the need to utilize all medical treatments additionally prescribed post surgery.    We will work with nephrology, as she will likely need dialysis access given that she utilizes peritoneal dialysis strategy.   I have asked that she contact me if she has any change in her symptoms specifically chest pain, dyspnea, or lower extremity edema etc. etc.  All question answered the best my ability and she is agreeable to consider continue medical manage at this time with the possibility of bypass grafting early next year.      Patient's Body mass index is 30.22 kg/m². BMI is above normal parameters. Recommendations include: educational material, nutrition counseling and referral to primary care.    I have congratulated Jennifer Salcido on successful smoking cessation.   She is a former smoker having quit 12 years ago.  I have educated her on the risk of diseases from using tobacco products such as cancer, COPD and heart diease2 minutes smoking cessation confirmation.     Advance Care Planning   ACP discussion was held with the patient during this visit. Patient does not have an advance directive, declines further assistance.

## 2021-02-17 NOTE — TELEPHONE ENCOUNTER
Dr. Wood wants her to have a swallow study to make sure MORENA probe can be safely passed during heart surgery. This has been ordered. Please schedule and notify patient and give her appt with Dr. Wood after test performed.

## 2021-02-18 NOTE — TELEPHONE ENCOUNTER
Patient has been scheduled and notified of both appointments. She did ask if she will be needing another heart cath prior to having surgery? I told her I would find out and let her know.

## 2021-03-04 NOTE — TELEPHONE ENCOUNTER
Called pt to offer appt on 3-5-21 instead of 3-12-21.  Pt was unable to make this appt due to no transportation./maykel

## 2021-03-04 NOTE — TELEPHONE ENCOUNTER
Spoke with patient and offered surgery date of 3/25. She states this is too soon because she has to have son here for assistance after surgery and he has to travel from [facility]. She prefers surgery at the end of April. Will discuss with Dr. Wood. Informed patient that she will need a CTA of the chest repeated. This has been ordered. She verbalizes understanding.

## 2021-03-23 NOTE — TELEPHONE ENCOUNTER
I canceled her 6 mo f/u with Dr. Coker for 3/29/21. She is SP cath 3/23/21. She needs 6 wks f/u with Dr. Coker after CABG with Dr. Wood on 4/22/21.

## 2021-03-26 PROBLEM — Z99.2 END STAGE RENAL DISEASE ON DIALYSIS (HCC): Status: ACTIVE | Noted: 2021-01-01

## 2021-03-26 PROBLEM — N18.6 END STAGE RENAL DISEASE ON DIALYSIS: Status: ACTIVE | Noted: 2021-01-01

## 2021-03-26 NOTE — H&P
HPI  I had the pleasure of seeing your patient Jennifer Salcido in the office today.  Thank you kindly for this consultation.  As you recall, Jennifer Salcido is a 65 y.o.  female who you are currently following for general medical care.  She is referred to the vascular surgery office today for evaluation of possible permacath insertion.  She has a history of end-stage renal disease currently on peritoneal dialysis for the last 5 years.  She also has diabetes, hypertension, and hyperlipidemia.  Finally she does have coronary artery disease and recently had cardiac cath and had been referred for CABG.  She has been following with the cardiothoracic surgeons here and plan is for her to undergo potential coronary bypass in mid April.  However and consultation with nephrology it is felt that she will need a period of hemodialysis leading up to this procedure as well as post procedure and so she will need access in the form of a permacath at least temporarily.  When seen in the office today she reports feeling well.  She does get some shortness of breath with exertion but otherwise denies chest pain.  She has not previously been on hemodialysis before from what she tells me.  She tolerates her peritoneal dialysis well.  She otherwise has no other significant acute complaints today.    Past Medical History:   Diagnosis Date   • Chronic kidney disease     STAGE 5    • Coronary artery disease    • Diabetes mellitus (CMS/[facility])    • Dialysis patient (CMS/[facility])    • Dyslipidemia 11/2/2018   • GERD (gastroesophageal reflux disease)    • Gout    • Hyperlipidemia    • Hypertension    • Peritoneal dialysis status (CMS/[facility])    • Type 2 diabetes mellitus with kidney complication, with long-term current use of insulin (CMS/[facility]) 11/2/2018       Past Surgical History:   Procedure Laterality Date   • BREAST BIOPSY     • CARDIAC CATHETERIZATION N/A 10/1/2019    Procedure: Left Heart Cath;  Surgeon: Srikanth Coker MD;  Location: [facility] CATH  INVASIVE LOCATION;  Service: Cardiology   • CARDIAC CATHETERIZATION N/A 7/14/2020    Procedure: Left Heart Cath;  Surgeon: Srikanth Coker MD;  Location:  [facility] CATH INVASIVE LOCATION;  Service: Cardiology;  Laterality: N/A;   • CORONARY ANGIOPLASTY WITH STENT PLACEMENT      X 2   • GASTRIC BYPASS     • HYSTERECTOMY     • OOPHORECTOMY     • SINUS SURGERY     • TUBAL ABDOMINAL LIGATION         Family History   Problem Relation Age of Onset   • Heart disease Father    • Breast cancer Neg Hx        Social History     Socioeconomic History   • Marital status:      Spouse name: Not on file   • Number of children: Not on file   • Years of education: Not on file   • Highest education level: Not on file   Tobacco Use   • Smoking status: Former Smoker     Packs/day: 0.00     Years: 2.00     Pack years: 0.00   • Smokeless tobacco: Never Used   Vaping Use   • Vaping Use: Never used   Substance and Sexual Activity   • Alcohol use: No   • Drug use: No   • Sexual activity: Defer       Allergies   Allergen Reactions   • Augmentin [Amoxicillin-Pot Clavulanate] Nausea And Vomiting   • Codeine Nausea And Vomiting   • Demerol [Meperidine] Nausea Only   • Levaquin [Levofloxacin] Nausea And Vomiting   • Lisinopril Swelling   • Morphine Nausea And Vomiting   • Sulfasalazine Nausea And Vomiting   • Ultram [Tramadol] Mental Status Change       Prior to Admission medications    Medication Sig Start Date End Date Taking? Authorizing Provider   allopurinol (ZYLOPRIM) 100 MG tablet Take 100 mg by mouth Daily.   Yes Elmer Thao MD   amLODIPine (NORVASC) 5 MG tablet Take 5 mg by mouth Daily. 2/12/21  Yes Elmer Thao MD   ascorbic acid (VITAMIN C) 500 MG tablet Take 1 tablet by mouth Daily. 12/8/20  Yes Elmer Thao MD   aspirin 81 MG EC tablet Take 81 mg by mouth Daily.   Yes ProviderElmer MD   bisacodyl (Dulcolax) 10 MG suppository Insert 10 mg into the rectum Daily As Needed for Constipation.   Yes  Elmer Thao MD   bumetanide (BUMEX) 2 MG tablet Take 2 mg by mouth 2 (Two) Times a Day.   Yes lEmer Thao MD   calcium acetate (PHOS BINDER,) 667 MG capsule capsule Take 667 mg by mouth 3 (Three) Times a Day. Velphoro   Yes Elmer Thao MD   carvedilol (COREG) 25 MG tablet Take 25 mg by mouth 2 (Two) Times a Day With Meals.   Yes Elmer Thao MD   Cholecalciferol (Vitamin D3) 1.25 MG (39372 UT) tablet Take  by mouth.   Yes Elmer Thao MD   citalopram (CeleXA) 40 MG tablet Take 40 mg by mouth Daily.   Yes Elmer Thao MD   docusate sodium (COLACE) 100 MG capsule Take 100 mg by mouth As Needed for Constipation.   Yes Elmer Thao MD   famotidine (PEPCID) 20 MG tablet Take 20 mg by mouth 2 (Two) Times a Day.   Yes Elmer Thao MD   gentamicin (GARAMYCIN) 0.1 % ointment Apply 1 application topically to the appropriate area as directed 3 (Three) Times a Day.   Yes Elmer Thao MD   insulin detemir (LEVEMIR) 100 UNIT/ML injection Inject 12 Units under the skin into the appropriate area as directed Daily. 6/10/20  Yes Cl Heart DO   irbesartan (AVAPRO) 150 MG tablet Take 300 mg by mouth Daily.   Yes Elmer Thao MD   melatonin 3 MG tablet Take 5 mg by mouth At Night As Needed for Sleep.   Yes Elmer Thao MD   Multiple Vitamins-Minerals (MULTIVITAMIN ADULT PO) Take 1 tablet by mouth Daily.   Yes Elmer Thao MD   pantoprazole (PROTONIX) 40 MG EC tablet Take 40 mg by mouth 2 (two) times a day. 6/18/18  Yes Elmer Thao MD   simvastatin (ZOCOR) 40 MG tablet TAKE 1 TABLET BY MOUTH EVERY DAY AT NIGHT 12/21/20  Yes Srikanth Coker MD   albuterol (ACCUNEB) 1.25 MG/3ML nebulizer solution Take 3 mL by nebulization Every 6 (Six) Hours As Needed for Wheezing. 1/5/20   Juaquin Yip DO   b complex-vitamin c-folic acid (NEPHRO-ORIANA) 0.8 MG tablet tablet Take 1 tablet by mouth Daily.    Master  "MD Elmer   Calcium 500-125 MG-UNIT tablet Take 1 tablet by mouth Daily.    Elmer Thao MD   doxazosin (CARDURA) 4 MG tablet Take 4 mg by mouth 2 (Two) Times a Day.    Elmer Thao MD   hydrALAZINE (APRESOLINE) 25 MG tablet Take 25 mg by mouth Daily As Needed.    Elmer Thao MD   levocetirizine (XYZAL) 5 MG tablet Take 5 mg by mouth Every Evening.    Elmer Thao MD   ondansetron ODT (ZOFRAN-ODT) 4 MG disintegrating tablet Take 1 tablet by mouth Every 6 (Six) Hours As Needed for Nausea or Vomiting. 1/21/20   Rachid Gifford PA-C   potassium chloride (KLOR-CON) 10 MEQ CR tablet Take 10 mEq by mouth Daily. 6/7/18   Elmer Thao MD       Review of Systems   Constitutional: Negative.  Negative for activity change, appetite change, chills, diaphoresis, fatigue and fever.   HENT: Negative.  Negative for congestion, sneezing, sore throat and trouble swallowing.    Eyes: Negative.  Negative for visual disturbance.   Respiratory: Negative.  Negative for chest tightness and shortness of breath.    Cardiovascular: Negative.  Negative for chest pain, palpitations and leg swelling.   Gastrointestinal: Negative.  Negative for abdominal distention, abdominal pain, nausea and vomiting.   Endocrine: Negative.    Genitourinary: Negative.    Musculoskeletal: Negative.    Skin: Negative.    Allergic/Immunologic: Negative.    Neurological: Negative.    Hematological: Negative.    Psychiatric/Behavioral: Negative.        /70   Pulse 56   Ht 160 cm (63\")   Wt 77.1 kg (170 lb)   SpO2 98%   BMI 30.11 kg/m²   Physical Exam  Vitals reviewed.   Constitutional:       Appearance: Normal appearance.   HENT:      Head: Normocephalic and atraumatic.      Nose: Nose normal.      Mouth/Throat:      Mouth: Mucous membranes are moist.   Eyes:      Extraocular Movements: Extraocular movements intact.      Pupils: Pupils are equal, round, and reactive to light.   Cardiovascular:      Rate " and Rhythm: Normal rate and regular rhythm.      Pulses: Normal pulses.           Carotid pulses are 2+ on the right side and 2+ on the left side.       Radial pulses are 2+ on the right side and 2+ on the left side.        Femoral pulses are 2+ on the right side and 2+ on the left side.       Popliteal pulses are 2+ on the right side and 2+ on the left side.        Dorsalis pedis pulses are 2+ on the right side and 2+ on the left side.        Posterior tibial pulses are 2+ on the right side and 2+ on the left side.   Pulmonary:      Effort: Pulmonary effort is normal. No respiratory distress.   Abdominal:      General: There is no distension.      Palpations: Abdomen is soft. There is no mass.      Tenderness: There is no abdominal tenderness.      Comments: She has a peritoneal dialysis catheter in place.  Site is clean and intact with dressing in place.   Musculoskeletal:         General: Normal range of motion.      Cervical back: Normal range of motion and neck supple.      Right lower leg: No edema.      Left lower leg: No edema.   Skin:     General: Skin is warm and dry.      Capillary Refill: Capillary refill takes less than 2 seconds.   Neurological:      General: No focal deficit present.      Mental Status: She is alert and oriented to person, place, and time.   Psychiatric:         Mood and Affect: Mood normal.         Behavior: Behavior normal.         Thought Content: Thought content normal.         Judgment: Judgment normal.         FL Esophagram Complete Single Contrast    Result Date: 2/26/2021  Narrative: EXAM: FL ESOPHAGRAM COMPLETE SINGLE-CONTRAST- - 2/26/2021 9:15 AM CST  HISTORY: esophageal stricture; K22.2-Esophageal obstruction   COMPARISON: 11/16/2020 chest radiograph.  TECHNIQUE: Routine double contrast esophagram performed.  Number of images: 80 Fluoroscopy time: 15 seconds Dose: 50.7 mGy  FINDINGS:     No abnormal stricture or narrowing.  After several swallows, there was retained in  reflux of contrast in the esophagus with tertiary contractions. Otherwise normal esophageal motility.  Spontaneous gastroesophageal reflux visualized.  Hiatal hernia with changes of gastric bypass, consistent with patient's reported surgical history.  Normal passage of barium tablet.       Impression: 1. Spontaneous gastroesophageal reflux present. 2. Hiatal hernia with changes of gastric bypass. The gastrojejunal anastomosis is at the level of the diaphragm. 3. Tertiary contractions visualized after several swallows with retained and refluxed contrast in the esophagus.   This report was finalized on 02/26/2021 09:35 by Dr Christel Laura MD.    Cardiac Catheterization/Vascular Study    Result Date: 3/26/2021  Narrative: Date: 3/23/2021   Procedures: 1. Selective coronary angiography 2. Left heart catheterization   Indication: CAD with stable angina   Attending: Srikanth Coker   Risks, benefits, and alternatives discussed with the patient and/or family.  Plan is for moderate sedation, and the patient agrees to proceed with the procedure.  An immediate assessment was done prior to the administration of moderate sedation. Procedure Description: The patient was brought to the cardiac catheterization lab in a fasting state. Informed consent was obtained after explaining the risks, benefits, alternatives of the procedure. The patient was sterilely prepped and draped for right radial artery access usual fashion by the cath lab staff. Time out was taken to confirm the correct patient, site, procedure. The area over the right radial artery was anesthetized with 1% lidocaine. A micropuncture needle was used to puncture the right radial artery resulting bright red pulsatile arterial blood flow. Following this a 5 Slovenian glide sheath was placed and the sheath was aspirated and flushed. Next, a 5 Irish Suly diagnostic catheter was advanced under fluoroscopic guidance into the ascending aorta, across aortic valve, and into the  left ventricle.  Pressures were recorded and pullback was obtained to evaluate for a gradient across aortic valve.  The catheter was then selectively engaged into the anomalous left circumflex and right coronary arteries.  Multiple cineograms were obtained via power injection of contrast in multiple orthogonal views.  The catheter was unable to engage the left coronary artery and was then removed and exchanged for a 5 [place] Tig catheter.  The catheter was selectively engaged into the left coronary artery.  Multiple cineograms were obtained via power injection of contrast in multiple orthogonal views.  The catheter was then removed and the sheath was aspirated and flushed.  A TR band was then placed and adequate hemostasis was obtained.  The patient was then transferred to cardiac observation unit in stable condition.  There is no early complications noted.  I supervised the administration of conscious sedation by nursing staff throughout the case.  First dose was given at 9:58 and the end of my face-to-face encounter was at 10:13, accounting for a total of 15 minutes of supervision.  During the case, continuous pulse oximetry, heart rate, blood pressure, and patient status were monitored. Total contrast: 72 ml Estimated Blood Loss: Negligible Specimens: None Complications: None Findings: Left heart catheterization: Pressures: 1.  LV: 104/13 mmHg 2.  Aortic: 104/51 mmHg, mean 73 mmHg   Selective coronary angiography: 1. Left anterior descending artery: The LAD arises directly off the aorta and runs in the interventricular groove giving off 2 diagonals and multiple septal perforators before wrapping around the apex as an apical recurrent branch.  There is a moderate 30-40% ostial stenosis. There is a severe 70-80% stenosis in the mid LAD.  The first diagonal is small and has mild diffuse disease only.  There is a severe 80-85% ostial stenosis of a large septal  branch.  The second diagonal branch is a large  bifurcating vessel with 60-70% stenosis in the proximal portion prior to the bifurcation and a a 70-80% stenosis in a small branch off the second diagonal.   3. Left circumflex artery: The left circumflex is an anomalous vessel that arises off the right coronary cusp.  It is a small vessel that is nondominant for posterior circulation.  It gives off 2 bifurcating obtuse marginal branches.  The is a severe 70-80% ostial stenosis.  There is moderate disease 50-60% stenosis in the first OM. 4. Right coronary artery: The RCA is dominant for posterior circulation giving rise to PDA and right PL branches.  The RCA is chronically totally occluded ostially.  The distal RCA, PDA, and right PL branches fill via collaterals from the LAD and left circumflex arteries.  There is 50 to 60% stenosis in a large right PL branch.  The remainder distal RCA has moderate diffuse disease and appears totally occluded at site of previous stent placement in the mid RCA.     Impression: 1.  Severe three-vessel coronary artery disease. 2.  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 3.  Normal left heart filling pressures.   Plan: -Routine post cath care. -Discharge home after post sedation monitoring. -Proceed with coronary artery bypass grafting with Dr. Wood. Srikanth Coker MD    CT Angiogram Chest    Result Date: 3/11/2021  Narrative: Exam: CT chest angiography with 3D MIP images with IV contrast  Indication: Aortic disease, nontraumatic  Comparison: 6/7/2020  Dose length product: 369 mGy cm. Automated exposure control was also utilized to decrease patient radiation dose.  Findings:  Mild 4 chamber cardiomegaly. Heavy coronary artery calcification. Aortic root, ascending aorta, aortic arch, and descending thoracic aorta are normal caliber. Scattered atherosclerotic calcification aortic arch and descending thoracic aorta. Main pulmonary trunk is upper limits of normal in caliber. No central pulmonary artery filling defect. No  pericardial effusion. Internal mammary arteries are patent. No evidence of aberrant coronary artery anatomy.  Central airways are clear. Tiny 2 mm LEFT lower lobe subpleural pulmonary nodule on image 75 is stable. No new or enlarging pulmonary nodule. No consolidation or pleural effusion. No enlarged thoracic lymph nodes. 1.8 cm LEFT thyroid nodule. No acute chest wall soft tissue abnormality. Moderate size hiatal hernia. Small volume ascites. Prior cholecystectomy. No acute osseous finding.      Impression: Impression: 1.  Heavy coronary calcification. 2.  Mild cardiomegaly. 3.  Nonaneurysmal thoracic aorta with mild atherosclerotic change. 4.  Moderate size hiatal hernia. 5.  Small volume ascites in the upper abdomen. This report was finalized on 03/11/2021 15:16 by Dr. Sravan Carlisle MD.      Patient Active Problem List   Diagnosis   • ESRD on dialysis (CMS/[facility])   • Coronary artery disease involving native coronary artery of native heart with angina pectoris (CMS/[facility])   • Essential hypertension   • Type 2 diabetes mellitus with hypoglycemia, without long-term current use of insulin (CMS/[facility])   • Coronary artery disease of native artery of native heart with stable angina pectoris (CMS/[facility])   • Dyslipidemia   • End stage renal disease on dialysis (CMS/[facility])         ICD-10-CM ICD-9-CM   1. End stage renal disease on dialysis (CMS/[facility])  N18.6 585.6    Z99.2 V45.11   2. Coronary artery disease of native artery of native heart with stable angina pectoris (CMS/[facility])  I25.118 414.01     413.9   3. Essential hypertension  I10 401.9   4. Dyslipidemia  E78.5 272.4   5. Type 2 diabetes mellitus with hypoglycemia without coma, without long-term current use of insulin (CMS/[facility])  E11.649 250.80     251.2       Lab Frequency Next Occurrence   Follow Anesthesia Guidelines / Protocol Once 03/26/2021   Obtain Informed Consent Once 03/31/2021   Provide NPO Instructions to Patient Once 03/31/2021   Chlorhexidine Skin Prep Once  03/31/2021   ECG 12 Lead Once 03/31/2021       Plan: After thoroughly evaluating Jennifer Salcido, I believe the best course of action is to proceed with placement of permacath in the OR in the coming days.  Patient has history of coronary artery disease and ultimately will be requiring CABG sometime next month.  She is currently on peritoneal dialysis given her end-stage renal disease but will need at least a temporary course of hemodialysis both leading up to the procedure and post procedure from what I understand after discussion with the CT surgery staff.  As such we will plan for permacath placement next week. The risks and benefits were explained at great length to the patient which include but are not limited to bleeding, infection, vessel damage, nerve damage and pneumothorax. The patient understands the risks and wishes for me to proceed.  I have reviewed her pertinent labs and imaging.  The patient can otherwise continue taking their current medication regimen as previously planned. I did discuss vascular risk factors as they pertain to the progression of vascular disease including controlling diabetes, hypertension, and hyperlipidemia. These factors remain stable. Patient's Body mass index is 30.11 kg/m². BMI is above normal parameters. Recommendations include: educational material. This was all discussed in full with complete understanding.

## 2021-03-26 NOTE — PROGRESS NOTES
03/26/2021      No referring provider defined for this encounter.    Jennifer Salcido  1955    Chief Complaint   Patient presents with   • Establish Care     Needs permacath placement.        Dear No ref. provider found:      HPI  I had the pleasure of seeing your patient Jennifer Salcido in the office today.  Thank you kindly for this consultation.  As you recall, Jennifer Salcido is a 65 y.o.  female who you are currently following for general medical care.  She is referred to the vascular surgery office today for evaluation of possible permacath insertion.  She has a history of end-stage renal disease currently on peritoneal dialysis for the last 5 years.  She also has diabetes, hypertension, and hyperlipidemia.  Finally she does have coronary artery disease and recently had cardiac cath and had been referred for CABG.  She has been following with the cardiothoracic surgeons here and plan is for her to undergo potential coronary bypass in mid April.  However and consultation with nephrology it is felt that she will need a period of hemodialysis leading up to this procedure as well as post procedure and so she will need access in the form of a permacath at least temporarily.  When seen in the office today she reports feeling well.  She does get some shortness of breath with exertion but otherwise denies chest pain.  She has not previously been on hemodialysis before from what she tells me.  She tolerates her peritoneal dialysis well.  She otherwise has no other significant acute complaints today.    Past Medical History:   Diagnosis Date   • Chronic kidney disease     STAGE 5    • Coronary artery disease    • Diabetes mellitus (CMS/[facility])    • Dialysis patient (CMS/[facility])    • Dyslipidemia 11/2/2018   • GERD (gastroesophageal reflux disease)    • Gout    • Hyperlipidemia    • Hypertension    • Peritoneal dialysis status (CMS/[facility])    • Type 2 diabetes mellitus with kidney complication, with long-term current use of insulin  (CMS/[facility]) 11/2/2018       Past Surgical History:   Procedure Laterality Date   • BREAST BIOPSY     • CARDIAC CATHETERIZATION N/A 10/1/2019    Procedure: Left Heart Cath;  Surgeon: Srikanth Coker MD;  Location:  [facility] CATH INVASIVE LOCATION;  Service: Cardiology   • CARDIAC CATHETERIZATION N/A 7/14/2020    Procedure: Left Heart Cath;  Surgeon: Srikanth Coker MD;  Location:  [facility] CATH INVASIVE LOCATION;  Service: Cardiology;  Laterality: N/A;   • CORONARY ANGIOPLASTY WITH STENT PLACEMENT      X 2   • GASTRIC BYPASS     • HYSTERECTOMY     • OOPHORECTOMY     • SINUS SURGERY     • TUBAL ABDOMINAL LIGATION         Family History   Problem Relation Age of Onset   • Heart disease Father    • Breast cancer Neg Hx        Social History     Socioeconomic History   • Marital status:      Spouse name: Not on file   • Number of children: Not on file   • Years of education: Not on file   • Highest education level: Not on file   Tobacco Use   • Smoking status: Former Smoker     Packs/day: 0.00     Years: 2.00     Pack years: 0.00   • Smokeless tobacco: Never Used   Vaping Use   • Vaping Use: Never used   Substance and Sexual Activity   • Alcohol use: No   • Drug use: No   • Sexual activity: Defer       Allergies   Allergen Reactions   • Augmentin [Amoxicillin-Pot Clavulanate] Nausea And Vomiting   • Codeine Nausea And Vomiting   • Demerol [Meperidine] Nausea Only   • Levaquin [Levofloxacin] Nausea And Vomiting   • Lisinopril Swelling   • Morphine Nausea And Vomiting   • Sulfasalazine Nausea And Vomiting   • Ultram [Tramadol] Mental Status Change       Prior to Admission medications    Medication Sig Start Date End Date Taking? Authorizing Provider   allopurinol (ZYLOPRIM) 100 MG tablet Take 100 mg by mouth Daily.   Yes Provider, MD Elmer   amLODIPine (NORVASC) 5 MG tablet Take 5 mg by mouth Daily. 2/12/21  Yes Provider, MD Elmer   ascorbic acid (VITAMIN C) 500 MG tablet Take 1 tablet by mouth Daily. 12/8/20   Yes Elmer Thao MD   aspirin 81 MG EC tablet Take 81 mg by mouth Daily.   Yes Elmer Thao MD   bisacodyl (Dulcolax) 10 MG suppository Insert 10 mg into the rectum Daily As Needed for Constipation.   Yes Elmer Thao MD   bumetanide (BUMEX) 2 MG tablet Take 2 mg by mouth 2 (Two) Times a Day.   Yes Elmer Thao MD   calcium acetate (PHOS BINDER,) 667 MG capsule capsule Take 667 mg by mouth 3 (Three) Times a Day. Velphoro   Yes Elmer Thao MD   carvedilol (COREG) 25 MG tablet Take 25 mg by mouth 2 (Two) Times a Day With Meals.   Yes Elmer Thao MD   Cholecalciferol (Vitamin D3) 1.25 MG (98517 UT) tablet Take  by mouth.   Yes Elmer Thao MD   citalopram (CeleXA) 40 MG tablet Take 40 mg by mouth Daily.   Yes Elmer Thao MD   docusate sodium (COLACE) 100 MG capsule Take 100 mg by mouth As Needed for Constipation.   Yes Elmer Thao MD   famotidine (PEPCID) 20 MG tablet Take 20 mg by mouth 2 (Two) Times a Day.   Yes Elmer Thao MD   gentamicin (GARAMYCIN) 0.1 % ointment Apply 1 application topically to the appropriate area as directed 3 (Three) Times a Day.   Yes Elmer Thao MD   insulin detemir (LEVEMIR) 100 UNIT/ML injection Inject 12 Units under the skin into the appropriate area as directed Daily. 6/10/20  Yes Cl Heart DO   irbesartan (AVAPRO) 150 MG tablet Take 300 mg by mouth Daily.   Yes Elmer Thao MD   melatonin 3 MG tablet Take 5 mg by mouth At Night As Needed for Sleep.   Yes Elmer Thao MD   Multiple Vitamins-Minerals (MULTIVITAMIN ADULT PO) Take 1 tablet by mouth Daily.   Yes Elmer Thao MD   pantoprazole (PROTONIX) 40 MG EC tablet Take 40 mg by mouth 2 (two) times a day. 6/18/18  Yes Elmer Thao MD   simvastatin (ZOCOR) 40 MG tablet TAKE 1 TABLET BY MOUTH EVERY DAY AT NIGHT 12/21/20  Yes Srikanth Coker MD   albuterol (ACCUNEB) 1.25 MG/3ML nebulizer  "solution Take 3 mL by nebulization Every 6 (Six) Hours As Needed for Wheezing. 1/5/20   Juaquin Yip DO   b complex-vitamin c-folic acid (NEPHRO-ORIANA) 0.8 MG tablet tablet Take 1 tablet by mouth Daily.    Elmer Thao MD   Calcium 500-125 MG-UNIT tablet Take 1 tablet by mouth Daily.    Elmer Thao MD   doxazosin (CARDURA) 4 MG tablet Take 4 mg by mouth 2 (Two) Times a Day.    Elmer Thao MD   hydrALAZINE (APRESOLINE) 25 MG tablet Take 25 mg by mouth Daily As Needed.    Elmer Thao MD   levocetirizine (XYZAL) 5 MG tablet Take 5 mg by mouth Every Evening.    Elmer Thao MD   ondansetron ODT (ZOFRAN-ODT) 4 MG disintegrating tablet Take 1 tablet by mouth Every 6 (Six) Hours As Needed for Nausea or Vomiting. 1/21/20   Rachid Gifford PA-C   potassium chloride (KLOR-CON) 10 MEQ CR tablet Take 10 mEq by mouth Daily. 6/7/18   Elmer Thao MD       Review of Systems   Constitutional: Negative.  Negative for activity change, appetite change, chills, diaphoresis, fatigue and fever.   HENT: Negative.  Negative for congestion, sneezing, sore throat and trouble swallowing.    Eyes: Negative.  Negative for visual disturbance.   Respiratory: Negative.  Negative for chest tightness and shortness of breath.    Cardiovascular: Negative.  Negative for chest pain, palpitations and leg swelling.   Gastrointestinal: Negative.  Negative for abdominal distention, abdominal pain, nausea and vomiting.   Endocrine: Negative.    Genitourinary: Negative.    Musculoskeletal: Negative.    Skin: Negative.    Allergic/Immunologic: Negative.    Neurological: Negative.    Hematological: Negative.    Psychiatric/Behavioral: Negative.        /70   Pulse 56   Ht 160 cm (63\")   Wt 77.1 kg (170 lb)   SpO2 98%   BMI 30.11 kg/m²   Physical Exam  Vitals reviewed.   Constitutional:       Appearance: Normal appearance.   HENT:      Head: Normocephalic and atraumatic.      Nose: Nose " normal.      Mouth/Throat:      Mouth: Mucous membranes are moist.   Eyes:      Extraocular Movements: Extraocular movements intact.      Pupils: Pupils are equal, round, and reactive to light.   Cardiovascular:      Rate and Rhythm: Normal rate and regular rhythm.      Pulses: Normal pulses.           Carotid pulses are 2+ on the right side and 2+ on the left side.       Radial pulses are 2+ on the right side and 2+ on the left side.        Femoral pulses are 2+ on the right side and 2+ on the left side.       Popliteal pulses are 2+ on the right side and 2+ on the left side.        Dorsalis pedis pulses are 2+ on the right side and 2+ on the left side.        Posterior tibial pulses are 2+ on the right side and 2+ on the left side.   Pulmonary:      Effort: Pulmonary effort is normal. No respiratory distress.   Abdominal:      General: There is no distension.      Palpations: Abdomen is soft. There is no mass.      Tenderness: There is no abdominal tenderness.      Comments: She has a peritoneal dialysis catheter in place.  Site is clean and intact with dressing in place.   Musculoskeletal:         General: Normal range of motion.      Cervical back: Normal range of motion and neck supple.      Right lower leg: No edema.      Left lower leg: No edema.   Skin:     General: Skin is warm and dry.      Capillary Refill: Capillary refill takes less than 2 seconds.   Neurological:      General: No focal deficit present.      Mental Status: She is alert and oriented to person, place, and time.   Psychiatric:         Mood and Affect: Mood normal.         Behavior: Behavior normal.         Thought Content: Thought content normal.         Judgment: Judgment normal.         FL Esophagram Complete Single Contrast    Result Date: 2/26/2021  Narrative: EXAM: FL ESOPHAGRAM COMPLETE SINGLE-CONTRAST- - 2/26/2021 9:15 AM CST  HISTORY: esophageal stricture; K22.2-Esophageal obstruction   COMPARISON: 11/16/2020 chest radiograph.   TECHNIQUE: Routine double contrast esophagram performed.  Number of images: 80 Fluoroscopy time: 15 seconds Dose: 50.7 mGy  FINDINGS:     No abnormal stricture or narrowing.  After several swallows, there was retained in reflux of contrast in the esophagus with tertiary contractions. Otherwise normal esophageal motility.  Spontaneous gastroesophageal reflux visualized.  Hiatal hernia with changes of gastric bypass, consistent with patient's reported surgical history.  Normal passage of barium tablet.       Impression: 1. Spontaneous gastroesophageal reflux present. 2. Hiatal hernia with changes of gastric bypass. The gastrojejunal anastomosis is at the level of the diaphragm. 3. Tertiary contractions visualized after several swallows with retained and refluxed contrast in the esophagus.   This report was finalized on 02/26/2021 09:35 by Dr Christel Laura MD.    Cardiac Catheterization/Vascular Study    Result Date: 3/26/2021  Narrative: Date: 3/23/2021   Procedures: 1. Selective coronary angiography 2. Left heart catheterization   Indication: CAD with stable angina   Attending: Srikanth Coker   Risks, benefits, and alternatives discussed with the patient and/or family.  Plan is for moderate sedation, and the patient agrees to proceed with the procedure.  An immediate assessment was done prior to the administration of moderate sedation. Procedure Description: The patient was brought to the cardiac catheterization lab in a fasting state. Informed consent was obtained after explaining the risks, benefits, alternatives of the procedure. The patient was sterilely prepped and draped for right radial artery access usual fashion by the cath lab staff. Time out was taken to confirm the correct patient, site, procedure. The area over the right radial artery was anesthetized with 1% lidocaine. A micropuncture needle was used to puncture the right radial artery resulting bright red pulsatile arterial blood flow. Following this a 5  Macedonian glide sheath was placed and the sheath was aspirated and flushed. Next, a 5 Tu[place] Suly diagnostic catheter was advanced under fluoroscopic guidance into the ascending aorta, across aortic valve, and into the left ventricle.  Pressures were recorded and pullback was obtained to evaluate for a gradient across aortic valve.  The catheter was then selectively engaged into the anomalous left circumflex and right coronary arteries.  Multiple cineograms were obtained via power injection of contrast in multiple orthogonal views.  The catheter was unable to engage the left coronary artery and was then removed and exchanged for a 5 Macedonian Tig catheter.  The catheter was selectively engaged into the left coronary artery.  Multiple cineograms were obtained via power injection of contrast in multiple orthogonal views.  The catheter was then removed and the sheath was aspirated and flushed.  A TR band was then placed and adequate hemostasis was obtained.  The patient was then transferred to cardiac observation unit in stable condition.  There is no early complications noted.  I supervised the administration of conscious sedation by nursing staff throughout the case.  First dose was given at 9:58 and the end of my face-to-face encounter was at 10:13, accounting for a total of 15 minutes of supervision.  During the case, continuous pulse oximetry, heart rate, blood pressure, and patient status were monitored. Total contrast: 72 ml Estimated Blood Loss: Negligible Specimens: None Complications: None Findings: Left heart catheterization: Pressures: 1.  LV: 104/13 mmHg 2.  Aortic: 104/51 mmHg, mean 73 mmHg   Selective coronary angiography: 1. Left anterior descending artery: The LAD arises directly off the aorta and runs in the interventricular groove giving off 2 diagonals and multiple septal perforators before wrapping around the apex as an apical recurrent branch.  There is a moderate 30-40% ostial stenosis. There is a  severe 70-80% stenosis in the mid LAD.  The first diagonal is small and has mild diffuse disease only.  There is a severe 80-85% ostial stenosis of a large septal  branch.  The second diagonal branch is a large bifurcating vessel with 60-70% stenosis in the proximal portion prior to the bifurcation and a a 70-80% stenosis in a small branch off the second diagonal.   3. Left circumflex artery: The left circumflex is an anomalous vessel that arises off the right coronary cusp.  It is a small vessel that is nondominant for posterior circulation.  It gives off 2 bifurcating obtuse marginal branches.  The is a severe 70-80% ostial stenosis.  There is moderate disease 50-60% stenosis in the first OM. 4. Right coronary artery: The RCA is dominant for posterior circulation giving rise to PDA and right PL branches.  The RCA is chronically totally occluded ostially.  The distal RCA, PDA, and right PL branches fill via collaterals from the LAD and left circumflex arteries.  There is 50 to 60% stenosis in a large right PL branch.  The remainder distal RCA has moderate diffuse disease and appears totally occluded at site of previous stent placement in the mid RCA.     Impression: 1.  Severe three-vessel coronary artery disease. 2.  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 3.  Normal left heart filling pressures.   Plan: -Routine post cath care. -Discharge home after post sedation monitoring. -Proceed with coronary artery bypass grafting with Dr. Wood. Srikanth Coker MD    CT Angiogram Chest    Result Date: 3/11/2021  Narrative: Exam: CT chest angiography with 3D MIP images with IV contrast  Indication: Aortic disease, nontraumatic  Comparison: 6/7/2020  Dose length product: 369 mGy cm. Automated exposure control was also utilized to decrease patient radiation dose.  Findings:  Mild 4 chamber cardiomegaly. Heavy coronary artery calcification. Aortic root, ascending aorta, aortic arch, and descending  thoracic aorta are normal caliber. Scattered atherosclerotic calcification aortic arch and descending thoracic aorta. Main pulmonary trunk is upper limits of normal in caliber. No central pulmonary artery filling defect. No pericardial effusion. Internal mammary arteries are patent. No evidence of aberrant coronary artery anatomy.  Central airways are clear. Tiny 2 mm LEFT lower lobe subpleural pulmonary nodule on image 75 is stable. No new or enlarging pulmonary nodule. No consolidation or pleural effusion. No enlarged thoracic lymph nodes. 1.8 cm LEFT thyroid nodule. No acute chest wall soft tissue abnormality. Moderate size hiatal hernia. Small volume ascites. Prior cholecystectomy. No acute osseous finding.      Impression: Impression: 1.  Heavy coronary calcification. 2.  Mild cardiomegaly. 3.  Nonaneurysmal thoracic aorta with mild atherosclerotic change. 4.  Moderate size hiatal hernia. 5.  Small volume ascites in the upper abdomen. This report was finalized on 03/11/2021 15:16 by Dr. Sravan Carlisle MD.      Patient Active Problem List   Diagnosis   • ESRD on dialysis (CMS/[facility])   • Coronary artery disease involving native coronary artery of native heart with angina pectoris (CMS/[facility])   • Essential hypertension   • Type 2 diabetes mellitus with hypoglycemia, without long-term current use of insulin (CMS/[facility])   • Coronary artery disease of native artery of native heart with stable angina pectoris (CMS/[facility])   • Dyslipidemia   • End stage renal disease on dialysis (CMS/[facility])         ICD-10-CM ICD-9-CM   1. End stage renal disease on dialysis (CMS/[facility])  N18.6 585.6    Z99.2 V45.11   2. Coronary artery disease of native artery of native heart with stable angina pectoris (CMS/[facility])  I25.118 414.01     413.9   3. Essential hypertension  I10 401.9   4. Dyslipidemia  E78.5 272.4   5. Type 2 diabetes mellitus with hypoglycemia without coma, without long-term current use of insulin (CMS/[facility])  E11.649 250.80     251.2        Lab Frequency Next Occurrence   Follow Anesthesia Guidelines / Protocol Once 03/26/2021   Obtain Informed Consent Once 03/31/2021   Provide NPO Instructions to Patient Once 03/31/2021   Chlorhexidine Skin Prep Once 03/31/2021   ECG 12 Lead Once 03/31/2021       Plan: After thoroughly evaluating Jennifer Salcido, I believe the best course of action is to proceed with placement of permacath in the OR in the coming days.  Patient has history of coronary artery disease and ultimately will be requiring CABG sometime next month.  She is currently on peritoneal dialysis given her end-stage renal disease but will need at least a temporary course of hemodialysis both leading up to the procedure and post procedure from what I understand after discussion with the CT surgery staff.  As such we will plan for permacath placement next week. The risks and benefits were explained at great length to the patient which include but are not limited to bleeding, infection, vessel damage, nerve damage and pneumothorax. The patient understands the risks and wishes for me to proceed.  I have reviewed her pertinent labs and imaging.  The patient can otherwise continue taking their current medication regimen as previously planned. I did discuss vascular risk factors as they pertain to the progression of vascular disease including controlling diabetes, hypertension, and hyperlipidemia. These factors remain stable. Patient's Body mass index is 30.11 kg/m². BMI is above normal parameters. Recommendations include: educational material. This was all discussed in full with complete understanding.    Thank you for allowing me to participate in the care of your patient.  Please do not hesitate with any questions or concerns.  I will keep you aware of any further encounters with Jennifer Salcido.        Sincerely yours,         Simon Coates MD

## 2021-03-26 NOTE — PATIENT INSTRUCTIONS
"BMI for Adults  What is BMI?  Body mass index (BMI) is a number that is calculated from a person's weight and height. BMI can help estimate how much of a person's weight is composed of fat. BMI does not measure body fat directly. Rather, it is an alternative to procedures that directly measure body fat, which can be difficult and expensive.  BMI can help identify people who may be at higher risk for certain medical problems.  What are BMI measurements used for?  BMI is used as a screening tool to identify possible weight problems. It helps determine whether a person is obese, overweight, a healthy weight, or underweight.  BMI is useful for:  · Identifying a weight problem that may be related to a medical condition or may increase the risk for medical problems.  · Promoting changes, such as changes in diet and exercise, to help reach a healthy weight. BMI screening can be repeated to see if these changes are working.  How is BMI calculated?  BMI involves measuring your weight in relation to your height. Both height and weight are measured, and the BMI is calculated from those numbers. This can be done either in English (U[place] or metric measurements. Note that charts and online BMI calculators are available to help you find your BMI quickly and easily without having to do these calculations yourself.  To calculate your BMI in English ([place] measurements:    1. Measure your weight in pounds (lb).  2. Multiply the number of pounds by 703.  ? For example, for a person who weighs 180 lb, multiply that number by 703, which equals 126,540.  3. Measure your height in inches. Then multiply that number by itself to get a measurement called \"inches squared.\"  ? For example, for a person who is 70 inches tall, the \"inches squared\" measurement is 70 inches x 70 inches, which equals 4,900 inches squared.  4. Divide the total from step 2 (number of lb x 703) by the total from step 3 (inches squared): 126,540 ÷ 4,900 = 25.8. This is " "your BMI.  To calculate your BMI in metric measurements:  1. Measure your weight in kilograms (kg).  2. Measure your height in meters (m). Then multiply that number by itself to get a measurement called \"meters squared.\"  ? For example, for a person who is 1.75 m tall, the \"meters squared\" measurement is 1.75 m x 1.75 m, which is equal to 3.1 meters squared.  3. Divide the number of kilograms (your weight) by the meters squared number. In this example: 70 ÷ 3.1 = 22.6. This is your BMI.  What do the results mean?  BMI charts are used to identify whether you are underweight, normal weight, overweight, or obese. The following guidelines will be used:  · Underweight: BMI less than 18.5.  · Normal weight: BMI between 18.5 and 24.9.  · Overweight: BMI between 25 and 29.9.  · Obese: BMI of 30 or above.  Keep these notes in mind:  · Weight includes both fat and muscle, so someone with a muscular build, such as an athlete, may have a BMI that is higher than 24.9. In cases like these, BMI is not an accurate measure of body fat.  · To determine if excess body fat is the cause of a BMI of 25 or higher, further assessments may need to be done by a health care provider.  · BMI is usually interpreted in the same way for men and women.  Where to find more information  For more information about BMI, including tools to quickly calculate your BMI, go to these websites:  · Centers for Disease Control and Prevention: www.cdc.gov  · American Heart Association: www.heart.org  · National Heart, Lung, and Blood [place]: www.nhlbi.nih.gov  Summary  · Body mass index (BMI) is a number that is calculated from a person's weight and height.  · BMI may help estimate how much of a person's weight is composed of fat. BMI can help identify those who may be at higher risk for certain medical problems.  · BMI can be measured using English measurements or metric measurements.  · BMI charts are used to identify whether you are underweight, normal " weight, overweight, or obese.  This information is not intended to replace advice given to you by your health care provider. Make sure you discuss any questions you have with your health care provider.  Document Revised: 09/09/2020 Document Reviewed: 07/17/2020  Elsevier Patient Education © 2021 [employer]

## 2021-03-26 NOTE — TELEPHONE ENCOUNTER
Spoke with patient and advised of arrival time and prework time for her procedure with Dr. Coates on 4/1/2021.  Patient advised of location, time and prep.  She expressed understanding for all that was discussed.

## 2021-03-31 NOTE — TELEPHONE ENCOUNTER
Called to remind the patient of her surgery for tomorrow and the prep.  The patient was aware of being NPO, taking her heart/bp medication with a sip of water and and when to be at the hospital.  She stated understanding.

## 2021-04-01 NOTE — TELEPHONE ENCOUNTER
Spoke with pt regarding previous call about bleeding from permacath placement. Pt was advised to hold pressure and change clothing and call us back if any bleeding continued per

## 2021-04-01 NOTE — OP NOTE
VASCULAR SURGERY OPERATIVE REPORT    DATE OF PROCEDURE: 04/01/21    ATTENDING SURGEON: Simon Coates MD    OR STAFF: Circulator: Katja Moscoso RN  Scrub Person: Bryan Azar  Assistant: Karen Chambers  Vascular Radiology Technician: Marisol Calloway    ANESTHESIA: Monitored Anesthesia Care    PRE-OPERATIVE DIAGNOSIS: End stage renal disease on dialysis (CMS/HCC) [N18.6, Z99.2]    POST-OPERATIVE DIAGNOSIS: Post-Op Diagnosis Codes:     * End stage renal disease on dialysis (CMS/HCC) [N18.6, Z99.2]    PROCEDURE(S):   1.) Percutaneous access of right internal jugular vein under ultrasound guidance  2.) Insertion of right internal jugular cuffed tunneled hemodialysis catheter (Permacath)  3.) Radiographic supervision and interpretation    INTRAOPERATIVE FINDINGS: Patent right internal jugular vein, SVC and   IVC, and well-secured cuffed tunneled hemodialysis catheter at completion of procedure    ESTIMATED BLOOD LOSS: Minimal (<20 mL)    SPECIMENS: None    IMPLANTS: 19 cm cuffed tunneled hemodialysis catheter (Permacath)    COMPLICATIONS: None apparent    CONDITION AT COMPLETION: Hemodynamically stable, awake    DISPOSITION: PACU    INDICATION(S) FOR PROCEDURE:   Patient is a 65 y.o. female with end-stage renal disease currently on peritoneal dialysis.  She has been scheduled for upcoming CABG and is requiring access for hemodialysis in the periprocedural period. All risks, benefits, and alternatives to above elective procedures were discussed with the patient, who elected to proceed, and informed consent was accordingly obtained at that time.    DETAILS OF PROCEDURE:   Patient was brought to the operating room and appropriately identified. In supine position, right IJ venous access site was prepped and draped in the usual sterile fashion. Following a brief timeout, percutaneous right internal jugular venous access was obtained under ultrasound guidance using modified Seldinger technique after infiltration  of local anesthetic to the area. Soft guidewire was easily advanced through the SVC into the proximal IVC. Local anesthetic was then injected over the right chest catheter tunnel site. A small stab incision was then made on the right chest wall using a #11 blade and a similar small stab incision was made at the wire entry site on the right neck.  The catheter was then tunneled retrograde from the chest wall exit site to the sheath insertion site.  Over the wire sequential dilators were then advanced and withdrawn to dilate the soft tissue tract, followed by insertion of the peel-away sheath for tunneled hemodialysis catheter. This was all done under direct fluoroscopic guidance.  The inner dilator of the sheath and wire were then removed.  Catheter was then advanced through the sheath into right internal jugular vein and under direct fluoroscopic visualization to the cavo-atrial junction.  There was noted to be excellent blood return from each port.  Each port was then easily flushed with heparinized saline solution and then each port was instilled with 1000 unit/mL heparin solution as per directions and caps placed on the catheter.  Cuffed tunneled hemodialysis catheter was then secured to skin with 3-0 Nylon suture, and skin at the venous access site on the neck site was reapproximated with 4-0 Monocryl suture. Skin was cleaned, dried, and sterile occlusive dressing was applied, after which patient was safely able to be transferred to recovery.    I was present for all aspects of the procedures, and there were no intraprocedural complications apparent.

## 2021-04-01 NOTE — DISCHARGE INSTRUCTIONS

## 2021-04-01 NOTE — ANESTHESIA PREPROCEDURE EVALUATION
Anesthesia Evaluation     Patient summary reviewed   history of anesthetic complications: PONV  NPO Solid Status: > 8 hours  NPO Liquid Status: > 8 hours           Airway   Mallampati: I  TM distance: >3 FB  Neck ROM: full  No difficulty expected  Dental    (+) partials    Pulmonary - normal exam   (+) sleep apnea,   Cardiovascular - normal exam  Exercise tolerance: good (4-7 METS)    (+) hypertension poorly controlled 2 medications or greater, CAD, angina, hyperlipidemia,       Neuro/Psych  (+) seizures well controlled,     GI/Hepatic/Renal/Endo    (+) obesity,  GERD poorly controlled,  renal disease ESRD, diabetes mellitus type 1 well controlled using insulin,     Musculoskeletal     Abdominal  - normal exam   Substance History - negative use     OB/GYN          Other   arthritis,                      Anesthesia Plan    ASA 3     MAC     intravenous induction     Anesthetic plan, all risks, benefits, and alternatives have been provided, discussed and informed consent has been obtained with: patient.

## 2021-04-01 NOTE — TELEPHONE ENCOUNTER
Pt called regarding scheduling 2wk f/u for permcath placement. Pt informed she is bleeding post op wasn't sure if it was normal. I let pt know I would speak to the dr regarding this issue.

## 2021-04-01 NOTE — INTERVAL H&P NOTE
H&P reviewed. The patient was examined and there are no changes to the H&P.  For permacath insertion to initiate hemodialysis. The risks and benefits were explained at great length to the patient which include but are not limited to bleeding, infection, vessel damage, nerve damage and pneumothorax. The patient understands the risks and wishes for me to proceed.

## 2021-04-01 NOTE — ANESTHESIA POSTPROCEDURE EVALUATION
Patient: Jennifer Salcido    Procedure Summary     Date: 04/01/21 Room / Location: [facility] OR  / [facility] HYBRID OR 12    Anesthesia Start: 0659 Anesthesia Stop: 0733    Procedure: HEMODIALYSIS CATHETER INSERTION (Right Neck) Diagnosis:       End stage renal disease on dialysis (CMS/HCC)      (End stage renal disease on dialysis (CMS/HCC) [N18.6, Z99.2])    Surgeons: Simon Coates MD Provider: Sammy Murillo CRNA    Anesthesia Type: MAC ASA Status: 3          Anesthesia Type: MAC    Vitals  Vitals Value Taken Time   /61 04/01/21 0740   Temp 97 °F (36.1 °C) 04/01/21 0745   Pulse 59 04/01/21 0746   Resp 14 04/01/21 0740   SpO2 100 % 04/01/21 0746   Vitals shown include unvalidated device data.        Post Anesthesia Care and Evaluation    Patient location during evaluation: PACU  Patient participation: complete - patient participated  Level of consciousness: awake and alert  Pain management: adequate  Airway patency: patent  Anesthetic complications: No anesthetic complications  PONV Status: none  Cardiovascular status: acceptable and hemodynamically stable  Respiratory status: acceptable  Hydration status: acceptable    Comments: Blood pressure 140/61, pulse 60, temperature 97 °F (36.1 °C), temperature source Temporal, resp. rate 14, SpO2 100 %, not currently breastfeeding.    Patient discharged from PACU based upon Bessy score. Please see RN notes for further details

## 2021-04-08 PROBLEM — E66.09 CLASS 1 OBESITY DUE TO EXCESS CALORIES WITH SERIOUS COMORBIDITY AND BODY MASS INDEX (BMI) OF 31.0 TO 31.9 IN ADULT: Status: ACTIVE | Noted: 2021-01-01

## 2021-04-08 NOTE — PROGRESS NOTES
Subjective   Chief Complaint   Patient presents with   • Coronary Artery Disease     Patient is here for a follow up w/ ct.        Patient ID: Jennifer Salcido is a 65 y.o. female who is here to discuss consideration for coronary artery bypass grafting.     Ms. Salcido is a 64 year old female with a complex medical history significant for hypertension, type 2 diabetes (most recent A1C 6.7), end stage renal disease requiring peritoneal dialysis, GERD, gout, and coronary artery disease.  Gastric bypass in 2004, total hysterectomy 2003, cholecystectomy, sinus surgery.  She did recently have an episode of hypoglycemia that required admission.  She routinely follows with cardiology and nephrology at [facility].  In 09/2019 she was considered for kidney transplant at [facility] for which they did find a compatible donor.  It was recommended that she have left heart catheterization prior to transplant and this was performed 10/2019.  It was determined at that time that she had severe three vessel disease.  She was further evaluated by [facility] specialist to determine the best plan for her moving forward in regard to CAD in light of the need for kidney transplant.  She has since then decided she would like her cardiac care done here locally due to her inability to drive and the need to set up transportation.  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  She does follow with an eye dr regularly.   It was recommended that she have an updated cardiac cath which demonstrated ongoing 3 vessel disease.  She does have a history of PCI in 2016.  She denies chest pain, palpitations, or dyspnea.  She was referred to cardiothoracic surgery for consideration of surgical revascularization.  Her workup was completed.  She is a former smoker quitting 12 years ago, does not drink alcohol, no recreational drug use.    She did present previously and we discussed pros/cons of CABG verses medical  management of 3V CAD in the setting of diabetes mellitus.  She did ultimately decide that she is agreeable to proceed forward with surgery.  A swallow study has been obtained to ensure no strictures and this is unremarkable.  She does see Dr. Carias.  She would like to ensure that she has suitable family support.  She would hope to consider surgery end of April May or even June potentially.  From a clinical point of view she remains asymptomatic without chest pain, dyspnea, lower extremity edema, paroxysmal nocturnal dyspnea orthopnea.  No admissions for heart failure or acute coronary syndrome.  She does have perhaps some fatigue.  This is intermittent.    She did see Dr. Coker who advised consideration for CABG.      The following portions of the patient's history were reviewed and updated as appropriate: allergies, current medications, past family history, past medical history, past social history, past surgical history and problem list.    Review of Systems   Constitutional: Positive for activity change. Negative for diaphoresis, fatigue and unexpected weight change.   HENT: Positive for trouble swallowing. Negative for voice change.    Eyes: Negative for visual disturbance (unchanged).   Respiratory: Negative for cough, chest tightness, shortness of breath and wheezing.    Cardiovascular: Negative for chest pain, palpitations and leg swelling.   Gastrointestinal: Positive for constipation. Negative for abdominal distention, abdominal pain, diarrhea, nausea and vomiting.   Musculoskeletal: Negative for arthralgias and myalgias.   Skin: Negative for color change, pallor, rash and wound.   Allergic/Immunologic: Negative for immunocompromised state.   Neurological: Negative for dizziness, tremors and seizures.   Hematological: Does not bruise/bleed easily.   Psychiatric/Behavioral: Negative for agitation, confusion and sleep disturbance.       Objective   Visit Vitals  /68 (BP Location: Left arm, Patient  "Position: Sitting, Cuff Size: Adult)   Pulse 54   Ht 160 cm (63\")   Wt 77.7 kg (171 lb 3.2 oz)   SpO2 100%   BMI 30.33 kg/m²       Physical Exam   Constitutional: She is oriented to person, place, and time. She appears well-developed.   HENT:   Head: Normocephalic.   Eyes: Pupils are equal, round, and reactive to light.   Neck: No JVD present.   Cardiovascular: Normal rate, regular rhythm and normal heart sounds.   No murmur heard.  Pulmonary/Chest: Effort normal and breath sounds normal. She has no wheezes. She has no rales.   Abdominal: Soft. She exhibits no distension. There is no abdominal tenderness.   PD site is C/D/I   Musculoskeletal: No tenderness.   Lymphadenopathy:     She has no cervical adenopathy.   Neurological: She is alert and oriented to person, place, and time.   Skin: Skin is warm and dry.   Paper thin skin   Psychiatric: Her behavior is normal. Judgment and thought content normal.   Vitals reviewed.            Assessment/Plan        Diagnoses and all orders for this visit:    1. Coronary artery disease involving native coronary artery of native heart with angina pectoris (CMS/[facility]) (Primary)    2. End stage renal disease on dialysis (CMS/[facility])    3. Type 2 diabetes mellitus with hypoglycemia without coma, without long-term current use of insulin (CMS/[facility])    4. Class 1 obesity due to excess calories with serious comorbidity and body mass index (BMI) of 31.0 to 31.9 in adult         I discussed the patient's findings and my recommendations with the patient.  We discussed the pros and cons of surgical revascularization in the setting of three-vessel coronary disease with diabetes mellitus.  I did emphasize a survival advantage is conferred.  She remains an elective operation.  She is agreeable to consider coronary artery bypass grafting.  We discussed the options for treatment of coronary artery disease to include medical therapy, coronary stenting, and surgical revascularization.  We discussed the " pros and cons of each option and how it pertains to the current case.  We discussed that I would agree that she should undergo maximal medical therapy regardless of decision-making and that PCI is not of benefit at this time.  The STS Risk score was calculated using the STS Risk Calculator and discussed with the patient.  Coronary artery bypass grafting is best option given patient's findings and risk factors.  We discussed the operative conduct and expected hospital and outpatient recovery.  Risks were discussed to include but not limited to bleeding, infection, stroke, heart attack, need for additional procedures, anesthesia risk, organ dysfunction and/or failure, prolonged mechanical ventilation, prolonged ICU stay, chronic pain syndromes, sternal nonunion, and/or death.  We discussed the need to utilize all medical treatments additionally prescribed post surgery.  It is my understanding that her nephrologist has advised that she have access established for planned hemodialysis perioperatively and thereafter.  We would like to have this arranged for 2 weeks prior to surgery.  A date has been selected for April 22.  Left heart catheterization will need to be repeated.  I did speak with Dr. Coker and he will take care of this.  I have asked that she contact me if she has any change in her symptoms specifically chest pain, dyspnea, or lower extremity edema etc. etc.  All question answered to the best my ability and she is agreeable.        Patient's Body mass index is 30.33 kg/m². BMI is above normal parameters. Recommendations include: educational material, nutrition counseling and referral to primary care.    I have congratulated Jennifer Salcido on successful smoking cessation.   She is a former smoker having quit 13 years ago.  I have educated her on the risk of diseases from using tobacco products such as cancer, COPD and heart diease    Advance Care Planning   ACP discussion was held with the patient during this  visit. Patient does not have an advance directive, declines further assistance.      Given her complexity of care I did like to see her prior to surgery with testing to ensure that she remains suitable proceed forward for elective bypass grafting.  This to be coordinated with her usual preop anesthesia visit.

## 2021-04-14 NOTE — PATIENT INSTRUCTIONS
"BMI for Adults  What is BMI?  Body mass index (BMI) is a number that is calculated from a person's weight and height. BMI can help estimate how much of a person's weight is composed of fat. BMI does not measure body fat directly. Rather, it is an alternative to procedures that directly measure body fat, which can be difficult and expensive.  BMI can help identify people who may be at higher risk for certain medical problems.  What are BMI measurements used for?  BMI is used as a screening tool to identify possible weight problems. It helps determine whether a person is obese, overweight, a healthy weight, or underweight.  BMI is useful for:  · Identifying a weight problem that may be related to a medical condition or may increase the risk for medical problems.  · Promoting changes, such as changes in diet and exercise, to help reach a healthy weight. BMI screening can be repeated to see if these changes are working.  How is BMI calculated?  BMI involves measuring your weight in relation to your height. Both height and weight are measured, and the BMI is calculated from those numbers. This can be done either in English ([place] or metric measurements. Note that charts and online BMI calculators are available to help you find your BMI quickly and easily without having to do these calculations yourself.  To calculate your BMI in English ([place] measurements:    1. Measure your weight in pounds (lb).  2. Multiply the number of pounds by 703.  ? For example, for a person who weighs 180 lb, multiply that number by 703, which equals 126,540.  3. Measure your height in inches. Then multiply that number by itself to get a measurement called \"inches squared.\"  ? For example, for a person who is 70 inches tall, the \"inches squared\" measurement is 70 inches x 70 inches, which equals 4,900 inches squared.  4. Divide the total from step 2 (number of lb x 703) by the total from step 3 (inches squared): 126,540 ÷ 4,900 = 25.8. This is " "your BMI.  To calculate your BMI in metric measurements:  1. Measure your weight in kilograms (kg).  2. Measure your height in meters (m). Then multiply that number by itself to get a measurement called \"meters squared.\"  ? For example, for a person who is 1.75 m tall, the \"meters squared\" measurement is 1.75 m x 1.75 m, which is equal to 3.1 meters squared.  3. Divide the number of kilograms (your weight) by the meters squared number. In this example: 70 ÷ 3.1 = 22.6. This is your BMI.  What do the results mean?  BMI charts are used to identify whether you are underweight, normal weight, overweight, or obese. The following guidelines will be used:  · Underweight: BMI less than 18.5.  · Normal weight: BMI between 18.5 and 24.9.  · Overweight: BMI between 25 and 29.9.  · Obese: BMI of 30 or above.  Keep these notes in mind:  · Weight includes both fat and muscle, so someone with a muscular build, such as an athlete, may have a BMI that is higher than 24.9. In cases like these, BMI is not an accurate measure of body fat.  · To determine if excess body fat is the cause of a BMI of 25 or higher, further assessments may need to be done by a health care provider.  · BMI is usually interpreted in the same way for men and women.  Where to find more information  For more information about BMI, including tools to quickly calculate your BMI, go to these websites:  · Centers for Disease Control and Prevention: www.cdc.gov  · American Heart Association: www.heart.org  · National Heart, Lung, and Blood [place]: www.nhlbi.nih.gov  Summary  · Body mass index (BMI) is a number that is calculated from a person's weight and height.  · BMI may help estimate how much of a person's weight is composed of fat. BMI can help identify those who may be at higher risk for certain medical problems.  · BMI can be measured using English measurements or metric measurements.  · BMI charts are used to identify whether you are underweight, normal " weight, overweight, or obese.  This information is not intended to replace advice given to you by your health care provider. Make sure you discuss any questions you have with your health care provider.  Document Revised: 09/09/2020 Document Reviewed: 07/17/2020  [employer]vier Patient Education © 2021 [employer]

## 2021-04-14 NOTE — PROGRESS NOTES
04/14/2021      Akhil Golden,   [facility]   [place] [address]        Jennifer Salcido  1955    Chief Complaint   Patient presents with   • Follow-up     permcath f/u 2wk- pt denies stroke symptoms-pt states itchy- dressing have not been changed this week- pt denies pain    • smoking     never used   • Med Management     verbally went over meds with pt        Dear Akhil Golden DO:    HPI     I had the pleasure of seeing your patient in the office today for follow up.  As you recall, the patient is a 65 y.o. female who we are currently following for end-stage renal disease on peritoneal dialysis.  She was referred to our office recently as she is being prepared for CABG later this month and decision was made to transition her at least temporarily to hemodialysis from her normal peritoneal dialysis.  As such I placed a right internal jugular vein permacath about 2 weeks ago so that she could start hemodialysis.  She has yet to actually initiate hemodialysis but the catheter has been accessed for some iron infusions with no issues.  Her CABG is scheduled for 4/22.  She otherwise feels well and has no acute complaints today.      Review of Systems   Constitutional: Negative.  Negative for activity change, appetite change, chills, diaphoresis, fatigue and fever.   HENT: Negative.  Negative for congestion, sneezing, sore throat and trouble swallowing.    Eyes: Negative.  Negative for visual disturbance.   Respiratory: Negative.  Negative for chest tightness and shortness of breath.    Cardiovascular: Negative.  Negative for chest pain, palpitations and leg swelling.   Gastrointestinal: Negative.  Negative for abdominal distention, abdominal pain, nausea and vomiting.   Endocrine: Negative.    Genitourinary: Negative.    Musculoskeletal: Negative.    Skin: Negative.    Allergic/Immunologic: Negative.    Neurological: Negative.    Hematological: Negative.    Psychiatric/Behavioral:  "Negative.        /54 (BP Location: Left arm, Patient Position: Sitting, Cuff Size: Adult)   Pulse 55   Ht 160 cm (63\")   Wt 78.8 kg (173 lb 12.8 oz)   SpO2 98%   BMI 30.79 kg/m²   Physical Exam  Vitals reviewed.   Constitutional:       Appearance: Normal appearance.   HENT:      Head: Normocephalic and atraumatic.      Nose: Nose normal.      Mouth/Throat:      Mouth: Mucous membranes are moist.   Eyes:      Extraocular Movements: Extraocular movements intact.      Pupils: Pupils are equal, round, and reactive to light.   Neck:      Comments: Right IJ permacath site is clean and intact with dressing in place.  Cardiovascular:      Rate and Rhythm: Normal rate and regular rhythm.      Pulses: Normal pulses.           Carotid pulses are 2+ on the right side and 2+ on the left side.       Radial pulses are 2+ on the right side and 2+ on the left side.        Femoral pulses are 2+ on the right side and 2+ on the left side.       Popliteal pulses are 2+ on the right side and 2+ on the left side.        Dorsalis pedis pulses are 2+ on the right side and 2+ on the left side.        Posterior tibial pulses are 2+ on the right side and 2+ on the left side.   Pulmonary:      Effort: Pulmonary effort is normal. No respiratory distress.   Abdominal:      General: There is no distension.      Palpations: Abdomen is soft. There is no mass.      Tenderness: There is no abdominal tenderness.      Comments: She has a peritoneal dialysis catheter in place.  Site is clean and intact with dressing in place.   Musculoskeletal:         General: Normal range of motion.      Cervical back: Normal range of motion and neck supple.      Right lower leg: No edema.      Left lower leg: No edema.   Skin:     General: Skin is warm and dry.      Capillary Refill: Capillary refill takes less than 2 seconds.   Neurological:      General: No focal deficit present.      Mental Status: She is alert and oriented to person, place, and time. "   Psychiatric:         Mood and Affect: Mood normal.         Behavior: Behavior normal.         Thought Content: Thought content normal.         Judgment: Judgment normal.         DIAGNOSTIC DATA:    Cardiac Catheterization/Vascular Study    Result Date: 3/26/2021  Narrative: Date: 3/23/2021   Procedures: 1. Selective coronary angiography 2. Left heart catheterization   Indication: CAD with stable angina   Attending: Srikanth Coker   Risks, benefits, and alternatives discussed with the patient and/or family.  Plan is for moderate sedation, and the patient agrees to proceed with the procedure.  An immediate assessment was done prior to the administration of moderate sedation. Procedure Description: The patient was brought to the cardiac catheterization lab in a fasting state. Informed consent was obtained after explaining the risks, benefits, alternatives of the procedure. The patient was sterilely prepped and draped for right radial artery access usual fashion by the cath lab staff. Time out was taken to confirm the correct patient, site, procedure. The area over the right radial artery was anesthetized with 1% lidocaine. A micropuncture needle was used to puncture the right radial artery resulting bright red pulsatile arterial blood flow. Following this a 5 Greek glide sheath was placed and the sheath was aspirated and flushed. Next, a 5 Bengali Suly diagnostic catheter was advanced under fluoroscopic guidance into the ascending aorta, across aortic valve, and into the left ventricle.  Pressures were recorded and pullback was obtained to evaluate for a gradient across aortic valve.  The catheter was then selectively engaged into the anomalous left circumflex and right coronary arteries.  Multiple cineograms were obtained via power injection of contrast in multiple orthogonal views.  The catheter was unable to engage the left coronary artery and was then removed and exchanged for a 5 Greek Tig catheter.  The catheter  was selectively engaged into the left coronary artery.  Multiple cineograms were obtained via power injection of contrast in multiple orthogonal views.  The catheter was then removed and the sheath was aspirated and flushed.  A TR band was then placed and adequate hemostasis was obtained.  The patient was then transferred to cardiac observation unit in stable condition.  There is no early complications noted.  I supervised the administration of conscious sedation by nursing staff throughout the case.  First dose was given at 9:58 and the end of my face-to-face encounter was at 10:13, accounting for a total of 15 minutes of supervision.  During the case, continuous pulse oximetry, heart rate, blood pressure, and patient status were monitored. Total contrast: 72 ml Estimated Blood Loss: Negligible Specimens: None Complications: None Findings: Left heart catheterization: Pressures: 1.  LV: 104/13 mmHg 2.  Aortic: 104/51 mmHg, mean 73 mmHg   Selective coronary angiography: 1. Left anterior descending artery: The LAD arises directly off the aorta and runs in the interventricular groove giving off 2 diagonals and multiple septal perforators before wrapping around the apex as an apical recurrent branch.  There is a moderate 30-40% ostial stenosis. There is a severe 70-80% stenosis in the mid LAD.  The first diagonal is small and has mild diffuse disease only.  There is a severe 80-85% ostial stenosis of a large septal  branch.  The second diagonal branch is a large bifurcating vessel with 60-70% stenosis in the proximal portion prior to the bifurcation and a a 70-80% stenosis in a small branch off the second diagonal.   3. Left circumflex artery: The left circumflex is an anomalous vessel that arises off the right coronary cusp.  It is a small vessel that is nondominant for posterior circulation.  It gives off 2 bifurcating obtuse marginal branches.  The is a severe 70-80% ostial stenosis.  There is moderate  disease 50-60% stenosis in the first OM. 4. Right coronary artery: The RCA is dominant for posterior circulation giving rise to PDA and right PL branches.  The RCA is chronically totally occluded ostially.  The distal RCA, PDA, and right PL branches fill via collaterals from the LAD and left circumflex arteries.  There is 50 to 60% stenosis in a large right PL branch.  The remainder distal RCA has moderate diffuse disease and appears totally occluded at site of previous stent placement in the mid RCA.     Impression: 1.  Severe three-vessel coronary artery disease. 2.  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 3.  Normal left heart filling pressures.   Plan: -Routine post cath care. -Discharge home after post sedation monitoring. -Proceed with coronary artery bypass grafting with Dr. Wood. Srikanth Coker MD    XR Chest 1 View    Result Date: 4/1/2021  Narrative: Exam: XR CHEST 1 VW-  Indication: Status post RIGHT IJ permacath insertion  Comparison: 12/16/2020  Findings:  RIGHT IJ dual-lumen CVL with tips overlying the low SVC. No visible pneumothorax. No pleural effusion or consolidation. Cardiac silhouette is within normal limits and stable. No acute osseous findings.      Impression: Impression:  RIGHT IJ dual-lumen CVL with tips overlying the low SVC. This report was finalized on 04/01/2021 09:59 by Dr. Sravan Carlisle MD.    IR Insert Tunneled CV Catheter Without Port 5 Plus, FL C Arm During Surgery    Result Date: 4/4/2021  Narrative: Performed by Dr. Coates. Please see procedure note. This report was finalized on 04/04/2021 09:43 by Dr. Simon Coates MD.      Patient Active Problem List   Diagnosis   • ESRD on dialysis (CMS/[facility])   • Coronary artery disease involving native coronary artery of native heart with angina pectoris (CMS/[facility])   • Essential hypertension   • Type 2 diabetes mellitus with hypoglycemia, without long-term current use of insulin (CMS/[facility])   • Coronary artery disease of  native artery of native heart with stable angina pectoris (CMS/[facility])   • Dyslipidemia   • End stage renal disease on dialysis (CMS/[facility])   • Class 1 obesity due to excess calories with serious comorbidity and body mass index (BMI) of 31.0 to 31.9 in adult         ICD-10-CM ICD-9-CM   1. End stage renal disease on dialysis (CMS/[facility])  N18.6 585.6    Z99.2 V45.11   2. Coronary artery disease involving native coronary artery of native heart with angina pectoris (CMS/[facility])  I25.119 414.01     413.9   3. Dyslipidemia  E78.5 272.4   4. Essential hypertension  I10 401.9   5. Type 2 diabetes mellitus with hypoglycemia without coma, without long-term current use of insulin (CMS/[facility])  E11.649 250.80     251.2       Lab Frequency Next Occurrence   Follow Anesthesia Guidelines / Protocol Once 03/26/2021   Obtain Informed Consent Once 03/31/2021   Provide NPO Instructions to Patient Once 03/31/2021   Chlorhexidine Skin Prep Once 03/31/2021       PLAN: After thoroughly evaluating Jennifer Salcido, I believe the best course of action is to remain conservative from a vascular standpoint.  She is doing well after recent right internal jugular vein permacath placement.  The permacath can be used as needed moving forward for hemodialysis.  She is planned to undergo CABG on 4/22 given multivessel coronary artery disease.  Once this is completed and she is able to transition back to peritoneal dialysis then I will see her back here in the office and we can remove the permacath.  I have a tentatively scheduled her to be seen here in 3 months and we can reevaluate at that time.  We did have another discussion today about the fact that she feels like her peritoneal dialysis is not as efficient and she may at some point makes a decision to transfer for over indefinitely to hemodialysis.  If that is the case and we can look at creating more permanent access for her in the form of either an AV fistula or AV graft.  The patient is to continue taking  their medications as previously discussed.   I did discuss vascular risk factors as they pertain to the progression of vascular disease including controlling diabetes, hypertension, and hyperlipidemia. These factors remain stable. Patient's Body mass index is 30.79 kg/m². BMI is above normal parameters. Recommendations include: educational material.   This was all discussed in full with complete understanding.  Thank you for allowing me to participate in the care of your patient.  Please do not hesitate to call with any questions or concerns.  We will keep you aware of any further encounters with Jennifer Salcido.      Sincerely Yours,      Simon Coates MD

## 2021-04-15 NOTE — TELEPHONE ENCOUNTER
Patient is aware of OR date 4/22 and prework date of 4/16. Per Katheryn, Dr Wood wants to see the patient before surgery and will try to coordinate seeing her while in prework on 4/16. If patient completes prework and has not seen Dr Wood, she is to come to our office. Patient verbalized understanding to all.

## 2021-04-16 NOTE — DISCHARGE INSTRUCTIONS
DAY OF SURGERY INSTRUCTIONS        YOUR SURGEON: Dr. Wood    PROCEDURE: Coronary Artery Bypass Grafting    DATE OF SURGERY: April 22, 2021    ARRIVAL TIME: AS DIRECTED BY OFFICE    YOU MAY TAKE THE FOLLOWING MEDICATION(S) THE MORNING OF SURGERY WITH A SIP OF WATER: ***      ALL OTHER HOME MEDICATION CHECK WITH YOUR PHYSICIAN                      MANAGING PAIN AFTER SURGERY    We know you are probably wondering what your pain will be like after surgery.  Following surgery it is unrealistic to expect you will not have pain.   Pain is how our bodies let us know that something is wrong or cautions us to be careful.  That said, our goal is to make your pain tolerable.    Methods we may use to treat your pain include (oral or IV medications, PCAs, epidurals, nerve blocks, etc.)   While some procedures require IV pain medications for a short time after surgery, transitioning to pain medications by mouth allows for better management of pain.   Your nurse will encourage you to take oral pain medications whenever possible.  IV medications work almost immediately, but only last a short while.  Taking medications by mouth allows for a more constant level of medication in your blood stream for a longer period of time.      Once your pain is out of control it is harder to get back under control.  It is important you are aware when your next dose of pain medication is due.  If you are admitted, your nurse may write the time of your next dose on the white board in your room to help you remember.      We are interested in your pain and encourage you to inform us about aggravating factors during your visit.   Many times a simple repositioning every few hours can make a big difference.    If your physician says it is okay, do not let your pain prevent you from getting out of bed. Be sure to call your nurse for assistance prior to getting up so you do not fall.      Before surgery, please decide your tolerable pain goal.  These  faces help describe the pain ratings we use on a 0-10 scale.   Be prepared to tell us your goal and whether or not you take pain or anxiety medications at home.          BEFORE YOU COME TO THE HOSPITAL  (Pre-op instructions)  • Do not eat, drink, smoke or chew gum after midnight the night before surgery.  This also includes no mints.  • Morning of surgery take only the medicines you have been instructed with a sip of water unless otherwise instructed  by your physician.  • Do not shave, wear makeup or dark nail polish.  • Remove all jewelry including rings.  • Leave anything you consider valuable at home.  • Leave your suitcase in the car until after your surgery.  • Bring the following with you if applicable:  o Picture ID and insurance, Medicare or Medicaid cards  o Co-pay/deductible required by insurance (cash, check, credit card)  o Copy of advance directive, living will or power-of- documents if not brought to PAT  o CPAP or BIPAP mask and tubing  o Relaxation aids ( book, magazine), etc.  o Hearing aids                        ON THE DAY OF SURGERY  · On the day of surgery check in at registration located at the main entrance of the hospital.   ? You will be registered and given a beeper with instructions where to wait in the main lobby.  ? When your beeper lights up and vibrates a member of the Outpatient Surgery staff will meet you at the double doors under the stair steps and escort you to your preoperative room.   · You may have cloth compression devices placed on your legs. These help to prevent blood clots and reduce swelling in your legs.  · An IV may be inserted into one of your veins.  · In the operating room, you may be given one or more of the following:  ? A medicine to help you relax (sedative).  ? A medicine to numb the area (local anesthetic).  ? A medicine to make you fall asleep (general anesthetic).  ? A medicine that is injected into an area of your body to numb everything below the  "injection site (regional anesthetic).  · Your surgical site will be marked or identified.  · You may be given an antibiotic through your IV to help prevent infection.  Contact a health care provider if you:  · Develop a fever of more than 100.4°F (38°C) or other feelings of illness during the 48 hours before your surgery.  · Have symptoms that get worse.  Have questions or concerns about your surgery    General Anesthesia/Surgery, Adult  General anesthesia is the use of medicines to make a person \"go to sleep\" (unconscious) for a medical procedure. General anesthesia must be used for certain procedures, and is often recommended for procedures that:  · Last a long time.  · Require you to be still or in an unusual position.  · Are major and can cause blood loss.  The medicines used for general anesthesia are called general anesthetics. As well as making you unconscious for a certain amount of time, these medicines:  · Prevent pain.  · Control your blood pressure.  · Relax your muscles.  Tell a health care provider about:  · Any allergies you have.  · All medicines you are taking, including vitamins, herbs, eye drops, creams, and over-the-counter medicines.  · Any problems you or family members have had with anesthetic medicines.  · Types of anesthetics you have had in the past.  · Any blood disorders you have.  · Any surgeries you have had.  · Any medical conditions you have.  · Any recent upper respiratory, chest, or ear infections.  · Any history of:  ? Heart or lung conditions, such as heart failure, sleep apnea, asthma, or chronic obstructive pulmonary disease (COPD).  ?  service.  ? Depression or anxiety.  · Any tobacco or drug use, including marijuana or alcohol use.  · Whether you are pregnant or may be pregnant.  What are the risks?  Generally, this is a safe procedure. However, problems may occur, including:  · Allergic reaction.  · Lung and heart problems.  · Inhaling food or liquid from the stomach " into the lungs (aspiration).  · Nerve injury.  · Air in the bloodstream, which can lead to stroke.  · Extreme agitation or confusion (delirium) when you wake up from the anesthetic.  · Waking up during your procedure and being unable to move. This is rare.  These problems are more likely to develop if you are having a major surgery or if you have an advanced or serious medical condition. You can prevent some of these complications by answering all of your health care provider's questions thoroughly and by following all instructions before your procedure.  General anesthesia can cause side effects, including:  · Nausea or vomiting.  · A sore throat from the breathing tube.  · Hoarseness.  · Wheezing or coughing.  · Shaking chills.  · Tiredness.  · Body aches.  · Anxiety.  · Sleepiness or drowsiness.  · Confusion or agitation.  RISKS AND COMPLICATIONS OF SURGERY  Your health care provider will discuss possible risks and complications with you before surgery. Common risks and complications include:    · Problems due to the use of anesthetics.  · Blood loss and replacement (does not apply to minor surgical procedures).  · Temporary increase in pain due to surgery.  · Uncorrected pain or problems that the surgery was meant to correct.  · Infection.  · New damage.    What happens before the procedure?    Medicines  Ask your health care provider about:  · Changing or stopping your regular medicines. This is especially important if you are taking diabetes medicines or blood thinners.  · Taking medicines such as aspirin and ibuprofen. These medicines can thin your blood. Do not take these medicines unless your health care provider tells you to take them.  · Taking over-the-counter medicines, vitamins, herbs, and supplements. Do not take these during the week before your procedure unless your health care provider approves them.  General instructions  · Starting 3-6 weeks before the procedure, do not use any products that  contain nicotine or tobacco, such as cigarettes and e-cigarettes. If you need help quitting, ask your health care provider.  · If you brush your teeth on the morning of the procedure, make sure to spit out all of the toothpaste.  · Tell your health care provider if you become ill or develop a cold, cough, or fever.  · If instructed by your health care provider, bring your sleep apnea device with you on the day of your surgery (if applicable).  · Ask your health care provider if you will be going home the same day, the following day, or after a longer hospital stay.  ? Plan to have someone take you home from the hospital or clinic.  ? Plan to have a responsible adult care for you for at least 24 hours after you leave the hospital or clinic. This is important.  What happens during the procedure?  · You will be given anesthetics through both of the following:  ? A mask placed over your nose and mouth.  ? An IV in one of your veins.  · You may receive a medicine to help you relax (sedative).  · After you are unconscious, a breathing tube may be inserted down your throat to help you breathe. This will be removed before you wake up.  · An anesthesia specialist will stay with you throughout your procedure. He or she will:  ? Keep you comfortable and safe by continuing to give you medicines and adjusting the amount of medicine that you get.  ? Monitor your blood pressure, pulse, and oxygen levels to make sure that the anesthetics do not cause any problems.  The procedure may vary among health care providers and hospitals.  What happens after the procedure?  · Your blood pressure, temperature, heart rate, breathing rate, and blood oxygen level will be monitored until the medicines you were given have worn off.  · You will wake up in a recovery area. You may wake up slowly.  · If you feel anxious or agitated, you may be given medicine to help you calm down.  · If you will be going home the same day, your health care provider  may check to make sure you can walk, drink, and urinate.  · Your health care provider will treat any pain or side effects you have before you go home.  · Do not drive for 24 hours if you were given a sedative.  Summary  · General anesthesia is used to keep you still and prevent pain during a procedure.  · It is important to tell your healthcare provider about your medical history and any surgeries you have had, and previous experience with anesthesia.  · Follow your healthcare provider’s instructions about when to stop eating, drinking, or taking certain medicines before your procedure.  · Plan to have someone take you home from the hospital or clinic.  This information is not intended to replace advice given to you by your health care provider. Make sure you discuss any questions you have with your health care provider.  Document Released: 03/26/2009 Document Revised: 08/03/2018 Document Reviewed: 08/03/2018  [employer] Interactive Patient Education © 2019 [employer]       Fall Prevention in Hospitals, Adult  As a hospital patient, your condition and the treatments you receive can increase your risk for falls. Some additional risk factors for falls in a hospital include:  · Being in an unfamiliar environment.  · Being on bed rest.  · Your surgery.  · Taking certain medicines.  · Your tubing requirements, such as intravenous (IV) therapy or catheters.  It is important that you learn how to decrease fall risks while at the hospital. Below are important tips that can help prevent falls.  SAFETY TIPS FOR PREVENTING FALLS  Talk about your risk of falling.  · Ask your health care provider why you are at risk for falling. Is it your medicine, illness, tubing placement, or something else?  · Make a plan with your health care provider to keep you safe from falls.  · Ask your health care provider or pharmacist about side effects of your medicines. Some medicines can make you dizzy or affect your coordination.  Ask for  help.  · Ask for help before getting out of bed. You may need to press your call button.  · Ask for assistance in getting safely to the toilet.  · Ask for a walker or cane to be put at your bedside. Ask that most of the side rails on your bed be placed up before your health care provider leaves the room.  · Ask family or friends to sit with you.  · Ask for things that are out of your reach, such as your glasses, hearing aids, telephone, bedside table, or call button.  Follow these tips to avoid falling:  · Stay lying or seated, rather than standing, while waiting for help.  · Wear rubber-soled slippers or shoes whenever you walk in the hospital.  · Avoid quick, sudden movements.  ¨ Change positions slowly.  ¨ Sit on the side of your bed before standing.  ¨ Stand up slowly and wait before you start to walk.  · Let your health care provider know if there is a spill on the floor.  · Pay careful attention to the medical equipment, electrical cords, and tubes around you.  · When you need help, use your call button by your bed or in the bathroom. Wait for one of your health care providers to help you.  · If you feel dizzy or unsure of your footing, return to bed and wait for assistance.  · Avoid being distracted by the TV, telephone, or another person in your room.  · Do not lean or support yourself on rolling objects, such as IV poles or bedside tables.     This information is not intended to replace advice given to you by your health care provider. Make sure you discuss any questions you have with your health care provider.     Document Released: 12/15/2001 Document Revised: 01/08/2016 Document Reviewed: 08/25/2013  [employer] Interactive Patient Education ©2016 [employer]       [facility]  CHG 4% Patient Instruction Sheet    Chlorhexidine Before Surgery  Chlorhexidine gluconate (CHG) is a germ-killing (antiseptic) solution that is used to clean the skin. It gets rid of the bacteria that normally live on  the skin. Cleaning your skin with CHG before surgery helps lower the risk for infection after surgery.    How to use CHG solution  · You will take 2 showers, one shower the night before surgery, the second shower the morning of surgery before coming to the hospital.  · Use CHG only as told by your health care provider, and follow the instructions on the label.  · Use CHG solution while taking a shower. Follow these steps when using CHG solution (unless your health care provider gives you different instructions):  1. Start the shower.  2. Use your normal soap and shampoo to wash your face and hair.  3. Turn off the shower or move out of the shower stream.  4. Pour the CHG onto a clean washcloth. Do not use any type of brush or rough-edged sponge.  5. Starting at your neck, lather your body down to your toes. Make sure you:  6. Pay special attention to the part of your body where you will be having surgery. Scrub this area for at least 1 minute.  7. Use the full amount of CHG as directed. Usually, this is one half bottle for each shower.  8. Do not use CHG on your head or face. If the solution gets into your ears or eyes, rinse them well with water.  9. Avoid your genital area.  10. Avoid any areas of skin that have broken skin, cuts, or scrapes.  11. Scrub your back and under your arms. Make sure to wash skin folds.  12. Let the lather sit on your skin for 1-2 minutes or as long as told by your health care  provider.  13. Thoroughly rinse your entire body in the shower. Make sure that all body creases and crevices are rinsed well.  14. Dry off with a clean towel. Do not put any substances on your body afterward, such as powder, lotion, or perfume.  15. Put on clean clothes or pajamas.  16. If it is the night before your surgery, sleep in clean sheets.    What are the risks?  Risks of using CHG include:  · A skin reaction.  · Hearing loss, if CHG gets in your ears.  · Eye injury, if CHG gets in your eyes and is not  rinsed out.  · The CHG product catching fire.  Make sure that you avoid smoking and flames after applying CHG to your skin.  Do not use CHG:  · If you have a chlorhexidine allergy or have previously reacted to chlorhexidine.  · On babies younger than 2 months of age.      On the day of surgery, when you are taken to your room in Outpatient Surgery you will be given a CHG prepackaged cloth to wipe the site for your surgery.  How to use CHG prepackaged cloths  · Follow the instructions on the label.  · Use the CHG cloth on clean, dry skin. Follow these steps when using a CHG cloth (unless your health care provider gives you different instructions):  1. Using the CHG cloth, vigorously scrub the part of your body where you will be having surgery. Scrub using a back-and-forth motion for 3 minutes. The area on your body should be completely wet with CHG when you are finished scrubbing.  2. Do not rinse. Discard the cloth and let the area air-dry for 1 minute. Do not put any substances on your body afterward, such as powder, lotion, or perfume.  Contact a health care provider if:  · Your skin gets irritated after scrubbing.  · You have questions about using your solution or cloth.  Get help right away if:  · Your eyes become very red or swollen.  · Your eyes itch badly.  · Your skin itches badly and is red or swollen.  · Your hearing changes.  · You have trouble seeing.  · You have swelling or tingling in your mouth or throat.  · You have trouble breathing.  · You swallow any chlorhexidine.  Summary  · Chlorhexidine gluconate (CHG) is a germ-killing (antiseptic) solution that is used to clean the skin. Cleaning your skin with CHG before surgery helps lower the risk for infection after surgery.  · You may be given CHG to use at home. It may be in a bottle or in a prepackaged cloth to use on your skin. Carefully follow your health care provider's instructions and the instructions on the product label.  · Do not use CHG if  you have a chlorhexidine allergy.  · Contact your health care provider if your skin gets irritated after scrubbing.  This information is not intended to replace advice given to you by your health care provider. Make sure you discuss any questions you have with your health care provider.  Document Released: 09/11/2013 Document Revised: 11/15/2018 Document Reviewed: 11/15/2018  [employer] Interactive Patient Education © 2019 [employer]          PATIENT/FAMILY/RESPONSIBLE PARTY VERBALIZES UNDERSTANDING OF ABOVE EDUCATION.  COPY OF PAIN SCALE GIVEN AND REVIEWED WITH VERBALIZED UNDERSTANDING.

## 2021-04-16 NOTE — ANESTHESIA PREPROCEDURE EVALUATION
Anesthesia Evaluation     Patient summary reviewed   history of anesthetic complications: PONV               Airway   Mallampati: I  TM distance: >3 FB  Neck ROM: full  Dental    (+) partials    Pulmonary    (+) sleep apnea (does not use cpap after 130 lbs weight loss),   (-) asthma  Cardiovascular   Exercise tolerance: poor (<4 METS)    ECG reviewed    (+) hypertension, past MI , CAD, cardiac stents (x2) dysrhythmias, angina, hyperlipidemia,     ROS comment: Cath:  Selective coronary angiography:  1. Left anterior descending artery: The LAD arises directly off the aorta and runs in the interventricular groove giving off 2 diagonals and multiple septal perforators before wrapping around the apex as an apical recurrent branch.  There is a moderate 30-40% ostial stenosis. There is a severe 70-80% stenosis in the mid LAD.  The first diagonal is small and has mild diffuse disease only.  There is a severe 80-85% ostial stenosis of a large septal  branch.  The second diagonal branch is a large bifurcating vessel with 60-70% stenosis in the proximal portion prior to the bifurcation and a a 70-80% stenosis in a small branch off the second diagonal.    3. Left circumflex artery: The left circumflex is an anomalous vessel that arises off the right coronary cusp.  It is a small vessel that is nondominant for posterior circulation.  It gives off 2 bifurcating obtuse marginal branches.  The is a severe 70-80% ostial stenosis.  There is moderate disease 50-60% stenosis in the first OM.   4. Right coronary artery: The RCA is dominant for posterior circulation giving rise to PDA and right PL branches.  The RCA is chronically totally occluded ostially.  The distal RCA, PDA, and right PL branches fill via collaterals from the LAD and left circumflex arteries.  There is 50 to 60% stenosis in a large right PL branch.  The remainder distal RCA has moderate diffuse disease and appears totally occluded at site of previous stent  placement in the mid RCA.        Impression:  1.  Severe three-vessel coronary artery disease.  2.  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  3.  Normal left heart filling pressures.    Neuro/Psych  (+) seizures (x2),     (-) TIA, CVA  GI/Hepatic/Renal/Endo    (+)  GERD,  renal disease dialysis, diabetes mellitus using insulin,   (-) liver disease    ROS Comment: S/p gastric bypass 2004    Musculoskeletal     Abdominal    Substance History      OB/GYN          Other                      Anesthesia Plan    ASA 4     general   (2 PIV, Arterial line, CVL/[place], MORENA)  intravenous induction     Anesthetic plan, all risks, benefits, and alternatives have been provided, discussed and informed consent has been obtained with: patient.  Use of blood products discussed with patient  Consented to blood products.

## 2021-04-19 NOTE — TELEPHONE ENCOUNTER
Nurse called with [facility] and wanted to know if they could begin her dialysis treatments.  I read her the last office note and gave her this information.  She asked that I fax over the last office note and the OP note.  I sent those to the fax number she gave me of [phone]

## 2021-04-22 PROBLEM — M10.9 GOUT: Status: ACTIVE | Noted: 2021-01-01

## 2021-04-22 PROBLEM — I31.9 CHRONIC PERICARDITIS: Status: ACTIVE | Noted: 2021-01-01

## 2021-04-22 PROBLEM — D64.9 CHRONIC ANEMIA: Status: ACTIVE | Noted: 2021-01-01

## 2021-04-22 PROBLEM — Z98.84 STATUS POST GASTRIC BYPASS FOR OBESITY: Status: ACTIVE | Noted: 2021-01-01

## 2021-04-22 PROBLEM — Z87.39 HISTORY OF GOUT: Status: ACTIVE | Noted: 2021-01-01

## 2021-04-22 PROBLEM — E66.3 OVERWEIGHT (BMI 25.0-29.9): Status: ACTIVE | Noted: 2021-01-01

## 2021-04-22 NOTE — ANESTHESIA PROCEDURE NOTES
Airway  Date/Time: 4/22/2021 8:57 AM  Airway not difficult    General Information and Staff    Patient location during procedure: OR  CRNA: Bill Fofana CRNA    Indications and Patient Condition  Indications for airway management: airway protection    Preoxygenated: yes  Mask difficulty assessment: 1 - vent by mask    Final Airway Details  Final airway type: endotracheal airway      Successful airway: ETT  Cuffed: yes   Successful intubation technique: direct laryngoscopy  Blade: Gray  Blade size: 2  ETT size (mm): 7.5  Cormack-Lehane Classification: grade I - full view of glottis  Placement verified by: chest auscultation and capnometry   Cuff volume (mL): 8  Measured from: teeth  ETT/EBT  to teeth (cm): 22  Number of attempts at approach: 1  Assessment: lips, teeth, and gum same as pre-op and atraumatic intubation

## 2021-04-22 NOTE — PROGRESS NOTES
Subjective   Chief Complaint   Patient presents with   • Coronary Artery Disease     Pt is here for follow up       Patient ID: Jennifer Salcido is a 65 y.o. female who is here to discuss consideration for coronary artery bypass grafting.     Ms. Salcido is a 64 year old female with a complex medical history significant for hypertension, type 2 diabetes (most recent A1C 6.7), end stage renal disease requiring peritoneal dialysis, GERD, gout, and coronary artery disease.  Gastric bypass in 2004, total hysterectomy 2003, cholecystectomy, sinus surgery.  She did recently have an episode of hypoglycemia that required admission.  She routinely follows with cardiology and nephrology at [facility].  In 09/2019 she was considered for kidney transplant at [facility] for which they did find a compatible donor.  It was recommended that she have left heart catheterization prior to transplant and this was performed 10/2019.  It was determined at that time that she had severe three vessel disease.  She was further evaluated by [facility] specialist to determine the best plan for her moving forward in regard to CAD in light of the need for kidney transplant.  She has since then decided she would like her cardiac care done here locally due to her inability to drive and the need to set up transportation.  She states she is legally blind in her left eye following hemorrhaging due to diabetes.  She does follow with an eye doctor regularly.   It was recommended that she have an updated cardiac cath which demonstrated ongoing 3 vessel disease and this has been performed.  She does have a history of PCI in 2016.  She denies chest pain, palpitations, or dyspnea.  She was referred to cardiothoracic surgery for consideration of surgical revascularization.  Her workup was completed.  She is a former smoker quitting 12 years ago, does not drink alcohol, no recreational drug use.    She did present previously and we discussed pros/cons of CABG  "verses medical management of 3V CAD in the setting of diabetes mellitus.  She did ultimately decide that she is agreeable to proceed forward with surgery.  No new GI complaints.    From a clinical point of view she remains asymptomatic without chest pain, dyspnea, lower extremity edema, paroxysmal nocturnal dyspnea orthopnea.  No admissions for heart failure or acute coronary syndrome.  She does have perhaps some fatigue.  This is intermittent.  She notes B LE edema.    In the interim she has had a right internal jugular dialysis catheter implanted by Dr. Coates without remark.    The following portions of the patient's history were reviewed and updated as appropriate: allergies, current medications, past family history, past medical history, past social history, past surgical history and problem list.    Review of Systems   Constitutional: Positive for activity change. Negative for diaphoresis, fatigue and unexpected weight change.   HENT: Positive for trouble swallowing. Negative for voice change.    Eyes: Negative for visual disturbance (unchanged).   Respiratory: Negative for cough, chest tightness, shortness of breath and wheezing.    Cardiovascular: Negative for chest pain, palpitations and leg swelling.   Gastrointestinal: Positive for constipation. Negative for abdominal distention, abdominal pain, diarrhea, nausea and vomiting.   Musculoskeletal: Negative for arthralgias and myalgias.        Muscle cramping   Skin: Negative for color change, pallor, rash and wound.   Allergic/Immunologic: Negative for immunocompromised state.   Neurological: Negative for dizziness, tremors and seizures.   Hematological: Does not bruise/bleed easily.   Psychiatric/Behavioral: Negative for agitation, confusion and sleep disturbance.       Objective   Visit Vitals  /80 (BP Location: Left arm, Patient Position: Sitting, Cuff Size: Adult)   Pulse 65   Ht 160 cm (62.99\")   Wt 74.8 kg (165 lb)   SpO2 100%   BMI 29.24 kg/m²  "       Physical Exam   Constitutional: She is oriented to person, place, and time. She appears well-developed.   HENT:   Head: Normocephalic.   Eyes: Pupils are equal, round, and reactive to light.   Neck: No JVD present.   Cardiovascular: Normal rate, regular rhythm and normal heart sounds.   No murmur heard.  Pulmonary/Chest: Effort normal and breath sounds normal. She has no wheezes. She has no rales.   Abdominal: Soft. She exhibits no distension. There is no abdominal tenderness.   PD site is C/D/I   Musculoskeletal: No tenderness.   Lymphadenopathy:     She has no cervical adenopathy.   Neurological: She is alert and oriented to person, place, and time.   Skin: Skin is warm and dry.   Paper thin skin,  Right IJ HD access   Psychiatric: Her behavior is normal. Judgment and thought content normal.   Vitals reviewed.              Conclusion    Date: 3/23/2021     Procedures:  1. Selective coronary angiography  2. Left heart catheterization     Indication: CAD with stable angina     Attending: Srikanth Coker     Risks, benefits, and alternatives discussed with the patient and/or family.  Plan is for moderate sedation, and the patient agrees to proceed with the procedure.  An immediate assessment was done prior to the administration of moderate sedation.      Procedure Description: The patient was brought to the cardiac catheterization lab in a fasting state. Informed consent was obtained after explaining the risks, benefits, alternatives of the procedure. The patient was sterilely prepped and draped for right radial artery access usual fashion by the cath lab staff. Time out was taken to confirm the correct patient, site, procedure. The area over the right radial artery was anesthetized with 1% lidocaine. A micropuncture needle was used to puncture the right radial artery resulting bright red pulsatile arterial blood flow. Following this a 5 Spanish glide sheath was placed and the sheath was aspirated and flushed. Next, a  5 Setswana Suly diagnostic catheter was advanced under fluoroscopic guidance into the ascending aorta, across aortic valve, and into the left ventricle.  Pressures were recorded and pullback was obtained to evaluate for a gradient across aortic valve.  The catheter was then selectively engaged into the anomalous left circumflex and right coronary arteries.  Multiple cineograms were obtained via power injection of contrast in multiple orthogonal views.  The catheter was unable to engage the left coronary artery and was then removed and exchanged for a 5 Liber[place] Tig catheter.  The catheter was selectively engaged into the left coronary artery.  Multiple cineograms were obtained via power injection of contrast in multiple orthogonal views.  The catheter was then removed and the sheath was aspirated and flushed.  A TR band was then placed and adequate hemostasis was obtained.  The patient was then transferred to cardiac observation unit in stable condition.  There is no early complications noted.       I supervised the administration of conscious sedation by nursing staff throughout the case.  First dose was given at 9:58 and the end of my face-to-face encounter was at 10:13, accounting for a total of 15 minutes of supervision.  During the case, continuous pulse oximetry, heart rate, blood pressure, and patient status were monitored.     Total contrast: 72 ml     Estimated Blood Loss: Negligible     Specimens: None     Complications: None     Findings:  Left heart catheterization:  Pressures:  1.  LV: 104/13 mmHg  2.  Aortic: 104/51 mmHg, mean 73 mmHg     Selective coronary angiography:  1. Left anterior descending artery: The LAD arises directly off the aorta and runs in the interventricular groove giving off 2 diagonals and multiple septal perforators before wrapping around the apex as an apical recurrent branch.  There is a moderate 30-40% ostial stenosis. There is a severe 70-80% stenosis in the mid LAD.  The first  diagonal is small and has mild diffuse disease only.  There is a severe 80-85% ostial stenosis of a large septal  branch.  The second diagonal branch is a large bifurcating vessel with 60-70% stenosis in the proximal portion prior to the bifurcation and a a 70-80% stenosis in a small branch off the second diagonal.    3. Left circumflex artery: The left circumflex is an anomalous vessel that arises off the right coronary cusp.  It is a small vessel that is nondominant for posterior circulation.  It gives off 2 bifurcating obtuse marginal branches.  The is a severe 70-80% ostial stenosis.  There is moderate disease 50-60% stenosis in the first OM.   4. Right coronary artery: The RCA is dominant for posterior circulation giving rise to PDA and right PL branches.  The RCA is chronically totally occluded ostially.  The distal RCA, PDA, and right PL branches fill via collaterals from the LAD and left circumflex arteries.  There is 50 to 60% stenosis in a large right PL branch.  The remainder distal RCA has moderate diffuse disease and appears totally occluded at site of previous stent placement in the mid RCA.        Impression:  1.  Severe three-vessel coronary artery disease.  2.  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  3.  Normal left heart filling pressures.     Plan:  -Routine post cath care.  -Discharge home after post sedation monitoring.  -Proceed with coronary artery bypass grafting with Dr. Wood.        Srikanth Coker MD     Study Result    Narrative & Impression   Exam: CT chest angiography with 3D MIP images with IV contrast     Indication: Aortic disease, nontraumatic     Comparison: 6/7/2020     Dose length product: 369 mGy cm. Automated exposure control was also  utilized to decrease patient radiation dose.     Findings:     Mild 4 chamber cardiomegaly. Heavy coronary artery calcification. Aortic  root, ascending aorta, aortic arch, and descending thoracic aorta are  normal  caliber. Scattered atherosclerotic calcification aortic arch and  descending thoracic aorta. Main pulmonary trunk is upper limits of  normal in caliber. No central pulmonary artery filling defect. No  pericardial effusion. Internal mammary arteries are patent. No evidence  of aberrant coronary artery anatomy.     Central airways are clear. Tiny 2 mm LEFT lower lobe subpleural  pulmonary nodule on image 75 is stable. No new or enlarging pulmonary  nodule. No consolidation or pleural effusion. No enlarged thoracic lymph  nodes. 1.8 cm LEFT thyroid nodule. No acute chest wall soft tissue  abnormality. Moderate size hiatal hernia. Small volume ascites. Prior  cholecystectomy. No acute osseous finding.     IMPRESSION:  Impression:  1.  Heavy coronary calcification.  2.  Mild cardiomegaly.  3.  Nonaneurysmal thoracic aorta with mild atherosclerotic change.  4.  Moderate size hiatal hernia.  5.  Small volume ascites in the upper abdomen.  This report was finalized on 03/11/2021 15:16 by Dr. Sravan Carlisle MD.     Study Result    Narrative & Impression   History: Carotid occlusive disease     IMPRESSION:  Impression:  1. There is less than 50% stenosis of the right internal carotid artery.  2. There is less than 50% stenosis of the left internal carotid artery.  3. Antegrade flow is demonstrated in bilateral vertebral arteries.     Comments: Bilateral carotid vertebral arterial duplex scan was  performed.     Grayscale imaging shows intimal thickening and calcified elements at the  carotid bifurcation. The right internal carotid artery peak systolic  velocity is 108 cm/sec. The end-diastolic velocity is 26.4 cm/sec. The  right ICA/CCA ratio is approximately 0.97 . These findings correlate  with less than 50% stenosis of the right internal carotid artery.     Grayscale imaging shows intimal thickening and calcified elements at the  carotid bifurcation. The left internal carotid artery peak systolic  velocity is 83.7 cm/sec.  The end-diastolic velocity is 21.1 cm/sec. The  left ICA/CCA ratio is approximately 0.73 . These findings correlate with  less than 50% stenosis of the left internal carotid artery.     Antegrade flow is demonstrated in bilateral vertebral arteries.  There is greater than 50% stenosis in bilateral external carotid  arteries.  This report was finalized on 08/28/2020 15:46 by Dr. Leo Jarrett MD.     Study Result    Narrative & Impression   History:  Pre-operative planning for [CABG].      Comments: Duplex ultrasound was used to image and map bilateral lower  extremity greater saphenous veins.       The right greater saphenous vein is patent.  The diameters are 4.7mm  proximally, 3mm at the level of the knee, and 1.7mm distally.      The left greater saphenous vein is patent.   The diameters are 5.6mm  proximally, 1.9mm at the level of the knee, and 1.7mm distally.      IMPRESSION:  Impression: Patent bilateral greater saphenous veins with measurements  as described in comments.        This report was finalized on 08/28/2020 15:50 by Dr. Leo Jarrett MD.         Assessment/Plan        Diagnoses and all orders for this visit:    1. Coronary artery disease of native artery of native heart with stable angina pectoris (CMS/[facility]) (Primary)    2. End stage renal disease on dialysis (CMS/[facility])    3. Type 2 diabetes mellitus with hypoglycemia without coma, without long-term current use of insulin (CMS/[facility])    4. Essential hypertension         I discussed the patient's findings and my recommendations with the patient.  We discussed the pros and cons of surgical revascularization in the setting of three-vessel coronary disease with diabetes mellitus.  I did emphasize a survival advantage is conferred.  She remains an elective operation.  She is agreeable to consider coronary artery bypass grafting.  We discussed the options for treatment of coronary artery disease to include medical therapy, coronary stenting, and  surgical revascularization.  We discussed the pros and cons of each option and how it pertains to the current case.  We discussed that I would agree that she should undergo maximal medical therapy regardless of decision-making and that PCI is not of benefit at this time.  The STS Risk score was calculated using the STS Risk Calculator and discussed with the patient.  Coronary artery bypass grafting is best option given patient's findings and risk factors.  We discussed the operative conduct and expected hospital and outpatient recovery.  Risks were discussed to include but not limited to bleeding, infection, stroke, heart attack, need for additional procedures, anesthesia risk, organ dysfunction and/or failure, prolonged mechanical ventilation, prolonged ICU stay, chronic pain syndromes, sternal nonunion, and/or death.  We discussed the need to utilize all medical treatments additionally prescribed post surgery.  Dialysis access has been established.  I did speak with Dr. Hernandez on Monday with she having undergone dialysis today and with plans for dialysis tomorrow.    Her left heart catheterization demonstrates stable coronary disease.    I have asked that she contact me if she has any change in her symptoms specifically chest pain, dyspnea, or lower extremity edema etc. etc.  All question answered to the best my ability and she is agreeable.    She is agreeable to proceed for coronary artery bypass grafting.    Patient's Body mass index is 29.24 kg/m². BMI is above normal parameters. Recommendations include: educational material, nutrition counseling and referral to primary care.    I have congratulated Jennifer Salcido on successful smoking cessation.   She is a former smoker having quit 13 years ago.  I have educated her on the risk of diseases from using tobacco products such as cancer, COPD and heart diease    Advance Care Planning   ACP discussion was held with the patient during this visit. Patient does not have  an advance directive, declines further assistance.      .

## 2021-04-22 NOTE — ANESTHESIA PROCEDURE NOTES
Arterial Line      Patient reassessed immediately prior to procedure    Patient location during procedure: pre-op  Start time: 4/22/2021 8:20 AM  Stop Time:4/22/2021 8:21 AM       Line placed for hemodynamic monitoring, ABGs/Labs/ISTAT and MD/Surgeon request.  Performed By   Anesthesiologist: Lashonda Dias MD  Preanesthetic Checklist  Completed: patient identified, IV checked, site marked, risks and benefits discussed, surgical consent, monitors and equipment checked, pre-op evaluation and timeout performed  Arterial Line Prep   Sterile Tech: gloves and sterile barriers  Prep: ChloraPrep  Patient monitoring: blood pressure monitoring, continuous pulse oximetry and EKG  Arterial Line Procedure   Laterality:right  Location:  radial artery  Catheter size: 20 G   Guidance: ultrasound guided  PROCEDURE NOTE/ULTRASOUND INTERPRETATION.  Using ultrasound guidance the potential vascular sites for insertion of the catheter were visualized to determine the patency of the vessel to be used for vascular access.  After selecting the appropriate site for insertion, the needle was visualized under ultrasound being inserted into the radial artery, followed by ultrasound confirmation of wire and catheter placement. There were no abnormalities seen on ultrasound; an image was taken; and the patient tolerated the procedure with no complications.   Number of attempts: 1  Successful placement: yes  Post Assessment   Dressing Type: occlusive dressing applied, secured with tape and wrist guard applied.   Complications no  Circ/Move/Sens Assessment: normal.   Patient Tolerance: patient tolerated the procedure well with no apparent complications

## 2021-04-22 NOTE — ANESTHESIA PROCEDURE NOTES
Preanesthesia Checklist:  Patient identified, IV assessed, risks and benefits discussed, monitors and equipment assessed, procedure being performed at surgeon's request and anesthesia consent obtained.    General Procedure Information  MORENA Placed for monitoring purposes only -- This is not a diagnostic MORENA  Diagnostic Indications for Echo:  hemodynamic monitoring  Physician Requesting Echo: Antony Wood MD  Location performed:  OR  Intubated  Bite block not placed  Heart visualized  Probe Insertion:  Easy  Probe Type:  Multiplane  Modalities:  2D only, color flow mapping, continuous wave Doppler and pulse wave Doppler        Anesthesia Information  Performed Personally  Anesthesiologist:  Lashonda Dias MD      Echocardiogram Comments:       MORENA placed at surgeon request.     Please see cardiology diagnostic evaluation for complete report.

## 2021-04-22 NOTE — ANESTHESIA PROCEDURE NOTES
Central Line      Patient reassessed immediately prior to procedure    Patient location during procedure: OR  Start time: 4/22/2021 9:15 AM  Stop Time:4/22/2021 9:25 AM  Indications: central pressure monitoring, vascular access and MD/Surgeon request  Staff  Anesthesiologist: Lashonda Dias MD  Preanesthetic Checklist  Completed: patient identified, IV checked, site marked, risks and benefits discussed, surgical consent, monitors and equipment checked, pre-op evaluation and timeout performed  Central Line Prep  Sterile Tech:sterile barriers, mask, cap, gloves and gown  Prep: chloraprep  Patient monitoring: blood pressure monitoring, continuous pulse oximetry and EKG  Central Line Procedure  Laterality:left  Location:internal jugular  Catheter Type:MAC and [place]  Catheter Size:9 Fr  Guidance:ultrasound guided  PROCEDURE NOTE/ULTRASOUND INTERPRETATION.  Using ultrasound guidance the potential vascular sites for insertion of the catheter were visualized to determine the patency of the vessel to be used for vascular access.  After selecting the appropriate site for insertion, the needle was visualized under ultrasound being inserted into the internal jugular vein, followed by ultrasound confirmation of wire and catheter placement. There were no abnormalities seen on ultrasound; an image was taken; and the patient tolerated the procedure with no complications.   Assessment  Post procedure:biopatch applied, line sutured and occlusive dressing applied  Assessement:blood return through all ports, free fluid flow and chest x-ray ordered  Complications:no  Patient Tolerance:patient tolerated the procedure well with no apparent complications

## 2021-04-22 NOTE — ANESTHESIA PROCEDURE NOTES
Peripheral IV    Patient location during procedure: pre-op  Start time: 4/22/2021 8:15 AM  Line placed for Fluids/Medication Admin and Sedation.  Performed By   Anesthesiologist: Lashonda Dias MD  Preanesthetic Checklist  Completed: patient identified, IV checked, site marked, risks and benefits discussed, surgical consent, monitors and equipment checked, pre-op evaluation and timeout performed  Peripheral IV Prep   Patient position: supine   Prep: ChloraPrep  Patient monitoring: heart rate and continuous pulse ox  Peripheral IV Procedure   Laterality:right  Location:  Antecubital  Catheter size: 16 G  Guidance: ultrasound guided         Post Assessment   Dressing Type: transparent and tape.    IV Dressing/Site: clean, dry and intact

## 2021-04-23 NOTE — ANESTHESIA POSTPROCEDURE EVALUATION
Patient: Jennifer Salcido    Procedure Summary     Date: 04/22/21 Room / Location:  [facility] OR  /  [facility]    Anesthesia Start: 0845 Anesthesia Stop: 1540    Procedure: CORONARY ARTERY BYPASS GRAFT X 3 WITH LEFT INTERNAL MAMMARY ARTERY, RIGHT LOWER EXTREMITY ENDOSCOPIC VEIN HARVEST, AND PERFUSION; TRANSESOPHAGEAL ECHOCARDIOGRAM (N/A Chest) Diagnosis:       Coronary artery disease involving native coronary artery of native heart with angina pectoris (CMS/HCC)      (Coronary artery disease involving native coronary artery of native heart with angina pectoris (CMS/HCC) [I25.119])    Surgeons: Antony Wood MD Provider: Bill Fofana CRNA    Anesthesia Type: general ASA Status: 4          Anesthesia Type: general    Vitals  Vitals Value Taken Time   /54 04/23/21 0815   Temp 98.7 °F (37.1 °C) 04/23/21 0453   Pulse 77 04/23/21 0828   Resp 18 04/23/21 0705   SpO2 99 % 04/23/21 0823   Vitals shown include unvalidated device data.        Post Anesthesia Care and Evaluation    Patient location during evaluation: PACU  Patient participation: complete - patient participated  Level of consciousness: awake and alert  Pain management: adequate  Airway patency: patent  Anesthetic complications: No anesthetic complications    Cardiovascular status: acceptable  Respiratory status: acceptable  Hydration status: acceptable    Comments: Blood pressure 123/56, pulse 66, temperature 98.7 °F (37.1 °C), temperature source Core, resp. rate 18, weight 84.5 kg (186 lb 3.2 oz), SpO2 98 %, not currently breastfeeding.    Pt discharged from PACU based on johnnie score >8

## 2021-04-28 PROBLEM — N18.6 END STAGE RENAL DISEASE ON DIALYSIS (HCC): Status: RESOLVED | Noted: 2021-01-01 | Resolved: 2021-01-01

## 2021-04-28 PROBLEM — K21.9 GASTROESOPHAGEAL REFLUX DISEASE WITHOUT ESOPHAGITIS: Status: ACTIVE | Noted: 2021-01-01

## 2021-04-28 PROBLEM — Z79.899 ON STATIN THERAPY: Status: ACTIVE | Noted: 2021-01-01

## 2021-04-28 PROBLEM — Z99.2 END STAGE RENAL DISEASE ON DIALYSIS (HCC): Status: RESOLVED | Noted: 2021-01-01 | Resolved: 2021-01-01

## 2021-04-28 PROBLEM — E66.09 CLASS 1 OBESITY DUE TO EXCESS CALORIES WITH SERIOUS COMORBIDITY AND BODY MASS INDEX (BMI) OF 31.0 TO 31.9 IN ADULT: Status: RESOLVED | Noted: 2021-01-01 | Resolved: 2021-01-01

## 2021-04-28 PROBLEM — Z87.891 FORMER SMOKER: Status: ACTIVE | Noted: 2021-01-01

## 2021-04-28 PROBLEM — I25.118 CORONARY ARTERY DISEASE OF NATIVE ARTERY OF NATIVE HEART WITH STABLE ANGINA PECTORIS (HCC): Status: RESOLVED | Noted: 2020-07-07 | Resolved: 2021-01-01

## 2021-04-29 NOTE — OUTREACH NOTE
Prep Survey      Responses   [facility] facility patient discharged from?  [facility]   Is LACE score < 7 ?  No   Emergency Room discharge w/ pulse ox?  No   Eligibility  Readm Mgmt   Discharge diagnosis  Coronary artery disease involving native coronary artery of native heart with angina pectoris (Coronary artery bypass grafting-3 vessel    Does the patient have one of the following disease processes/diagnoses(primary or secondary)?  Cardiothoracic surgery   Does the patient have Home health ordered?  Yes   What is the Home health agency?    [facility]   Is there a DME ordered?  No   General alerts for this patient  Pt[facility] HD [facility] 2:00PM.   Prep survey completed?  Yes          Ayala Romero RN

## 2021-05-03 NOTE — OUTREACH NOTE
CT Surgery Week 1 Survey      Responses   [facility] patient discharged from?  [place]   Does the patient have one of the following disease processes/diagnoses(primary or secondary)?  Cardiothoracic surgery   Week 1 attempt successful?  No   Unsuccessful attempts  Attempt 1            Che Campos RN

## 2021-05-04 NOTE — TELEPHONE ENCOUNTER
Ivon edwards/ [facility] left vm message stating she had been out to see pt today.  Pt states she had a coughing spell on Sunday and since then she has had pain in her R ribs, under her R breast and her whole R side.  States pt cannot get a deep breath and has to lean forward to breathe well.  Pt was in no distress today and vitals were good (/68, P 70, R, 19, T 98.8, O2 sat 92%).  Pt has appt in the office tomorrow.  Can reach home care nurse at #[phone] if any questions or instructions./maykel

## 2021-05-05 NOTE — OUTREACH NOTE
CT Surgery Week 1 Survey      Responses   [facility] patient discharged from?  [place]   Does the patient have one of the following disease processes/diagnoses(primary or secondary)?  Cardiothoracic surgery   Week 1 attempt successful?  Yes   Call start time  1527   Rescheduled  Rescheduled-pt requested [spoke with Lindsey who states to call patient or Troy(son) for information]   Call end time  1530   General alerts for this patient  Pt's HD is[facility] the [facility] MWF at 2:00PM.   Discharge diagnosis  Coronary artery disease involving native coronary artery of native heart with angina pectoris (Coronary artery bypass grafting-3 vessel             Francisca Cleaning RN

## 2021-05-05 NOTE — PROGRESS NOTES
"Subjective   Chief Complaint   Patient presents with   • Post-op Follow-up     Pt had CABG x3 on 4/22       Patient ID: Jennifer Salcido is a 65 y.o. female who is here for follow-up having had Coronary artery bypass grafting-3 vessel (left internal mammary artery/left anterior descending, reverse saphenous vein graft/diagonal artery second, and reverse saphenous vein graft/posterior descending artery), right endoscopic vein harvest performed on 4/22/2021 by Dr. Wood.      History of Present Illness  Post operative recovery was uneventful without any major complications. She was discharged home last week. She returns today for 1 week follow up joined by her son. She is in a wheel chair. She has completed hemodialysis last Friday and Monday. She is due for HD later today. She relays she was doing well up until last Sunday when she had a coughing fit and since then has had right sided chest pain \"feels like breast and lung pain\" and shortness of breath. She cannot take a deep breath or change positions without pain. Sleep habits are poor due to pain and inability to get comfortable. Pain control has been fair. No fevers/sweats/chills. No drainage from incisions.  No sternal clicks. Appetite is fair. Glycemic control is good. Ambulating 5 minutes twice daily. She did break sternal precautions during the office visit today. She was sent down for a CXR and labs and presents back to the office for discussion.     The following portions of the patient's history were reviewed and updated as appropriate: allergies, current medications, past family history, past medical history, past social history, past surgical history and problem list.    Review of Systems   Constitutional: Positive for activity change and fatigue. Negative for chills, diaphoresis and fever.   Respiratory: Positive for cough and shortness of breath. Negative for wheezing.    Cardiovascular: Positive for chest pain and leg swelling.   Skin: Positive for color " "change (bruising).       Objective   Visit Vitals  /90 (BP Location: Left arm, Patient Position: Sitting, Cuff Size: Adult)   Pulse 87   Ht 160 cm (63\")   Wt 83.1 kg (183 lb 3.2 oz)   SpO2 95%   BMI 32.45 kg/m²     On room air     Preoperative weight 165 pounds     Physical Exam  Vitals reviewed.   Constitutional:       General: She is not in acute distress.     Appearance: She is well-developed. She is obese. She is not diaphoretic.   HENT:      Head: Normocephalic.   Eyes:      Pupils: Pupils are equal, round, and reactive to light.   Neck:      Vascular: No JVD.   Cardiovascular:      Rate and Rhythm: Normal rate and regular rhythm.      Heart sounds: Normal heart sounds. No murmur heard.   No friction rub.   Pulmonary:      Effort: Pulmonary effort is normal. No respiratory distress.      Breath sounds: Normal breath sounds. No stridor. No wheezing or rales.   Abdominal:      General: There is no distension.      Palpations: Abdomen is soft.      Tenderness: There is no abdominal tenderness.      Comments: edema   Musculoskeletal:      Cervical back: Normal range of motion and neck supple.      Right lower leg: Edema present.      Left lower leg: Edema present.   Skin:     General: Skin is warm and dry.      Coloration: Skin is not pale.      Findings: Bruising (generalized bruising, worse on abdomen, left neck, right lower extremity) present. No erythema or rash.      Comments: Sternotomy incision clean, dry, and intact. No clicks. Sternum stable.   Saphenectomy site clean, dry, and intact. No drainage.   Right IJ dialysis catheter  Peritoneal dialysis catheter to abdomen   Neurological:      Mental Status: She is alert and oriented to person, place, and time.      Cranial Nerves: No cranial nerve deficit.   Psychiatric:         Behavior: Behavior normal.       Lab on 05/05/2021   Component Date Value Ref Range Status   • WBC 05/05/2021 9.72  3.40 - 10.80 10*3/mm3 Final   • RBC 05/05/2021 3.02* 3.77 - " 5.28 10*6/mm3 Final   • Hemoglobin 05/05/2021 9.5* 12.0 - 15.9 g/dL Final   • Hematocrit 05/05/2021 30.3* 34.0 - 46.6 % Final   • MCV 05/05/2021 100.3* 79.0 - 97.0 fL Final   • MCH 05/05/2021 31.5  26.6 - 33.0 pg Final   • MCHC 05/05/2021 31.4* 31.5 - 35.7 g/dL Final   • RDW 05/05/2021 16.0* 12.3 - 15.4 % Final   • RDW-SD 05/05/2021 58.9* 37.0 - 54.0 fl Final   • MPV 05/05/2021 10.0  6.0 - 12.0 fL Final   • Platelets 05/05/2021 278  140 - 450 10*3/mm3 Final   • Glucose 05/05/2021 212* 65 - 99 mg/dL Final   • BUN 05/05/2021 31* 8 - 23 mg/dL Final   • Creatinine 05/05/2021 6.28* 0.57 - 1.00 mg/dL Final   • Sodium 05/05/2021 132* 136 - 145 mmol/L Final   • Potassium 05/05/2021 3.9  3.5 - 5.2 mmol/L Final    Slight hemolysis detected by analyzer. Results may be affected.   • Chloride 05/05/2021 90* 98 - 107 mmol/L Final   • CO2 05/05/2021 30.0* 22.0 - 29.0 mmol/L Final   • Calcium 05/05/2021 8.8  8.6 - 10.5 mg/dL Final   • Total Protein 05/05/2021 5.6* 6.0 - 8.5 g/dL Final   • Albumin 05/05/2021 3.10* 3.50 - 5.20 g/dL Final   • ALT (SGPT) 05/05/2021 <5  1 - 33 U/L Final   • AST (SGOT) 05/05/2021 20  1 - 32 U/L Final   • Alkaline Phosphatase 05/05/2021 89  39 - 117 U/L Final   • Total Bilirubin 05/05/2021 0.6  0.0 - 1.2 mg/dL Final   • eGFR Non African Amer 05/05/2021 7* >60 mL/min/1.73 Final    <15 Indicative of kidney failure.   • eGFR   Amer 05/05/2021    Final    <15 Indicative of kidney failure.   • Globulin 05/05/2021 2.5  gm/dL Final   • A/G Ratio 05/05/2021 1.2  g/dL Final   • BUN/Creatinine Ratio 05/05/2021 4.9* 7.0 - 25.0 Final   • Anion Gap 05/05/2021 12.0  5.0 - 15.0 mmol/L Final       XR Chest 2 View    Result Date: 5/5/2021  Narrative: XR CHEST 2 VW-  HISTORY: shortness of breath, chest pain post sternotomy; I25.119-Atherosclerotic heart disease of native coronary artery with unspecified angina pectoris; R06.02-Shortness of breath   COMPARISON: Chest x-ray dated 4/20/2021.  FINDINGS: Upright frontal  and lateral radiographs of the chest were obtained.  There appear to be bilateral layering pleural effusions, including a moderate sized effusion on the left. Heart size is stable. Mild central pulmonary congestion. Prior coronary bypass. Sternal wires are intact. Right IJ dual-lumen central venous catheter. No acute bony abnormality.      Impression: 1. Dominant finding is a new moderate-sized left pleural effusion which I suspect is related to systemic fluid overload.  Findings and recommendations were discussed with KARTHIK VICTORIA on 5/5/2021 at 10:27 AM CDT.  This report was finalized on 05/05/2021 10:27 by Dr Cale Doan, .        Assessment/Plan     Independent Review of Radiographic Studies:    Bilateral pleural effusions-moderate, no pneumothorax, sternal wires appear intact and unchanged when compared to previous PA/Lat CXR    Diagnoses and all orders for this visit:    1. Coronary artery disease involving native coronary artery of native heart with angina pectoris (CMS/[facility]) (Primary)  -     XR Chest 2 View; Future  -     CBC (No Diff); Future  -     Comprehensive Metabolic Panel; Future    2. Hypervolemia, unspecified hypervolemia type  -     CBC (No Diff); Future  -     Comprehensive Metabolic Panel; Future    3. Shortness of breath  -     XR Chest 2 View; Future    4. Chronic pericarditis, unspecified complication status, unspecified type    5. ESRD on dialysis (CMS/[facility])    6. Chronic anemia    7. Former smoker    8. Type 2 diabetes mellitus with hypoglycemia without coma, without long-term current use of insulin (CMS/[facility])    9. Dyslipidemia    10. On statin therapy    11. Essential hypertension         Overall, Jennifer Salcido is doing well from a cardiac surgery standpoint. However, she does appear to be fluid overloaded and in need of additional volume pulled from hemodialysis. She is 18 pounds positive from preoperative weight. Labs and chest x ray findings reviewed with Dr. Wood as well who  spoke to Dr. Jones, her nephrologist, who plans on pulling more fluid from hemodialysis today and completing an additional hemodialysis treatment this week. She was educated to start daily weights and notify nephrology of weight gain. We discussed current sternotomy precautions and how these will not be advanced yet. Discussed strict adherence to sternal precautions. Surgical sites are clean and dry without evidence of infection. Following post op cardiac surgery home instructions. Provided support and encouragement. All questions have been answered to the best of my ability. Will discuss starting cardiac rehabilitation at official 1 month post op visit. Patient has follow up with Dr. Wood in a few weeks. Patient has follow up with Cardiology in a few weeks. Patient has been instructed to contact our office with any questions or concerns should they arise prior to the next office visit. RTC in 1 week for general recheck.     Patient's (Body mass index is 32.45 kg/m².) indicates that they are obese (BMI >30) with obesity-related health conditions that include hypertension, coronary heart disease, diabetes mellitus, dyslipidemias and GERD . Obesity is unchanged. BMI is is above average; BMI management plan is completed.     Jennifer Salcido  reports that she quit smoking about 13 years ago. Her smoking use included cigarettes. She smoked 0.00 packs per day for 2.00 years. She has never used smokeless tobacco. She has been congratulated on smoking cessation.        Advance Care Planning   ACP discussion was declined by the patient. Patient does not have an advance directive, declines further assistance.

## 2021-05-06 NOTE — TELEPHONE ENCOUNTER
Called and talked with patient. She is only taking norco twice per day as needed for pain. Refill of Pheba sent to the pharmacy. Advised that as she is end stage kidney disease she does not need to be on bumex from our standpoint. She is at hemodialysis now getting an extra treatment. She may speak with nephrology regarding future use of bumex. She also asked about resuming plavix and advised her to continue holding this for now.

## 2021-05-06 NOTE — TELEPHONE ENCOUNTER
Pt left  message requesting to speak with Katheryn ROMAN.  States she was seen yesterday and forgot to ask about a couple of things.  1)  She is asking for a refill of Norco 5mg.  States she is almost out of this.  2)  States she was on Bumex prior to surgery and now is not on any diuretic.  States she has a lot of fluid built up and is wondering if she should restart this med or what to do.  Can reach her at #[phone]/maykel

## 2021-05-10 NOTE — OUTREACH NOTE
CT Surgery Week 1 Survey      Responses   [facility] patient discharged from?  [place]   Does the patient have one of the following disease processes/diagnoses(primary or secondary)?  Cardiothoracic surgery   Week 1 attempt successful?  Yes   Call start time  1502   Call end time  1511   Meds reviewed with patient/caregiver?  Yes   Is the patient having any side effects they believe may be caused by any medication additions or changes?  No   Does the patient have all medications related to this admission filled (includes all antibiotics, pain medications, cardiac medications, etc.)  Yes   Is the patient taking all medications as directed (includes completed medication regime)?  Yes   Does the patient have a primary care provider?   Yes   Does the patient have an appointment scheduled with their C/T surgeon?  Yes   Has the patient kept scheduled appointments due by today?  Yes   Has home health visited the patient within 72 hours of discharge?  Yes   Home health comments  PT and nursing.   Psychosocial issues?  No   Psychosocial comments  States her son has returned home to [place]-has granddaughter close by. Has another son who lives next door to her.   Did the patient receive a copy of their discharge instructions?  Yes   Nursing interventions  Reviewed instructions with patient   What is the patient's perception of their health status since discharge?  Same   Nursing interventions  Nurse provided patient education   Is the patient/caregiver able to teach back normal signs of recovery?  Nausea and lack of appetite   Nursing interventions  Reassured on normal signs of recovery   Is the patient /caregiver able to teach back basic post-op care?  Continue use of incentive spirometry at least 1 week post discharge, Drive as instructed by MD in discharge instructions, No tub bath, swimming, or hot tub until instructed by MD, Do not remove steri-strips, Practice 'cough and deep breath', Take showers only when  approved by MD-sponge bathe until then, Keep incision areas clean, dry and protected, Lifting as instructed by MD in discharge instructions   Is the patient/caregiver able to teach back signs and symptoms of incisional infection?  Increased redness, swelling or pain at the incisonal site, Incisional warmth, Fever, Increased drainage or bleeding, Pus or odor from incision   Is the patient/caregiver able to teach back steps to recovery at home?  Set small, achievable goals for return to baseline health, Eat a well-balance diet, Rest and rebuild strength, gradually increase activity, Practice good oral hygiene, Weigh daily, Make a list of questions for surgeon's appointment   Is the patient /caregiver able to teach back the importance of cardiac rehab?  Yes   Nursing interventions  Provided education on importance of cardiac rehab   If the patient is a current smoker, are they able to teach back resources for cessation?  Not a smoker   Is the patient/caregiver able to teach back the hierarchy of who to call/visit for symptoms/problems? PCP, Specialist, Home health nurse, Urgent Care, ED, 911  Yes   Week 1 call completed?  Yes   Wrap up additional comments  States is feeling about the same-denies any incisional pain or s/s of infection. States still has edema of feet/legs, slight SOA. Continues with dialysis Mon-Wed-Fri. States has decreased appetite, and eating small, frequent snacks. States unable to use supplement drinks due to gastric bypass hx. Denies any needs today.            Mellisa Kirby RN

## 2021-05-12 NOTE — PROGRESS NOTES
"Subjective   Chief Complaint   Patient presents with   • Post-op Follow-up     CABG x3 on 4/22       Patient ID: Jennifer Salcido is a 65 y.o. female who is here for follow-up having had Coronary artery bypass grafting-3 vessel (left internal mammary artery/left anterior descending, reverse saphenous vein graft/diagonal artery second, and reverse saphenous vein graft/posterior descending artery), right endoscopic vein harvest performed on 4/22/2021 by Dr. Wood.      History of Present Illness  Post operative recovery was uneventful without any major complications. She returns today for 1 week follow up. Hemodialys M-W-F. Weight down 15 pounds since last week. She is 3 pounds above preoperative weight. [facility] and physical therapy. She is walking 5 minutes twice per day still. Lacking energy and still some shortness of breath with exertion although this is improving. She was able to walk from the office into the patient room today-not requiring wheelchair. Incisional chest pain and breast pain is improving. She has bruising in left breast and around the left nipple that is tender. Occasional sternal \"pop\" during deep inspiration. Sleep habits are improving. Pain control has been good-has not taken a pain pill in several days. No fevers/sweats/chills. No drainage from incisions.  Appetite is fair. Glycemic control is good, BG 150s highest.     The following portions of the patient's history were reviewed and updated as appropriate: allergies, current medications, past family history, past medical history, past social history, past surgical history and problem list.    Review of Systems   Constitutional: Positive for activity change (improving ), appetite change and fatigue (improving ). Negative for chills, diaphoresis and fever.   Respiratory: Positive for shortness of breath (with exertion). Negative for wheezing.    Cardiovascular: Positive for chest pain (incisional-improving ) and leg swelling " "(improving ).   Skin: Positive for color change (bruising).       Objective   Visit Vitals  /76 (BP Location: Left arm, Patient Position: Sitting, Cuff Size: Adult)   Pulse 75   Ht 160 cm (63\")   Wt 76.3 kg (168 lb 3.2 oz)   SpO2 99%   BMI 29.80 kg/m²     On room air     Preoperative weight 165 pounds         Assessment/Plan       Diagnoses and all orders for this visit:    1. Coronary artery disease involving native coronary artery of native heart with angina pectoris (CMS/[facility]) (Primary)    2. Chronic pericarditis, unspecified complication status, unspecified type    3. Type 2 diabetes mellitus with hypoglycemia without coma, without long-term current use of insulin (CMS/[facility])    4. ESRD on dialysis (CMS/[facility])    5. Dyslipidemia    6. On statin therapy    7. Essential hypertension    8. Overweight (BMI 25.0-29.9)    9. Status post gastric bypass for obesity    10. Chronic anemia    11. Former smoker         Overall, Jennifer Salcido is doing well from a cardiac surgery standpoint. Surgical sites are clean and dry without evidence of infection. Few steri strips remaining. No sternal click appreciated today. Continue current sternotomy precautions. BP 150s today, but she has hemodialysis later today and indicates she was hypotensive at last HD treatment. No medication changes made today. Continue M-W-F HD treatments. She is eager to restart peritoneal dialysis once sternotomy restrictions are advanced. Following post op cardiac surgery home instructions. Provided support and encouragement. All questions have been answered to the best of my ability. Will discuss starting cardiac rehabilitation at official 1 month post op visit. Patient has follow up with Dr. Wood in 2 weeks. Patient has follow up with Cardiology in a few weeks. Patient has been instructed to contact our office with any questions or concerns should they arise prior to the next office visit.      Patient's (Body mass index is 29.8 kg/m².) indicates " that they are obese (BMI >30) with obesity-related health conditions that include hypertension, coronary heart disease, diabetes mellitus, dyslipidemias and GERD . Obesity is improving with lifestyle modifications. BMI is is above average; BMI management plan is completed.     Jennifer Salcido  reports that she quit smoking about 13 years ago. Her smoking use included cigarettes. She smoked 0.00 packs per day for 2.00 years. She has never used smokeless tobacco. She has been congratulated on smoking cessation.        Advance Care Planning   ACP discussion was declined by the patient. Patient does not have an advance directive, declines further assistance.

## 2021-05-17 NOTE — OUTREACH NOTE
CT Surgery Week 2 Survey      Responses   [facility] [place] patient discharged from?  [place]   Does the patient have one of the following disease processes/diagnoses(primary or secondary)?  Cardiothoracic surgery   Week 2 attempt successful?  No   Unsuccessful attempts  Attempt 1          Che Campos RN

## 2021-05-18 NOTE — OUTREACH NOTE
"CT Surgery Week 2 Survey      Responses   [facility] patient discharged from?  [place]   Does the patient have one of the following disease processes/diagnoses(primary or secondary)?  Cardiothoracic surgery   Week 2 attempt successful?  Yes   Call start time  1225   Call end time  1229   General alerts for this patient  Pt's HD [facility] the [facility] MWF at 2:00PM.   Discharge diagnosis  Coronary artery disease involving native coronary artery of native heart with angina pectoris (Coronary artery bypass grafting-3 vessel)   Person spoke with today (if not patient) and relationship  [place] reviewed with patient/caregiver?  Yes   Is the patient taking all medications as directed (includes completed medication regime)?  Yes   Comments regarding PCP  Pt has followed up with PCP   Has the patient kept scheduled appointments due by today?  Yes   What is the patient's perception of their health status since discharge?  Improving [Son reports she's having trouble \"catching her breath I guess it's because of the dialysis she's on.\"]   Is the patient/caregiver able to teach back normal signs of recovery?  Nausea and lack of appetite, Depression or irritability   Nursing interventions  Reassured on normal signs of recovery   Is the patient/caregiver able to teach back steps to recovery at home?  Set small, achievable goals for return to baseline health, Eat a well-balance diet [Pt is up and moving around and able to fix her own food]   Week 2 call completed?  Yes          Sarah Deleon RN  "

## 2021-05-19 PROBLEM — R11.2 NAUSEA AND VOMITING: Status: ACTIVE | Noted: 2021-01-01

## 2021-05-19 PROBLEM — E87.70 HYPERVOLEMIA: Status: ACTIVE | Noted: 2021-01-01

## 2021-05-20 NOTE — OUTREACH NOTE
CT Surgery Week 3 Survey      Responses   [facility] patient discharged from?  [place]   Does the patient have one of the following disease processes/diagnoses(primary or secondary)?  Cardiothoracic surgery   Week 3 attempt successful?  No   Revoke  Readmitted          Marybel Hale RN

## 2021-05-21 NOTE — OUTREACH NOTE
Prep Survey      Responses   [facility] discharged from?  [place]   Is LACE score < 7 ?  No   Emergency Room discharge w/ pulse ox?  No   Eligibility  Readm Mgmt   Discharge diagnosis  Hypervolemia   Does the patient have one of the following disease processes/diagnoses(primary or secondary)?  Other   Does the patient have Home health ordered?  Yes   What is the Home health agency?    [facility]   Is there a DME ordered?  No   Prep survey completed?  Yes          Cece Conroy RN

## 2021-05-25 NOTE — TELEPHONE ENCOUNTER
Ivon w/ [facility] left vm message re: pt.  States she was out to see pt today and pt is having increased SOA w/ activity, w/ talking or w/ coughing.  Pt is having a non-prod cough.  Pt also reports a pain L lung when she coughs.  Vitals are stable (T 99.1, P 78, R 20, /86, Pulse Ox 95% on RA, Wt 156.5, 2+ edema BLE).  Pt is requesting to go back on peritoneal dialysis.  Ivon suggested pt go to ER but pt declined.  If any questions or instructions, can reach Ivon at #[phone] or can reach pt at #[phone].        FYI - Pt was on Dr Wood's sched for 4wk PO HFU tomorrow (5/26) but was cx'd for an emergency procedure by Dr Wood.  Pt has been resched for 6-2-21/[facility]

## 2021-05-26 NOTE — OUTREACH NOTE
Medical Week 1 Survey      Responses   [facility] patient discharged from?  [facility]   Does the patient have one of the following disease processes/diagnoses(primary or secondary)?  Other   Week 1 attempt successful?  Yes   Call start time  1126   Rescheduled  Rescheduled-pt requested [Son Troy goes to lunch at 1100 am.]   General alerts for this patient  Pt's HD issetup for the [facility] MWF at 2:00PM.   Discharge diagnosis  Hypervolemia   Is patient permission given to speak with other caregiver?  Yes   Person spoke with today (if not patient) and relationship  Lindsey Ma reviewed with patient/caregiver?  Yes   Is the patient having any side effects they believe may be caused by any medication additions or changes?  No   Does the patient have all medications ordered at discharge?  Yes   Is the patient taking all medications as directed (includes completed medication regime)?  Yes   Does the patient have a primary care provider?   Yes   Does the patient have an appointment with their PCP within 7 days of discharge?  Yes          Isabel London RN

## 2021-05-26 NOTE — TELEPHONE ENCOUNTER
Spoke with Dr Wood - he suggests patient going to ER if her SOB/chest pain persists and that it is up to her dialysis physicians if she can go back on peritoneal dialysis. I called and spoke with patient and let her know this information. She doesn't feel she needs to go to the ER at this time, but is understandable to go if she feels she needs to. She is aware to notify Dr Hernandez's office regarding the dialysis. She was reminded of her OV w/us on 6/2. Patient verbalized understanding to all.

## 2021-06-04 PROBLEM — E11.9 TYPE 2 DIABETES MELLITUS, WITHOUT LONG-TERM CURRENT USE OF INSULIN (HCC): Status: ACTIVE | Noted: 2020-06-09

## 2021-06-04 PROBLEM — R05.3 PERSISTENT DRY COUGH: Status: ACTIVE | Noted: 2021-01-01

## 2021-06-04 PROBLEM — J90 PLEURAL EFFUSION ON RIGHT: Status: ACTIVE | Noted: 2021-01-01

## 2021-06-04 NOTE — ED TRIAGE NOTES
Presents to ED with complaint of shortness of breath.  She states she is 6 weeks post op CABG (Dr. Wood).  She states about 3 weeks ago was told she had fluid on her lungs, probably from surgery.  She states her SOA has worsened over the last 3 weeks and is now losing weight and feeling more fatigued.  Has a non productive cough.  Was seen at kidney clinic this am and was told to come to ED for further evaluation.

## 2021-06-04 NOTE — NURSING NOTE
JESSIE Baptiste, Spoke with JESSIE Foy with nephrology regarding possibility of urgent HD treatment based on current lab values. Nancy states she will speak with PD department and call back with more information.

## 2021-06-04 NOTE — NURSING NOTE
Per JESSIE Cruz ([phone]) with nephrology the PD on call nurse should come and do a treatment tonight. Will continue to monitor.

## 2021-06-04 NOTE — H&P
[facility] Medicine Services  HISTORY AND PHYSICAL    Date of Admission: 6/4/2021  Primary Care Physician: Cristian Hernandez MD    Subjective     Chief Complaint: Persistent shortness of breathing recall    History of Present Illness  Jennifer Salcido is a 65-year-old female with a past medical history of end-stage renal disease on peritoneal dialysis. Patient was started on hemodialysis 4/22/2021 due to undergoing three-vessel CABG. Patient recently admitted 5/19-5/20, 2021 for evaluation of nausea and vomiting and missing a day of dialysis.  Patient continued with hemodialysis until 5/28/2021 at which time she resumed her home peritoneal dialysis.  She presents today with shortness of breathing and complaints of persistent cough.  She denies sputum production.  She states she is extremely weak with severe shortness of breathing with minimal exertion.  She does have concerns there is a problem with her heart.  She has had no chest pain.  ER work-up reveals elevated troponin which is chronic, elevated BNP which is less than from previous admission also chronic.  Creatinine is 8.96 with a GFR of 4.  Imaging reveals right pleural effusion has increased in size from last evaluation however has been a slight decrease in the left pleural effusion.  Condition is stable.  Patient is followed outpatient by Dr. Antony Wood and Dr. Srikanth Coker.  She is admitted for further evaluation treatment.    Review of Systems   A 10 point review of systems was completed, all negative except for those discussed in HPI    Past Medical History:   Past Medical History:   Diagnosis Date   • Arthritis     LEFT SHOULDER   • CHF (congestive heart failure) (CMS/HCC)    • Collar bone fracture 12/2020    SLING PLACED TO HEAL   • Coronary artery disease     LEFT MAIN AND 3 OTHER AREAS--CABG SCHEDULED FOR APRIL 22, 2021   • Elevated cholesterol    • End stage kidney disease (CMS/HCC)     currently HD (5/19/2021), hopes  to return to PD    • GERD (gastroesophageal reflux disease)    • Gout    • History of transfusion    • Hyperlipidemia    • Hypertension    • Peritoneal dialysis status (CMS/[facility])     EVERY NIGHT FOR 10 HOURS, (5/19/2021 currently on hold)   • PONV (postoperative nausea and vomiting)    • Sleep apnea     RESOLVED   • Type 2 diabetes mellitus with kidney complication, with long-term current use of insulin (CMS/[facility]) 11/2/2018       Past Surgical History:   Past Surgical History:   Procedure Laterality Date   • BREAST BIOPSY Right     FATTY TUMOR   • CARDIAC CATHETERIZATION N/A 10/1/2019    Procedure: Left Heart Cath;  Surgeon: Srikanth Coker MD;  Location:  [facility] CATH INVASIVE LOCATION;  Service: Cardiology   • CARDIAC CATHETERIZATION N/A 7/14/2020    Procedure: Left Heart Cath;  Surgeon: Srikanth Coker MD;  Location: [facility] CATH INVASIVE LOCATION;  Service: Cardiology;  Laterality: N/A;   • CARDIAC CATHETERIZATION N/A 3/23/2021    Procedure: Left Heart Cath;  Surgeon: Srikanth Coker MD;  Location: [facility] CATH INVASIVE LOCATION;  Service: Cardiology;  Laterality: N/A;   • CORONARY ANGIOPLASTY WITH STENT PLACEMENT  2016    X 2   • CORONARY ARTERY BYPASS GRAFT N/A 4/22/2021    Procedure: CORONARY ARTERY BYPASS GRAFT X 3 WITH LEFT INTERNAL MAMMARY ARTERY, RIGHT LOWER EXTREMITY ENDOSCOPIC VEIN HARVEST, AND PERFUSION; TRANSESOPHAGEAL ECHOCARDIOGRAM;  Surgeon: Antony Wood MD;  Location: [facility] OR;  Service: Cardiothoracic;  Laterality: N/A;   • EYE SURGERY Bilateral     IOC LENS IMPLANTS   • EYE SURGERY Bilateral     RETINAL SURGERY   • GALLBLADDER SURGERY     • GASTRIC BYPASS     • HYSTERECTOMY     • INSERTION HEMODIALYSIS CATHETER Right 4/1/2021    Procedure: HEMODIALYSIS CATHETER INSERTION;  Surgeon: Simon Coates MD;  Location: [facility] HYBRID OR 12;  Service: Vascular;  Laterality: Right;   • INSERTION PERITONEAL DIALYSIS CATHETER     • OOPHORECTOMY Bilateral    • SINUS SURGERY     • SINUS SURGERY   07/1980   • TUBAL ABDOMINAL LIGATION         Family History: family history includes Abnormal EKG in her father; Diabetes in her father; Heart disease in her father; Hypertension in her mother; Lung disease in her mother.    Social History:  reports that she quit smoking about 13 years ago. Her smoking use included cigarettes. She smoked 0.00 packs per day for 2.00 years. She has never used smokeless tobacco. She reports previous alcohol use. She reports that she does not use drugs.    Code Status: Full, if unable to speak for herself her son Troy will speak for her      Allergies:  Allergies   Allergen Reactions   • Codeine Nausea And Vomiting   • Demerol [Meperidine] Nausea Only   • Levaquin [Levofloxacin] Nausea And Vomiting   • Lisinopril Swelling   • Morphine Nausea And Vomiting   • Sulfasalazine Nausea And Vomiting   • Ultram [Tramadol] Mental Status Change       Medications:  Prior to Admission medications    Medication Sig Start Date End Date Taking? Authorizing Provider   allopurinol (ZYLOPRIM) 100 MG tablet Take 100 mg by mouth Daily.    Elmer Thao MD   ascorbic acid (VITAMIN C) 500 MG tablet Take 500 mg by mouth Daily. 12/8/20   Elmer Thao MD   aspirin 81 MG EC tablet Take 81 mg by mouth Daily.    Elmer Thao MD   atorvastatin (LIPITOR) 20 MG tablet Take 1 tablet by mouth Every Night. 4/28/21   Antony Wood MD   bisacodyl (Dulcolax) 10 MG suppository Insert 10 mg into the rectum Daily As Needed for Constipation.    Elmer Thao MD   calcium carbonate (TUMS) 500 MG chewable tablet Chew 2 tablets Every Night.    Elmer Thao MD   citalopram (CeleXA) 40 MG tablet Take 40 mg by mouth Daily.    Elmer Thao MD   docusate sodium (COLACE) 100 MG capsule Take 200 mg by mouth As Needed for Constipation.    Elmer Thao MD   famotidine (PEPCID) 20 MG tablet Take 1 tablet by mouth Daily. 4/28/21   Antony Wood MD   gentamicin  "(GARAMYCIN) 0.1 % ointment Apply 1 application topically to the appropriate area as directed 3 (Three) Times a Day. AT SITE OF PERITONEAL DIALYSIS CATH    Elmer Thao MD   HYDROcodone-acetaminophen (NORCO) 5-325 MG per tablet Take 1 tablet by mouth Every 8 (Eight) Hours As Needed (pain). 5/6/21   Katheryn De Jesus APRN   insulin detemir (LEVEMIR) 100 UNIT/ML injection Inject 18 Units under the skin into the appropriate area as directed Daily.    Emler Thao MD   irbesartan (AVAPRO) 75 MG tablet Take 1 tablet by mouth Daily. 4/28/21   Antony Wood MD   Melatonin 5 MG capsule Take 5 mg by mouth Every Night.    Elmer Thao MD   metoprolol tartrate (LOPRESSOR) 25 MG tablet Take 1 tablet by mouth Every 12 (Twelve) Hours. 4/28/21   Antony Wood MD   Multiple Vitamins-Minerals (MULTIVITAMIN ADULT PO) Take 1 tablet by mouth Daily.    Elmer Thao MD   nitroglycerin (NITROSTAT) 0.4 MG SL tablet 1 under the tongue as needed for angina, may repeat q5mins for up three doses 5/11/21   Srikanth Coker MD   pantoprazole (PROTONIX) 40 MG EC tablet Take 40 mg by mouth 2 (two) times a day. 6/18/18   Elmer Thao MD   sucralfate (CARAFATE) 1 g tablet Take 1 g by mouth 2 (two) times a day.    Elmer Thao MD   Sucroferric Oxyhydroxide (Velphoro) 500 MG chewable tablet Chew 1 tablet 3 (Three) Times a Day. WITH MEALS    Elmer Thao MD   vitamin D3 125 MCG (5000 UT) capsule capsule Take 1 tablet by mouth Daily.    Elmer Thao MD       Objective     /60   Pulse 90   Temp 98.6 °F (37 °C)   Resp 24   Ht 160 cm (63\")   Wt 68.1 kg (150 lb 1.6 oz)   SpO2 90%   BMI 26.59 kg/m²   Physical Exam  Vitals reviewed.   Constitutional:       General: She is not in acute distress.     Appearance: She is well-developed. She is ill-appearing ( Chronic). She is not diaphoretic.   HENT:      Head: Normocephalic and atraumatic.      Mouth/Throat:      " Mouth: Mucous membranes are moist.      Pharynx: Oropharynx is clear.   Eyes:      General: No scleral icterus.     Conjunctiva/sclera: Conjunctivae normal.      Pupils: Pupils are equal, round, and reactive to light.   Neck:      Vascular: No JVD.      Trachea: No tracheal deviation.   Cardiovascular:      Rate and Rhythm: Normal rate and regular rhythm.      Heart sounds: Normal heart sounds. No murmur heard.   No gallop.    Pulmonary:      Effort: Pulmonary effort is normal. No respiratory distress.      Breath sounds: No wheezing or rales.      Comments: Diminished throughout without adventitious breath sounds  Abdominal:      General: Bowel sounds are normal. There is no distension.      Palpations: Abdomen is soft.      Tenderness: There is no abdominal tenderness. There is no guarding.   Musculoskeletal:         General: Normal range of motion.      Cervical back: Normal range of motion and neck supple.      Right lower leg: Edema ( Trace) present.      Left lower leg: Edema (  Trace) present.   Skin:     General: Skin is warm and dry.      Coloration: Skin is pale ( Sallow).      Findings: No erythema or rash.   Neurological:      General: No focal deficit present.      Mental Status: She is alert and oriented to person, place, and time.      Comments: No obvious deficits noted.   Psychiatric:         Mood and Affect: Mood normal.         Behavior: Behavior normal.     Pertinent Data:   Lab Results (last 72 hours)     Procedure Component Value Units Date/Time    COVID-19,[employer] IN-HOUSE,Nasal Swab No Transport Media 3-4 HR TAT - Swab, Nasal Cavity [[id number]]  (Normal) Collected: 06/04/21 1628    Specimen: Swab from Nasal Cavity Updated: 06/04/21 1716     COVID19 Not Detected    CBC Auto Differential [[id number]]  (Abnormal) Collected: 06/04/21 1233    Specimen: Blood from Arm, Right Updated: 06/04/21 1600     WBC 8.91 10*3/mm3      RBC 3.40 10*6/mm3      Hemoglobin 10.8 g/dL      Hematocrit 33.0 %      MCV  97.1 fL      MCH 31.8 pg      MCHC 32.7 g/dL      RDW 14.7 %      RDW-SD 52.1 fl      MPV 9.9 fL      Platelets 508 10*3/mm3      Neutrophil % 71.4 %      Lymphocyte % 16.2 %      Monocyte % 6.7 %      Eosinophil % 4.5 %      Basophil % 0.8 %      Immature Grans % 0.4 %      Neutrophils, Absolute 6.36 10*3/mm3      Lymphocytes, Absolute 1.44 10*3/mm3      Monocytes, Absolute 0.60 10*3/mm3      Eosinophils, Absolute 0.40 10*3/mm3      Basophils, Absolute 0.07 10*3/mm3      Immature Grans, Absolute 0.04 10*3/mm3      nRBC 0.0 /100 WBC     Troponin [[id number]]  (Abnormal) Collected: 06/04/21 1233    Specimen: Blood from Arm, Right Updated: 06/04/21 1524     Troponin T 0.158 ng/mL     Comprehensive Metabolic Panel [[id number]]  (Abnormal) Collected: 06/04/21 1233    Specimen: Blood from Arm, Right Updated: 06/04/21 1515     Glucose 125 mg/dL      BUN 46 mg/dL      Creatinine 8.96 mg/dL      Sodium 142 mmol/L      Potassium 3.5 mmol/L      Chloride 99 mmol/L      CO2 26.0 mmol/L      Calcium 9.4 mg/dL      Total Protein 6.3 g/dL      Albumin 2.50 g/dL      ALT (SGPT) 6 U/L      AST (SGOT) 17 U/L      Alkaline Phosphatase 122 U/L      Total Bilirubin 0.3 mg/dL      eGFR Non African Amer 4 mL/min/1.73      Comment: <15 Indicative of kidney failure.        eGFR   Amer --     Comment: <15 Indicative of kidney failure.        Globulin 3.8 gm/dL      A/G Ratio 0.7 g/dL      BUN/Creatinine Ratio 5.1     Anion Gap 17.0 mmol/L     Phosphorus [[id number]]  (Normal) Collected: 06/04/21 1233    Specimen: Blood from Arm, Right Updated: 06/04/21 1512     Phosphorus 4.4 mg/dL     BNP [[id number]]  (Abnormal) Collected: 06/04/21 1233    Specimen: Blood from Arm, Right Updated: 06/04/21 1510     proBNP 27,617.0 pg/mL     Magnesium [[id number]]  (Normal) Collected: 06/04/21 1233    Specimen: Blood from Arm, Right Updated: 06/04/21 1509     Magnesium 2.4 mg/dL     aPTT [[id number]]  (Abnormal) Collected: 06/04/21 1233    Specimen: Blood  from Arm, Right Updated: 06/04/21 1503     PTT 40.4 seconds     Protime-INR [[id number]]  (Abnormal) Collected: 06/04/21 1233    Specimen: Blood from Arm, Right Updated: 06/04/21 1503     Protime 14.7 Seconds      INR 1.25     Imaging Results (Last 24 Hours)     Procedure Component Value Units Date/Time    XR Chest 1 View [[id number]] Collected: 06/04/21 1600     Updated: 06/04/21 1604    Narrative:      EXAM: XR CHEST 1 VW-     INDICATION: Shortness of air     COMPARISON: 5/19/2020     FINDINGS:     Increased moderate size RIGHT pleural effusion with overlying  atelectasis. Small LEFT pleural effusion, slightly decreased, with  adjacent atelectasis. Cardiac silhouette is enlarged but stable. Prior  median sternotomy. Right-sided CVL with tips overlying the RIGHT atrium.  No visible pneumothorax. No suspicious osseous finding.       Impression:         1.  Increased moderate RIGHT pleural effusion with overlying  atelectasis.  2.  Slight decrease in LEFT pleural effusion with overlying atelectasis.  This report was finalized on 06/04/2021 16:01 by Dr. Sravan Carlisle MD.          Assessment / Plan     Assessment:   Active Hospital Problems    Diagnosis    • Persistent dry cough    • Pleural effusion on right    • Hypervolemia    • Chronic anemia    • Type 2 diabetes mellitus, without long-term current use of insulin (CMS/[facility])    • ESRD on dialysis (CMS/[facility])    • Coronary artery disease involving native coronary artery of native heart with angina pectoris (CMS/[facility])        Plan:   1.  Admit as inpatient  2.  Consult nephrology for possible dialysis today  3.  CT angiogram of the chest in a.m. prior to HD  4.  Dr. Perez plans for bedside thoracentesis tomorrow  5.  Home medications reviewed and restarted as appropriate  6.  DVT prophylaxis with SCDs  7.  Labs in a.m.  8.  Supplemental oxygen, incentive spirometry, continuous pulse oximetry  9.  Monitor glucose before meals and at bedtime with regular insulin sliding  scale coverage, add home Levemir however decreased from 20 units to 10 units daily    I discussed the patient's findings and my recommendations with: Feng Perez MD  Time spent 35 minutes    Patient seen and examined by me on 6/4/2021 at 4:25 PM.    Electronically signed by JESSIE Schulte, 06/04/21, 18:42 CDT.

## 2021-06-04 NOTE — ED PROVIDER NOTES
Subjective   Who is on peritoneal dialysis at home recent CABG came the ER progressive worsening shortness of breath recently admitted to the hospital the same thing. Not getting better      Shortness of Breath  Severity:  Moderate  Onset quality:  Gradual  Timing:  Constant  Progression:  Worsening  Chronicity:  Recurrent  Context: activity    Context: not animal exposure, not emotional upset, not occupational exposure, not pollens and not weather changes    Relieved by:  Nothing  Worsened by:  Exertion  Ineffective treatments:  Diuretics  Associated symptoms: cough, PND, sputum production and wheezing    Associated symptoms: no abdominal pain, no chest pain, no claudication, no diaphoresis, no ear pain, no fever, no headaches, no hemoptysis, no rash, no sore throat and no vomiting    Risk factors: no recent alcohol use, no family hx of DVT, no obesity and no recent surgery        Review of Systems   Constitutional: Negative.  Negative for activity change, appetite change, chills, diaphoresis, fatigue and fever.   HENT: Negative for congestion, drooling, ear pain, facial swelling, hearing loss, sinus pressure and sore throat.    Eyes: Negative.  Negative for discharge.   Respiratory: Positive for cough, sputum production, shortness of breath and wheezing. Negative for hemoptysis.    Cardiovascular: Positive for PND. Negative for chest pain and claudication.   Gastrointestinal: Negative for abdominal distention, abdominal pain, blood in stool, diarrhea, nausea and vomiting.   Endocrine: Negative.  Negative for cold intolerance, heat intolerance, polydipsia, polyphagia and polyuria.   Genitourinary: Negative.  Negative for dysuria, flank pain and urgency.   Musculoskeletal: Negative.  Negative for arthralgias, back pain, myalgias and neck stiffness.   Skin: Negative.  Negative for color change, pallor and rash.   Allergic/Immunologic: Negative.    Neurological: Negative.  Negative for dizziness, seizures, speech  difficulty, weakness, numbness and headaches.   Hematological: Negative.  Negative for adenopathy.   All other systems reviewed and are negative.      Past Medical History:   Diagnosis Date   • Arthritis     LEFT SHOULDER   • CHF (congestive heart failure) (CMS/HCC)    • Collar bone fracture 12/2020    SLING PLACED TO HEAL   • Coronary artery disease     LEFT MAIN AND 3 OTHER AREAS--CABG SCHEDULED FOR APRIL 22, 2021   • Elevated cholesterol    • End stage kidney disease (CMS/HCC)     currently HD (5/19/2021), hopes to return to PD    • GERD (gastroesophageal reflux disease)    • Gout    • History of transfusion    • Hyperlipidemia    • Hypertension    • Peritoneal dialysis status (CMS/HCC)     EVERY NIGHT FOR 10 HOURS, (5/19/2021 currently on hold)   • PONV (postoperative nausea and vomiting)    • Sleep apnea     RESOLVED   • Type 2 diabetes mellitus with kidney complication, with long-term current use of insulin (CMS/HCC) 11/2/2018       Allergies   Allergen Reactions   • Codeine Nausea And Vomiting   • Demerol [Meperidine] Nausea Only   • Levaquin [Levofloxacin] Nausea And Vomiting   • Lisinopril Swelling   • Morphine Nausea And Vomiting   • Sulfasalazine Nausea And Vomiting   • Ultram [Tramadol] Mental Status Change       Past Surgical History:   Procedure Laterality Date   • BREAST BIOPSY Right     FATTY TUMOR   • CARDIAC CATHETERIZATION N/A 10/1/2019    Procedure: Left Heart Cath;  Surgeon: Srikanth Coker MD;  Location:  [facility]ATH [facility];  Service: Cardiology   • CARDIAC CATHETERIZATION N/A 7/14/2020    Procedure: Left Heart Cath;  Surgeon: Srikanth Coker MD;  Location:  [facility];  Service: Cardiology;  Laterality: N/A;   • CARDIAC CATHETERIZATION N/A 3/23/2021    Procedure: Left Heart Cath;  Surgeon: Srikanth Coker MD;  Location:  [facility];  Service: Cardiology;  Laterality: N/A;   • CORONARY ANGIOPLASTY WITH STENT PLACEMENT  2016    X 2   • CORONARY  ARTERY BYPASS GRAFT N/A 2021    Procedure: CORONARY ARTERY BYPASS GRAFT X 3 WITH LEFT INTERNAL MAMMARY ARTERY, RIGHT LOWER EXTREMITY ENDOSCOPIC VEIN HARVEST, AND PERFUSION; TRANSESOPHAGEAL ECHOCARDIOGRAM;  Surgeon: Antony Wood MD;  Location: [facility] OR;  Service: Cardiothoracic;  Laterality: N/A;   • EYE SURGERY Bilateral     IOC LENS IMPLANTS   • EYE SURGERY Bilateral     RETINAL SURGERY   • GALLBLADDER SURGERY     • GASTRIC BYPASS     • HYSTERECTOMY     • INSERTION HEMODIALYSIS CATHETER Right 2021    Procedure: HEMODIALYSIS CATHETER INSERTION;  Surgeon: Simon Coates MD;  Location: [facility] HYBRID OR 12;  Service: Vascular;  Laterality: Right;   • INSERTION PERITONEAL DIALYSIS CATHETER     • OOPHORECTOMY Bilateral    • SINUS SURGERY     • SINUS SURGERY  1980   • TUBAL ABDOMINAL LIGATION         Family History   Problem Relation Age of Onset   • Hypertension Mother    • Lung disease Mother    • Heart disease Father    • Diabetes Father    • Abnormal EKG Father    • Breast cancer Neg Hx        Social History     Socioeconomic History   • Marital status:      Spouse name: Not on file   • Number of children: Not on file   • Years of education: Not on file   • Highest education level: Not on file   Tobacco Use   • Smoking status: Former Smoker     Packs/day: 0.00     Years: 2.00     Pack years: 0.00     Types: Cigarettes     Quit date:      Years since quittin.4   • Smokeless tobacco: Never Used   Vaping Use   • Vaping Use: Never used   Substance and Sexual Activity   • Alcohol use: Not Currently     Comment: haven't drank in 3 years ( 2018)    • Drug use: No   • Sexual activity: Defer           Objective   Physical Exam  Vitals and nursing note reviewed. Exam conducted with a chaperone present.   Constitutional:       Appearance: Normal appearance. She is well-developed. She is ill-appearing. She is not diaphoretic.   HENT:      Head: Normocephalic and atraumatic.      Right  Ear: External ear normal.      Nose: Nose normal.      Mouth/Throat:      Mouth: Mucous membranes are moist.   Eyes:      Conjunctiva/sclera: Conjunctivae normal.      Pupils: Pupils are equal, round, and reactive to light.   Cardiovascular:      Rate and Rhythm: Normal rate and regular rhythm.      Chest Wall: PMI is not displaced.      Pulses: Normal pulses. No decreased pulses.      Heart sounds: Murmur heard.   Systolic murmur is present with a grade of 2/6.   No gallop.    Pulmonary:      Effort: Pulmonary effort is normal. Tachypnea present. No accessory muscle usage or respiratory distress.      Breath sounds: No stridor. Examination of the right-middle field reveals decreased breath sounds and rales. Examination of the right-lower field reveals decreased breath sounds and rales. Examination of the left-lower field reveals decreased breath sounds and rales. Decreased breath sounds and rales present. No wheezing or rhonchi.   Chest:      Chest wall: No tenderness or crepitus.   Abdominal:      General: Bowel sounds are normal. There is no distension.      Palpations: Abdomen is soft.      Tenderness: There is no abdominal tenderness.   Musculoskeletal:         General: No swelling or tenderness. Normal range of motion.      Cervical back: Normal range of motion and neck supple. No rigidity.      Right lower le+ Edema present.      Left lower le+ Edema present.      Comments: Lower extremity exam bilaterally is unremarkable.  There is no right or left calf tenderness .  There is no palpable venous cord.  No obvious difference in the size of the legs.  No pitting edema.  The dorsalis pedis and posterior tibial femoral and popliteal pulses are palpable and +2 bilaterally.  Homans sign is negative   Skin:     General: Skin is warm.      Capillary Refill: Capillary refill takes less than 2 seconds.      Coloration: Skin is not jaundiced.      Findings: No erythema or rash.   Neurological:      General: No  focal deficit present.      Mental Status: She is alert and oriented to person, place, and time. Mental status is at baseline.      Cranial Nerves: No cranial nerve deficit.      Motor: No weakness.      Coordination: Coordination normal.      Deep Tendon Reflexes: Reflexes are normal and symmetric. Reflexes normal.   Psychiatric:         Mood and Affect: Mood normal.         Procedures           ED Course  ED Course as of Jun 04 1616 Fri Jun 04, 2021   1614 Given the ER progressive worsening shortness of breath enlarging pleural effusion have given her 2 mg of Bumex without much relief the patient to be admitted to the hospital service may require to be hemodialyzed again    [TS]      ED Course User Index  [TS] Param Payne MD                                           MDM  Number of Diagnoses or Management Options  Diagnosis management comments: Differential Diagnosis:  I considered pulmonary etiology, asthma, chronic obstructive pulmonary disease, pneumonia, pulmonary embolism, adult respiratory distress syndrome, pneumothorax, pleural effusion, pulmonary fibrosis, cardiac etiology, congestive heart failure, myocardial infarction, metabolic etiology, diabetic ketoacidosis, uremia, acidosis, sepsis, anemia, drug related etiology, hyperventilation and CNS disease as a possible cause of dyspnea in this patient. This is a partial list of diagnoses considered.            Amount and/or Complexity of Data Reviewed  Clinical lab tests: reviewed and ordered  Tests in the radiology section of CPT®: ordered and reviewed  Tests in the medicine section of CPT®: ordered and reviewed    Risk of Complications, Morbidity, and/or Mortality  Presenting problems: moderate  Diagnostic procedures: moderate  Management options: moderate        Final diagnoses:   Hypervolemia, unspecified hypervolemia type   Chronic kidney disease, unspecified CKD stage   Pleural effusion       ED Disposition  ED Disposition     ED Disposition  Condition Comment    Decision to Admit  Level of Care: Telemetry [5]   Diagnosis: Hypervolemia, unspecified hypervolemia type [[id number]]   Admitting Physician: CHULA NEWTON [[id number]]   Attending Physician: CHULA NEWTON [[id number]]   Certification: I Certify That Inpatient Hospital Services Are Medically Necessary For Greater Than 2 Midnights            No follow-up provider specified.       Medication List      No changes were made to your prescriptions during this visit.          Param Payne MD  06/04/21 [id number]

## 2021-06-05 NOTE — PLAN OF CARE
Goal Outcome Evaluation:  Plan of Care Reviewed With: patient  Progress: no change  Outcome Summary: 1.8 L: off in dialysis this shift; pt expecting go for more dialysis today.  O2 at 2L NC.  Pt c/o of SOA and cough.  Had some nausea during dialysis; relieved with PRN Zofran.  VSS.  NSR 71-83 with PVC per tele.  No c/o pain.  Safety maintained.  Bedside thoracentesis today per Dr. Perez.  Will continue to monitor.

## 2021-06-05 NOTE — OUTREACH NOTE
Medical Week 3 Survey      Responses   [facility] patient discharged from?  [place]   Does the patient have one of the following disease processes/diagnoses(primary or secondary)?  Other   Week 3 attempt successful?  No   Revoke  Readmitted          Porsha Nieto RN

## 2021-06-05 NOTE — PROCEDURES
"Thoracentesis at Bedside    Date/Time: 6/5/2021 4:17 PM  Performed by: Feng Perez MD  Authorized by: Feng Perez MD   Consent: Verbal consent obtained. Written consent obtained.  Risks and benefits: risks, benefits and alternatives were discussed  Consent given by: patient  Patient understanding: patient states understanding of the procedure being performed  Patient consent: the patient's understanding of the procedure matches consent given  Procedure consent: procedure consent matches procedure scheduled  Required items: required blood products, implants, devices, and special equipment available  Patient identity confirmed: verbally with patient  Time out: Immediately prior to procedure a \"time out\" was called to verify the correct patient, procedure, equipment, support staff and site/side marked as required.  Procedure purpose: diagnostic and therapeutic  Indications: pleural effusion  Preparation: Patient was prepped and draped in the usual sterile fashion.  Local anesthesia used: yes    Anesthesia:  Local anesthesia used: yes  Local Anesthetic: lidocaine 1% with epinephrine and lidocaine 1% without epinephrine  Anesthetic total: 9 mL  Preparation: skin prepped with ChloraPrep  Location: right posterior  Intercostal space: 8th  Puncture method: over-the-needle catheter  Needle size: 18  Catheter size: 8 Slovenian  Number of attempts: 1  Drainage amount: 1250 ml  Drainage characteristics: bloody  Patient tolerance: patient tolerated the procedure well with no immediate complications  Chest x-ray performed: yes  Chest x-ray interpreted by me.  Chest x-ray findings: pleural effusion        "

## 2021-06-05 NOTE — CONSULTS
Nephrology ([facility]) Consult Note      Patient:  Jennifer Salcido  YOB: 1955  Date of Service: 6/5/2021  MRN: [id number]   Acct: [id number]   Primary Care Physician: Cristian Hernandez MD  Advance Directive:   Code Status and Medical Interventions:   Ordered at: 06/04/21 1729     Level Of Support Discussed With:    Patient     Code Status:    CPR     Medical Interventions (Level of Support Prior to Arrest):    Full     Admit Date: 6/4/2021       Hospital Day: 1  Referring Provider: No ref. provider found      Patient Seen, Chart, Consults, Notes, Labs, Radiology studies reviewed.    Chief complaint: End-stage renal disease./Volume overload/shortness of breath.    Subjective:  Jennifer Salcido is a 65 y.o. female  whom we were consulted for end-stage renal disease.  He has end-stage renal disease secondary to diabetic nephropathy, currently on CCPD.  She was recently admitted, underwent elective open heart surgery and converted to hemodialysis.  However she went back to CCPD.  Presenting with increasing shortness of breath.  CT angiogram in the emergency room was consistent with negative study for pulmonary embolism.  However she was found to have a new onset of right sided large pleural effusion.  Patient has received emergent hemodialysis treatment for correction of volume status.  However she is still complaining of shortness of breath.  She is now scheduled to have right-sided pleurocentesis.    Currently seen on hemodialysis  Hemodialysis access: Permacath  Hemodialysis: 3-1/2-hour  Ultrafiltration: 2000 cc  2K bath  Blood flow rate is 450 cc/min    Allergies:  Codeine, Demerol [meperidine], Levaquin [levofloxacin], Lisinopril, Morphine, Sulfasalazine, and Ultram [tramadol]    Home Meds:  Medications Prior to Admission   Medication Sig Dispense Refill Last Dose   • allopurinol (ZYLOPRIM) 100 MG tablet Take 100 mg by mouth Daily.      • ascorbic acid (VITAMIN C) 500 MG tablet Take  500 mg by mouth Daily.      • aspirin 81 MG EC tablet Take 81 mg by mouth Daily.      • atorvastatin (LIPITOR) 20 MG tablet Take 1 tablet by mouth Every Night. 30 tablet 2    • bisacodyl (Dulcolax) 10 MG suppository Insert 10 mg into the rectum Daily As Needed for Constipation.      • calcium carbonate (TUMS) 500 MG chewable tablet Chew 2 tablets Every Night.      • citalopram (CeleXA) 40 MG tablet Take 40 mg by mouth Daily.      • docusate sodium (COLACE) 100 MG capsule Take 200 mg by mouth As Needed for Constipation.      • famotidine (PEPCID) 20 MG tablet Take 1 tablet by mouth Daily. 30 tablet 0    • gentamicin (GARAMYCIN) 0.1 % ointment Apply 1 application topically to the appropriate area as directed 3 (Three) Times a Day. AT SITE OF PERITONEAL DIALYSIS CATH      • HYDROcodone-acetaminophen (NORCO) 5-325 MG per tablet Take 1 tablet by mouth Every 8 (Eight) Hours As Needed (pain). 10 tablet 0    • insulin detemir (LEVEMIR) 100 UNIT/ML injection Inject 18 Units under the skin into the appropriate area as directed Daily.      • irbesartan (AVAPRO) 75 MG tablet Take 1 tablet by mouth Daily. 30 tablet 2    • Melatonin 5 MG capsule Take 5 mg by mouth Every Night.      • metoprolol tartrate (LOPRESSOR) 25 MG tablet Take 1 tablet by mouth Every 12 (Twelve) Hours. 60 tablet 2    • Multiple Vitamins-Minerals (MULTIVITAMIN ADULT PO) Take 1 tablet by mouth Daily.      • nitroglycerin (NITROSTAT) 0.4 MG SL tablet 1 under the tongue as needed for angina, may repeat q5mins for up three doses 25 tablet 3    • pantoprazole (PROTONIX) 40 MG EC tablet Take 40 mg by mouth 2 (two) times a day.      • sucralfate (CARAFATE) 1 g tablet Take 1 g by mouth 2 (two) times a day.      • Sucroferric Oxyhydroxide (Velphoro) 500 MG chewable tablet Chew 1 tablet 3 (Three) Times a Day. WITH MEALS      • vitamin D3 125 MCG (5000 UT) capsule capsule Take 1 tablet by mouth Daily.          Medicines:  Current Facility-Administered Medications    Medication Dose Route Frequency Provider Last Rate Last Admin   • acetaminophen (TYLENOL) tablet 650 mg  650 mg Oral Q6H PRN Cathleen Lord, APRN        Or   • acetaminophen (TYLENOL) 160 MG/5ML solution 650 mg  650 mg Oral Q6H PRN Cathleen Lord, APRN        Or   • acetaminophen (TYLENOL) suppository 650 mg  650 mg Rectal Q6H PRN Cathleen Lord, APRN       • albumin human 25 % IV SOLN 12.5 g  12.5 g Intravenous PRN Cristian Hernandez MD       • allopurinol (ZYLOPRIM) tablet 100 mg  100 mg Oral Daily Cathleen Lord, APRN       • ascorbic acid (VITAMIN C) tablet 500 mg  500 mg Oral Daily Cathleen Lord, APRN       • aspirin EC tablet 81 mg  81 mg Oral Daily Cathleen Lord, APRN       • atorvastatin (LIPITOR) tablet 20 mg  20 mg Oral Nightly Cathleen Lord, APRN   20 mg at 06/05/21 0034   • benzonatate (TESSALON) capsule 200 mg  200 mg Oral Q4H PRN Cathleen Lord, APRN       • bisacodyl (DULCOLAX) suppository 10 mg  10 mg Rectal Daily PRN Cathleen Lord, APRN       • calcium carbonate (TUMS) chewable tablet 500 mg (200 mg elemental)  2 tablet Oral Nightly Cathleen Lord, APRN   2 tablet at 06/05/21 0032   • citalopram (CeleXA) tablet 40 mg  40 mg Oral Daily Cathleen Lord, APRN       • dextrose (D50W) 25 g/ 50mL Intravenous Solution 25 g  25 g Intravenous Q15 Min PRN Cathleen Lord, APRN       • dextrose (GLUTOSE) oral gel 15 g  15 g Oral Q15 Min PRN Cathleen Lord, APRN       • diphenhydrAMINE (BENADRYL) capsule 25 mg  25 mg Oral Q4H PRN Cristian Hernandez MD        Or   • diphenhydrAMINE (BENADRYL) injection 25 mg  25 mg Intravenous Q4H PRN Cristian Hernandez MD       • docusate sodium (COLACE) capsule 200 mg  200 mg Oral Daily PRN Feng Perez MD       • famotidine (PEPCID) tablet 20 mg  20 mg Oral Nightly Cathleen Lord, APRN   20 mg at 06/05/21 0034   • glucagon (human recombinant) (GLUCAGEN DIAGNOSTIC) injection 1 mg  1 mg Subcutaneous Q15 Min PRN Cathleen Lord, APRN        • guaiFENesin (MUCINEX) 12 hr tablet 600 mg  600 mg Oral Q12H Cathleen Lord APRN   600 mg at 06/05/21 0036   • HYDROcodone-acetaminophen (NORCO) 5-325 MG per tablet 1 tablet  1 tablet Oral Q8H PRN Cathleen Lord, APRANASTASIYA       • insulin detemir (LEVEMIR) injection 10 Units  10 Units Subcutaneous Daily Cathleen Lord APRN       • insulin lispro (humaLOG) injection 0-9 Units  0-9 Units Subcutaneous 4x Daily With Meals & Nightly Cathleen Lord APRANASTASIYA       • losartan (COZAAR) tablet 25 mg  25 mg Oral Q24H Cathleen Lord APRN       • melatonin tablet 3 mg  3 mg Oral Nightly Cathleen Lord APRN   3 mg at 06/05/21 0033   • metoprolol tartrate (LOPRESSOR) tablet 25 mg  25 mg Oral Q12H Cathleen Lord APRN   25 mg at 06/05/21 0033   • multivitamin with minerals 1 tablet  1 tablet Oral Daily Cathleen Lord APRN       • nitroglycerin (NITROSTAT) SL tablet 0.4 mg  0.4 mg Sublingual Q5 Min PRN Cathleen Lord APRN       • ondansetron (ZOFRAN) tablet 4 mg  4 mg Oral Q6H PRN Cathleen Lord APRN        Or   • ondansetron (ZOFRAN) injection 4 mg  4 mg Intravenous Q6H PRN Cathleen Lord APRN       • ondansetron (ZOFRAN) injection 4 mg  4 mg Intravenous Q2H PRN Cristian Hernandez MD   4 mg at 06/04/21 2311   • sodium chloride 0.9 % bolus 100 mL  100 mL Intravenous PRN Cristian Hernandez MD       • sodium chloride 0.9 % flush 10 mL  10 mL Intravenous Q12H Cathleen Lord APRN   10 mL at 06/04/21 2102   • sodium chloride 0.9 % flush 10 mL  10 mL Intravenous PRN Cathleen Lord APRANASTASIYA       • sucralfate (CARAFATE) tablet 1 g  1 g Oral BID AC Cathleen Lord APRN   1 g at 06/05/21 0556       Past Medical History:  Past Medical History:   Diagnosis Date   • Arthritis     LEFT SHOULDER   • CHF (congestive heart failure) (CMS/[facility])    • Collar bone fracture 12/2020    SLING PLACED TO HEAL   • Coronary artery disease     LEFT MAIN AND 3 OTHER AREAS--CABG SCHEDULED FOR APRIL 22, 2021   • Elevated cholesterol     • End stage kidney disease (CMS/[facility])     currently HD (5/19/2021), hopes to return to PD    • GERD (gastroesophageal reflux disease)    • Gout    • History of transfusion    • Hyperlipidemia    • Hypertension    • Peritoneal dialysis status (CMS/[facility])     EVERY NIGHT FOR 10 HOURS, (5/19/2021 currently on hold)   • PONV (postoperative nausea and vomiting)    • Sleep apnea     RESOLVED   • Type 2 diabetes mellitus with kidney complication, with long-term current use of insulin (CMS/HCC) 11/2/2018       Past Surgical History:  Past Surgical History:   Procedure Laterality Date   • BREAST BIOPSY Right     FATTY TUMOR   • CARDIAC CATHETERIZATION N/A 10/1/2019    Procedure: Left Heart Cath;  Surgeon: Srikanth Coker MD;  Location:  [facility] CATH INVASIVE LOC[facility];  Service: Cardiology   • CARDIAC CATHETERIZATION N/A 7/14/2020    Procedure: Left Heart Cath;  Surgeon: Srikanth Coker MD;  Location:  [facility] CATH [facility]VAS[facility];  Service: Cardiology;  Laterality: N/A;   • CARDIAC CATHETERIZATION N/A 3/23/2021    Procedure: Left Heart Cath;  Surgeon: Srikanth Coker MD;  Location:  [facility] CATH INVASIVE [facility];  Service: Cardiology;  Laterality: N/A;   • CORONARY ANGIOPLASTY WITH STENT PLACEMENT  2016    X 2   • CORONARY ARTERY BYPASS GRAFT N/A 4/22/2021    Procedure: CORONARY ARTERY BYPASS GRAFT X 3 WITH LEFT INTERNAL MAMMARY ARTERY, RIGHT LOWER EXTREMITY ENDOSCOPIC VEIN HARVEST, AND PERFUSION; TRANSESOPHAGEAL ECHOCARDIOGRAM;  Surgeon: Antony Wood MD;  Location: [facility] OR;  Service: Cardiothoracic;  Laterality: N/A;   • EYE SURGERY Bilateral     IOC LENS IMPLANTS   • EYE SURGERY Bilateral     RETINAL SURGERY   • GALLBLADDER SURGERY     • GASTRIC BYPASS     • HYSTERECTOMY     • INSERTION HEMODIALYSIS CATHETER Right 4/1/2021    Procedure: HEMODIALYSIS CATHETER INSERTION;  Surgeon: Simon Coates MD;  Location: [facility] HYBRID OR 12;  Service: Vascular;  Laterality: Right;   • INSERTION PERITONEAL DIALYSIS  "CATHETER     • OOPHORECTOMY Bilateral    • SINUS SURGERY     • SINUS SURGERY  1980   • TUBAL ABDOMINAL LIGATION         Family History  Family History   Problem Relation Age of Onset   • Hypertension Mother    • Lung disease Mother    • Heart disease Father    • Diabetes Father    • Abnormal EKG Father    • Breast cancer Neg Hx        Social History  Social History     Socioeconomic History   • Marital status:      Spouse name: Not on file   • Number of children: Not on file   • Years of education: Not on file   • Highest education level: Not on file   Tobacco Use   • Smoking status: Former Smoker     Packs/day: 0.00     Years: 2.00     Pack years: 0.00     Types: Cigarettes     Quit date:      Years since quittin.4   • Smokeless tobacco: Never Used   Vaping Use   • Vaping Use: Never used   Substance and Sexual Activity   • Alcohol use: Not Currently     Comment: haven't drank in 3 years ( 2018)    • Drug use: No   • Sexual activity: Defer         Review of Systems:  History obtained from chart review and the patient  General ROS: No fever or chills  Respiratory ROS: positive for - shortness of breath  Cardiovascular ROS: no chest pain or dyspnea on exertion  Gastrointestinal ROS: No abdominal pain or melena  Genito-Urinary ROS: No dysuria or hematuria  14 point ROS reviewed with the patient and negative except as noted above and in the HPI unless unable to obtain.    Objective:  /56   Pulse 70   Temp 98.3 °F (36.8 °C) (Oral)   Resp 18   Ht 160 cm (62.99\")   Wt 67 kg (147 lb 9.6 oz)   SpO2 99%   BMI 26.15 kg/m²     Intake/Output Summary (Last 24 hours) at 2021 1616  Last data filed at 2021 0900  Gross per 24 hour   Intake 240 ml   Output 100 ml   Net 140 ml     General: awake/alert   HEENT: Normocephalic atraumatic head  Neck: Supple with no JVD or carotid bruits.  Chest:  Decreased breath sound at right lung field.  CVS: regular rate and rhythm  Abdominal: soft, nontender, " normal bowel sounds  Extremities: no cyanosis or edema  Skin: warm and dry without rash      Labs:    Results from last 7 days   Lab Units 06/05/21  0440 06/04/21  1233   WBC 10*3/mm3 8.76 8.91   HEMOGLOBIN g/dL 10.2* 10.8*   HEMATOCRIT % 32.0* 33.0*   PLATELETS 10*3/mm3 354 508*       Results from last 7 days   Lab Units 06/05/21  0440 06/04/21  1233   SODIUM mmol/L 136 142   POTASSIUM mmol/L 3.9 3.5   CHLORIDE mmol/L 96* 99   CO2 mmol/L 29.0 26.0   BUN mg/dL 26* 46*   CREATININE mg/dL 6.17* 8.96*   CALCIUM mg/dL 8.7 9.4   BILIRUBIN mg/dL  --  0.3   ALK PHOS U/L  --  122*   ALT (SGPT) U/L  --  6   AST (SGOT) U/L  --  17   GLUCOSE mg/dL 62* 125*         Radiology:   Imaging Results (Last 24 Hours)     Procedure Component Value Units Date/Time    XR Chest 1 View [[id number]] Resulted: 06/05/21 1603     Updated: 06/05/21 1603    CT Angiogram Chest [[id number]] Collected: 06/05/21 1031     Updated: 06/05/21 1042    Narrative:      EXAMINATION: CT ANGIOGRAM CHEST- 6/5/2021 10:31 AM CDT     HISTORY: pleural effusion -right; E87.70-Fluid overload, unspecified;  N18.9-Chronic kidney disease, unspecified; S85-Nyjhozh effusion, not  elsewhere classified     DOSE: 400 mGycm (Automatic exposure control technique was implemented in  an effort to keep the radiation dose as low as possible without  compromising image quality)     REPORT: Spiral CT of the chest was performed after administration of  intravenous contrast from the thoracic inlet through the upper abdomen  using CTA protocol. Reconstructed coronal and sagittal images were also  reviewed.     Comparison: CTA chest 3/11/2021.     The contrast bolus is satisfactory, there is moderate respiratory motion  artifact. The central pulmonary arteries are normal caliber without  filling defects. There is poor visualization of peripheral pulmonary  artery branches due to respiratory motion. Heart size is normal. No  evidence of right heart strain is identified. There is a  right-sided  internal jugular dialysis catheter which appears in satisfactory  position. There is a small amount calcified plaque within the thoracic  aortic arch with normal aortic caliber. No evidence of aortic  dissection. There is previous median sternotomy. There is a large new  pleural effusion on the right, with diffuse atelectasis, particularly  involving the right middle and lower lobes. This also small new left  pleural effusion. Lung windows show no pneumothorax or obvious  consolidation to indicate pneumonia. There are nodules in the thyroid  gland as well as calcifications. The largest nodule appears cystic, in  the left lobe and measures 1.6 cm. There is a moderate-sized hiatal  hernia and evidence previous gastric surgery. A small pericardial  effusion is present. This is new. The visualized upper abdomen is  otherwise unremarkable. Review of bone windows is acutely unremarkable.  There is mild displacement of the upper sternotomy with poor healing.       Impression:      1. Limited exam due to excessive respiratory motion, with no gross  evidence of central pulmonary emboli.  2. New large pleural effusion on the right, new small left pleural  effusion and new small pericardial effusion. There is extensive passive  atelectasis in the right lung, particularly involving the right middle  and lower lobes.  3. No pneumothorax is identified. No evidence of intrathoracic  lymphadenopathy.  4. Evidence of interval median sternotomy, with poor healing of the  sternotomy.              This report was finalized on 06/05/2021 10:39 by Dr. Saji Mullen MD.          Culture:  No components found for: WOUNDCUL, 3  No components found for: CSFCUL, 3  No components found for: BC, 3  No components found for: URINECUL, 3      Assessment   1.  End-stage renal disease/currently seen on dialysis  2.  Type II diabetic nephropathy.  3.  New onset of right-sided pleural effusion  4.  Anemia of chronic kidney disease.  5.   Recent history of CABG    Plan:  1.  Resume CCPD tomorrow  2.  Agree with right-sided pleurocentesis.  3.  Resume LONNIE as needed.      Thank you for the consult, we appreciate the opportunity to provide care to your patients.  Feel free to contact me if I can be of any further assistance.      Cristian Hernandez MD  6/5/2021  16:16 CDT

## 2021-06-05 NOTE — PROGRESS NOTES
[facility] Medicine Services  INPATIENT PROGRESS NOTE    Patient Name: Jennifer Salcido  Date of Admission: 6/4/2021  Today's Date: 06/05/21  Length of Stay: 1  Primary Care Physician: Cristian Hernandez MD    Subjective   Chief Complaint: shortness of breath  HPI     Patient seen and examined at bedside.      Patient appears clinically ill and significantly short of breath.  She is diaphoretic and shortness of breath.        Review of Systems   Constitutional: Negative for activity change, appetite change, chills and fever.   Respiratory: Positive for cough. Negative for shortness of breath.    Cardiovascular: Negative for chest pain and palpitations.   Gastrointestinal: Negative for abdominal distention, abdominal pain, diarrhea, nausea and vomiting.        All pertinent negatives and positives are as above. All other systems have been reviewed and are negative unless otherwise stated.     Objective    Temp:  [97.5 °F (36.4 °C)-99.3 °F (37.4 °C)] 98.3 °F (36.8 °C)  Heart Rate:  [69-90] 70  Resp:  [18-24] 18  BP: (102-157)/(56-83) 102/56  Physical Exam  Vitals and nursing note reviewed.   Constitutional:       Appearance: Normal appearance. She is diaphoretic.   HENT:      Head: Normocephalic and atraumatic.      Nose: No congestion or rhinorrhea.      Mouth/Throat:      Mouth: Mucous membranes are dry.      Pharynx: Oropharynx is clear.   Eyes:      General: No scleral icterus.     Conjunctiva/sclera: Conjunctivae normal.   Cardiovascular:      Rate and Rhythm: Normal rate and regular rhythm.   Pulmonary:      Effort: Respiratory distress present.      Breath sounds: Normal breath sounds.      Comments: Diminished bilateral bases  Abdominal:      General: Abdomen is flat. Bowel sounds are normal. There is no distension.      Palpations: Abdomen is soft. There is no mass.   Skin:     General: Skin is warm.      Coloration: Skin is pale. Skin is not jaundiced.   Neurological:       General: No focal deficit present.      Mental Status: She is alert and oriented to person, place, and time.   Psychiatric:         Mood and Affect: Mood normal.         Behavior: Behavior normal.             Results Review:  I have reviewed the labs, radiology results, and diagnostic studies.    Laboratory Data:   Results from last 7 days   Lab Units 06/05/21  0440 06/04/21  1233   WBC 10*3/mm3 8.76 8.91   HEMOGLOBIN g/dL 10.2* 10.8*   HEMATOCRIT % 32.0* 33.0*   PLATELETS 10*3/mm3 354 508*        Results from last 7 days   Lab Units 06/05/21  0440 06/04/21  1233   SODIUM mmol/L 136 142   POTASSIUM mmol/L 3.9 3.5   CHLORIDE mmol/L 96* 99   CO2 mmol/L 29.0 26.0   BUN mg/dL 26* 46*   CREATININE mg/dL 6.17* 8.96*   CALCIUM mg/dL 8.7 9.4   BILIRUBIN mg/dL  --  0.3   ALK PHOS U/L  --  122*   ALT (SGPT) U/L  --  6   AST (SGOT) U/L  --  17   GLUCOSE mg/dL 62* 125*       Culture Data:   No results found for: BLOODCX, URINECX, WOUNDCX, MRSACX, RESPCX, STOOLCX    Radiology Data:   Imaging Results (Last 24 Hours)     Procedure Component Value Units Date/Time    XR Chest 1 View [[id number]] Collected: 06/05/21 1614     Updated: 06/05/21 1618    Narrative:      EXAMINATION: XR CHEST 1 VW- 6/5/2021 4:14 PM CDT     HISTORY: thoracentesis; E87.70-Fluid overload, unspecified;  N18.9-Chronic kidney disease, unspecified; Z66-Yhdnahr effusion, not  elsewhere classified.     REPORT: A frontal view of the chest was obtained.     COMPARISON: Chest x-ray 6/4/2021, CTA chest 6/5/2021.     There is marked reduction in volume of the right pleural effusion  following thoracentesis, no pneumothorax is identified. A small left  pleural effusion is unchanged. There is moderate atelectasis in the lung  bases greater on the right. Heart size is normal. Previous CABG is  noted. The right-sided dialysis catheter appears in good position as  before. No other change.       Impression:      Marked reduction in volume of the right pleural  effusion  following thoracentesis with no pneumothorax. There is residual  atelectasis in the lung bases greater on the right.  This report was finalized on 06/05/2021 16:15 by Dr. Saji Mullen MD.    CT Angiogram Chest [[id number]] Collected: 06/05/21 1031     Updated: 06/05/21 1042    Narrative:      EXAMINATION: CT ANGIOGRAM CHEST- 6/5/2021 10:31 AM CDT     HISTORY: pleural effusion -right; E87.70-Fluid overload, unspecified;  N18.9-Chronic kidney disease, unspecified; I29-Zpifnzh effusion, not  elsewhere classified     DOSE: 400 mGycm (Automatic exposure control technique was implemented in  an effort to keep the radiation dose as low as possible without  compromising image quality)     REPORT: Spiral CT of the chest was performed after administration of  intravenous contrast from the thoracic inlet through the upper abdomen  using CTA protocol. Reconstructed coronal and sagittal images were also  reviewed.     Comparison: CTA chest 3/11/2021.     The contrast bolus is satisfactory, there is moderate respiratory motion  artifact. The central pulmonary arteries are normal caliber without  filling defects. There is poor visualization of peripheral pulmonary  artery branches due to respiratory motion. Heart size is normal. No  evidence of right heart strain is identified. There is a right-sided  internal jugular dialysis catheter which appears in satisfactory  position. There is a small amount calcified plaque within the thoracic  aortic arch with normal aortic caliber. No evidence of aortic  dissection. There is previous median sternotomy. There is a large new  pleural effusion on the right, with diffuse atelectasis, particularly  involving the right middle and lower lobes. This also small new left  pleural effusion. Lung windows show no pneumothorax or obvious  consolidation to indicate pneumonia. There are nodules in the thyroid  gland as well as calcifications. The largest nodule appears cystic, in  the left  "lobe and measures 1.6 cm. There is a moderate-sized hiatal  hernia and evidence previous gastric surgery. A small pericardial  effusion is present. This is new. The visualized upper abdomen is  otherwise unremarkable. Review of bone windows is acutely unremarkable.  There is mild displacement of the upper sternotomy with poor healing.       Impression:      1. Limited exam due to excessive respiratory motion, with no gross  evidence of central pulmonary emboli.  2. New large pleural effusion on the right, new small left pleural  effusion and new small pericardial effusion. There is extensive passive  atelectasis in the right lung, particularly involving the right middle  and lower lobes.  3. No pneumothorax is identified. No evidence of intrathoracic  lymphadenopathy.  4. Evidence of interval median sternotomy, with poor healing of the  sternotomy.              This report was finalized on 06/05/2021 10:39 by Dr. Saji Mullen MD.          I have reviewed the patient's current medications.     Assessment/Plan     Active Hospital Problems    Diagnosis    • Persistent dry cough    • Pleural effusion on right    • Hypervolemia    • Chronic anemia    • Type 2 diabetes mellitus, without long-term current use of insulin (CMS/[facility])    • ESRD on dialysis (CMS/[facility])    • Coronary artery disease involving native coronary artery of native heart with angina pectoris (CMS/[facility])        Plan:  Patient admitted for continued shortness of breath and volume overload.  Patient had recently switched from HD back to PD.  She indicates that on her PD she used some of the yellow bags as she felt \"dehydrated\" and used the lighter bags.    Nephrology consult for dialysis.  Will discuss with Dr. Hernandez about conitnuing HD to pull.  Only pulled 1.4 L 6/5 due to hypotension.    Right sided pleural effusion patient is acutely short of breath and it has been worsening.  Right-sided thoracentesis performed by Dr. Perez on 6/5 1.25 L removed.  " Significantly elevated RBCs.  Suspected it would have been transfudating but appears exudative.              Discharge Planning: I expect the patient to be discharged to home in 1-2 days.    Electronically signed by Feng Perez MD, 06/05/21, 16:27 CDT.

## 2021-06-06 NOTE — PROGRESS NOTES
Nephrology ([facility]) Progress Note      Patient:  Jennifer Salcido  YOB: 1955  Date of Service: 6/6/2021  MRN: [id number]   Acct: [id number]   Primary Care Physician: Cristian Hernandez MD  Advance Directive:   Code Status and Medical Interventions:   Ordered at: 06/04/21 1727     Level Of Support Discussed With:    Patient     Code Status:    CPR     Medical Interventions (Level of Support Prior to Arrest):    Full     Admit Date: 6/4/2021       Hospital Day: 2  Referring Provider: No ref. provider found      Patient personally seen and examined.  Complete chart including Consults, Notes, Operative Reports, Labs, Cardiology, and Radiology studies reviewed as able.    Chief complaint: End-stage renal disease/shortness of breath    Subjective:    Jennifer Salcido is a 65 y.o. female  whom we were consulted for end-stage renal disease.  He has end-stage renal disease secondary to diabetic nephropathy, currently on CCPD.  She was recently admitted, underwent elective open heart surgery and converted to hemodialysis.  However she went back to CCPD.  Presenting with increasing shortness of breath.  CT angiogram in the emergency room was consistent with negative study for pulmonary embolism.  However she was found to have a new onset of right sided large pleural effusion.  Patient has received emergent hemodialysis treatment for correction of volume status.    On June 5, she underwent large right-sided pleurocentesis.    This afternoon she feels well.  She denies any shortness of breath.    Allergies:  Codeine, Demerol [meperidine], Levaquin [levofloxacin], Lisinopril, Morphine, Sulfasalazine, and Ultram [tramadol]    Home Meds:  Medications Prior to Admission   Medication Sig Dispense Refill Last Dose   • allopurinol (ZYLOPRIM) 100 MG tablet Take 100 mg by mouth Daily.      • ascorbic acid (VITAMIN C) 500 MG tablet Take 500 mg by mouth Daily.      • aspirin 81 MG EC tablet Take 81 mg by  mouth Daily.      • atorvastatin (LIPITOR) 20 MG tablet Take 1 tablet by mouth Every Night. 30 tablet 2    • bisacodyl (Dulcolax) 10 MG suppository Insert 10 mg into the rectum Daily As Needed for Constipation.      • calcium carbonate (TUMS) 500 MG chewable tablet Chew 2 tablets Every Night.      • citalopram (CeleXA) 40 MG tablet Take 40 mg by mouth Daily.      • docusate sodium (COLACE) 100 MG capsule Take 200 mg by mouth As Needed for Constipation.      • famotidine (PEPCID) 20 MG tablet Take 1 tablet by mouth Daily. 30 tablet 0    • gentamicin (GARAMYCIN) 0.1 % ointment Apply 1 application topically to the appropriate area as directed 3 (Three) Times a Day. AT SITE OF PERITONEAL DIALYSIS CATH      • HYDROcodone-acetaminophen (NORCO) 5-325 MG per tablet Take 1 tablet by mouth Every 8 (Eight) Hours As Needed (pain). 10 tablet 0    • insulin detemir (LEVEMIR) 100 UNIT/ML injection Inject 18 Units under the skin into the appropriate area as directed Daily.      • irbesartan (AVAPRO) 75 MG tablet Take 1 tablet by mouth Daily. 30 tablet 2    • Melatonin 5 MG capsule Take 5 mg by mouth Every Night.      • metoprolol tartrate (LOPRESSOR) 25 MG tablet Take 1 tablet by mouth Every 12 (Twelve) Hours. 60 tablet 2    • Multiple Vitamins-Minerals (MULTIVITAMIN ADULT PO) Take 1 tablet by mouth Daily.      • nitroglycerin (NITROSTAT) 0.4 MG SL tablet 1 under the tongue as needed for angina, may repeat q5mins for up three doses 25 tablet 3    • pantoprazole (PROTONIX) 40 MG EC tablet Take 40 mg by mouth 2 (two) times a day.      • sucralfate (CARAFATE) 1 g tablet Take 1 g by mouth 2 (two) times a day.      • Sucroferric Oxyhydroxide (Velphoro) 500 MG chewable tablet Chew 1 tablet 3 (Three) Times a Day. WITH MEALS      • vitamin D3 125 MCG (5000 UT) capsule capsule Take 1 tablet by mouth Daily.          Medicines:  Current Facility-Administered Medications   Medication Dose Route Frequency Provider Last Rate Last Admin   •  acetaminophen (TYLENOL) tablet 650 mg  650 mg Oral Q6H PRN Cathleen Lord, APRN        Or   • acetaminophen (TYLENOL) 160 MG/5ML solution 650 mg  650 mg Oral Q6H PRN Cathleen Lord, APRN        Or   • acetaminophen (TYLENOL) suppository 650 mg  650 mg Rectal Q6H PRN Cathleen Lord, APRN       • allopurinol (ZYLOPRIM) tablet 100 mg  100 mg Oral Daily Cathleen Lord, APRN   100 mg at 06/06/21 0952   • ascorbic acid (VITAMIN C) tablet 500 mg  500 mg Oral Daily Cathleen Lord, APRN   500 mg at 06/06/21 0952   • aspirin EC tablet 81 mg  81 mg Oral Daily Cathleen Lord, APRN   81 mg at 06/06/21 0952   • atorvastatin (LIPITOR) tablet 20 mg  20 mg Oral Nightly Cathleen Lord, APRN   20 mg at 06/05/21 2059   • benzonatate (TESSALON) capsule 200 mg  200 mg Oral Q4H PRN Cathleen Lord, APRN       • bisacodyl (DULCOLAX) suppository 10 mg  10 mg Rectal Daily PRN Cathleen Lord, APRN       • calcium carbonate (TUMS) chewable tablet 500 mg (200 mg elemental)  2 tablet Oral Nightly Cathleen Lord, APRN   2 tablet at 06/05/21 2059   • citalopram (CeleXA) tablet 40 mg  40 mg Oral Nightly Feng Perez MD   40 mg at 06/05/21 2059   • dextrose (D50W) 25 g/ 50mL Intravenous Solution 25 g  25 g Intravenous Q15 Min PRN Cathlene Lord, APRN       • dextrose (GLUTOSE) oral gel 15 g  15 g Oral Q15 Min PRN Cathleen Lord, APRN       • diphenhydrAMINE (BENADRYL) capsule 25 mg  25 mg Oral Q4H PRN Cristian Hernandez MD        Or   • diphenhydrAMINE (BENADRYL) injection 25 mg  25 mg Intravenous Q4H PRN Cristian Hernandez MD       • docusate sodium (COLACE) capsule 200 mg  200 mg Oral Daily PRN Feng Perez MD       • famotidine (PEPCID) tablet 20 mg  20 mg Oral Nightly Cathleen Lord APRN   20 mg at 06/05/21 2100   • glucagon (human recombinant) (GLUCAGEN DIAGNOSTIC) injection 1 mg  1 mg Subcutaneous Q15 Min PRN Cathleen Lord APRN       • guaiFENesin (MUCINEX) 12 hr tablet 600 mg  600 mg Oral Q12H  Cathleen Lord APRN   600 mg at 06/06/21 0952   • HYDROcodone-acetaminophen (NORCO) 5-325 MG per tablet 1 tablet  1 tablet Oral Q8H PRN Cathleen Lord APRANASTASIYA       • insulin detemir (LEVEMIR) injection 10 Units  10 Units Subcutaneous Daily Cathleen Lord, APRN       • insulin lispro (humaLOG) injection 0-9 Units  0-9 Units Subcutaneous 4x Daily With Meals & Nightly Cathleen Lord APRN   2 Units at 06/06/21 1225   • losartan (COZAAR) tablet 25 mg  25 mg Oral Q24H Cathleen Lord APRN   25 mg at 06/06/21 0952   • melatonin tablet 3 mg  3 mg Oral Nightly Cathleen Lord APRN   3 mg at 06/05/21 2100   • metoprolol tartrate (LOPRESSOR) tablet 25 mg  25 mg Oral Q12H Cathleen Lord, APRN   25 mg at 06/06/21 0952   • multivitamin with minerals 1 tablet  1 tablet Oral Daily Cathleen Lord APRN   1 tablet at 06/06/21 0952   • nitroglycerin (NITROSTAT) SL tablet 0.4 mg  0.4 mg Sublingual Q5 Min PRN Cathleen Lord APRN       • ondansetron (ZOFRAN) tablet 4 mg  4 mg Oral Q6H PRN Cathleen Lord APRN        Or   • ondansetron (ZOFRAN) injection 4 mg  4 mg Intravenous Q6H PRN Cathleen Lord, APRN       • ondansetron (ZOFRAN) injection 4 mg  4 mg Intravenous Q2H PRN Cristian Hernandez MD   4 mg at 06/04/21 2311   • sodium chloride 0.9 % bolus 100 mL  100 mL Intravenous PRN Cristian Hernandez MD       • sodium chloride 0.9 % flush 10 mL  10 mL Intravenous Q12H Cathleen Lord APRN   10 mL at 06/06/21 0954   • sodium chloride 0.9 % flush 10 mL  10 mL Intravenous PRN Cathleen Lord, APRN       • sucralfate (CARAFATE) tablet 1 g  1 g Oral BID AC Cathleen Lord APRN   1 g at 06/06/21 0536       Past Medical History:  Past Medical History:   Diagnosis Date   • Arthritis     LEFT SHOULDER   • CHF (congestive heart failure) (CMS/[facility])    • Collar bone fracture 12/2020    SLING PLACED TO HEAL   • Coronary artery disease     LEFT MAIN AND 3 OTHER AREAS--CABG SCHEDULED FOR APRIL 22, 2021   • Elevated  cholesterol    • End stage kidney disease (CMS/[facility])     currently HD (5/19/2021), hopes to return to PD    • GERD (gastroesophageal reflux disease)    • Gout    • History of transfusion    • Hyperlipidemia    • Hypertension    • Peritoneal dialysis status (CMS/[facility])     EVERY NIGHT FOR 10 HOURS, (5/19/2021 currently on hold)   • PONV (postoperative nausea and vomiting)    • Sleep apnea     RESOLVED   • Type 2 diabetes mellitus with kidney complication, with long-term current use of insulin (CMS/[facility]) 11/2/2018       Past Surgical History:  Past Surgical History:   Procedure Laterality Date   • BREAST BIOPSY Right     FATTY TUMOR   • CARDIAC CATHETERIZATION N/A 10/1/2019    Procedure: Left Heart Cath;  Surgeon: Srikanth Coker MD;  Location:  [facility] CATH INVASIVE LOC[facility];  Service: Cardiology   • CARDIAC CATHETERIZATION N/A 7/14/2020    Procedure: Left Heart Cath;  Surgeon: Srikanth Coker MD;  Location:  [facility] CATH INVAS[facility];  Service: Cardiology;  Laterality: N/A;   • CARDIAC CATHETERIZATION N/A 3/23/2021    Procedure: Left Heart Cath;  Surgeon: Srikanth Coker MD;  Location:  [facility] CATH INVASIVE [facility];  Service: Cardiology;  Laterality: N/A;   • CORONARY ANGIOPLASTY WITH STENT PLACEMENT  2016    X 2   • CORONARY ARTERY BYPASS GRAFT N/A 4/22/2021    Procedure: CORONARY ARTERY BYPASS GRAFT X 3 WITH LEFT INTERNAL MAMMARY ARTERY, RIGHT LOWER EXTREMITY ENDOSCOPIC VEIN HARVEST, AND PERFUSION; TRANSESOPHAGEAL ECHOCARDIOGRAM;  Surgeon: Antony Wood MD;  Location: [facility] OR;  Service: Cardiothoracic;  Laterality: N/A;   • EYE SURGERY Bilateral     IOC LENS IMPLANTS   • EYE SURGERY Bilateral     RETINAL SURGERY   • GALLBLADDER SURGERY     • GASTRIC BYPASS     • HYSTERECTOMY     • INSERTION HEMODIALYSIS CATHETER Right 4/1/2021    Procedure: HEMODIALYSIS CATHETER INSERTION;  Surgeon: Simon Coates MD;  Location: [facility] HYBRID OR 12;  Service: Vascular;  Laterality: Right;   • INSERTION PERITONEAL  DIALYSIS CATHETER     • OOPHORECTOMY Bilateral    • SINUS SURGERY     • SINUS SURGERY  1980   • TUBAL ABDOMINAL LIGATION         Family History  Family History   Problem Relation Age of Onset   • Hypertension Mother    • Lung disease Mother    • Heart disease Father    • Diabetes Father    • Abnormal EKG Father    • Breast cancer Neg Hx        Social History  Social History     Socioeconomic History   • Marital status:      Spouse name: Not on file   • Number of children: Not on file   • Years of education: Not on file   • Highest education level: Not on file   Tobacco Use   • Smoking status: Former Smoker     Packs/day: 0.00     Years: 2.00     Pack years: 0.00     Types: Cigarettes     Quit date:      Years since quittin.4   • Smokeless tobacco: Never Used   Vaping Use   • Vaping Use: Never used   Substance and Sexual Activity   • Alcohol use: Not Currently     Comment: haven't drank in 3 years ( 2018)    • Drug use: No   • Sexual activity: Defer         Review of Systems:  History obtained from chart review and the patient  General ROS: No fever or chills  Respiratory ROS: No cough, shortness of breath, wheezing  Cardiovascular ROS: No chest pain or palpitations  Gastrointestinal ROS: No abdominal pain or melena  Genito-Urinary ROS: No dysuria or hematuria    Objective:  Patient Vitals for the past 24 hrs:   BP Temp Temp src Pulse Resp SpO2 Weight   21 1041 132/72 98.6 °F (37 °C) Oral 72 20 100 % --   21 0739 132/68 98.2 °F (36.8 °C) Oral 69 20 100 % --   21 0410 124/54 98.7 °F (37.1 °C) Oral 70 20 99 % --   21 2350 109/63 97.8 °F (36.6 °C) Oral 81 18 100 % --   21 129/73 98.9 °F (37.2 °C) Oral 90 20 98 % 66.4 kg (146 lb 6.4 oz)   21 1603 102/56 -- -- -- -- -- --   21 1559 105/56 -- -- -- -- -- --       Intake/Output Summary (Last 24 hours) at 2021 1438  Last data filed at 2021 0900  Gross per 24 hour   Intake 720 ml   Output 1250 ml   Net  -530 ml     General: awake/alert   HEENT: Normocephalic atraumatic head  Chest:  clear to auscultation bilaterally without respiratory distress  CVS: regular rate and rhythm  Abdominal: soft, nontender, positive bowel sounds  Extremities: no cyanosis or edema  Skin: warm and dry without rash      Labs:  Results from last 7 days   Lab Units 06/05/21  0440 06/04/21  1233   WBC 10*3/mm3 8.76 8.91   HEMOGLOBIN g/dL 10.2* 10.8*   HEMATOCRIT % 32.0* 33.0*   PLATELETS 10*3/mm3 354 508*         Results from last 7 days   Lab Units 06/05/21  0440 06/04/21  1233   SODIUM mmol/L 136 142   POTASSIUM mmol/L 3.9 3.5   CHLORIDE mmol/L 96* 99   CO2 mmol/L 29.0 26.0   BUN mg/dL 26* 46*   CREATININE mg/dL 6.17* 8.96*   CALCIUM mg/dL 8.7 9.4   BILIRUBIN mg/dL  --  0.3   ALK PHOS U/L  --  122*   ALT (SGPT) U/L  --  6   AST (SGOT) U/L  --  17   GLUCOSE mg/dL 62* 125*       Radiology:   Imaging Results (Last 72 Hours)     Procedure Component Value Units Date/Time    XR Chest 1 View [[id number]] Collected: 06/05/21 1614     Updated: 06/05/21 1618    Narrative:      EXAMINATION: XR CHEST 1 VW- 6/5/2021 4:14 PM CDT     HISTORY: thoracentesis; E87.70-Fluid overload, unspecified;  N18.9-Chronic kidney disease, unspecified; D54-Oqiksnu effusion, not  elsewhere classified.     REPORT: A frontal view of the chest was obtained.     COMPARISON: Chest x-ray 6/4/2021, CTA chest 6/5/2021.     There is marked reduction in volume of the right pleural effusion  following thoracentesis, no pneumothorax is identified. A small left  pleural effusion is unchanged. There is moderate atelectasis in the lung  bases greater on the right. Heart size is normal. Previous CABG is  noted. The right-sided dialysis catheter appears in good position as  before. No other change.       Impression:      Marked reduction in volume of the right pleural effusion  following thoracentesis with no pneumothorax. There is residual  atelectasis in the lung bases greater on the  right.  This report was finalized on 06/05/2021 16:15 by Dr. Saji Mullen MD.    CT Angiogram Chest [[id number]] Collected: 06/05/21 1031     Updated: 06/05/21 1042    Narrative:      EXAMINATION: CT ANGIOGRAM CHEST- 6/5/2021 10:31 AM CDT     HISTORY: pleural effusion -right; E87.70-Fluid overload, unspecified;  N18.9-Chronic kidney disease, unspecified; X59-Vcwmwkd effusion, not  elsewhere classified     DOSE: 400 mGycm (Automatic exposure control technique was implemented in  an effort to keep the radiation dose as low as possible without  compromising image quality)     REPORT: Spiral CT of the chest was performed after administration of  intravenous contrast from the thoracic inlet through the upper abdomen  using CTA protocol. Reconstructed coronal and sagittal images were also  reviewed.     Comparison: CTA chest 3/11/2021.     The contrast bolus is satisfactory, there is moderate respiratory motion  artifact. The central pulmonary arteries are normal caliber without  filling defects. There is poor visualization of peripheral pulmonary  artery branches due to respiratory motion. Heart size is normal. No  evidence of right heart strain is identified. There is a right-sided  internal jugular dialysis catheter which appears in satisfactory  position. There is a small amount calcified plaque within the thoracic  aortic arch with normal aortic caliber. No evidence of aortic  dissection. There is previous median sternotomy. There is a large new  pleural effusion on the right, with diffuse atelectasis, particularly  involving the right middle and lower lobes. This also small new left  pleural effusion. Lung windows show no pneumothorax or obvious  consolidation to indicate pneumonia. There are nodules in the thyroid  gland as well as calcifications. The largest nodule appears cystic, in  the left lobe and measures 1.6 cm. There is a moderate-sized hiatal  hernia and evidence previous gastric surgery. A small  pericardial  effusion is present. This is new. The visualized upper abdomen is  otherwise unremarkable. Review of bone windows is acutely unremarkable.  There is mild displacement of the upper sternotomy with poor healing.       Impression:      1. Limited exam due to excessive respiratory motion, with no gross  evidence of central pulmonary emboli.  2. New large pleural effusion on the right, new small left pleural  effusion and new small pericardial effusion. There is extensive passive  atelectasis in the right lung, particularly involving the right middle  and lower lobes.  3. No pneumothorax is identified. No evidence of intrathoracic  lymphadenopathy.  4. Evidence of interval median sternotomy, with poor healing of the  sternotomy.              This report was finalized on 06/05/2021 10:39 by Dr. Saji Mullen MD.    XR Chest 1 View [[id number]] Collected: 06/04/21 1600     Updated: 06/04/21 1604    Narrative:      EXAM: XR CHEST 1 VW-     INDICATION: Shortness of air     COMPARISON: 5/19/2020     FINDINGS:     Increased moderate size RIGHT pleural effusion with overlying  atelectasis. Small LEFT pleural effusion, slightly decreased, with  adjacent atelectasis. Cardiac silhouette is enlarged but stable. Prior  median sternotomy. Right-sided CVL with tips overlying the RIGHT atrium.  No visible pneumothorax. No suspicious osseous finding.       Impression:         1.  Increased moderate RIGHT pleural effusion with overlying  atelectasis.  2.  Slight decrease in LEFT pleural effusion with overlying atelectasis.  This report was finalized on 06/04/2021 16:01 by Dr. Sravan Carlisle MD.          Culture:  No results found for: BLOODCX, URINECX, WOUNDCX, MRSACX, RESPCX, STOOLCX      Assessment   1.  End-stage renal disease on maintenance dialysis.  2.  Type II diabetic nephropathy.  3.  Right pleural effusion/pleurocentesis.  4.  Anemia of chronic kidney disease.  5.  Recent history of CABG.    Plan:  1.  Agree to  proceed with CCPD with 2.5% dialysate to remove more fluid.  2.  Continue LONNIE.  3.  Okay to discharge her either today or tomorrow.      Cristian Hernandez MD  6/6/2021  14:38 CDT

## 2021-06-06 NOTE — PLAN OF CARE
Patient has rested well with improvement noted with shortness of air. She notes that she feels very fatigued. She did call out shortly before midnight and stated she felt sweaty, was having a hot flash, and felt as though her BG was low, it was 43, after treating with prune juice and PB and crackers she improved and BG normalized. No other incidents.

## 2021-06-06 NOTE — PLAN OF CARE
Goal Outcome Evaluation:     Progress: no change  Outcome Summary: Patient had right thoracentesis yesterday, lungs sound better today, patient up and walking in room, no c/o pain, does get soa with ambulation, repeat 2view cxr shows increasing right pleural effusion and left pleural effusion, dr Wood to place CT tomorrow and npo after midnight per our conversation, patient will have CCPD tonight

## 2021-06-07 NOTE — PROGRESS NOTES
"Patient name: Jennifer Salcido  Patient : 1955  VISIT # [id number]  MR #[id number]    Procedure:Procedure(s):  CHEST TUBE INSERTION  Procedure Date:2021  POD:* No surgery date entered *    Subjective   Chief Complaint   Patient presents with   • Shortness of Breath       Resting in bed. On 2 liters nasal cannula. Indicates she had a hypotensive episode last night. Believes this was from BP medications along with peritoneal dialysis. She denies falling and hitting her head. Nursing indicates only 175 ml were pulled off from PD. Denies pain presently.          Objective     Visit Vitals  /58 (BP Location: Right arm, Patient Position: Lying)   Pulse 74   Temp 97.9 °F (36.6 °C) (Oral)   Resp 16   Ht 160 cm (62.99\")   Wt 67.1 kg (148 lb)   SpO2 100%   BMI 26.22 kg/m²       Intake/Output Summary (Last 24 hours) at 2021 1206  Last data filed at 2021 0752  Gross per 24 hour   Intake 480 ml   Output 175 ml   Net 305 ml       Lab:     CBC:  Results from last 7 days   Lab Units 21  0036 21  0440 21  1233   WBC 10*3/mm3 8.77 8.76 8.91   HEMATOCRIT % 31.2* 32.0* 33.0*   PLATELETS 10*3/mm3 324 354 508*          BMP:  Results from last 7 days   Lab Units 21  0036 21  0440 21  1233   SODIUM mmol/L 133* 136 142   POTASSIUM mmol/L 3.5 3.9 3.5   CHLORIDE mmol/L 97* 96* 99   CO2 mmol/L 20.0* 29.0 26.0   GLUCOSE mg/dL 147* 62* 125*   BUN mg/dL 29* 26* 46*   CREATININE mg/dL 6.13* 6.17* 8.96*          COAG:  Results from last 7 days   Lab Units 21  1233   INR  1.25*   APTT seconds 40.4*       IMAGES:       Imaging Results (Last 24 Hours)     Procedure Component Value Units Date/Time    XR Abdomen KUB [[id number]] Collected: 21     Updated: 21    Narrative:      EXAMINATION: XR ABDOMEN KUB- 2021 7:45 AM CDT     HISTORY: Vomiting; E87.70-Fluid overload, unspecified; N18.9-Chronic  kidney disease, unspecified; N84-Hdfshmy effusion, not " elsewhere  classified; D00-Wogrtgr effusion, not elsewhere classified.     REPORT: A supine view of the abdomen was obtained.     COMPARISON: CT abdomen pelvis without contrast 5/19/2021.     There is a paucity of bowel gas overall, under stool volume is noted at  the rectum. A peritoneal dialysis catheter is coiled in the left pelvis.  Cholecystectomy clips are present. There is heavy atherosclerotic  calcification within the aorta and its branches. Mild levoscoliosis of  the lumbar spine.       Impression:      There is a paucity of bowel gas overall, which is  nonspecific, moderate stool volume is noted at the rectum. A left pelvic  peritoneal dialysis catheter is present.  This report was finalized on 06/07/2021 07:48 by Dr. Saji Mullen MD.    XR Chest 1 View [[id number]] Collected: 06/07/21 0744     Updated: 06/07/21 0748    Narrative:      EXAMINATION: XR CHEST 1 VW- 6/7/2021 7:44 AM CDT     HISTORY: RRT; E87.70-Fluid overload, unspecified; N18.9-Chronic kidney  disease, unspecified; W83-Yryklhp effusion, not elsewhere classified;  M55-Mdjmtil effusion, not elsewhere classified.     REPORT: A frontal view of the chest was obtained.     COMPARISON: Chest x-rays 6/6/2021 1458 hours.     The lungs are grossly hypoaerated and the patient is rotated to the  left. There is central vascular crowding, the cardiac margins are  partially obscured without obvious cardiomegaly. Bilateral pleural  effusions are noted, right greater than left and appears stable. No  pneumothorax is identified. The right-sided dialysis catheter appears  unchanged in position.       Impression:      Gross hypoaeration of the lungs with stable bilateral  pleural effusions, right greater than left. Increased central markings  are noted, likely exaggerated by the low lung volumes. Correlate  clinically for the possibility of interstitial edema and mild volume  overload.  This report was finalized on 06/07/2021 07:45 by Dr. Saji Mullen MD.             Physical Exam:  General: Alert, oriented. No apparent distress. Resting in bed.   Cardiovascular: Regular rate and rhythm without murmur, rubs, or gallops.    Pulmonary: Clear to auscultation bilaterally without wheezing, rubs, or rales.  Chest: well healed sternotomy site. No drainage. sternum stable. No clicks.   Abdomen: Soft, non distended, and non tender.  Extremities: Warm, moves all extremities.  Neurologic:  Grossly intact with no focal deficits.            Impression:  ESRD on dialysis (CMS/[facility])  Coronary artery disease involving native coronary artery of native heart with angina pectoris (CMS/[facility])  Type 2 diabetes mellitus, without long-term current use of insulin (CMS/[facility])  Chronic anemia  Hypervolemia  Persistent dry cough  Pleural effusion on right      Plan:  To OR later today for chest tube insertion by Dr. Israel RIDER patient and nursing       JESSIE Najera  06/07/21  12:06 CDT

## 2021-06-07 NOTE — OP NOTE
Pre-op diagnosis: right recurrent pleural effusion  Post-op diagnosis:same  Procedure: Ultrasound right  thoracostomy tube placement  Surgeon: Antony Wood M.D.  Anesthesia: Local with MAC   EBL: minimal  Specimen: none  Drains: 28 Fr. Right angle pleural chest tube  Findings: estimated 1200 ml  bloody pleural fluid was drained.    Operative description:   she was identified in room 15 of the OR where a time out was performed.  Ultrasound was performed to define thoracic anatomy and an optimal site of access.  With that, the chest was prepped and draped in the usual surgical fashion.  Anesthesia local was instilled as detailed above. A limited skin incision was made.  Blunt dissection was performed over a rib and the chest was accessed without remark.  Bloody fluid gushed out.  1200 ml bloody fluid was drained.  A 28 Fr right chest tube is placed without remark.  This was secured to the skin with silk stitch.  Three addition 2-0 silk stitches were placed to closed the access site.  The chest tube was placed in continuity with a Pleur-evac.  The patient tolerated the procedure without remark.  Complications: None immediate.  Disposition: The patient is to remain in the room and continue drainage to 20 cm H2O suction.  Chest x-ray has been ordered.

## 2021-06-07 NOTE — ANESTHESIA PREPROCEDURE EVALUATION
Anesthesia Evaluation     Patient summary reviewed   history of anesthetic complications: PONV               Airway   Mallampati: I  TM distance: >3 FB  Neck ROM: full  Dental    (+) partials    Pulmonary    (+) pleural effusion, sleep apnea (does not use cpap after 130 lbs weight loss),   (-) asthma  Cardiovascular   Exercise tolerance: poor (<4 METS)    ECG reviewed    (+) hypertension, past MI , CAD, CABG (4/2021), cardiac stents (x2) dysrhythmias, hyperlipidemia,       Neuro/Psych  (+) seizures (in past...not medicated),     (-) TIA, CVA  GI/Hepatic/Renal/Endo    (+)  GERD,  renal disease dialysis, diabetes mellitus using insulin,   (-) liver disease    ROS Comment: S/p gastric bypass 2004    Musculoskeletal     Abdominal    Substance History      OB/GYN          Other                          Anesthesia Plan    ASA 3     MAC     intravenous induction     Anesthetic plan, all risks, benefits, and alternatives have been provided, discussed and informed consent has been obtained with: patient.

## 2021-06-07 NOTE — PROGRESS NOTES
[facility]  INPATIENT PROGRESS NOTE    Length of Stay: 3  Date of Admission: 6/4/2021  Primary Care Physician: Cristian Hernandez MD    Subjective     Chief Complaint:     Shortness of breath    HPI     The patient is apparently scheduled for CT surgery today for chest tube insertion.  She remains on 2 L per nasal cannula.  She apparently had a hypotensive episode last evening that was felt to be from a combination of blood pressure medications combined with peritoneal dialysis.  Only 175 cc was removed per peritoneal dialysis.  Creatinine this a.m. 6.13.  Sodium 133.  Albumin 2.4.  CBC is unremarkable except hemoglobin 10.2.      Review of Systems     All pertinent negatives and positives are as above. All other systems have been reviewed and are negative unless otherwise stated.     Objective    Temp:  [97.9 °F (36.6 °C)-99.7 °F (37.6 °C)] 97.9 °F (36.6 °C)  Heart Rate:  [65-84] 73  Resp:  [16-18] 16  BP: ()/() 134/58    Lab Results (last 24 hours)     Procedure Component Value Units Date/Time    POC Glucose Once [[id number]]  (Normal) Collected: 06/07/21 1522    Specimen: Blood Updated: 06/07/21 1534     Glucose 104 mg/dL      Comment: : [id number] Gloria Schultz ID: [id number]       POC Glucose Once [[id number]]  (Abnormal) Collected: 06/07/21 1119    Specimen: Blood Updated: 06/07/21 1136     Glucose 150 mg/dL      Comment: : [id number] Raul Watson ID: [id number]       Non-gynecologic Cytology [[id number]] Collected: 06/05/21 1619    Specimen: Body Fluid from Pleural Cavity Updated: 06/07/21 0953    POC Glucose Once [[id number]]  (Abnormal) Collected: 06/07/21 0754    Specimen: Blood Updated: 06/07/21 0805     Glucose 264 mg/dL      Comment: : [id number] Orlando Nieto ID: [id number]       Body Fluid Culture - Body Fluid, Pleural Cavity [[id number]] Collected: 06/05/21 1619    Specimen: Body Fluid from Pleural Cavity  Updated: 06/07/21 0533     Body Fluid Culture No growth at 2 days     Gram Stain Moderate (3+) WBCs seen      No organisms seen    Troponin [[id number]]  (Abnormal) Collected: 06/07/21 0036    Specimen: Blood Updated: 06/07/21 0108     Troponin T 0.180 ng/mL     Narrative:      Troponin T Reference Range:  <= 0.03 ng/mL-   Negative for AMI  >0.03 ng/mL-     Abnormal for myocardial necrosis.  Clinicians would have to utilize clinical acumen, EKG, Troponin and serial changes to determine if it is an Acute Myocardial Infarction or myocardial injury due to an underlying chronic condition.       Results may be falsely decreased if patient taking Biotin.      Comprehensive Metabolic Panel [[id number]]  (Abnormal) Collected: 06/07/21 0036    Specimen: Blood Updated: 06/07/21 0101     Glucose 147 mg/dL      BUN 29 mg/dL      Creatinine 6.13 mg/dL      Sodium 133 mmol/L      Potassium 3.5 mmol/L      Chloride 97 mmol/L      CO2 20.0 mmol/L      Calcium 8.5 mg/dL      Total Protein 6.1 g/dL      Albumin 2.40 g/dL      ALT (SGPT) 7 U/L      AST (SGOT) 17 U/L      Alkaline Phosphatase 119 U/L      Total Bilirubin 0.3 mg/dL      eGFR Non African Amer 7 mL/min/1.73      Comment: <15 Indicative of kidney failure.        eGFR   Amer --     Comment: <15 Indicative of kidney failure.        Globulin 3.7 gm/dL      A/G Ratio 0.6 g/dL      BUN/Creatinine Ratio 4.7     Anion Gap 16.0 mmol/L     Narrative:      GFR Normal >60  Chronic Kidney Disease <60  Kidney Failure <15      Lipase [[id number]]  (Abnormal) Collected: 06/07/21 0036    Specimen: Blood Updated: 06/07/21 0056     Lipase 145 U/L     POC Glucose Once [[id number]]  (Abnormal) Collected: 06/07/21 0004    Specimen: Blood Updated: 06/07/21 0049     Glucose 135 mg/dL      Comment: : [id number] Jace RosalesnMtasha ID: [id number]       POC Glucose Once [[id number]]  (Abnormal) Collected: 06/07/21 0025    Specimen: Blood Updated: 06/07/21 0046     Glucose 143 mg/dL       Comment: : [id number] Milad MeyerMeter ID: [id number]       CBC & Differential [[id number]]  (Abnormal) Collected: 06/07/21 0036    Specimen: Blood Updated: 06/07/21 0042    Narrative:      The following orders were created for panel order CBC & Differential.  Procedure                               Abnormality         Status                     ---------                               -----------         ------                     CBC Auto Differential[[id number]]        Abnormal            Final result                 Please view results for these tests on the individual orders.    CBC Auto Differential [[id number]]  (Abnormal) Collected: 06/07/21 0036    Specimen: Blood Updated: 06/07/21 0042     WBC 8.77 10*3/mm3      RBC 3.21 10*6/mm3      Hemoglobin 10.2 g/dL      Hematocrit 31.2 %      MCV 97.2 fL      MCH 31.8 pg      MCHC 32.7 g/dL      RDW 14.5 %      RDW-SD 51.8 fl      MPV 9.9 fL      Platelets 324 10*3/mm3      Neutrophil % 56.9 %      Lymphocyte % 25.0 %      Monocyte % 8.6 %      Eosinophil % 8.0 %      Basophil % 0.7 %      Immature Grans % 0.8 %      Neutrophils, Absolute 5.00 10*3/mm3      Lymphocytes, Absolute 2.19 10*3/mm3      Monocytes, Absolute 0.75 10*3/mm3      Eosinophils, Absolute 0.70 10*3/mm3      Basophils, Absolute 0.06 10*3/mm3      Immature Grans, Absolute 0.07 10*3/mm3      nRBC 0.0 /100 WBC     POC Glucose Once [[id number]]  (Abnormal) Collected: 06/06/21 2345    Specimen: Blood Updated: 06/06/21 2356     Glucose 157 mg/dL      Comment: : [id number] Jace Aaron ID: [id number]             Imaging Results (Last 24 Hours)     Procedure Component Value Units Date/Time    US Chest [[id number]] Resulted: 06/07/21 1607     Updated: 06/07/21 1607    XR Abdomen KUB [[id number]] Collected: 06/07/21 0745     Updated: 06/07/21 0751    Narrative:      EXAMINATION: XR ABDOMEN KUB- 6/7/2021 7:45 AM CDT     HISTORY: Vomiting; E87.70-Fluid overload, unspecified; N18.9-Chronic  kidney  disease, unspecified; T37-Jmvhzxv effusion, not elsewhere  classified; A47-Pdndejn effusion, not elsewhere classified.     REPORT: A supine view of the abdomen was obtained.     COMPARISON: CT abdomen pelvis without contrast 5/19/2021.     There is a paucity of bowel gas overall, under stool volume is noted at  the rectum. A peritoneal dialysis catheter is coiled in the left pelvis.  Cholecystectomy clips are present. There is heavy atherosclerotic  calcification within the aorta and its branches. Mild levoscoliosis of  the lumbar spine.       Impression:      There is a paucity of bowel gas overall, which is  nonspecific, moderate stool volume is noted at the rectum. A left pelvic  peritoneal dialysis catheter is present.  This report was finalized on 06/07/2021 07:48 by Dr. Saji Mullen MD.    XR Chest 1 View [[id number]] Collected: 06/07/21 0744     Updated: 06/07/21 0748    Narrative:      EXAMINATION: XR CHEST 1 VW- 6/7/2021 7:44 AM CDT     HISTORY: RRT; E87.70-Fluid overload, unspecified; N18.9-Chronic kidney  disease, unspecified; D64-Lcvprcd effusion, not elsewhere classified;  T11-Xhgqhhs effusion, not elsewhere classified.     REPORT: A frontal view of the chest was obtained.     COMPARISON: Chest x-rays 6/6/2021 1458 hours.     The lungs are grossly hypoaerated and the patient is rotated to the  left. There is central vascular crowding, the cardiac margins are  partially obscured without obvious cardiomegaly. Bilateral pleural  effusions are noted, right greater than left and appears stable. No  pneumothorax is identified. The right-sided dialysis catheter appears  unchanged in position.       Impression:      Gross hypoaeration of the lungs with stable bilateral  pleural effusions, right greater than left. Increased central markings  are noted, likely exaggerated by the low lung volumes. Correlate  clinically for the possibility of interstitial edema and mild volume  overload.  This report was  finalized on 06/07/2021 07:45 by Dr. Saji Mullen MD.             Intake/Output Summary (Last 24 hours) at 6/7/2021 1626  Last data filed at 6/7/2021 1602  Gross per 24 hour   Intake 340 ml   Output 175 ml   Net 165 ml       Physical Exam  Constitutional:       Appearance: Normal appearance. She is ill-appearing (chronically).   HENT:      Head: Normocephalic and atraumatic.      Nose: No congestion or rhinorrhea.      Mouth: Mucous membranes are moist.      Pharynx: Oropharynx is clear.   Eyes:      General: No scleral icterus.     Conjunctiva/sclera: Conjunctivae normal.   Cardiovascular:      Rate and Rhythm: Normal rate and regular rhythm.   Pulmonary:      Effort: Pulmonary effort is normal.      Breath sounds: Normal breath sounds.      Comments: Diminished both bases  Abdominal:      General: Abdomen is flat. Bowel sounds are normal. There is no distension.      Palpations: Abdomen is soft. There is no mass.   Skin:     General: Skin is warm.      Coloration: Skin is pale. Skin is not jaundiced.   Neurological:      General: No focal deficit present.      Mental Status: She is alert and oriented to person, place, and time.   Psychiatric:         Mood and Affect: Mood normal    Results Review:  I have reviewed the labs, radiology results, and diagnostic studies since my last progress note and made treatment changes reflective of the results.   I have reviewed the current medications.    Assessment/Plan     Active Hospital Problems    Diagnosis    • Persistent dry cough    • Pleural effusion on right    • Hypervolemia    • Chronic anemia    • Type 2 diabetes mellitus, without long-term current use of insulin (CMS/[facility])    • ESRD on dialysis (CMS/[facility])    • Coronary artery disease involving native coronary artery of native heart with angina pectoris (CMS/[facility])        PLAN:  Chest tube placement today per cardiothoracic surgery  Peritoneal dialysis on hold per nephrology    Electronically signed by Cl SHEPHERD  DO Sly, 06/07/21, 16:26 CDT.

## 2021-06-07 NOTE — H&P (VIEW-ONLY)
Chief Complaint   Patient presents with   • Shortness of Breath         Subjective     History of Present Illness    65-year-old female well-known to me having undergone coronary artery bypass grafting by me with known renal failure now presents with increasing shortness of breath.  While she did well perioperatively as an inpatient at home she has struggled making the transition from peritoneal dialysis to hemodialysis.  She was seen earlier by Katheryn eD Jesus requiring additional bouts of hemodialysis to achieve euvolemia.  It appears she is required additional admissions since then.  Dr. Perez and I did discuss her care with thoracocentesis performed.  There is a residual effusion.  The fluid was fairly bloody.  She is breathing comfortably today.      Review of Systems   Constitutional: Positive for activity change and fatigue. Negative for appetite change, chills, diaphoresis, fever and unexpected weight change.   HENT: Negative for dental problem, hearing loss, nosebleeds, sore throat, trouble swallowing and voice change.    Eyes: Negative for photophobia, redness and visual disturbance.   Respiratory: Positive for shortness of breath. Negative for apnea, cough, chest tightness, wheezing and stridor.    Cardiovascular: Positive for leg swelling. Negative for chest pain and palpitations.   Gastrointestinal: Negative for abdominal distention, abdominal pain, blood in stool, constipation, diarrhea, nausea and vomiting.   Endocrine: Negative for cold intolerance, heat intolerance, polyphagia and polyuria.   Genitourinary: Negative for decreased urine volume, difficulty urinating, dysuria, flank pain, frequency, hematuria and urgency.   Musculoskeletal: Positive for back pain. Negative for arthralgias, gait problem, joint swelling, myalgias and neck pain.   Skin: Negative for pallor, rash and wound.   Allergic/Immunologic: Negative for immunocompromised state.   Neurological: Positive for weakness. Negative for  dizziness, tremors, seizures, syncope, speech difficulty, light-headedness, numbness and headaches.   Hematological: Bruises/bleeds easily.   Psychiatric/Behavioral: Negative for confusion, sleep disturbance and suicidal ideas. The patient is not nervous/anxious.           Past Medical History:   Diagnosis Date   • Arthritis     LEFT SHOULDER   • CHF (congestive heart failure) (CMS/[facility])    • Collar bone fracture 12/2020    SLING PLACED TO HEAL   • Coronary artery disease     LEFT MAIN AND 3 OTHER AREAS--CABG SCHEDULED FOR APRIL 22, 2021   • Elevated cholesterol    • End stage kidney disease (CMS/[facility])     currently HD (5/19/2021), hopes to return to [facility]    • GERD (gastroesophageal reflux disease)    • Gout    • History of transfusion    • Hyperlipidemia    • Hypertension    • Peritoneal dialysis status (CMS/HCC)     EVERY NIGHT FOR 10 HOURS, (5/19/2021 currently on hold)   • PONV (postoperative nausea and vomiting)    • Sleep apnea     RESOLVED   • Type 2 diabetes mellitus with kidney complication, with long-term current use of insulin (CMS/HCC) 11/2/2018     Past Surgical History:   Procedure Laterality Date   • BREAST BIOPSY Right     FATTY TUMOR   • CARDIAC CATHETERIZATION N/A 10/1/2019    Procedure: Left Heart Cath;  Surgeon: Srikanth Coker MD;  Location:  [facility] CATH [facility]VASIVE [facility];  Service: Cardiology   • CARDIAC CATHETERIZATION N/A 7/14/2020    Procedure: Left Heart Cath;  Surgeon: Srikanth Coker MD;  Location:  [facility]ATH [facility];  Service: Cardiology;  Laterality: N/A;   • CARDIAC CATHETERIZATION N/A 3/23/2021    Procedure: Left Heart Cath;  Surgeon: Srikanth Coker MD;  Location:  [facility];  Service: Cardiology;  Laterality: N/A;   • CORONARY ANGIOPLASTY WITH STENT PLACEMENT  2016    X 2   • CORONARY ARTERY BYPASS GRAFT N/A 4/22/2021    Procedure: CORONARY ARTERY BYPASS GRAFT X 3 WITH LEFT INTERNAL MAMMARY ARTERY, RIGHT LOWER EXTREMITY ENDOSCOPIC VEIN HARVEST, AND  PERFUSION; TRANSESOPHAGEAL ECHOCARDIOGRAM;  Surgeon: Antony Wood MD;  Location: [facility] OR;  Service: Cardiothoracic;  Laterality: N/A;   • EYE SURGERY Bilateral     IOC LENS IMPLANTS   • EYE SURGERY Bilateral     RETINAL SURGERY   • GALLBLADDER SURGERY     • GASTRIC BYPASS     • HYSTERECTOMY     • INSERTION HEMODIALYSIS CATHETER Right 2021    Procedure: HEMODIALYSIS CATHETER INSERTION;  Surgeon: Simon Coates MD;  Location: [facility] HYBRID OR 12;  Service: Vascular;  Laterality: Right;   • INSERTION PERITONEAL DIALYSIS CATHETER     • OOPHORECTOMY Bilateral    • SINUS SURGERY     • SINUS SURGERY  1980   • TUBAL ABDOMINAL LIGATION       Family History   Problem Relation Age of Onset   • Hypertension Mother    • Lung disease Mother    • Heart disease Father    • Diabetes Father    • Abnormal EKG Father    • Breast cancer Neg Hx      Social History     Tobacco Use   • Smoking status: Former Smoker     Packs/day: 0.00     Years: 2.00     Pack years: 0.00     Types: Cigarettes     Quit date:      Years since quittin.4   • Smokeless tobacco: Never Used   Vaping Use   • Vaping Use: Never used   Substance Use Topics   • Alcohol use: Not Currently     Comment: haven't drank in 3 years ( 2018)    • Drug use: No     Medications Prior to Admission   Medication Sig Dispense Refill Last Dose   • allopurinol (ZYLOPRIM) 100 MG tablet Take 100 mg by mouth Daily.      • ascorbic acid (VITAMIN C) 500 MG tablet Take 500 mg by mouth Daily.      • aspirin 81 MG EC tablet Take 81 mg by mouth Daily.      • atorvastatin (LIPITOR) 20 MG tablet Take 1 tablet by mouth Every Night. 30 tablet 2    • bisacodyl (Dulcolax) 10 MG suppository Insert 10 mg into the rectum Daily As Needed for Constipation.      • calcium carbonate (TUMS) 500 MG chewable tablet Chew 2 tablets Every Night.      • citalopram (CeleXA) 40 MG tablet Take 40 mg by mouth Daily.      • docusate sodium (COLACE) 100 MG capsule Take 200 mg by mouth As  Needed for Constipation.      • famotidine (PEPCID) 20 MG tablet Take 1 tablet by mouth Daily. 30 tablet 0    • gentamicin (GARAMYCIN) 0.1 % ointment Apply 1 application topically to the appropriate area as directed 3 (Three) Times a Day. AT SITE OF PERITONEAL DIALYSIS CATH      • HYDROcodone-acetaminophen (NORCO) 5-325 MG per tablet Take 1 tablet by mouth Every 8 (Eight) Hours As Needed (pain). 10 tablet 0    • insulin detemir (LEVEMIR) 100 UNIT/ML injection Inject 18 Units under the skin into the appropriate area as directed Daily.      • irbesartan (AVAPRO) 75 MG tablet Take 1 tablet by mouth Daily. 30 tablet 2    • Melatonin 5 MG capsule Take 5 mg by mouth Every Night.      • metoprolol tartrate (LOPRESSOR) 25 MG tablet Take 1 tablet by mouth Every 12 (Twelve) Hours. 60 tablet 2    • Multiple Vitamins-Minerals (MULTIVITAMIN ADULT PO) Take 1 tablet by mouth Daily.      • nitroglycerin (NITROSTAT) 0.4 MG SL tablet 1 under the tongue as needed for angina, may repeat q5mins for up three doses 25 tablet 3    • pantoprazole (PROTONIX) 40 MG EC tablet Take 40 mg by mouth 2 (two) times a day.      • sucralfate (CARAFATE) 1 g tablet Take 1 g by mouth 2 (two) times a day.      • Sucroferric Oxyhydroxide (Velphoro) 500 MG chewable tablet Chew 1 tablet 3 (Three) Times a Day. WITH MEALS      • vitamin D3 125 MCG (5000 UT) capsule capsule Take 1 tablet by mouth Daily.        Allergies:  Codeine, Demerol [meperidine], Levaquin [levofloxacin], Lisinopril, Morphine, Sulfasalazine, and Ultram [tramadol]    Objective      Vital Signs  Temp:  [97.9 °F (36.6 °C)-99.7 °F (37.6 °C)] 99.7 °F (37.6 °C)  Heart Rate:  [65-84] 65  Resp:  [16-20] 16  BP: ()/() 130/57    Physical Exam  Constitutional:       Appearance: She is well-developed.   HENT:      Head: Normocephalic and atraumatic.   Eyes:      Pupils: Pupils are equal, round, and reactive to light.   Neck:      Thyroid: No thyromegaly.      Vascular: No JVD.       Trachea: No tracheal deviation.   Cardiovascular:      Rate and Rhythm: Normal rate and regular rhythm.      Heart sounds: Normal heart sounds. No murmur heard.   No friction rub. No gallop.    Pulmonary:      Effort: Pulmonary effort is normal. No respiratory distress.      Breath sounds: Normal breath sounds. No wheezing or rales.   Chest:      Chest wall: No tenderness.   Abdominal:      General: There is no distension.      Palpations: Abdomen is soft.      Tenderness: There is no abdominal tenderness.   Musculoskeletal:         General: Normal range of motion.      Cervical back: Normal range of motion and neck supple.   Lymphadenopathy:      Cervical: No cervical adenopathy.   Skin:     General: Skin is warm and dry.      Comments: Saphenectomy incision sites on the right lower extremity are healing nicely.  Some residual bruising is noted but the leg is healing nicely.    The sternum is stable. No clicks.  The sternotomy incision is C/D/I.       Neurological:      Mental Status: She is alert and oriented to person, place, and time.      Cranial Nerves: No cranial nerve deficit.         Results Review:   CT Abdomen Pelvis Without Contrast    Result Date: 5/19/2021  Narrative: CT ABDOMEN PELVIS WO CONTRAST- 5/19/2021 4:45 PM CDT  HISTORY: Abdominal distension; E87.70-Fluid overload, unspecified  COMPARISON: None  DOSE LENGTH PRODUCT: 330 mGy cm. Automated exposure control was also utilized to decrease patient radiation dose.  TECHNIQUE: Axial images and pelvis are obtained without IV or oral contrast.  FINDINGS:  There are moderate size bilateral pleural effusions with a small pericardial effusion. Compressive atelectasis of the right middle and lower lobe parenchyma.  The nonenhanced liver, spleen, pancreas, and adrenal glands are unremarkable. There are cholecystectomy clips. Cortical renal atrophy. No abnormal perinephric fluid collection. No hydronephrosis. Peritoneal catheter curled within the lower  pelvis. Only trace fluid seen within the peritoneum. No free intraperitoneal air loculated abscess. No evidence for bowel obstruction. Decompressed colon. Fatty appearing ileocecal valve. Appendix measures 7 to 8 mm in width with no  Appendiceal inflammation. Questionable wall thickening of the appendix. No small bowel dilatation. Decompressed stomach. Postoperative changes of the stomach. Small hiatal hernia.  Diffuse vascular calcifications with no aneurysm.  Stranding of the subcutaneous fat of the anterior abdominal wall likely related to medicinal injections.  Structures osseous lesions.      Impression: 1. Moderate size bilateral pleural effusions with compressive atelectasis of the lung bases. Small pericardial effusion. 2. No evidence for bowel obstruction. Peritoneal catheter curled within the lower pelvis with only trace free fluid in the pelvis. No free air or loculated abscess. 3. Appendix is identified measuring 7 to 8 mm with no periappendiceal inflammation. Questionable wall thickening of the appendix. Clinical correlation for early appendicitis recommended. 4. Cortical renal atrophy. 5. Diffuse vascular calcification. This report was finalized on 05/19/2021 17:01 by Dr. Kimberlee Blankenship MD.    XR Chest 1 View    Result Date: 6/5/2021  Narrative: EXAMINATION: XR CHEST 1 VW- 6/5/2021 4:14 PM CDT  HISTORY: thoracentesis; E87.70-Fluid overload, unspecified; N18.9-Chronic kidney disease, unspecified; N74-Ktzmmks effusion, not elsewhere classified.  REPORT: A frontal view of the chest was obtained.  COMPARISON: Chest x-ray 6/4/2021, CTA chest 6/5/2021.  There is marked reduction in volume of the right pleural effusion following thoracentesis, no pneumothorax is identified. A small left pleural effusion is unchanged. There is moderate atelectasis in the lung bases greater on the right. Heart size is normal. Previous CABG is noted. The right-sided dialysis catheter appears in good position as before. No other  change.      Impression: Marked reduction in volume of the right pleural effusion following thoracentesis with no pneumothorax. There is residual atelectasis in the lung bases greater on the right. This report was finalized on 06/05/2021 16:15 by Dr. Saji Mullen MD.    XR Chest 1 View    Result Date: 6/4/2021  Narrative: EXAM: XR CHEST 1 VW-  INDICATION: Shortness of air  COMPARISON: 5/19/2020  FINDINGS:  Increased moderate size RIGHT pleural effusion with overlying atelectasis. Small LEFT pleural effusion, slightly decreased, with adjacent atelectasis. Cardiac silhouette is enlarged but stable. Prior median sternotomy. Right-sided CVL with tips overlying the RIGHT atrium. No visible pneumothorax. No suspicious osseous finding.      Impression:  1.  Increased moderate RIGHT pleural effusion with overlying atelectasis. 2.  Slight decrease in LEFT pleural effusion with overlying atelectasis. This report was finalized on 06/04/2021 16:01 by Dr. Sravan Carlisle MD.    XR Chest 1 View    Result Date: 5/19/2021  Narrative: EXAM: XR CHEST 1 VW- 5/19/2021 11:58 AM CDT  HISTORY: SOB x 1 week   COMPARISON: May 5, 2021.  TECHNIQUE: Frontal radiograph of the chest  FINDINGS: Small right lateral pleural fluid collections present. Moderate left pleural fluid collections present.. Cardiac silhouettes upper limits of normal. Wires are present previous median sternotomy. Double lumen right internal jugular catheters present..  The osseous structures and surrounding soft tissues demonstrate no acute abnormality.       Impression: 1. Bilateral pleural effusions again most likely this is due to a systemic fluid overload..   This report was finalized on 05/19/2021 12:08 by Dr. Elmer Thomas MD.    CT Angiogram Chest    Result Date: 6/5/2021  Narrative: EXAMINATION: CT ANGIOGRAM CHEST- 6/5/2021 10:31 AM CDT  HISTORY: pleural effusion -right; E87.70-Fluid overload, unspecified; N18.9-Chronic kidney disease, unspecified; K76-Mjxqlie  effusion, not elsewhere classified  DOSE: 400 mGycm (Automatic exposure control technique was implemented in an effort to keep the radiation dose as low as possible without compromising image quality)  REPORT: Spiral CT of the chest was performed after administration of intravenous contrast from the thoracic inlet through the upper abdomen using CTA protocol. Reconstructed coronal and sagittal images were also reviewed.  Comparison: CTA chest 3/11/2021.  The contrast bolus is satisfactory, there is moderate respiratory motion artifact. The central pulmonary arteries are normal caliber without filling defects. There is poor visualization of peripheral pulmonary artery branches due to respiratory motion. Heart size is normal. No evidence of right heart strain is identified. There is a right-sided internal jugular dialysis catheter which appears in satisfactory position. There is a small amount calcified plaque within the thoracic aortic arch with normal aortic caliber. No evidence of aortic dissection. There is previous median sternotomy. There is a large new pleural effusion on the right, with diffuse atelectasis, particularly involving the right middle and lower lobes. This also small new left pleural effusion. Lung windows show no pneumothorax or obvious consolidation to indicate pneumonia. There are nodules in the thyroid gland as well as calcifications. The largest nodule appears cystic, in the left lobe and measures 1.6 cm. There is a moderate-sized hiatal hernia and evidence previous gastric surgery. A small pericardial effusion is present. This is new. The visualized upper abdomen is otherwise unremarkable. Review of bone windows is acutely unremarkable. There is mild displacement of the upper sternotomy with poor healing.      Impression: 1. Limited exam due to excessive respiratory motion, with no gross evidence of central pulmonary emboli. 2. New large pleural effusion on the right, new small left pleural  effusion and new small pericardial effusion. There is extensive passive atelectasis in the right lung, particularly involving the right middle and lower lobes. 3. No pneumothorax is identified. No evidence of intrathoracic lymphadenopathy. 4. Evidence of interval median sternotomy, with poor healing of the sternotomy.     This report was finalized on 06/05/2021 10:39 by Dr. Saji Mullen MD.    XR Chest PA & Lateral    Result Date: 6/6/2021  Narrative: EXAMINATION: XR CHEST PA AND LATERAL- 6/6/2021 3:07 PM CDT  HISTORY: follow up after right thoracentesis; E87.70-Fluid overload, unspecified; N18.9-Chronic kidney disease, unspecified; L56-Dxeckyl effusion, not elsewhere classified.  REPORT: Frontal and lateral views of the chest were obtained.  COMPARISON: Chest x-rays 6/5/2021 1550 hours.  The lungs remain hypoaerated, with moderate pleural effusions, right greater than left, the right pleural effusion appears slightly increased in volume. There is extensive bibasilar atelectasis. No pneumothorax is identified. Heart size is normal. There is evidence of previous median sternotomy. The right-sided dialysis catheter appears in good position as before. No acute osseous abnormality.      Impression: Extensive bibasilar atelectasis with persistent pleural effusions, right greater than left, the right pleural effusion appears slightly increased in overall volume. No other change. This report was finalized on 06/06/2021 15:09 by Dr. Saji Mullen MD.     I reviewed the patient's new clinical results.  Discussed with patient      Assessment/Plan       ESRD on dialysis (CMS/[facility])    Coronary artery disease involving native coronary artery of native heart with angina pectoris (CMS/[facility])    Type 2 diabetes mellitus, without long-term current use of insulin (CMS/[facility])    Chronic anemia    Hypervolemia    Persistent dry cough    Pleural effusion on right        Discussed with patient's findings and recommendations with both the  patient and with Dr. Perez.  She has a recurrent right pleural effusion.  I believe her best course of care would be to place a large bore chest tube given the reported sanguinity of pleural effusion.  At this time, I discussed the operative procedure and she expressed much concern as such we will plan for a MAC with anesthesiology for placement ultrasound-guided right large bore chest tube placement.  Risks, benefits, and alternatives of the procedure were discussed at length.  The operative conduct discussed at length.  Plan for this in the a.m.  She is to be made n.p.o. after midnight.    All questions been answered to the best my ability and she is agreeable to proceed forward.

## 2021-06-07 NOTE — CONSULTS
Chief Complaint   Patient presents with   • Shortness of Breath         Subjective     History of Present Illness    65-year-old female well-known to me having undergone coronary artery bypass grafting by me with known renal failure now presents with increasing shortness of breath.  While she did well perioperatively as an inpatient at home she has struggled making the transition from peritoneal dialysis to hemodialysis.  She was seen earlier by Katheryn De Jesus requiring additional bouts of hemodialysis to achieve euvolemia.  It appears she is required additional admissions since then.  Dr. Perez and I did discuss her care with thoracocentesis performed.  There is a residual effusion.  The fluid was fairly bloody.  She is breathing comfortably today.      Review of Systems   Constitutional: Positive for activity change and fatigue. Negative for appetite change, chills, diaphoresis, fever and unexpected weight change.   HENT: Negative for dental problem, hearing loss, nosebleeds, sore throat, trouble swallowing and voice change.    Eyes: Negative for photophobia, redness and visual disturbance.   Respiratory: Positive for shortness of breath. Negative for apnea, cough, chest tightness, wheezing and stridor.    Cardiovascular: Positive for leg swelling. Negative for chest pain and palpitations.   Gastrointestinal: Negative for abdominal distention, abdominal pain, blood in stool, constipation, diarrhea, nausea and vomiting.   Endocrine: Negative for cold intolerance, heat intolerance, polyphagia and polyuria.   Genitourinary: Negative for decreased urine volume, difficulty urinating, dysuria, flank pain, frequency, hematuria and urgency.   Musculoskeletal: Positive for back pain. Negative for arthralgias, gait problem, joint swelling, myalgias and neck pain.   Skin: Negative for pallor, rash and wound.   Allergic/Immunologic: Negative for immunocompromised state.   Neurological: Positive for weakness. Negative for  dizziness, tremors, seizures, syncope, speech difficulty, light-headedness, numbness and headaches.   Hematological: Bruises/bleeds easily.   Psychiatric/Behavioral: Negative for confusion, sleep disturbance and suicidal ideas. The patient is not nervous/anxious.           Past Medical History:   Diagnosis Date   • Arthritis     LEFT SHOULDER   • CHF (congestive heart failure) (CMS/[facility])    • Collar bone fracture 12/2020    SLING PLACED TO HEAL   • Coronary artery disease     LEFT MAIN AND 3 OTHER AREAS--CABG SCHEDULED FOR APRIL 22, 2021   • Elevated cholesterol    • End stage kidney disease (CMS/[facility])     currently HD (5/19/2021), hopes to return to PD    • GERD (gastroesophageal reflux disease)    • Gout    • History of transfusion    • Hyperlipidemia    • Hypertension    • Peritoneal dialysis status (CMS/HCC)     EVERY NIGHT FOR 10 HOURS, (5/19/2021 currently on hold)   • PONV (postoperative nausea and vomiting)    • Sleep apnea     RESOLVED   • Type 2 diabetes mellitus with kidney complication, with long-term current use of insulin (CMS/HCC) 11/2/2018     Past Surgical History:   Procedure Laterality Date   • BREAST BIOPSY Right     FATTY TUMOR   • CARDIAC CATHETERIZATION N/A 10/1/2019    Procedure: Left Heart Cath;  Surgeon: Srikanth Coker MD;  Location:  [facility];  Service: Cardiology   • CARDIAC CATHETERIZATION N/A 7/14/2020    Procedure: Left Heart Cath;  Surgeon: Srikanth Coker MD;  Location:  [facility];  Service: Cardiology;  Laterality: N/A;   • CARDIAC CATHETERIZATION N/A 3/23/2021    Procedure: Left Heart Cath;  Surgeon: Srikanth Coker MD;  Location:  [facility];  Service: Cardiology;  Laterality: N/A;   • CORONARY ANGIOPLASTY WITH STENT PLACEMENT  2016    X 2   • CORONARY ARTERY BYPASS GRAFT N/A 4/22/2021    Procedure: CORONARY ARTERY BYPASS GRAFT X 3 WITH LEFT INTERNAL MAMMARY ARTERY, RIGHT LOWER EXTREMITY ENDOSCOPIC VEIN HARVEST, AND  PERFUSION; TRANSESOPHAGEAL ECHOCARDIOGRAM;  Surgeon: Antony Wood MD;  Location: [facility] OR;  Service: Cardiothoracic;  Laterality: N/A;   • EYE SURGERY Bilateral     IOC LENS IMPLANTS   • EYE SURGERY Bilateral     RETINAL SURGERY   • GALLBLADDER SURGERY     • GASTRIC BYPASS     • HYSTERECTOMY     • INSERTION HEMODIALYSIS CATHETER Right 2021    Procedure: HEMODIALYSIS CATHETER INSERTION;  Surgeon: Simon Coates MD;  Location: [facility] HYBRID OR 12;  Service: Vascular;  Laterality: Right;   • INSERTION PERITONEAL DIALYSIS CATHETER     • OOPHORECTOMY Bilateral    • SINUS SURGERY     • SINUS SURGERY  1980   • TUBAL ABDOMINAL LIGATION       Family History   Problem Relation Age of Onset   • Hypertension Mother    • Lung disease Mother    • Heart disease Father    • Diabetes Father    • Abnormal EKG Father    • Breast cancer Neg Hx      Social History     Tobacco Use   • Smoking status: Former Smoker     Packs/day: 0.00     Years: 2.00     Pack years: 0.00     Types: Cigarettes     Quit date:      Years since quittin.4   • Smokeless tobacco: Never Used   Vaping Use   • Vaping Use: Never used   Substance Use Topics   • Alcohol use: Not Currently     Comment: haven't drank in 3 years ( 2018)    • Drug use: No     Medications Prior to Admission   Medication Sig Dispense Refill Last Dose   • allopurinol (ZYLOPRIM) 100 MG tablet Take 100 mg by mouth Daily.      • ascorbic acid (VITAMIN C) 500 MG tablet Take 500 mg by mouth Daily.      • aspirin 81 MG EC tablet Take 81 mg by mouth Daily.      • atorvastatin (LIPITOR) 20 MG tablet Take 1 tablet by mouth Every Night. 30 tablet 2    • bisacodyl (Dulcolax) 10 MG suppository Insert 10 mg into the rectum Daily As Needed for Constipation.      • calcium carbonate (TUMS) 500 MG chewable tablet Chew 2 tablets Every Night.      • citalopram (CeleXA) 40 MG tablet Take 40 mg by mouth Daily.      • docusate sodium (COLACE) 100 MG capsule Take 200 mg by mouth As  Needed for Constipation.      • famotidine (PEPCID) 20 MG tablet Take 1 tablet by mouth Daily. 30 tablet 0    • gentamicin (GARAMYCIN) 0.1 % ointment Apply 1 application topically to the appropriate area as directed 3 (Three) Times a Day. AT SITE OF PERITONEAL DIALYSIS CATH      • HYDROcodone-acetaminophen (NORCO) 5-325 MG per tablet Take 1 tablet by mouth Every 8 (Eight) Hours As Needed (pain). 10 tablet 0    • insulin detemir (LEVEMIR) 100 UNIT/ML injection Inject 18 Units under the skin into the appropriate area as directed Daily.      • irbesartan (AVAPRO) 75 MG tablet Take 1 tablet by mouth Daily. 30 tablet 2    • Melatonin 5 MG capsule Take 5 mg by mouth Every Night.      • metoprolol tartrate (LOPRESSOR) 25 MG tablet Take 1 tablet by mouth Every 12 (Twelve) Hours. 60 tablet 2    • Multiple Vitamins-Minerals (MULTIVITAMIN ADULT PO) Take 1 tablet by mouth Daily.      • nitroglycerin (NITROSTAT) 0.4 MG SL tablet 1 under the tongue as needed for angina, may repeat q5mins for up three doses 25 tablet 3    • pantoprazole (PROTONIX) 40 MG EC tablet Take 40 mg by mouth 2 (two) times a day.      • sucralfate (CARAFATE) 1 g tablet Take 1 g by mouth 2 (two) times a day.      • Sucroferric Oxyhydroxide (Velphoro) 500 MG chewable tablet Chew 1 tablet 3 (Three) Times a Day. WITH MEALS      • vitamin D3 125 MCG (5000 UT) capsule capsule Take 1 tablet by mouth Daily.        Allergies:  Codeine, Demerol [meperidine], Levaquin [levofloxacin], Lisinopril, Morphine, Sulfasalazine, and Ultram [tramadol]    Objective      Vital Signs  Temp:  [97.9 °F (36.6 °C)-99.7 °F (37.6 °C)] 99.7 °F (37.6 °C)  Heart Rate:  [65-84] 65  Resp:  [16-20] 16  BP: ()/() 130/57    Physical Exam  Constitutional:       Appearance: She is well-developed.   HENT:      Head: Normocephalic and atraumatic.   Eyes:      Pupils: Pupils are equal, round, and reactive to light.   Neck:      Thyroid: No thyromegaly.      Vascular: No JVD.       Trachea: No tracheal deviation.   Cardiovascular:      Rate and Rhythm: Normal rate and regular rhythm.      Heart sounds: Normal heart sounds. No murmur heard.   No friction rub. No gallop.    Pulmonary:      Effort: Pulmonary effort is normal. No respiratory distress.      Breath sounds: Normal breath sounds. No wheezing or rales.   Chest:      Chest wall: No tenderness.   Abdominal:      General: There is no distension.      Palpations: Abdomen is soft.      Tenderness: There is no abdominal tenderness.   Musculoskeletal:         General: Normal range of motion.      Cervical back: Normal range of motion and neck supple.   Lymphadenopathy:      Cervical: No cervical adenopathy.   Skin:     General: Skin is warm and dry.      Comments: Saphenectomy incision sites on the right lower extremity are healing nicely.  Some residual bruising is noted but the leg is healing nicely.    The sternum is stable. No clicks.  The sternotomy incision is C/D/I.       Neurological:      Mental Status: She is alert and oriented to person, place, and time.      Cranial Nerves: No cranial nerve deficit.         Results Review:   CT Abdomen Pelvis Without Contrast    Result Date: 5/19/2021  Narrative: CT ABDOMEN PELVIS WO CONTRAST- 5/19/2021 4:45 PM CDT  HISTORY: Abdominal distension; E87.70-Fluid overload, unspecified  COMPARISON: None  DOSE LENGTH PRODUCT: 330 mGy cm. Automated exposure control was also utilized to decrease patient radiation dose.  TECHNIQUE: Axial images and pelvis are obtained without IV or oral contrast.  FINDINGS:  There are moderate size bilateral pleural effusions with a small pericardial effusion. Compressive atelectasis of the right middle and lower lobe parenchyma.  The nonenhanced liver, spleen, pancreas, and adrenal glands are unremarkable. There are cholecystectomy clips. Cortical renal atrophy. No abnormal perinephric fluid collection. No hydronephrosis. Peritoneal catheter curled within the lower  pelvis. Only trace fluid seen within the peritoneum. No free intraperitoneal air loculated abscess. No evidence for bowel obstruction. Decompressed colon. Fatty appearing ileocecal valve. Appendix measures 7 to 8 mm in width with no  Appendiceal inflammation. Questionable wall thickening of the appendix. No small bowel dilatation. Decompressed stomach. Postoperative changes of the stomach. Small hiatal hernia.  Diffuse vascular calcifications with no aneurysm.  Stranding of the subcutaneous fat of the anterior abdominal wall likely related to medicinal injections.  Structures osseous lesions.      Impression: 1. Moderate size bilateral pleural effusions with compressive atelectasis of the lung bases. Small pericardial effusion. 2. No evidence for bowel obstruction. Peritoneal catheter curled within the lower pelvis with only trace free fluid in the pelvis. No free air or loculated abscess. 3. Appendix is identified measuring 7 to 8 mm with no periappendiceal inflammation. Questionable wall thickening of the appendix. Clinical correlation for early appendicitis recommended. 4. Cortical renal atrophy. 5. Diffuse vascular calcification. This report was finalized on 05/19/2021 17:01 by Dr. Kimberlee Blankenship MD.    XR Chest 1 View    Result Date: 6/5/2021  Narrative: EXAMINATION: XR CHEST 1 VW- 6/5/2021 4:14 PM CDT  HISTORY: thoracentesis; E87.70-Fluid overload, unspecified; N18.9-Chronic kidney disease, unspecified; N25-Utxecms effusion, not elsewhere classified.  REPORT: A frontal view of the chest was obtained.  COMPARISON: Chest x-ray 6/4/2021, CTA chest 6/5/2021.  There is marked reduction in volume of the right pleural effusion following thoracentesis, no pneumothorax is identified. A small left pleural effusion is unchanged. There is moderate atelectasis in the lung bases greater on the right. Heart size is normal. Previous CABG is noted. The right-sided dialysis catheter appears in good position as before. No other  change.      Impression: Marked reduction in volume of the right pleural effusion following thoracentesis with no pneumothorax. There is residual atelectasis in the lung bases greater on the right. This report was finalized on 06/05/2021 16:15 by Dr. Saji Mullen MD.    XR Chest 1 View    Result Date: 6/4/2021  Narrative: EXAM: XR CHEST 1 VW-  INDICATION: Shortness of air  COMPARISON: 5/19/2020  FINDINGS:  Increased moderate size RIGHT pleural effusion with overlying atelectasis. Small LEFT pleural effusion, slightly decreased, with adjacent atelectasis. Cardiac silhouette is enlarged but stable. Prior median sternotomy. Right-sided CVL with tips overlying the RIGHT atrium. No visible pneumothorax. No suspicious osseous finding.      Impression:  1.  Increased moderate RIGHT pleural effusion with overlying atelectasis. 2.  Slight decrease in LEFT pleural effusion with overlying atelectasis. This report was finalized on 06/04/2021 16:01 by Dr. Sravan Carlisle MD.    XR Chest 1 View    Result Date: 5/19/2021  Narrative: EXAM: XR CHEST 1 VW- 5/19/2021 11:58 AM CDT  HISTORY: SOB x 1 week   COMPARISON: May 5, 2021.  TECHNIQUE: Frontal radiograph of the chest  FINDINGS: Small right lateral pleural fluid collections present. Moderate left pleural fluid collections present.. Cardiac silhouettes upper limits of normal. Wires are present previous median sternotomy. Double lumen right internal jugular catheters present..  The osseous structures and surrounding soft tissues demonstrate no acute abnormality.       Impression: 1. Bilateral pleural effusions again most likely this is due to a systemic fluid overload..   This report was finalized on 05/19/2021 12:08 by Dr. Elmer Thomas MD.    CT Angiogram Chest    Result Date: 6/5/2021  Narrative: EXAMINATION: CT ANGIOGRAM CHEST- 6/5/2021 10:31 AM CDT  HISTORY: pleural effusion -right; E87.70-Fluid overload, unspecified; N18.9-Chronic kidney disease, unspecified; I26-Yyuztia  effusion, not elsewhere classified  DOSE: 400 mGycm (Automatic exposure control technique was implemented in an effort to keep the radiation dose as low as possible without compromising image quality)  REPORT: Spiral CT of the chest was performed after administration of intravenous contrast from the thoracic inlet through the upper abdomen using CTA protocol. Reconstructed coronal and sagittal images were also reviewed.  Comparison: CTA chest 3/11/2021.  The contrast bolus is satisfactory, there is moderate respiratory motion artifact. The central pulmonary arteries are normal caliber without filling defects. There is poor visualization of peripheral pulmonary artery branches due to respiratory motion. Heart size is normal. No evidence of right heart strain is identified. There is a right-sided internal jugular dialysis catheter which appears in satisfactory position. There is a small amount calcified plaque within the thoracic aortic arch with normal aortic caliber. No evidence of aortic dissection. There is previous median sternotomy. There is a large new pleural effusion on the right, with diffuse atelectasis, particularly involving the right middle and lower lobes. This also small new left pleural effusion. Lung windows show no pneumothorax or obvious consolidation to indicate pneumonia. There are nodules in the thyroid gland as well as calcifications. The largest nodule appears cystic, in the left lobe and measures 1.6 cm. There is a moderate-sized hiatal hernia and evidence previous gastric surgery. A small pericardial effusion is present. This is new. The visualized upper abdomen is otherwise unremarkable. Review of bone windows is acutely unremarkable. There is mild displacement of the upper sternotomy with poor healing.      Impression: 1. Limited exam due to excessive respiratory motion, with no gross evidence of central pulmonary emboli. 2. New large pleural effusion on the right, new small left pleural  effusion and new small pericardial effusion. There is extensive passive atelectasis in the right lung, particularly involving the right middle and lower lobes. 3. No pneumothorax is identified. No evidence of intrathoracic lymphadenopathy. 4. Evidence of interval median sternotomy, with poor healing of the sternotomy.     This report was finalized on 06/05/2021 10:39 by Dr. Saji Mullen MD.    XR Chest PA & Lateral    Result Date: 6/6/2021  Narrative: EXAMINATION: XR CHEST PA AND LATERAL- 6/6/2021 3:07 PM CDT  HISTORY: follow up after right thoracentesis; E87.70-Fluid overload, unspecified; N18.9-Chronic kidney disease, unspecified; P25-Qfytyqq effusion, not elsewhere classified.  REPORT: Frontal and lateral views of the chest were obtained.  COMPARISON: Chest x-rays 6/5/2021 1550 hours.  The lungs remain hypoaerated, with moderate pleural effusions, right greater than left, the right pleural effusion appears slightly increased in volume. There is extensive bibasilar atelectasis. No pneumothorax is identified. Heart size is normal. There is evidence of previous median sternotomy. The right-sided dialysis catheter appears in good position as before. No acute osseous abnormality.      Impression: Extensive bibasilar atelectasis with persistent pleural effusions, right greater than left, the right pleural effusion appears slightly increased in overall volume. No other change. This report was finalized on 06/06/2021 15:09 by Dr. Saji Mullen MD.     I reviewed the patient's new clinical results.  Discussed with patient      Assessment/Plan       ESRD on dialysis (CMS/[facility])    Coronary artery disease involving native coronary artery of native heart with angina pectoris (CMS/[facility])    Type 2 diabetes mellitus, without long-term current use of insulin (CMS/[facility])    Chronic anemia    Hypervolemia    Persistent dry cough    Pleural effusion on right        Discussed with patient's findings and recommendations with both the  patient and with Dr. Perez.  She has a recurrent right pleural effusion.  I believe her best course of care would be to place a large bore chest tube given the reported sanguinity of pleural effusion.  At this time, I discussed the operative procedure and she expressed much concern as such we will plan for a MAC with anesthesiology for placement ultrasound-guided right large bore chest tube placement.  Risks, benefits, and alternatives of the procedure were discussed at length.  The operative conduct discussed at length.  Plan for this in the a.m.  She is to be made n.p.o. after midnight.    All questions been answered to the best my ability and she is agreeable to proceed forward.

## 2021-06-07 NOTE — NURSING NOTE
"[facility] only pulled off 175 ml today per HD nurse report.  Patient is \"weak and worn out\" She is currently NPO for large bore CT placement this morning with Dr Wood  "

## 2021-06-07 NOTE — PROGRESS NOTES
[facility] Medicine Services  INPATIENT PROGRESS NOTE    Patient Name: Jennifer Salcido  Date of Admission: 6/4/2021  Today's Date: 06/06/21  Length of Stay: 2  Primary Care Physician: Cristian Hernandez MD    Subjective   Chief Complaint: shortness of breath  Shortness of Breath  Pertinent negatives include no abdominal pain, chest pain, fever or vomiting.        Patient seen and examined at bedside.      Much improved.  Shortness of breath improved.       Review of Systems   Constitutional: Negative for activity change, appetite change, chills and fever.   Respiratory: Positive for cough and shortness of breath.    Cardiovascular: Negative for chest pain and palpitations.   Gastrointestinal: Negative for abdominal distention, abdominal pain, diarrhea, nausea and vomiting.        All pertinent negatives and positives are as above. All other systems have been reviewed and are negative unless otherwise stated.     Objective    Temp:  [97.8 °F (36.6 °C)-98.7 °F (37.1 °C)] 97.9 °F (36.6 °C)  Heart Rate:  [69-84] 84  Resp:  [18-20] 18  BP: (109-157)/(54-75) 157/75  Physical Exam  Vitals and nursing note reviewed.   Constitutional:       Appearance: Normal appearance. She is ill-appearing (chronically).   HENT:      Head: Normocephalic and atraumatic.      Nose: No congestion or rhinorrhea.      Mouth/Throat:      Mouth: Mucous membranes are dry.      Pharynx: Oropharynx is clear.   Eyes:      General: No scleral icterus.     Conjunctiva/sclera: Conjunctivae normal.   Cardiovascular:      Rate and Rhythm: Normal rate and regular rhythm.   Pulmonary:      Effort: Pulmonary effort is normal.      Breath sounds: Normal breath sounds.      Comments: Diminished bilateral bases  Abdominal:      General: Abdomen is flat. Bowel sounds are normal. There is no distension.      Palpations: Abdomen is soft. There is no mass.   Skin:     General: Skin is warm.      Coloration: Skin is pale. Skin is not  jaundiced.   Neurological:      General: No focal deficit present.      Mental Status: She is alert and oriented to person, place, and time.   Psychiatric:         Mood and Affect: Mood normal.         Behavior: Behavior normal.             Results Review:  I have reviewed the labs, radiology results, and diagnostic studies.    Laboratory Data:   Results from last 7 days   Lab Units 06/05/21  0440 06/04/21  1233   WBC 10*3/mm3 8.76 8.91   HEMOGLOBIN g/dL 10.2* 10.8*   HEMATOCRIT % 32.0* 33.0*   PLATELETS 10*3/mm3 354 508*        Results from last 7 days   Lab Units 06/05/21  0440 06/04/21  1233   SODIUM mmol/L 136 142   POTASSIUM mmol/L 3.9 3.5   CHLORIDE mmol/L 96* 99   CO2 mmol/L 29.0 26.0   BUN mg/dL 26* 46*   CREATININE mg/dL 6.17* 8.96*   CALCIUM mg/dL 8.7 9.4   BILIRUBIN mg/dL  --  0.3   ALK PHOS U/L  --  122*   ALT (SGPT) U/L  --  6   AST (SGOT) U/L  --  17   GLUCOSE mg/dL 62* 125*       Culture Data:   No results found for: BLOODCX, URINECX, WOUNDCX, MRSACX, RESPCX, STOOLCX    Radiology Data:   Imaging Results (Last 24 Hours)     Procedure Component Value Units Date/Time    XR Chest PA & Lateral [[id number]] Collected: 06/06/21 1507     Updated: 06/06/21 1512    Narrative:      EXAMINATION: XR CHEST PA AND LATERAL- 6/6/2021 3:07 PM CDT     HISTORY: follow up after right thoracentesis; E87.70-Fluid overload,  unspecified; N18.9-Chronic kidney disease, unspecified; B26-Pohplau  effusion, not elsewhere classified.     REPORT: Frontal and lateral views of the chest were obtained.     COMPARISON: Chest x-rays 6/5/2021 1550 hours.     The lungs remain hypoaerated, with moderate pleural effusions, right  greater than left, the right pleural effusion appears slightly increased  in volume. There is extensive bibasilar atelectasis. No pneumothorax is  identified. Heart size is normal. There is evidence of previous median  sternotomy. The right-sided dialysis catheter appears in good position  as before. No acute  "osseous abnormality.       Impression:      Extensive bibasilar atelectasis with persistent pleural  effusions, right greater than left, the right pleural effusion appears  slightly increased in overall volume. No other change.  This report was finalized on 06/06/2021 15:09 by Dr. Saji Mullen MD.          I have reviewed the patient's current medications.     Assessment/Plan     Active Hospital Problems    Diagnosis    • Persistent dry cough    • Pleural effusion on right    • Hypervolemia    • Chronic anemia    • Type 2 diabetes mellitus, without long-term current use of insulin (CMS/[facility])    • ESRD on dialysis (CMS/[facility])    • Coronary artery disease involving native coronary artery of native heart with angina pectoris (CMS/[facility])        Plan:  Patient admitted for continued shortness of breath and volume overload.  Patient had recently switched from HD back to PD.  She indicates that on her PD she used some of the yellow bags as she felt \"dehydrated\" and used the lighter bags.    Nephrology consult for dialysis.  Will discuss with Dr. Hernandez about conitnuing HD to pull.  Only pulled 1.4 L 6/5 due to hypotension.    Right sided pleural effusion patient is acutely short of breath and it has been worsening.  Right-sided thoracentesis performed by Dr. Perez on 6/5 1.25 L removed.  Significantly elevated RBCs.  Suspected it would have been transfudating but appears exudative.    Light's Criteria for Exudative Effusions - MDCalc  Calculated on Jun 06 2021 10:18 PM  At least one of Light’s Criteria has been met; according to one study, this was 98% sensitive for exudative effusion    Consult CT surgery for further management of pleural effusion.    Two-view chest x-ray to further evaluate effusion    Resume home medications as appropriate.          Discharge Planning: I expect the patient to be discharged to home in 1-2 days.    Electronically signed by Feng Perez MD, 06/06/21, 21:30 CDT.  "

## 2021-06-07 NOTE — PLAN OF CARE
Goal Outcome Evaluation:  Plan of Care Reviewed With: patient  Progress: no change  Outcome Summary: No c/o pain. Pt. did have an episode of feeling sick and lightheaded last night, when arrived to pt. room she was in the bathroom on the toilet. After checking her BG twice with normal levels her BP was checked which showed 50's/30's. I explained to her we needed to move to the bed, pt. was placed in a wheelchair from the toilet but when trying to transfer her from wheelchair to bed she went unresponsive for a few seconds and I lowered her to the ground. An RRT was then called, see code documentation. BP improved however patient continued to vomit. Stat labs and Xrays were completed, see results. Pt. has remained stable the rest of night and states she feels better this AM. Periteneal dialysis completed through night. NPO since midnight for possible CT placement today??

## 2021-06-07 NOTE — PROGRESS NOTES
Nephrology ([facility]) Progress Note      Patient:  Jennifer Salcido  YOB: 1955  Date of Service: 6/7/2021  MRN: [id number]   Acct: [id number]   Primary Care Physician: Cristian Hernandez MD  Advance Directive:   Code Status and Medical Interventions:   Ordered at: 06/04/21 1722     Level Of Support Discussed With:    Patient     Code Status:    CPR     Medical Interventions (Level of Support Prior to Arrest):    Full     Admit Date: 6/4/2021       Hospital Day: 3  Referring Provider: No ref. provider found      Patient personally seen and examined.  Complete chart including Consults, Notes, Operative Reports, Labs, Cardiology, and Radiology studies reviewed as able.    Chief complaint: End-stage renal disease/shortness of breath    Subjective:    Jennifer Salcido is a 65 y.o. female  whom we were consulted for end-stage renal disease.  He has end-stage renal disease secondary to diabetic nephropathy, currently on CCPD.  She was recently admitted, underwent elective open heart surgery and converted to hemodialysis.  However she went back to CCPD.  Presenting with increasing shortness of breath.  CT angiogram in the emergency room was consistent with negative study for pulmonary embolism.  However she was found to have a new onset of right sided large pleural effusion.  Patient has received emergent hemodialysis treatment for correction of volume status.    On June 5, she underwent large right-sided pleurocentesis.    Patient is now scheduled for thoracocentesis.  She tolerated well her peritoneal dialysis last night but her ultrafiltration was poor.    Allergies:  Codeine, Demerol [meperidine], Levaquin [levofloxacin], Lisinopril, Morphine, Sulfasalazine, and Ultram [tramadol]    Home Meds:  Medications Prior to Admission   Medication Sig Dispense Refill Last Dose   • allopurinol (ZYLOPRIM) 100 MG tablet Take 100 mg by mouth Daily.      • ascorbic acid (VITAMIN C) 500 MG tablet Take  500 mg by mouth Daily.      • aspirin 81 MG EC tablet Take 81 mg by mouth Daily.      • atorvastatin (LIPITOR) 20 MG tablet Take 1 tablet by mouth Every Night. 30 tablet 2    • bisacodyl (Dulcolax) 10 MG suppository Insert 10 mg into the rectum Daily As Needed for Constipation.      • calcium carbonate (TUMS) 500 MG chewable tablet Chew 2 tablets Every Night.      • citalopram (CeleXA) 40 MG tablet Take 40 mg by mouth Daily.      • docusate sodium (COLACE) 100 MG capsule Take 200 mg by mouth As Needed for Constipation.      • famotidine (PEPCID) 20 MG tablet Take 1 tablet by mouth Daily. 30 tablet 0    • gentamicin (GARAMYCIN) 0.1 % ointment Apply 1 application topically to the appropriate area as directed 3 (Three) Times a Day. AT SITE OF PERITONEAL DIALYSIS CATH      • HYDROcodone-acetaminophen (NORCO) 5-325 MG per tablet Take 1 tablet by mouth Every 8 (Eight) Hours As Needed (pain). 10 tablet 0    • insulin detemir (LEVEMIR) 100 UNIT/ML injection Inject 18 Units under the skin into the appropriate area as directed Daily.      • irbesartan (AVAPRO) 75 MG tablet Take 1 tablet by mouth Daily. 30 tablet 2    • Melatonin 5 MG capsule Take 5 mg by mouth Every Night.      • metoprolol tartrate (LOPRESSOR) 25 MG tablet Take 1 tablet by mouth Every 12 (Twelve) Hours. 60 tablet 2    • Multiple Vitamins-Minerals (MULTIVITAMIN ADULT PO) Take 1 tablet by mouth Daily.      • nitroglycerin (NITROSTAT) 0.4 MG SL tablet 1 under the tongue as needed for angina, may repeat q5mins for up three doses 25 tablet 3    • pantoprazole (PROTONIX) 40 MG EC tablet Take 40 mg by mouth 2 (two) times a day.      • sucralfate (CARAFATE) 1 g tablet Take 1 g by mouth 2 (two) times a day.      • Sucroferric Oxyhydroxide (Velphoro) 500 MG chewable tablet Chew 1 tablet 3 (Three) Times a Day. WITH MEALS      • vitamin D3 125 MCG (5000 UT) capsule capsule Take 1 tablet by mouth Daily.          Medicines:  Current Facility-Administered Medications    Medication Dose Route Frequency Provider Last Rate Last Admin   • [MAR Hold] acetaminophen (TYLENOL) tablet 650 mg  650 mg Oral Q6H PRN Cathleen Lord, APRN        Or   • [MAR Hold] acetaminophen (TYLENOL) 160 MG/5ML solution 650 mg  650 mg Oral Q6H PRN Cathleen Lord, APRN        Or   • [MAR Hold] acetaminophen (TYLENOL) suppository 650 mg  650 mg Rectal Q6H PRN Cathleen Lord, APRN       • [MAR Hold] allopurinol (ZYLOPRIM) tablet 100 mg  100 mg Oral Daily Cathleen Lord, APRN   100 mg at 06/06/21 0952   • [MAR Hold] ascorbic acid (VITAMIN C) tablet 500 mg  500 mg Oral Daily Cathleen Lord, APRN   500 mg at 06/06/21 0952   • [MAR Hold] aspirin EC tablet 81 mg  81 mg Oral Daily Cathleen Lord, APRN   81 mg at 06/06/21 0952   • [MAR Hold] atorvastatin (LIPITOR) tablet 20 mg  20 mg Oral Nightly Cathleen Lord, APRN   20 mg at 06/06/21 2105   • [MAR Hold] benzonatate (TESSALON) capsule 200 mg  200 mg Oral Q4H PRN Cathleen Lord, APRN       • [MAR Hold] bisacodyl (DULCOLAX) suppository 10 mg  10 mg Rectal Daily PRN Cathleen Lord, APRN       • [MAR Hold] calcium carbonate (TUMS) chewable tablet 500 mg (200 mg elemental)  2 tablet Oral Nightly Cathleen Lord, APRN   2 tablet at 06/06/21 2105   • [MAR Hold] citalopram (CeleXA) tablet 40 mg  40 mg Oral Nightly Feng Perez MD   40 mg at 06/06/21 2105   • dextrose (D50W) 25 g/ 50mL Intravenous Solution 12.5 g  12.5 g Intravenous PRN Philip Villatoro MD       • [MAR Hold] dextrose (D50W) 25 g/ 50mL Intravenous Solution 25 g  25 g Intravenous Q15 Min PRN Cathleen Lord, APRN       • [MAR Hold] dextrose (GLUTOSE) oral gel 15 g  15 g Oral Q15 Min PRN Cathleen Lord, APRN       • [MAR Hold] diphenhydrAMINE (BENADRYL) capsule 25 mg  25 mg Oral Q4H PRN Cristian Henrandez MD        Or   • [MAR Hold] diphenhydrAMINE (BENADRYL) injection 25 mg  25 mg Intravenous Q4H PRN Cristian Hernandez MD       • [MAR Hold] docusate sodium (COLACE)  capsule 200 mg  200 mg Oral Daily PRN Feng Perez MD       • famotidine (PEPCID) tablet 20 mg  20 mg Oral Nightly Cathleen Lord, APRN   20 mg at 06/06/21 2105   • fentaNYL citrate (PF) (SUBLIMAZE) injection 25 mcg  25 mcg Intravenous Q5 Min PRN Philip Villatoro MD       • [MAR Hold] glucagon (human recombinant) (GLUCAGEN DIAGNOSTIC) injection 1 mg  1 mg Subcutaneous Q15 Min PRN Cathleen Lord, APRN       • [MAR Hold] guaiFENesin (MUCINEX) 12 hr tablet 600 mg  600 mg Oral Q12H Cathleen Lord, APRN   600 mg at 06/06/21 2105   • [MAR Hold] HYDROcodone-acetaminophen (NORCO) 5-325 MG per tablet 1 tablet  1 tablet Oral Q8H PRN Cathleen Lord, APRN       • [MAR Hold] insulin detemir (LEVEMIR) injection 10 Units  10 Units Subcutaneous Daily Cathleen Lord, APRN       • [MAR Hold] insulin lispro (humaLOG) injection 0-9 Units  0-9 Units Subcutaneous 4x Daily With Meals & Nightly Cathleen Lord, APRN   6 Units at 06/06/21 2105   • lactated ringers infusion  9 mL/hr Intravenous Continuous Philip Villatoro MD       • lidocaine PF 1% (XYLOCAINE) injection 0.5 mL  0.5 mL Intradermal Once PRN Antony Wood MD       • lidocaine PF 1% (XYLOCAINE) injection 0.5 mL  0.5 mL Injection Once PRN Philip Villatoro MD       • losartan (COZAAR) tablet 25 mg  25 mg Oral Q24H Cathleen Lord, APRN   25 mg at 06/06/21 0952   • [MAR Hold] melatonin tablet 3 mg  3 mg Oral Nightly Cathleen Lord, APRN   3 mg at 06/06/21 2105   • metoprolol tartrate (LOPRESSOR) tablet 25 mg  25 mg Oral Q12H Cathleen Lord, APRN   25 mg at 06/06/21 2105   • [MAR Hold] multivitamin with minerals 1 tablet  1 tablet Oral Daily Cathleen Lord, APRN   1 tablet at 06/06/21 0952   • [MAR Hold] nitroglycerin (NITROSTAT) SL tablet 0.4 mg  0.4 mg Sublingual Q5 Min PRN Cathleen Lord, APRN       • [MAR Hold] ondansetron (ZOFRAN) tablet 4 mg  4 mg Oral Q6H PRN Cathleen Lord, APRN        Or   • [MAR  Hold] ondansetron (ZOFRAN) injection 4 mg  4 mg Intravenous Q6H PRN Cathleen Lord, APRN   4 mg at 06/07/21 0023   • [MAR Hold] ondansetron (ZOFRAN) injection 4 mg  4 mg Intravenous Q2H PRN Cristian Hernandez MD   4 mg at 06/04/21 2311   • [MAR Hold] sodium chloride 0.9 % bolus 100 mL  100 mL Intravenous PRN Cristian Hernandez MD       • [MAR Hold] sodium chloride 0.9 % flush 10 mL  10 mL Intravenous Q12H Cathleen Lord, APRN   10 mL at 06/06/21 2106   • [MAR Hold] sodium chloride 0.9 % flush 10 mL  10 mL Intravenous PRN Cathleen Lord, APRN       • sodium chloride 0.9 % flush 10 mL  10 mL Intravenous Q12H Philip Villatoro MD       • sodium chloride 0.9 % flush 10 mL  10 mL Intravenous PRN Philip Villatoro MD       • sodium chloride 0.9 % flush 3 mL  3 mL Intravenous PRN Antony Wood MD       • sodium chloride 0.9 % infusion 500 mL  500 mL Intravenous Continuous Antony Wood MD 25 mL/hr at 06/07/21 1453 500 mL at 06/07/21 1453   • [MAR Hold] sucralfate (CARAFATE) tablet 1 g  1 g Oral BID AC Cathleen Lord, APRN   1 g at 06/07/21 0547       Past Medical History:  Past Medical History:   Diagnosis Date   • Arthritis     LEFT SHOULDER   • CHF (congestive heart failure) (CMS/[facility])    • Collar bone fracture 12/2020    SLING PLACED TO HEAL   • Coronary artery disease     LEFT MAIN AND 3 OTHER AREAS--CABG SCHEDULED FOR APRIL 22, 2021   • Elevated cholesterol    • End stage kidney disease (CMS/[facility])     currently HD (5/19/2021), hopes to return to PD    • GERD (gastroesophageal reflux disease)    • Gout    • History of transfusion    • Hyperlipidemia    • Hypertension    • Peritoneal dialysis status (CMS/HCC)     EVERY NIGHT FOR 10 HOURS, (5/19/2021 currently on hold)   • PONV (postoperative nausea and vomiting)    • Sleep apnea     RESOLVED   • Type 2 diabetes mellitus with kidney complication, with long-term current use of insulin (CMS/HCC) 11/2/2018       Past Surgical History:  Past  Surgical History:   Procedure Laterality Date   • BREAST BIOPSY Right     FATTY TUMOR   • CARDIAC CATHETERIZATION N/A 10/1/2019    Procedure: Left Heart Cath;  Surgeon: Srikanth Coker MD;  Location:  [facility] CATH INVASIVE LOCATION;  Service: Cardiology   • CARDIAC CATHETERIZATION N/A 7/14/2020    Procedure: Left Heart Cath;  Surgeon: Srikanth Coker MD;  Location:  [facility] CATH [facility]VAS[facility];  Service: Cardiology;  Laterality: N/A;   • CARDIAC CATHETERIZATION N/A 3/23/2021    Procedure: Left Heart Cath;  Surgeon: Srikanth Coker MD;  Location:  [facility] CATH INVASIVE [facility];  Service: Cardiology;  Laterality: N/A;   • CORONARY ANGIOPLASTY WITH STENT PLACEMENT  2016    X 2   • CORONARY ARTERY BYPASS GRAFT N/A 4/22/2021    Procedure: CORONARY ARTERY BYPASS GRAFT X 3 WITH LEFT INTERNAL MAMMARY ARTERY, RIGHT LOWER EXTREMITY ENDOSCOPIC VEIN HARVEST, AND PERFUSION; TRANSESOPHAGEAL ECHOCARDIOGRAM;  Surgeon: Antony Wood MD;  Location: [facility] OR;  Service: Cardiothoracic;  Laterality: N/A;   • EYE SURGERY Bilateral     IOC LENS IMPLANTS   • EYE SURGERY Bilateral     RETINAL SURGERY   • GALLBLADDER SURGERY     • GASTRIC BYPASS     • HYSTERECTOMY     • INSERTION HEMODIALYSIS CATHETER Right 4/1/2021    Procedure: HEMODIALYSIS CATHETER INSERTION;  Surgeon: Simon Coates MD;  Location: [facility] HYBRID OR 12;  Service: Vascular;  Laterality: Right;   • INSERTION PERITONEAL DIALYSIS CATHETER     • OOPHORECTOMY Bilateral    • SINUS SURGERY     • SINUS SURGERY  07/1980   • TUBAL ABDOMINAL LIGATION         Family History  Family History   Problem Relation Age of Onset   • Hypertension Mother    • Lung disease Mother    • Heart disease Father    • Diabetes Father    • Abnormal EKG Father    • Breast cancer Neg Hx        Social History  Social History     Socioeconomic History   • Marital status:      Spouse name: Not on file   • Number of children: Not on file   • Years of education: Not on file   • Highest  education level: Not on file   Tobacco Use   • Smoking status: Former Smoker     Packs/day: 0.00     Years: 2.00     Pack years: 0.00     Types: Cigarettes     Quit date:      Years since quittin.4   • Smokeless tobacco: Never Used   Vaping Use   • Vaping Use: Never used   Substance and Sexual Activity   • Alcohol use: Not Currently     Comment: haven't drank in 3 years ( 2018)    • Drug use: No   • Sexual activity: Defer         Review of Systems:  History obtained from chart review and the patient  General ROS: No fever or chills  Respiratory ROS: No cough, shortness of breath, wheezing  Cardiovascular ROS: No chest pain or palpitations  Gastrointestinal ROS: No abdominal pain or melena  Genito-Urinary ROS: No dysuria or hematuria    Objective:  Patient Vitals for the past 24 hrs:   BP Temp Temp src Pulse Resp SpO2 Weight   21 1513 -- -- -- 73 -- 100 % --   21 1125 134/58 97.9 °F (36.6 °C) Oral 74 16 100 % --   21 0752 117/53 98.6 °F (37 °C) Oral 70 16 100 % 67.1 kg (148 lb)   21 0500 130/57 99.7 °F (37.6 °C) Oral 65 16 100 % --   21 0048 130/58 -- -- -- -- -- --   21 0031 128/55 -- -- 70 -- 99 % --   21 0026 (!) 139/117 -- -- 71 -- 92 % --   21 0024 -- -- -- -- -- 95 % --   21 0021 149/92 -- -- 74 -- -- --   21 0017 166/65 -- -- 76 -- -- --   21 0016 146/96 -- -- -- -- -- --   21 0007 (!) 56/34 -- -- -- -- -- --   21 2300 119/53 98.6 °F (37 °C) Oral 75 16 96 % --   21 2044 157/75 97.9 °F (36.6 °C) Oral 84 18 95 % --   21 1624 149/71 98.3 °F (36.8 °C) Oral 69 20 100 % --       Intake/Output Summary (Last 24 hours) at 2021 1606  Last data filed at 2021 0752  Gross per 24 hour   Intake 240 ml   Output 175 ml   Net 65 ml     General: awake/alert   HEENT: Normocephalic atraumatic head  Chest:  clear to auscultation bilaterally without respiratory distress  CVS: regular rate and rhythm  Abdominal: soft, nontender,  positive bowel sounds  Extremities: no cyanosis or edema  Skin: warm and dry without rash      Labs:  Results from last 7 days   Lab Units 06/07/21  0036 06/05/21  0440 06/04/21  1233   WBC 10*3/mm3 8.77 8.76 8.91   HEMOGLOBIN g/dL 10.2* 10.2* 10.8*   HEMATOCRIT % 31.2* 32.0* 33.0*   PLATELETS 10*3/mm3 324 354 508*         Results from last 7 days   Lab Units 06/07/21  0036 06/05/21  0440 06/04/21  1233   SODIUM mmol/L 133* 136 142   POTASSIUM mmol/L 3.5 3.9 3.5   CHLORIDE mmol/L 97* 96* 99   CO2 mmol/L 20.0* 29.0 26.0   BUN mg/dL 29* 26* 46*   CREATININE mg/dL 6.13* 6.17* 8.96*   CALCIUM mg/dL 8.5* 8.7 9.4   BILIRUBIN mg/dL 0.3  --  0.3   ALK PHOS U/L 119*  --  122*   ALT (SGPT) U/L 7  --  6   AST (SGOT) U/L 17  --  17   GLUCOSE mg/dL 147* 62* 125*       Radiology:   Imaging Results (Last 72 Hours)     Procedure Component Value Units Date/Time    XR Abdomen KUB [[id number]] Collected: 06/07/21 0745     Updated: 06/07/21 0751    Narrative:      EXAMINATION: XR ABDOMEN KUB- 6/7/2021 7:45 AM CDT     HISTORY: Vomiting; E87.70-Fluid overload, unspecified; N18.9-Chronic  kidney disease, unspecified; C07-Dzdtjsq effusion, not elsewhere  classified; T60-Vaiysya effusion, not elsewhere classified.     REPORT: A supine view of the abdomen was obtained.     COMPARISON: CT abdomen pelvis without contrast 5/19/2021.     There is a paucity of bowel gas overall, under stool volume is noted at  the rectum. A peritoneal dialysis catheter is coiled in the left pelvis.  Cholecystectomy clips are present. There is heavy atherosclerotic  calcification within the aorta and its branches. Mild levoscoliosis of  the lumbar spine.       Impression:      There is a paucity of bowel gas overall, which is  nonspecific, moderate stool volume is noted at the rectum. A left pelvic  peritoneal dialysis catheter is present.  This report was finalized on 06/07/2021 07:48 by Dr. Saji Mullen MD.    XR Chest 1 View [[id number]] Collected: 06/07/21  0744     Updated: 06/07/21 0748    Narrative:      EXAMINATION: XR CHEST 1 VW- 6/7/2021 7:44 AM CDT     HISTORY: RRT; E87.70-Fluid overload, unspecified; N18.9-Chronic kidney  disease, unspecified; V56-Moajxfy effusion, not elsewhere classified;  D55-Tqvyvah effusion, not elsewhere classified.     REPORT: A frontal view of the chest was obtained.     COMPARISON: Chest x-rays 6/6/2021 1458 hours.     The lungs are grossly hypoaerated and the patient is rotated to the  left. There is central vascular crowding, the cardiac margins are  partially obscured without obvious cardiomegaly. Bilateral pleural  effusions are noted, right greater than left and appears stable. No  pneumothorax is identified. The right-sided dialysis catheter appears  unchanged in position.       Impression:      Gross hypoaeration of the lungs with stable bilateral  pleural effusions, right greater than left. Increased central markings  are noted, likely exaggerated by the low lung volumes. Correlate  clinically for the possibility of interstitial edema and mild volume  overload.  This report was finalized on 06/07/2021 07:45 by Dr. Saji Mullen MD.    XR Chest PA & Lateral [[id number]] Collected: 06/06/21 1507     Updated: 06/06/21 1512    Narrative:      EXAMINATION: XR CHEST PA AND LATERAL- 6/6/2021 3:07 PM CDT     HISTORY: follow up after right thoracentesis; E87.70-Fluid overload,  unspecified; N18.9-Chronic kidney disease, unspecified; O83-Dopmotj  effusion, not elsewhere classified.     REPORT: Frontal and lateral views of the chest were obtained.     COMPARISON: Chest x-rays 6/5/2021 1550 hours.     The lungs remain hypoaerated, with moderate pleural effusions, right  greater than left, the right pleural effusion appears slightly increased  in volume. There is extensive bibasilar atelectasis. No pneumothorax is  identified. Heart size is normal. There is evidence of previous median  sternotomy. The right-sided dialysis catheter appears  in good position  as before. No acute osseous abnormality.       Impression:      Extensive bibasilar atelectasis with persistent pleural  effusions, right greater than left, the right pleural effusion appears  slightly increased in overall volume. No other change.  This report was finalized on 06/06/2021 15:09 by Dr. Saji Mullen MD.    XR Chest 1 View [[id number]] Collected: 06/05/21 1614     Updated: 06/05/21 1618    Narrative:      EXAMINATION: XR CHEST 1 VW- 6/5/2021 4:14 PM CDT     HISTORY: thoracentesis; E87.70-Fluid overload, unspecified;  N18.9-Chronic kidney disease, unspecified; G95-Xqnlffd effusion, not  elsewhere classified.     REPORT: A frontal view of the chest was obtained.     COMPARISON: Chest x-ray 6/4/2021, CTA chest 6/5/2021.     There is marked reduction in volume of the right pleural effusion  following thoracentesis, no pneumothorax is identified. A small left  pleural effusion is unchanged. There is moderate atelectasis in the lung  bases greater on the right. Heart size is normal. Previous CABG is  noted. The right-sided dialysis catheter appears in good position as  before. No other change.       Impression:      Marked reduction in volume of the right pleural effusion  following thoracentesis with no pneumothorax. There is residual  atelectasis in the lung bases greater on the right.  This report was finalized on 06/05/2021 16:15 by Dr. Saji Mullen MD.    CT Angiogram Chest [[id number]] Collected: 06/05/21 1031     Updated: 06/05/21 1042    Narrative:      EXAMINATION: CT ANGIOGRAM CHEST- 6/5/2021 10:31 AM CDT     HISTORY: pleural effusion -right; E87.70-Fluid overload, unspecified;  N18.9-Chronic kidney disease, unspecified; S70-Yclvicd effusion, not  elsewhere classified     DOSE: 400 mGycm (Automatic exposure control technique was implemented in  an effort to keep the radiation dose as low as possible without  compromising image quality)     REPORT: Spiral CT of the chest was  performed after administration of  intravenous contrast from the thoracic inlet through the upper abdomen  using CTA protocol. Reconstructed coronal and sagittal images were also  reviewed.     Comparison: CTA chest 3/11/2021.     The contrast bolus is satisfactory, there is moderate respiratory motion  artifact. The central pulmonary arteries are normal caliber without  filling defects. There is poor visualization of peripheral pulmonary  artery branches due to respiratory motion. Heart size is normal. No  evidence of right heart strain is identified. There is a right-sided  internal jugular dialysis catheter which appears in satisfactory  position. There is a small amount calcified plaque within the thoracic  aortic arch with normal aortic caliber. No evidence of aortic  dissection. There is previous median sternotomy. There is a large new  pleural effusion on the right, with diffuse atelectasis, particularly  involving the right middle and lower lobes. This also small new left  pleural effusion. Lung windows show no pneumothorax or obvious  consolidation to indicate pneumonia. There are nodules in the thyroid  gland as well as calcifications. The largest nodule appears cystic, in  the left lobe and measures 1.6 cm. There is a moderate-sized hiatal  hernia and evidence previous gastric surgery. A small pericardial  effusion is present. This is new. The visualized upper abdomen is  otherwise unremarkable. Review of bone windows is acutely unremarkable.  There is mild displacement of the upper sternotomy with poor healing.       Impression:      1. Limited exam due to excessive respiratory motion, with no gross  evidence of central pulmonary emboli.  2. New large pleural effusion on the right, new small left pleural  effusion and new small pericardial effusion. There is extensive passive  atelectasis in the right lung, particularly involving the right middle  and lower lobes.  3. No pneumothorax is identified. No  evidence of intrathoracic  lymphadenopathy.  4. Evidence of interval median sternotomy, with poor healing of the  sternotomy.              This report was finalized on 06/05/2021 10:39 by Dr. Saji Mullen MD.          Culture:  No results found for: BLOODCX, URINECX, WOUNDCX, MRSACX, RESPCX, STOOLCX      Assessment   1.  End-stage renal disease on maintenance dialysis.  2.  Type II diabetic nephropathy.  3.  Right pleural effusion/pleurocentesis.  4.  Anemia of chronic kidney disease.  5.  Recent history of CABG.    Plan:  We will hold on peritoneal dialysis tonight.  Patient appears to be somewhat dry and her ultrafiltration has been minimal.  If she remains stable after thoracocentesis, we will resume her PD tomorrow night.  Follow-up labs.      Davon Stallings MD  6/7/2021  16:06 CDT

## 2021-06-08 NOTE — PROGRESS NOTES
[facility] Medicine Services  INPATIENT PROGRESS NOTE    Length of Stay: 4  Date of Admission: 6/4/2021  Primary Care Physician: Cristian Hernandez MD    Subjective     Chief Complaint:     Shortness of breath    HPI     The patient is doing well today after chest tube placement yesterday.  Chest tube is attached to wall suction.  She does complain of some mild to moderate right chest discomfort secondary to the presence of the chest tube.  Saturations are good on room air and she seems to be breathing better.  She has had 150 cc out of the chest tube in the past 24 hours.  CT surgery continues to follow and manage the chest tube.  Dr. Stallings is also following and managing fluid balance and PD.  Peritoneal dialysis will resume tonight.  A.m. labs have been ordered.      Review of Systems     All pertinent negatives and positives are as above. All other systems have been reviewed and are negative unless otherwise stated.     Objective    Temp:  [97.6 °F (36.4 °C)-99.4 °F (37.4 °C)] 98 °F (36.7 °C)  Heart Rate:  [58-95] 63  Resp:  [16-18] 16  BP: (112-147)/(48-71) 147/71    Lab Results (last 24 hours)     Procedure Component Value Units Date/Time    POC Glucose Once [[id number]]  (Abnormal) Collected: 06/08/21 1606    Specimen: Blood Updated: 06/08/21 1656     Glucose 152 mg/dL      Comment: : [id number] Raul JessMeter ID: [id number]       POC Glucose Once [[id number]]  (Abnormal) Collected: 06/08/21 1157    Specimen: Blood Updated: 06/08/21 1209     Glucose 160 mg/dL      Comment: : [id number] Raul JessMeter ID: [id number]       POC Glucose Once [[id number]]  (Abnormal) Collected: 06/08/21 0745    Specimen: Blood Updated: 06/08/21 0756     Glucose 149 mg/dL      Comment: : [id number] Raul JessMeter ID: [id number]       Anaerobic Culture - Pleural Fluid, Pleural Cavity [[id number]] Collected: 06/05/21 1619    Specimen: Pleural Fluid from Pleural Cavity Updated: 06/08/21  0639     Anaerobic Culture No anaerobes isolated at 3 days    Body Fluid Culture - Body Fluid, Pleural Cavity [[id number]] Collected: 06/05/21 1619    Specimen: Body Fluid from Pleural Cavity Updated: 06/08/21 0512     Body Fluid Culture No growth at 3 days     Gram Stain Moderate (3+) WBCs seen      No organisms seen          Imaging Results (Last 24 Hours)     Procedure Component Value Units Date/Time    XR Chest 1 View [[id number]] Collected: 06/08/21 0717     Updated: 06/08/21 0721    Narrative:      EXAMINATION: XR CHEST 1 VW- 6/8/2021 7:17 AM CDT     HISTORY: pleural effusion; E87.70-Fluid overload, unspecified;  N18.9-Chronic kidney disease, unspecified; F10-Rstjyyg effusion, not  elsewhere classified; I70-Uqzlqzm effusion, not elsewhere classified.     REPORT: A frontal view the chest was obtained.     COMPARISON: Chest x-ray 6/7/2021 1732 hours.     There is persistent and increased consolidation of the left lung base  suspicious for pneumonia and probable combined atelectasis. There is a  right basilar chest tube which appears in satisfactory position. The  right internal jugular central venous catheter remains in satisfactory  position. No pneumothorax is identified. There is borderline  cardiomegaly and evidence of previous CABG, without overt CHF.  Underlying COPD is evident. The osseous structures are unremarkable.       Impression:      Increasing consolidation of the left lung base concerning  for pneumonia versus atelectasis with a small effusion. The right  basilar chest tube remains in satisfactory position. No other change.  This report was finalized on 06/08/2021 07:18 by Dr. Saji Mullen MD.             Intake/Output Summary (Last 24 hours) at 6/8/2021 1801  Last data filed at 6/8/2021 1715  Gross per 24 hour   Intake 600 ml   Output 105 ml   Net 495 ml       Physical Exam  Constitutional:       Appearance: Normal appearance.   HENT:      Head: Normocephalic and atraumatic.      Nose: No  congestion or rhinorrhea.      Mouth: Mucous membranes are moist.      Pharynx: Oropharynx is clear.   Eyes:      General: No scleral icterus.     Conjunctiva/sclera: Conjunctivae normal.   Cardiovascular:      Rate and Rhythm: Normal rate and regular rhythm.   Pulmonary:      Effort: Pulmonary effort is normal.      Breath sounds: Normal breath sounds.      Comments: Diminished both bases  Abdominal:      General: Abdomen is flat. Bowel sounds are normal. There is no distension.      Palpations: Abdomen is soft. There is no mass.   Skin:     General: Skin is warm.      Coloration: Skin is pale. Skin is not jaundiced.   Neurological:      General: No focal deficit present.      Mental Status: She is alert and oriented to person, place, and time.   Psychiatric:         Mood and Affect: Mood normal    Results Review:  I have reviewed the labs, radiology results, and diagnostic studies since my last progress note and made treatment changes reflective of the results.   I have reviewed the current medications.    Assessment/Plan     Active Hospital Problems    Diagnosis    • Persistent dry cough    • Pleural effusion on right    • Hypervolemia    • Chronic anemia    • Type 2 diabetes mellitus, without long-term current use of insulin (CMS/[facility])    • ESRD on dialysis (CMS/[facility])    • Coronary artery disease involving native coronary artery of native heart with angina pectoris (CMS/[facility])        PLAN:  Chest tube management per CT surgery.  Peritoneal dialysis and fluid management per nephrology    Electronically signed by Cl Heart DO, 06/08/21, 18:01 CDT.

## 2021-06-08 NOTE — ANESTHESIA POSTPROCEDURE EVALUATION
Patient: Jennifer Salcido    Procedure Summary     Date: 06/07/21 Room / Location: [facility] OR  /  [facility] OR    Anesthesia Start: 1602 Anesthesia Stop: 1708    Procedure: CHEST TUBE INSERTION (Right Chest) Diagnosis:       Pleural effusion on right      (Pleural effusion on right [J90])    Surgeons: Antony Wood MD Provider: Srikanth Esparza CRNA    Anesthesia Type: MAC ASA Status: 3          Anesthesia Type: MAC    Vitals  Vitals Value Taken Time   /67 06/07/21 1817   Temp 98.1 °F (36.7 °C) 06/07/21 1827   Pulse 85 06/07/21 1827   Resp 16 06/07/21 1817   SpO2 97 % 06/07/21 1827           Post Anesthesia Care and Evaluation    Patient location during evaluation: PACU  Patient participation: complete - patient participated  Level of consciousness: awake and alert  Pain score: 0  Pain management: adequate  Airway patency: patent  Anesthetic complications: No anesthetic complications  PONV Status: none  Cardiovascular status: acceptable and stable  Respiratory status: acceptable  Hydration status: acceptable

## 2021-06-08 NOTE — PLAN OF CARE
Goal Outcome Evaluation:  Plan of Care Reviewed With: patient        Progress: no change  Outcome Summary: VSS. Pt c/o back pain this morning near chest tube site, medicated with PRN Tylenol with some relief. Pt wants to take pain pill later this evening closer to bedtime. Chest tube is patent, 30 ml out today. Pt restarting PD tonight. Cont to monitor overnight

## 2021-06-08 NOTE — PROGRESS NOTES
Nephrology ([facility]) Progress Note      Patient:  Jennifer Salcido  YOB: 1955  Date of Service: 6/8/2021  MRN: [id number]   Acct: [id number]   Primary Care Physician: Cristian Hernandez MD  Advance Directive:   Code Status and Medical Interventions:   Ordered at: 06/04/21 1728     Level Of Support Discussed With:    Patient     Code Status:    CPR     Medical Interventions (Level of Support Prior to Arrest):    Full     Admit Date: 6/4/2021       Hospital Day: 4  Referring Provider: No ref. provider found      Patient personally seen and examined.  Complete chart including Consults, Notes, Operative Reports, Labs, Cardiology, and Radiology studies reviewed as able.        Subjective:  Jennifer Salcido is a 65 y.o. female  whom we were consulted for end stage renal disease on peritoneal dialysis. History of diabetic nephropathy. Recent prior admission for elective CABG, temporarily was converted to hemodialysis. Has since converted back to [facility]D.  Presented this time with dyspnea. Found to have large right pleural effusion. She had emergency hemodialysis on 6/05 to correct volume status. Also had pleurocentesis on 6/05.  On 6/07 had chest tube placed    Today is feeling better, no new overnight issues. PD was held last PM due to concern for volume depletion.    Allergies:  Codeine, Demerol [meperidine], Levaquin [levofloxacin], Lisinopril, Morphine, Sulfasalazine, and Ultram [tramadol]    Home Meds:  Medications Prior to Admission   Medication Sig Dispense Refill Last Dose   • allopurinol (ZYLOPRIM) 100 MG tablet Take 100 mg by mouth Daily.      • ascorbic acid (VITAMIN C) 500 MG tablet Take 500 mg by mouth Daily.      • aspirin 81 MG EC tablet Take 81 mg by mouth Daily.      • atorvastatin (LIPITOR) 20 MG tablet Take 1 tablet by mouth Every Night. 30 tablet 2    • bisacodyl (Dulcolax) 10 MG suppository Insert 10 mg into the rectum Daily As Needed for Constipation.      • calcium  carbonate (TUMS) 500 MG chewable tablet Chew 2 tablets Every Night.      • citalopram (CeleXA) 40 MG tablet Take 40 mg by mouth Daily.      • docusate sodium (COLACE) 100 MG capsule Take 200 mg by mouth As Needed for Constipation.      • famotidine (PEPCID) 20 MG tablet Take 1 tablet by mouth Daily. 30 tablet 0    • gentamicin (GARAMYCIN) 0.1 % ointment Apply 1 application topically to the appropriate area as directed 3 (Three) Times a Day. AT SITE OF PERITONEAL DIALYSIS CATH      • HYDROcodone-acetaminophen (NORCO) 5-325 MG per tablet Take 1 tablet by mouth Every 8 (Eight) Hours As Needed (pain). 10 tablet 0    • insulin detemir (LEVEMIR) 100 UNIT/ML injection Inject 18 Units under the skin into the appropriate area as directed Daily.      • irbesartan (AVAPRO) 75 MG tablet Take 1 tablet by mouth Daily. 30 tablet 2    • Melatonin 5 MG capsule Take 5 mg by mouth Every Night.      • metoprolol tartrate (LOPRESSOR) 25 MG tablet Take 1 tablet by mouth Every 12 (Twelve) Hours. 60 tablet 2    • Multiple Vitamins-Minerals (MULTIVITAMIN ADULT PO) Take 1 tablet by mouth Daily.      • nitroglycerin (NITROSTAT) 0.4 MG SL tablet 1 under the tongue as needed for angina, may repeat q5mins for up three doses 25 tablet 3    • pantoprazole (PROTONIX) 40 MG EC tablet Take 40 mg by mouth 2 (two) times a day.      • sucralfate (CARAFATE) 1 g tablet Take 1 g by mouth 2 (two) times a day.      • Sucroferric Oxyhydroxide (Velphoro) 500 MG chewable tablet Chew 1 tablet 3 (Three) Times a Day. WITH MEALS      • vitamin D3 125 MCG (5000 UT) capsule capsule Take 1 tablet by mouth Daily.          Medicines:  Current Facility-Administered Medications   Medication Dose Route Frequency Provider Last Rate Last Admin   • acetaminophen (TYLENOL) tablet 650 mg  650 mg Oral Q6H PRN Antony Wood MD   650 mg at 06/08/21 0836    Or   • acetaminophen (TYLENOL) 160 MG/5ML solution 650 mg  650 mg Oral Q6H PRN Antony Wood MD        Or   •  acetaminophen (TYLENOL) suppository 650 mg  650 mg Rectal Q6H PRN nAtony Wood MD       • allopurinol (ZYLOPRIM) tablet 100 mg  100 mg Oral Daily Antony Wood MD   100 mg at 06/08/21 0837   • ascorbic acid (VITAMIN C) tablet 500 mg  500 mg Oral Daily Antony Wood MD   500 mg at 06/08/21 0837   • aspirin EC tablet 81 mg  81 mg Oral Daily Antony Wood MD   81 mg at 06/08/21 0837   • atorvastatin (LIPITOR) tablet 20 mg  20 mg Oral Nightly Antony Wood MD   20 mg at 06/07/21 2108   • benzonatate (TESSALON) capsule 200 mg  200 mg Oral Q4H PRN Antony Wood MD       • bisacodyl (DULCOLAX) suppository 10 mg  10 mg Rectal Daily PRN Antony Wood MD       • calcium carbonate (TUMS) chewable tablet 500 mg (200 mg elemental)  2 tablet Oral Nightly Antony Wood MD   2 tablet at 06/07/21 2108   • citalopram (CeleXA) tablet 40 mg  40 mg Oral Nightly Antony Wood MD   40 mg at 06/07/21 2108   • dextrose (D50W) 25 g/ 50mL Intravenous Solution 25 g  25 g Intravenous Q15 Min PRN Antony Wood MD       • dextrose (GLUTOSE) oral gel 15 g  15 g Oral Q15 Min PRN Antony Wood MD       • diphenhydrAMINE (BENADRYL) capsule 25 mg  25 mg Oral Q4H PRN Antony Wood MD        Or   • diphenhydrAMINE (BENADRYL) injection 25 mg  25 mg Intravenous Q4H PRN Antony Wood MD       • docusate sodium (COLACE) capsule 200 mg  200 mg Oral Daily PRN Antony Wood MD       • famotidine (PEPCID) tablet 20 mg  20 mg Oral Nightly Antony Wood MD   20 mg at 06/07/21 2108   • glucagon (human recombinant) (GLUCAGEN DIAGNOSTIC) injection 1 mg  1 mg Subcutaneous Q15 Min PRN Antony Wood MD       • guaiFENesin (MUCINEX) 12 hr tablet 600 mg  600 mg Oral Q12H Antony Wood MD   600 mg at 06/08/21 0837   • HYDROcodone-acetaminophen (NORCO) 5-325 MG per tablet 1 tablet  1 tablet Oral Q8H PRN Antony Wood MD   1 tablet at 06/08/21 0547   • insulin detemir (LEVEMIR)  injection 10 Units  10 Units Subcutaneous Daily Antony Wood MD       • insulin lispro (humaLOG) injection 0-9 Units  0-9 Units Subcutaneous 4x Daily With Meals & Nightly Antony Wood MD   6 Units at 06/06/21 2105   • lactated ringers infusion  9 mL/hr Intravenous Continuous Antony Wood MD       • losartan (COZAAR) tablet 25 mg  25 mg Oral Q24H Antony Wood MD   25 mg at 06/06/21 0952   • melatonin tablet 3 mg  3 mg Oral Nightly Antony Wood MD   3 mg at 06/07/21 2108   • metoprolol tartrate (LOPRESSOR) tablet 25 mg  25 mg Oral Q12H Antony Wood MD   25 mg at 06/08/21 0837   • multivitamin with minerals 1 tablet  1 tablet Oral Daily Antony Wood MD   1 tablet at 06/08/21 0837   • nitroglycerin (NITROSTAT) SL tablet 0.4 mg  0.4 mg Sublingual Q5 Min PRN Antony Wood MD       • ondansetron (ZOFRAN) tablet 4 mg  4 mg Oral Q6H PRN Antony Wood MD        Or   • ondansetron (ZOFRAN) injection 4 mg  4 mg Intravenous Q6H PRN Antony Wood MD   4 mg at 06/07/21 0023   • ondansetron (ZOFRAN) injection 4 mg  4 mg Intravenous Q2H PRN Antony Wood MD   4 mg at 06/04/21 2311   • sodium chloride 0.9 % bolus 100 mL  100 mL Intravenous PRN Antony Wood MD       • sodium chloride 0.9 % flush 10 mL  10 mL Intravenous Q12H Antony Wood MD   10 mL at 06/08/21 0837   • sodium chloride 0.9 % flush 10 mL  10 mL Intravenous PRN Antony Wood MD       • sodium chloride 0.9 % infusion 500 mL  500 mL Intravenous Continuous Antony Wood MD 25 mL/hr at 06/07/21 1453 Restarted at 06/07/21 1602   • sucralfate (CARAFATE) tablet 1 g  1 g Oral BID AC Antony Wood MD   1 g at 06/08/21 0547       Past Medical History:  Past Medical History:   Diagnosis Date   • Arthritis     LEFT SHOULDER   • CHF (congestive heart failure) (CMS/[facility])    • Chronic pericarditis 4/22/2021   • Collar bone fracture 12/2020    SLING PLACED TO HEAL   • Coronary artery disease     LEFT  MAIN AND 3 OTHER AREAS--CABG SCHEDULED FOR APRIL 22, 2021   • Dyslipidemia 9/28/2020   • Elevated cholesterol    • End stage kidney disease (CMS/[facility])     currently HD (5/19/2021), hopes to return to PD    • Essential hypertension 11/2/2018   • Gastroesophageal reflux disease without esophagitis 4/28/2021   • GERD (gastroesophageal reflux disease)    • Gout    • History of transfusion    • Hyperlipidemia    • Hypertension    • Overweight (BMI 25.0-29.9) 4/22/2021   • Peritoneal dialysis status (CMS/[facility])     EVERY NIGHT FOR 10 HOURS, (5/19/2021 currently on hold)   • PONV (postoperative nausea and vomiting)    • Sleep apnea     RESOLVED   • Status post gastric bypass for obesity 4/22/2021   • Type 2 diabetes mellitus with kidney complication, with long-term current use of insulin (CMS/[facility]) 11/2/2018       Past Surgical History:  Past Surgical History:   Procedure Laterality Date   • BREAST BIOPSY Right     FATTY TUMOR   • CARDIAC CATHETERIZATION N/A 10/1/2019    Procedure: Left Heart Cath;  Surgeon: Srikanth Coker MD;  Location:  [facility] CATH INVASIVE LOCATION;  Service: Cardiology   • CARDIAC CATHETERIZATION N/A 7/14/2020    Procedure: Left Heart Cath;  Surgeon: Srikanth Coker MD;  Location:  [facility] CATH INVASIVE LOC[facility];  Service: Cardiology;  Laterality: N/A;   • CARDIAC CATHETERIZATION N/A 3/23/2021    Procedure: Left Heart Cath;  Surgeon: Srikanth Coker MD;  Location:  [facility] CATH INVASIVE [facility];  Service: Cardiology;  Laterality: N/A;   • CHEST TUBE INSERTION Right 6/7/2021    Procedure: CHEST TUBE INSERTION;  Surgeon: Antony Wood MD;  Location:  [facility] OR;  Service: Cardiothoracic;  Laterality: Right;   • CORONARY ANGIOPLASTY WITH STENT PLACEMENT  2016    X 2   • CORONARY ARTERY BYPASS GRAFT N/A 4/22/2021    Procedure: CORONARY ARTERY BYPASS GRAFT X 3 WITH LEFT INTERNAL MAMMARY ARTERY, RIGHT LOWER EXTREMITY ENDOSCOPIC VEIN HARVEST, AND PERFUSION; TRANSESOPHAGEAL ECHOCARDIOGRAM;  Surgeon: Israel  Antony MORE MD;  Location: [facility] OR;  Service: Cardiothoracic;  Laterality: N/A;   • EYE SURGERY Bilateral     IOC LENS IMPLANTS   • EYE SURGERY Bilateral     RETINAL SURGERY   • GALLBLADDER SURGERY     • GASTRIC BYPASS     • HYSTERECTOMY     • INSERTION HEMODIALYSIS CATHETER Right 2021    Procedure: HEMODIALYSIS CATHETER INSERTION;  Surgeon: Simon Coates MD;  Location: [facility] HYBRID OR 12;  Service: Vascular;  Laterality: Right;   • INSERTION PERITONEAL DIALYSIS CATHETER     • OOPHORECTOMY Bilateral    • SINUS SURGERY     • SINUS SURGERY  1980   • TUBAL ABDOMINAL LIGATION         Family History  Family History   Problem Relation Age of Onset   • Hypertension Mother    • Lung disease Mother    • Heart disease Father    • Diabetes Father    • Abnormal EKG Father    • Breast cancer Neg Hx        Social History  Social History     Socioeconomic History   • Marital status:      Spouse name: Not on file   • Number of children: Not on file   • Years of education: Not on file   • Highest education level: Not on file   Tobacco Use   • Smoking status: Former Smoker     Packs/day: 0.00     Years: 2.00     Pack years: 0.00     Types: Cigarettes     Quit date:      Years since quittin.4   • Smokeless tobacco: Never Used   Vaping Use   • Vaping Use: Never used   Substance and Sexual Activity   • Alcohol use: Not Currently     Comment: haven't drank in 3 years ( 2018)    • Drug use: No   • Sexual activity: Defer         Review of Systems:  History obtained from chart review and the patient  General ROS: No fever or chills  Respiratory ROS: No cough, shortness of breath, wheezing  Cardiovascular ROS: No chest pain or palpitations  Gastrointestinal ROS: No abdominal pain or melena  Genito-Urinary ROS: No dysuria or hematuria    Objective:  Patient Vitals for the past 24 hrs:   BP Temp Temp src Pulse Resp SpO2 Weight   21 0725 145/54 97.6 °F (36.4 °C) Oral 60 18 97 % --   21 0557 -- --  -- -- -- -- 66 kg (145 lb 9.6 oz)   06/08/21 0544 126/52 98.1 °F (36.7 °C) Oral 64 18 99 % --   06/07/21 2343 130/50 98.4 °F (36.9 °C) Oral 58 16 95 % --   06/07/21 2025 112/48 98.5 °F (36.9 °C) Oral 95 18 97 % --   06/07/21 1900 138/57 99.4 °F (37.4 °C) Axillary 82 18 97 % --   06/07/21 1827 -- 98.1 °F (36.7 °C) -- 85 -- 97 % --   06/07/21 1817 145/67 -- -- 80 16 98 % --   06/07/21 1807 143/57 -- -- 83 16 98 % --   06/07/21 1757 144/62 -- -- 82 16 98 % --   06/07/21 1747 136/55 -- -- 79 16 98 % --   06/07/21 1737 114/70 -- -- 81 16 98 % --   06/07/21 1727 132/57 -- -- 76 16 100 % --   06/07/21 1724 127/56 -- -- 74 16 100 % --   06/07/21 1721 128/55 -- -- 75 16 98 % --   06/07/21 1718 128/59 -- -- 76 16 100 % --   06/07/21 1715 116/57 -- -- 76 16 100 % --   06/07/21 1712 95/55 -- -- 75 (P) 16 (P) 100 % --   06/07/21 1710 95/55 98 °F (36.7 °C) Temporal 74 16 98 % --   06/07/21 1513 -- -- -- 73 -- 100 % --   06/07/21 1125 134/58 97.9 °F (36.6 °C) Oral 74 16 100 % --       Intake/Output Summary (Last 24 hours) at 6/8/2021 1049  Last data filed at 6/8/2021 0841  Gross per 24 hour   Intake 780 ml   Output 170 ml   Net 610 ml     General: awake/alert   Chest:  clear to auscultation bilaterally without respiratory distress, right chest tube  CVS: regular rate and rhythm  Abdominal: soft, nontender, positive bowel sounds  Extremities: no cyanosis or edema  Skin: warm and dry without rash      Labs:  Results from last 7 days   Lab Units 06/07/21  0036 06/05/21  0440 06/04/21  1233   WBC 10*3/mm3 8.77 8.76 8.91   HEMOGLOBIN g/dL 10.2* 10.2* 10.8*   HEMATOCRIT % 31.2* 32.0* 33.0*   PLATELETS 10*3/mm3 324 354 508*         Results from last 7 days   Lab Units 06/07/21  0036 06/05/21  0440 06/04/21  1233   SODIUM mmol/L 133* 136 142   POTASSIUM mmol/L 3.5 3.9 3.5   CHLORIDE mmol/L 97* 96* 99   CO2 mmol/L 20.0* 29.0 26.0   BUN mg/dL 29* 26* 46*   CREATININE mg/dL 6.13* 6.17* 8.96*   CALCIUM mg/dL 8.5* 8.7 9.4   BILIRUBIN mg/dL 0.3   --  0.3   ALK PHOS U/L 119*  --  122*   ALT (SGPT) U/L 7  --  6   AST (SGOT) U/L 17  --  17   GLUCOSE mg/dL 147* 62* 125*       Radiology:   Imaging Results (Last 72 Hours)     Procedure Component Value Units Date/Time    XR Chest 1 View [[id number]] Collected: 06/08/21 0717     Updated: 06/08/21 0721    Narrative:      EXAMINATION: XR CHEST 1 VW- 6/8/2021 7:17 AM CDT     HISTORY: pleural effusion; E87.70-Fluid overload, unspecified;  N18.9-Chronic kidney disease, unspecified; D24-Mxgxkrg effusion, not  elsewhere classified; M16-Nyasuzo effusion, not elsewhere classified.     REPORT: A frontal view the chest was obtained.     COMPARISON: Chest x-ray 6/7/2021 1732 hours.     There is persistent and increased consolidation of the left lung base  suspicious for pneumonia and probable combined atelectasis. There is a  right basilar chest tube which appears in satisfactory position. The  right internal jugular central venous catheter remains in satisfactory  position. No pneumothorax is identified. There is borderline  cardiomegaly and evidence of previous CABG, without overt CHF.  Underlying COPD is evident. The osseous structures are unremarkable.       Impression:      Increasing consolidation of the left lung base concerning  for pneumonia versus atelectasis with a small effusion. The right  basilar chest tube remains in satisfactory position. No other change.  This report was finalized on 06/08/2021 07:18 by Dr. Saji Mullen MD.    XR Chest 1 View [[id number]] Collected: 06/07/21 1802     Updated: 06/07/21 1807    Narrative:      EXAMINATION:  XR CHEST 1 VW-  6/7/2021 5:43 PM CDT     HISTORY: Pleural effusion. E87.70-Fluid overload, unspecified;  N18.9-Chronic kidney disease, unspecified; F97-Hmrhedm effusion, not  elsewhere classified; Q20-Uedultr effusion, not elsewhere classified.     COMPARISON: 6/7/2021.     FINDINGS:  There is better inspiration. There is a new right chest tube.  Bilateral infiltrates are  nearly resolved. There is mild patchy  infiltrate along the left hemidiaphragm. There is blunting of the left  costophrenic angle. Heart size is within normal limits. There has been  prior median sternotomy. There is a dialysis catheter on the right.       Impression:      1. New right chest tube in place. Right pleural effusion appears to be  nearly resolved.  2. Small left pleural effusion.  3. Infiltrate at the left lung base. Probable atelectasis. Pneumonia  less likely.        This report was finalized on 06/07/2021 18:04 by Dr. Sukhi Ramirez MD.    US Chest [[id number]] Collected: 06/07/21 1715     Updated: 06/07/21 1720    Narrative:      HISTORY: Chest tube insertion     Ultrasound chest: Sonographic imaging of the right posterior chest  performed to assist Dr. Wood with chest tube insertion. Images  demonstrate a moderate right pleural fluid collection with post  placement images demonstrating evacuation of right pleural fluid.  This report was finalized on 06/07/2021 17:17 by Dr. Kimberlee Blankenship MD.    XR Abdomen KUB [[id number]] Collected: 06/07/21 0745     Updated: 06/07/21 0751    Narrative:      EXAMINATION: XR ABDOMEN KUB- 6/7/2021 7:45 AM CDT     HISTORY: Vomiting; E87.70-Fluid overload, unspecified; N18.9-Chronic  kidney disease, unspecified; J07-Kwaohzo effusion, not elsewhere  classified; W42-Mljerpf effusion, not elsewhere classified.     REPORT: A supine view of the abdomen was obtained.     COMPARISON: CT abdomen pelvis without contrast 5/19/2021.     There is a paucity of bowel gas overall, under stool volume is noted at  the rectum. A peritoneal dialysis catheter is coiled in the left pelvis.  Cholecystectomy clips are present. There is heavy atherosclerotic  calcification within the aorta and its branches. Mild levoscoliosis of  the lumbar spine.       Impression:      There is a paucity of bowel gas overall, which is  nonspecific, moderate stool volume is noted at the rectum. A left  pelvic  peritoneal dialysis catheter is present.  This report was finalized on 06/07/2021 07:48 by Dr. Saji Mullen MD.    XR Chest 1 View [[id number]] Collected: 06/07/21 0744     Updated: 06/07/21 0748    Narrative:      EXAMINATION: XR CHEST 1 VW- 6/7/2021 7:44 AM CDT     HISTORY: RRT; E87.70-Fluid overload, unspecified; N18.9-Chronic kidney  disease, unspecified; A20-Ypvfezw effusion, not elsewhere classified;  L24-Skjtaby effusion, not elsewhere classified.     REPORT: A frontal view of the chest was obtained.     COMPARISON: Chest x-rays 6/6/2021 1458 hours.     The lungs are grossly hypoaerated and the patient is rotated to the  left. There is central vascular crowding, the cardiac margins are  partially obscured without obvious cardiomegaly. Bilateral pleural  effusions are noted, right greater than left and appears stable. No  pneumothorax is identified. The right-sided dialysis catheter appears  unchanged in position.       Impression:      Gross hypoaeration of the lungs with stable bilateral  pleural effusions, right greater than left. Increased central markings  are noted, likely exaggerated by the low lung volumes. Correlate  clinically for the possibility of interstitial edema and mild volume  overload.  This report was finalized on 06/07/2021 07:45 by Dr. Saji Mullen MD.    XR Chest PA & Lateral [[id number]] Collected: 06/06/21 1507     Updated: 06/06/21 1512    Narrative:      EXAMINATION: XR CHEST PA AND LATERAL- 6/6/2021 3:07 PM CDT     HISTORY: follow up after right thoracentesis; E87.70-Fluid overload,  unspecified; N18.9-Chronic kidney disease, unspecified; D76-Ytzbbok  effusion, not elsewhere classified.     REPORT: Frontal and lateral views of the chest were obtained.     COMPARISON: Chest x-rays 6/5/2021 1550 hours.     The lungs remain hypoaerated, with moderate pleural effusions, right  greater than left, the right pleural effusion appears slightly increased  in volume. There is  extensive bibasilar atelectasis. No pneumothorax is  identified. Heart size is normal. There is evidence of previous median  sternotomy. The right-sided dialysis catheter appears in good position  as before. No acute osseous abnormality.       Impression:      Extensive bibasilar atelectasis with persistent pleural  effusions, right greater than left, the right pleural effusion appears  slightly increased in overall volume. No other change.  This report was finalized on 06/06/2021 15:09 by Dr. Saji Mullen MD.    XR Chest 1 View [[id number]] Collected: 06/05/21 1614     Updated: 06/05/21 1618    Narrative:      EXAMINATION: XR CHEST 1 VW- 6/5/2021 4:14 PM CDT     HISTORY: thoracentesis; E87.70-Fluid overload, unspecified;  N18.9-Chronic kidney disease, unspecified; S20-Uvbcile effusion, not  elsewhere classified.     REPORT: A frontal view of the chest was obtained.     COMPARISON: Chest x-ray 6/4/2021, CTA chest 6/5/2021.     There is marked reduction in volume of the right pleural effusion  following thoracentesis, no pneumothorax is identified. A small left  pleural effusion is unchanged. There is moderate atelectasis in the lung  bases greater on the right. Heart size is normal. Previous CABG is  noted. The right-sided dialysis catheter appears in good position as  before. No other change.       Impression:      Marked reduction in volume of the right pleural effusion  following thoracentesis with no pneumothorax. There is residual  atelectasis in the lung bases greater on the right.  This report was finalized on 06/05/2021 16:15 by Dr. Saji Mullen MD.          Culture:  No results found for: BLOODCX, URINECX, WOUNDCX, MRSACX, RESPCX, STOOLCX      Assessment   1.  End stage renal disease on peritoneal dialysis  2.  Type 2 diabetes with nephropathy  3.  Right pleural effusion--s/p chest tube placement on 6/07  4.  Anemia of CKD  5.  Recent prior CABG  6.  Hyponatremia     Plan:  1.  Resume routine PD  treatment tonight  2.  Monitor labs      Dick Fisher, APRN  6/8/2021  10:49 CDT

## 2021-06-08 NOTE — PLAN OF CARE
Goal Outcome Evaluation:  Plan of Care Reviewed With: patient        Progress: improving  Outcome Summary: patient got right chest tube placement today with 1200 ml serosang fluid removed immediately and 90 ml since then, she is pain free and alert and oriented and hopeful to get better, vss

## 2021-06-08 NOTE — CASE MANAGEMENT/SOCIAL WORK
Discharge Planning Assessment  [facility]     Patient Name: Jennifer Salcido  MRN: [id number]  Today's Date: 6/8/2021    Admit Date: 6/4/2021    Discharge Needs Assessment     Row Name 06/08/21 1427       Living Environment    Lives With  alone    Current Living Arrangements  home/apartment/condo    Primary Care Provided by  self    Provides Primary Care For  no one    Family Caregiver if Needed  child(jeromy), adult    Family Caregiver Names  ANHTONY, SON LIVES NEXT DOOR    Quality of Family Relationships  helpful;involved;supportive    Able to Return to Prior Arrangements  yes       Resource/Environmental Concerns    Transportation Concerns  car, none       Transition Planning    Patient/Family Anticipates Transition to  home with help/services CURRENT WITH [facility]    Transportation Anticipated  family or friend will provide       Discharge Needs Assessment    Readmission Within the Last 30 Days  previous discharge plan unsuccessful    Equipment Currently Used at Home  other (see comments) PD AT HOME    Concerns to be Addressed  denies needs/concerns at this time    Equipment Needed After Discharge  none        Discharge Plan     Row Name 06/08/21 1430       Plan    Plan  HOME WITH [facility]; RESUMPTION OF SERVICE    Patient/Family in Agreement with Plan  yes    Plan Comments  PT. REPORTS SHE LIVES AT HOME ALONE, BUT SUPPORTIVE SON LIVES NEXT DOOR AND HELPS WHEN NEEDED.  PT. IS CURRENTLY FOLLOWED BY [facility] AND DOES PD AT HOME. WILL FOLLOW TO NOTIFY [facility] WHEN PT. IS D/C'D.        Continued Care and Services - Admitted Since 6/4/2021    Coordination has not been started for this encounter.     Selected Continued Care - Prior Encounters Includes selections from prior encounters from 3/6/2021 to 6/8/2021    Discharged on 5/20/2021 Admission date: 5/19/2021 - Discharge disposition: Home or Self Care    Home Medical Care     Service Provider Selected Services Address Phone Fax Patient  Preferred    [facility] [address] [address] [phone] --                Discharged on 4/28/2021 Admission date: 4/22/2021 - Discharge disposition: Home-Health Care [facility] [place]    Home Medical Care     Service Provider Selected Services Address Phone Fax Patient Preferred    [facility] [address] [address] [phone] --                      Demographic Summary    No documentation.       Functional Status    No documentation.       Psychosocial    No documentation.       Abuse/Neglect    No documentation.       Legal    No documentation.       Substance Abuse    No documentation.       Patient Forms    No documentation.           KELSEY Chaves

## 2021-06-08 NOTE — PROGRESS NOTES
"Patient name: Jennifer Salcido  Patient : 1955  VISIT # [id number]  MR #[id number]    Procedure:Procedure(s):  CHEST TUBE INSERTION  Procedure Date:2021  POD:1    Subjective   Chief Complaint   Patient presents with   • Shortness of Breath       Resting in bed. On room air. Indicates her breathing is better. She is hungry and asking for breakfast tray. Mild pain related to chest tube. Right chest tube to -20 cm H2O suction. No PD performed overnight.        Objective     Visit Vitals  /54 (BP Location: Left arm, Patient Position: Lying)   Pulse 60   Temp 97.6 °F (36.4 °C) (Oral)   Resp 18   Ht 160 cm (62.99\")   Wt 66 kg (145 lb 9.6 oz)   SpO2 97%   BMI 25.80 kg/m²       Intake/Output Summary (Last 24 hours) at 2021 1011  Last data filed at 2021 0841  Gross per 24 hour   Intake 780 ml   Output 170 ml   Net 610 ml     CT : 150 ml/24 hours, no air leak with forced cough    Lab:     CBC:  Results from last 7 days   Lab Units 21  0036 21  0440 21  1233   WBC 10*3/mm3 8.77 8.76 8.91   HEMATOCRIT % 31.2* 32.0* 33.0*   PLATELETS 10*3/mm3 324 354 508*          BMP:  Results from last 7 days   Lab Units 21  0036 21  0440 21  1233   SODIUM mmol/L 133* 136 142   POTASSIUM mmol/L 3.5 3.9 3.5   CHLORIDE mmol/L 97* 96* 99   CO2 mmol/L 20.0* 29.0 26.0   GLUCOSE mg/dL 147* 62* 125*   BUN mg/dL 29* 26* 46*   CREATININE mg/dL 6.13* 6.17* 8.96*          COAG:  Results from last 7 days   Lab Units 21  1233   INR  1.25*   APTT seconds 40.4*       IMAGES:       Imaging Results (Last 24 Hours)     Procedure Component Value Units Date/Time    XR Chest 1 View [[id number]] Collected: 21     Updated: 21    Narrative:      EXAMINATION: XR CHEST 1 VW- 2021 7:17 AM CDT     HISTORY: pleural effusion; E87.70-Fluid overload, unspecified;  N18.9-Chronic kidney disease, unspecified; Z42-Uzprpnt effusion, not  elsewhere classified; A52-Urthrca effusion, not " elsewhere classified.     REPORT: A frontal view the chest was obtained.     COMPARISON: Chest x-ray 6/7/2021 1732 hours.     There is persistent and increased consolidation of the left lung base  suspicious for pneumonia and probable combined atelectasis. There is a  right basilar chest tube which appears in satisfactory position. The  right internal jugular central venous catheter remains in satisfactory  position. No pneumothorax is identified. There is borderline  cardiomegaly and evidence of previous CABG, without overt CHF.  Underlying COPD is evident. The osseous structures are unremarkable.       Impression:      Increasing consolidation of the left lung base concerning  for pneumonia versus atelectasis with a small effusion. The right  basilar chest tube remains in satisfactory position. No other change.  This report was finalized on 06/08/2021 07:18 by Dr. Saji Mullen MD.    XR Chest 1 View [[id number]] Collected: 06/07/21 1802     Updated: 06/07/21 1807    Narrative:      EXAMINATION:  XR CHEST 1 VW-  6/7/2021 5:43 PM CDT     HISTORY: Pleural effusion. E87.70-Fluid overload, unspecified;  N18.9-Chronic kidney disease, unspecified; G01-Ouephhi effusion, not  elsewhere classified; M34-Kfarngd effusion, not elsewhere classified.     COMPARISON: 6/7/2021.     FINDINGS:  There is better inspiration. There is a new right chest tube.  Bilateral infiltrates are nearly resolved. There is mild patchy  infiltrate along the left hemidiaphragm. There is blunting of the left  costophrenic angle. Heart size is within normal limits. There has been  prior median sternotomy. There is a dialysis catheter on the right.       Impression:      1. New right chest tube in place. Right pleural effusion appears to be  nearly resolved.  2. Small left pleural effusion.  3. Infiltrate at the left lung base. Probable atelectasis. Pneumonia  less likely.        This report was finalized on 06/07/2021 18:04 by Dr. Sukhi Ramirez MD.     US Chest [[id number]] Collected: 06/07/21 1715     Updated: 06/07/21 1720    Narrative:      HISTORY: Chest tube insertion     Ultrasound chest: Sonographic imaging of the right posterior chest  performed to assist Dr. Wood with chest tube insertion. Images  demonstrate a moderate right pleural fluid collection with post  placement images demonstrating evacuation of right pleural fluid.  This report was finalized on 06/07/2021 17:17 by Dr. Kimberlee Blankenship MD.        Imaging today: right chest tube in good position, improved right pleural effusion, no pneumothorax, increasing left pleural effusion    Physical Exam:  General: Alert, oriented. No apparent distress. Resting in bed.   Cardiovascular: Regular rate and rhythm without murmur, rubs, or gallops.    Pulmonary: Clear to auscultation bilaterally without wheezing, rubs, or rales.  Chest: well healed sternotomy site. No drainage. sternum stable. No clicks. Right chest tube with dressing clean, dry, and intact. -20 cm H2O suction, no air leak, serosanguinous fluid output.  Abdomen: Soft, non distended, and non tender.  Extremities: Warm, moves all extremities.  Neurologic:  Grossly intact with no focal deficits.            Impression:  ESRD on dialysis (CMS/[facility])  Coronary artery disease involving native coronary artery of native heart with angina pectoris (CMS/[facility])  Type 2 diabetes mellitus, without long-term current use of insulin (CMS/[facility])  Chronic anemia  Hypervolemia  Persistent dry cough  Pleural effusion on right      Plan:  Continue right chest tube suction  Pleural fluid cytology pending   DW patient and nursing       JESSIE Najera  06/08/21  10:11 CDT

## 2021-06-08 NOTE — PLAN OF CARE
Goal Outcome Evaluation:  Plan of Care Reviewed With: patient        Progress: improving  Outcome Summary: VSS. Remains on room air. C/o mild pain at chest tube insertion site, PRN pain med given with relief noted. 60 ml drainage from CT through night. NSR on tele.

## 2021-06-09 NOTE — PROGRESS NOTES
"Patient name: Jennifer Salcido  Patient : 1955  VISIT # [id number]  MR #[id number]    Procedure:Procedure(s):  CHEST TUBE INSERTION  Procedure Date:2021  POD:2    Subjective   Chief Complaint   Patient presents with   • Shortness of Breath     Resting in bed. On room air. Right chest tube to -20 cm H2O suction. PD performed overnight with 1183 ml drawn off.        Objective     Visit Vitals  /79 (BP Location: Left arm, Patient Position: Lying)   Pulse 69   Temp 98.1 °F (36.7 °C) (Oral)   Resp 18   Ht 160 cm (62.99\")   Wt 66 kg (145 lb 9.6 oz)   SpO2 100%   BMI 25.80 kg/m²       Intake/Output Summary (Last 24 hours) at 2021 1052  Last data filed at 2021 0622  Gross per 24 hour   Intake 360 ml   Output 40 ml   Net 320 ml     CT : 60 ml/24 hours, no air leak with forced cough    Lab:     CBC:  Results from last 7 days   Lab Units 21  0036 21  0440 21  1233   WBC 10*3/mm3 8.77 8.76 8.91   HEMATOCRIT % 31.2* 32.0* 33.0*   PLATELETS 10*3/mm3 324 354 508*          BMP:  Results from last 7 days   Lab Units 21  0036 21  0440 21  1233   SODIUM mmol/L 133* 136 142   POTASSIUM mmol/L 3.5 3.9 3.5   CHLORIDE mmol/L 97* 96* 99   CO2 mmol/L 20.0* 29.0 26.0   GLUCOSE mg/dL 147* 62* 125*   BUN mg/dL 29* 26* 46*   CREATININE mg/dL 6.13* 6.17* 8.96*          COAG:  Results from last 7 days   Lab Units 21  1233   INR  1.25*   APTT seconds 40.4*       IMAGES:       Imaging Results (Last 24 Hours)     Procedure Component Value Units Date/Time    XR Chest 1 View [[id number]] Collected: 21     Updated: 21    Narrative:      EXAMINATION: XR CHEST 1 VW- 2021 7:22 AM CDT     HISTORY: pleural effusion; E87.70-Fluid overload, unspecified;  N18.9-Chronic kidney disease, unspecified; W27-Kgcerrf effusion, not  elsewhere classified; I56-Vlvkgbr effusion, not elsewhere classified.     REPORT: A frontal view the chest was obtained.     COMPARISON: Chest x-ray " 6/8/2021 0354 hours.     The lungs are hypoaerated, there is partial consolidation of the left  lung base with blunting of the costophrenic angle, unchanged. No focal  infiltrates in the right lung. Heart size is normal. There is previous  median sternotomy. The right internal jugular central venous catheter  appears in satisfactory position as before. No pneumothorax is  identified. The right basilar chest tube appears in good position as  before.       Impression:      Stable 1 day appearance of the chest.  This report was finalized on 06/09/2021 07:24 by Dr. Saji Mullen MD.        Imaging today: right chest tube in place, improved right pleural effusion, no pneumothorax, small left pleural effusion    Physical Exam:  General: Alert, oriented. No apparent distress. Resting in bed.   Cardiovascular: Regular rate and rhythm without murmur, rubs, or gallops.    Pulmonary: Clear to auscultation bilaterally without wheezing, rubs, or rales.  Chest: well healed sternotomy site. No drainage. sternum stable. No clicks. Right chest tube with dressing clean, dry, and intact. -20 cm H2O suction, no air leak, serosanguinous fluid output.  Abdomen: Soft, non distended, and non tender.  Extremities: Warm, moves all extremities.  Neurologic:  Grossly intact with no focal deficits.            Impression:  ESRD on dialysis (CMS/[facility])  Coronary artery disease involving native coronary artery of native heart with angina pectoris (CMS/[facility])  Type 2 diabetes mellitus, without long-term current use of insulin (CMS/[facility])  Chronic anemia  Hypervolemia  Persistent dry cough  Pleural effusion on right      Plan:  No new plans today   Continue right chest tube suction  Pleural fluid cytology pending   DW patient and nursing       Katheryn De Jesus, JESSIE  06/09/21  10:52 CDT

## 2021-06-09 NOTE — PLAN OF CARE
Goal Outcome Evaluation:  Plan of Care Reviewed With: patient      Pt still has CT present to the right side, pt complains of tenderness with movement to the site.  Will continue to monitor

## 2021-06-09 NOTE — PROGRESS NOTES
Nephrology ([facility]) Progress Note      Patient:  Jennifer Salcido  YOB: 1955  Date of Service: 6/9/2021  MRN: [id number]   Acct: [id number]   Primary Care Physician: Cristian Hernandez MD  Advance Directive:   Code Status and Medical Interventions:   Ordered at: 06/04/21 1720     Level Of Support Discussed With:    Patient     Code Status:    CPR     Medical Interventions (Level of Support Prior to Arrest):    Full     Admit Date: 6/4/2021       Hospital Day: 5  Referring Provider: No ref. provider found      Patient personally seen and examined.  Complete chart including Consults, Notes, Operative Reports, Labs, Cardiology, and Radiology studies reviewed as able.        Subjective:  Jennifer Salcido is a 65 y.o. female  whom we were consulted for end stage renal disease on peritoneal dialysis. History of diabetic nephropathy. Recent prior admission for elective CABG, temporarily was converted to hemodialysis. Has since converted back to [facility].  Presented this time with dyspnea. Found to have large right pleural effusion. She had emergency hemodialysis on 6/05 to correct volume status. Also had pleurocentesis on 6/05.  On 6/07 had chest tube placed. PD was held on 6/07, resumed 6/08 and was tolerated well with approx 1100 ml UF achieved.    Today is feeling better, no new overnight issues. Output from chest tube is decreasing.    Allergies:  Codeine, Demerol [meperidine], Levaquin [levofloxacin], Lisinopril, Morphine, Sulfasalazine, and Ultram [tramadol]    Home Meds:  Medications Prior to Admission   Medication Sig Dispense Refill Last Dose   • allopurinol (ZYLOPRIM) 100 MG tablet Take 100 mg by mouth Daily.   6/4/2021 at Unknown time   • ascorbic acid (VITAMIN C) 500 MG tablet Take 500 mg by mouth Daily.   6/4/2021 at Unknown time   • aspirin 81 MG EC tablet Take 81 mg by mouth Daily.   6/4/2021 at Unknown time   • atorvastatin (LIPITOR) 20 MG tablet Take 1 tablet by mouth Every  Night. 30 tablet 2 6/3/2021 at Unknown time   • bisacodyl (Dulcolax) 10 MG suppository Insert 10 mg into the rectum Daily As Needed for Constipation.   Past Month at Unknown time   • calcium carbonate (TUMS) 500 MG chewable tablet Chew 2 tablets Every Night.   6/3/2021 at Unknown time   • citalopram (CeleXA) 40 MG tablet Take 40 mg by mouth Daily.   6/4/2021 at Unknown time   • docusate sodium (COLACE) 100 MG capsule Take 200 mg by mouth As Needed for Constipation.   6/3/2021 at Unknown time   • famotidine (PEPCID) 20 MG tablet Take 1 tablet by mouth Daily. 30 tablet 0 6/4/2021 at Unknown time   • HYDROcodone-acetaminophen (NORCO) 5-325 MG per tablet Take 1 tablet by mouth Every 8 (Eight) Hours As Needed (pain). 10 tablet 0 Past Month at Unknown time   • insulin detemir (LEVEMIR) 100 UNIT/ML injection Inject 12 Units under the skin into the appropriate area as directed Daily.   6/4/2021 at Unknown time   • irbesartan (AVAPRO) 75 MG tablet Take 1 tablet by mouth Daily. 30 tablet 2 6/4/2021 at Unknown time   • Melatonin 5 MG capsule Take 5 mg by mouth Every Night.   6/3/2021 at Unknown time   • metoprolol tartrate (LOPRESSOR) 25 MG tablet Take 1 tablet by mouth Every 12 (Twelve) Hours. 60 tablet 2 6/4/2021 at Unknown time   • Multiple Vitamins-Minerals (MULTIVITAMIN ADULT PO) Take 1 tablet by mouth Daily.   6/4/2021 at Unknown time   • pantoprazole (PROTONIX) 40 MG EC tablet Take 40 mg by mouth 2 (two) times a day.   6/4/2021 at Unknown time   • sucralfate (CARAFATE) 1 g tablet Dissolve 1 tablet in water to create a slurry and drink QID; AC & HS PRN   6/4/2021 at Unknown time   • Sucroferric Oxyhydroxide (Velphoro) 500 MG chewable tablet Chew 1 tablet 3 (Three) Times a Day. WITH MEALS   6/4/2021 at Unknown time   • vitamin D3 125 MCG (5000 UT) capsule capsule Take 1 tablet by mouth Daily.   6/4/2021 at Unknown time   • nitroglycerin (NITROSTAT) 0.4 MG SL tablet 1 under the tongue as needed for angina, may repeat  q5mins for up three doses 25 tablet 3        Medicines:  Current Facility-Administered Medications   Medication Dose Route Frequency Provider Last Rate Last Admin   • acetaminophen (TYLENOL) tablet 650 mg  650 mg Oral Q6H PRN Antony Wood MD   650 mg at 06/08/21 2157    Or   • acetaminophen (TYLENOL) 160 MG/5ML solution 650 mg  650 mg Oral Q6H PRN Antony Wood MD        Or   • acetaminophen (TYLENOL) suppository 650 mg  650 mg Rectal Q6H PRN Antony Wood MD       • allopurinol (ZYLOPRIM) tablet 100 mg  100 mg Oral Daily Antony Wood MD   100 mg at 06/09/21 0837   • ascorbic acid (VITAMIN C) tablet 500 mg  500 mg Oral Daily Antony Wood MD   500 mg at 06/09/21 0837   • aspirin EC tablet 81 mg  81 mg Oral Daily Antony Wood MD   81 mg at 06/09/21 0836   • atorvastatin (LIPITOR) tablet 20 mg  20 mg Oral Nightly Antony Wood MD   20 mg at 06/08/21 2043   • benzonatate (TESSALON) capsule 200 mg  200 mg Oral Q4H PRN Antony Wood MD       • bisacodyl (DULCOLAX) suppository 10 mg  10 mg Rectal Daily PRN Antony Wood MD       • calcium carbonate (TUMS) chewable tablet 500 mg (200 mg elemental)  2 tablet Oral Nightly Antony Wood MD   2 tablet at 06/08/21 2043   • citalopram (CeleXA) tablet 40 mg  40 mg Oral Nightly Antony Wood MD   40 mg at 06/08/21 2043   • dextrose (D50W) 25 g/ 50mL Intravenous Solution 25 g  25 g Intravenous Q15 Min PRN Antony Wood MD       • dextrose (GLUTOSE) oral gel 15 g  15 g Oral Q15 Min PRN Antony Wood MD       • diphenhydrAMINE (BENADRYL) capsule 25 mg  25 mg Oral Q4H PRN Antony Wood MD        Or   • diphenhydrAMINE (BENADRYL) injection 25 mg  25 mg Intravenous Q4H PRN Antony Wood MD       • docusate sodium (COLACE) capsule 200 mg  200 mg Oral Daily PRN Antony Wood MD       • famotidine (PEPCID) tablet 20 mg  20 mg Oral Nightly Antony Wood MD   20 mg at 06/08/21 2043   • glucagon (human  recombinant) (GLUCAGEN DIAGNOSTIC) injection 1 mg  1 mg Subcutaneous Q15 Min PRN Antony Wood MD       • guaiFENesin (MUCINEX) 12 hr tablet 600 mg  600 mg Oral Q12H Antony Wood MD   600 mg at 06/09/21 0837   • HYDROcodone-acetaminophen (NORCO) 5-325 MG per tablet 1 tablet  1 tablet Oral Q8H PRN Antony Wood MD   1 tablet at 06/08/21 0547   • insulin detemir (LEVEMIR) injection 10 Units  10 Units Subcutaneous Daily Antony Wood MD       • insulin lispro (humaLOG) injection 0-9 Units  0-9 Units Subcutaneous 4x Daily With Meals & Nightly Antony Wood MD   6 Units at 06/06/21 2105   • lactated ringers infusion  9 mL/hr Intravenous Continuous Antony Wood MD       • losartan (COZAAR) tablet 25 mg  25 mg Oral Q24H Antony Wood MD   25 mg at 06/06/21 0952   • melatonin tablet 3 mg  3 mg Oral Nightly Antony Wood MD   3 mg at 06/08/21 2043   • metoprolol tartrate (LOPRESSOR) tablet 25 mg  25 mg Oral Q12H Antony Wood MD   25 mg at 06/09/21 0837   • multivitamin with minerals 1 tablet  1 tablet Oral Daily Antony Wood MD   1 tablet at 06/09/21 0836   • nitroglycerin (NITROSTAT) SL tablet 0.4 mg  0.4 mg Sublingual Q5 Min PRN Antony Wood MD       • ondansetron (ZOFRAN) tablet 4 mg  4 mg Oral Q6H PRN Antony Wood MD        Or   • ondansetron (ZOFRAN) injection 4 mg  4 mg Intravenous Q6H PRN Antony Wood MD   4 mg at 06/07/21 0023   • ondansetron (ZOFRAN) injection 4 mg  4 mg Intravenous Q2H PRN Antony Wood MD   4 mg at 06/04/21 2311   • sodium chloride 0.9 % bolus 100 mL  100 mL Intravenous PRN Antony Wood MD       • sodium chloride 0.9 % flush 10 mL  10 mL Intravenous Q12H Antony Wood MD   10 mL at 06/08/21 2043   • sodium chloride 0.9 % flush 10 mL  10 mL Intravenous PRN Antony Wood MD       • sucralfate (CARAFATE) tablet 1 g  1 g Oral BID AC Antony Wood MD   1 g at 06/09/21 0618       Past Medical History:  Past  Medical History:   Diagnosis Date   • Arthritis     LEFT SHOULDER   • CHF (congestive heart failure) (CMS/HCC)    • Chronic pericarditis 4/22/2021   • Collar bone fracture 12/2020    SLING PLACED TO HEAL   • Coronary artery disease     LEFT MAIN AND 3 OTHER AREAS--CABG SCHEDULED FOR APRIL 22, 2021   • Dyslipidemia 9/28/2020   • Elevated cholesterol    • End stage kidney disease (CMS/HCC)     currently HD (5/19/2021), hopes to return to PD    • Essential hypertension 11/2/2018   • Gastroesophageal reflux disease without esophagitis 4/28/2021   • GERD (gastroesophageal reflux disease)    • Gout    • History of transfusion    • Hyperlipidemia    • Hypertension    • Overweight (BMI 25.0-29.9) 4/22/2021   • Peritoneal dialysis status (CMS/HCC)     EVERY NIGHT FOR 10 HOURS, (5/19/2021 currently on hold)   • PONV (postoperative nausea and vomiting)    • Sleep apnea     RESOLVED   • Status post gastric bypass for obesity 4/22/2021   • Type 2 diabetes mellitus with kidney complication, with long-term current use of insulin (CMS/HCC) 11/2/2018       Past Surgical History:  Past Surgical History:   Procedure Laterality Date   • BREAST BIOPSY Right     FATTY TUMOR   • CARDIAC CATHETERIZATION N/A 10/1/2019    Procedure: Left Heart Cath;  Surgeon: Srikanth Coker MD;  Location:  [facility];  Service: Cardiology   • CARDIAC CATHETERIZATION N/A 7/14/2020    Procedure: Left Heart Cath;  Surgeon: Srikanth Coker MD;  Location:  [facility];  Service: Cardiology;  Laterality: N/A;   • CARDIAC CATHETERIZATION N/A 3/23/2021    Procedure: Left Heart Cath;  Surgeon: Srikanth Coker MD;  Location:  [facility];  Service: Cardiology;  Laterality: N/A;   • CHEST TUBE INSERTION Right 6/7/2021    Procedure: CHEST TUBE INSERTION;  Surgeon: Antony Wood MD;  Location: [facility] OR;  Service: Cardiothoracic;  Laterality: Right;   • CORONARY ANGIOPLASTY WITH STENT PLACEMENT  2016    X 2   •  CORONARY ARTERY BYPASS GRAFT N/A 2021    Procedure: CORONARY ARTERY BYPASS GRAFT X 3 WITH LEFT INTERNAL MAMMARY ARTERY, RIGHT LOWER EXTREMITY ENDOSCOPIC VEIN HARVEST, AND PERFUSION; TRANSESOPHAGEAL ECHOCARDIOGRAM;  Surgeon: Antony Wood MD;  Location: [facility] OR;  Service: Cardiothoracic;  Laterality: N/A;   • EYE SURGERY Bilateral     IOC LENS IMPLANTS   • EYE SURGERY Bilateral     RETINAL SURGERY   • GALLBLADDER SURGERY     • GASTRIC BYPASS     • HYSTERECTOMY     • INSERTION HEMODIALYSIS CATHETER Right 2021    Procedure: HEMODIALYSIS CATHETER INSERTION;  Surgeon: Simon Coates MD;  Location: [facility] HYBRID OR 12;  Service: Vascular;  Laterality: Right;   • INSERTION PERITONEAL DIALYSIS CATHETER     • OOPHORECTOMY Bilateral    • SINUS SURGERY     • SINUS SURGERY  1980   • TUBAL ABDOMINAL LIGATION         Family History  Family History   Problem Relation Age of Onset   • Hypertension Mother    • Lung disease Mother    • Heart disease Father    • Diabetes Father    • Abnormal EKG Father    • Breast cancer Neg Hx        Social History  Social History     Socioeconomic History   • Marital status:      Spouse name: Not on file   • Number of children: Not on file   • Years of education: Not on file   • Highest education level: Not on file   Tobacco Use   • Smoking status: Former Smoker     Packs/day: 0.00     Years: 2.00     Pack years: 0.00     Types: Cigarettes     Quit date:      Years since quittin.4   • Smokeless tobacco: Never Used   Vaping Use   • Vaping Use: Never used   Substance and Sexual Activity   • Alcohol use: Not Currently     Comment: haven't drank in 3 years ( 2018)    • Drug use: No   • Sexual activity: Defer         Review of Systems:  History obtained from chart review and the patient  General ROS: No fever or chills  Respiratory ROS: No cough, shortness of breath, wheezing  Cardiovascular ROS: No chest pain or palpitations  Gastrointestinal ROS: No abdominal  pain or melena  Genito-Urinary ROS: No dysuria or hematuria    Objective:  Patient Vitals for the past 24 hrs:   BP Temp Temp src Pulse Resp SpO2   06/09/21 0755 145/79 98.1 °F (36.7 °C) Oral 69 18 100 %   06/09/21 0351 132/53 97.8 °F (36.6 °C) Oral 63 16 97 %   06/09/21 0009 136/72 98.2 °F (36.8 °C) Oral 63 16 97 %   06/08/21 2048 160/71 98.4 °F (36.9 °C) Oral 70 16 100 %   06/08/21 1600 147/71 98 °F (36.7 °C) Oral 63 16 97 %   06/08/21 1145 141/64 98 °F (36.7 °C) Oral 62 18 97 %       Intake/Output Summary (Last 24 hours) at 6/9/2021 1032  Last data filed at 6/9/2021 0622  Gross per 24 hour   Intake 360 ml   Output 40 ml   Net 320 ml     General: awake/alert   Chest:  clear to auscultation bilaterally without respiratory distress, right chest tube  CVS: regular rate and rhythm  Abdominal: soft, nontender, positive bowel sounds  Extremities: no cyanosis or edema  Skin: warm and dry without rash      Labs:  Results from last 7 days   Lab Units 06/07/21  0036 06/05/21  0440 06/04/21  1233   WBC 10*3/mm3 8.77 8.76 8.91   HEMOGLOBIN g/dL 10.2* 10.2* 10.8*   HEMATOCRIT % 31.2* 32.0* 33.0*   PLATELETS 10*3/mm3 324 354 508*         Results from last 7 days   Lab Units 06/07/21  0036 06/05/21  0440 06/04/21  1233   SODIUM mmol/L 133* 136 142   POTASSIUM mmol/L 3.5 3.9 3.5   CHLORIDE mmol/L 97* 96* 99   CO2 mmol/L 20.0* 29.0 26.0   BUN mg/dL 29* 26* 46*   CREATININE mg/dL 6.13* 6.17* 8.96*   CALCIUM mg/dL 8.5* 8.7 9.4   BILIRUBIN mg/dL 0.3  --  0.3   ALK PHOS U/L 119*  --  122*   ALT (SGPT) U/L 7  --  6   AST (SGOT) U/L 17  --  17   GLUCOSE mg/dL 147* 62* 125*       Radiology:   Imaging Results (Last 72 Hours)     Procedure Component Value Units Date/Time    XR Chest 1 View [[id number]] Collected: 06/09/21 0722     Updated: 06/09/21 0727    Narrative:      EXAMINATION: XR CHEST 1 VW- 6/9/2021 7:22 AM CDT     HISTORY: pleural effusion; E87.70-Fluid overload, unspecified;  N18.9-Chronic kidney disease, unspecified; B79-Lzziguj  effusion, not  elsewhere classified; K68-Nuxpdeq effusion, not elsewhere classified.     REPORT: A frontal view the chest was obtained.     COMPARISON: Chest x-ray 6/8/2021 0354 hours.     The lungs are hypoaerated, there is partial consolidation of the left  lung base with blunting of the costophrenic angle, unchanged. No focal  infiltrates in the right lung. Heart size is normal. There is previous  median sternotomy. The right internal jugular central venous catheter  appears in satisfactory position as before. No pneumothorax is  identified. The right basilar chest tube appears in good position as  before.       Impression:      Stable 1 day appearance of the chest.  This report was finalized on 06/09/2021 07:24 by Dr. Saji Mullen MD.    XR Chest 1 View [[id number]] Collected: 06/08/21 0717     Updated: 06/08/21 0721    Narrative:      EXAMINATION: XR CHEST 1 VW- 6/8/2021 7:17 AM CDT     HISTORY: pleural effusion; E87.70-Fluid overload, unspecified;  N18.9-Chronic kidney disease, unspecified; R69-Vrxrbyz effusion, not  elsewhere classified; S78-Mnzqnhx effusion, not elsewhere classified.     REPORT: A frontal view the chest was obtained.     COMPARISON: Chest x-ray 6/7/2021 1732 hours.     There is persistent and increased consolidation of the left lung base  suspicious for pneumonia and probable combined atelectasis. There is a  right basilar chest tube which appears in satisfactory position. The  right internal jugular central venous catheter remains in satisfactory  position. No pneumothorax is identified. There is borderline  cardiomegaly and evidence of previous CABG, without overt CHF.  Underlying COPD is evident. The osseous structures are unremarkable.       Impression:      Increasing consolidation of the left lung base concerning  for pneumonia versus atelectasis with a small effusion. The right  basilar chest tube remains in satisfactory position. No other change.  This report was finalized on  06/08/2021 07:18 by Dr. Saji Mullen MD.    XR Chest 1 View [[id number]] Collected: 06/07/21 1802     Updated: 06/07/21 1807    Narrative:      EXAMINATION:  XR CHEST 1 VW-  6/7/2021 5:43 PM CDT     HISTORY: Pleural effusion. E87.70-Fluid overload, unspecified;  N18.9-Chronic kidney disease, unspecified; L19-Jxrndkr effusion, not  elsewhere classified; D53-Exmctqn effusion, not elsewhere classified.     COMPARISON: 6/7/2021.     FINDINGS:  There is better inspiration. There is a new right chest tube.  Bilateral infiltrates are nearly resolved. There is mild patchy  infiltrate along the left hemidiaphragm. There is blunting of the left  costophrenic angle. Heart size is within normal limits. There has been  prior median sternotomy. There is a dialysis catheter on the right.       Impression:      1. New right chest tube in place. Right pleural effusion appears to be  nearly resolved.  2. Small left pleural effusion.  3. Infiltrate at the left lung base. Probable atelectasis. Pneumonia  less likely.        This report was finalized on 06/07/2021 18:04 by Dr. Sukhi Ramirez MD.    US Chest [[id number]] Collected: 06/07/21 1715     Updated: 06/07/21 1720    Narrative:      HISTORY: Chest tube insertion     Ultrasound chest: Sonographic imaging of the right posterior chest  performed to assist Dr. Wood with chest tube insertion. Images  demonstrate a moderate right pleural fluid collection with post  placement images demonstrating evacuation of right pleural fluid.  This report was finalized on 06/07/2021 17:17 by Dr. Kimberlee Blankenship MD.    XR Abdomen KUB [[id number]] Collected: 06/07/21 0745     Updated: 06/07/21 0751    Narrative:      EXAMINATION: XR ABDOMEN KUB- 6/7/2021 7:45 AM CDT     HISTORY: Vomiting; E87.70-Fluid overload, unspecified; N18.9-Chronic  kidney disease, unspecified; H19-Xbmuosu effusion, not elsewhere  classified; R84-Egvoasv effusion, not elsewhere classified.     REPORT: A supine view of the  abdomen was obtained.     COMPARISON: CT abdomen pelvis without contrast 5/19/2021.     There is a paucity of bowel gas overall, under stool volume is noted at  the rectum. A peritoneal dialysis catheter is coiled in the left pelvis.  Cholecystectomy clips are present. There is heavy atherosclerotic  calcification within the aorta and its branches. Mild levoscoliosis of  the lumbar spine.       Impression:      There is a paucity of bowel gas overall, which is  nonspecific, moderate stool volume is noted at the rectum. A left pelvic  peritoneal dialysis catheter is present.  This report was finalized on 06/07/2021 07:48 by Dr. Saji Mullen MD.    XR Chest 1 View [[id number]] Collected: 06/07/21 0744     Updated: 06/07/21 0748    Narrative:      EXAMINATION: XR CHEST 1 VW- 6/7/2021 7:44 AM CDT     HISTORY: RRT; E87.70-Fluid overload, unspecified; N18.9-Chronic kidney  disease, unspecified; M92-Jltgnno effusion, not elsewhere classified;  I70-Cvkbxns effusion, not elsewhere classified.     REPORT: A frontal view of the chest was obtained.     COMPARISON: Chest x-rays 6/6/2021 1458 hours.     The lungs are grossly hypoaerated and the patient is rotated to the  left. There is central vascular crowding, the cardiac margins are  partially obscured without obvious cardiomegaly. Bilateral pleural  effusions are noted, right greater than left and appears stable. No  pneumothorax is identified. The right-sided dialysis catheter appears  unchanged in position.       Impression:      Gross hypoaeration of the lungs with stable bilateral  pleural effusions, right greater than left. Increased central markings  are noted, likely exaggerated by the low lung volumes. Correlate  clinically for the possibility of interstitial edema and mild volume  overload.  This report was finalized on 06/07/2021 07:45 by Dr. Saji Mullen MD.          Culture:  No results found for: BLOODCX, URINECX, WOUNDCX, MRSACX, RESPCX,  STOOLCX      Assessment   1.  End stage renal disease on peritoneal dialysis  2.  Type 2 diabetes with nephropathy  3.  Right pleural effusion--s/p chest tube placement on 6/07  4.  Anemia of CKD  5.  Recent prior CABG  6.  Hyponatremia     Plan:  1.  Continue maintenance peritoneal dialysis  2.  Monitor labs      Dick Fisher, JESSIE  6/9/2021  10:32 CDT

## 2021-06-09 NOTE — PLAN OF CARE
Problem: Adult Inpatient Plan of Care  Goal: Plan of Care Review  Outcome: Ongoing, Progressing  Flowsheets (Taken 6/9/2021 1522)  Progress: no change  Plan of Care Reviewed With: patient  Outcome Summary: Patient c/o pain, prn tylenol given with relief. Chest tube in place and patent. PD throughout the night. SB/SR 57-77 on tele. VSS. Safety maintained. Will notify MD of any changes.

## 2021-06-09 NOTE — NURSING NOTE
Dialysis came to unhook patient from PD. They stated they removed 1185 from patient overnight.  Ira Soria RN  6/9/2021  07:14 CDT

## 2021-06-10 NOTE — PROGRESS NOTES
"Patient name: Jennifer Salcido  Patient : 1955  VISIT # [id number]  MR #[id number]    Procedure:Procedure(s):  CHEST TUBE INSERTION  Procedure Date:2021  POD:3    Subjective   Chief Complaint   Patient presents with   • Shortness of Breath     Resting in bed. On room air. Says she slept poorly last night. Right chest tube to -20 cm H2O suction. PD performed overnight with 1377 ml drawn off.     Telemetry: SB-NSR 59-79 bpm     Objective     Visit Vitals  /58 (BP Location: Left arm, Patient Position: Lying)   Pulse 73   Temp 97.6 °F (36.4 °C) (Oral)   Resp 16   Ht 160 cm (62.99\")   Wt 65.5 kg (144 lb 4.8 oz)   SpO2 99%   BMI 25.57 kg/m²       Intake/Output Summary (Last 24 hours) at 6/10/2021 1124  Last data filed at 6/10/2021 0639  Gross per 24 hour   Intake 480 ml   Output 70 ml   Net 410 ml     CT : 70 ml/24 hours, no air leak with forced cough    Lab:     CBC:  Results from last 7 days   Lab Units 21  0036 21  0440 21  1233   WBC 10*3/mm3 8.77 8.76 8.91   HEMATOCRIT % 31.2* 32.0* 33.0*   PLATELETS 10*3/mm3 324 354 508*          BMP:  Results from last 7 days   Lab Units 06/10/21  0834 21  0036 21  0440   SODIUM mmol/L 134* 133* 136   POTASSIUM mmol/L 4.4 3.5 3.9   CHLORIDE mmol/L 94* 97* 96*   CO2 mmol/L 27.0 20.0* 29.0   GLUCOSE mg/dL 156* 147* 62*   BUN mg/dL 37* 29* 26*   CREATININE mg/dL 8.50* 6.13* 6.17*          COAG:  Results from last 7 days   Lab Units 21  1233   INR  1.25*   APTT seconds 40.4*       IMAGES:       Imaging Results (Last 24 Hours)     Procedure Component Value Units Date/Time    XR Chest 1 View [[id number]] Collected: 06/10/21 0704     Updated: 06/10/21 0709    Narrative:      EXAMINATION: XR CHEST 1 VW- 6/10/2021 7:04 AM CDT     HISTORY: pleural effusion; E87.70-Fluid overload, unspecified;  N18.9-Chronic kidney disease, unspecified; L60-Tzypxxb effusion, not  elsewhere classified; P60-Xdhlyyz effusion, not elsewhere classified.   "   REPORT: A frontal view of the chest was obtained.     COMPARISON: Chest x-ray 6/9/2021 0350 hours.     The right internal jugular central venous catheter remains in  satisfactory position. The lungs are hypoaerated, there is mild  improvement in an area of consolidation in the left lateral lung base,  with associated small left effusion. There is a right basilar chest tube  which remains in good position. There is mild atelectasis at the right  base, with no focal infiltrate in the right. Heart size is normal.  Previous CABG is noted. No pneumothorax is identified.       Impression:      Stable satisfactory position of the right basilar chest tube  and right internal jugular central venous catheter. No pneumothorax is  identified. There is mild improvement in the opacities in the left  lateral lung base.  This report was finalized on 06/10/2021 07:06 by Dr. Saji Mullen MD.        Imaging today: right chest tube in place, improved right pleural effusion, no pneumothorax, improved left pleural effusion    Physical Exam:  General: Alert, oriented. No apparent distress. Resting in bed.   Cardiovascular: Regular rate and rhythm without murmur, rubs, or gallops.    Pulmonary: Clear to auscultation bilaterally without wheezing, rubs, or rales.  Chest: well healed sternotomy site. No drainage. sternum stable. No clicks. Right chest tube with dressing clean, dry, and intact. -20 cm H2O suction, no air leak, serosanguinous fluid output.  Abdomen: Soft, non distended, and non tender.  Extremities: Warm, moves all extremities.  Neurologic:  Grossly intact with no focal deficits.            Impression:  ESRD on dialysis (CMS/[facility])  Coronary artery disease involving native coronary artery of native heart with angina pectoris (CMS/[facility])  Type 2 diabetes mellitus, without long-term current use of insulin (CMS/[facility])  Chronic anemia  Hypervolemia  Persistent dry cough  Pleural effusion on right      Plan:  Continue right chest  tube suction for now  Pleural fluid cytology pending   AD[facility] patient and nursing   Anticipate removal of chest tube soon  [facility] Nephrology       JESSIE Najera  06/10/21  11:24 CDT

## 2021-06-10 NOTE — PROGRESS NOTES
[facility] Medicine Services  INPATIENT PROGRESS NOTE    Length of Stay: 6  Date of Admission: 6/4/2021  Primary Care Physician: Cristian Hernandez MD    Subjective     Chief Complaint:     Shortness of breath    HPI     The patient continues to do well.  Chest tube remains attached to wall suction at 20 cm.  She continues to have some mild right chest discomfort.  Nephrology has seen the patient and agrees that the Vas-Cath can be removed.  CT surgery continues to follow and manage the chest tube.  The patient had about 1400 cc out via peritoneal dialysis last night.  Creatinine today 8.5 with sodium 134 and glucose 156.  CT surgery may remove the chest tube today.  If the patient tolerates chest tube removal, it is likely that she can be discharged tomorrow.      Review of Systems     All pertinent negatives and positives are as above. All other systems have been reviewed and are negative unless otherwise stated.     Objective    Temp:  [97.6 °F (36.4 °C)-99 °F (37.2 °C)] 98.1 °F (36.7 °C)  Heart Rate:  [58-73] 65  Resp:  [16] 16  BP: (112-156)/(56-72) 129/72    Lab Results (last 24 hours)     Procedure Component Value Units Date/Time    POC Glucose Once [[id number]]  (Abnormal) Collected: 06/10/21 1206    Specimen: Blood Updated: 06/10/21 1217     Glucose 155 mg/dL      Comment: : [id number] Ayla ChampionMeter ID: [id number]       Basic Metabolic Panel [[id number]]  (Abnormal) Collected: 06/10/21 0834    Specimen: Blood Updated: 06/10/21 0915     Glucose 156 mg/dL      BUN 37 mg/dL      Creatinine 8.50 mg/dL      Sodium 134 mmol/L      Potassium 4.4 mmol/L      Chloride 94 mmol/L      CO2 27.0 mmol/L      Calcium 9.4 mg/dL      eGFR   Amer --     Comment: <15 Indicative of kidney failure.        eGFR Non African Amer 5 mL/min/1.73      Comment: <15 Indicative of kidney failure.        BUN/Creatinine Ratio 4.4     Anion Gap 13.0 mmol/L     Narrative:      GFR Normal  >60  Chronic Kidney Disease <60  Kidney Failure <15      POC Glucose Once [[id number]]  (Abnormal) Collected: 06/10/21 0822    Specimen: Blood Updated: 06/10/21 0833     Glucose 168 mg/dL      Comment: : [id number] Ayla AkersMeter ID: [id number]       Anaerobic Culture - Pleural Fluid, Pleural Cavity [[id number]] Collected: 06/05/21 1619    Specimen: Pleural Fluid from Pleural Cavity Updated: 06/10/21 0608     Anaerobic Culture No anaerobes isolated at 5 days    POC Glucose Once [[id number]]  (Abnormal) Collected: 06/09/21 2009    Specimen: Blood Updated: 06/09/21 2020     Glucose 181 mg/dL      Comment: : [id number] Jennifer Mendieta ID: [id number]             Imaging Results (Last 24 Hours)     Procedure Component Value Units Date/Time    XR Chest 1 View [[id number]] Collected: 06/10/21 0704     Updated: 06/10/21 0709    Narrative:      EXAMINATION: XR CHEST 1 VW- 6/10/2021 7:04 AM CDT     HISTORY: pleural effusion; E87.70-Fluid overload, unspecified;  N18.9-Chronic kidney disease, unspecified; Z56-Bdtxmfc effusion, not  elsewhere classified; X41-Fuvxgvp effusion, not elsewhere classified.     REPORT: A frontal view of the chest was obtained.     COMPARISON: Chest x-ray 6/9/2021 0350 hours.     The right internal jugular central venous catheter remains in  satisfactory position. The lungs are hypoaerated, there is mild  improvement in an area of consolidation in the left lateral lung base,  with associated small left effusion. There is a right basilar chest tube  which remains in good position. There is mild atelectasis at the right  base, with no focal infiltrate in the right. Heart size is normal.  Previous CABG is noted. No pneumothorax is identified.       Impression:      Stable satisfactory position of the right basilar chest tube  and right internal jugular central venous catheter. No pneumothorax is  identified. There is mild improvement in the opacities in the left  lateral lung base.  This  report was finalized on 06/10/2021 07:06 by Dr. Saji Mullen MD.             Intake/Output Summary (Last 24 hours) at 6/10/2021 1504  Last data filed at 6/10/2021 0800  Gross per 24 hour   Intake 240 ml   Output 70 ml   Net 170 ml       Physical Exam  Constitutional:       Appearance: Normal appearance.   HENT:      Head: Normocephalic and atraumatic.      Nose: No congestion or rhinorrhea.      Mouth: Mucous membranes are moist.      Pharynx: Oropharynx is clear.   Eyes:      General: No scleral icterus.     Conjunctiva/sclera: Conjunctivae normal.   Cardiovascular:      Rate and Rhythm: Normal rate and regular rhythm.   Pulmonary:      Effort: Pulmonary effort is normal.      Breath sounds: Normal breath sounds.      Comments: Diminished both bases  Abdominal:      General: Abdomen is flat. Bowel sounds are normal. There is no distension.      Palpations: Abdomen is soft. There is no mass.   Skin:     General: Skin is warm.      Coloration: Skin is pale. Skin is not jaundiced.   Neurological:      General: No focal deficit present.      Mental Status: She is alert and oriented to person, place, and time.   Psychiatric:         Mood and Affect: Mood normal    Results Review:  I have reviewed the labs, radiology results, and diagnostic studies since my last progress note and made treatment changes reflective of the results.   I have reviewed the current medications.    Assessment/Plan     Active Hospital Problems    Diagnosis    • Persistent dry cough    • Pleural effusion on right    • Hypervolemia    • Chronic anemia    • Type 2 diabetes mellitus, without long-term current use of insulin (CMS/[facility])    • ESRD on dialysis (CMS/[facility])    • Coronary artery disease involving native coronary artery of native heart with angina pectoris (CMS/[facility])        PLAN:  Chest tube management per CT surgery with possible removal today  Peritoneal dialysis and fluid management per nephrology with possible discontinuation of Vas-Cath  today    Electronically signed by Cl Heart DO, 06/10/21, 15:04 CDT.

## 2021-06-10 NOTE — TELEPHONE ENCOUNTER
Son left  message asking to have a call back from Dr Wood re: pt.  Can reach him ([place]) at #[phone]/[email or web address]

## 2021-06-10 NOTE — SIGNIFICANT NOTE
Spoke with at the office Rosey at the office regarding permacath removal. Dr. Jarrett will be notified

## 2021-06-10 NOTE — PROGRESS NOTES
Nephrology ([facility]) Progress Note      Patient:  Jennifer Salcido  YOB: 1955  Date of Service: 6/10/2021  MRN: [id number]   Acct: [id number]   Primary Care Physician: Cristian Hernandez MD  Advance Directive:   Code Status and Medical Interventions:   Ordered at: 06/04/21 1728     Level Of Support Discussed With:    Patient     Code Status:    CPR     Medical Interventions (Level of Support Prior to Arrest):    Full     Admit Date: 6/4/2021       Hospital Day: 6  Referring Provider: No ref. provider found      Patient personally seen and examined.  Complete chart including Consults, Notes, Operative Reports, Labs, Cardiology, and Radiology studies reviewed as able.        Subjective:  Jennifer Salcido is a 65 y.o. female  whom we were consulted for end stage renal disease on peritoneal dialysis. History of diabetic nephropathy. Recent prior admission for elective CABG, temporarily was converted to hemodialysis. Has since converted back to [facility].  Presented this time with dyspnea. Found to have large right pleural effusion. She had emergency hemodialysis on 6/05 to correct volume status. Also had pleurocentesis on 6/05.  On 6/07 had chest tube placed. PD was held on 6/07, resumed 6/08 and has been tolerating well since then. Getting 1+ liter UF with each treatment last few days.    Today is feeling better, no new overnight issues. Chest tube remains in place, draining well.    Allergies:  Codeine, Demerol [meperidine], Levaquin [levofloxacin], Lisinopril, Morphine, Sulfasalazine, and Ultram [tramadol]    Home Meds:  Medications Prior to Admission   Medication Sig Dispense Refill Last Dose   • allopurinol (ZYLOPRIM) 100 MG tablet Take 100 mg by mouth Daily.   6/4/2021 at Unknown time   • ascorbic acid (VITAMIN C) 500 MG tablet Take 500 mg by mouth Daily.   6/4/2021 at Unknown time   • aspirin 81 MG EC tablet Take 81 mg by mouth Daily.   6/4/2021 at Unknown time   • atorvastatin  (LIPITOR) 20 MG tablet Take 1 tablet by mouth Every Night. 30 tablet 2 6/3/2021 at Unknown time   • bisacodyl (Dulcolax) 10 MG suppository Insert 10 mg into the rectum Daily As Needed for Constipation.   Past Month at Unknown time   • calcium carbonate (TUMS) 500 MG chewable tablet Chew 2 tablets Every Night.   6/3/2021 at Unknown time   • citalopram (CeleXA) 40 MG tablet Take 40 mg by mouth Daily.   6/4/2021 at Unknown time   • docusate sodium (COLACE) 100 MG capsule Take 200 mg by mouth As Needed for Constipation.   6/3/2021 at Unknown time   • famotidine (PEPCID) 20 MG tablet Take 1 tablet by mouth Daily. 30 tablet 0 6/4/2021 at Unknown time   • HYDROcodone-acetaminophen (NORCO) 5-325 MG per tablet Take 1 tablet by mouth Every 8 (Eight) Hours As Needed (pain). 10 tablet 0 Past Month at Unknown time   • insulin detemir (LEVEMIR) 100 UNIT/ML injection Inject 12 Units under the skin into the appropriate area as directed Daily.   6/4/2021 at Unknown time   • irbesartan (AVAPRO) 75 MG tablet Take 1 tablet by mouth Daily. 30 tablet 2 6/4/2021 at Unknown time   • Melatonin 5 MG capsule Take 5 mg by mouth Every Night.   6/3/2021 at Unknown time   • metoprolol tartrate (LOPRESSOR) 25 MG tablet Take 1 tablet by mouth Every 12 (Twelve) Hours. 60 tablet 2 6/4/2021 at Unknown time   • Multiple Vitamins-Minerals (MULTIVITAMIN ADULT PO) Take 1 tablet by mouth Daily.   6/4/2021 at Unknown time   • pantoprazole (PROTONIX) 40 MG EC tablet Take 40 mg by mouth 2 (two) times a day.   6/4/2021 at Unknown time   • sucralfate (CARAFATE) 1 g tablet Dissolve 1 tablet in water to create a slurry and drink QID; AC & HS PRN   6/4/2021 at Unknown time   • Sucroferric Oxyhydroxide (Velphoro) 500 MG chewable tablet Chew 1 tablet 3 (Three) Times a Day. WITH MEALS   6/4/2021 at Unknown time   • vitamin D3 125 MCG (5000 UT) capsule capsule Take 1 tablet by mouth Daily.   6/4/2021 at Unknown time   • nitroglycerin (NITROSTAT) 0.4 MG SL tablet 1  under the tongue as needed for angina, may repeat q5mins for up three doses 25 tablet 3        Medicines:  Current Facility-Administered Medications   Medication Dose Route Frequency Provider Last Rate Last Admin   • acetaminophen (TYLENOL) tablet 650 mg  650 mg Oral Q6H PRN Antony Wood MD   650 mg at 06/10/21 0948    Or   • acetaminophen (TYLENOL) 160 MG/5ML solution 650 mg  650 mg Oral Q6H PRN Antony Wood MD        Or   • acetaminophen (TYLENOL) suppository 650 mg  650 mg Rectal Q6H PRN Antony Wood MD       • allopurinol (ZYLOPRIM) tablet 100 mg  100 mg Oral Daily Antony Wood MD   100 mg at 06/10/21 0945   • ascorbic acid (VITAMIN C) tablet 500 mg  500 mg Oral Daily Antony Wood MD   500 mg at 06/10/21 0945   • aspirin EC tablet 81 mg  81 mg Oral Daily Antony Wood MD   81 mg at 06/10/21 0945   • atorvastatin (LIPITOR) tablet 20 mg  20 mg Oral Nightly Antony Wood MD   20 mg at 06/09/21 2044   • benzonatate (TESSALON) capsule 200 mg  200 mg Oral Q4H PRN Antony Wood MD       • bisacodyl (DULCOLAX) suppository 10 mg  10 mg Rectal Daily PRN Antony Wood MD       • calcium carbonate (TUMS) chewable tablet 500 mg (200 mg elemental)  2 tablet Oral Nightly Antony Wood MD   2 tablet at 06/09/21 2044   • citalopram (CeleXA) tablet 40 mg  40 mg Oral Nightly Antony Wood MD   40 mg at 06/09/21 2044   • dextrose (D50W) 25 g/ 50mL Intravenous Solution 25 g  25 g Intravenous Q15 Min PRN Antony Wood MD       • dextrose (GLUTOSE) oral gel 15 g  15 g Oral Q15 Min PRN Antony Wood MD       • diphenhydrAMINE (BENADRYL) capsule 25 mg  25 mg Oral Q4H PRN Antony Wood MD        Or   • diphenhydrAMINE (BENADRYL) injection 25 mg  25 mg Intravenous Q4H PRN Antony Wood MD       • docusate sodium (COLACE) capsule 200 mg  200 mg Oral Daily PRN Antony Wood MD       • famotidine (PEPCID) tablet 20 mg  20 mg Oral Nightly Antony Wood,  MD   20 mg at 06/09/21 2045   • glucagon (human recombinant) (GLUCAGEN DIAGNOSTIC) injection 1 mg  1 mg Subcutaneous Q15 Min PRN Antony Wood MD       • guaiFENesin (MUCINEX) 12 hr tablet 600 mg  600 mg Oral Q12H Antony Wood MD   600 mg at 06/10/21 0946   • HYDROcodone-acetaminophen (NORCO) 5-325 MG per tablet 1 tablet  1 tablet Oral Q8H PRN Antony Wood MD   1 tablet at 06/08/21 0547   • insulin detemir (LEVEMIR) injection 10 Units  10 Units Subcutaneous Daily Antony Wood MD       • insulin lispro (humaLOG) injection 0-9 Units  0-9 Units Subcutaneous 4x Daily With Meals & Nightly Antony Wood MD   6 Units at 06/06/21 2105   • lactated ringers infusion  9 mL/hr Intravenous Continuous Antony Wood MD       • losartan (COZAAR) tablet 25 mg  25 mg Oral Q24H Antony Wood MD   25 mg at 06/09/21 1228   • melatonin tablet 3 mg  3 mg Oral Nightly Antony Wood MD   3 mg at 06/09/21 2045   • metoprolol tartrate (LOPRESSOR) tablet 25 mg  25 mg Oral Q12H Antony Wood MD   25 mg at 06/10/21 0944   • multivitamin with minerals 1 tablet  1 tablet Oral Daily Antony Wood MD   1 tablet at 06/10/21 0945   • nitroglycerin (NITROSTAT) SL tablet 0.4 mg  0.4 mg Sublingual Q5 Min PRN Antony Wood MD       • ondansetron (ZOFRAN) tablet 4 mg  4 mg Oral Q6H PRN Antony Wood MD        Or   • ondansetron (ZOFRAN) injection 4 mg  4 mg Intravenous Q6H PRN Antony Wood MD   4 mg at 06/07/21 0023   • ondansetron (ZOFRAN) injection 4 mg  4 mg Intravenous Q2H PRN Antony Wood MD   4 mg at 06/04/21 2311   • sodium chloride 0.9 % bolus 100 mL  100 mL Intravenous PRN Antony Wood MD       • sodium chloride 0.9 % flush 10 mL  10 mL Intravenous Q12H Antony Wood MD   10 mL at 06/10/21 0945   • sodium chloride 0.9 % flush 10 mL  10 mL Intravenous PRN Antony Wood MD       • sucralfate (CARAFATE) tablet 1 g  1 g Oral BID AC Antony Wood MD   1 g at  06/10/21 0636       Past Medical History:  Past Medical History:   Diagnosis Date   • Arthritis     LEFT SHOULDER   • CHF (congestive heart failure) (CMS/HCC)    • Chronic pericarditis 4/22/2021   • Collar bone fracture 12/2020    SLING PLACED TO HEAL   • Coronary artery disease     LEFT MAIN AND 3 OTHER AREAS--CABG SCHEDULED FOR APRIL 22, 2021   • Dyslipidemia 9/28/2020   • Elevated cholesterol    • End stage kidney disease (CMS/[facility])     currently HD (5/19/2021), hopes to return to PD    • Essential hypertension 11/2/2018   • Gastroesophageal reflux disease without esophagitis 4/28/2021   • GERD (gastroesophageal reflux disease)    • Gout    • History of transfusion    • Hyperlipidemia    • Hypertension    • Overweight (BMI 25.0-29.9) 4/22/2021   • Peritoneal dialysis status (CMS/HCC)     EVERY NIGHT FOR 10 HOURS, (5/19/2021 currently on hold)   • PONV (postoperative nausea and vomiting)    • Sleep apnea     RESOLVED   • Status post gastric bypass for obesity 4/22/2021   • Type 2 diabetes mellitus with kidney complication, with long-term current use of insulin (CMS/HCC) 11/2/2018       Past Surgical History:  Past Surgical History:   Procedure Laterality Date   • BREAST BIOPSY Right     FATTY TUMOR   • CARDIAC CATHETERIZATION N/A 10/1/2019    Procedure: Left Heart Cath;  Surgeon: Srikanth Coker MD;  Location:  [facility] CATH INVASIVE LOCATION;  Service: Cardiology   • CARDIAC CATHETERIZATION N/A 7/14/2020    Procedure: Left Heart Cath;  Surgeon: Srikanth Coker MD;  Location:  [facility] CATH INVAS[facility];  Service: Cardiology;  Laterality: N/A;   • CARDIAC CATHETERIZATION N/A 3/23/2021    Procedure: Left Heart Cath;  Surgeon: Srikanth Coker MD;  Location:  [facility] CATH INVAS[facility];  Service: Cardiology;  Laterality: N/A;   • CHEST TUBE INSERTION Right 6/7/2021    Procedure: CHEST TUBE INSERTION;  Surgeon: Antony Wood MD;  Location:  [facility] OR;  Service: Cardiothoracic;  Laterality: Right;   • CORONARY  ANGIOPLASTY WITH STENT PLACEMENT  2016    X 2   • CORONARY ARTERY BYPASS GRAFT N/A 2021    Procedure: CORONARY ARTERY BYPASS GRAFT X 3 WITH LEFT INTERNAL MAMMARY ARTERY, RIGHT LOWER EXTREMITY ENDOSCOPIC VEIN HARVEST, AND PERFUSION; TRANSESOPHAGEAL ECHOCARDIOGRAM;  Surgeon: Antony Wood MD;  Location: [facility] OR;  Service: Cardiothoracic;  Laterality: N/A;   • EYE SURGERY Bilateral     IOC LENS IMPLANTS   • EYE SURGERY Bilateral     RETINAL SURGERY   • GALLBLADDER SURGERY     • GASTRIC BYPASS     • HYSTERECTOMY     • INSERTION HEMODIALYSIS CATHETER Right 2021    Procedure: HEMODIALYSIS CATHETER INSERTION;  Surgeon: Simon Coates MD;  Location: [facility] HYBRID OR 12;  Service: Vascular;  Laterality: Right;   • INSERTION PERITONEAL DIALYSIS CATHETER     • OOPHORECTOMY Bilateral    • SINUS SURGERY     • SINUS SURGERY  1980   • TUBAL ABDOMINAL LIGATION         Family History  Family History   Problem Relation Age of Onset   • Hypertension Mother    • Lung disease Mother    • Heart disease Father    • Diabetes Father    • Abnormal EKG Father    • Breast cancer Neg Hx        Social History  Social History     Socioeconomic History   • Marital status:      Spouse name: Not on file   • Number of children: Not on file   • Years of education: Not on file   • Highest education level: Not on file   Tobacco Use   • Smoking status: Former Smoker     Packs/day: 0.00     Years: 2.00     Pack years: 0.00     Types: Cigarettes     Quit date:      Years since quittin.4   • Smokeless tobacco: Never Used   Vaping Use   • Vaping Use: Never used   Substance and Sexual Activity   • Alcohol use: Not Currently     Comment: haven't drank in 3 years ( 2018)    • Drug use: No   • Sexual activity: Defer         Review of Systems:  History obtained from chart review and the patient  General ROS: No fever or chills  Respiratory ROS: No cough, shortness of breath, wheezing  Cardiovascular ROS: No chest pain  or palpitations  Gastrointestinal ROS: No abdominal pain or melena  Genito-Urinary ROS: No dysuria or hematuria    Objective:  Patient Vitals for the past 24 hrs:   BP Temp Temp src Pulse Resp SpO2 Weight   06/10/21 0700 138/58 97.6 °F (36.4 °C) Oral 73 16 99 % --   06/10/21 0606 -- -- -- -- -- -- 65.5 kg (144 lb 4.8 oz)   06/10/21 0306 112/56 98.1 °F (36.7 °C) Oral 58 16 99 % --   06/09/21 2106 156/70 98.7 °F (37.1 °C) Oral 70 16 97 % --   06/09/21 1605 154/57 99 °F (37.2 °C) Oral 67 16 100 % --   06/09/21 1140 150/66 98.7 °F (37.1 °C) Oral 63 16 97 % --       Intake/Output Summary (Last 24 hours) at 6/10/2021 1105  Last data filed at 6/10/2021 0639  Gross per 24 hour   Intake 480 ml   Output 70 ml   Net 410 ml     General: awake/alert    Neck: supple, no JVD  Chest:  clear to auscultation bilaterally without respiratory distress, right chest tube  CVS: regular rate and rhythm  Abdominal: soft, nontender, positive bowel sounds  Extremities: no cyanosis or edema  Skin: warm and dry without rash      Labs:  Results from last 7 days   Lab Units 06/07/21  0036 06/05/21  0440 06/04/21  1233   WBC 10*3/mm3 8.77 8.76 8.91   HEMOGLOBIN g/dL 10.2* 10.2* 10.8*   HEMATOCRIT % 31.2* 32.0* 33.0*   PLATELETS 10*3/mm3 324 354 508*         Results from last 7 days   Lab Units 06/10/21  0834 06/07/21  0036 06/05/21  0440 06/04/21  1233   SODIUM mmol/L 134* 133* 136 142   POTASSIUM mmol/L 4.4 3.5 3.9 3.5   CHLORIDE mmol/L 94* 97* 96* 99   CO2 mmol/L 27.0 20.0* 29.0 26.0   BUN mg/dL 37* 29* 26* 46*   CREATININE mg/dL 8.50* 6.13* 6.17* 8.96*   CALCIUM mg/dL 9.4 8.5* 8.7 9.4   BILIRUBIN mg/dL  --  0.3  --  0.3   ALK PHOS U/L  --  119*  --  122*   ALT (SGPT) U/L  --  7  --  6   AST (SGOT) U/L  --  17  --  17   GLUCOSE mg/dL 156* 147* 62* 125*       Radiology:   Imaging Results (Last 72 Hours)     Procedure Component Value Units Date/Time    XR Chest 1 View [[id number]] Collected: 06/10/21 0704     Updated: 06/10/21 0709    Narrative:       EXAMINATION: XR CHEST 1 - 6/10/2021 7:04 AM CDT     HISTORY: pleural effusion; E87.70-Fluid overload, unspecified;  N18.9-Chronic kidney disease, unspecified; J74-Bnngtni effusion, not  elsewhere classified; H17-Tjcpbnz effusion, not elsewhere classified.     REPORT: A frontal view of the chest was obtained.     COMPARISON: Chest x-ray 6/9/2021 0350 hours.     The right internal jugular central venous catheter remains in  satisfactory position. The lungs are hypoaerated, there is mild  improvement in an area of consolidation in the left lateral lung base,  with associated small left effusion. There is a right basilar chest tube  which remains in good position. There is mild atelectasis at the right  base, with no focal infiltrate in the right. Heart size is normal.  Previous CABG is noted. No pneumothorax is identified.       Impression:      Stable satisfactory position of the right basilar chest tube  and right internal jugular central venous catheter. No pneumothorax is  identified. There is mild improvement in the opacities in the left  lateral lung base.  This report was finalized on 06/10/2021 07:06 by Dr. Saji Mullen MD.    XR Chest 1 View [[id number]] Collected: 06/09/21 0722     Updated: 06/09/21 0727    Narrative:      EXAMINATION: XR CHEST 1 - 6/9/2021 7:22 AM CDT     HISTORY: pleural effusion; E87.70-Fluid overload, unspecified;  N18.9-Chronic kidney disease, unspecified; Z61-Ecvcbqz effusion, not  elsewhere classified; X08-Qkydmlb effusion, not elsewhere classified.     REPORT: A frontal view the chest was obtained.     COMPARISON: Chest x-ray 6/8/2021 0354 hours.     The lungs are hypoaerated, there is partial consolidation of the left  lung base with blunting of the costophrenic angle, unchanged. No focal  infiltrates in the right lung. Heart size is normal. There is previous  median sternotomy. The right internal jugular central venous catheter  appears in satisfactory position as before.  No pneumothorax is  identified. The right basilar chest tube appears in good position as  before.       Impression:      Stable 1 day appearance of the chest.  This report was finalized on 06/09/2021 07:24 by Dr. Saji Mullen MD.    XR Chest 1 View [[id number]] Collected: 06/08/21 0717     Updated: 06/08/21 0721    Narrative:      EXAMINATION: XR CHEST 1 VW- 6/8/2021 7:17 AM CDT     HISTORY: pleural effusion; E87.70-Fluid overload, unspecified;  N18.9-Chronic kidney disease, unspecified; H70-Fluuyqm effusion, not  elsewhere classified; O13-Eceqdjg effusion, not elsewhere classified.     REPORT: A frontal view the chest was obtained.     COMPARISON: Chest x-ray 6/7/2021 1732 hours.     There is persistent and increased consolidation of the left lung base  suspicious for pneumonia and probable combined atelectasis. There is a  right basilar chest tube which appears in satisfactory position. The  right internal jugular central venous catheter remains in satisfactory  position. No pneumothorax is identified. There is borderline  cardiomegaly and evidence of previous CABG, without overt CHF.  Underlying COPD is evident. The osseous structures are unremarkable.       Impression:      Increasing consolidation of the left lung base concerning  for pneumonia versus atelectasis with a small effusion. The right  basilar chest tube remains in satisfactory position. No other change.  This report was finalized on 06/08/2021 07:18 by Dr. Saji Mullen MD.    XR Chest 1 View [[id number]] Collected: 06/07/21 1802     Updated: 06/07/21 1807    Narrative:      EXAMINATION:  XR CHEST 1 VW-  6/7/2021 5:43 PM CDT     HISTORY: Pleural effusion. E87.70-Fluid overload, unspecified;  N18.9-Chronic kidney disease, unspecified; P60-Qtnbbvc effusion, not  elsewhere classified; T37-Hkcbays effusion, not elsewhere classified.     COMPARISON: 6/7/2021.     FINDINGS:  There is better inspiration. There is a new right chest tube.  Bilateral  infiltrates are nearly resolved. There is mild patchy  infiltrate along the left hemidiaphragm. There is blunting of the left  costophrenic angle. Heart size is within normal limits. There has been  prior median sternotomy. There is a dialysis catheter on the right.       Impression:      1. New right chest tube in place. Right pleural effusion appears to be  nearly resolved.  2. Small left pleural effusion.  3. Infiltrate at the left lung base. Probable atelectasis. Pneumonia  less likely.        This report was finalized on 06/07/2021 18:04 by Dr. Sukhi Ramirez MD.     Chest [[id number]] Collected: 06/07/21 1715     Updated: 06/07/21 1720    Narrative:      HISTORY: Chest tube insertion     Ultrasound chest: Sonographic imaging of the right posterior chest  performed to assist Dr. Wood with chest tube insertion. Images  demonstrate a moderate right pleural fluid collection with post  placement images demonstrating evacuation of right pleural fluid.  This report was finalized on 06/07/2021 17:17 by Dr. Kimberlee Blankenship MD.          Culture:  No results found for: BLOODCX, URINECX, WOUNDCX, MRSACX, RESPCX, STOOLCX      Assessment   1.  End stage renal disease on peritoneal dialysis  2.  Type 2 diabetes with nephropathy  3.  Right pleural effusion--s/p chest tube placement on 6/07  4.  Anemia of CKD  5.  Recent prior CABG  6.  Hyponatremia     Plan:  1.  Continue maintenance peritoneal dialysis tonight.  2.  If patient continues tolerating PD and continues to maintain adequate UF with her treatments, will need to consider removing permcath soon.  3.  Discussed with JESSIE Ansari with CT surgery  4.  Monitor labs      JESSIE Short  6/10/2021  11:05 CDT

## 2021-06-10 NOTE — PLAN OF CARE
Problem: Adult Inpatient Plan of Care  Goal: Plan of Care Review  Outcome: Ongoing, Progressing  Flowsheets (Taken 6/10/2021 9495)  Progress: no change  Plan of Care Reviewed With: patient  Outcome Summary: Patient has no c/o pain. Chest tube in place and patent. PD during the night. SB/SR 59-79 on tele. VSS. Safety maintained. Will notify MD of any changes.

## 2021-06-10 NOTE — PROGRESS NOTES
[facility]  INPATIENT PROGRESS NOTE    Length of Stay: 6  Date of Admission: 6/4/2021  Primary Care Physician: Cristian Hernandez MD    Subjective     Chief Complaint:     Shortness of breath    HPI     The patient continues to do well after chest tube placement.  Chest tube remains attached to wall suction with minimal output noted.  Oxygen saturations remain good.  CT surgery continues to follow and manage the chest tube.  She asked me if she could have the hemodialysis catheter access discontinued.  I will defer that to nephrology and have asked the patient to speak with them about it.      Review of Systems     All pertinent negatives and positives are as above. All other systems have been reviewed and are negative unless otherwise stated.     Objective    Temp:  [97.6 °F (36.4 °C)-99 °F (37.2 °C)] 98.1 °F (36.7 °C)  Heart Rate:  [58-73] 65  Resp:  [16] 16  BP: (112-156)/(56-72) 129/72    Lab Results (last 24 hours)     Procedure Component Value Units Date/Time    POC Glucose Once [[id number]]  (Abnormal) Collected: 06/10/21 1206    Specimen: Blood Updated: 06/10/21 1217     Glucose 155 mg/dL      Comment: : [id number] Ayla ChampionMeter ID: [id number]       Basic Metabolic Panel [[id number]]  (Abnormal) Collected: 06/10/21 0834    Specimen: Blood Updated: 06/10/21 0915     Glucose 156 mg/dL      BUN 37 mg/dL      Creatinine 8.50 mg/dL      Sodium 134 mmol/L      Potassium 4.4 mmol/L      Chloride 94 mmol/L      CO2 27.0 mmol/L      Calcium 9.4 mg/dL      eGFR   Amer --     Comment: <15 Indicative of kidney failure.        eGFR Non African Amer 5 mL/min/1.73      Comment: <15 Indicative of kidney failure.        BUN/Creatinine Ratio 4.4     Anion Gap 13.0 mmol/L     Narrative:      GFR Normal >60  Chronic Kidney Disease <60  Kidney Failure <15      POC Glucose Once [[id number]]  (Abnormal) Collected: 06/10/21 0822    Specimen: Blood Updated: 06/10/21 0833      Glucose 168 mg/dL      Comment: : [id number] Ayla AkersMetprem ID: [id number]       Anaerobic Culture - Pleural Fluid, Pleural Cavity [[id number]] Collected: 06/05/21 1619    Specimen: Pleural Fluid from Pleural Cavity Updated: 06/10/21 0608     Anaerobic Culture No anaerobes isolated at 5 days    POC Glucose Once [[id number]]  (Abnormal) Collected: 06/09/21 2009    Specimen: Blood Updated: 06/09/21 2020     Glucose 181 mg/dL      Comment: : [id number] Jennifer Mendieta ID: [id number]             Imaging Results (Last 24 Hours)     Procedure Component Value Units Date/Time    XR Chest 1 View [[id number]] Collected: 06/10/21 0704     Updated: 06/10/21 0709    Narrative:      EXAMINATION: XR CHEST 1 VW- 6/10/2021 7:04 AM CDT     HISTORY: pleural effusion; E87.70-Fluid overload, unspecified;  N18.9-Chronic kidney disease, unspecified; I92-Jnivsld effusion, not  elsewhere classified; Q97-Bvadnrh effusion, not elsewhere classified.     REPORT: A frontal view of the chest was obtained.     COMPARISON: Chest x-ray 6/9/2021 0350 hours.     The right internal jugular central venous catheter remains in  satisfactory position. The lungs are hypoaerated, there is mild  improvement in an area of consolidation in the left lateral lung base,  with associated small left effusion. There is a right basilar chest tube  which remains in good position. There is mild atelectasis at the right  base, with no focal infiltrate in the right. Heart size is normal.  Previous CABG is noted. No pneumothorax is identified.       Impression:      Stable satisfactory position of the right basilar chest tube  and right internal jugular central venous catheter. No pneumothorax is  identified. There is mild improvement in the opacities in the left  lateral lung base.  This report was finalized on 06/10/2021 07:06 by Dr. Saji Mullen MD.             Intake/Output Summary (Last 24 hours) at 6/10/2021 1516  Last data filed at 6/10/2021  0800  Gross per 24 hour   Intake 240 ml   Output 70 ml   Net 170 ml       Physical Exam  Constitutional:       Appearance: Normal appearance.   HENT:      Head: Normocephalic and atraumatic.      Nose: No congestion or rhinorrhea.      Mouth: Mucous membranes are moist.      Pharynx: Oropharynx is clear.   Eyes:      General: No scleral icterus.     Conjunctiva/sclera: Conjunctivae normal.   Cardiovascular:      Rate and Rhythm: Normal rate and regular rhythm.   Pulmonary:      Effort: Pulmonary effort is normal.      Breath sounds: Normal breath sounds.      Comments: Decreased both bases  Abdominal:      General: Abdomen is flat. Bowel sounds are normal. There is no distension.      Palpations: Abdomen is soft. There is no mass.   Skin:     General: Skin is warm.      Coloration: Skin is pale. Skin is not jaundiced.   Neurological:      General: No focal deficit present.      Mental Status: She is alert and oriented to person, place, and time.   Psychiatric:         Mood and Affect: Mood normal    Results Review:  I have reviewed the labs, radiology results, and diagnostic studies since my last progress note and made treatment changes reflective of the results.   I have reviewed the current medications.    Assessment/Plan     Active Hospital Problems    Diagnosis    • Persistent dry cough    • Pleural effusion on right    • Hypervolemia    • Chronic anemia    • Type 2 diabetes mellitus, without long-term current use of insulin (CMS/[facility])    • ESRD on dialysis (CMS/[facility])    • Coronary artery disease involving native coronary artery of native heart with angina pectoris (CMS/[facility])        PLAN:  Chest tube management per CT surgery.  Peritoneal dialysis and fluid management per nephrology    Electronically signed by Cl Heart DO, 06/09/21, 15:16 CDT.

## 2021-06-11 NOTE — PROCEDURES
Jennifer Salcido  [id number]  [id number]  06/11/21  445/1    PERMACATH REMOVAL PROCEDURE NOTE    PREOPERATIVE DIAGNOSIS: CAD, need for temporary hemodialysis    POSTOPERATIVE DIAGNOSIS: CAD, need for temporary hemodialysis    PROCEDURE PERFORMED: Removal of right internal jugular vein Permcath    PERFORMED BY: Simon Coates MD    ANESTHESIA: NONE    ESTIMATED BLOOD LOSS: Less than 5 ml.    COMPLICATIONS: None    INDICATIONS: The patient is a 65 y.o. female who has a prior history of end-stage renal disease.  She was on peritoneal dialysis.  However recently she underwent CABG and there is need to transition her for a brief period to hemodialysis in the perioperative period.  As such back in April I placed a right internal jugular vein permacath.  She successfully had her cardiac surgery and tolerated short course of hemodialysis and now has transition back to peritoneal dialysis.  She is currently here in the hospital now and had a right pleural effusion and had placement of a chest tube for drainage and then chest tube was removed today.  While here request was made to remove her permacath given that she is back on peritoneal dialysis and no longer has need for the permacath.    DESCRIPTION OF PROCEDURE:  After the patient was correctly identified, the patient was placed in the supine position in her hospital bed.  The stitches of the catheter were removed.  The catheter was easily removed completely intact and direct pressure was held over the right internal jugular vein for hemostasis for 15 minutes.  Sterile dressings were applied.  The patient tolerated the procedure well.    Simon Coates MD  06/11/2021  09:08 CDT

## 2021-06-11 NOTE — DISCHARGE PLACEMENT REQUEST
"From:  Tala Worley, BSW  [phone]    SW spoke to Sury.    Willy Salcido (65 y.o. Female)     Date of Birth Social Security Number Address Home Phone MRN    1955  [facility] [place] [address] [phone] [id number]    Druze Marital Status          [place]        Admission Date Admission Type Admitting Provider Attending Provider Department, Room/Bed    6/4/21 Emergency Cl Heart DO Robinson, Maurice S, DO [facility] 4B, 445/1    Discharge Date Discharge Disposition Discharge Destination         Home or Self Care              Attending Provider: Cl Heart DO    Allergies: Codeine, Demerol [Meperidine], Levaquin [Levofloxacin], Lisinopril, Morphine, Sulfasalazine, Ultram [Tramadol]    Isolation: None   Infection: None   Code Status: CPR    Ht: 160 cm (62.99\")   Wt: 65.2 kg (143 lb 12.8 oz)    Admission Cmt: None   Principal Problem: None                Active Insurance as of 6/4/2021     Primary Coverage     Payor Plan Insurance Group Employer/Plan Group    MEDICARE MEDICARE A & B      Payor Plan Address Payor Plan Phone Number Payor Plan Fax Number Effective Dates    [address] [phone]  6/1/2006 - None Entered    [facility] [id number]       Subscriber Name Subscriber Birth Date Member WILLY SANCHEZ 1955 [id number]           Secondary Coverage     Payor Plan Insurance Group Employer/Plan Group    [employer] SUP   [facility] SUP                   Coverage Address Coverage Phone Number Coverage Fax Number Effective Dates    [address] [address] [phone]  1/1/2021 - None Entered    [facility] [id number]       Subscriber Name Subscriber Birth Date Member WILLY SANCHEZ 1955 [id number]                 Emergency Contacts      (Rel.) Home Phone Work Phone Mobile Phone    Troy Salcido (Son) [phone] -- --    DANIEL SALCIDO (Son) -- -- [phone]    OTIS SALCIDO (Relative) -- -- [phone]        Ambulatory " Referral to Home Health [[id number]] (Order [id number])  Order  Date: 6/11/2021 Department: [address] Ordering/Authorizing: Cl Heart DO   Order History  Outpatient  Date/Time Action Taken User Additional Information   06/11/21 1438 Sign Cl Heart DO    Order Details    Frequency Duration Priority Order Class   None None Routine Internal Referral   Start Date/Time    Start Date   06/11/21   Order Information    Order Date Service Start Date Start Time   06/11/21 Medicine 06/11/21    Reference Links    Associated Reports External References   View Encounter Current Health Referral Information   Order Questions    Question Answer Comment   Face to Face Visit Date: 6/11/2021    Follow-up provider for Plan of Care? I treated the patient in an acute care facility and will not continue treatment after discharge.    Follow-up provider: SUSI POLANCO    Reason/Clinical Findings generalized weakness.  Assistance with peritoneal dialysis    Describe mobility limitations that make leaving home difficult: Generalized weakness.  Dependence on peritoneal dialysis    Nursing/Therapeutic Services Requested Skilled Nursing    Skilled nursing orders: Other    Frequency: 1 Week 1           Source Order Set / Preference List    Order Set   [facility] GEN EXPRESS DISCHARGE [[id number]]   Clinical Indications     ICD-10-CM ICD-9-CM   Chronic kidney disease, unspecified CKD stage     N18.9 585.9   Reprint Order Requisition    Ambulatory Referral to Home Health (Order #[id number]) on 6/11/21   Encounter    View Encounter          Order Provider Info        Office phone Pager E-mail   Ordering User Cl Heart [phone] -- --   Authorizing Provider Cl Heart [phone] -- --   Attending Provider Cl Heart [phone] -- --   Linked Charges    No charges linked to this procedure   Tracking Reports  Cosign Tracking Order Transmittal Tracking   Authorized by:   Cl Heart DO  (NPI: [id number])       Lab Component SmartPhrase Guide    Ambulatory Referral to Home Health (Order #[id number]) on 6/11/21            History & Physical      Antony Wood MD at 06/06/21 1600          H&P reviewed. The patient was examined and there are no changes to the H&P.          Electronically signed by Antony Wood MD at 06/07/21 0741   Source Note            Chief Complaint   Patient presents with   • Shortness of Breath         Subjective     History of Present Illness    65-year-old female well-known to me having undergone coronary artery bypass grafting by me with known renal failure now presents with increasing shortness of breath.  While she did well perioperatively as an inpatient at home she has struggled making the transition from peritoneal dialysis to hemodialysis.  She was seen earlier by Katheryn De Jesus requiring additional bouts of hemodialysis to achieve euvolemia.  It appears she is required additional admissions since then.  Dr. Perez and I did discuss her care with thoracocentesis performed.  There is a residual effusion.  The fluid was fairly bloody.  She is breathing comfortably today.      Review of Systems   Constitutional: Positive for activity change and fatigue. Negative for appetite change, chills, diaphoresis, fever and unexpected weight change.   HENT: Negative for dental problem, hearing loss, nosebleeds, sore throat, trouble swallowing and voice change.    Eyes: Negative for photophobia, redness and visual disturbance.   Respiratory: Positive for shortness of breath. Negative for apnea, cough, chest tightness, wheezing and stridor.    Cardiovascular: Positive for leg swelling. Negative for chest pain and palpitations.   Gastrointestinal: Negative for abdominal distention, abdominal pain, blood in stool, constipation, diarrhea, nausea and vomiting.   Endocrine: Negative for cold intolerance, heat intolerance, polyphagia and polyuria.      Genitourinary: Negative for decreased urine volume, difficulty urinating, dysuria, flank pain, frequency, hematuria and urgency.   Musculoskeletal: Positive for back pain. Negative for arthralgias, gait problem, joint swelling, myalgias and neck pain.   Skin: Negative for pallor, rash and wound.   Allergic/Immunologic: Negative for immunocompromised state.   Neurological: Positive for weakness. Negative for dizziness, tremors, seizures, syncope, speech difficulty, light-headedness, numbness and headaches.   Hematological: Bruises/bleeds easily.   Psychiatric/Behavioral: Negative for confusion, sleep disturbance and suicidal ideas. The patient is not nervous/anxious.           Past Medical History:   Diagnosis Date   • Arthritis     LEFT SHOULDER   • CHF (congestive heart failure) (CMS/HCC)    • Collar bone fracture 12/2020    SLING PLACED TO HEAL   • Coronary artery disease     LEFT MAIN AND 3 OTHER AREAS--CABG SCHEDULED FOR APRIL 22, 2021   • Elevated cholesterol    • End stage kidney disease (CMS/HCC)     currently HD (5/19/2021), hopes to return to [facility]    • GERD (gastroesophageal reflux disease)    • Gout    • History of transfusion    • Hyperlipidemia    • Hypertension    • Peritoneal dialysis status (CMS/HCC)     EVERY NIGHT FOR 10 HOURS, (5/19/2021 currently on hold)   • PONV (postoperative nausea and vomiting)    • Sleep apnea     RESOLVED   • Type 2 diabetes mellitus with kidney complication, with long-term current use of insulin (CMS/HCC) 11/2/2018     Past Surgical History:   Procedure Laterality Date   • BREAST BIOPSY Right     FATTY TUMOR   • CARDIAC CATHETERIZATION N/A 10/1/2019    Procedure: Left Heart Cath;  Surgeon: Srikanth Coker MD;  Location:  [facility] CATH INVASIVE LOCATION;  Service: Cardiology   • CARDIAC CATHETERIZATION N/A 7/14/2020    Procedure: Left Heart Cath;  Surgeon: Srikanth Coker MD;  Location:  [facility] CATH INVASIVE LOCATION;  Service: Cardiology;  Laterality: N/A;   • CARDIAC  CATHETERIZATION N/A 3/23/2021    Procedure: Left Heart Cath;  Surgeon: Srikanth Coker MD;  Location: [facility] CATH INVASIVE LOCATION;  Service: Cardiology;  Laterality: N/A;   • CORONARY ANGIOPLASTY WITH STENT PLACEMENT  2016    X 2   • CORONARY ARTERY BYPASS GRAFT N/A 2021    Procedure: CORONARY ARTERY BYPASS GRAFT X 3 WITH LEFT INTERNAL MAMMARY ARTERY, RIGHT LOWER EXTREMITY ENDOSCOPIC VEIN HARVEST, AND PERFUSION; TRANSESOPHAGEAL ECHOCARDIOGRAM;  Surgeon: Antony Wood MD;  Location: [facility] OR;  Service: Cardiothoracic;  Laterality: N/A;   • EYE SURGERY Bilateral     IOC LENS IMPLANTS   • EYE SURGERY Bilateral     RETINAL SURGERY   • GALLBLADDER SURGERY     • GASTRIC BYPASS     • HYSTERECTOMY     • INSERTION HEMODIALYSIS CATHETER Right 2021    Procedure: HEMODIALYSIS CATHETER INSERTION;  Surgeon: Simon Coates MD;  Location: [facility] HYBRID OR 12;  Service: Vascular;  Laterality: Right;   • INSERTION PERITONEAL DIALYSIS CATHETER     • OOPHORECTOMY Bilateral    • SINUS SURGERY     • SINUS SURGERY  1980   • TUBAL ABDOMINAL LIGATION       Family History   Problem Relation Age of Onset   • Hypertension Mother    • Lung disease Mother    • Heart disease Father    • Diabetes Father    • Abnormal EKG Father    • Breast cancer Neg Hx      Social History     Tobacco Use   • Smoking status: Former Smoker     Packs/day: 0.00     Years: 2.00     Pack years: 0.00     Types: Cigarettes     Quit date:      Years since quittin.4   • Smokeless tobacco: Never Used   Vaping Use   • Vaping Use: Never used   Substance Use Topics   • Alcohol use: Not Currently     Comment: haven't drank in 3 years ( 2018)    • Drug use: No     Medications Prior to Admission   Medication Sig Dispense Refill Last Dose   • allopurinol (ZYLOPRIM) 100 MG tablet Take 100 mg by mouth Daily.      • ascorbic acid (VITAMIN C) 500 MG tablet Take 500 mg by mouth Daily.      • aspirin 81 MG EC tablet Take 81 mg by mouth Daily.      •  atorvastatin (LIPITOR) 20 MG tablet Take 1 tablet by mouth Every Night. 30 tablet 2    • bisacodyl (Dulcolax) 10 MG suppository Insert 10 mg into the rectum Daily As Needed for Constipation.      • calcium carbonate (TUMS) 500 MG chewable tablet Chew 2 tablets Every Night.      • citalopram (CeleXA) 40 MG tablet Take 40 mg by mouth Daily.      • docusate sodium (COLACE) 100 MG capsule Take 200 mg by mouth As Needed for Constipation.      • famotidine (PEPCID) 20 MG tablet Take 1 tablet by mouth Daily. 30 tablet 0    • gentamicin (GARAMYCIN) 0.1 % ointment Apply 1 application topically to the appropriate area as directed 3 (Three) Times a Day. AT SITE OF PERITONEAL DIALYSIS CATH      • HYDROcodone-acetaminophen (NORCO) 5-325 MG per tablet Take 1 tablet by mouth Every 8 (Eight) Hours As Needed (pain). 10 tablet 0    • insulin detemir (LEVEMIR) 100 UNIT/ML injection Inject 18 Units under the skin into the appropriate area as directed Daily.      • irbesartan (AVAPRO) 75 MG tablet Take 1 tablet by mouth Daily. 30 tablet 2    • Melatonin 5 MG capsule Take 5 mg by mouth Every Night.      • metoprolol tartrate (LOPRESSOR) 25 MG tablet Take 1 tablet by mouth Every 12 (Twelve) Hours. 60 tablet 2    • Multiple Vitamins-Minerals (MULTIVITAMIN ADULT PO) Take 1 tablet by mouth Daily.      • nitroglycerin (NITROSTAT) 0.4 MG SL tablet 1 under the tongue as needed for angina, may repeat q5mins for up three doses 25 tablet 3    • pantoprazole (PROTONIX) 40 MG EC tablet Take 40 mg by mouth 2 (two) times a day.      • sucralfate (CARAFATE) 1 g tablet Take 1 g by mouth 2 (two) times a day.      • Sucroferric Oxyhydroxide (Velphoro) 500 MG chewable tablet Chew 1 tablet 3 (Three) Times a Day. WITH MEALS      • vitamin D3 125 MCG (5000 UT) capsule capsule Take 1 tablet by mouth Daily.        Allergies:  Codeine, Demerol [meperidine], Levaquin [levofloxacin], Lisinopril, Morphine, Sulfasalazine, and Ultram [tramadol]    Objective           Vital Signs  Temp:  [97.9 °F (36.6 °C)-99.7 °F (37.6 °C)] 99.7 °F (37.6 °C)  Heart Rate:  [65-84] 65  Resp:  [16-20] 16  BP: ()/() 130/57    Physical Exam  Constitutional:       Appearance: She is well-developed.   HENT:      Head: Normocephalic and atraumatic.   Eyes:      Pupils: Pupils are equal, round, and reactive to light.   Neck:      Thyroid: No thyromegaly.      Vascular: No JVD.      Trachea: No tracheal deviation.   Cardiovascular:      Rate and Rhythm: Normal rate and regular rhythm.      Heart sounds: Normal heart sounds. No murmur heard.   No friction rub. No gallop.    Pulmonary:      Effort: Pulmonary effort is normal. No respiratory distress.      Breath sounds: Normal breath sounds. No wheezing or rales.   Chest:      Chest wall: No tenderness.   Abdominal:      General: There is no distension.      Palpations: Abdomen is soft.      Tenderness: There is no abdominal tenderness.   Musculoskeletal:         General: Normal range of motion.      Cervical back: Normal range of motion and neck supple.   Lymphadenopathy:      Cervical: No cervical adenopathy.   Skin:     General: Skin is warm and dry.      Comments: Saphenectomy incision sites on the right lower extremity are healing nicely.  Some residual bruising is noted but the leg is healing nicely.    The sternum is stable. No clicks.  The sternotomy incision is C/D/I.       Neurological:      Mental Status: She is alert and oriented to person, place, and time.      Cranial Nerves: No cranial nerve deficit.         Results Review:   CT Abdomen Pelvis Without Contrast    Result Date: 5/19/2021  Narrative: CT ABDOMEN PELVIS WO CONTRAST- 5/19/2021 4:45 PM CDT  HISTORY: Abdominal distension; E87.70-Fluid overload, unspecified  COMPARISON: None  DOSE LENGTH PRODUCT: 330 mGy cm. Automated exposure control was also utilized to decrease patient radiation dose.  TECHNIQUE: Axial images and pelvis are obtained without IV or oral contrast.   FINDINGS:  There are moderate size bilateral pleural effusions with a small pericardial effusion. Compressive atelectasis of the right middle and lower lobe parenchyma.  The nonenhanced liver, spleen, pancreas, and adrenal glands are unremarkable. There are cholecystectomy clips. Cortical renal atrophy. No abnormal perinephric fluid collection. No hydronephrosis. Peritoneal catheter curled within the lower pelvis. Only trace fluid seen within the peritoneum. No free intraperitoneal air loculated abscess. No evidence for bowel obstruction. Decompressed colon. Fatty appearing ileocecal valve. Appendix measures 7 to 8 mm in width with no  Appendiceal inflammation. Questionable wall thickening of the appendix. No small bowel dilatation. Decompressed stomach. Postoperative changes of the stomach. Small hiatal hernia.  Diffuse vascular calcifications with no aneurysm.  Stranding of the subcutaneous fat of the anterior abdominal wall likely related to medicinal injections.  Structures osseous lesions.      Impression: 1. Moderate size bilateral pleural effusions with compressive atelectasis of the lung bases. Small pericardial effusion. 2. No evidence for bowel obstruction. Peritoneal catheter curled within the lower pelvis with only trace free fluid in the pelvis. No free air or loculated abscess. 3. Appendix is identified measuring 7 to 8 mm with no periappendiceal inflammation. Questionable wall thickening of the appendix. Clinical correlation for early appendicitis recommended. 4. Cortical renal atrophy. 5. Diffuse vascular calcification. This report was finalized on 05/19/2021 17:01 by Dr. Kimberlee Blankenship MD.    XR Chest 1 View    Result Date: 6/5/2021  Narrative: EXAMINATION: XR CHEST 1 VW- 6/5/2021 4:14 PM CDT  HISTORY: thoracentesis; E87.70-Fluid overload, unspecified; N18.9-Chronic kidney disease, unspecified; C43-Wnbljjl effusion, not elsewhere classified.  REPORT: A frontal view of the chest was obtained.   COMPARISON: Chest x-ray 6/4/2021, CTA chest 6/5/2021.  There is marked reduction in volume of the right pleural effusion following thoracentesis, no pneumothorax is identified. A small left pleural effusion is unchanged. There is moderate atelectasis in the lung bases greater on the right. Heart size is normal. Previous CABG is noted. The right-sided dialysis catheter appears in good position as before. No other change.      Impression: Marked reduction in volume of the right pleural effusion following thoracentesis with no pneumothorax. There is residual atelectasis in the lung bases greater on the right. This report was finalized on 06/05/2021 16:15 by Dr. Saji Mullen MD.    XR Chest 1 View    Result Date: 6/4/2021  Narrative: EXAM: XR CHEST 1 VW-  INDICATION: Shortness of air  COMPARISON: 5/19/2020  FINDINGS:  Increased moderate size RIGHT pleural effusion with overlying atelectasis. Small LEFT pleural effusion, slightly decreased, with adjacent atelectasis. Cardiac silhouette is enlarged but stable. Prior median sternotomy. Right-sided CVL with tips overlying the RIGHT atrium. No visible pneumothorax. No suspicious osseous finding.      Impression:  1.  Increased moderate RIGHT pleural effusion with overlying atelectasis. 2.  Slight decrease in LEFT pleural effusion with overlying atelectasis. This report was finalized on 06/04/2021 16:01 by Dr. Sravan Carlisle MD.    XR Chest 1 View    Result Date: 5/19/2021  Narrative: EXAM: XR CHEST 1 VW- 5/19/2021 11:58 AM CDT  HISTORY: SOB x 1 week   COMPARISON: May 5, 2021.  TECHNIQUE: Frontal radiograph of the chest  FINDINGS: Small right lateral pleural fluid collections present. Moderate left pleural fluid collections present.. Cardiac silhouettes upper limits of normal. Wires are present previous median sternotomy. Double lumen right internal jugular catheters present..  The osseous structures and surrounding soft tissues demonstrate no acute abnormality.            Impression: 1. Bilateral pleural effusions again most likely this is due to a systemic fluid overload..   This report was finalized on 05/19/2021 12:08 by Dr. Elmer Thomas MD.    CT Angiogram Chest    Result Date: 6/5/2021  Narrative: EXAMINATION: CT ANGIOGRAM CHEST- 6/5/2021 10:31 AM CDT  HISTORY: pleural effusion -right; E87.70-Fluid overload, unspecified; N18.9-Chronic kidney disease, unspecified; T06-Pdtqrza effusion, not elsewhere classified  DOSE: 400 mGycm (Automatic exposure control technique was implemented in an effort to keep the radiation dose as low as possible without compromising image quality)  REPORT: Spiral CT of the chest was performed after administration of intravenous contrast from the thoracic inlet through the upper abdomen using CTA protocol. Reconstructed coronal and sagittal images were also reviewed.  Comparison: CTA chest 3/11/2021.  The contrast bolus is satisfactory, there is moderate respiratory motion artifact. The central pulmonary arteries are normal caliber without filling defects. There is poor visualization of peripheral pulmonary artery branches due to respiratory motion. Heart size is normal. No evidence of right heart strain is identified. There is a right-sided internal jugular dialysis catheter which appears in satisfactory position. There is a small amount calcified plaque within the thoracic aortic arch with normal aortic caliber. No evidence of aortic dissection. There is previous median sternotomy. There is a large new pleural effusion on the right, with diffuse atelectasis, particularly involving the right middle and lower lobes. This also small new left pleural effusion. Lung windows show no pneumothorax or obvious consolidation to indicate pneumonia. There are nodules in the thyroid gland as well as calcifications. The largest nodule appears cystic, in the left lobe and measures 1.6 cm. There is a moderate-sized hiatal hernia and evidence previous gastric  surgery. A small pericardial effusion is present. This is new. The visualized upper abdomen is otherwise unremarkable. Review of bone windows is acutely unremarkable. There is mild displacement of the upper sternotomy with poor healing.      Impression: 1. Limited exam due to excessive respiratory motion, with no gross evidence of central pulmonary emboli. 2. New large pleural effusion on the right, new small left pleural effusion and new small pericardial effusion. There is extensive passive atelectasis in the right lung, particularly involving the right middle and lower lobes. 3. No pneumothorax is identified. No evidence of intrathoracic lymphadenopathy. 4. Evidence of interval median sternotomy, with poor healing of the sternotomy.     This report was finalized on 06/05/2021 10:39 by Dr. Saji Mullen MD.    XR Chest PA & Lateral    Result Date: 6/6/2021  Narrative: EXAMINATION: XR CHEST PA AND LATERAL- 6/6/2021 3:07 PM CDT  HISTORY: follow up after right thoracentesis; E87.70-Fluid overload, unspecified; N18.9-Chronic kidney disease, unspecified; M98-Ciexdqt effusion, not elsewhere classified.  REPORT: Frontal and lateral views of the chest were obtained.  COMPARISON: Chest x-rays 6/5/2021 1550 hours.  The lungs remain hypoaerated, with moderate pleural effusions, right greater than left, the right pleural effusion appears slightly increased in volume. There is extensive bibasilar atelectasis. No pneumothorax is identified. Heart size is normal. There is evidence of previous median sternotomy. The right-sided dialysis catheter appears in good position as before. No acute osseous abnormality.      Impression: Extensive bibasilar atelectasis with persistent pleural effusions, right greater than left, the right pleural effusion appears slightly increased in overall volume. No other change. This report was finalized on 06/06/2021 15:09 by Dr. Saji Mullen MD.     I reviewed the patient's new clinical  results.  Discussed with patient      Assessment/Plan       ESRD on dialysis (CMS/[facility])    Coronary artery disease involving native coronary artery of native heart with angina pectoris (CMS/[facility])    Type 2 diabetes mellitus, without long-term current use of insulin (CMS/[facility])    Chronic anemia    Hypervolemia    Persistent dry cough    Pleural effusion on right        Discussed with patient's findings and recommendations with both the patient and with Dr. Perez.  She has a recurrent right pleural effusion.  I believe her best course of care would be to place a large bore chest tube given the reported sanguinity of pleural effusion.  At this time, I discussed the operative procedure and she expressed much concern as such we will plan for a MAC with anesthesiology for placement ultrasound-guided right large bore chest tube placement.  Risks, benefits, and alternatives of the procedure were discussed at length.  The operative conduct discussed at length.  Plan for this in the a.m.  She is to be made n.p.o. after midnight.    All questions been answered to the best my ability and she is agreeable to proceed forward.      Electronically signed by Antony Wood MD at 06/07/21 0737             Antony Wood MD at 06/06/21 1600            Chief Complaint   Patient presents with   • Shortness of Breath         Subjective     History of Present Illness    65-year-old female well-known to me having undergone coronary artery bypass grafting by me with known renal failure now presents with increasing shortness of breath.  While she did well perioperatively as an inpatient at home she has struggled making the transition from peritoneal dialysis to hemodialysis.  She was seen earlier by Katheryn De Jesus requiring additional bouts of hemodialysis to achieve euvolemia.  It appears she is required additional admissions since then.  Dr. Perez and I did discuss her care with thoracocentesis performed.  There is a residual  effusion.  The fluid was fairly bloody.  She is breathing comfortably today.      Review of Systems   Constitutional: Positive for activity change and fatigue. Negative for appetite change, chills, diaphoresis, fever and unexpected weight change.   HENT: Negative for dental problem, hearing loss, nosebleeds, sore throat, trouble swallowing and voice change.    Eyes: Negative for photophobia, redness and visual disturbance.   Respiratory: Positive for shortness of breath. Negative for apnea, cough, chest tightness, wheezing and stridor.    Cardiovascular: Positive for leg swelling. Negative for chest pain and palpitations.   Gastrointestinal: Negative for abdominal distention, abdominal pain, blood in stool, constipation, diarrhea, nausea and vomiting.   Endocrine: Negative for cold intolerance, heat intolerance, polyphagia and polyuria.   Genitourinary: Negative for decreased urine volume, difficulty urinating, dysuria, flank pain, frequency, hematuria and urgency.   Musculoskeletal: Positive for back pain. Negative for arthralgias, gait problem, joint swelling, myalgias and neck pain.   Skin: Negative for pallor, rash and wound.   Allergic/Immunologic: Negative for immunocompromised state.   Neurological: Positive for weakness. Negative for dizziness, tremors, seizures, syncope, speech difficulty, light-headedness, numbness and headaches.   Hematological: Bruises/bleeds easily.   Psychiatric/Behavioral: Negative for confusion, sleep disturbance and suicidal ideas. The patient is not nervous/anxious.           Past Medical History:   Diagnosis Date   • Arthritis     LEFT SHOULDER   • CHF (congestive heart failure) (CMS/[facility])    • Collar bone fracture 12/2020    SLING PLACED TO HEAL   • Coronary artery disease     LEFT MAIN AND 3 OTHER AREAS--CABG SCHEDULED FOR APRIL 22, 2021   • Elevated cholesterol    • End stage kidney disease (CMS/[facility])     currently HD (5/19/2021), hopes to return to PD    • GERD (gastroesophageal  reflux disease)    • Gout    • History of transfusion    • Hyperlipidemia    • Hypertension    • Peritoneal dialysis status (CMS/[facility])     EVERY NIGHT FOR 10 HOURS, (5/19/2021 currently on hold)   • PONV (postoperative nausea and vomiting)    • Sleep apnea     RESOLVED   • Type 2 diabetes mellitus with kidney complication, with long-term current use of insulin (CMS/[facility]) 11/2/2018     Past Surgical History:   Procedure Laterality Date   • BREAST BIOPSY Right     FATTY TUMOR   • CARDIAC CATHETERIZATION N/A 10/1/2019    Procedure: Left Heart Cath;  Surgeon: Srikanth Coker MD;  Location:  [facility] CATH INVASIVE LOCATION;  Service: Cardiology   • CARDIAC CATHETERIZATION N/A 7/14/2020    Procedure: Left Heart Cath;  Surgeon: Srikanth Coker MD;  Location:  [facility] CATH INVASIVE LOCATION;  Service: Cardiology;  Laterality: N/A;   • CARDIAC CATHETERIZATION N/A 3/23/2021    Procedure: Left Heart Cath;  Surgeon: Srikanth Coker MD;  Location:  [facility] CATH INVASIVE LOCATION;  Service: Cardiology;  Laterality: N/A;   • CORONARY ANGIOPLASTY WITH STENT PLACEMENT  2016    X 2   • CORONARY ARTERY BYPASS GRAFT N/A 4/22/2021    Procedure: CORONARY ARTERY BYPASS GRAFT X 3 WITH LEFT INTERNAL MAMMARY ARTERY, RIGHT LOWER EXTREMITY ENDOSCOPIC VEIN HARVEST, AND PERFUSION; TRANSESOPHAGEAL ECHOCARDIOGRAM;  Surgeon: Antony Wood MD;  Location: [facility] OR;  Service: Cardiothoracic;  Laterality: N/A;   • EYE SURGERY Bilateral     IOC LENS IMPLANTS   • EYE SURGERY Bilateral     RETINAL SURGERY   • GALLBLADDER SURGERY     • GASTRIC BYPASS     • HYSTERECTOMY     • INSERTION HEMODIALYSIS CATHETER Right 4/1/2021    Procedure: HEMODIALYSIS CATHETER INSERTION;  Surgeon: Simon Coates MD;  Location: [facility] HYBRID OR 12;  Service: Vascular;  Laterality: Right;   • INSERTION PERITONEAL DIALYSIS CATHETER     • OOPHORECTOMY Bilateral    • SINUS SURGERY     • SINUS SURGERY  07/1980   • TUBAL ABDOMINAL LIGATION       Family History   Problem  Relation Age of Onset   • Hypertension Mother    • Lung disease Mother    • Heart disease Father    • Diabetes Father    • Abnormal EKG Father    • Breast cancer Neg Hx      Social History     Tobacco Use   • Smoking status: Former Smoker     Packs/day: 0.00     Years: 2.00     Pack years: 0.00     Types: Cigarettes     Quit date:      Years since quittin.4   • Smokeless tobacco: Never Used   Vaping Use   • Vaping Use: Never used   Substance Use Topics   • Alcohol use: Not Currently     Comment: haven't drank in 3 years ( 2018)    • Drug use: No     Medications Prior to Admission   Medication Sig Dispense Refill Last Dose   • allopurinol (ZYLOPRIM) 100 MG tablet Take 100 mg by mouth Daily.      • ascorbic acid (VITAMIN C) 500 MG tablet Take 500 mg by mouth Daily.      • aspirin 81 MG EC tablet Take 81 mg by mouth Daily.      • atorvastatin (LIPITOR) 20 MG tablet Take 1 tablet by mouth Every Night. 30 tablet 2    • bisacodyl (Dulcolax) 10 MG suppository Insert 10 mg into the rectum Daily As Needed for Constipation.      • calcium carbonate (TUMS) 500 MG chewable tablet Chew 2 tablets Every Night.      • citalopram (CeleXA) 40 MG tablet Take 40 mg by mouth Daily.      • docusate sodium (COLACE) 100 MG capsule Take 200 mg by mouth As Needed for Constipation.      • famotidine (PEPCID) 20 MG tablet Take 1 tablet by mouth Daily. 30 tablet 0    • gentamicin (GARAMYCIN) 0.1 % ointment Apply 1 application topically to the appropriate area as directed 3 (Three) Times a Day. AT SITE OF PERITONEAL DIALYSIS CATH      • HYDROcodone-acetaminophen (NORCO) 5-325 MG per tablet Take 1 tablet by mouth Every 8 (Eight) Hours As Needed (pain). 10 tablet 0    • insulin detemir (LEVEMIR) 100 UNIT/ML injection Inject 18 Units under the skin into the appropriate area as directed Daily.      • irbesartan (AVAPRO) 75 MG tablet Take 1 tablet by mouth Daily. 30 tablet 2    • Melatonin 5 MG capsule Take 5 mg by mouth Every Night.      •  metoprolol tartrate (LOPRESSOR) 25 MG tablet Take 1 tablet by mouth Every 12 (Twelve) Hours. 60 tablet 2    • Multiple Vitamins-Minerals (MULTIVITAMIN ADULT PO) Take 1 tablet by mouth Daily.      • nitroglycerin (NITROSTAT) 0.4 MG SL tablet 1 under the tongue as needed for angina, may repeat q5mins for up three doses 25 tablet 3    • pantoprazole (PROTONIX) 40 MG EC tablet Take 40 mg by mouth 2 (two) times a day.      • sucralfate (CARAFATE) 1 g tablet Take 1 g by mouth 2 (two) times a day.      • Sucroferric Oxyhydroxide (Velphoro) 500 MG chewable tablet Chew 1 tablet 3 (Three) Times a Day. WITH MEALS      • vitamin D3 125 MCG (5000 UT) capsule capsule Take 1 tablet by mouth Daily.        Allergies:  Codeine, Demerol [meperidine], Levaquin [levofloxacin], Lisinopril, Morphine, Sulfasalazine, and Ultram [tramadol]    Objective      Vital Signs  Temp:  [97.9 °F (36.6 °C)-99.7 °F (37.6 °C)] 99.7 °F (37.6 °C)  Heart Rate:  [65-84] 65  Resp:  [16-20] 16  BP: ()/() 130/57    Physical Exam  Constitutional:       Appearance: She is well-developed.   HENT:      Head: Normocephalic and atraumatic.   Eyes:      Pupils: Pupils are equal, round, and reactive to light.   Neck:      Thyroid: No thyromegaly.      Vascular: No JVD.      Trachea: No tracheal deviation.   Cardiovascular:      Rate and Rhythm: Normal rate and regular rhythm.      Heart sounds: Normal heart sounds. No murmur heard.   No friction rub. No gallop.    Pulmonary:      Effort: Pulmonary effort is normal. No respiratory distress.      Breath sounds: Normal breath sounds. No wheezing or rales.   Chest:      Chest wall: No tenderness.   Abdominal:      General: There is no distension.      Palpations: Abdomen is soft.      Tenderness: There is no abdominal tenderness.   Musculoskeletal:         General: Normal range of motion.      Cervical back: Normal range of motion and neck supple.   Lymphadenopathy:      Cervical: No cervical adenopathy.    Skin:     General: Skin is warm and dry.      Comments: Saphenectomy incision sites on the right lower extremity are healing nicely.  Some residual bruising is noted but the leg is healing nicely.    The sternum is stable. No clicks.  The sternotomy incision is C/D/I.       Neurological:      Mental Status: She is alert and oriented to person, place, and time.      Cranial Nerves: No cranial nerve deficit.         Results Review:   CT Abdomen Pelvis Without Contrast    Result Date: 5/19/2021  Narrative: CT ABDOMEN PELVIS WO CONTRAST- 5/19/2021 4:45 PM CDT  HISTORY: Abdominal distension; E87.70-Fluid overload, unspecified  COMPARISON: None  DOSE LENGTH PRODUCT: 330 mGy cm. Automated exposure control was also utilized to decrease patient radiation dose.  TECHNIQUE: Axial images and pelvis are obtained without IV or oral contrast.  FINDINGS:  There are moderate size bilateral pleural effusions with a small pericardial effusion. Compressive atelectasis of the right middle and lower lobe parenchyma.  The nonenhanced liver, spleen, pancreas, and adrenal glands are unremarkable. There are cholecystectomy clips. Cortical renal atrophy. No abnormal perinephric fluid collection. No hydronephrosis. Peritoneal catheter curled within the lower pelvis. Only trace fluid seen within the peritoneum. No free intraperitoneal air loculated abscess. No evidence for bowel obstruction. Decompressed colon. Fatty appearing ileocecal valve. Appendix measures 7 to 8 mm in width with no  Appendiceal inflammation. Questionable wall thickening of the appendix. No small bowel dilatation. Decompressed stomach. Postoperative changes of the stomach. Small hiatal hernia.  Diffuse vascular calcifications with no aneurysm.  Stranding of the subcutaneous fat of the anterior abdominal wall likely related to medicinal injections.  Structures osseous lesions.      Impression: 1. Moderate size bilateral pleural effusions with compressive atelectasis of  the lung bases. Small pericardial effusion. 2. No evidence for bowel obstruction. Peritoneal catheter curled within the lower pelvis with only trace free fluid in the pelvis. No free air or loculated abscess. 3. Appendix is identified measuring 7 to 8 mm with no periappendiceal inflammation. Questionable wall thickening of the appendix. Clinical correlation for early appendicitis recommended. 4. Cortical renal atrophy. 5. Diffuse vascular calcification. This report was finalized on 05/19/2021 17:01 by Dr. Kimberlee Blankenship MD.    XR Chest 1 View    Result Date: 6/5/2021  Narrative: EXAMINATION: XR CHEST 1 VW- 6/5/2021 4:14 PM CDT  HISTORY: thoracentesis; E87.70-Fluid overload, unspecified; N18.9-Chronic kidney disease, unspecified; P39-Prryjpc effusion, not elsewhere classified.  REPORT: A frontal view of the chest was obtained.  COMPARISON: Chest x-ray 6/4/2021, CTA chest 6/5/2021.  There is marked reduction in volume of the right pleural effusion following thoracentesis, no pneumothorax is identified. A small left pleural effusion is unchanged. There is moderate atelectasis in the lung bases greater on the right. Heart size is normal. Previous CABG is noted. The right-sided dialysis catheter appears in good position as before. No other change.      Impression: Marked reduction in volume of the right pleural effusion following thoracentesis with no pneumothorax. There is residual atelectasis in the lung bases greater on the right. This report was finalized on 06/05/2021 16:15 by Dr. Saji Mullen MD.    XR Chest 1 View    Result Date: 6/4/2021  Narrative: EXAM: XR CHEST 1 VW-  INDICATION: Shortness of air  COMPARISON: 5/19/2020  FINDINGS:  Increased moderate size RIGHT pleural effusion with overlying atelectasis. Small LEFT pleural effusion, slightly decreased, with adjacent atelectasis. Cardiac silhouette is enlarged but stable. Prior median sternotomy. Right-sided CVL with tips overlying the RIGHT atrium. No  visible pneumothorax. No suspicious osseous finding.      Impression:  1.  Increased moderate RIGHT pleural effusion with overlying atelectasis. 2.  Slight decrease in LEFT pleural effusion with overlying atelectasis. This report was finalized on 06/04/2021 16:01 by Dr. Sravan Carlisle MD.    XR Chest 1 View    Result Date: 5/19/2021  Narrative: EXAM: XR CHEST 1 VW- 5/19/2021 11:58 AM CDT  HISTORY: SOB x 1 week   COMPARISON: May 5, 2021.  TECHNIQUE: Frontal radiograph of the chest  FINDINGS: Small right lateral pleural fluid collections present. Moderate left pleural fluid collections present.. Cardiac silhouettes upper limits of normal. Wires are present previous median sternotomy. Double lumen right internal jugular catheters present..  The osseous structures and surrounding soft tissues demonstrate no acute abnormality.       Impression: 1. Bilateral pleural effusions again most likely this is due to a systemic fluid overload..   This report was finalized on 05/19/2021 12:08 by Dr. Elmer Thomas MD.    CT Angiogram Chest    Result Date: 6/5/2021  Narrative: EXAMINATION: CT ANGIOGRAM CHEST- 6/5/2021 10:31 AM CDT  HISTORY: pleural effusion -right; E87.70-Fluid overload, unspecified; N18.9-Chronic kidney disease, unspecified; Q66-Wlcemxz effusion, not elsewhere classified  DOSE: 400 mGycm (Automatic exposure control technique was implemented in an effort to keep the radiation dose as low as possible without compromising image quality)  REPORT: Spiral CT of the chest was performed after administration of intravenous contrast from the thoracic inlet through the upper abdomen using CTA protocol. Reconstructed coronal and sagittal images were also reviewed.  Comparison: CTA chest 3/11/2021.  The contrast bolus is satisfactory, there is moderate respiratory motion artifact. The central pulmonary arteries are normal caliber without filling defects. There is poor visualization of peripheral pulmonary artery branches due to  respiratory motion. Heart size is normal. No evidence of right heart strain is identified. There is a right-sided internal jugular dialysis catheter which appears in satisfactory position. There is a small amount calcified plaque within the thoracic aortic arch with normal aortic caliber. No evidence of aortic dissection. There is previous median sternotomy. There is a large new pleural effusion on the right, with diffuse atelectasis, particularly involving the right middle and lower lobes. This also small new left pleural effusion. Lung windows show no pneumothorax or obvious consolidation to indicate pneumonia. There are nodules in the thyroid gland as well as calcifications. The largest nodule appears cystic, in the left lobe and measures 1.6 cm. There is a moderate-sized hiatal hernia and evidence previous gastric surgery. A small pericardial effusion is present. This is new. The visualized upper abdomen is otherwise unremarkable. Review of bone windows is acutely unremarkable. There is mild displacement of the upper sternotomy with poor healing.      Impression: 1. Limited exam due to excessive respiratory motion, with no gross evidence of central pulmonary emboli. 2. New large pleural effusion on the right, new small left pleural effusion and new small pericardial effusion. There is extensive passive atelectasis in the right lung, particularly involving the right middle and lower lobes. 3. No pneumothorax is identified. No evidence of intrathoracic lymphadenopathy. 4. Evidence of interval median sternotomy, with poor healing of the sternotomy.     This report was finalized on 06/05/2021 10:39 by Dr. Saji Mullen MD.    XR Chest PA & Lateral    Result Date: 6/6/2021  Narrative: EXAMINATION: XR CHEST PA AND LATERAL- 6/6/2021 3:07 PM CDT  HISTORY: follow up after right thoracentesis; E87.70-Fluid overload, unspecified; N18.9-Chronic kidney disease, unspecified; L97-Jywhbdq effusion, not elsewhere classified.   REPORT: Frontal and lateral views of the chest were obtained.  COMPARISON: Chest x-rays 6/5/2021 1550 hours.  The lungs remain hypoaerated, with moderate pleural effusions, right greater than left, the right pleural effusion appears slightly increased in volume. There is extensive bibasilar atelectasis. No pneumothorax is identified. Heart size is normal. There is evidence of previous median sternotomy. The right-sided dialysis catheter appears in good position as before. No acute osseous abnormality.      Impression: Extensive bibasilar atelectasis with persistent pleural effusions, right greater than left, the right pleural effusion appears slightly increased in overall volume. No other change. This report was finalized on 06/06/2021 15:09 by Dr. Saji Mullen MD.     I reviewed the patient's new clinical results.  Discussed with patient      Assessment/Plan       ESRD on dialysis (CMS/[facility])    Coronary artery disease involving native coronary artery of native heart with angina pectoris (CMS/[facility])    Type 2 diabetes mellitus, without long-term current use of insulin (CMS/[facility])    Chronic anemia    Hypervolemia    Persistent dry cough    Pleural effusion on right        Discussed with patient's findings and recommendations with both the patient and with Dr. Perez.  She has a recurrent right pleural effusion.  I believe her best course of care would be to place a large bore chest tube given the reported sanguinity of pleural effusion.  At this time, I discussed the operative procedure and she expressed much concern as such we will plan for a MAC with anesthesiology for placement ultrasound-guided right large bore chest tube placement.  Risks, benefits, and alternatives of the procedure were discussed at length.  The operative conduct discussed at length.  Plan for this in the a.m.  She is to be made n.p.o. after midnight.    All questions been answered to the best my ability and she is agreeable to proceed  forward.      Electronically signed by Antony Wood MD at 06/07/21 0737     Cathleen Lord APRN at 06/04/21 1625     Attestation signed by Feng Perez MD at 06/05/21 0051    I personally evaluated and examined the patient in conjunction with JESSIE River and agree with the assessment, treatment plan, and disposition of the patient as recorded by her. My history, exam, and further recommendations are:     Patient seen and examined at bedside.  Patient known to me from previous admissions.  Patient has large pleural effusion on right side that has greatly worsened.      Electronically signed by Feng Perez MD, 6/5/2021, 22:07 CDT.                      [facility] Medicine Services  HISTORY AND PHYSICAL    Date of Admission: 6/4/2021  Primary Care Physician: Cristian Hernandez MD    Subjective     Chief Complaint: Persistent shortness of breathing recall    History of Present Illness  Jennifer Salcido is a 65-year-old female with a past medical history of end-stage renal disease on peritoneal dialysis. Patient was started on hemodialysis 4/22/2021 due to undergoing three-vessel CABG. Patient recently admitted 5/19-5/20, 2021 for evaluation of nausea and vomiting and missing a day of dialysis.  Patient continued with hemodialysis until 5/28/2021 at which time she resumed her home peritoneal dialysis.  She presents today with shortness of breathing and complaints of persistent cough.  She denies sputum production.  She states she is extremely weak with severe shortness of breathing with minimal exertion.  She does have concerns there is a problem with her heart.  She has had no chest pain.  ER work-up reveals elevated troponin which is chronic, elevated BNP which is less than from previous admission also chronic.  Creatinine is 8.96 with a GFR of 4.  Imaging reveals right pleural effusion has increased in size from last evaluation however has been a slight decrease  in the left pleural effusion.  Condition is stable.  Patient is followed outpatient by Dr. Antony Wood and Dr. Srikanth Coker.  She is admitted for further evaluation treatment.    Review of Systems   A 10 point review of systems was completed, all negative except for those discussed in HPI    Past Medical History:   Past Medical History:   Diagnosis Date   • Arthritis     LEFT SHOULDER   • CHF (congestive heart failure) (CMS/[facility])    • Collar bone fracture 12/2020    SLING PLACED TO HEAL   • Coronary artery disease     LEFT MAIN AND 3 OTHER AREAS--CABG SCHEDULED FOR APRIL 22, 2021   • Elevated cholesterol    • End stage kidney disease (CMS/[facility])     currently HD (5/19/2021), hopes to return to PD    • GERD (gastroesophageal reflux disease)    • Gout    • History of transfusion    • Hyperlipidemia    • Hypertension    • Peritoneal dialysis status (CMS/[facility])     EVERY NIGHT FOR 10 HOURS, (5/19/2021 currently on hold)   • PONV (postoperative nausea and vomiting)    • Sleep apnea     RESOLVED   • Type 2 diabetes mellitus with kidney complication, with long-term current use of insulin (CMS/[facility]) 11/2/2018       Past Surgical History:   Past Surgical History:   Procedure Laterality Date   • BREAST BIOPSY Right     FATTY TUMOR   • CARDIAC CATHETERIZATION N/A 10/1/2019    Procedure: Left Heart Cath;  Surgeon: Srikanth Coker MD;  Location:  [facility] CATH INVASIVE LOCATION;  Service: Cardiology   • CARDIAC CATHETERIZATION N/A 7/14/2020    Procedure: Left Heart Cath;  Surgeon: Srikanth Coker MD;  Location:  [facility] CATH INVASIVE LOC[facility];  Service: Cardiology;  Laterality: N/A;   • CARDIAC CATHETERIZATION N/A 3/23/2021    Procedure: Left Heart Cath;  Surgeon: Srikanth Coker MD;  Location:  [facility] CATH INVASIVE LOCATION;  Service: Cardiology;  Laterality: N/A;   • CORONARY ANGIOPLASTY WITH STENT PLACEMENT  2016    X 2   • CORONARY ARTERY BYPASS GRAFT N/A 4/22/2021    Procedure: CORONARY ARTERY BYPASS GRAFT X 3 WITH LEFT INTERNAL  MAMMARY ARTERY, RIGHT LOWER EXTREMITY ENDOSCOPIC VEIN HARVEST, AND PERFUSION; TRANSESOPHAGEAL ECHOCARDIOGRAM;  Surgeon: Antony Wood MD;  Location:  [facility] OR;  Service: Cardiothoracic;  Laterality: N/A;   • EYE SURGERY Bilateral     IOC LENS IMPLANTS   • EYE SURGERY Bilateral     RETINAL SURGERY   • GALLBLADDER SURGERY     • GASTRIC BYPASS     • HYSTERECTOMY     • INSERTION HEMODIALYSIS CATHETER Right 4/1/2021    Procedure: HEMODIALYSIS CATHETER INSERTION;  Surgeon: Simon Coates MD;  Location: [facility] HYBRID OR 12;  Service: Vascular;  Laterality: Right;   • INSERTION PERITONEAL DIALYSIS CATHETER     • OOPHORECTOMY Bilateral    • SINUS SURGERY     • SINUS SURGERY  07/1980   • TUBAL ABDOMINAL LIGATION         Family History: family history includes Abnormal EKG in her father; Diabetes in her father; Heart disease in her father; Hypertension in her mother; Lung disease in her mother.    Social History:  reports that she quit smoking about 13 years ago. Her smoking use included cigarettes. She smoked 0.00 packs per day for 2.00 years. She has never used smokeless tobacco. She reports previous alcohol use. She reports that she does not use drugs.    Code Status: Full, if unable to speak for herself her son Troy will speak for her      Allergies:  Allergies   Allergen Reactions   • Codeine Nausea And Vomiting   • Demerol [Meperidine] Nausea Only   • Levaquin [Levofloxacin] Nausea And Vomiting   • Lisinopril Swelling   • Morphine Nausea And Vomiting   • Sulfasalazine Nausea And Vomiting   • Ultram [Tramadol] Mental Status Change       Medications:  Prior to Admission medications    Medication Sig Start Date End Date Taking? Authorizing Provider   allopurinol (ZYLOPRIM) 100 MG tablet Take 100 mg by mouth Daily.    Elmer Thao MD   ascorbic acid (VITAMIN C) 500 MG tablet Take 500 mg by mouth Daily. 12/8/20   Elmer Thao MD   aspirin 81 MG EC tablet Take 81 mg by mouth Daily.     Elmer Thao MD   atorvastatin (LIPITOR) 20 MG tablet Take 1 tablet by mouth Every Night. 4/28/21   Antony Wood MD   bisacodyl (Dulcolax) 10 MG suppository Insert 10 mg into the rectum Daily As Needed for Constipation.    Elmer Thao MD   calcium carbonate (TUMS) 500 MG chewable tablet Chew 2 tablets Every Night.    Elmer Thao MD   citalopram (CeleXA) 40 MG tablet Take 40 mg by mouth Daily.    Elmer Thao MD   docusate sodium (COLACE) 100 MG capsule Take 200 mg by mouth As Needed for Constipation.    Elmer Thao MD   famotidine (PEPCID) 20 MG tablet Take 1 tablet by mouth Daily. 4/28/21   Antony Wood MD   gentamicin (GARAMYCIN) 0.1 % ointment Apply 1 application topically to the appropriate area as directed 3 (Three) Times a Day. AT SITE OF PERITONEAL DIALYSIS CATH    Elmer Thao MD   HYDROcodone-acetaminophen (NORCO) 5-325 MG per tablet Take 1 tablet by mouth Every 8 (Eight) Hours As Needed (pain). 5/6/21   Katheryn De Jesus APRN   insulin detemir (LEVEMIR) 100 UNIT/ML injection Inject 18 Units under the skin into the appropriate area as directed Daily.    Elmer Thao MD   irbesartan (AVAPRO) 75 MG tablet Take 1 tablet by mouth Daily. 4/28/21   Antony Wood MD   Melatonin 5 MG capsule Take 5 mg by mouth Every Night.    Elmer Thao MD   metoprolol tartrate (LOPRESSOR) 25 MG tablet Take 1 tablet by mouth Every 12 (Twelve) Hours. 4/28/21   Antony Wood MD   Multiple Vitamins-Minerals (MULTIVITAMIN ADULT PO) Take 1 tablet by mouth Daily.    Elmer Thao MD   nitroglycerin (NITROSTAT) 0.4 MG SL tablet 1 under the tongue as needed for angina, may repeat q5mins for up three doses 5/11/21   Srikanth Coker MD   pantoprazole (PROTONIX) 40 MG EC tablet Take 40 mg by mouth 2 (two) times a day. 6/18/18   Elmer Thao MD   sucralfate (CARAFATE) 1 g tablet Take 1 g by mouth 2 (two) times a day.     "Elmer Thao MD   Sucroferric Oxyhydroxide (Velphoro) 500 MG chewable tablet Chew 1 tablet 3 (Three) Times a Day. WITH MEALS    Elmer Thao MD   vitamin D3 125 MCG (5000 UT) capsule capsule Take 1 tablet by mouth Daily.    Elmer Thao MD       Objective     /60   Pulse 90   Temp 98.6 °F (37 °C)   Resp 24   Ht 160 cm (63\")   Wt 68.1 kg (150 lb 1.6 oz)   SpO2 90%   BMI 26.59 kg/m²   Physical Exam  Vitals reviewed.   Constitutional:       General: She is not in acute distress.     Appearance: She is well-developed. She is ill-appearing ( Chronic). She is not diaphoretic.   HENT:      Head: Normocephalic and atraumatic.      Mouth/Throat:      Mouth: Mucous membranes are moist.      Pharynx: Oropharynx is clear.   Eyes:      General: No scleral icterus.     Conjunctiva/sclera: Conjunctivae normal.      Pupils: Pupils are equal, round, and reactive to light.   Neck:      Vascular: No JVD.      Trachea: No tracheal deviation.   Cardiovascular:      Rate and Rhythm: Normal rate and regular rhythm.      Heart sounds: Normal heart sounds. No murmur heard.   No gallop.    Pulmonary:      Effort: Pulmonary effort is normal. No respiratory distress.      Breath sounds: No wheezing or rales.      Comments: Diminished throughout without adventitious breath sounds  Abdominal:      General: Bowel sounds are normal. There is no distension.      Palpations: Abdomen is soft.      Tenderness: There is no abdominal tenderness. There is no guarding.   Musculoskeletal:         General: Normal range of motion.      Cervical back: Normal range of motion and neck supple.      Right lower leg: Edema ( Trace) present.      Left lower leg: Edema (  Trace) present.   Skin:     General: Skin is warm and dry.      Coloration: Skin is pale ( Sallow).      Findings: No erythema or rash.   Neurological:      General: No focal deficit present.      Mental Status: She is alert and oriented to person, place, and " time.      Comments: No obvious deficits noted.   Psychiatric:         Mood and Affect: Mood normal.         Behavior: Behavior normal.     Pertinent Data:   Lab Results (last 72 hours)     Procedure Component Value Units Date/Time    COVID-19,[facility] IN-HOUSE,Nasal Swab No Transport Media 3-4 HR TAT - Swab, Nasal Cavity [[id number]]  (Normal) Collected: 06/04/21 1628    Specimen: Swab from Nasal Cavity Updated: 06/04/21 1716     COVID19 Not Detected    CBC Auto Differential [[id number]]  (Abnormal) Collected: 06/04/21 1233    Specimen: Blood from Arm, Right Updated: 06/04/21 1600     WBC 8.91 10*3/mm3      RBC 3.40 10*6/mm3      Hemoglobin 10.8 g/dL      Hematocrit 33.0 %      MCV 97.1 fL      MCH 31.8 pg      MCHC 32.7 g/dL      RDW 14.7 %      RDW-SD 52.1 fl      MPV 9.9 fL      Platelets 508 10*3/mm3      Neutrophil % 71.4 %      Lymphocyte % 16.2 %      Monocyte % 6.7 %      Eosinophil % 4.5 %      Basophil % 0.8 %      Immature Grans % 0.4 %      Neutrophils, Absolute 6.36 10*3/mm3      Lymphocytes, Absolute 1.44 10*3/mm3      Monocytes, Absolute 0.60 10*3/mm3      Eosinophils, Absolute 0.40 10*3/mm3      Basophils, Absolute 0.07 10*3/mm3      Immature Grans, Absolute 0.04 10*3/mm3      nRBC 0.0 /100 WBC     Troponin [[id number]]  (Abnormal) Collected: 06/04/21 1233    Specimen: Blood from Arm, Right Updated: 06/04/21 1524     Troponin T 0.158 ng/mL     Comprehensive Metabolic Panel [[id number]]  (Abnormal) Collected: 06/04/21 1233    Specimen: Blood from Arm, Right Updated: 06/04/21 1515     Glucose 125 mg/dL      BUN 46 mg/dL      Creatinine 8.96 mg/dL      Sodium 142 mmol/L      Potassium 3.5 mmol/L      Chloride 99 mmol/L      CO2 26.0 mmol/L      Calcium 9.4 mg/dL      Total Protein 6.3 g/dL      Albumin 2.50 g/dL      ALT (SGPT) 6 U/L      AST (SGOT) 17 U/L      Alkaline Phosphatase 122 U/L      Total Bilirubin 0.3 mg/dL      eGFR Non African Amer 4 mL/min/1.73      Comment: <15 Indicative of kidney  failure.        eGFR   Amer --     Comment: <15 Indicative of kidney failure.        Globulin 3.8 gm/dL      A/G Ratio 0.7 g/dL      BUN/Creatinine Ratio 5.1     Anion Gap 17.0 mmol/L     Phosphorus [[id number]]  (Normal) Collected: 06/04/21 1233    Specimen: Blood from Arm, Right Updated: 06/04/21 1512     Phosphorus 4.4 mg/dL     BNP [[id number]]  (Abnormal) Collected: 06/04/21 1233    Specimen: Blood from Arm, Right Updated: 06/04/21 1510     proBNP 27,617.0 pg/mL     Magnesium [[id number]]  (Normal) Collected: 06/04/21 1233    Specimen: Blood from Arm, Right Updated: 06/04/21 1509     Magnesium 2.4 mg/dL     aPTT [[id number]]  (Abnormal) Collected: 06/04/21 1233    Specimen: Blood from Arm, Right Updated: 06/04/21 1503     PTT 40.4 seconds     Protime-INR [[id number]]  (Abnormal) Collected: 06/04/21 1233    Specimen: Blood from Arm, Right Updated: 06/04/21 1503     Protime 14.7 Seconds      INR 1.25     Imaging Results (Last 24 Hours)     Procedure Component Value Units Date/Time    XR Chest 1 View [[id number]] Collected: 06/04/21 1600     Updated: 06/04/21 1604    Narrative:      EXAM: XR CHEST 1 VW-     INDICATION: Shortness of air     COMPARISON: 5/19/2020     FINDINGS:     Increased moderate size RIGHT pleural effusion with overlying  atelectasis. Small LEFT pleural effusion, slightly decreased, with  adjacent atelectasis. Cardiac silhouette is enlarged but stable. Prior  median sternotomy. Right-sided CVL with tips overlying the RIGHT atrium.  No visible pneumothorax. No suspicious osseous finding.       Impression:         1.  Increased moderate RIGHT pleural effusion with overlying  atelectasis.  2.  Slight decrease in LEFT pleural effusion with overlying atelectasis.  This report was finalized on 06/04/2021 16:01 by Dr. Sravan Carlisle MD.          Assessment / Plan     Assessment:   Active Hospital Problems    Diagnosis    • Persistent dry cough    • Pleural effusion on right    • Hypervolemia    •  Chronic anemia    • Type 2 diabetes mellitus, without long-term current use of insulin (CMS/[facility])    • ESRD on dialysis (CMS/[facility])    • Coronary artery disease involving native coronary artery of native heart with angina pectoris (CMS/[facility])        Plan:   1.  Admit as inpatient  2.  Consult nephrology for possible dialysis today  3.  CT angiogram of the chest in a.m. prior to HD  4.  Dr. Perez plans for bedside thoracentesis tomorrow  5.  Home medications reviewed and restarted as appropriate  6.  DVT prophylaxis with SCDs  7.  Labs in a.m.  8.  Supplemental oxygen, incentive spirometry, continuous pulse oximetry  9.  Monitor glucose before meals and at bedtime with regular insulin sliding scale coverage, add home Levemir however decreased from 20 units to 10 units daily    I discussed the patient's findings and my recommendations with: Feng Perez MD  Time spent 35 minutes    Patient seen and examined by me on 6/4/2021 at 4:25 PM.    Electronically signed by JESSIE Schulte, 06/04/21, 18:42 CDT.    Electronically signed by Feng Perez MD at 06/05/21 2211          Operative/Procedure Notes (most recent note)      Simon Coates MD at 06/11/21 0908        Jennifer F Loly  [id number]  [id number]  06/11/21  445/1    PERMACATH REMOVAL PROCEDURE NOTE    PREOPERATIVE DIAGNOSIS: CAD, need for temporary hemodialysis    POSTOPERATIVE DIAGNOSIS: CAD, need for temporary hemodialysis    PROCEDURE PERFORMED: Removal of right internal jugular vein Permcath    PERFORMED BY: Simon Coates MD    ANESTHESIA: NONE    ESTIMATED BLOOD LOSS: Less than 5 ml.    COMPLICATIONS: None    INDICATIONS: The patient is a 65 y.o. female who has a prior history of end-stage renal disease.  She was on peritoneal dialysis.  However recently she underwent CABG and there is need to transition her for a brief period to hemodialysis in the perioperative period.  As such back in April I placed a right internal jugular vein  permacath.  She successfully had her cardiac surgery and tolerated short course of hemodialysis and now has transition back to peritoneal dialysis.  She is currently here in the hospital now and had a right pleural effusion and had placement of a chest tube for drainage and then chest tube was removed today.  While here request was made to remove her permacath given that she is back on peritoneal dialysis and no longer has need for the permacath.    DESCRIPTION OF PROCEDURE:  After the patient was correctly identified, the patient was placed in the supine position in her hospital bed.  The stitches of the catheter were removed.  The catheter was easily removed completely intact and direct pressure was held over the right internal jugular vein for hemostasis for 15 minutes.  Sterile dressings were applied.  The patient tolerated the procedure well.    Simon Coates MD  06/11/2021  09:08 CDT      Electronically signed by Simon Coates MD at 06/11/21 0911          Physician Progress Notes (most recent note)      Dick Fisher APRN at 06/11/21 1020     Attestation signed by Davon Stallings MD at 06/11/21 1320    I saw and examined the patient independently.  I have reviewed this documentation and agree.  Jennifer Salcido is a 65 y.o. female  whom we were consulted for end stage renal disease on peritoneal dialysis. History of diabetic nephropathy. Recent prior admission for elective CABG, temporarily was converted to hemodialysis. Has since converted back to [facility].  Presented this time with dyspnea. Found to have large right pleural effusion. She had emergency hemodialysis on 6/05 to correct volume status. Also had pleurocentesis on 6/05.  On 6/07 had chest tube placed.  Today, patient has been feeling better.  She had her chest tube and PermCath both removed.  She tolerated PD very well overnight.  She was anxious to be discharged home.    Vitals: Reviewed.  On exam, heart sounds are regular,  lungs are clear to auscultation bilaterally, abdomen is soft without guarding, lower extremity without edema.  Labs are reviewed.    Assessment and plan:  1.  End stage renal disease on peritoneal dialysis  2.  Type 2 diabetes with nephropathy  3.  Right pleural effusion--s/p chest tube placement on 6/07  4.  Anemia of CKD  5.  Recent prior CABG  6.  Hyponatremia   Continue PD tonight.  OK for discharge from renal standpoint, follow up at the dialysis clinic.                    Nephrology ([facility]) Progress Note      Patient:  Jennifer Salcido  YOB: 1955  Date of Service: 6/11/2021  MRN: [id number]   Acct: [id number]   Primary Care Physician: Cristian Hernandez MD  Advance Directive:   Code Status and Medical Interventions:   Ordered at: 06/04/21 1729     Level Of Support Discussed With:    Patient     Code Status:    CPR     Medical Interventions (Level of Support Prior to Arrest):    Full     Admit Date: 6/4/2021       Hospital Day: 7  Referring Provider: No ref. provider found      Patient personally seen and examined.  Complete chart including Consults, Notes, Operative Reports, Labs, Cardiology, and Radiology studies reviewed as able.        Subjective:  Jennifer Salcido is a 65 y.o. female  whom we were consulted for end stage renal disease on peritoneal dialysis. History of diabetic nephropathy. Recent prior admission for elective CABG, temporarily was converted to hemodialysis. Has since converted back to [facility]CPD.  Presented this time with dyspnea. Found to have large right pleural effusion. She had emergency hemodialysis on 6/05 to correct volume status. Also had pleurocentesis on 6/05.  On 6/07 had chest tube placed. PD was held on 6/07, resumed 6/08 and has been tolerating well since then. Getting adequate UF with treatments    Today is doing well. No new overnight issues. Had permcath and chest tube removed this morning.    Allergies:  Codeine, Demerol [meperidine], Levaquin  [levofloxacin], Lisinopril, Morphine, Sulfasalazine, and Ultram [tramadol]    Home Meds:  Medications Prior to Admission   Medication Sig Dispense Refill Last Dose   • allopurinol (ZYLOPRIM) 100 MG tablet Take 100 mg by mouth Daily.      • ascorbic acid (VITAMIN C) 500 MG tablet Take 500 mg by mouth Daily.      • aspirin 81 MG EC tablet Take 81 mg by mouth Daily.      • atorvastatin (LIPITOR) 20 MG tablet Take 1 tablet by mouth Every Night. 30 tablet 2    • bisacodyl (Dulcolax) 10 MG suppository Insert 10 mg into the rectum Daily As Needed for Constipation.      • calcium carbonate (TUMS) 500 MG chewable tablet Chew 2 tablets Every Night.      • citalopram (CeleXA) 40 MG tablet Take 40 mg by mouth Daily.      • docusate sodium (COLACE) 100 MG capsule Take 200 mg by mouth As Needed for Constipation.      • famotidine (PEPCID) 20 MG tablet Take 1 tablet by mouth Daily. 30 tablet 0    • HYDROcodone-acetaminophen (NORCO) 5-325 MG per tablet Take 1 tablet by mouth Every 8 (Eight) Hours As Needed (pain). 10 tablet 0    • insulin detemir (LEVEMIR) 100 UNIT/ML injection Inject 12 Units under the skin into the appropriate area as directed Daily.      • irbesartan (AVAPRO) 75 MG tablet Take 1 tablet by mouth Daily. 30 tablet 2    • Melatonin 5 MG capsule Take 5 mg by mouth Every Night.      • metoprolol tartrate (LOPRESSOR) 25 MG tablet Take 1 tablet by mouth Every 12 (Twelve) Hours. 60 tablet 2    • Multiple Vitamins-Minerals (MULTIVITAMIN ADULT PO) Take 1 tablet by mouth Daily.      • pantoprazole (PROTONIX) 40 MG EC tablet Take 40 mg by mouth 2 (two) times a day.      • sucralfate (CARAFATE) 1 g tablet Take 1 g by mouth 4 (Four) Times a Day. Dissolve 1 tablet in water to create a slurry and drink QID; AC & HS PRN      • Sucroferric Oxyhydroxide (Velphoro) 500 MG chewable tablet Chew 1 tablet 3 (Three) Times a Day. WITH MEALS      • vitamin D3 125 MCG (5000 UT) capsule capsule Take 1 tablet by mouth Daily.      •  nitroglycerin (NITROSTAT) 0.4 MG SL tablet 1 under the tongue as needed for angina, may repeat q5mins for up three doses (Patient taking differently: Place 0.4 mg under the tongue Every 5 (Five) Minutes As Needed. 1 under the tongue as needed for angina, may repeat q5mins for up three doses) 25 tablet 3        Medicines:  Current Facility-Administered Medications   Medication Dose Route Frequency Provider Last Rate Last Admin   • acetaminophen (TYLENOL) tablet 650 mg  650 mg Oral Q6H PRN Antony Wood MD   650 mg at 06/10/21 0948    Or   • acetaminophen (TYLENOL) 160 MG/5ML solution 650 mg  650 mg Oral Q6H PRN Antony Wood MD        Or   • acetaminophen (TYLENOL) suppository 650 mg  650 mg Rectal Q6H PRN Antony Wood MD       • allopurinol (ZYLOPRIM) tablet 100 mg  100 mg Oral Daily Antony Wood MD   100 mg at 06/11/21 0903   • ascorbic acid (VITAMIN C) tablet 500 mg  500 mg Oral Daily Antony Wood MD   500 mg at 06/11/21 0902   • aspirin EC tablet 81 mg  81 mg Oral Daily Antony Wood MD   81 mg at 06/11/21 0902   • atorvastatin (LIPITOR) tablet 20 mg  20 mg Oral Nightly Antony Wood MD   20 mg at 06/10/21 2054   • benzonatate (TESSALON) capsule 200 mg  200 mg Oral Q4H PRN Antony Wood MD       • bisacodyl (DULCOLAX) suppository 10 mg  10 mg Rectal Daily PRN Antony Wood MD       • calcium carbonate (TUMS) chewable tablet 500 mg (200 mg elemental)  2 tablet Oral Nightly Antony Wood MD   2 tablet at 06/10/21 2054   • citalopram (CeleXA) tablet 40 mg  40 mg Oral Nightly Antony Wood MD   40 mg at 06/10/21 2055   • dextrose (D50W) 25 g/ 50mL Intravenous Solution 25 g  25 g Intravenous Q15 Min PRN Antony Wood MD       • dextrose (GLUTOSE) oral gel 15 g  15 g Oral Q15 Min PRN Antony Wood MD       • diphenhydrAMINE (BENADRYL) capsule 25 mg  25 mg Oral Q4H PRN Antony Wood MD        Or   • diphenhydrAMINE (BENADRYL) injection 25 mg  25 mg  Intravenous Q4H PRN Antony Wood MD       • docusate sodium (COLACE) capsule 200 mg  200 mg Oral Daily PRN Antony Wood MD       • famotidine (PEPCID) tablet 20 mg  20 mg Oral Nightly Antony Wood MD   20 mg at 06/10/21 2055   • glucagon (human recombinant) (GLUCAGEN DIAGNOSTIC) injection 1 mg  1 mg Subcutaneous Q15 Min PRN Antony Wood MD       • guaiFENesin (MUCINEX) 12 hr tablet 600 mg  600 mg Oral Q12H Antony Wood MD   600 mg at 06/11/21 0902   • HYDROcodone-acetaminophen (NORCO) 5-325 MG per tablet 1 tablet  1 tablet Oral Q8H PRN Antony Wood MD   1 tablet at 06/08/21 0547   • insulin detemir (LEVEMIR) injection 10 Units  10 Units Subcutaneous Daily Antony Wood MD   10 Units at 06/11/21 0903   • insulin lispro (humaLOG) injection 0-9 Units  0-9 Units Subcutaneous 4x Daily With Meals & Nightly Antony Wood MD   4 Units at 06/10/21 1719   • lactated ringers infusion  9 mL/hr Intravenous Continuous Antony Wood MD       • losartan (COZAAR) tablet 25 mg  25 mg Oral Q24H Cl Heart DO       • melatonin tablet 3 mg  3 mg Oral Nightly Antony Wood MD   3 mg at 06/10/21 2054   • metoprolol tartrate (LOPRESSOR) tablet 25 mg  25 mg Oral Q12H Antony Wood MD   25 mg at 06/11/21 0902   • multivitamin with minerals 1 tablet  1 tablet Oral Daily Antony Wood MD   1 tablet at 06/11/21 0902   • nitroglycerin (NITROSTAT) SL tablet 0.4 mg  0.4 mg Sublingual Q5 Min PRN Antony Wood MD       • ondansetron (ZOFRAN) tablet 4 mg  4 mg Oral Q6H PRN Antony Wood MD        Or   • ondansetron (ZOFRAN) injection 4 mg  4 mg Intravenous Q6H PRN Antony Wood MD   4 mg at 06/07/21 0023   • ondansetron (ZOFRAN) injection 4 mg  4 mg Intravenous Q2H PRN Antony Wood MD   4 mg at 06/04/21 2311   • sodium chloride 0.9 % bolus 100 mL  100 mL Intravenous PRN Antony Wood MD       • sodium chloride 0.9 % flush 10 mL  10 mL Intravenous  Q12H Antony Wood MD   10 mL at 06/11/21 0902   • sodium chloride 0.9 % flush 10 mL  10 mL Intravenous PRN Antony Wood MD       • sucralfate (CARAFATE) tablet 1 g  1 g Oral BID AC Antony Wood MD   1 g at 06/11/21 0513       Past Medical History:  Past Medical History:   Diagnosis Date   • Arthritis     LEFT SHOULDER   • CHF (congestive heart failure) (CMS/HCC)    • Chronic pericarditis 4/22/2021   • Collar bone fracture 12/2020    SLING PLACED TO HEAL   • Coronary artery disease     LEFT MAIN AND 3 OTHER AREAS--CABG SCHEDULED FOR APRIL 22, 2021   • Dyslipidemia 9/28/2020   • Elevated cholesterol    • End stage kidney disease (CMS/HCC)     currently HD (5/19/2021), hopes to return to PD    • Essential hypertension 11/2/2018   • Gastroesophageal reflux disease without esophagitis 4/28/2021   • GERD (gastroesophageal reflux disease)    • Gout    • History of transfusion    • Hyperlipidemia    • Hypertension    • Overweight (BMI 25.0-29.9) 4/22/2021   • Peritoneal dialysis status (CMS/HCC)     EVERY NIGHT FOR 10 HOURS, (5/19/2021 currently on hold)   • PONV (postoperative nausea and vomiting)    • Sleep apnea     RESOLVED   • Status post gastric bypass for obesity 4/22/2021   • Type 2 diabetes mellitus with kidney complication, with long-term current use of insulin (CMS/HCC) 11/2/2018       Past Surgical History:  Past Surgical History:   Procedure Laterality Date   • BREAST BIOPSY Right     FATTY TUMOR   • CARDIAC CATHETERIZATION N/A 10/1/2019    Procedure: Left Heart Cath;  Surgeon: Srikanth Coker MD;  Location:  [facility] CATH INVASIVE LOC[facility];  Service: Cardiology   • CARDIAC CATHETERIZATION N/A 7/14/2020    Procedure: Left Heart Cath;  Surgeon: Srikanth Coker MD;  Location:  [facility] CATH [facility]VAS[facility];  Service: Cardiology;  Laterality: N/A;   • CARDIAC CATHETERIZATION N/A 3/23/2021    Procedure: Left Heart Cath;  Surgeon: Srikanth Coker MD;  Location:  [facility]ATH [facility]VAS[facility];   Service: Cardiology;  Laterality: N/A;   • CHEST TUBE INSERTION Right 2021    Procedure: CHEST TUBE INSERTION;  Surgeon: Antony Wood MD;  Location: [facility] OR;  Service: Cardiothoracic;  Laterality: Right;   • CORONARY ANGIOPLASTY WITH STENT PLACEMENT  2016    X 2   • CORONARY ARTERY BYPASS GRAFT N/A 2021    Procedure: CORONARY ARTERY BYPASS GRAFT X 3 WITH LEFT INTERNAL MAMMARY ARTERY, RIGHT LOWER EXTREMITY ENDOSCOPIC VEIN HARVEST, AND PERFUSION; TRANSESOPHAGEAL ECHOCARDIOGRAM;  Surgeon: Antony Wood MD;  Location: [facility] OR;  Service: Cardiothoracic;  Laterality: N/A;   • EYE SURGERY Bilateral     IOC LENS IMPLANTS   • EYE SURGERY Bilateral     RETINAL SURGERY   • GALLBLADDER SURGERY     • GASTRIC BYPASS     • HYSTERECTOMY     • INSERTION HEMODIALYSIS CATHETER Right 2021    Procedure: HEMODIALYSIS CATHETER INSERTION;  Surgeon: Simon Coates MD;  Location: [facility] HYBRID OR 12;  Service: Vascular;  Laterality: Right;   • INSERTION PERITONEAL DIALYSIS CATHETER     • OOPHORECTOMY Bilateral    • SINUS SURGERY     • SINUS SURGERY  1980   • TUBAL ABDOMINAL LIGATION         Family History  Family History   Problem Relation Age of Onset   • Hypertension Mother    • Lung disease Mother    • Heart disease Father    • Diabetes Father    • Abnormal EKG Father    • Breast cancer Neg Hx        Social History  Social History     Socioeconomic History   • Marital status:      Spouse name: Not on file   • Number of children: Not on file   • Years of education: Not on file   • Highest education level: Not on file   Tobacco Use   • Smoking status: Former Smoker     Packs/day: 0.00     Years: 2.00     Pack years: 0.00     Types: Cigarettes     Quit date:      Years since quittin.4   • Smokeless tobacco: Never Used   Vaping Use   • Vaping Use: Never used   Substance and Sexual Activity   • Alcohol use: Not Currently     Comment: haven't drank in 3 years ( 2018)    • Drug use: No   •  Sexual activity: Defer         Review of Systems:  History obtained from chart review and the patient  General ROS: No fever or chills  Respiratory ROS: No cough, shortness of breath, wheezing  Cardiovascular ROS: No chest pain or palpitations  Gastrointestinal ROS: No abdominal pain or melena  Genito-Urinary ROS: No dysuria or hematuria    Objective:  Patient Vitals for the past 24 hrs:   BP Temp Temp src Pulse Resp SpO2 Weight   06/11/21 0700 124/67 98.2 °F (36.8 °C) Oral 75 16 97 % --   06/11/21 0511 -- -- -- -- -- -- 65.2 kg (143 lb 12.8 oz)   06/11/21 0411 117/58 97.7 °F (36.5 °C) Oral 65 16 98 % --   06/10/21 2355 111/52 98.2 °F (36.8 °C) Oral 65 16 97 % --   06/10/21 1950 134/69 98.2 °F (36.8 °C) Oral 73 16 98 % --   06/10/21 1100 129/72 98.1 °F (36.7 °C) Oral 65 16 97 % --       Intake/Output Summary (Last 24 hours) at 6/11/2021 1020  Last data filed at 6/11/2021 0600  Gross per 24 hour   Intake --   Output 0 ml   Net 0 ml     General: awake/alert    Neck: supple, no JVD  Chest:  clear to auscultation bilaterally without respiratory distress  CVS: regular rate and rhythm  Abdominal: soft, nontender, positive bowel sounds  Extremities: no cyanosis or edema  Skin: warm and dry without rash      Labs:  Results from last 7 days   Lab Units 06/07/21  0036 06/05/21  0440 06/04/21  1233   WBC 10*3/mm3 8.77 8.76 8.91   HEMOGLOBIN g/dL 10.2* 10.2* 10.8*   HEMATOCRIT % 31.2* 32.0* 33.0*   PLATELETS 10*3/mm3 324 354 508*         Results from last 7 days   Lab Units 06/10/21  0834 06/07/21  0036 06/05/21  0440 06/04/21  1233   SODIUM mmol/L 134* 133* 136 142   POTASSIUM mmol/L 4.4 3.5 3.9 3.5   CHLORIDE mmol/L 94* 97* 96* 99   CO2 mmol/L 27.0 20.0* 29.0 26.0   BUN mg/dL 37* 29* 26* 46*   CREATININE mg/dL 8.50* 6.13* 6.17* 8.96*   CALCIUM mg/dL 9.4 8.5* 8.7 9.4   BILIRUBIN mg/dL  --  0.3  --  0.3   ALK PHOS U/L  --  119*  --  122*   ALT (SGPT) U/L  --  7  --  6   AST (SGOT) U/L  --  17  --  17   GLUCOSE mg/dL 156* 147*  62* 125*       Radiology:   Imaging Results (Last 72 Hours)     Procedure Component Value Units Date/Time    XR Chest 1 View [[id number]] Collected: 06/11/21 0725     Updated: 06/11/21 0730    Narrative:      EXAMINATION: XR CHEST 1 - 6/11/2021 7:25 AM CDT     HISTORY: pleural effusion; E87.70-Fluid overload, unspecified;  N18.9-Chronic kidney disease, unspecified; I14-Gsnswgd effusion, not  elsewhere classified; N62-Gjpidls effusion, not elsewhere classified.     REPORT: A frontal view of the chest was obtained.     COMPARISON: Chest x-ray 6/10/2021 0357 hours.     The right internal jugular central venous catheter appears in good  position as before. The right basilar chest tube appears in good  position, unchanged. The lungs are hypoaerated. Is mild improvement in  their consolidation in the left lateral lung base as well as a small  left pleural effusion. No pneumothorax is identified. There is minimal  atelectasis in the right base. Our size is normal. Previous CABG is  noted.       Impression:      Mild improvement in aeration of the left lung base but with  a persistent area of consolidation and a small effusion. Stable right  basilar chest tube and right internal jugular central venous catheter.  This report was finalized on 06/11/2021 07:27 by Dr. Saji Mullen MD.    XR Chest 1 View [[id number]] Collected: 06/10/21 0704     Updated: 06/10/21 0709    Narrative:      EXAMINATION: XR CHEST 1 - 6/10/2021 7:04 AM CDT     HISTORY: pleural effusion; E87.70-Fluid overload, unspecified;  N18.9-Chronic kidney disease, unspecified; G85-Skaogcr effusion, not  elsewhere classified; V17-Qhlzkti effusion, not elsewhere classified.     REPORT: A frontal view of the chest was obtained.     COMPARISON: Chest x-ray 6/9/2021 0350 hours.     The right internal jugular central venous catheter remains in  satisfactory position. The lungs are hypoaerated, there is mild  improvement in an area of consolidation in the left  lateral lung base,  with associated small left effusion. There is a right basilar chest tube  which remains in good position. There is mild atelectasis at the right  base, with no focal infiltrate in the right. Heart size is normal.  Previous CABG is noted. No pneumothorax is identified.       Impression:      Stable satisfactory position of the right basilar chest tube  and right internal jugular central venous catheter. No pneumothorax is  identified. There is mild improvement in the opacities in the left  lateral lung base.  This report was finalized on 06/10/2021 07:06 by Dr. Saji Mullen MD.    XR Chest 1 View [[id number]] Collected: 06/09/21 0722     Updated: 06/09/21 0727    Narrative:      EXAMINATION: XR CHEST 1 VW- 6/9/2021 7:22 AM CDT     HISTORY: pleural effusion; E87.70-Fluid overload, unspecified;  N18.9-Chronic kidney disease, unspecified; B04-Zxvuepf effusion, not  elsewhere classified; A68-Amgpuvu effusion, not elsewhere classified.     REPORT: A frontal view the chest was obtained.     COMPARISON: Chest x-ray 6/8/2021 0354 hours.     The lungs are hypoaerated, there is partial consolidation of the left  lung base with blunting of the costophrenic angle, unchanged. No focal  infiltrates in the right lung. Heart size is normal. There is previous  median sternotomy. The right internal jugular central venous catheter  appears in satisfactory position as before. No pneumothorax is  identified. The right basilar chest tube appears in good position as  before.       Impression:      Stable 1 day appearance of the chest.  This report was finalized on 06/09/2021 07:24 by Dr. Saji Mullen MD.          Culture:  No results found for: BLOODCX, URINECX, WOUNDCX, MRSACX, RESPCX, STOOLCX      Assessment   1.  End stage renal disease on peritoneal dialysis  2.  Type 2 diabetes with nephropathy  3.  Right pleural effusion--s/p chest tube  4.  Anemia of CKD  5.  Recent prior CABG  6.  Hyponatremia     Plan:  1.   Continue maintenance peritoneal dialysis   2.  permcath removed this morning, appreciate Dr Leach's assistance in this regard.  3.  OK for discharge from renal standpoint, follow up to be via dialysis clinic      JESSIE Short  6/11/2021  10:20 CDT    Electronically signed by Davon Stallings MD at 06/11/21 1320            Discharge Summary      Cl Heart DO at 06/11/21 1420              [facility] Medicine Services  DISCHARGE SUMMARY       Date of Admission: 6/4/2021  Date of Discharge:  6/11/2021  Primary Care Physician: Cristian Hernandez MD    Discharge Diagnoses:  Active Hospital Problems    Diagnosis    • Persistent dry cough    • Pleural effusion on right    • Hypervolemia    • Chronic anemia    • Type 2 diabetes mellitus, without long-term current use of insulin (CMS/[facility])    • ESRD on dialysis (CMS/[facility])    • Coronary artery disease involving native coronary artery of native heart with angina pectoris (CMS/[facility])          Presenting Problem/History of Present Illness:  Hypervolemia, unspecified hypervolemia type [E87.70]     Chief Complaint on Day of Discharge:   No complaint    History of Present Illness on Day of Discharge:   Vascular access catheter for dialysis has been removed.  Chest tube has been removed with no recurrence of pneumothorax.  All services agreed that the patient is appropriate for discharge home today.    Hospital Course  Jennifer Salcido is a 65-year-old female with a past medical history of end-stage renal disease on peritoneal dialysis. Patient was started on hemodialysis 4/22/2021 due to undergoing three-vessel CABG. Patient recently admitted 5/19-5/20, 2021 for evaluation of nausea and vomiting and missing a day of dialysis.  Patient continued with hemodialysis until 5/28/2021 at which time she resumed her home peritoneal dialysis.  She presents today with shortness of breathing and complaints of persistent cough.  She denies  sputum production.  She states she is extremely weak with severe shortness of breathing with minimal exertion.  She does have concerns there is a problem with her heart.  She has had no chest pain.  ER work-up reveals elevated troponin which is chronic, elevated BNP which is less than from previous admission also chronic.  Creatinine is 8.96 with a GFR of 4.  Imaging reveals right pleural effusion has increased in size from last evaluation however has been a slight decrease in the left pleural effusion.  Condition is stable.  Patient is followed outpatient by Dr. Antony Wood and Dr. Srikanth Coker.  She is admitted for further evaluation treatment.  Plan:   1.  Admit as inpatient  2.  Consult nephrology for possible dialysis today  3.  CT angiogram of the chest in a.m. prior to HD  4.  Dr. Perez plans for bedside thoracentesis tomorrow  5.  Home medications reviewed and restarted as appropriate  6.  DVT prophylaxis with SCDs  7.  Labs in a.m.  8.  Supplemental oxygen, incentive spirometry, continuous pulse oximetry  9.  Monitor glucose before meals and at bedtime with regular insulin sliding scale coverage, add home Levemir however decreased from 20 units to 10 units daily    Nephrology was consulted for dialysis secondary to poor response to peritoneal dialysis.  A bedside thoracentesis was performed by Dr. Perez on 6/5 with 1.25 L removed.  Chest x-ray repeated the following day showed continued pleural effusion.  CT surgery was consulted and inserted a chest tube.  The tube was placed to 20 cm suction.  Hemodialysis was undertaken for short period of time until fluid balance was established by nephrology.  The patient was then converted to peritoneal dialysis and seemed to do very well thereafter.  Chest tube remained connected to suction with gradual reduction in suction during her stay.  The patient did quite well throughout her stay.  Fluid output volume from chest tube waned and was minimal at the time that  the tube was removed.  Repeat chest x-ray performed this morning about 2 hours after tube removal shows no evidence of recurrent pneumothorax.  All services agreed the patient is stable for discharge home.    Procedures Performed:   Bedside thoracentesis  Right thoracostomy tube placement  Removal of right IJ permacath    Consults:   Cardiothoracic surgery  Nephrology  Vascular surgery    Pertinent Test Results:   Lab Results (all)     Procedure Component Value Units Date/Time    POC Glucose Once [[id number]]  (Abnormal) Collected: 06/11/21 1116    Specimen: Blood Updated: 06/11/21 1127     Glucose 173 mg/dL      Comment: : [id number] Vane BandaiaMeter ID: [id number]       POC Glucose Once [[id number]]  (Abnormal) Collected: 06/11/21 0737    Specimen: Blood Updated: 06/11/21 0748     Glucose 171 mg/dL      Comment: : [id number] Orlando Nieto ID: [id number]       POC Glucose Once [[id number]]  (Abnormal) Collected: 06/10/21 1953    Specimen: Blood Updated: 06/10/21 2004     Glucose 181 mg/dL      Comment: : [id number] Saul KohlerahMeter ID: [id number]       Fungus Culture - Body Fluid, Pleural Cavity [[id number]] Collected: 06/05/21 1619    Specimen: Body Fluid from Pleural Cavity Updated: 06/10/21 1700     Fungus Culture No fungus isolated at less than 1 week    POC Glucose Once [[id number]]  (Abnormal) Collected: 06/10/21 1644    Specimen: Blood Updated: 06/10/21 1655     Glucose 207 mg/dL      Comment: : [id number] Ayla PasaclionMeter ID: [id number]       POC Glucose Once [[id number]]  (Abnormal) Collected: 06/10/21 1206    Specimen: Blood Updated: 06/10/21 1217     Glucose 155 mg/dL      Comment: : [id number] Ayla PascalionMeter ID: [id number]       Basic Metabolic Panel [[id number]]  (Abnormal) Collected: 06/10/21 0834    Specimen: Blood Updated: 06/10/21 0915     Glucose 156 mg/dL      BUN 37 mg/dL      Creatinine 8.50 mg/dL      Sodium 134 mmol/L      Potassium 4.4 mmol/L      Chloride 94  mmol/L      CO2 27.0 mmol/L      Calcium 9.4 mg/dL      eGFR   Amer --     Comment: <15 Indicative of kidney failure.        eGFR Non African Amer 5 mL/min/1.73      Comment: <15 Indicative of kidney failure.        BUN/Creatinine Ratio 4.4     Anion Gap 13.0 mmol/L     Narrative:      GFR Normal >60  Chronic Kidney Disease <60  Kidney Failure <15      POC Glucose Once [[id number]]  (Abnormal) Collected: 06/10/21 0822    Specimen: Blood Updated: 06/10/21 0833     Glucose 168 mg/dL      Comment: : [id number] Ayla AkersMeter ID: [id number]       Anaerobic Culture - Pleural Fluid, Pleural Cavity [[id number]] Collected: 06/05/21 1619    Specimen: Pleural Fluid from Pleural Cavity Updated: 06/10/21 0608     Anaerobic Culture No anaerobes isolated at 5 days    POC Glucose Once [[id number]]  (Abnormal) Collected: 06/09/21 2009    Specimen: Blood Updated: 06/09/21 2020     Glucose 181 mg/dL      Comment: : [id number] Jennifer Mendieta ID: [id number]       POC Glucose Once [[id number]]  (Abnormal) Collected: 06/09/21 1659    Specimen: Blood Updated: 06/09/21 1712     Glucose 166 mg/dL      Comment: : [id number] Raul PollockssMeter ID: [id number]       POC Glucose Once [[id number]]  (Abnormal) Collected: 06/09/21 1129    Specimen: Blood Updated: 06/09/21 1141     Glucose 150 mg/dL      Comment: : [id number] Raul PollockssMeter ID: [id number]       POC Glucose Once [[id number]]  (Abnormal) Collected: 06/09/21 0759    Specimen: Blood Updated: 06/09/21 0825     Glucose 135 mg/dL      Comment: : [id number] Raul JessMeter ID: [id number]       POC Glucose Once [[id number]]  (Abnormal) Collected: 06/08/21 2040    Specimen: Blood Updated: 06/08/21 2043     Glucose 236 mg/dL      Comment: : [id number] Jennifer Mendieta ID: [id number]       POC Glucose Once [[id number]]  (Abnormal) Collected: 06/08/21 1606    Specimen: Blood Updated: 06/08/21 1656     Glucose 152 mg/dL      Comment: : [id number] Raul  JessMeter ID: [id number]       POC Glucose Once [[id number]]  (Abnormal) Collected: 06/08/21 1157    Specimen: Blood Updated: 06/08/21 1209     Glucose 160 mg/dL      Comment: : [id number] Raul PollockssMeter ID: [id number]       POC Glucose Once [[id number]]  (Abnormal) Collected: 06/08/21 0745    Specimen: Blood Updated: 06/08/21 0756     Glucose 149 mg/dL      Comment: : [id number] Carter JessMeter ID: [id number]       Body Fluid Culture - Body Fluid, Pleural Cavity [[id number]] Collected: 06/05/21 1619    Specimen: Body Fluid from Pleural Cavity Updated: 06/08/21 0512     Body Fluid Culture No growth at 3 days     Gram Stain Moderate (3+) WBCs seen      No organisms seen    POC Glucose Once [[id number]]  (Abnormal) Collected: 06/07/21 2029    Specimen: Blood Updated: 06/07/21 2029     Glucose 239 mg/dL      Comment: : [id number] Gregoria CurtisandraMeter ID: [id number]       POC Glucose Once [[id number]]  (Normal) Collected: 06/07/21 1714    Specimen: Blood Updated: 06/07/21 1725     Glucose 119 mg/dL      Comment: : [id number] Niurka Velasco  SMeter ID: [id number]       POC Glucose Once [[id number]]  (Normal) Collected: 06/07/21 1522    Specimen: Blood Updated: 06/07/21 1534     Glucose 104 mg/dL      Comment: : [id number] Gloria Husain RMeter ID: [id number]       POC Glucose Once [[id number]]  (Abnormal) Collected: 06/07/21 1119    Specimen: Blood Updated: 06/07/21 1136     Glucose 150 mg/dL      Comment: : [id number] Carter JessMeter ID: [id number]       Non-gynecologic Cytology [[id number]] Collected: 06/05/21 1619    Specimen: Body Fluid from Pleural Cavity Updated: 06/07/21 0953    POC Glucose Once [[id number]]  (Abnormal) Collected: 06/07/21 0754    Specimen: Blood Updated: 06/07/21 0805     Glucose 264 mg/dL      Comment: : [id number] Orlando Nieto ID: [id number]       Troponin [[id number]]  (Abnormal) Collected: 06/07/21 0036    Specimen: Blood Updated: 06/07/21 0108     Troponin T  0.180 ng/mL     Narrative:      Troponin T Reference Range:  <= 0.03 ng/mL-   Negative for AMI  >0.03 ng/mL-     Abnormal for myocardial necrosis.  Clinicians would have to utilize clinical acumen, EKG, Troponin and serial changes to determine if it is an Acute Myocardial Infarction or myocardial injury due to an underlying chronic condition.       Results may be falsely decreased if patient taking Biotin.      Comprehensive Metabolic Panel [[id number]]  (Abnormal) Collected: 06/07/21 0036    Specimen: Blood Updated: 06/07/21 0101     Glucose 147 mg/dL      BUN 29 mg/dL      Creatinine 6.13 mg/dL      Sodium 133 mmol/L      Potassium 3.5 mmol/L      Chloride 97 mmol/L      CO2 20.0 mmol/L      Calcium 8.5 mg/dL      Total Protein 6.1 g/dL      Albumin 2.40 g/dL      ALT (SGPT) 7 U/L      AST (SGOT) 17 U/L      Alkaline Phosphatase 119 U/L      Total Bilirubin 0.3 mg/dL      eGFR Non African Amer 7 mL/min/1.73      Comment: <15 Indicative of kidney failure.        eGFR   Amer --     Comment: <15 Indicative of kidney failure.        Globulin 3.7 gm/dL      A/G Ratio 0.6 g/dL      BUN/Creatinine Ratio 4.7     Anion Gap 16.0 mmol/L     Narrative:      GFR Normal >60  Chronic Kidney Disease <60  Kidney Failure <15      Lipase [[id number]]  (Abnormal) Collected: 06/07/21 0036    Specimen: Blood Updated: 06/07/21 0056     Lipase 145 U/L     POC Glucose Once [[id number]]  (Abnormal) Collected: 06/07/21 0004    Specimen: Blood Updated: 06/07/21 0049     Glucose 135 mg/dL      Comment: : [id number] Jace RosalesnMeter ID: [id number]       POC Glucose Once [[id number]]  (Abnormal) Collected: 06/07/21 0025    Specimen: Blood Updated: 06/07/21 0046     Glucose 143 mg/dL      Comment: : [id number] Pherigo SusanMeter ID: [id number]       CBC & Differential [[id number]]  (Abnormal) Collected: 06/07/21 0036    Specimen: Blood Updated: 06/07/21 0042    Narrative:      The following orders were created for panel order CBC &  Differential.  Procedure                               Abnormality         Status                     ---------                               -----------         ------                     CBC Auto Differential[[id number]]        Abnormal            Final result                 Please view results for these tests on the individual orders.    CBC Auto Differential [[id number]]  (Abnormal) Collected: 06/07/21 0036    Specimen: Blood Updated: 06/07/21 0042     WBC 8.77 10*3/mm3      RBC 3.21 10*6/mm3      Hemoglobin 10.2 g/dL      Hematocrit 31.2 %      MCV 97.2 fL      MCH 31.8 pg      MCHC 32.7 g/dL      RDW 14.5 %      RDW-SD 51.8 fl      MPV 9.9 fL      Platelets 324 10*3/mm3      Neutrophil % 56.9 %      Lymphocyte % 25.0 %      Monocyte % 8.6 %      Eosinophil % 8.0 %      Basophil % 0.7 %      Immature Grans % 0.8 %      Neutrophils, Absolute 5.00 10*3/mm3      Lymphocytes, Absolute 2.19 10*3/mm3      Monocytes, Absolute 0.75 10*3/mm3      Eosinophils, Absolute 0.70 10*3/mm3      Basophils, Absolute 0.06 10*3/mm3      Immature Grans, Absolute 0.07 10*3/mm3      nRBC 0.0 /100 WBC     POC Glucose Once [[id number]]  (Abnormal) Collected: 06/06/21 2345    Specimen: Blood Updated: 06/06/21 2356     Glucose 157 mg/dL      Comment: : [id number] Recon InstrumentsnMeter ID: [id number]       POC Glucose Once [[id number]]  (Abnormal) Collected: 06/06/21 2028    Specimen: Blood Updated: 06/06/21 2029     Glucose 251 mg/dL      Comment: : [id number] CalvinilenMeter ID: [id number]       POC Glucose Once [[id number]]  (Normal) Collected: 06/06/21 1635    Specimen: Blood Updated: 06/06/21 1735     Glucose 86 mg/dL      Comment: : [id number] Jose Armando EuraisaMeter ID: [id number]       Protein, Body Fluid - Pleural Fluid, Pleural Cavity [[id number]] Collected: 06/05/21 1619    Specimen: Pleural Fluid from Pleural Cavity Updated: 06/06/21 1309     Protein, Total, Fluid 4.0 g/dL     Narrative:      No Reference Ranges  Established.    A serous fluid total fluid (TP) greater than 50 percent of the serum TP suggests the fluid is an exudate.      1. Pleural TP/Serum TP >0.5  2. Pleural LD/Serum LD >0.6  3. Pleural LD >2/3 of the upper limit of normal for serum LDH    This test was developed, it performance characteristics determined and judged suitable for clinical purposes by [facility].  It has not been cleared or approved by the FDA.  The laboratory is regulated under CLIA as qualified to perform high-complexity testing.     Glucose, Body Fluid - Pleural Fluid, Pleural Cavity [[id number]] Collected: 06/05/21 1619    Specimen: Pleural Fluid from Pleural Cavity Updated: 06/06/21 1309     Glucose, Fluid 104 mg/dL     Narrative:      No Reference Ranges Established.    Serous fluid glucose less than 60 mg/dL or less than 30 mg/dL below serum glucose suggests an infectious or malignant exudate.     This test was developed, it performance characteristics determined and judged suitable for clinical purposes by [facility].  It has not been cleared or approved by the FDA.  The laboratory is regulated under CLIA as qualified to perform high-complexity testing.     Albumin, Fluid - Pleural Fluid, Pleural Cavity [[id number]] Collected: 06/05/21 1619    Specimen: Pleural Fluid from Pleural Cavity Updated: 06/06/21 1309     Albumin, Fluid 2.20 g/dL     Narrative:      No Reference Ranges Established.    A Serous fluid albumin gradient (serum albumin-fluid) <1.1 g/dL suggests the fluid is an exudate.  Cirrhosis usually results in an ascites fluid albumin gradient >1.1 g/dL.    This test was developed, its performance characteristics determined and judged suitable for clinical purposes by [facility].  It has not been cleared or approved by the FDA.  The laboratory is regulated under CLIA as qualified to perfom high-complexity testing.      Lactate Dehydrogenase, Body  Fluid - Body Fluid, Pleural Cavity [[id number]] Collected: 06/05/21 1619    Specimen: Body Fluid from Pleural Cavity Updated: 06/06/21 1256     Lactate Dehydrogenase (LD), Fluid 297 U/L     Narrative:      No Reference Ranges Established.    Serous fluid LDH greater than 60 percent of the serum LDH or serous fluid LDH two-thirds of the upper limit of normal for serum LDH suggests the fluid is an exudate.     1. Pleural TP/Serum TP >0.5  2. Pleural LD/Serum LD >0.6  3. Pleural LD >2/3 of the upper limit of normal for serum LDH    This test was developed, it performance characteristics determined and judged suitable for clinical purposes by [facility].  It has not been cleared or approved by the FDA.  The laboratory is regulated under CLIA as qualified to perform high-complexity testing.     POC Glucose Once [[id number]]  (Abnormal) Collected: 06/06/21 1044    Specimen: Blood Updated: 06/06/21 1101     Glucose 174 mg/dL      Comment: : [id number] Cortexyme EuraisaMeter ID: [id number]       POC Glucose Once [[id number]]  (Normal) Collected: 06/06/21 0742    Specimen: Blood Updated: 06/06/21 0822     Glucose 112 mg/dL      Comment: : [id number] Cortexyme EuraisaMeter ID: [id number]       POC Glucose Once [[id number]]  (Normal) Collected: 06/05/21 2354    Specimen: Blood Updated: 06/06/21 0008     Glucose 82 mg/dL      Comment: : [id number] Netflix GenevaMeter ID: [id number]       POC Glucose Once [[id number]]  (Abnormal) Collected: 06/05/21 2332    Specimen: Blood Updated: 06/06/21 0002     Glucose 43 mg/dL      Comment: : [id number] Netflix GenevaMeter ID: [id number]       POC Glucose Once [[id number]]  (Abnormal) Collected: 06/05/21 2049    Specimen: Blood Updated: 06/05/21 2121     Glucose 245 mg/dL      Comment: : [id number] Dulce Washington  GMeter ID: [id number]       Body Fluid Cell Count With Differential - Body Fluid, Pleural Cavity [[id number]]  (Abnormal) Collected: 06/05/21  1619    Specimen: Body Fluid from Pleural Cavity Updated: 06/05/21 1737    Narrative:      The following orders were created for panel order Body Fluid Cell Count With Differential - Body Fluid, Pleural Cavity.  Procedure                               Abnormality         Status                     ---------                               -----------         ------                     Body fluid cell count - ...[[id number]]  Abnormal            Final result               Body fluid differential ...[[id number]]                      Final result                 Please view results for these tests on the individual orders.    Body fluid differential - Body Fluid, Pleural Cavity [[id number]] Collected: 06/05/21 1619    Specimen: Body Fluid from Pleural Cavity Updated: 06/05/21 1737     Neutrophils, Fluid 18 %      Lymphocytes, Fluid 8 %      Monocytes, Fluid 6 %      Eosinophils, Fluid 12 %      Mesothelial Cells, Fluid 56 %     POC Glucose Once [[id number]]  (Normal) Collected: 06/05/21 1650    Specimen: Blood Updated: 06/05/21 1711     Glucose 94 mg/dL      Comment: : [id number] Lifes[employer] CoreyaisaMeter ID: [id number]       Body fluid cell count - Body Fluid, Pleural Cavity [[id number]]  (Abnormal) Collected: 06/05/21 1619    Specimen: Body Fluid from Pleural Cavity Updated: 06/05/21 1707     Color, Fluid Red     Appearance, Fluid Turbid     WBC, Fluid 525 /mm3      RBC, Fluid 224,000 /mm3     POC Glucose Once [[id number]]  (Normal) Collected: 06/05/21 1604    Specimen: Blood Updated: 06/05/21 1624     Glucose 107 mg/dL      Comment: : [id number] Boyd PennyMeter ID: [id number]       POC Glucose Once [[id number]]  (Normal) Collected: 06/05/21 0800    Specimen: Blood Updated: 06/05/21 0817     Glucose 71 mg/dL      Comment: : [id number] [employer] EuraisaMeter ID: [id number]       Troponin [[id number]]  (Abnormal) Collected: 06/05/21 0440    Specimen: Blood Updated: 06/05/21 0540     Troponin T 0.158 ng/mL     Narrative:       Troponin T Reference Range:  <= 0.03 ng/mL-   Negative for AMI  >0.03 ng/mL-     Abnormal for myocardial necrosis.  Clinicians would have to utilize clinical acumen, EKG, Troponin and serial changes to determine if it is an Acute Myocardial Infarction or myocardial injury due to an underlying chronic condition.       Results may be falsely decreased if patient taking Biotin.      Basic Metabolic Panel [[id number]]  (Abnormal) Collected: 06/05/21 0440    Specimen: Blood Updated: 06/05/21 0527     Glucose 62 mg/dL      BUN 26 mg/dL      Creatinine 6.17 mg/dL      Sodium 136 mmol/L      Potassium 3.9 mmol/L      Chloride 96 mmol/L      CO2 29.0 mmol/L      Calcium 8.7 mg/dL      eGFR   Amer --     Comment: <15 Indicative of kidney failure.        eGFR Non African Amer 7 mL/min/1.73      Comment: <15 Indicative of kidney failure.        BUN/Creatinine Ratio 4.2     Anion Gap 11.0 mmol/L     Narrative:      GFR Normal >60  Chronic Kidney Disease <60  Kidney Failure <15      BNP [[id number]]  (Abnormal) Collected: 06/05/21 0440    Specimen: Blood Updated: 06/05/21 0527     proBNP 24,107.0 pg/mL     Narrative:      Among patients with dyspnea, NT-proBNP is highly sensitive for the detection of acute congestive heart failure. In addition NT-proBNP of <300 pg/ml effectively rules out acute congestive heart failure with 99% negative predictive value.    Results may be falsely decreased if patient taking Biotin.      CBC (No Diff) [[id number]]  (Abnormal) Collected: 06/05/21 0440    Specimen: Blood Updated: 06/05/21 0501     WBC 8.76 10*3/mm3      RBC 3.26 10*6/mm3      Hemoglobin 10.2 g/dL      Hematocrit 32.0 %      MCV 98.2 fL      MCH 31.3 pg      MCHC 31.9 g/dL      RDW 14.5 %      RDW-SD 52.0 fl      MPV 10.2 fL      Platelets 354 10*3/mm3     POC Glucose Once [[id number]]  (Normal) Collected: 06/05/21 0031    Specimen: Blood Updated: 06/05/21 0042     Glucose 77 mg/dL      Comment: : [id number] Britney  Adilia ID: [id number]       POC Glucose Once [[id number]]  (Abnormal) Collected: 06/04/21 2050    Specimen: Blood Updated: 06/04/21 2101     Glucose 53 mg/dL      Comment: : [id number] Gregoria Martinez ID: [id number]       COVID PRE-OP / PRE-PROCEDURE SCREENING ORDER (NO ISOLATION) - Swab, Nasal Cavity [[id number]]  (Normal) Collected: 06/04/21 1628    Specimen: Swab from Nasal Cavity Updated: 06/04/21 1716    Narrative:      The following orders were created for panel order COVID PRE-OP / PRE-PROCEDURE SCREENING ORDER (NO ISOLATION) - Swab, Nasal Cavity.  Procedure                               Abnormality         Status                     ---------                               -----------         ------                     COVID-19,[facility] IN-[facility]LL[facility]...[[id number]]  Normal              Final result                 Please view results for these tests on the individual orders.    COVID-19,[facility] [employer] IN-HOUSE,Nasal Swab No Transport Media 3-4 HR TAT - Swab, Nasal Cavity [[id number]]  (Normal) Collected: 06/04/21 1628    Specimen: Swab from Nasal Cavity Updated: 06/04/21 1716     COVID19 Not Detected    Narrative:      Fact sheet for providers: https://www.fda.gov/media/007604/download     Fact sheet for patients: https://www.fda.gov/media/464368/download    Test performed by PCR.    Consider negative results in combination with clinical observations, patient history, and epidemiological information.    [place] Draw [[id number]] Collected: 06/04/21 1233    Specimen: Blood from Arm, Right Updated: 06/04/21 1645    Narrative:      The following orders were created for panel order [place] Draw.  Procedure                               Abnormality         Status                     ---------                               -----------         ------                     Light Blue Top[[id number]]                                   Final result               Green Top (Gel)[[id number]]                                   Final result               Lavender Top[[id number]]                                     Final result               [facility] Blood Culture Lalit...[[id number]]                      Final result               [place] Top[[id number]]                                         Final result                 Please view results for these tests on the individual orders.    [place] Top [[id number]] Collected: 06/04/21 1233    Specimen: Blood from Arm, Right Updated: 06/04/21 1645     Extra Tube Hold for add-ons.     Comment: Auto resulted.       CBC Auto Differential [[id number]]  (Abnormal) Collected: 06/04/21 1233    Specimen: Blood from Arm, Right Updated: 06/04/21 1600     WBC 8.91 10*3/mm3      RBC 3.40 10*6/mm3      Hemoglobin 10.8 g/dL      Hematocrit 33.0 %      MCV 97.1 fL      MCH 31.8 pg      MCHC 32.7 g/dL      RDW 14.7 %      RDW-SD 52.1 fl      MPV 9.9 fL      Platelets 508 10*3/mm3      Neutrophil % 71.4 %      Lymphocyte % 16.2 %      Monocyte % 6.7 %      Eosinophil % 4.5 %      Basophil % 0.8 %      Immature Grans % 0.4 %      Neutrophils, Absolute 6.36 10*3/mm3      Lymphocytes, Absolute 1.44 10*3/mm3      Monocytes, Absolute 0.60 10*3/mm3      Eosinophils, Absolute 0.40 10*3/mm3      Basophils, Absolute 0.07 10*3/mm3      Immature Grans, Absolute 0.04 10*3/mm3      nRBC 0.0 /100 WBC     Troponin [[id number]]  (Abnormal) Collected: 06/04/21 1233    Specimen: Blood from Arm, Right Updated: 06/04/21 1524     Troponin T 0.158 ng/mL     Narrative:      Troponin T Reference Range:  <= 0.03 ng/mL-   Negative for AMI  >0.03 ng/mL-     Abnormal for myocardial necrosis.  Clinicians would have to utilize clinical acumen, EKG, Troponin and serial changes to determine if it is an Acute Myocardial Infarction or myocardial injury due to an underlying chronic condition.       Results may be falsely decreased if patient taking Biotin.      Comprehensive Metabolic Panel [[id number]]  (Abnormal) Collected: 06/04/21 1233    Specimen: Blood from  Arm, Right Updated: 06/04/21 1515     Glucose 125 mg/dL      BUN 46 mg/dL      Creatinine 8.96 mg/dL      Sodium 142 mmol/L      Potassium 3.5 mmol/L      Chloride 99 mmol/L      CO2 26.0 mmol/L      Calcium 9.4 mg/dL      Total Protein 6.3 g/dL      Albumin 2.50 g/dL      ALT (SGPT) 6 U/L      AST (SGOT) 17 U/L      Alkaline Phosphatase 122 U/L      Total Bilirubin 0.3 mg/dL      eGFR Non African Amer 4 mL/min/1.73      Comment: <15 Indicative of kidney failure.        eGFR   Amer --     Comment: <15 Indicative of kidney failure.        Globulin 3.8 gm/dL      A/G Ratio 0.7 g/dL      BUN/Creatinine Ratio 5.1     Anion Gap 17.0 mmol/L     Narrative:      GFR Normal >60  Chronic Kidney Disease <60  Kidney Failure <15      Phosphorus [[id number]]  (Normal) Collected: 06/04/21 1233    Specimen: Blood from Arm, Right Updated: 06/04/21 1512     Phosphorus 4.4 mg/dL     BNP [[id number]]  (Abnormal) Collected: 06/04/21 1233    Specimen: Blood from Arm, Right Updated: 06/04/21 1510     proBNP 27,617.0 pg/mL     Narrative:      Among patients with dyspnea, NT-proBNP is highly sensitive for the detection of acute congestive heart failure. In addition NT-proBNP of <300 pg/ml effectively rules out acute congestive heart failure with 99% negative predictive value.    Results may be falsely decreased if patient taking Biotin.      Magnesium [[id number]]  (Normal) Collected: 06/04/21 1233    Specimen: Blood from Arm, Right Updated: 06/04/21 1509     Magnesium 2.4 mg/dL     aPTT [[id number]]  (Abnormal) Collected: 06/04/21 1233    Specimen: Blood from Arm, Right Updated: 06/04/21 1503     PTT 40.4 seconds     Protime-INR [[id number]]  (Abnormal) Collected: 06/04/21 1233    Specimen: Blood from Arm, Right Updated: 06/04/21 1503     Protime 14.7 Seconds      INR 1.25     Imaging Results (Last 7 Days)     Procedure Component Value Units Date/Time    XR Chest PA & Lateral [[id number]] Collected: 06/11/21 1242     Updated: 06/11/21  1247    Narrative:      EXAMINATION: XR CHEST PA AND LATERAL-  6/11/2021 12:42 PM CDT 2 views     HISTORY: pleural effusion, s/p chest tube removal; E87.70-Fluid  overload, unspecified; N18.9-Chronic kidney disease, unspecified;  Z37-Cbbndgf effusion, not elsewhere classified; D69-Btlmujs effusion,  not elsewhere classified; I25.119-Atherosclerotic heart disease of  native coronary artery with unspecified angina pectoris     COMPARISON: 06/11/2021.     FINDINGS:   Slight interval improvement in aeration at the LEFT lung base again seen  with probable LEFT basilar atelectasis and small amount of pleural fluid  remaining. The RIGHT chest tube is been removed. No definite  pneumothorax. Some RIGHT basilar atelectasis and probable trace RIGHT  pleural fluid noted. Large bore central venous catheter previously seen  on the RIGHT has been removed as well. The osseous structures and  surrounding soft tissues demonstrate no acute abnormality.          Impression:         1.  Interval removal of the central venous catheter and RIGHT chest  tube. No definite pneumothorax.     2.  Interval improvement in aeration at the LEFT lung base with some  persistent bibasilar atelectasis and trace pleural fluid suspected.        This report was finalized on 06/11/2021 12:44 by Dr. Mati Martinez MD.    XR Chest 1 View [[id number]] Collected: 06/11/21 0725     Updated: 06/11/21 0730    Narrative:      EXAMINATION: XR CHEST 1 VW- 6/11/2021 7:25 AM CDT     HISTORY: pleural effusion; E87.70-Fluid overload, unspecified;  N18.9-Chronic kidney disease, unspecified; G76-Mrbbvpx effusion, not  elsewhere classified; R07-Qteslmi effusion, not elsewhere classified.     REPORT: A frontal view of the chest was obtained.     COMPARISON: Chest x-ray 6/10/2021 0357 hours.     The right internal jugular central venous catheter appears in good  position as before. The right basilar chest tube appears in good  position, unchanged. The lungs are  hypoaerated. Is mild improvement in  their consolidation in the left lateral lung base as well as a small  left pleural effusion. No pneumothorax is identified. There is minimal  atelectasis in the right base. Our size is normal. Previous CABG is  noted.       Impression:      Mild improvement in aeration of the left lung base but with  a persistent area of consolidation and a small effusion. Stable right  basilar chest tube and right internal jugular central venous catheter.  This report was finalized on 06/11/2021 07:27 by Dr. Saji Mullen MD.    XR Chest 1 View [[id number]] Collected: 06/10/21 0704     Updated: 06/10/21 0709    Narrative:      EXAMINATION: XR CHEST 1 VW- 6/10/2021 7:04 AM CDT     HISTORY: pleural effusion; E87.70-Fluid overload, unspecified;  N18.9-Chronic kidney disease, unspecified; R19-Axryddw effusion, not  elsewhere classified; U43-Kvypqpd effusion, not elsewhere classified.     REPORT: A frontal view of the chest was obtained.     COMPARISON: Chest x-ray 6/9/2021 0350 hours.     The right internal jugular central venous catheter remains in  satisfactory position. The lungs are hypoaerated, there is mild  improvement in an area of consolidation in the left lateral lung base,  with associated small left effusion. There is a right basilar chest tube  which remains in good position. There is mild atelectasis at the right  base, with no focal infiltrate in the right. Heart size is normal.  Previous CABG is noted. No pneumothorax is identified.       Impression:      Stable satisfactory position of the right basilar chest tube  and right internal jugular central venous catheter. No pneumothorax is  identified. There is mild improvement in the opacities in the left  lateral lung base.  This report was finalized on 06/10/2021 07:06 by Dr. Saji Mullen MD.    XR Chest 1 View [[id number]] Collected: 06/09/21 0722     Updated: 06/09/21 0727    Narrative:      EXAMINATION: XR CHEST 1 VW- 6/9/2021  7:22 AM CDT     HISTORY: pleural effusion; E87.70-Fluid overload, unspecified;  N18.9-Chronic kidney disease, unspecified; Q19-Ewhdcsv effusion, not  elsewhere classified; T94-Mprimbw effusion, not elsewhere classified.     REPORT: A frontal view the chest was obtained.     COMPARISON: Chest x-ray 6/8/2021 0354 hours.     The lungs are hypoaerated, there is partial consolidation of the left  lung base with blunting of the costophrenic angle, unchanged. No focal  infiltrates in the right lung. Heart size is normal. There is previous  median sternotomy. The right internal jugular central venous catheter  appears in satisfactory position as before. No pneumothorax is  identified. The right basilar chest tube appears in good position as  before.       Impression:      Stable 1 day appearance of the chest.  This report was finalized on 06/09/2021 07:24 by Dr. Saji Mullen MD.    XR Chest 1 View [[id number]] Collected: 06/08/21 0717     Updated: 06/08/21 0721    Narrative:      EXAMINATION: XR CHEST 1 VW- 6/8/2021 7:17 AM CDT     HISTORY: pleural effusion; E87.70-Fluid overload, unspecified;  N18.9-Chronic kidney disease, unspecified; N56-Xvbumpy effusion, not  elsewhere classified; T44-Duqmkll effusion, not elsewhere classified.     REPORT: A frontal view the chest was obtained.     COMPARISON: Chest x-ray 6/7/2021 1732 hours.     There is persistent and increased consolidation of the left lung base  suspicious for pneumonia and probable combined atelectasis. There is a  right basilar chest tube which appears in satisfactory position. The  right internal jugular central venous catheter remains in satisfactory  position. No pneumothorax is identified. There is borderline  cardiomegaly and evidence of previous CABG, without overt CHF.  Underlying COPD is evident. The osseous structures are unremarkable.       Impression:      Increasing consolidation of the left lung base concerning  for pneumonia versus atelectasis with  a small effusion. The right  basilar chest tube remains in satisfactory position. No other change.  This report was finalized on 06/08/2021 07:18 by Dr. Saji Mullen MD.    XR Chest 1 View [[id number]] Collected: 06/07/21 1802     Updated: 06/07/21 1807    Narrative:      EXAMINATION:  XR CHEST 1 VW-  6/7/2021 5:43 PM CDT     HISTORY: Pleural effusion. E87.70-Fluid overload, unspecified;  N18.9-Chronic kidney disease, unspecified; H63-Dmjgjig effusion, not  elsewhere classified; K33-Timvnmi effusion, not elsewhere classified.     COMPARISON: 6/7/2021.     FINDINGS:  There is better inspiration. There is a new right chest tube.  Bilateral infiltrates are nearly resolved. There is mild patchy  infiltrate along the left hemidiaphragm. There is blunting of the left  costophrenic angle. Heart size is within normal limits. There has been  prior median sternotomy. There is a dialysis catheter on the right.       Impression:      1. New right chest tube in place. Right pleural effusion appears to be  nearly resolved.  2. Small left pleural effusion.  3. Infiltrate at the left lung base. Probable atelectasis. Pneumonia  less likely.        This report was finalized on 06/07/2021 18:04 by Dr. Sukhi Ramirez MD.    US Chest [[id number]] Collected: 06/07/21 1715     Updated: 06/07/21 1720    Narrative:      HISTORY: Chest tube insertion     Ultrasound chest: Sonographic imaging of the right posterior chest  performed to assist Dr. Wood with chest tube insertion. Images  demonstrate a moderate right pleural fluid collection with post  placement images demonstrating evacuation of right pleural fluid.  This report was finalized on 06/07/2021 17:17 by Dr. Kimberlee Blankenship MD.    XR Abdomen KUB [[id number]] Collected: 06/07/21 0745     Updated: 06/07/21 0751    Narrative:      EXAMINATION: XR ABDOMEN KUB- 6/7/2021 7:45 AM CDT     HISTORY: Vomiting; E87.70-Fluid overload, unspecified; N18.9-Chronic  kidney disease, unspecified;  Y98-Kycaioo effusion, not elsewhere  classified; U10-Wwozitn effusion, not elsewhere classified.     REPORT: A supine view of the abdomen was obtained.     COMPARISON: CT abdomen pelvis without contrast 5/19/2021.     There is a paucity of bowel gas overall, under stool volume is noted at  the rectum. A peritoneal dialysis catheter is coiled in the left pelvis.  Cholecystectomy clips are present. There is heavy atherosclerotic  calcification within the aorta and its branches. Mild levoscoliosis of  the lumbar spine.       Impression:      There is a paucity of bowel gas overall, which is  nonspecific, moderate stool volume is noted at the rectum. A left pelvic  peritoneal dialysis catheter is present.  This report was finalized on 06/07/2021 07:48 by Dr. Saji Mullen MD.    XR Chest 1 View [[id number]] Collected: 06/07/21 0744     Updated: 06/07/21 0748    Narrative:      EXAMINATION: XR CHEST 1 VW- 6/7/2021 7:44 AM CDT     HISTORY: RRT; E87.70-Fluid overload, unspecified; N18.9-Chronic kidney  disease, unspecified; K44-Lijlcml effusion, not elsewhere classified;  X38-Kpfhlrj effusion, not elsewhere classified.     REPORT: A frontal view of the chest was obtained.     COMPARISON: Chest x-rays 6/6/2021 1458 hours.     The lungs are grossly hypoaerated and the patient is rotated to the  left. There is central vascular crowding, the cardiac margins are  partially obscured without obvious cardiomegaly. Bilateral pleural  effusions are noted, right greater than left and appears stable. No  pneumothorax is identified. The right-sided dialysis catheter appears  unchanged in position.       Impression:      Gross hypoaeration of the lungs with stable bilateral  pleural effusions, right greater than left. Increased central markings  are noted, likely exaggerated by the low lung volumes. Correlate  clinically for the possibility of interstitial edema and mild volume  overload.  This report was finalized on 06/07/2021 07:45  by Dr. Saji Mullen MD.    XR Chest PA & Lateral [[id number]] Collected: 06/06/21 1507     Updated: 06/06/21 1512    Narrative:      EXAMINATION: XR CHEST PA AND LATERAL- 6/6/2021 3:07 PM CDT     HISTORY: follow up after right thoracentesis; E87.70-Fluid overload,  unspecified; N18.9-Chronic kidney disease, unspecified; E47-Tyfqlle  effusion, not elsewhere classified.     REPORT: Frontal and lateral views of the chest were obtained.     COMPARISON: Chest x-rays 6/5/2021 1550 hours.     The lungs remain hypoaerated, with moderate pleural effusions, right  greater than left, the right pleural effusion appears slightly increased  in volume. There is extensive bibasilar atelectasis. No pneumothorax is  identified. Heart size is normal. There is evidence of previous median  sternotomy. The right-sided dialysis catheter appears in good position  as before. No acute osseous abnormality.       Impression:      Extensive bibasilar atelectasis with persistent pleural  effusions, right greater than left, the right pleural effusion appears  slightly increased in overall volume. No other change.  This report was finalized on 06/06/2021 15:09 by Dr. Saji Mullen MD.    XR Chest 1 View [[id number]] Collected: 06/05/21 1614     Updated: 06/05/21 1618    Narrative:      EXAMINATION: XR CHEST 1 VW- 6/5/2021 4:14 PM CDT     HISTORY: thoracentesis; E87.70-Fluid overload, unspecified;  N18.9-Chronic kidney disease, unspecified; I47-Nherprm effusion, not  elsewhere classified.     REPORT: A frontal view of the chest was obtained.     COMPARISON: Chest x-ray 6/4/2021, CTA chest 6/5/2021.     There is marked reduction in volume of the right pleural effusion  following thoracentesis, no pneumothorax is identified. A small left  pleural effusion is unchanged. There is moderate atelectasis in the lung  bases greater on the right. Heart size is normal. Previous CABG is  noted. The right-sided dialysis catheter appears in good position  as  before. No other change.       Impression:      Marked reduction in volume of the right pleural effusion  following thoracentesis with no pneumothorax. There is residual  atelectasis in the lung bases greater on the right.  This report was finalized on 06/05/2021 16:15 by Dr. Saji Mullen MD.    CT Angiogram Chest [[id number]] Collected: 06/05/21 1031     Updated: 06/05/21 1042    Narrative:      EXAMINATION: CT ANGIOGRAM CHEST- 6/5/2021 10:31 AM CDT     HISTORY: pleural effusion -right; E87.70-Fluid overload, unspecified;  N18.9-Chronic kidney disease, unspecified; L74-Zcployv effusion, not  elsewhere classified     DOSE: 400 mGycm (Automatic exposure control technique was implemented in  an effort to keep the radiation dose as low as possible without  compromising image quality)     REPORT: Spiral CT of the chest was performed after administration of  intravenous contrast from the thoracic inlet through the upper abdomen  using CTA protocol. Reconstructed coronal and sagittal images were also  reviewed.     Comparison: CTA chest 3/11/2021.     The contrast bolus is satisfactory, there is moderate respiratory motion  artifact. The central pulmonary arteries are normal caliber without  filling defects. There is poor visualization of peripheral pulmonary  artery branches due to respiratory motion. Heart size is normal. No  evidence of right heart strain is identified. There is a right-sided  internal jugular dialysis catheter which appears in satisfactory  position. There is a small amount calcified plaque within the thoracic  aortic arch with normal aortic caliber. No evidence of aortic  dissection. There is previous median sternotomy. There is a large new  pleural effusion on the right, with diffuse atelectasis, particularly  involving the right middle and lower lobes. This also small new left  pleural effusion. Lung windows show no pneumothorax or obvious  consolidation to indicate pneumonia. There are  "nodules in the thyroid  gland as well as calcifications. The largest nodule appears cystic, in  the left lobe and measures 1.6 cm. There is a moderate-sized hiatal  hernia and evidence previous gastric surgery. A small pericardial  effusion is present. This is new. The visualized upper abdomen is  otherwise unremarkable. Review of bone windows is acutely unremarkable.  There is mild displacement of the upper sternotomy with poor healing.       Impression:      1. Limited exam due to excessive respiratory motion, with no gross  evidence of central pulmonary emboli.  2. New large pleural effusion on the right, new small left pleural  effusion and new small pericardial effusion. There is extensive passive  atelectasis in the right lung, particularly involving the right middle  and lower lobes.  3. No pneumothorax is identified. No evidence of intrathoracic  lymphadenopathy.  4. Evidence of interval median sternotomy, with poor healing of the  sternotomy.              This report was finalized on 06/05/2021 10:39 by Dr. Saji Mullen MD.            Condition on Discharge:    Stable    Physical Exam on Discharge:  /56 (BP Location: Left arm, Patient Position: Lying)   Pulse 70   Temp 98.6 °F (37 °C) (Oral)   Resp 16   Ht 160 cm (62.99\")   Wt 65.2 kg (143 lb 12.8 oz)   SpO2 97%   BMI 25.48 kg/m²   Physical Exam     Constitutional:       Appearance: Normal appearance.   HENT:      Head: Normocephalic and atraumatic.      Nose: No congestion or rhinorrhea.      Mouth: Mucous membranes are moist.      Pharynx: Oropharynx is clear.   Eyes:      General: No scleral icterus.     Conjunctiva/sclera: Conjunctivae normal.   Cardiovascular:      Rate and Rhythm: Normal rate and regular rhythm.   Pulmonary:      Effort: Pulmonary effort is normal.      Breath sounds: Normal breath sounds.      Comments: Diminished both bases  Abdominal:      General: Abdomen is flat. Bowel sounds are normal. There is no distension. "      Palpations: Abdomen is soft. There is no mass.   Skin:     General: Skin is warm.      Coloration: Skin is pale. Skin is not jaundiced.   Neurological:      General: No focal deficit present.      Mental Status: She is alert and oriented to person, place, and time.   Psychiatric:         Mood and Affect: Mood normal    Discharge Disposition:  Home or Self Care    Discharge Medications:     Discharge Medications      Changes to Medications      Instructions Start Date   nitroglycerin 0.4 MG SL tablet  Commonly known as: NITROSTAT  What changed:   · how much to take  · how to take this  · when to take this  · reasons to take this   1 under the tongue as needed for angina, may repeat q5mins for up three doses         Continue These Medications      Instructions Start Date   allopurinol 100 MG tablet  Commonly known as: ZYLOPRIM   100 mg, Oral, Daily      ascorbic acid 500 MG tablet  Commonly known as: VITAMIN C   500 mg, Oral, Daily      aspirin 81 MG EC tablet   81 mg, Oral, Daily      atorvastatin 20 MG tablet  Commonly known as: LIPITOR   20 mg, Oral, Nightly      calcium carbonate 500 MG chewable tablet  Commonly known as: TUMS   2 tablets, Oral, Nightly      citalopram 40 MG tablet  Commonly known as: CeleXA   40 mg, Oral, Daily      docusate sodium 100 MG capsule  Commonly known as: COLACE   200 mg, Oral, As Needed      Dulcolax 10 MG suppository  Generic drug: bisacodyl   10 mg, Rectal, Daily PRN      famotidine 20 MG tablet  Commonly known as: PEPCID   20 mg, Oral, Daily      HYDROcodone-acetaminophen 5-325 MG per tablet  Commonly known as: NORCO   1 tablet, Oral, Every 8 Hours PRN      insulin detemir 100 UNIT/ML injection  Commonly known as: LEVEMIR   12 Units, Subcutaneous, Daily      irbesartan 75 MG tablet  Commonly known as: AVAPRO   75 mg, Oral, Daily      Melatonin 5 MG capsule   5 mg, Oral, Nightly      metoprolol tartrate 25 MG tablet  Commonly known as: LOPRESSOR   25 mg, Oral, Every 12 Hours  Scheduled      multivitamin with minerals tablet tablet   1 tablet, Oral, Daily      pantoprazole 40 MG EC tablet  Commonly known as: PROTONIX   40 mg, Oral, 2 times daily      sucralfate 1 g tablet  Commonly known as: CARAFATE   1 g, Oral, 4 Times Daily, Dissolve 1 tablet in water to create a slurry and drink QID; AC & HS PRN      Velphoro 500 MG chewable tablet  Generic drug: Sucroferric Oxyhydroxide   1 tablet, Oral, 3 Times Daily, WITH MEALS      vitamin D3 125 MCG (5000 UT) capsule capsule   1 tablet, Oral, Daily             Discharge Diet:   Diet Instructions     Diet: Consistent Carbohydrate, Renal      Discharge Diet:  Consistent Carbohydrate  Renal             Discharge Care Plan / Instructions:   Discharge home    Activity at Discharge:   Activity Instructions     Activity as Tolerated            Follow-up Appointments:  Follow-up with primary care physician next week  Follow-up with CT surgery in 1 month       Electronically signed by Cl Heart DO, 06/11/21, 14:20 CDT.    Time: Discharge Over 30 min    Part of this note may be an electronic transcription/translation of spoken language to printed text using the Dragon Dictation system.        Electronically signed by Cl Heart DO at 06/11/21 1428       Discharge Order (From admission, onward)     Start     Ordered    06/11/21 1419  Discharge patient  Once     Expected Discharge Date: 06/11/21    Discharge Disposition: Home or Self Care    Physician of Record for Attribution - Please select from Treatment Team: CAMILLE PEARSON [[id number]]    Review needed by CMO to determine Physician of Record: No       Question Answer Comment   Physician of Record for Attribution - Please select from Treatment Team CAMILLE PEARSON    Review needed by CMO to determine Physician of Record No        06/11/21 1422

## 2021-06-11 NOTE — CASE MANAGEMENT/SOCIAL WORK
Continued Stay Note   Abby     Patient Name: Jennifer Salcido  MRN: [id number]  Today's Date: 6/11/2021    Admit Date: 6/4/2021    Discharge Plan     Row Name 06/11/21 0842       Plan    Plan  Home and resume [facility]    Patient/Family in Agreement with Plan  yes    Plan Comments  Chart reviewed; events noted.  Possible discharge home today.  SW will need resumption orders for [facility].  SW will follow.        Discharge Codes    No documentation.             AUDI Grewal

## 2021-06-11 NOTE — PROGRESS NOTES
"Patient name: Jennifer Salcido  Patient : 1955  VISIT # [id number]  MR #[id number]    Procedure:Procedure(s):  CHEST TUBE INSERTION, right   Procedure Date:2021  POD:4    Subjective   Chief Complaint   Patient presents with   • Shortness of Breath     Resting in bed. On room air. Right chest tube to -20 cm H2O suction with minimal output. PD performed overnight with around 1500 ml pulled off per patient report. She is hopeful for chest tube removal today.     Telemetry: SB-NSR 59-77 bpm     Objective     Visit Vitals  /67 (BP Location: Left arm, Patient Position: Lying)   Pulse 75   Temp 98.2 °F (36.8 °C) (Oral)   Resp 16   Ht 160 cm (62.99\")   Wt 65.2 kg (143 lb 12.8 oz)   SpO2 97%   BMI 25.48 kg/m²       Intake/Output Summary (Last 24 hours) at 2021 0943  Last data filed at 2021 0600  Gross per 24 hour   Intake --   Output 0 ml   Net 0 ml     CT : 0 ml/24 hours, no air leak with forced cough    Lab:     CBC:  Results from last 7 days   Lab Units 21  0036 21  0440 21  1233   WBC 10*3/mm3 8.77 8.76 8.91   HEMATOCRIT % 31.2* 32.0* 33.0*   PLATELETS 10*3/mm3 324 354 508*          BMP:  Results from last 7 days   Lab Units 06/10/21  0834 21  0036 21  0440   SODIUM mmol/L 134* 133* 136   POTASSIUM mmol/L 4.4 3.5 3.9   CHLORIDE mmol/L 94* 97* 96*   CO2 mmol/L 27.0 20.0* 29.0   GLUCOSE mg/dL 156* 147* 62*   BUN mg/dL 37* 29* 26*   CREATININE mg/dL 8.50* 6.13* 6.17*          COAG:  Results from last 7 days   Lab Units 21  1233   INR  1.25*   APTT seconds 40.4*       IMAGES:       Imaging Results (Last 24 Hours)     Procedure Component Value Units Date/Time    XR Chest 1 View [[id number]] Collected: 21     Updated: 2130    Narrative:      EXAMINATION: XR CHEST 1 VW- 2021 7:25 AM CDT     HISTORY: pleural effusion; E87.70-Fluid overload, unspecified;  N18.9-Chronic kidney disease, unspecified; D81-Lzqyrsj effusion, not  elsewhere classified; " P53-Ojikukn effusion, not elsewhere classified.     REPORT: A frontal view of the chest was obtained.     COMPARISON: Chest x-ray 6/10/2021 0357 hours.     The right internal jugular central venous catheter appears in good  position as before. The right basilar chest tube appears in good  position, unchanged. The lungs are hypoaerated. Is mild improvement in  their consolidation in the left lateral lung base as well as a small  left pleural effusion. No pneumothorax is identified. There is minimal  atelectasis in the right base. Our size is normal. Previous CABG is  noted.       Impression:      Mild improvement in aeration of the left lung base but with  a persistent area of consolidation and a small effusion. Stable right  basilar chest tube and right internal jugular central venous catheter.  This report was finalized on 06/11/2021 07:27 by Dr. Saji Mullen MD.        Imaging today: right chest tube in place, no right pleural effusion, no pneumothorax, improved left pleural effusion    Physical Exam:  General: Alert, oriented. No apparent distress. Resting in bed.   Cardiovascular: Regular rate and rhythm without murmur, rubs, or gallops.    Pulmonary: Clear to auscultation bilaterally without wheezing, rubs, or rales.  Chest: well healed sternotomy site. No drainage. sternum stable. No clicks. Right chest tube with dressing clean, dry, and intact. -20 cm H2O suction, no air leak, minimal serosanguinous fluid output.  Abdomen: Soft, non distended, and non tender.  Extremities: Warm, moves all extremities.  Neurologic:  Grossly intact with no focal deficits.            Impression:  ESRD on dialysis (CMS/[facility], located within [facility])  Coronary artery disease involving native coronary artery of native heart with angina pectoris (CMS/[facility], located within [facility])  Type 2 diabetes mellitus, without long-term current use of insulin (CMS/[facility], located within [facility])  Chronic anemia  Hypervolemia  Persistent dry cough  Pleural effusion on right, resolved       Plan:  Right chest tube  removed without remark. PA and Lat CXR in a couple hours and if stable, ok for DC home from CT surgery standpoint.   Briefly discussed pleurx catheter for reaccumulation of right pleural effusion in the future.   Pleural fluid cytology pending   DW patient and nursing   FU with Dr. Wood with CXR      Katheryn De Jesus, APRN  06/11/21  09:43 CDT

## 2021-06-11 NOTE — DISCHARGE SUMMARY
[facility] Medicine Services  DISCHARGE SUMMARY       Date of Admission: 6/4/2021  Date of Discharge:  6/11/2021  Primary Care Physician: Cristian Hernandez MD    Discharge Diagnoses:  Active Hospital Problems    Diagnosis    • Persistent dry cough    • Pleural effusion on right    • Hypervolemia    • Chronic anemia    • Type 2 diabetes mellitus, without long-term current use of insulin (CMS/[facility])    • ESRD on dialysis (CMS/[facility])    • Coronary artery disease involving native coronary artery of native heart with angina pectoris (CMS/[facility])          Presenting Problem/History of Present Illness:  Hypervolemia, unspecified hypervolemia type [E87.70]     Chief Complaint on Day of Discharge:   No complaint    History of Present Illness on Day of Discharge:   Vascular access catheter for dialysis has been removed.  Chest tube has been removed with no recurrence of pneumothorax.  All services agreed that the patient is appropriate for discharge home today.    Hospital Course  Jennifer Salcido is a 65-year-old female with a past medical history of end-stage renal disease on peritoneal dialysis. Patient was started on hemodialysis 4/22/2021 due to undergoing three-vessel CABG. Patient recently admitted 5/19-5/20, 2021 for evaluation of nausea and vomiting and missing a day of dialysis.  Patient continued with hemodialysis until 5/28/2021 at which time she resumed her home peritoneal dialysis.  She presents today with shortness of breathing and complaints of persistent cough.  She denies sputum production.  She states she is extremely weak with severe shortness of breathing with minimal exertion.  She does have concerns there is a problem with her heart.  She has had no chest pain.  ER work-up reveals elevated troponin which is chronic, elevated BNP which is less than from previous admission also chronic.  Creatinine is 8.96 with a GFR of 4.  Imaging reveals right pleural effusion has increased in size  from last evaluation however has been a slight decrease in the left pleural effusion.  Condition is stable.  Patient is followed outpatient by Dr. Antony Wood and Dr. Srikanth Coker.  She is admitted for further evaluation treatment.  Plan:   1.  Admit as inpatient  2.  Consult nephrology for possible dialysis today  3.  CT angiogram of the chest in a.m. prior to HD  4.  Dr. Perez plans for bedside thoracentesis tomorrow  5.  Home medications reviewed and restarted as appropriate  6.  DVT prophylaxis with SCDs  7.  Labs in a.m.  8.  Supplemental oxygen, incentive spirometry, continuous pulse oximetry  9.  Monitor glucose before meals and at bedtime with regular insulin sliding scale coverage, add home Levemir however decreased from 20 units to 10 units daily    Nephrology was consulted for dialysis secondary to poor response to peritoneal dialysis.  A bedside thoracentesis was performed by Dr. Perez on 6/5 with 1.25 L removed.  Chest x-ray repeated the following day showed continued pleural effusion.  CT surgery was consulted and inserted a chest tube.  The tube was placed to 20 cm suction.  Hemodialysis was undertaken for short period of time until fluid balance was established by nephrology.  The patient was then converted to peritoneal dialysis and seemed to do very well thereafter.  Chest tube remained connected to suction with gradual reduction in suction during her stay.  The patient did quite well throughout her stay.  Fluid output volume from chest tube waned and was minimal at the time that the tube was removed.  Repeat chest x-ray performed this morning about 2 hours after tube removal shows no evidence of recurrent pneumothorax.  All services agreed the patient is stable for discharge home.    Procedures Performed:   Bedside thoracentesis  Right thoracostomy tube placement  Removal of right IJ permacath    Consults:   Cardiothoracic surgery  Nephrology  Vascular surgery    Pertinent Test Results:   Lab  Results (all)     Procedure Component Value Units Date/Time    POC Glucose Once [[id number]]  (Abnormal) Collected: 06/11/21 1116    Specimen: Blood Updated: 06/11/21 1127     Glucose 173 mg/dL      Comment: : [id number] Vane BandaiaMeter ID: [id number]       POC Glucose Once [[id number]]  (Abnormal) Collected: 06/11/21 0737    Specimen: Blood Updated: 06/11/21 0748     Glucose 171 mg/dL      Comment: : [id number]  Gerson ID: [id number]       POC Glucose Once [[id number]]  (Abnormal) Collected: 06/10/21 1953    Specimen: Blood Updated: 06/10/21 2004     Glucose 181 mg/dL      Comment: : [id number] Saul KohlerahMeter ID: [id number]       Fungus Culture - Body Fluid, Pleural Cavity [[id number]] Collected: 06/05/21 1619    Specimen: Body Fluid from Pleural Cavity Updated: 06/10/21 1700     Fungus Culture No fungus isolated at less than 1 week    POC Glucose Once [[id number]]  (Abnormal) Collected: 06/10/21 1644    Specimen: Blood Updated: 06/10/21 1655     Glucose 207 mg/dL      Comment: : [id number] Ayla PascalionMeter ID: [id number]       POC Glucose Once [[id number]]  (Abnormal) Collected: 06/10/21 1206    Specimen: Blood Updated: 06/10/21 1217     Glucose 155 mg/dL      Comment: : [id number] Ayla ChampionMeter ID: [id number]       Basic Metabolic Panel [[id number]]  (Abnormal) Collected: 06/10/21 0834    Specimen: Blood Updated: 06/10/21 0915     Glucose 156 mg/dL      BUN 37 mg/dL      Creatinine 8.50 mg/dL      Sodium 134 mmol/L      Potassium 4.4 mmol/L      Chloride 94 mmol/L      CO2 27.0 mmol/L      Calcium 9.4 mg/dL      eGFR   Amer --     Comment: <15 Indicative of kidney failure.        eGFR Non African Amer 5 mL/min/1.73      Comment: <15 Indicative of kidney failure.        BUN/Creatinine Ratio 4.4     Anion Gap 13.0 mmol/L     Narrative:      GFR Normal >60  Chronic Kidney Disease <60  Kidney Failure <15      POC Glucose Once [[id number]]  (Abnormal) Collected: 06/10/21  0822    Specimen: Blood Updated: 06/10/21 0833     Glucose 168 mg/dL      Comment: : [id number] Ayla PascalionMeter ID: [id number]       Anaerobic Culture - Pleural Fluid, Pleural Cavity [[id number]] Collected: 06/05/21 1619    Specimen: Pleural Fluid from Pleural Cavity Updated: 06/10/21 0608     Anaerobic Culture No anaerobes isolated at 5 days    POC Glucose Once [[id number]]  (Abnormal) Collected: 06/09/21 2009    Specimen: Blood Updated: 06/09/21 2020     Glucose 181 mg/dL      Comment: : [id number] Jennifer Mendieta ID: [id number]       POC Glucose Once [[id number]]  (Abnormal) Collected: 06/09/21 1659    Specimen: Blood Updated: 06/09/21 1712     Glucose 166 mg/dL      Comment: : [id number] Raul PollockssMeter ID: [id number]       POC Glucose Once [[id number]]  (Abnormal) Collected: 06/09/21 1129    Specimen: Blood Updated: 06/09/21 1141     Glucose 150 mg/dL      Comment: : [id number] Raul PollockssMeter ID: [id number]       POC Glucose Once [[id number]]  (Abnormal) Collected: 06/09/21 0759    Specimen: Blood Updated: 06/09/21 0825     Glucose 135 mg/dL      Comment: : [id number] Raul PollockssMeter ID: [id number]       POC Glucose Once [[id number]]  (Abnormal) Collected: 06/08/21 2040    Specimen: Blood Updated: 06/08/21 2043     Glucose 236 mg/dL      Comment: : [id number] Jennifer Mendieta ID: [id number]       POC Glucose Once [[id number]]  (Abnormal) Collected: 06/08/21 1606    Specimen: Blood Updated: 06/08/21 1656     Glucose 152 mg/dL      Comment: : [id number] Raul JessMeter ID: [id number]       POC Glucose Once [[id number]]  (Abnormal) Collected: 06/08/21 1157    Specimen: Blood Updated: 06/08/21 1209     Glucose 160 mg/dL      Comment: : [id number] Raul JessMeter ID: [id number]       POC Glucose Once [[id number]]  (Abnormal) Collected: 06/08/21 0745    Specimen: Blood Updated: 06/08/21 0756     Glucose 149 mg/dL      Comment: : [id number] Raul Watson ID: [id number]        Body Fluid Culture - Body Fluid, Pleural Cavity [[id number]] Collected: 06/05/21 1619    Specimen: Body Fluid from Pleural Cavity Updated: 06/08/21 0512     Body Fluid Culture No growth at 3 days     Gram Stain Moderate (3+) WBCs seen      No organisms seen    POC Glucose Once [[id number]]  (Abnormal) Collected: 06/07/21 2029    Specimen: Blood Updated: 06/07/21 2029     Glucose 239 mg/dL      Comment: : [id number] Gregoria CurtisandraMeter ID: [id number]       POC Glucose Once [[id number]]  (Normal) Collected: 06/07/21 1714    Specimen: Blood Updated: 06/07/21 1725     Glucose 119 mg/dL      Comment: : [id number] Niurka Velasco  SMeter ID: [id number]       POC Glucose Once [[id number]]  (Normal) Collected: 06/07/21 1522    Specimen: Blood Updated: 06/07/21 1534     Glucose 104 mg/dL      Comment: : [id number] Gloria Husain RMeter ID: [id number]       POC Glucose Once [[id number]]  (Abnormal) Collected: 06/07/21 1119    Specimen: Blood Updated: 06/07/21 1136     Glucose 150 mg/dL      Comment: : [id number] Cartermemo AntonioMeter ID: [id number]       Non-gynecologic Cytology [[id number]] Collected: 06/05/21 1619    Specimen: Body Fluid from Pleural Cavity Updated: 06/07/21 0953    POC Glucose Once [[id number]]  (Abnormal) Collected: 06/07/21 0754    Specimen: Blood Updated: 06/07/21 0805     Glucose 264 mg/dL      Comment: : [id number] Orlando Nieto ID: [id number]       Troponin [[id number]]  (Abnormal) Collected: 06/07/21 0036    Specimen: Blood Updated: 06/07/21 0108     Troponin T 0.180 ng/mL     Narrative:      Troponin T Reference Range:  <= 0.03 ng/mL-   Negative for AMI  >0.03 ng/mL-     Abnormal for myocardial necrosis.  Clinicians would have to utilize clinical acumen, EKG, Troponin and serial changes to determine if it is an Acute Myocardial Infarction or myocardial injury due to an underlying chronic condition.       Results may be falsely decreased if patient taking Biotin.       Comprehensive Metabolic Panel [[id number]]  (Abnormal) Collected: 06/07/21 0036    Specimen: Blood Updated: 06/07/21 0101     Glucose 147 mg/dL      BUN 29 mg/dL      Creatinine 6.13 mg/dL      Sodium 133 mmol/L      Potassium 3.5 mmol/L      Chloride 97 mmol/L      CO2 20.0 mmol/L      Calcium 8.5 mg/dL      Total Protein 6.1 g/dL      Albumin 2.40 g/dL      ALT (SGPT) 7 U/L      AST (SGOT) 17 U/L      Alkaline Phosphatase 119 U/L      Total Bilirubin 0.3 mg/dL      eGFR Non African Amer 7 mL/min/1.73      Comment: <15 Indicative of kidney failure.        eGFR   Amer --     Comment: <15 Indicative of kidney failure.        Globulin 3.7 gm/dL      A/G Ratio 0.6 g/dL      BUN/Creatinine Ratio 4.7     Anion Gap 16.0 mmol/L     Narrative:      GFR Normal >60  Chronic Kidney Disease <60  Kidney Failure <15      Lipase [[id number]]  (Abnormal) Collected: 06/07/21 0036    Specimen: Blood Updated: 06/07/21 0056     Lipase 145 U/L     POC Glucose Once [[id number]]  (Abnormal) Collected: 06/07/21 0004    Specimen: Blood Updated: 06/07/21 0049     Glucose 135 mg/dL      Comment: : [id number] Mccromick ConnienMeter ID: [id number]       POC Glucose Once [[id number]]  (Abnormal) Collected: 06/07/21 0025    Specimen: Blood Updated: 06/07/21 0046     Glucose 143 mg/dL      Comment: : [id number] Pherigo SusanMeter ID: [id number]       CBC & Differential [[id number]]  (Abnormal) Collected: 06/07/21 0036    Specimen: Blood Updated: 06/07/21 0042    Narrative:      The following orders were created for panel order CBC & Differential.  Procedure                               Abnormality         Status                     ---------                               -----------         ------                     CBC Auto Differential[[id number]]        Abnormal            Final result                 Please view results for these tests on the individual orders.    CBC Auto Differential [[id number]]  (Abnormal) Collected: 06/07/21 0036     Specimen: Blood Updated: 06/07/21 0042     WBC 8.77 10*3/mm3      RBC 3.21 10*6/mm3      Hemoglobin 10.2 g/dL      Hematocrit 31.2 %      MCV 97.2 fL      MCH 31.8 pg      MCHC 32.7 g/dL      RDW 14.5 %      RDW-SD 51.8 fl      MPV 9.9 fL      Platelets 324 10*3/mm3      Neutrophil % 56.9 %      Lymphocyte % 25.0 %      Monocyte % 8.6 %      Eosinophil % 8.0 %      Basophil % 0.7 %      Immature Grans % 0.8 %      Neutrophils, Absolute 5.00 10*3/mm3      Lymphocytes, Absolute 2.19 10*3/mm3      Monocytes, Absolute 0.75 10*3/mm3      Eosinophils, Absolute 0.70 10*3/mm3      Basophils, Absolute 0.06 10*3/mm3      Immature Grans, Absolute 0.07 10*3/mm3      nRBC 0.0 /100 WBC     POC Glucose Once [[id number]]  (Abnormal) Collected: 06/06/21 2345    Specimen: Blood Updated: 06/06/21 2356     Glucose 157 mg/dL      Comment: : [id number] dotSyntaxeter ID: [id number]       POC Glucose Once [[id number]]  (Abnormal) Collected: 06/06/21 2028    Specimen: Blood Updated: 06/06/21 2029     Glucose 251 mg/dL      Comment: : [id number] [employer] Technology SolutionsilenMeter ID: [id number]       POC Glucose Once [[id number]]  (Normal) Collected: 06/06/21 1635    Specimen: Blood Updated: 06/06/21 1735     Glucose 86 mg/dL      Comment: : [id number] Jose Armando EuraisaMeter ID: [id number]       Protein, Body Fluid - Pleural Fluid, Pleural Cavity [[id number]] Collected: 06/05/21 1619    Specimen: Pleural Fluid from Pleural Cavity Updated: 06/06/21 1309     Protein, Total, Fluid 4.0 g/dL     Narrative:      No Reference Ranges Established.    A serous fluid total fluid (TP) greater than 50 percent of the serum TP suggests the fluid is an exudate.      1. Pleural TP/Serum TP >0.5  2. Pleural LD/Serum LD >0.6  3. Pleural LD >2/3 of the upper limit of normal for serum LDH    This test was developed, it performance characteristics determined and judged suitable for clinical purposes by [facility].  It has not been cleared or  approved by the FDA.  The laboratory is regulated under CLIA as qualified to perform high-complexity testing.     Glucose, Body Fluid - Pleural Fluid, Pleural Cavity [[id number]] Collected: 06/05/21 1619    Specimen: Pleural Fluid from Pleural Cavity Updated: 06/06/21 1309     Glucose, Fluid 104 mg/dL     Narrative:      No Reference Ranges Established.    Serous fluid glucose less than 60 mg/dL or less than 30 mg/dL below serum glucose suggests an infectious or malignant exudate.     This test was developed, it performance characteristics determined and judged suitable for clinical purposes by [facility].  It has not been cleared or approved by the FDA.  The laboratory is regulated under CLIA as qualified to perform high-complexity testing.     Albumin, Fluid - Pleural Fluid, Pleural Cavity [[id number]] Collected: 06/05/21 1619    Specimen: Pleural Fluid from Pleural Cavity Updated: 06/06/21 1309     Albumin, Fluid 2.20 g/dL     Narrative:      No Reference Ranges Established.    A Serous fluid albumin gradient (serum albumin-fluid) <1.1 g/dL suggests the fluid is an exudate.  Cirrhosis usually results in an ascites fluid albumin gradient >1.1 g/dL.    This test was developed, its performance characteristics determined and judged suitable for clinical purposes by [facility].  It has not been cleared or approved by the FDA.  The laboratory is regulated under CLIA as qualified to perfom high-complexity testing.      Lactate Dehydrogenase, Body Fluid - Body Fluid, Pleural Cavity [[id number]] Collected: 06/05/21 1619    Specimen: Body Fluid from Pleural Cavity Updated: 06/06/21 1256     Lactate Dehydrogenase (LD), Fluid 297 U/L     Narrative:      No Reference Ranges Established.    Serous fluid LDH greater than 60 percent of the serum LDH or serous fluid LDH two-thirds of the upper limit of normal for serum LDH suggests the fluid is an exudate.     1. Pleural TP/Serum  TP >0.5  2. Pleural LD/Serum LD >0.6  3. Pleural LD >2/3 of the upper limit of normal for serum LDH    This test was developed, it performance characteristics determined and judged suitable for clinical purposes by [facility].  It has not been cleared or approved by the FDA.  The laboratory is regulated under CLIA as qualified to perform high-complexity testing.     POC Glucose Once [[id number]]  (Abnormal) Collected: 06/06/21 1044    Specimen: Blood Updated: 06/06/21 1101     Glucose 174 mg/dL      Comment: : [id number] DrinkSendo EuraisaMeter ID: [id number]       POC Glucose Once [[id number]]  (Normal) Collected: 06/06/21 0742    Specimen: Blood Updated: 06/06/21 0822     Glucose 112 mg/dL      Comment: : [id number] DrinkSendo EuraisaMeter ID: [id number]       POC Glucose Once [[id number]]  (Normal) Collected: 06/05/21 2354    Specimen: Blood Updated: 06/06/21 0008     Glucose 82 mg/dL      Comment: : [id number] CityIN GenevaMeter ID: [id number]       POC Glucose Once [[id number]]  (Abnormal) Collected: 06/05/21 2332    Specimen: Blood Updated: 06/06/21 0002     Glucose 43 mg/dL      Comment: : [id number] CityIN [place]Meter ID: [id number]       POC Glucose Once [[id number]]  (Abnormal) Collected: 06/05/21 2049    Specimen: Blood Updated: 06/05/21 2121     Glucose 245 mg/dL      Comment: : [id number] Dulce Liza  ALLAeter ID: [id number]       Body Fluid Cell Count With Differential - Body Fluid, Pleural Cavity [[id number]]  (Abnormal) Collected: 06/05/21 1619    Specimen: Body Fluid from Pleural Cavity Updated: 06/05/21 1737    Narrative:      The following orders were created for panel order Body Fluid Cell Count With Differential - Body Fluid, Pleural Cavity.  Procedure                               Abnormality         Status                     ---------                               -----------         ------                     Body fluid cell count - ...[[id number]]  Abnormal             Final result               Body fluid differential ...[[id number]]                      Final result                 Please view results for these tests on the individual orders.    Body fluid differential - Body Fluid, Pleural Cavity [[id number]] Collected: 06/05/21 1619    Specimen: Body Fluid from Pleural Cavity Updated: 06/05/21 1737     Neutrophils, Fluid 18 %      Lymphocytes, Fluid 8 %      Monocytes, Fluid 6 %      Eosinophils, Fluid 12 %      Mesothelial Cells, Fluid 56 %     POC Glucose Once [[id number]]  (Normal) Collected: 06/05/21 1650    Specimen: Blood Updated: 06/05/21 1711     Glucose 94 mg/dL      Comment: : [id number] copygram EuraisaMeter ID: [id number]       Body fluid cell count - Body Fluid, Pleural Cavity [[id number]]  (Abnormal) Collected: 06/05/21 1619    Specimen: Body Fluid from Pleural Cavity Updated: 06/05/21 1707     Color, Fluid Red     Appearance, Fluid Turbid     WBC, Fluid 525 /mm3      RBC, Fluid 224,000 /mm3     POC Glucose Once [[id number]]  (Normal) Collected: 06/05/21 1604    Specimen: Blood Updated: 06/05/21 1624     Glucose 107 mg/dL      Comment: : [id number] Nix JeanieMeter ID: [id number]       POC Glucose Once [[id number]]  (Normal) Collected: 06/05/21 0800    Specimen: Blood Updated: 06/05/21 0817     Glucose 71 mg/dL      Comment: : [id number] copygram EuraisaMeter ID: [id number]       Troponin [[id number]]  (Abnormal) Collected: 06/05/21 0440    Specimen: Blood Updated: 06/05/21 0540     Troponin T 0.158 ng/mL     Narrative:      Troponin T Reference Range:  <= 0.03 ng/mL-   Negative for AMI  >0.03 ng/mL-     Abnormal for myocardial necrosis.  Clinicians would have to utilize clinical acumen, EKG, Troponin and serial changes to determine if it is an Acute Myocardial Infarction or myocardial injury due to an underlying chronic condition.       Results may be falsely decreased if patient taking Biotin.      Basic Metabolic Panel [[id number]]  (Abnormal)  Collected: 06/05/21 0440    Specimen: Blood Updated: 06/05/21 0527     Glucose 62 mg/dL      BUN 26 mg/dL      Creatinine 6.17 mg/dL      Sodium 136 mmol/L      Potassium 3.9 mmol/L      Chloride 96 mmol/L      CO2 29.0 mmol/L      Calcium 8.7 mg/dL      eGFR   Amer --     Comment: <15 Indicative of kidney failure.        eGFR Non African Amer 7 mL/min/1.73      Comment: <15 Indicative of kidney failure.        BUN/Creatinine Ratio 4.2     Anion Gap 11.0 mmol/L     Narrative:      GFR Normal >60  Chronic Kidney Disease <60  Kidney Failure <15      BNP [[id number]]  (Abnormal) Collected: 06/05/21 0440    Specimen: Blood Updated: 06/05/21 0527     proBNP 24,107.0 pg/mL     Narrative:      Among patients with dyspnea, NT-proBNP is highly sensitive for the detection of acute congestive heart failure. In addition NT-proBNP of <300 pg/ml effectively rules out acute congestive heart failure with 99% negative predictive value.    Results may be falsely decreased if patient taking Biotin.      CBC (No Diff) [[id number]]  (Abnormal) Collected: 06/05/21 0440    Specimen: Blood Updated: 06/05/21 0501     WBC 8.76 10*3/mm3      RBC 3.26 10*6/mm3      Hemoglobin 10.2 g/dL      Hematocrit 32.0 %      MCV 98.2 fL      MCH 31.3 pg      MCHC 31.9 g/dL      RDW 14.5 %      RDW-SD 52.0 fl      MPV 10.2 fL      Platelets 354 10*3/mm3     POC Glucose Once [[id number]]  (Normal) Collected: 06/05/21 0031    Specimen: Blood Updated: 06/05/21 0042     Glucose 77 mg/dL      Comment: : [id number] Britney Pat ID: [id number]       POC Glucose Once [[id number]]  (Abnormal) Collected: 06/04/21 2050    Specimen: Blood Updated: 06/04/21 2101     Glucose 53 mg/dL      Comment: : [id number] Gregoria Martinez ID: [id number]       COVID PRE-OP / PRE-PROCEDURE SCREENING ORDER (NO ISOLATION) - Swab, Nasal Cavity [[id number]]  (Normal) Collected: 06/04/21 1628    Specimen: Swab from Nasal Cavity Updated: 06/04/21 1716    Narrative:       The following orders were created for panel order COVID PRE-OP / PRE-PROCEDURE SCREENING ORDER (NO ISOLATION) - Swab, Nasal Cavity.  Procedure                               Abnormality         Status                     ---------                               -----------         ------                     COVID-19,[employer] IN-HELLEN...[[id number]]  Normal              Final result                 Please view results for these tests on the individual orders.    COVID-19,[employer] IN-HOUSE,Nasal Swab No Transport Media 3-4 HR TAT - Swab, Nasal Cavity [[id number]]  (Normal) Collected: 06/04/21 1628    Specimen: Swab from Nasal Cavity Updated: 06/04/21 1716     COVID19 Not Detected    Narrative:      Fact sheet for providers: https://www.fda.gov/media/401959/download     Fact sheet for patients: https://www.fda.gov/media/823004/download    Test performed by PCR.    Consider negative results in combination with clinical observations, patient history, and epidemiological information.    [place] Draw [[id number]] Collected: 06/04/21 1233    Specimen: Blood from Arm, Right Updated: 06/04/21 1645    Narrative:      The following orders were created for panel order [place] Draw.  Procedure                               Abnormality         Status                     ---------                               -----------         ------                     Light Blue Top[[id number]]                                   Final result               Green Top (Gel)[[id number]]                                  Final result               Lavender Top[[id number]]                                     Final result               [place] Blood Culture Lalit...[[id number]]                      Final result               [place] Top[[id number]]                                         Final result                 Please view results for these tests on the individual orders.    [place] Top [[id number]] Collected: 06/04/21 1233    Specimen: Blood from Arm, Right  Updated: 06/04/21 1645     Extra Tube Hold for add-ons.     Comment: Auto resulted.       CBC Auto Differential [[id number]]  (Abnormal) Collected: 06/04/21 1233    Specimen: Blood from Arm, Right Updated: 06/04/21 1600     WBC 8.91 10*3/mm3      RBC 3.40 10*6/mm3      Hemoglobin 10.8 g/dL      Hematocrit 33.0 %      MCV 97.1 fL      MCH 31.8 pg      MCHC 32.7 g/dL      RDW 14.7 %      RDW-SD 52.1 fl      MPV 9.9 fL      Platelets 508 10*3/mm3      Neutrophil % 71.4 %      Lymphocyte % 16.2 %      Monocyte % 6.7 %      Eosinophil % 4.5 %      Basophil % 0.8 %      Immature Grans % 0.4 %      Neutrophils, Absolute 6.36 10*3/mm3      Lymphocytes, Absolute 1.44 10*3/mm3      Monocytes, Absolute 0.60 10*3/mm3      Eosinophils, Absolute 0.40 10*3/mm3      Basophils, Absolute 0.07 10*3/mm3      Immature Grans, Absolute 0.04 10*3/mm3      nRBC 0.0 /100 WBC     Troponin [[id number]]  (Abnormal) Collected: 06/04/21 1233    Specimen: Blood from Arm, Right Updated: 06/04/21 1524     Troponin T 0.158 ng/mL     Narrative:      Troponin T Reference Range:  <= 0.03 ng/mL-   Negative for AMI  >0.03 ng/mL-     Abnormal for myocardial necrosis.  Clinicians would have to utilize clinical acumen, EKG, Troponin and serial changes to determine if it is an Acute Myocardial Infarction or myocardial injury due to an underlying chronic condition.       Results may be falsely decreased if patient taking Biotin.      Comprehensive Metabolic Panel [[id number]]  (Abnormal) Collected: 06/04/21 1233    Specimen: Blood from Arm, Right Updated: 06/04/21 1515     Glucose 125 mg/dL      BUN 46 mg/dL      Creatinine 8.96 mg/dL      Sodium 142 mmol/L      Potassium 3.5 mmol/L      Chloride 99 mmol/L      CO2 26.0 mmol/L      Calcium 9.4 mg/dL      Total Protein 6.3 g/dL      Albumin 2.50 g/dL      ALT (SGPT) 6 U/L      AST (SGOT) 17 U/L      Alkaline Phosphatase 122 U/L      Total Bilirubin 0.3 mg/dL      eGFR Non African Amer 4 mL/min/1.73      Comment:  <15 Indicative of kidney failure.        eGFR   Amer --     Comment: <15 Indicative of kidney failure.        Globulin 3.8 gm/dL      A/G Ratio 0.7 g/dL      BUN/Creatinine Ratio 5.1     Anion Gap 17.0 mmol/L     Narrative:      GFR Normal >60  Chronic Kidney Disease <60  Kidney Failure <15      Phosphorus [[id number]]  (Normal) Collected: 06/04/21 1233    Specimen: Blood from Arm, Right Updated: 06/04/21 1512     Phosphorus 4.4 mg/dL     BNP [[id number]]  (Abnormal) Collected: 06/04/21 1233    Specimen: Blood from Arm, Right Updated: 06/04/21 1510     proBNP 27,617.0 pg/mL     Narrative:      Among patients with dyspnea, NT-proBNP is highly sensitive for the detection of acute congestive heart failure. In addition NT-proBNP of <300 pg/ml effectively rules out acute congestive heart failure with 99% negative predictive value.    Results may be falsely decreased if patient taking Biotin.      Magnesium [[id number]]  (Normal) Collected: 06/04/21 1233    Specimen: Blood from Arm, Right Updated: 06/04/21 1509     Magnesium 2.4 mg/dL     aPTT [[id number]]  (Abnormal) Collected: 06/04/21 1233    Specimen: Blood from Arm, Right Updated: 06/04/21 1503     PTT 40.4 seconds     Protime-INR [[id number]]  (Abnormal) Collected: 06/04/21 1233    Specimen: Blood from Arm, Right Updated: 06/04/21 1503     Protime 14.7 Seconds      INR 1.25     Imaging Results (Last 7 Days)     Procedure Component Value Units Date/Time    XR Chest PA & Lateral [[id number]] Collected: 06/11/21 1242     Updated: 06/11/21 1247    Narrative:      EXAMINATION: XR CHEST PA AND LATERAL-  6/11/2021 12:42 PM CDT 2 views     HISTORY: pleural effusion, s/p chest tube removal; E87.70-Fluid  overload, unspecified; N18.9-Chronic kidney disease, unspecified;  B91-Jfcopbn effusion, not elsewhere classified; G58-Mmjmsww effusion,  not elsewhere classified; I25.119-Atherosclerotic heart disease of  native coronary artery with unspecified angina pectoris        COMPARISON: 06/11/2021.     FINDINGS:   Slight interval improvement in aeration at the LEFT lung base again seen  with probable LEFT basilar atelectasis and small amount of pleural fluid  remaining. The RIGHT chest tube is been removed. No definite  pneumothorax. Some RIGHT basilar atelectasis and probable trace RIGHT  pleural fluid noted. Large bore central venous catheter previously seen  on the RIGHT has been removed as well. The osseous structures and  surrounding soft tissues demonstrate no acute abnormality.          Impression:         1.  Interval removal of the central venous catheter and RIGHT chest  tube. No definite pneumothorax.     2.  Interval improvement in aeration at the LEFT lung base with some  persistent bibasilar atelectasis and trace pleural fluid suspected.        This report was finalized on 06/11/2021 12:44 by Dr. Mati Martinez MD.    XR Chest 1 View [[id number]] Collected: 06/11/21 0725     Updated: 06/11/21 0730    Narrative:      EXAMINATION: XR CHEST 1 VW- 6/11/2021 7:25 AM CDT     HISTORY: pleural effusion; E87.70-Fluid overload, unspecified;  N18.9-Chronic kidney disease, unspecified; Q40-Qhjuzal effusion, not  elsewhere classified; I09-Dagafee effusion, not elsewhere classified.     REPORT: A frontal view of the chest was obtained.     COMPARISON: Chest x-ray 6/10/2021 0357 hours.     The right internal jugular central venous catheter appears in good  position as before. The right basilar chest tube appears in good  position, unchanged. The lungs are hypoaerated. Is mild improvement in  their consolidation in the left lateral lung base as well as a small  left pleural effusion. No pneumothorax is identified. There is minimal  atelectasis in the right base. Our size is normal. Previous CABG is  noted.       Impression:      Mild improvement in aeration of the left lung base but with  a persistent area of consolidation and a small effusion. Stable right  basilar chest tube and right  internal jugular central venous catheter.  This report was finalized on 06/11/2021 07:27 by Dr. Saji Mullen MD.    XR Chest 1 View [[id number]] Collected: 06/10/21 0704     Updated: 06/10/21 0709    Narrative:      EXAMINATION: XR CHEST 1 VW- 6/10/2021 7:04 AM CDT     HISTORY: pleural effusion; E87.70-Fluid overload, unspecified;  N18.9-Chronic kidney disease, unspecified; A37-Sdncgun effusion, not  elsewhere classified; W08-Rmzemzf effusion, not elsewhere classified.     REPORT: A frontal view of the chest was obtained.     COMPARISON: Chest x-ray 6/9/2021 0350 hours.     The right internal jugular central venous catheter remains in  satisfactory position. The lungs are hypoaerated, there is mild  improvement in an area of consolidation in the left lateral lung base,  with associated small left effusion. There is a right basilar chest tube  which remains in good position. There is mild atelectasis at the right  base, with no focal infiltrate in the right. Heart size is normal.  Previous CABG is noted. No pneumothorax is identified.       Impression:      Stable satisfactory position of the right basilar chest tube  and right internal jugular central venous catheter. No pneumothorax is  identified. There is mild improvement in the opacities in the left  lateral lung base.  This report was finalized on 06/10/2021 07:06 by Dr. Saji Mullen MD.    XR Chest 1 View [[id number]] Collected: 06/09/21 0722     Updated: 06/09/21 0727    Narrative:      EXAMINATION: XR CHEST 1 VW- 6/9/2021 7:22 AM CDT     HISTORY: pleural effusion; E87.70-Fluid overload, unspecified;  N18.9-Chronic kidney disease, unspecified; T32-Ujcxutt effusion, not  elsewhere classified; K43-Ajswuzx effusion, not elsewhere classified.     REPORT: A frontal view the chest was obtained.     COMPARISON: Chest x-ray 6/8/2021 0354 hours.     The lungs are hypoaerated, there is partial consolidation of the left  lung base with blunting of the costophrenic  angle, unchanged. No focal  infiltrates in the right lung. Heart size is normal. There is previous  median sternotomy. The right internal jugular central venous catheter  appears in satisfactory position as before. No pneumothorax is  identified. The right basilar chest tube appears in good position as  before.       Impression:      Stable 1 day appearance of the chest.  This report was finalized on 06/09/2021 07:24 by Dr. Saji Mullen MD.    XR Chest 1 View [[id number]] Collected: 06/08/21 0717     Updated: 06/08/21 0721    Narrative:      EXAMINATION: XR CHEST 1 VW- 6/8/2021 7:17 AM CDT     HISTORY: pleural effusion; E87.70-Fluid overload, unspecified;  N18.9-Chronic kidney disease, unspecified; D71-Smhlplo effusion, not  elsewhere classified; G31-Bkhexjo effusion, not elsewhere classified.     REPORT: A frontal view the chest was obtained.     COMPARISON: Chest x-ray 6/7/2021 1732 hours.     There is persistent and increased consolidation of the left lung base  suspicious for pneumonia and probable combined atelectasis. There is a  right basilar chest tube which appears in satisfactory position. The  right internal jugular central venous catheter remains in satisfactory  position. No pneumothorax is identified. There is borderline  cardiomegaly and evidence of previous CABG, without overt CHF.  Underlying COPD is evident. The osseous structures are unremarkable.       Impression:      Increasing consolidation of the left lung base concerning  for pneumonia versus atelectasis with a small effusion. The right  basilar chest tube remains in satisfactory position. No other change.  This report was finalized on 06/08/2021 07:18 by Dr. Saji Mullen MD.    XR Chest 1 View [[id number]] Collected: 06/07/21 1802     Updated: 06/07/21 1807    Narrative:      EXAMINATION:  XR CHEST 1 VW-  6/7/2021 5:43 PM CDT     HISTORY: Pleural effusion. E87.70-Fluid overload, unspecified;  N18.9-Chronic kidney disease, unspecified;  A16-Oujtkui effusion, not  elsewhere classified; C03-Uacbggn effusion, not elsewhere classified.     COMPARISON: 6/7/2021.     FINDINGS:  There is better inspiration. There is a new right chest tube.  Bilateral infiltrates are nearly resolved. There is mild patchy  infiltrate along the left hemidiaphragm. There is blunting of the left  costophrenic angle. Heart size is within normal limits. There has been  prior median sternotomy. There is a dialysis catheter on the right.       Impression:      1. New right chest tube in place. Right pleural effusion appears to be  nearly resolved.  2. Small left pleural effusion.  3. Infiltrate at the left lung base. Probable atelectasis. Pneumonia  less likely.        This report was finalized on 06/07/2021 18:04 by Dr. Sukhi Ramirez MD.    US Chest [[id number]] Collected: 06/07/21 1715     Updated: 06/07/21 1720    Narrative:      HISTORY: Chest tube insertion     Ultrasound chest: Sonographic imaging of the right posterior chest  performed to assist Dr. Wood with chest tube insertion. Images  demonstrate a moderate right pleural fluid collection with post  placement images demonstrating evacuation of right pleural fluid.  This report was finalized on 06/07/2021 17:17 by Dr. Kimberlee Blankenship MD.    XR Abdomen KUB [[id number]] Collected: 06/07/21 0745     Updated: 06/07/21 0751    Narrative:      EXAMINATION: XR ABDOMEN KUB- 6/7/2021 7:45 AM CDT     HISTORY: Vomiting; E87.70-Fluid overload, unspecified; N18.9-Chronic  kidney disease, unspecified; X46-Oirebbx effusion, not elsewhere  classified; P57-Bzjrobt effusion, not elsewhere classified.     REPORT: A supine view of the abdomen was obtained.     COMPARISON: CT abdomen pelvis without contrast 5/19/2021.     There is a paucity of bowel gas overall, under stool volume is noted at  the rectum. A peritoneal dialysis catheter is coiled in the left pelvis.  Cholecystectomy clips are present. There is heavy  atherosclerotic  calcification within the aorta and its branches. Mild levoscoliosis of  the lumbar spine.       Impression:      There is a paucity of bowel gas overall, which is  nonspecific, moderate stool volume is noted at the rectum. A left pelvic  peritoneal dialysis catheter is present.  This report was finalized on 06/07/2021 07:48 by Dr. Saji Mullen MD.    XR Chest 1 View [[id number]] Collected: 06/07/21 0744     Updated: 06/07/21 0748    Narrative:      EXAMINATION: XR CHEST 1 VW- 6/7/2021 7:44 AM CDT     HISTORY: RRT; E87.70-Fluid overload, unspecified; N18.9-Chronic kidney  disease, unspecified; G89-Duiqchj effusion, not elsewhere classified;  P31-Hircsmc effusion, not elsewhere classified.     REPORT: A frontal view of the chest was obtained.     COMPARISON: Chest x-rays 6/6/2021 1458 hours.     The lungs are grossly hypoaerated and the patient is rotated to the  left. There is central vascular crowding, the cardiac margins are  partially obscured without obvious cardiomegaly. Bilateral pleural  effusions are noted, right greater than left and appears stable. No  pneumothorax is identified. The right-sided dialysis catheter appears  unchanged in position.       Impression:      Gross hypoaeration of the lungs with stable bilateral  pleural effusions, right greater than left. Increased central markings  are noted, likely exaggerated by the low lung volumes. Correlate  clinically for the possibility of interstitial edema and mild volume  overload.  This report was finalized on 06/07/2021 07:45 by Dr. Saji Mullen MD.    XR Chest PA & Lateral [[id number]] Collected: 06/06/21 1507     Updated: 06/06/21 1512    Narrative:      EXAMINATION: XR CHEST PA AND LATERAL- 6/6/2021 3:07 PM CDT     HISTORY: follow up after right thoracentesis; E87.70-Fluid overload,  unspecified; N18.9-Chronic kidney disease, unspecified; Q22-Nxpyjfv  effusion, not elsewhere classified.     REPORT: Frontal and lateral views of  the chest were obtained.     COMPARISON: Chest x-rays 6/5/2021 1550 hours.     The lungs remain hypoaerated, with moderate pleural effusions, right  greater than left, the right pleural effusion appears slightly increased  in volume. There is extensive bibasilar atelectasis. No pneumothorax is  identified. Heart size is normal. There is evidence of previous median  sternotomy. The right-sided dialysis catheter appears in good position  as before. No acute osseous abnormality.       Impression:      Extensive bibasilar atelectasis with persistent pleural  effusions, right greater than left, the right pleural effusion appears  slightly increased in overall volume. No other change.  This report was finalized on 06/06/2021 15:09 by Dr. Saji Mullen MD.    XR Chest 1 View [[id number]] Collected: 06/05/21 1614     Updated: 06/05/21 1618    Narrative:      EXAMINATION: XR CHEST 1 VW- 6/5/2021 4:14 PM CDT     HISTORY: thoracentesis; E87.70-Fluid overload, unspecified;  N18.9-Chronic kidney disease, unspecified; R59-Pkfwrhy effusion, not  elsewhere classified.     REPORT: A frontal view of the chest was obtained.     COMPARISON: Chest x-ray 6/4/2021, CTA chest 6/5/2021.     There is marked reduction in volume of the right pleural effusion  following thoracentesis, no pneumothorax is identified. A small left  pleural effusion is unchanged. There is moderate atelectasis in the lung  bases greater on the right. Heart size is normal. Previous CABG is  noted. The right-sided dialysis catheter appears in good position as  before. No other change.       Impression:      Marked reduction in volume of the right pleural effusion  following thoracentesis with no pneumothorax. There is residual  atelectasis in the lung bases greater on the right.  This report was finalized on 06/05/2021 16:15 by Dr. Saji Mullen MD.    CT Angiogram Chest [[id number]] Collected: 06/05/21 1031     Updated: 06/05/21 1042    Narrative:      EXAMINATION:  CT ANGIOGRAM CHEST- 6/5/2021 10:31 AM CDT     HISTORY: pleural effusion -right; E87.70-Fluid overload, unspecified;  N18.9-Chronic kidney disease, unspecified; I39-Abxkllq effusion, not  elsewhere classified     DOSE: 400 mGycm (Automatic exposure control technique was implemented in  an effort to keep the radiation dose as low as possible without  compromising image quality)     REPORT: Spiral CT of the chest was performed after administration of  intravenous contrast from the thoracic inlet through the upper abdomen  using CTA protocol. Reconstructed coronal and sagittal images were also  reviewed.     Comparison: CTA chest 3/11/2021.     The contrast bolus is satisfactory, there is moderate respiratory motion  artifact. The central pulmonary arteries are normal caliber without  filling defects. There is poor visualization of peripheral pulmonary  artery branches due to respiratory motion. Heart size is normal. No  evidence of right heart strain is identified. There is a right-sided  internal jugular dialysis catheter which appears in satisfactory  position. There is a small amount calcified plaque within the thoracic  aortic arch with normal aortic caliber. No evidence of aortic  dissection. There is previous median sternotomy. There is a large new  pleural effusion on the right, with diffuse atelectasis, particularly  involving the right middle and lower lobes. This also small new left  pleural effusion. Lung windows show no pneumothorax or obvious  consolidation to indicate pneumonia. There are nodules in the thyroid  gland as well as calcifications. The largest nodule appears cystic, in  the left lobe and measures 1.6 cm. There is a moderate-sized hiatal  hernia and evidence previous gastric surgery. A small pericardial  effusion is present. This is new. The visualized upper abdomen is  otherwise unremarkable. Review of bone windows is acutely unremarkable.  There is mild displacement of the upper sternotomy  "with poor healing.       Impression:      1. Limited exam due to excessive respiratory motion, with no gross  evidence of central pulmonary emboli.  2. New large pleural effusion on the right, new small left pleural  effusion and new small pericardial effusion. There is extensive passive  atelectasis in the right lung, particularly involving the right middle  and lower lobes.  3. No pneumothorax is identified. No evidence of intrathoracic  lymphadenopathy.  4. Evidence of interval median sternotomy, with poor healing of the  sternotomy.              This report was finalized on 06/05/2021 10:39 by Dr. Saji Mullen MD.            Condition on Discharge:    Stable    Physical Exam on Discharge:  /56 (BP Location: Left arm, Patient Position: Lying)   Pulse 70   Temp 98.6 °F (37 °C) (Oral)   Resp 16   Ht 160 cm (62.99\")   Wt 65.2 kg (143 lb 12.8 oz)   SpO2 97%   BMI 25.48 kg/m²   Physical Exam     Constitutional:       Appearance: Normal appearance.   HENT:      Head: Normocephalic and atraumatic.      Nose: No congestion or rhinorrhea.      Mouth: Mucous membranes are moist.      Pharynx: Oropharynx is clear.   Eyes:      General: No scleral icterus.     Conjunctiva/sclera: Conjunctivae normal.   Cardiovascular:      Rate and Rhythm: Normal rate and regular rhythm.   Pulmonary:      Effort: Pulmonary effort is normal.      Breath sounds: Normal breath sounds.      Comments: Diminished both bases  Abdominal:      General: Abdomen is flat. Bowel sounds are normal. There is no distension.      Palpations: Abdomen is soft. There is no mass.   Skin:     General: Skin is warm.      Coloration: Skin is pale. Skin is not jaundiced.   Neurological:      General: No focal deficit present.      Mental Status: She is alert and oriented to person, place, and time.   Psychiatric:         Mood and Affect: Mood normal    Discharge Disposition:  Home or Self Care    Discharge Medications:     Discharge Medications    "   Changes to Medications      Instructions Start Date   nitroglycerin 0.4 MG SL tablet  Commonly known as: NITROSTAT  What changed:   · how much to take  · how to take this  · when to take this  · reasons to take this   1 under the tongue as needed for angina, may repeat q5mins for up three doses         Continue These Medications      Instructions Start Date   allopurinol 100 MG tablet  Commonly known as: ZYLOPRIM   100 mg, Oral, Daily      ascorbic acid 500 MG tablet  Commonly known as: VITAMIN C   500 mg, Oral, Daily      aspirin 81 MG EC tablet   81 mg, Oral, Daily      atorvastatin 20 MG tablet  Commonly known as: LIPITOR   20 mg, Oral, Nightly      calcium carbonate 500 MG chewable tablet  Commonly known as: TUMS   2 tablets, Oral, Nightly      citalopram 40 MG tablet  Commonly known as: CeleXA   40 mg, Oral, Daily      docusate sodium 100 MG capsule  Commonly known as: COLACE   200 mg, Oral, As Needed      Dulcolax 10 MG suppository  Generic drug: bisacodyl   10 mg, Rectal, Daily PRN      famotidine 20 MG tablet  Commonly known as: PEPCID   20 mg, Oral, Daily      HYDROcodone-acetaminophen 5-325 MG per tablet  Commonly known as: NORCO   1 tablet, Oral, Every 8 Hours PRN      insulin detemir 100 UNIT/ML injection  Commonly known as: LEVEMIR   12 Units, Subcutaneous, Daily      irbesartan 75 MG tablet  Commonly known as: AVAPRO   75 mg, Oral, Daily      Melatonin 5 MG capsule   5 mg, Oral, Nightly      metoprolol tartrate 25 MG tablet  Commonly known as: LOPRESSOR   25 mg, Oral, Every 12 Hours Scheduled      multivitamin with minerals tablet tablet   1 tablet, Oral, Daily      pantoprazole 40 MG EC tablet  Commonly known as: PROTONIX   40 mg, Oral, 2 times daily      sucralfate 1 g tablet  Commonly known as: CARAFATE   1 g, Oral, 4 Times Daily, Dissolve 1 tablet in water to create a slurry and drink QID; AC & HS PRN      Velphoro 500 MG chewable tablet  Generic drug: Sucroferric Oxyhydroxide   1 tablet, Oral, 3  Times Daily, WITH MEALS      vitamin D3 125 MCG (5000 UT) capsule capsule   1 tablet, Oral, Daily             Discharge Diet:   Diet Instructions     Diet: Consistent Carbohydrate, Renal      Discharge Diet:  Consistent Carbohydrate  Renal             Discharge Care Plan / Instructions:   Discharge home    Activity at Discharge:   Activity Instructions     Activity as Tolerated            Follow-up Appointments:  Follow-up with primary care physician next week  Follow-up with CT surgery in 1 month       Electronically signed by Cl Heart DO, 06/11/21, 14:20 CDT.    Time: Discharge Over 30 min    Part of this note may be an electronic transcription/translation of spoken language to printed text using the Dragon Dictation system.

## 2021-06-11 NOTE — PROGRESS NOTES
Nephrology ([facility]) Progress Note      Patient:  Jennifer Salcido  YOB: 1955  Date of Service: 6/11/2021  MRN: [id number]   Acct: [id number]   Primary Care Physician: Cristian Hernandez MD  Advance Directive:   Code Status and Medical Interventions:   Ordered at: 06/04/21 172     Level Of Support Discussed With:    Patient     Code Status:    CPR     Medical Interventions (Level of Support Prior to Arrest):    Full     Admit Date: 6/4/2021       Hospital Day: 7  Referring Provider: No ref. provider found      Patient personally seen and examined.  Complete chart including Consults, Notes, Operative Reports, Labs, Cardiology, and Radiology studies reviewed as able.        Subjective:  Jennifer Salcido is a 65 y.o. female  whom we were consulted for end stage renal disease on peritoneal dialysis. History of diabetic nephropathy. Recent prior admission for elective CABG, temporarily was converted to hemodialysis. Has since converted back to [facility]CPD.  Presented this time with dyspnea. Found to have large right pleural effusion. She had emergency hemodialysis on 6/05 to correct volume status. Also had pleurocentesis on 6/05.  On 6/07 had chest tube placed. PD was held on 6/07, resumed 6/08 and has been tolerating well since then. Getting adequate UF with treatments    Today is doing well. No new overnight issues. Had permcath and chest tube removed this morning.    Allergies:  Codeine, Demerol [meperidine], Levaquin [levofloxacin], Lisinopril, Morphine, Sulfasalazine, and Ultram [tramadol]    Home Meds:  Medications Prior to Admission   Medication Sig Dispense Refill Last Dose   • allopurinol (ZYLOPRIM) 100 MG tablet Take 100 mg by mouth Daily.      • ascorbic acid (VITAMIN C) 500 MG tablet Take 500 mg by mouth Daily.      • aspirin 81 MG EC tablet Take 81 mg by mouth Daily.      • atorvastatin (LIPITOR) 20 MG tablet Take 1 tablet by mouth Every Night. 30 tablet 2    • bisacodyl (Dulcolax)  10 MG suppository Insert 10 mg into the rectum Daily As Needed for Constipation.      • calcium carbonate (TUMS) 500 MG chewable tablet Chew 2 tablets Every Night.      • citalopram (CeleXA) 40 MG tablet Take 40 mg by mouth Daily.      • docusate sodium (COLACE) 100 MG capsule Take 200 mg by mouth As Needed for Constipation.      • famotidine (PEPCID) 20 MG tablet Take 1 tablet by mouth Daily. 30 tablet 0    • HYDROcodone-acetaminophen (NORCO) 5-325 MG per tablet Take 1 tablet by mouth Every 8 (Eight) Hours As Needed (pain). 10 tablet 0    • insulin detemir (LEVEMIR) 100 UNIT/ML injection Inject 12 Units under the skin into the appropriate area as directed Daily.      • irbesartan (AVAPRO) 75 MG tablet Take 1 tablet by mouth Daily. 30 tablet 2    • Melatonin 5 MG capsule Take 5 mg by mouth Every Night.      • metoprolol tartrate (LOPRESSOR) 25 MG tablet Take 1 tablet by mouth Every 12 (Twelve) Hours. 60 tablet 2    • Multiple Vitamins-Minerals (MULTIVITAMIN ADULT PO) Take 1 tablet by mouth Daily.      • pantoprazole (PROTONIX) 40 MG EC tablet Take 40 mg by mouth 2 (two) times a day.      • sucralfate (CARAFATE) 1 g tablet Take 1 g by mouth 4 (Four) Times a Day. Dissolve 1 tablet in water to create a slurry and drink QID; AC & HS PRN      • Sucroferric Oxyhydroxide (Velphoro) 500 MG chewable tablet Chew 1 tablet 3 (Three) Times a Day. WITH MEALS      • vitamin D3 125 MCG (5000 UT) capsule capsule Take 1 tablet by mouth Daily.      • nitroglycerin (NITROSTAT) 0.4 MG SL tablet 1 under the tongue as needed for angina, may repeat q5mins for up three doses (Patient taking differently: Place 0.4 mg under the tongue Every 5 (Five) Minutes As Needed. 1 under the tongue as needed for angina, may repeat q5mins for up three doses) 25 tablet 3        Medicines:  Current Facility-Administered Medications   Medication Dose Route Frequency Provider Last Rate Last Admin   • acetaminophen (TYLENOL) tablet 650 mg  650 mg Oral Q6H PRN  Antony Wood MD   650 mg at 06/10/21 0948    Or   • acetaminophen (TYLENOL) 160 MG/5ML solution 650 mg  650 mg Oral Q6H PRN Antony Wood MD        Or   • acetaminophen (TYLENOL) suppository 650 mg  650 mg Rectal Q6H PRN Antony Wood MD       • allopurinol (ZYLOPRIM) tablet 100 mg  100 mg Oral Daily Antony Wood MD   100 mg at 06/11/21 0903   • ascorbic acid (VITAMIN C) tablet 500 mg  500 mg Oral Daily Antony Wood MD   500 mg at 06/11/21 0902   • aspirin EC tablet 81 mg  81 mg Oral Daily Antony Wood MD   81 mg at 06/11/21 0902   • atorvastatin (LIPITOR) tablet 20 mg  20 mg Oral Nightly Antony Wood MD   20 mg at 06/10/21 2054   • benzonatate (TESSALON) capsule 200 mg  200 mg Oral Q4H PRN Antony Wood MD       • bisacodyl (DULCOLAX) suppository 10 mg  10 mg Rectal Daily PRN Antony Wood MD       • calcium carbonate (TUMS) chewable tablet 500 mg (200 mg elemental)  2 tablet Oral Nightly Antony Wood MD   2 tablet at 06/10/21 2054   • citalopram (CeleXA) tablet 40 mg  40 mg Oral Nightly Antony Wood MD   40 mg at 06/10/21 2055   • dextrose (D50W) 25 g/ 50mL Intravenous Solution 25 g  25 g Intravenous Q15 Min PRN Antony Wood MD       • dextrose (GLUTOSE) oral gel 15 g  15 g Oral Q15 Min PRN Antony Wood MD       • diphenhydrAMINE (BENADRYL) capsule 25 mg  25 mg Oral Q4H PRN Antony Wood MD        Or   • diphenhydrAMINE (BENADRYL) injection 25 mg  25 mg Intravenous Q4H PRN Antony Wood MD       • docusate sodium (COLACE) capsule 200 mg  200 mg Oral Daily PRN Antony Wood MD       • famotidine (PEPCID) tablet 20 mg  20 mg Oral Nightly Antony Wood MD   20 mg at 06/10/21 2055   • glucagon (human recombinant) (GLUCAGEN DIAGNOSTIC) injection 1 mg  1 mg Subcutaneous Q15 Min PRN Antony Wood MD       • guaiFENesin (MUCINEX) 12 hr tablet 600 mg  600 mg Oral Q12H Antony Wood MD   600 mg at 06/11/21 0902   •  HYDROcodone-acetaminophen (NORCO) 5-325 MG per tablet 1 tablet  1 tablet Oral Q8H PRN Antony Wood MD   1 tablet at 06/08/21 0547   • insulin detemir (LEVEMIR) injection 10 Units  10 Units Subcutaneous Daily Antony Wood MD   10 Units at 06/11/21 0903   • insulin lispro (humaLOG) injection 0-9 Units  0-9 Units Subcutaneous 4x Daily With Meals & Nightly Antony Wood MD   4 Units at 06/10/21 1719   • lactated ringers infusion  9 mL/hr Intravenous Continuous Antony Wood MD       • losartan (COZAAR) tablet 25 mg  25 mg Oral Q24H Cl Heart DO       • melatonin tablet 3 mg  3 mg Oral Nightly Antony Wood MD   3 mg at 06/10/21 2054   • metoprolol tartrate (LOPRESSOR) tablet 25 mg  25 mg Oral Q12H Antony Wood MD   25 mg at 06/11/21 0902   • multivitamin with minerals 1 tablet  1 tablet Oral Daily Antony Wood MD   1 tablet at 06/11/21 0902   • nitroglycerin (NITROSTAT) SL tablet 0.4 mg  0.4 mg Sublingual Q5 Min PRN Antony Wood MD       • ondansetron (ZOFRAN) tablet 4 mg  4 mg Oral Q6H PRN Antony Wood MD        Or   • ondansetron (ZOFRAN) injection 4 mg  4 mg Intravenous Q6H PRN Antony Wood MD   4 mg at 06/07/21 0023   • ondansetron (ZOFRAN) injection 4 mg  4 mg Intravenous Q2H PRN Antony Wood MD   4 mg at 06/04/21 2311   • sodium chloride 0.9 % bolus 100 mL  100 mL Intravenous PRN Antony Wood MD       • sodium chloride 0.9 % flush 10 mL  10 mL Intravenous Q12H Antony Wood MD   10 mL at 06/11/21 0902   • sodium chloride 0.9 % flush 10 mL  10 mL Intravenous PRN Antony Wood MD       • sucralfate (CARAFATE) tablet 1 g  1 g Oral BID AC Antony Wood MD   1 g at 06/11/21 0513       Past Medical History:  Past Medical History:   Diagnosis Date   • Arthritis     LEFT SHOULDER   • CHF (congestive heart failure) (CMS/[facility])    • Chronic pericarditis 4/22/2021   • Collar bone fracture 12/2020    SLING PLACED TO HEAL   • Coronary  artery disease     LEFT MAIN AND 3 OTHER AREAS--CABG SCHEDULED FOR APRIL 22, 2021   • Dyslipidemia 9/28/2020   • Elevated cholesterol    • End stage kidney disease (CMS/[facility])     currently HD (5/19/2021), hopes to return to PD    • Essential hypertension 11/2/2018   • Gastroesophageal reflux disease without esophagitis 4/28/2021   • GERD (gastroesophageal reflux disease)    • Gout    • History of transfusion    • Hyperlipidemia    • Hypertension    • Overweight (BMI 25.0-29.9) 4/22/2021   • Peritoneal dialysis status (CMS/[facility])     EVERY NIGHT FOR 10 HOURS, (5/19/2021 currently on hold)   • PONV (postoperative nausea and vomiting)    • Sleep apnea     RESOLVED   • Status post gastric bypass for obesity 4/22/2021   • Type 2 diabetes mellitus with kidney complication, with long-term current use of insulin (CMS/[facility]) 11/2/2018       Past Surgical History:  Past Surgical History:   Procedure Laterality Date   • BREAST BIOPSY Right     FATTY TUMOR   • CARDIAC CATHETERIZATION N/A 10/1/2019    Procedure: Left Heart Cath;  Surgeon: Srikanth Coker MD;  Location:  [facility] CATH INVASIVE LOCATION;  Service: Cardiology   • CARDIAC CATHETERIZATION N/A 7/14/2020    Procedure: Left Heart Cath;  Surgeon: Srikanth Coker MD;  Location:  [facility] CATH INVASIVE [facility];  Service: Cardiology;  Laterality: N/A;   • CARDIAC CATHETERIZATION N/A 3/23/2021    Procedure: Left Heart Cath;  Surgeon: Srikanth Coker MD;  Location:  [facility] CATH INVASIVE [facility];  Service: Cardiology;  Laterality: N/A;   • CHEST TUBE INSERTION Right 6/7/2021    Procedure: CHEST TUBE INSERTION;  Surgeon: Antony Wood MD;  Location:  [facility] OR;  Service: Cardiothoracic;  Laterality: Right;   • CORONARY ANGIOPLASTY WITH STENT PLACEMENT  2016    X 2   • CORONARY ARTERY BYPASS GRAFT N/A 4/22/2021    Procedure: CORONARY ARTERY BYPASS GRAFT X 3 WITH LEFT INTERNAL MAMMARY ARTERY, RIGHT LOWER EXTREMITY ENDOSCOPIC VEIN HARVEST, AND PERFUSION; TRANSESOPHAGEAL  ECHOCARDIOGRAM;  Surgeon: Antony Wood MD;  Location: [facility] OR;  Service: Cardiothoracic;  Laterality: N/A;   • EYE SURGERY Bilateral     IOC LENS IMPLANTS   • EYE SURGERY Bilateral     RETINAL SURGERY   • GALLBLADDER SURGERY     • GASTRIC BYPASS     • HYSTERECTOMY     • INSERTION HEMODIALYSIS CATHETER Right 2021    Procedure: HEMODIALYSIS CATHETER INSERTION;  Surgeon: Simon Coates MD;  Location: [facility] HYBRID OR 12;  Service: Vascular;  Laterality: Right;   • INSERTION PERITONEAL DIALYSIS CATHETER     • OOPHORECTOMY Bilateral    • SINUS SURGERY     • SINUS SURGERY  1980   • TUBAL ABDOMINAL LIGATION         Family History  Family History   Problem Relation Age of Onset   • Hypertension Mother    • Lung disease Mother    • Heart disease Father    • Diabetes Father    • Abnormal EKG Father    • Breast cancer Neg Hx        Social History  Social History     Socioeconomic History   • Marital status:      Spouse name: Not on file   • Number of children: Not on file   • Years of education: Not on file   • Highest education level: Not on file   Tobacco Use   • Smoking status: Former Smoker     Packs/day: 0.00     Years: 2.00     Pack years: 0.00     Types: Cigarettes     Quit date:      Years since quittin.4   • Smokeless tobacco: Never Used   Vaping Use   • Vaping Use: Never used   Substance and Sexual Activity   • Alcohol use: Not Currently     Comment: haven't drank in 3 years ( 2018)    • Drug use: No   • Sexual activity: Defer         Review of Systems:  History obtained from chart review and the patient  General ROS: No fever or chills  Respiratory ROS: No cough, shortness of breath, wheezing  Cardiovascular ROS: No chest pain or palpitations  Gastrointestinal ROS: No abdominal pain or melena  Genito-Urinary ROS: No dysuria or hematuria    Objective:  Patient Vitals for the past 24 hrs:   BP Temp Temp src Pulse Resp SpO2 Weight   21 0700 124/67 98.2 °F (36.8 °C) Oral 75  16 97 % --   06/11/21 0511 -- -- -- -- -- -- 65.2 kg (143 lb 12.8 oz)   06/11/21 0411 117/58 97.7 °F (36.5 °C) Oral 65 16 98 % --   06/10/21 2355 111/52 98.2 °F (36.8 °C) Oral 65 16 97 % --   06/10/21 1950 134/69 98.2 °F (36.8 °C) Oral 73 16 98 % --   06/10/21 1100 129/72 98.1 °F (36.7 °C) Oral 65 16 97 % --       Intake/Output Summary (Last 24 hours) at 6/11/2021 1020  Last data filed at 6/11/2021 0600  Gross per 24 hour   Intake --   Output 0 ml   Net 0 ml     General: awake/alert    Neck: supple, no JVD  Chest:  clear to auscultation bilaterally without respiratory distress  CVS: regular rate and rhythm  Abdominal: soft, nontender, positive bowel sounds  Extremities: no cyanosis or edema  Skin: warm and dry without rash      Labs:  Results from last 7 days   Lab Units 06/07/21  0036 06/05/21  0440 06/04/21  1233   WBC 10*3/mm3 8.77 8.76 8.91   HEMOGLOBIN g/dL 10.2* 10.2* 10.8*   HEMATOCRIT % 31.2* 32.0* 33.0*   PLATELETS 10*3/mm3 324 354 508*         Results from last 7 days   Lab Units 06/10/21  0834 06/07/21  0036 06/05/21  0440 06/04/21  1233   SODIUM mmol/L 134* 133* 136 142   POTASSIUM mmol/L 4.4 3.5 3.9 3.5   CHLORIDE mmol/L 94* 97* 96* 99   CO2 mmol/L 27.0 20.0* 29.0 26.0   BUN mg/dL 37* 29* 26* 46*   CREATININE mg/dL 8.50* 6.13* 6.17* 8.96*   CALCIUM mg/dL 9.4 8.5* 8.7 9.4   BILIRUBIN mg/dL  --  0.3  --  0.3   ALK PHOS U/L  --  119*  --  122*   ALT (SGPT) U/L  --  7  --  6   AST (SGOT) U/L  --  17  --  17   GLUCOSE mg/dL 156* 147* 62* 125*       Radiology:   Imaging Results (Last 72 Hours)     Procedure Component Value Units Date/Time    XR Chest 1 View [[id number]] Collected: 06/11/21 0725     Updated: 06/11/21 0730    Narrative:      EXAMINATION: XR CHEST 1 VW- 6/11/2021 7:25 AM CDT     HISTORY: pleural effusion; E87.70-Fluid overload, unspecified;  N18.9-Chronic kidney disease, unspecified; W93-Uuvgtig effusion, not  elsewhere classified; Q78-Iqqktfa effusion, not elsewhere classified.     REPORT: A  frontal view of the chest was obtained.     COMPARISON: Chest x-ray 6/10/2021 0357 hours.     The right internal jugular central venous catheter appears in good  position as before. The right basilar chest tube appears in good  position, unchanged. The lungs are hypoaerated. Is mild improvement in  their consolidation in the left lateral lung base as well as a small  left pleural effusion. No pneumothorax is identified. There is minimal  atelectasis in the right base. Our size is normal. Previous CABG is  noted.       Impression:      Mild improvement in aeration of the left lung base but with  a persistent area of consolidation and a small effusion. Stable right  basilar chest tube and right internal jugular central venous catheter.  This report was finalized on 06/11/2021 07:27 by Dr. Saji Mullen MD.    XR Chest 1 View [[id number]] Collected: 06/10/21 0704     Updated: 06/10/21 0709    Narrative:      EXAMINATION: XR CHEST 1 VW- 6/10/2021 7:04 AM CDT     HISTORY: pleural effusion; E87.70-Fluid overload, unspecified;  N18.9-Chronic kidney disease, unspecified; C86-Hvprtug effusion, not  elsewhere classified; S55-Zzsnqbd effusion, not elsewhere classified.     REPORT: A frontal view of the chest was obtained.     COMPARISON: Chest x-ray 6/9/2021 0350 hours.     The right internal jugular central venous catheter remains in  satisfactory position. The lungs are hypoaerated, there is mild  improvement in an area of consolidation in the left lateral lung base,  with associated small left effusion. There is a right basilar chest tube  which remains in good position. There is mild atelectasis at the right  base, with no focal infiltrate in the right. Heart size is normal.  Previous CABG is noted. No pneumothorax is identified.       Impression:      Stable satisfactory position of the right basilar chest tube  and right internal jugular central venous catheter. No pneumothorax is  identified. There is mild improvement  in the opacities in the left  lateral lung base.  This report was finalized on 06/10/2021 07:06 by Dr. Saji Mullen MD.    XR Chest 1 View [[id number]] Collected: 06/09/21 0722     Updated: 06/09/21 0727    Narrative:      EXAMINATION: XR CHEST 1 VW- 6/9/2021 7:22 AM CDT     HISTORY: pleural effusion; E87.70-Fluid overload, unspecified;  N18.9-Chronic kidney disease, unspecified; B40-Fqowwsc effusion, not  elsewhere classified; A33-Ngcvyzi effusion, not elsewhere classified.     REPORT: A frontal view the chest was obtained.     COMPARISON: Chest x-ray 6/8/2021 0354 hours.     The lungs are hypoaerated, there is partial consolidation of the left  lung base with blunting of the costophrenic angle, unchanged. No focal  infiltrates in the right lung. Heart size is normal. There is previous  median sternotomy. The right internal jugular central venous catheter  appears in satisfactory position as before. No pneumothorax is  identified. The right basilar chest tube appears in good position as  before.       Impression:      Stable 1 day appearance of the chest.  This report was finalized on 06/09/2021 07:24 by Dr. Saji Mullen MD.          Culture:  No results found for: BLOODCX, URINECX, WOUNDCX, MRSACX, RESPCX, STOOLCX      Assessment   1.  End stage renal disease on peritoneal dialysis  2.  Type 2 diabetes with nephropathy  3.  Right pleural effusion--s/p chest tube  4.  Anemia of CKD  5.  Recent prior CABG  6.  Hyponatremia     Plan:  1.  Continue maintenance peritoneal dialysis   2.  permcath removed this morning, appreciate Dr Leach's assistance in this regard.  3.  OK for discharge from renal standpoint, follow up to be via dialysis clinic      Dick Fisher, JESSIE  6/11/2021  10:20 CDT

## 2021-06-11 NOTE — CASE MANAGEMENT/SOCIAL WORK
Continued Stay Note  ALEX Mora     Patient Name: Jennifer Salcido  MRN: [id number]  Today's Date: 6/11/2021    Admit Date: 6/4/2021    Discharge Plan     Row Name 06/11/21 1436       Plan    Plan  Home and resume [facility]    Patient/Family in Agreement with Plan  yes    Final Discharge Disposition Code  06 - home with home health care    Final Note  SW spoke to Sury at [facility] to inform of discharge.  Resumption orders as well as discharge summary has been faxed to [employer] at [phone].        Discharge Codes    No documentation.       Expected Discharge Date and Time     Expected Discharge Date Expected Discharge Time    Jun 11, 2021             AUDI Grewal

## 2021-06-11 NOTE — PLAN OF CARE
Goal Outcome Evaluation:  Plan of Care Reviewed With: patient        Progress: no change   VSS. No C/O pain. Sinus matthew 59-72 on tele. Continues on room air with saturation in the 90s. AAOX4. Continues on dialysis. Permacath site CDI. Chest tube site CDI. Medication education provided. Safety maintained.

## 2021-06-16 NOTE — TELEPHONE ENCOUNTER
Pt informed & voiced understanding.  States she had not been notified of this appt previously.  Pt was also notified to arrive 30 min early for CXR/kahm

## 2021-06-21 NOTE — TELEPHONE ENCOUNTER
Per instruction from Katheryn LANGFORD, called patient and advised her that Dr Wood recommends her going to the ER due to her kidney disease and dialysis so she can get the volume status assessed. Patient verbalized understanding and states she will have to call her son so he can come get her. She will be presenting to [facility] ER.

## 2021-06-21 NOTE — ED PROVIDER NOTES
Subjective   65 yof presents with c/o shortness of breath.  She states two months ago she had a CABG.  She then developed fluid on the right lung. Two weeks ago she required a thoracentesis.  The fluid remained so a chest tube was placed.  She states she has been home and a few days ago she developed SOB again but this time it was on the left. She denies chest pain.  She states she has 'rib pain' with a deep breath.  She denies fever or cough.  She is an ESRD patient and completes peritoneal dialysis.  She states she has been on dialysis for 5 years.            Review of Systems   Constitutional: Negative for activity change, appetite change, fatigue and fever.   HENT: Negative for congestion, ear pain, facial swelling and sore throat.    Eyes: Negative for discharge and visual disturbance.   Respiratory: Positive for shortness of breath. Negative for apnea, chest tightness, wheezing and stridor.    Cardiovascular: Negative for chest pain and palpitations.   Gastrointestinal: Negative for abdominal distention, abdominal pain, diarrhea, nausea and vomiting.   Genitourinary: Negative for difficulty urinating and dysuria.   Musculoskeletal: Negative for arthralgias and myalgias.   Skin: Negative for rash and wound.   Neurological: Negative for dizziness and seizures.   Psychiatric/Behavioral: Negative for agitation and confusion.       Past Medical History:   Diagnosis Date   • Arthritis     LEFT SHOULDER   • CHF (congestive heart failure) (CMS/[facility])    • Chronic pericarditis 4/22/2021   • Collar bone fracture 12/2020    SLING PLACED TO HEAL   • Coronary artery disease     LEFT MAIN AND 3 OTHER AREAS--CABG SCHEDULED FOR APRIL 22, 2021   • Dyslipidemia 9/28/2020   • Elevated cholesterol    • End stage kidney disease (CMS/[facility])     currently HD (5/19/2021), hopes to return to [facility]    • Essential hypertension 11/2/2018   • Gastroesophageal reflux disease without esophagitis 4/28/2021   • GERD (gastroesophageal reflux disease)     • Gout    • History of transfusion    • Hyperlipidemia    • Hypertension    • Overweight (BMI 25.0-29.9) 4/22/2021   • Peritoneal dialysis status (CMS/HCC)     EVERY NIGHT FOR 10 HOURS, (5/19/2021 currently on hold)   • PONV (postoperative nausea and vomiting)    • Sleep apnea     RESOLVED   • Status post gastric bypass for obesity 4/22/2021   • Type 2 diabetes mellitus with kidney complication, with long-term current use of insulin (CMS/[facility]) 11/2/2018       Allergies   Allergen Reactions   • Codeine Nausea And Vomiting   • Demerol [Meperidine] Nausea Only   • Levaquin [Levofloxacin] Nausea And Vomiting   • Lisinopril Swelling   • Morphine Nausea And Vomiting   • Sulfasalazine Nausea And Vomiting   • Ultram [Tramadol] Mental Status Change       Past Surgical History:   Procedure Laterality Date   • BREAST BIOPSY Right     FATTY TUMOR   • CARDIAC CATHETERIZATION N/A 10/1/2019    Procedure: Left Heart Cath;  Surgeon: Srikanth Coker MD;  Location:  [facility] CATH [facility];  Service: Cardiology   • CARDIAC CATHETERIZATION N/A 7/14/2020    Procedure: Left Heart Cath;  Surgeon: Srikanth Coker MD;  Location:  [facility] CATH [facility];  Service: Cardiology;  Laterality: N/A;   • CARDIAC CATHETERIZATION N/A 3/23/2021    Procedure: Left Heart Cath;  Surgeon: Srikanth Coker MD;  Location:  [facility]ATH [facility];  Service: Cardiology;  Laterality: N/A;   • CHEST TUBE INSERTION Right 6/7/2021    Procedure: CHEST TUBE INSERTION;  Surgeon: Antony Wood MD;  Location: [facility] OR;  Service: Cardiothoracic;  Laterality: Right;   • CORONARY ANGIOPLASTY WITH STENT PLACEMENT  2016    X 2   • CORONARY ARTERY BYPASS GRAFT N/A 4/22/2021    Procedure: CORONARY ARTERY BYPASS GRAFT X 3 WITH LEFT INTERNAL MAMMARY ARTERY, RIGHT LOWER EXTREMITY ENDOSCOPIC VEIN HARVEST, AND PERFUSION; TRANSESOPHAGEAL ECHOCARDIOGRAM;  Surgeon: Antony Wood MD;  Location: [facility] OR;  Service: Cardiothoracic;  Laterality: N/A;   •  EYE SURGERY Bilateral     IOC LENS IMPLANTS   • EYE SURGERY Bilateral     RETINAL SURGERY   • GALLBLADDER SURGERY     • GASTRIC BYPASS     • HYSTERECTOMY     • INSERTION HEMODIALYSIS CATHETER Right 2021    Procedure: HEMODIALYSIS CATHETER INSERTION;  Surgeon: Simon Coates MD;  Location: [place] [address];  Service: Vascular;  Laterality: Right;   • INSERTION PERITONEAL DIALYSIS CATHETER     • OOPHORECTOMY Bilateral    • SINUS SURGERY     • SINUS SURGERY  1980   • TUBAL ABDOMINAL LIGATION         Family History   Problem Relation Age of Onset   • Hypertension Mother    • Lung disease Mother    • Heart disease Father    • Diabetes Father    • Abnormal EKG Father    • Breast cancer Neg Hx        Social History     Socioeconomic History   • Marital status:      Spouse name: Not on file   • Number of children: Not on file   • Years of education: Not on file   • Highest education level: Not on file   Tobacco Use   • Smoking status: Former Smoker     Packs/day: 0.00     Years: 2.00     Pack years: 0.00     Types: Cigarettes     Quit date:      Years since quittin.4   • Smokeless tobacco: Never Used   Vaping Use   • Vaping Use: Never used   Substance and Sexual Activity   • Alcohol use: Not Currently     Comment: haven't drank in 3 years ( 2018)    • Drug use: No   • Sexual activity: Defer           Objective   Physical Exam  Constitutional:       Appearance: She is well-developed.   HENT:      Head: Normocephalic.   Eyes:      Pupils: Pupils are equal, round, and reactive to light.   Cardiovascular:      Rate and Rhythm: Normal rate and regular rhythm.      Heart sounds: No murmur heard.     Pulmonary:      Effort: Pulmonary effort is normal.      Breath sounds: Decreased breath sounds present.   Chest:      Chest wall: No tenderness.      Comments: Post operative CABG site is well healed with no redness  Abdominal:      General: Bowel sounds are normal.      Palpations: Abdomen is soft.    Musculoskeletal:         General: Normal range of motion.      Cervical back: Normal range of motion and neck supple.   Skin:     General: Skin is warm and dry.   Neurological:      Mental Status: She is alert and oriented to person, place, and time.         Procedures            Current Facility-Administered Medications:   •  sodium chloride 0.9 % flush 10 mL, 10 mL, Intravenous, PRN, Emergency, Triage Protocol, MD    Current Outpatient Medications:   •  allopurinol (ZYLOPRIM) 100 MG tablet, Take 100 mg by mouth Daily., Disp: , Rfl:   •  ascorbic acid (VITAMIN C) 500 MG tablet, Take 500 mg by mouth Daily., Disp: , Rfl:   •  aspirin 81 MG EC tablet, Take 81 mg by mouth Daily., Disp: , Rfl:   •  atorvastatin (LIPITOR) 20 MG tablet, Take 1 tablet by mouth Every Night., Disp: 30 tablet, Rfl: 2  •  bisacodyl (Dulcolax) 10 MG suppository, Insert 10 mg into the rectum Daily As Needed for Constipation., Disp: , Rfl:   •  calcium carbonate (TUMS) 500 MG chewable tablet, Chew 2 tablets Every Night., Disp: , Rfl:   •  citalopram (CeleXA) 40 MG tablet, Take 40 mg by mouth Daily., Disp: , Rfl:   •  docusate sodium (COLACE) 100 MG capsule, Take 200 mg by mouth As Needed for Constipation., Disp: , Rfl:   •  famotidine (PEPCID) 20 MG tablet, Take 1 tablet by mouth Daily., Disp: 30 tablet, Rfl: 0  •  HYDROcodone-acetaminophen (NORCO) 5-325 MG per tablet, Take 1 tablet by mouth Every 8 (Eight) Hours As Needed (pain)., Disp: 10 tablet, Rfl: 0  •  insulin detemir (LEVEMIR) 100 UNIT/ML injection, Inject 12 Units under the skin into the appropriate area as directed Daily., Disp: , Rfl:   •  irbesartan (AVAPRO) 75 MG tablet, TAKE 1 TABLET BY MOUTH EVERY DAY, Disp: 30 tablet, Rfl: 2  •  Melatonin 5 MG capsule, Take 5 mg by mouth Every Night., Disp: , Rfl:   •  metoprolol tartrate (LOPRESSOR) 25 MG tablet, Take 1 tablet by mouth Every 12 (Twelve) Hours., Disp: 60 tablet, Rfl: 2  •  Multiple Vitamins-Minerals (MULTIVITAMIN ADULT PO), Take  "1 tablet by mouth Daily., Disp: , Rfl:   •  nitroglycerin (NITROSTAT) 0.4 MG SL tablet, 1 under the tongue as needed for angina, may repeat q5mins for up three doses (Patient taking differently: Place 0.4 mg under the tongue Every 5 (Five) Minutes As Needed. 1 under the tongue as needed for angina, may repeat q5mins for up three doses), Disp: 25 tablet, Rfl: 3  •  pantoprazole (PROTONIX) 40 MG EC tablet, Take 40 mg by mouth 2 (two) times a day., Disp: , Rfl:   •  sucralfate (CARAFATE) 1 g tablet, Take 1 g by mouth 4 (Four) Times a Day. Dissolve 1 tablet in water to create a slurry and drink QID; AC & HS PRN, Disp: , Rfl:   •  Sucroferric Oxyhydroxide (Velphoro) 500 MG chewable tablet, Chew 1 tablet 3 (Three) Times a Day. WITH MEALS, Disp: , Rfl:   •  vitamin D3 125 MCG (5000 UT) capsule capsule, Take 1 tablet by mouth Daily., Disp: , Rfl:     Vital signs:  /64 (BP Location: Right arm, Patient Position: Sitting)   Pulse 73   Temp 98.6 °F (37 °C) (Oral)   Resp 22   Ht 160 cm (63\")   Wt 68 kg (150 lb)   SpO2 100%   BMI 26.57 kg/m²        ED LAB RESULTS:   Lab Results (last 24 hours)     Procedure Component Value Units Date/Time    CBC & Differential [[id number]]  (Abnormal) Collected: 06/21/21 1542    Specimen: Blood Updated: 06/21/21 1551    Narrative:      The following orders were created for panel order CBC & Differential.  Procedure                               Abnormality         Status                     ---------                               -----------         ------                     CBC Auto Differential[[id number]]        Abnormal            Final result                 Please view results for these tests on the individual orders.    Comprehensive Metabolic Panel [[id number]]  (Abnormal) Collected: 06/21/21 1542    Specimen: Blood Updated: 06/21/21 1611     Glucose 137 mg/dL      BUN 44 mg/dL      Creatinine 9.27 mg/dL      Sodium 134 mmol/L      Potassium 5.3 mmol/L      Chloride 92 mmol/L  "     CO2 26.0 mmol/L      Calcium 9.0 mg/dL      Total Protein 6.1 g/dL      Albumin 2.60 g/dL      ALT (SGPT) <5 U/L      AST (SGOT) 15 U/L      Alkaline Phosphatase 124 U/L      Total Bilirubin 0.3 mg/dL      eGFR Non African Amer 4 mL/min/1.73      Comment: <15 Indicative of kidney failure.        eGFR   Amer --     Comment: <15 Indicative of kidney failure.        Globulin 3.5 gm/dL      A/G Ratio 0.7 g/dL      BUN/Creatinine Ratio 4.7     Anion Gap 16.0 mmol/L     Narrative:      GFR Normal >60  Chronic Kidney Disease <60  Kidney Failure <15      BNP [[id number]]  (Abnormal) Collected: 06/21/21 1542    Specimen: Blood Updated: 06/21/21 1608     proBNP 7,544.0 pg/mL     Narrative:      Among patients with dyspnea, NT-proBNP is highly sensitive for the detection of acute congestive heart failure. In addition NT-proBNP of <300 pg/ml effectively rules out acute congestive heart failure with 99% negative predictive value.    Results may be falsely decreased if patient taking Biotin.      Troponin [[id number]]  (Abnormal) Collected: 06/21/21 1542    Specimen: Blood Updated: 06/21/21 1618     Troponin T 0.157 ng/mL     Narrative:      Troponin T Reference Range:  <= 0.03 ng/mL-   Negative for AMI  >0.03 ng/mL-     Abnormal for myocardial necrosis.  Clinicians would have to utilize clinical acumen, EKG, Troponin and serial changes to determine if it is an Acute Myocardial Infarction or myocardial injury due to an underlying chronic condition.       Results may be falsely decreased if patient taking Biotin.      CBC Auto Differential [[id number]]  (Abnormal) Collected: 06/21/21 1542    Specimen: Blood Updated: 06/21/21 1551     WBC 8.97 10*3/mm3      RBC 3.27 10*6/mm3      Hemoglobin 10.5 g/dL      Hematocrit 32.3 %      MCV 98.8 fL      MCH 32.1 pg      MCHC 32.5 g/dL      RDW 15.0 %      RDW-SD 54.0 fl      MPV 9.7 fL      Platelets 431 10*3/mm3      Neutrophil % 66.9 %      Lymphocyte % 16.1 %      Monocyte % 8.0  %      Eosinophil % 7.2 %      Basophil % 0.8 %      Immature Grans % 1.0 %      Neutrophils, Absolute 6.00 10*3/mm3      Lymphocytes, Absolute 1.44 10*3/mm3      Monocytes, Absolute 0.72 10*3/mm3      Eosinophils, Absolute 0.65 10*3/mm3      Basophils, Absolute 0.07 10*3/mm3      Immature Grans, Absolute 0.09 10*3/mm3      nRBC 0.0 /100 WBC     Protime-INR [[id number]]  (Abnormal) Collected: 06/21/21 1723    Specimen: Blood Updated: 06/21/21 1738     Protime 14.0 Seconds      INR 1.17    aPTT [[id number]]  (Abnormal) Collected: 06/21/21 1723    Specimen: Blood Updated: 06/21/21 1738     PTT 38.3 seconds     COVID PRE-OP / PRE-PROCEDURE SCREENING ORDER (NO ISOLATION) - Swab, Nasal Cavity [[id number]]  (Normal) Collected: 06/21/21 1723    Specimen: Swab from Nasal Cavity Updated: 06/21/21 1823    Narrative:      The following orders were created for panel order COVID PRE-OP / PRE-PROCEDURE SCREENING ORDER (NO ISOLATION) - Swab, Nasal Cavity.  Procedure                               Abnormality         Status                     ---------                               -----------         ------                     COVID-19,[employer] IN-[facility]LLEN...[[id number]]  Normal              Final result                 Please view results for these tests on the individual orders.    COVID-19,[employer] IN-HOUSE,Nasal Swab No Transport Media 3-4 HR TAT - Swab, Nasal Cavity [[id number]]  (Normal) Collected: 06/21/21 1723    Specimen: Swab from Nasal Cavity Updated: 06/21/21 1823     COVID19 Not Detected    Narrative:      Fact sheet for providers: https://www.fda.gov/media/814989/download     Fact sheet for patients: https://www.fda.gov/media/334127/download    Test performed by PCR.    Consider negative results in combination with clinical observations, patient history, and epidemiological information.             IMAGING RESULTS  CT Angiogram Chest   Final Result   1. No convincing evidence of pulmonary embolus. The more distal    subsegmental branches lower lobes are partially obscured secondary to   breathing motion.   2. Previously noted large right-sided pleural effusion has shown marked   diminishment in size. There is a tiny amount of air within the pleural   space inferiorly and within the major fissure. This may be related to   thoracentesis and should be correlated clinically.   3. On the limited images through the upper abdomen there is some   extraluminal air within the perihepatic space as well as a small amount   of free fluid. This is not imaged in its entirety. This could   potentially be postoperative in nature as the patient has undergone a   fairly recent median sternotomy but does appear to be subdiaphragmatic   in location on sagittal reconstructions.   4. Previously noted small left-sided effusion is now a moderate effusion   with compressive atelectasis of the left lung.   5. Atheromatous calcification of the aortic arch and proximal great   vessels.   6. Mild enlargement and nodularity of the thyroid gland stable from the   previous study.   7. Postoperative changes with fairly recent median sternotomy presumably   for coronary bypass grafting. There is mild stranding within the   pericardium and anterior mediastinum. FINDINGS are similar to the   previous study..           This report was finalized on 06/21/2021 19:02 by Dr. Je Lyn MD.      XR Chest 2 View   Final Result       1.  Tiny focus of air at the RIGHT lung base which may represent a trace   basilar pneumothorax versus trace pneumoperitoneum. Correlate with   clinical history and exam.   2.  Slight increase in small LEFT pleural effusion with overlying   atelectasis. No significant RIGHT pleural effusion.   This report was finalized on 06/21/2021 16:36 by Dr. Sravan Carlisle MD.                     ED Course  ED Course as of Jun 21 1954   Mon Jun 21, 2021 1910 I discussed the patient's history, assessment and testing results with Dr John. She  does have fluid on the left lung but it does not appear to need intervention at this time.  She is to f/u with Dr Wood.  The patient voiced understanding of of both results and d'c instructions.  When I was discussing results and instructions with her she remembered she fell a couple weeks ago hitting the left ribs and that is why she believes she is having pain now.  There are no rib fractures on her xrays or scans.  Her sp02 remains 100% on room air.    [KS]      ED Course User Index  [KS] Shoulders, Caseyistdylan Stahl, APRN                                           MDM  Number of Diagnoses or Management Options  ESRD on dialysis (CMS/HCC): established and worsening  Pleural effusion, left: minor     Amount and/or Complexity of Data Reviewed  Clinical lab tests: ordered and reviewed  Tests in the radiology section of CPT®: ordered and reviewed  Decide to obtain previous medical records or to obtain history from someone other than the patient: yes  Discuss the patient with other providers: yes    Risk of Complications, Morbidity, and/or Mortality  Presenting problems: low  Diagnostic procedures: low  Management options: low    Patient Progress  Patient progress: stable      Final diagnoses:   Pleural effusion, left   ESRD on dialysis (CMS/HCC)       ED Disposition  ED Disposition     ED Disposition Condition Comment    Discharge Stable           Antony Wood MD  [address] [facility] [address]  [facility] [id number] [phone]    Schedule an appointment as soon as possible for a visit in 1 day  Routine ED follow up    Akhil Golden, [address]  [facility] [address] [phone]    Schedule an appointment as soon as possible for a visit in 1 day  Routine ED follow up         Medication List      Changed    nitroglycerin 0.4 MG SL tablet  Commonly known as: NITROSTAT  1 under the tongue as needed for angina, may repeat q5mins for up three doses  What changed:   · how much to take  · how to take  this  · when to take this  · reasons to take this             Shoulders, Constantino Stahl, APRN  06/25/21 1539

## 2021-06-21 NOTE — TELEPHONE ENCOUNTER
"Called patient back, states she pulled -2195 ml from PD last night. She has gained 2 pounds since Friday. Also states that she fell the on 6/12 (\"lost balance and fell on butt\"). Home health nurse had to call EMT to help patient get up. Since the fall she is having left chest pain but also knows \"fluid is building up\"  No respiratory distress and she is able to complete full sentences. Explained that I would discuss with Dr. Wood and call her back with either time for office visit or if he recommends she be seen in the ER. She has to work on obtaining transportation. Will call back. She verbalizes understanding. "

## 2021-06-21 NOTE — TELEPHONE ENCOUNTER
Pt calling c/o SOA and bilat pain in chest with deep breath.  When she bends over, she starts coughing.  Pt concerned re: fluid build up again.  States she is also up 2 lbs on her weight this am.  Pt states Dr Wood told her if sx's started to return she would need to be seen ASAP.  Can reach her at #[phone]/kah

## 2021-06-22 NOTE — TELEPHONE ENCOUNTER
Pt left vm message stating she went to the ER yesterday as directed but they did not admit her.  States they told her to follow up with Dr Wood so pt is calling for an appt.  Can reach pt at #[phone].  Pt already has appt sched for 7-28-21.  OK to wait until then or does pt need to be seen sooner?/maykel

## 2021-06-22 NOTE — DISCHARGE INSTRUCTIONS
Continue your home medication.  Continue dialysis as previously instructed.  Follow up with Dr Wood and PCP - call tomorrow for appointment. Return to ED if condition does not improve or worsens

## 2021-06-28 PROBLEM — E78.5 DYSLIPIDEMIA: Status: ACTIVE | Noted: 2021-01-01

## 2021-06-28 PROBLEM — I10 ESSENTIAL HYPERTENSION: Status: ACTIVE | Noted: 2021-01-01

## 2021-06-28 NOTE — PROGRESS NOTES
Reason for Visit: cardiovascular follow up.    HPI:  Jennifer Salcido is a 65 y.o. female is here today for follow-up.  She underwent three-vessel CABG in April with Dr. Wood.  Surgery has been complicated by pleural effusions.  She has not yet started cardiac rehab.  She has been slowly recovering.  She gets a little dizzy when she stands up.  She has not been very active and has noticed her muscles getting weak.  She notices an occasional flutter in her chest.  Sometimes she can feel the popping in her chest when she moves a certain.  Her weight is down about 20 lbs since surgery.      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  - Nuclear stress (12/6/2019) normal myocardial perfusion with no evidence of ischemia, EF 70%  - Echo (5/22/2020) EF 40%, normal RV size with low normal systolic function, small pericardial effusion the posterior lateral LV with no tamponade physiology  - Echo (5/24/2020) compared to echo from 5/20/2020 the pericardial collection along the posterior lateral LV appears similar, no evidence of hemodynamic compromise  - Echo (6/8/2020) EF 51-55%, grade 1 diastolic dysfunction, mild LA enlargement, mild MR, no significant pericardial effusion  - LHC (7/14/2020) severe three-vessel CAD, 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, normal left heart filling pressures  - Carotid ultrasound (8/28/2020) less than 50% bilaterally  - PFT (8/28/2020) normal spirometry, normal lung volumes, diffusion capacity  - [facility] (3/16/2021) severe three-vessel CAD, anomalous left  circumflex of the right coronary cusp, normal left heart filling pressures  - Lipid panel (3/23/2021) total cholesterol 141, HDL 43, LDL 70, triglycerides 166  - Three-vessel CABG (2021) LIMA to LAD, SVG to diagonal, SVG to PDA  - Intra-Op MORENA (2021) EF appears to be 56-60%  - proBNP (2021) 7544    Patient Active Problem List   Diagnosis   • ESRD on dialysis (CMS/[facility])   • Coronary artery disease involving native coronary artery of native heart without angina pectoris   • Type 2 diabetes mellitus, without long-term current use of insulin (CMS/[facility])   • Chronic anemia   • Hypervolemia   • Persistent dry cough   • Pleural effusion on right   • Dyslipidemia   • Essential hypertension       Social History     Tobacco Use   • Smoking status: Former Smoker     Packs/day: 0.00     Years: 2.00     Pack years: 0.00     Types: Cigarettes     Quit date:      Years since quittin.4   • Smokeless tobacco: Never Used   Vaping Use   • Vaping Use: Never used   Substance Use Topics   • Alcohol use: Not Currently     Comment: haven't drank in 3 years ( 2018)    • Drug use: No       Family History   Problem Relation Age of Onset   • Hypertension Mother    • Lung disease Mother    • Heart disease Father    • Diabetes Father    • Abnormal EKG Father    • Breast cancer Neg Hx        The following portions of the patient's history were reviewed and updated as appropriate: allergies, current medications, past family history, past medical history, past social history, past surgical history and problem list.      Current Outpatient Medications:   •  allopurinol (ZYLOPRIM) 100 MG tablet, Take 100 mg by mouth Daily., Disp: , Rfl:   •  ascorbic acid (VITAMIN C) 500 MG tablet, Take 500 mg by mouth Daily., Disp: , Rfl:   •  aspirin 81 MG EC tablet, Take 81 mg by mouth Daily., Disp: , Rfl:   •  atorvastatin (LIPITOR) 20 MG tablet, Take 1 tablet by mouth Every Night., Disp: 30 tablet, Rfl: 2  •  bisacodyl (Dulcolax) 10 MG  suppository, Insert 10 mg into the rectum Daily As Needed for Constipation., Disp: , Rfl:   •  calcium carbonate (TUMS) 500 MG chewable tablet, Chew 2 tablets Every Night., Disp: , Rfl:   •  citalopram (CeleXA) 40 MG tablet, Take 40 mg by mouth Daily., Disp: , Rfl:   •  docusate sodium (COLACE) 100 MG capsule, Take 200 mg by mouth As Needed for Constipation., Disp: , Rfl:   •  famotidine (PEPCID) 20 MG tablet, Take 1 tablet by mouth Daily., Disp: 30 tablet, Rfl: 0  •  insulin detemir (LEVEMIR) 100 UNIT/ML injection, Inject 12 Units under the skin into the appropriate area as directed Daily., Disp: , Rfl:   •  irbesartan (AVAPRO) 75 MG tablet, TAKE 1 TABLET BY MOUTH EVERY DAY, Disp: 30 tablet, Rfl: 2  •  Melatonin 5 MG capsule, Take 5 mg by mouth Every Night., Disp: , Rfl:   •  metoprolol tartrate (LOPRESSOR) 25 MG tablet, Take 1 tablet by mouth Every 12 (Twelve) Hours., Disp: 60 tablet, Rfl: 2  •  Multiple Vitamins-Minerals (MULTIVITAMIN ADULT PO), Take 1 tablet by mouth Daily., Disp: , Rfl:   •  pantoprazole (PROTONIX) 40 MG EC tablet, Take 40 mg by mouth 2 (two) times a day., Disp: , Rfl:   •  sucralfate (CARAFATE) 1 g tablet, Take 1 g by mouth 4 (Four) Times a Day. Dissolve 1 tablet in water to create a slurry and drink QID; AC & HS PRN, Disp: , Rfl:   •  Sucroferric Oxyhydroxide (Velphoro) 500 MG chewable tablet, Chew 1 tablet 3 (Three) Times a Day. WITH MEALS, Disp: , Rfl:   •  vitamin D3 125 MCG (5000 UT) capsule capsule, Take 1 tablet by mouth Daily., Disp: , Rfl:   •  HYDROcodone-acetaminophen (NORCO) 5-325 MG per tablet, Take 1 tablet by mouth Every 8 (Eight) Hours As Needed (pain)., Disp: 10 tablet, Rfl: 0  •  nitroglycerin (NITROSTAT) 0.4 MG SL tablet, 1 under the tongue as needed for angina, may repeat q5mins for up three doses (Patient taking differently: Place 0.4 mg under the tongue Every 5 (Five) Minutes As Needed. 1 under the tongue as needed for angina, may repeat q5mins for up three doses), Disp:  "25 tablet, Rfl: 3    Review of Systems   Constitutional: Negative for chills and fever.   Cardiovascular: Negative for chest pain and paroxysmal nocturnal dyspnea.   Respiratory: Positive for shortness of breath. Negative for cough.    Skin: Negative for rash.   Musculoskeletal: Positive for muscle weakness.   Gastrointestinal: Negative for abdominal pain and heartburn.   Neurological: Negative for dizziness and numbness.       Objective   /72 (BP Location: Left arm, Patient Position: Sitting, Cuff Size: Adult)   Pulse 62   Ht 160 cm (62.99\")   Wt 66.2 kg (146 lb)   SpO2 96%   BMI 25.87 kg/m²   Constitutional:       Appearance: Well-developed.   HENT:      Head: Normocephalic and atraumatic.   Pulmonary:      Effort: Pulmonary effort is normal.      Breath sounds: Normal breath sounds.   Chest:       Cardiovascular:      Normal rate. Regular rhythm.      Murmurs: There is no murmur.      No gallop. No click.   Edema:     Peripheral edema absent.   Skin:     General: Skin is warm and dry.   Neurological:      Mental Status: Alert and oriented to person, place, and time.       Procedures      ICD-10-CM ICD-9-CM   1. Coronary artery disease involving native coronary artery of native heart without angina pectoris  I25.10 414.01   2. Dyslipidemia  E78.5 272.4   3. Essential hypertension  I10 401.9   4. ESRD on dialysis (CMS/[facility])  N18.6 585.6    Z99.2 V45.11         Assessment/Plan:  1.  Coronary artery disease: History of PCI to RCA at [place] in 2016.  Status post recent three-vessel CABG on 4/22/2021.   She is chest pain-free at this time.  Continue aspirin, metoprolol, nitroglycerin, and atorvastatin.     2.  Dyslipidemia: Continue simvastatin.       3.  Essential hypertension: Blood pressures well controlled today.  Continue metoprolol and irbesartan.     4.  Chronic kidney disease stage V: On peritoneal dialysis.    Previously on transplant list but she has been off it since prior to her surgery.    "

## 2021-07-14 NOTE — TELEPHONE ENCOUNTER
Spoke with patient about rescheduling apt from 07/14. Patient stated she believed the apt was for permacath removal and  had removed it already in June when she was hospitalized so she di not need the apt any longer.

## 2021-08-16 NOTE — PROGRESS NOTES
Subjective   Chief Complaint   Patient presents with   • Pleural Effusion     Patient is here for a follow up w/ cxr.        Patient ID: Jennifer Salcido is a 65 y.o. female who is here for follow-up having had Coronary artery bypass grafting-3 vessel (left internal mammary artery/left anterior descending, reverse saphenous vein graft/diagonal artery second, and reverse saphenous vein graft/posterior descending artery), right endoscopic vein harvest performed on 4/22/2021 by Dr. Wood.      History of Present Illness  Post operative recovery was uneventful without any major complications as an inpatient.    She did require readmission for volume status but also was found to have a right pleural effusion and this was treated with right chest tube placement.  Her volume status is much improved.  She returns to see me in the office for further evaluation.  Separately she reports having ongoing chest discomfort including a sternal click.  Her glycemic control has been good.    The following portions of the patient's history were reviewed and updated as appropriate: allergies, current medications, past family history, past medical history, past social history, past surgical history and problem list.    Review of Systems   Constitutional: Positive for activity change (improving ). Negative for appetite change, chills, diaphoresis, fever and unexpected weight change. Fatigue: improving    HENT: Negative for dental problem, hearing loss, nosebleeds, sore throat, trouble swallowing and voice change.    Eyes: Negative for photophobia, redness and visual disturbance.   Respiratory: Positive for shortness of breath (with exertion). Negative for apnea, cough, chest tightness, wheezing and stridor.    Cardiovascular: Positive for chest pain (incisional-improving ). Negative for palpitations and leg swelling.   Gastrointestinal: Negative for abdominal distention, abdominal pain, blood in stool, constipation, diarrhea, nausea and  "vomiting.   Endocrine: Negative for cold intolerance, heat intolerance, polyphagia and polyuria.   Genitourinary: Negative for decreased urine volume, difficulty urinating, dysuria, flank pain, frequency, hematuria and urgency.   Musculoskeletal: Positive for arthralgias. Negative for back pain, gait problem, joint swelling, myalgias and neck pain.   Skin: Positive for color change (bruising). Negative for pallor, rash and wound.   Allergic/Immunologic: Negative for immunocompromised state.   Neurological: Positive for weakness. Negative for dizziness, tremors, seizures, syncope, speech difficulty, light-headedness, numbness and headaches.   Hematological: Does not bruise/bleed easily.   Psychiatric/Behavioral: Negative for confusion, sleep disturbance and suicidal ideas. The patient is not nervous/anxious.        Objective   Visit Vitals  BP 93/50 (BP Location: Left arm, Patient Position: Sitting, Cuff Size: Adult)   Pulse 61   Ht 160 cm (62.99\")   Wt 66 kg (145 lb 6.4 oz)   SpO2 98%   BMI 25.76 kg/m²     Physical Exam:    General: No acute distress, in good spirits  Cardiovascular: RRR, no murmur, rubs, or gallops.    Pulmonary: Clear to auscultation bilaterally, no wheezing, rubs, or rales.  Chest: Sternum is grossly stable.  I may have felt a click.  Incisions well-healed.  Abdomen: Soft, non distended, and non tender.  Extremities: Warm, moves all extremities.  Neurologic:  No focal deficits, CN II-XII intact grossly.        On room air     Preoperative weight 165 pounds         Assessment/Plan       Diagnoses and all orders for this visit:    1. Pleural effusion on right (Primary)    2. ESRD on dialysis (CMS/[facility])    3. Coronary artery disease involving native coronary artery of native heart without angina pectoris    Other orders  -     metoprolol succinate XL (Toprol XL) 25 MG 24 hr tablet; Take 1 tablet by mouth Daily.  Dispense: 30 tablet; Refill: 2         Overall, Jennifer Salcido is doing well from a " cardiac surgery standpoint.  Chest x-ray shows no reaccumulation of her pleural fluid.   Asked her sternotomy precautions and asked her to maintain sternal precautions at 5 pounds at this time.  Encouraged to improve her dietary intake and specifically protein intake.  We will see her back in 3 months to reevaluate her sternal healing.    Patient's (Body mass index is 25.76 kg/m².) indicates that they are obese (BMI >30) with obesity-related health conditions that include hypertension, coronary heart disease, diabetes mellitus, dyslipidemias and GERD . Obesity is improving with lifestyle modifications. BMI is is above average; BMI management plan is completed.     Jennifer Salcido  reports that she quit smoking about 13 years ago. Her smoking use included cigarettes. She smoked 0.00 packs per day for 2.00 years. She has never used smokeless tobacco. She has been congratulated on smoking cessation.        Advance Care Planning   ACP discussion was declined by the patient. Patient does not have an advance directive, declines further assistance.

## 2021-09-17 NOTE — TELEPHONE ENCOUNTER
Spoke with patient and she said she is no longer taking the irbesartan, she takes lopressor 12.5 mg once a day. Should she hold this to see if it helps the dizziness? Please advise.

## 2021-09-17 NOTE — TELEPHONE ENCOUNTER
Patient says over the last few days she has been having some dizziness and also when she stands up she gets pain between her shoulder blades up  Into the back of her neck into her head. Blood pressure was 107/59 this morning. Please advise.

## 2021-09-17 NOTE — TELEPHONE ENCOUNTER
The pain in her shoulder blades, back, and neck sounds musculoskeletal in etiology.  She can try Tylenol as needed if no improvement, discuss further options with her PCP.    Her blood pressure is in the lower limits of normal.  It is unclear whether this would cause her dizziness.  I recommend she try to drink plenty of fluids and stay hydrated.  If she still feels dizzy she can cut her irbesartan dose in half or even try to hold it for a day or 2 to monitor for improvement.

## 2021-09-20 NOTE — TELEPHONE ENCOUNTER
It would be reasonable for her to try to hold that to see if it helps.  Should monitor her blood pressure while off it and continue to stay well-hydrated.

## 2021-09-20 NOTE — TELEPHONE ENCOUNTER
Patient said her blood pressure was 118/66 with hr of 93 this afternoon. Spoke with Dr Coker and he said patient should stay off of the Lopressor until she sees her pcp.   Called and told patient what Dr Coker said and she said she had an appointment with np at her pcp Wednesday.

## 2021-09-20 NOTE — TELEPHONE ENCOUNTER
Patient says she went to [facility] Saturday night because she was feeling bad. She was dehydrated so they gave her fluids and midodrine to raise her blood pressure. She has been holding the Lopressor for the last couple of days and her blood pressure has been running, 116/64, 113/66. This morning her blood pressure was 136/90. Will call back this afternoon to let me know what her blood pressure is.

## 2021-09-22 PROBLEM — E87.70 HYPERVOLEMIA: Status: RESOLVED | Noted: 2021-01-01 | Resolved: 2021-01-01

## 2021-09-22 NOTE — PROGRESS NOTES
Reason for Visit: Dizziness, low blood pressure.    HPI:  Jennifer Salcido is a 66 y.o. female is here today for follow-up of worsening dizziness and low blood pressure.  She has had to stop her metoprolol and irbesartan, and was started on midodrine.  She has been itching since she started the midodrine.  She was still dizzy despite taking the midodrine.  She went to the ED over the weekend and was told she was dehydrated.  She has a follow up appointment with her nephrologist, Dr Underwood, on Friday and a PCP appointment later today.  He dizziness tends to worsen when she stands up.  She notices her blood pressure is lower when she is standing.  She has not been hungry and notes her appetite has been lower.  She denies any chest pain, palpitations, shortness of breath, PND, or orthopnea.      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  - Nuclear stress (12/6/2019) normal myocardial perfusion with no evidence of ischemia, EF 70%  - Echo (5/22/2020) EF 40%, normal RV size with low normal systolic function, small pericardial effusion the posterior lateral LV with no tamponade physiology  - Echo (5/24/2020) compared to echo from 5/20/2020 the pericardial collection along the posterior lateral LV appears similar, no evidence of hemodynamic compromise  - Echo (6/8/2020) EF 51-55%, grade 1 diastolic dysfunction, mild LA enlargement, mild MR, no significant pericardial effusion  - LHC (7/14/2020) severe three-vessel CAD, 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, normal left heart filling  pressures  - Carotid ultrasound (2020) less than 50% bilaterally  - PFT (2020) normal spirometry, normal lung volumes, diffusion capacity  - LHC (3/16/2021) severe three-vessel CAD, 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, normal left heart filling pressures  - Lipid panel (3/23/2021) total cholesterol 141, HDL 43, LDL 70, triglycerides 166  - 3-vessel CABG (2021) LIMA to LAD, SVG to diagonal, SVG to PDA  - Intra-Op MORENA (2021) EF appears to be 56-60%  - proBNP (2021) 7544    Patient Active Problem List   Diagnosis   • ESRD on dialysis (CMS/[facility])   • Coronary artery disease involving native coronary artery of native heart without angina pectoris   • Type 2 diabetes mellitus, without long-term current use of insulin (CMS/[facility])   • Chronic anemia   • Persistent dry cough   • Pleural effusion on right   • Dyslipidemia   • Essential hypertension       Social History     Tobacco Use   • Smoking status: Former Smoker     Packs/day: 0.00     Years: 2.00     Pack years: 0.00     Types: Cigarettes     Quit date:      Years since quittin.7   • Smokeless tobacco: Never Used   Vaping Use   • Vaping Use: Never used   Substance Use Topics   • Alcohol use: Not Currently     Comment: haven't drank in 3 years ( 2018)    • Drug use: No       Family History   Problem Relation Age of Onset   • Hypertension Mother    • Lung disease Mother    • Heart disease Father    • Diabetes Father    • Abnormal EKG Father    • Breast cancer Neg Hx        The following portions of the patient's history were reviewed and updated as appropriate: allergies, current medications, past family history, past medical history, past social history, past surgical history and problem list.      Current Outpatient Medications:   •  allopurinol (ZYLOPRIM) 100 MG tablet, Take 100 mg by mouth Daily., Disp: , Rfl:   •  ascorbic acid (VITAMIN C) 500 MG tablet, Take 500 mg by mouth Daily., Disp: , Rfl:   •  aspirin 81 MG EC  tablet, Take 81 mg by mouth Daily., Disp: , Rfl:   •  atorvastatin (LIPITOR) 20 MG tablet, Take 1 tablet by mouth Every Night., Disp: 90 tablet, Rfl: 4  •  bisacodyl (Dulcolax) 10 MG suppository, Insert 10 mg into the rectum Daily As Needed for Constipation., Disp: , Rfl:   •  calcium carbonate (TUMS) 500 MG chewable tablet, Chew 2 tablets Every Night., Disp: , Rfl:   •  citalopram (CeleXA) 40 MG tablet, Take 40 mg by mouth Daily., Disp: , Rfl:   •  docusate sodium (COLACE) 100 MG capsule, Take 200 mg by mouth As Needed for Constipation., Disp: , Rfl:   •  famotidine (PEPCID) 20 MG tablet, Take 1 tablet by mouth Daily., Disp: 30 tablet, Rfl: 0  •  insulin detemir (LEVEMIR) 100 UNIT/ML injection, Inject 12 Units under the skin into the appropriate area as directed Daily., Disp: , Rfl:   •  LORazepam (ATIVAN) 0.5 MG tablet, Take 0.5 mg by mouth Every 8 (Eight) Hours As Needed for Anxiety., Disp: , Rfl:   •  Melatonin 5 MG capsule, Take 5 mg by mouth Every Night., Disp: , Rfl:   •  midodrine (PROAMATINE) 5 MG tablet, Take 5 mg by mouth 2 (two) times a day., Disp: , Rfl:   •  Multiple Vitamins-Minerals (MULTIVITAMIN ADULT PO), Take 1 tablet by mouth Daily., Disp: , Rfl:   •  nitroglycerin (NITROSTAT) 0.4 MG SL tablet, 1 under the tongue as needed for angina, may repeat q5mins for up three doses (Patient taking differently: Place 0.4 mg under the tongue Every 5 (Five) Minutes As Needed. 1 under the tongue as needed for angina, may repeat q5mins for up three doses), Disp: 25 tablet, Rfl: 3  •  pantoprazole (PROTONIX) 40 MG EC tablet, Take 40 mg by mouth 2 (two) times a day., Disp: , Rfl:   •  sucralfate (CARAFATE) 1 g tablet, Take 1 g by mouth 4 (Four) Times a Day. Dissolve 1 tablet in water to create a slurry and drink QID; AC & HS PRN, Disp: , Rfl:   •  Sucroferric Oxyhydroxide (Velphoro) 500 MG chewable tablet, Chew 1 tablet 3 (Three) Times a Day. WITH MEALS, Disp: , Rfl:   •  vitamin D3 125 MCG (5000 UT) capsule  "capsule, Take 1 tablet by mouth Daily., Disp: , Rfl:   •  gentamicin (GARAMYCIN) 0.1 % cream, APPLY TO EXIT SITE AS NEEDED, Disp: , Rfl:     Review of Systems   Constitutional: Negative for chills and fever.   Cardiovascular: Negative for chest pain and paroxysmal nocturnal dyspnea.   Respiratory: Negative for cough and shortness of breath.    Skin: Negative for rash.   Gastrointestinal: Positive for heartburn. Negative for abdominal pain.   Neurological: Positive for dizziness. Negative for numbness.       Objective   /60 (BP Location: Left arm, Patient Position: Sitting, Cuff Size: Adult)   Pulse 87   Ht 160 cm (62.99\")   Wt 67.1 kg (148 lb)   SpO2 99%   BMI 26.22 kg/m²   Constitutional:       Appearance: Well-developed.   HENT:      Head: Normocephalic and atraumatic.   Pulmonary:      Effort: Pulmonary effort is normal.      Breath sounds: Normal breath sounds.   Cardiovascular:      Normal rate. Regular rhythm.      Murmurs: There is no murmur.      No gallop. No click.   Edema:     Peripheral edema absent.   Skin:     General: Skin is warm and dry.   Neurological:      Mental Status: Alert and oriented to person, place, and time.         ECG 12 Lead    Date/Time: 9/22/2021 10:06 AM  Performed by: Srikanth Coker MD  Authorized by: Srikanth Coker MD   Comparison: compared with previous ECG from 6/21/2021  Similar to previous ECG  Rhythm: sinus rhythm  Ectopy: atrial premature contractions  Conduction: non-specific intraventricular conduction delay  QRS axis: right  Other findings: non-specific ST-T wave changes              ICD-10-CM ICD-9-CM   1. Coronary artery disease involving native coronary artery of native heart without angina pectoris  I25.10 414.01   2. Essential hypertension  I10 401.9   3. Dyslipidemia  E78.5 272.4   4. ESRD on dialysis (CMS/[facility])  N18.6 585.6    Z99.2 V45.11   5. Dizziness  R42 780.4         Assessment/Plan:  1.  Coronary artery disease: History of PCI to RCA at " [place] in 2016 and 3-vessel CABG on 4/22/2021.   She remains chest pain free.  Continue aspirin, metoprolol, atorvastatin, and nitroglycerin.     2.  Dyslipidemia: Continue simvastatin.       3.  Essential hypertension: Blood pressure is low normal today and having periods of been even lower at home.  She also has symptoms of orthostatic hypotension.  Discontinue metoprolol.  Irbesartan was previously discontinued.     4.  Chronic kidney disease stage V: On peritoneal dialysis.  Currently off transplant list.  Encouraged her to discuss decreasing fluid removal with her nephrologist.    5.  Dizziness: Symptoms consistent with orthostatic hypotension.  Stop metoprolol and encourage increased fluid intake.  Order compression stockings.  Consider decreasing fluid removal with dialysis.  Check an echocardiogram.

## 2021-10-01 NOTE — TELEPHONE ENCOUNTER
----- Message from Srikanth Coker MD sent at 10/1/2021 12:54 PM CDT -----  Please let her know that the echocardiogram shows normal cardiac structure and function with mild age-related changes.  There is no abnormalities that would explain her dizziness.

## 2021-10-03 NOTE — ED PROVIDER NOTES
Emergency Medicine Provider Note    Subjective:    HISTORY OF PRESENT ILLNESS     This is a very pleasant 66 y.o. female with a past medical history of Arthritis, hypertension, hyperlipidemia, diabetes, ESRD, CAD with a cath and March of this year which showed severe three-vessel CAD status post CABG Dr. Wood who has documented CHF although most recent echo appears to have been last month and shows an EF greater than 60% who presents emergency department today by EMS with a chief complaint of syncope.  Patient states she is having a bowel movement whenever she felt hot, sweaty, her ears began during, she became tunnel vision, and she passed out.  Sudden onset.  Constant.  Moderate.  No exacerbating relieving factors no associated symptoms.  Has no chest pain or shortness of breath.  No numbness, weakness, or paresthesias.  She complains of a mild neck pain here.  Denies any headache or vision changes.  Continues to have no chest pain or shortness of breath.  No abdominal pain, nausea, vomiting.  She had been feeling well prior to this.        History is obtained from the patient and collateral history obtained from chart review.       Review of Systems: All other systems are reviewed and are negative other than noted in the HPI.    Past Medical History:  Past Medical History:   Diagnosis Date   • Arthritis     LEFT SHOULDER   • CHF (congestive heart failure) (HCC)    • Chronic pericarditis 4/22/2021   • Collar bone fracture 12/2020    SLING PLACED TO HEAL   • Coronary artery disease     LEFT MAIN AND 3 OTHER AREAS--CABG SCHEDULED FOR APRIL 22, 2021   • Dyslipidemia 9/28/2020   • Elevated cholesterol    • End stage kidney disease (HCC)     currently HD (5/19/2021), hopes to return to [facility]    • Essential hypertension 11/2/2018   • Gastroesophageal reflux disease without esophagitis 4/28/2021   • GERD (gastroesophageal reflux disease)    • Gout    • History of transfusion    • Hyperlipidemia    • Hypertension    •  Overweight (BMI 25.0-29.9) 4/22/2021   • Peritoneal dialysis status ([facility])     EVERY NIGHT FOR 10 HOURS, (5/19/2021 currently on hold)   • PONV (postoperative nausea and vomiting)    • Sleep apnea     RESOLVED   • Status post gastric bypass for obesity 4/22/2021   • Type 2 diabetes mellitus with kidney complication, with long-term current use of insulin ([facility]) 11/2/2018       Allergies:  Allergies   Allergen Reactions   • Codeine Nausea And Vomiting   • Demerol [Meperidine] Nausea Only   • Levaquin [Levofloxacin] Nausea And Vomiting   • Lisinopril Swelling   • Morphine Nausea And Vomiting   • Sulfasalazine Nausea And Vomiting   • Ultram [Tramadol] Mental Status Change   • Trental [Pentoxifylline] GI Intolerance       Past Surgical History:  Past Surgical History:   Procedure Laterality Date   • BREAST BIOPSY Right     FATTY TUMOR   • CARDIAC CATHETERIZATION N/A 10/1/2019    Procedure: Left Heart Cath;  Surgeon: Srikanth Coker MD;  Location:  [facility] CATH INVASIVE LOC[facility];  Service: Cardiology   • CARDIAC CATHETERIZATION N/A 7/14/2020    Procedure: Left Heart Cath;  Surgeon: Srikanth Coker MD;  Location:  [facility]ATH [facility];  Service: Cardiology;  Laterality: N/A;   • CARDIAC CATHETERIZATION N/A 3/23/2021    Procedure: Left Heart Cath;  Surgeon: Srikanth Coker MD;  Location:  [facility] CATH [facility];  Service: Cardiology;  Laterality: N/A;   • CHEST TUBE INSERTION Right 6/7/2021    Procedure: CHEST TUBE INSERTION;  Surgeon: Antony Wood MD;  Location: [facility] OR;  Service: Cardiothoracic;  Laterality: Right;   • CORONARY ANGIOPLASTY WITH STENT PLACEMENT  2016    X 2   • CORONARY ARTERY BYPASS GRAFT N/A 4/22/2021    Procedure: CORONARY ARTERY BYPASS GRAFT X 3 WITH LEFT INTERNAL MAMMARY ARTERY, RIGHT LOWER EXTREMITY ENDOSCOPIC VEIN HARVEST, AND PERFUSION; TRANSESOPHAGEAL ECHOCARDIOGRAM;  Surgeon: Antony Wood MD;  Location:  [facility] OR;  Service: Cardiothoracic;  Laterality: N/A;   • EYE  SURGERY Bilateral     IOC LENS IMPLANTS   • EYE SURGERY Bilateral     RETINAL SURGERY   • GALLBLADDER SURGERY     • GASTRIC BYPASS     • HYSTERECTOMY     • INSERTION HEMODIALYSIS CATHETER Right 2021    Procedure: HEMODIALYSIS CATHETER INSERTION;  Surgeon: Simon Coates MD;  Location: [place] [address];  Service: Vascular;  Laterality: Right;   • INSERTION PERITONEAL DIALYSIS CATHETER     • OOPHORECTOMY Bilateral    • SINUS SURGERY     • SINUS SURGERY  1980   • TUBAL ABDOMINAL LIGATION         Family History:  Family History   Problem Relation Age of Onset   • Hypertension Mother    • Lung disease Mother    • Heart disease Father    • Diabetes Father    • Abnormal EKG Father    • Breast cancer Neg Hx        Social History:  Social History     Socioeconomic History   • Marital status:      Spouse name: Not on file   • Number of children: Not on file   • Years of education: Not on file   • Highest education level: Not on file   Tobacco Use   • Smoking status: Former Smoker     Packs/day: 0.00     Years: 2.00     Pack years: 0.00     Types: Cigarettes     Quit date:      Years since quittin.7   • Smokeless tobacco: Never Used   Vaping Use   • Vaping Use: Never used   Substance and Sexual Activity   • Alcohol use: Not Currently     Comment: haven't drank in 3 years ( 2018)    • Drug use: No   • Sexual activity: Defer       Home Medications:  Prior to Admission medications    Medication Sig Start Date End Date Taking? Authorizing Provider   allopurinol (ZYLOPRIM) 100 MG tablet Take 100 mg by mouth Daily.    ProviderElmer MD   ascorbic acid (VITAMIN C) 500 MG tablet Take 500 mg by mouth Daily. 20   Elmer Thao MD   aspirin 81 MG EC tablet Take 81 mg by mouth Daily.    Elmer Thao MD   atorvastatin (LIPITOR) 20 MG tablet Take 1 tablet by mouth Every Night. 21   Srikanth Coker MD   bisacodyl (Dulcolax) 10 MG suppository Insert 10 mg into the rectum  Daily As Needed for Constipation.    Elmer Thao MD   calcium carbonate (TUMS) 500 MG chewable tablet Chew 2 tablets Every Night.    Elmer Thao MD   citalopram (CeleXA) 40 MG tablet Take 40 mg by mouth Daily.    Elmer Thao MD   docusate sodium (COLACE) 100 MG capsule Take 200 mg by mouth As Needed for Constipation.    Elmer Thao MD   famotidine (PEPCID) 20 MG tablet Take 1 tablet by mouth Daily. 4/28/21   Antony Wood MD   gentamicin (GARAMYCIN) 0.1 % cream APPLY TO EXIT SITE AS NEEDED 8/4/21   Elmer Thao MD   insulin detemir (LEVEMIR) 100 UNIT/ML injection Inject 12 Units under the skin into the appropriate area as directed Daily.    Elmer Thao MD   LORazepam (ATIVAN) 0.5 MG tablet Take 0.5 mg by mouth Every 8 (Eight) Hours As Needed for Anxiety.    Elmer Thao MD   Melatonin 5 MG capsule Take 5 mg by mouth Every Night.    Elmer Thao MD   midodrine (PROAMATINE) 5 MG tablet Take 5 mg by mouth 2 (two) times a day.    Elmer Thao MD   Multiple Vitamins-Minerals (MULTIVITAMIN ADULT PO) Take 1 tablet by mouth Daily.    Elmer Thao MD   nitroglycerin (NITROSTAT) 0.4 MG SL tablet 1 under the tongue as needed for angina, may repeat q5mins for up three doses  Patient taking differently: Place 0.4 mg under the tongue Every 5 (Five) Minutes As Needed. 1 under the tongue as needed for angina, may repeat q5mins for up three doses 5/11/21   Srikanth Coker MD   pantoprazole (PROTONIX) 40 MG EC tablet Take 40 mg by mouth 2 (two) times a day. 6/18/18   Elmer Thao MD   sucralfate (CARAFATE) 1 g tablet Take 1 g by mouth 4 (Four) Times a Day. Dissolve 1 tablet in water to create a slurry and drink QID; AC & HS PRN    Elmer Thao MD   Sucroferric Oxyhydroxide (Velphoro) 500 MG chewable tablet Chew 1 tablet 3 (Three) Times a Day. WITH MEALS    Elmer Thao MD   vitamin D3 125 MCG (5000 UT)  capsule capsule Take 1 tablet by mouth Daily.    Provider, MD Elmer         Objective:    PHYSICAL EXAM     Vitals:   Vitals:    10/03/21 0938   BP: 109/52   Pulse: 61   Resp: 18   Temp: 98.3 °F (36.8 °C)   SpO2: 100%     GENERAL: Well appearing, in no acute distress.   HEENT: Moist mucous membranes, oropharynx clear without lesions, exudates, thrush.   EYES: No scleral icterus, conjunctivae clear.   NECK: No cervical lymphadenopathy, no stiffness.  CARDIAC: Normal rate, regular rhythm, no murmurs, 2+ peripheral pulses in all four extremities, normal capillary refill.   PULMONARY: Normal work of breathing on room air, lungs are clear to auscultation bilaterally without wheezes, crackles, rhonchi.  ABDOMINAL: Normal bowel sounds, abdomen is soft, non-tender, non-distended, no hepatomegaly or splenomegaly.   MUSCULOSKELETAL: Normal range of motion, no lower extremity edema.  NEUROLOGIC: Alert and oriented x 3, EOM grossly intact and moves all four extremities with normal strength.  SKIN: Warm and dry without rashes.   PSYCHIATRIC: Mood and affect are normal.     PROCEDURES     Procedures    LAB AND RADIOLOGY RESULTS     Lab Results (last 24 hours)     Procedure Component Value Units Date/Time    CBC & Differential [[id number]]  (Abnormal) Collected: 10/03/21 0621    Specimen: Blood Updated: 10/03/21 0633    Narrative:      The following orders were created for panel order CBC & Differential.  Procedure                               Abnormality         Status                     ---------                               -----------         ------                     CBC Auto Differential[[id number]]        Abnormal            Final result                 Please view results for these tests on the individual orders.    CBC Auto Differential [[id number]]  (Abnormal) Collected: 10/03/21 0621    Specimen: Blood Updated: 10/03/21 0633     WBC 9.94 10*3/mm3      RBC 3.18 10*6/mm3      Hemoglobin 10.6 g/dL      Hematocrit  32.6 %      .5 fL      MCH 33.3 pg      MCHC 32.5 g/dL      RDW 15.1 %      RDW-SD 56.2 fl      MPV 10.1 fL      Platelets 342 10*3/mm3      Neutrophil % 74.8 %      Lymphocyte % 11.7 %      Monocyte % 7.3 %      Eosinophil % 4.7 %      Basophil % 0.7 %      Immature Grans % 0.8 %      Neutrophils, Absolute 7.43 10*3/mm3      Lymphocytes, Absolute 1.16 10*3/mm3      Monocytes, Absolute 0.73 10*3/mm3      Eosinophils, Absolute 0.47 10*3/mm3      Basophils, Absolute 0.07 10*3/mm3      Immature Grans, Absolute 0.08 10*3/mm3      nRBC 0.0 /100 WBC     Comprehensive Metabolic Panel [[id number]]  (Abnormal) Collected: 10/03/21 0639    Specimen: Blood Updated: 10/03/21 0718     Glucose 193 mg/dL      BUN 46 mg/dL      Creatinine 8.63 mg/dL      Sodium 132 mmol/L      Potassium 3.6 mmol/L      Chloride 93 mmol/L      CO2 24.0 mmol/L      Calcium 9.1 mg/dL      Total Protein 5.6 g/dL      Albumin 2.80 g/dL      ALT (SGPT) 14 U/L      AST (SGOT) 23 U/L      Alkaline Phosphatase 108 U/L      Total Bilirubin 0.4 mg/dL      eGFR Non African Amer 5 mL/min/1.73      Comment: <15 Indicative of kidney failure.        eGFR   Amer --     Comment: <15 Indicative of kidney failure.        Globulin 2.8 gm/dL      A/G Ratio 1.0 g/dL      BUN/Creatinine Ratio 5.3     Anion Gap 15.0 mmol/L     Narrative:      GFR Normal >60  Chronic Kidney Disease <60  Kidney Failure <15      BNP [[id number]]  (Abnormal) Collected: 10/03/21 0639    Specimen: Blood Updated: 10/03/21 0712     proBNP 13,390.0 pg/mL     Narrative:      Among patients with dyspnea, NT-proBNP is highly sensitive for the detection of acute congestive heart failure. In addition NT-proBNP of <300 pg/ml effectively rules out acute congestive heart failure with 99% negative predictive value.    Results may be falsely decreased if patient taking Biotin.      Troponin [[id number]]  (Abnormal) Collected: 10/03/21 0639    Specimen: Blood Updated: 10/03/21 0717     Troponin T  0.170 ng/mL     Narrative:      Troponin T Reference Range:  <= 0.03 ng/mL-   Negative for AMI  >0.03 ng/mL-     Abnormal for myocardial necrosis.  Clinicians would have to utilize clinical acumen, EKG, Troponin and serial changes to determine if it is an Acute Myocardial Infarction or myocardial injury due to an underlying chronic condition.       Results may be falsely decreased if patient taking Biotin.      Troponin [[id number]]  (Abnormal) Collected: 10/03/21 0825    Specimen: Blood Updated: 10/03/21 0914     Troponin T 0.168 ng/mL     Narrative:      Troponin T Reference Range:  <= 0.03 ng/mL-   Negative for AMI  >0.03 ng/mL-     Abnormal for myocardial necrosis.  Clinicians would have to utilize clinical acumen, EKG, Troponin and serial changes to determine if it is an Acute Myocardial Infarction or myocardial injury due to an underlying chronic condition.       Results may be falsely decreased if patient taking Biotin.            CT Head Without Contrast    Result Date: 10/3/2021  Narrative: EXAMINATION: CT HEAD WO CONTRAST-  10/3/2021 8:02 AM CDT  CT SCAN OF THE HEAD, WITHOUT CONTRAST:  HISTORY: Head pain post trauma  COMPARISONS: 12/31/2020  TECHNIQUE:  Radiation dose equals  mGy-cm.  Automated exposure control dose reduction technique was implemented.   CT evaluation of the head without intravenous contrast. 5 mm transaxial images were obtained.   2-D sagittal and coronal reconstruction images were generated.  FINDINGS:  There is no intra or extra-axial hemorrhage.  There is no mass effect or midline shift.  There is no developing hydrocephalus.  There is central and cortical atrophy. Partial empty sella noted.  The paranasal sinuses imaged incompletely are grossly clear. Mild inflammatory changes noted in the inferior right mastoid sinuses.  There is no skull fracture or acute calvarial abnormality.           Impression: 1. No CT evidence of an acute intracranial process.                      This  report was finalized on 10/03/2021 08:04 by Dr. Omid Rader MD.    CT Cervical Spine Without Contrast    Result Date: 10/3/2021  Narrative: EXAMINATION:  CT CERVICAL SPINE WO CONTRAST-  10/3/2021 7:50 AM CDT  HISTORY: Neck trauma, midline tenderness (Age 16-64y); R55-Syncope and collapse  COMPARISON: No comparison study.  TECHNIQUE: Radiation dose equals  mGy-cm.  Automated exposure control dose reduction technique was implemented.  Thin section axial imaging was obtained without intravenous contrast. 2-D sagittal and coronal reconstruction images were generated.  FINDINGS:  The facets are appropriately aligned.  Vertebral body heights are maintained. The prevertebral soft tissues are normal.  There is no CT evidence of acute fracture or subluxation.  There is disc degeneration at C4-C5 with complete loss of disc space with osteophyte formation anteriorly and posteriorly mild anterolisthesis of C4 on C5. There is resulting in relative mild narrowing of the bony spinal canal with posterior osteophyte and disc complex. There is relative mild to moderate bilateral foraminal narrowing.  Remaining vertebral body heights are maintained without canal foraminal stenosis. There is no CT evidence of posttraumatic disc herniation.  The lung apices are clear.  Carotid artery calcifications identified, bifurcation region greater on the left.      Impression: 1. No CT evidence of acute bony injury to the cervical spine. 2. Disc degeneration and spondylosis C4-C5. This report was finalized on 10/03/2021 08:07 by Dr. Omid Rader MD.    XR Chest 1 View    Result Date: 10/3/2021  Narrative: EXAMINATION: XR CHEST 1 VW-. 10/3/2021 10:44 AM CDT  CHEST, ONE VIEW:  HISTORY: Dyspnea  COMPARISON: 7/20/2021, 6/21/2021 6/11/2021  A single frontal chest radiograph was obtained.  FINDINGS:  Changes from median sternotomy observed.  The lungs are clear without acute infiltrates.  The heart is normal in size. The bony circulation  appropriate, without heart failure.  The bony structures are intact.  Hiatal hernia is observed.                                  Impression: 1. No acute cardiopulmonary process.  This report was finalized on 10/03/2021 10:46 by Dr. Omid Rader MD.      ED course:    Medications - No data to display       Amount and/or complexity of data reviewed:    • Clinical lab tests ordered and reviewed.  • Tests in the radiology section ordered and reviewed.  • Independent visualization of imaging, tracing, or specimen is remarkable for an EKG with normal sinus rhythm at a rate of 69, there is no ST elevation or depression concerning for acute ischemia, QRS is narrow, GA within normal limits, QT slightly prolonged, no Wellens, no Brugada, no signs of bypass tract.  QT is improved from EKG obtained in September of this year.      Risk of significant complications, morbidity, and/or mortality.    •  Presenting problem: high  •  Diagnostic procedures: high  •  Management options: high        MEDICAL DECISION MAKING     Patient presents with syncope. Upon arrival to the Emergency Department the patient is in no acute distress and her vital signs are reassuring.  IV access is obtained and labs are sent.  She is evaluated with lab work, chest x-ray, CT scan of the head and C-spine.  CT scans are reassuring, chest x-ray is reassuring.Lab work shows CBC with anemia that is consistent with baseline, CMP with elevated creatinine and BUN consistent with history of ESRD.  BNP is elevated although always elevated this appears to be about half of what it was 4 months ago.  Troponin initially was elevated to 0.170 and down trended to 0.168.  Again, less than she was 3 months ago and downtrending.  She vehemently denies any chest pain or shortness of breath.  As for the causes of her syncope have low concern for arrhythmia with reassuring EKG, history does not suggest arrhythmia.  Low concern for structural cardiac disease as she was not  exerting herself at the time.  Low concern for pulmonary embolism with no chest pain or shortness of breath.  She does not appear to be in heart failure on history or physical exam.  She does have elevated troponin although this is less than baseline, downtrending, she has no chest pain.  She has no traumatic injury other than neck pain.  She has no neurologic deficits to suggest an occult spinal cord injury.  Imaging is reassuring.  I discussed findings with the patient.  She is comfortable with discharge home.  History is very consistent with a vasovagal episode.  She is discharged in good condition with reassuring vital signs and she is given commonsense return precautions which she verbalizes understanding of.        Diagnosis:    Final diagnoses:   Syncope, unspecified syncope type         ED Disposition:     ED Disposition     ED Disposition Condition Comment    Discharge Stable           Akhil Golden, [address] [id number] [phone]    Schedule an appointment as soon as possible for a visit in 2 days           Medication List      Changed    nitroglycerin 0.4 MG SL tablet  Commonly known as: NITROSTAT  1 under the tongue as needed for angina, may repeat q5mins for up three doses  What changed:   · how much to take  · how to take this  · when to take this  · reasons to take this                 Ben Shelton MD  10/03/21 1503

## 2021-10-28 ENCOUNTER — OFFICE VISIT (OUTPATIENT)
Dept: VASCULAR SURGERY | Age: 66
End: 2021-10-28
Payer: MEDICARE

## 2021-10-28 VITALS
SYSTOLIC BLOOD PRESSURE: 116 MMHG | HEART RATE: 76 BPM | TEMPERATURE: 97 F | RESPIRATION RATE: 18 BRPM | DIASTOLIC BLOOD PRESSURE: 71 MMHG

## 2021-10-28 DIAGNOSIS — Z01.818 PRE-OP TESTING: Primary | ICD-10-CM

## 2021-10-28 DIAGNOSIS — I70.213 ATHEROSCLER OF NATIVE ARTERY OF BOTH LEGS WITH INTERMIT CLAUDICATION (HCC): ICD-10-CM

## 2021-10-28 PROCEDURE — 99204 OFFICE O/P NEW MOD 45 MIN: CPT | Performed by: NURSE PRACTITIONER

## 2021-10-28 PROCEDURE — 3017F COLORECTAL CA SCREEN DOC REV: CPT | Performed by: NURSE PRACTITIONER

## 2021-10-28 PROCEDURE — 1123F ACP DISCUSS/DSCN MKR DOCD: CPT | Performed by: NURSE PRACTITIONER

## 2021-10-28 PROCEDURE — G8400 PT W/DXA NO RESULTS DOC: HCPCS | Performed by: NURSE PRACTITIONER

## 2021-10-28 PROCEDURE — 1090F PRES/ABSN URINE INCON ASSESS: CPT | Performed by: NURSE PRACTITIONER

## 2021-10-28 PROCEDURE — G8427 DOCREV CUR MEDS BY ELIG CLIN: HCPCS | Performed by: NURSE PRACTITIONER

## 2021-10-28 PROCEDURE — 1036F TOBACCO NON-USER: CPT | Performed by: NURSE PRACTITIONER

## 2021-10-28 PROCEDURE — G8421 BMI NOT CALCULATED: HCPCS | Performed by: NURSE PRACTITIONER

## 2021-10-28 PROCEDURE — 4040F PNEUMOC VAC/ADMIN/RCVD: CPT | Performed by: NURSE PRACTITIONER

## 2021-10-28 PROCEDURE — G8484 FLU IMMUNIZE NO ADMIN: HCPCS | Performed by: NURSE PRACTITIONER

## 2021-10-28 RX ORDER — ASPIRIN 81 MG/1
81 TABLET ORAL DAILY
COMMUNITY

## 2021-10-28 RX ORDER — SODIUM CHLORIDE 9 MG/ML
INJECTION, SOLUTION INTRAVENOUS CONTINUOUS
Status: CANCELLED | OUTPATIENT
Start: 2021-10-28

## 2021-10-28 RX ORDER — ATORVASTATIN CALCIUM 20 MG/1
20 TABLET, FILM COATED ORAL DAILY
COMMUNITY
Start: 2021-10-21

## 2021-10-28 RX ORDER — UREA 10 %
2 LOTION (ML) TOPICAL NIGHTLY
COMMUNITY

## 2021-10-28 RX ORDER — SODIUM CHLORIDE 9 MG/ML
25 INJECTION, SOLUTION INTRAVENOUS PRN
Status: CANCELLED | OUTPATIENT
Start: 2021-10-28

## 2021-10-28 RX ORDER — SODIUM CHLORIDE 0.9 % (FLUSH) 0.9 %
10 SYRINGE (ML) INJECTION PRN
Status: CANCELLED | OUTPATIENT
Start: 2021-10-28

## 2021-10-28 RX ORDER — FAMOTIDINE 20 MG/1
1 TABLET, FILM COATED ORAL DAILY
COMMUNITY
Start: 2021-04-28

## 2021-10-28 RX ORDER — SODIUM CHLORIDE 0.9 % (FLUSH) 0.9 %
10 SYRINGE (ML) INJECTION EVERY 12 HOURS SCHEDULED
Status: CANCELLED | OUTPATIENT
Start: 2021-10-28

## 2021-10-28 RX ORDER — ASPIRIN 81 MG/1
81 TABLET ORAL ONCE
Status: CANCELLED | OUTPATIENT
Start: 2021-10-28 | End: 2021-10-28

## 2021-10-28 RX ORDER — SUCROFERRIC OXYHYDROXIDE 500 MG/1
500 TABLET, CHEWABLE ORAL
COMMUNITY
Start: 2021-09-01

## 2021-10-28 RX ORDER — LORAZEPAM 0.5 MG/1
1 TABLET ORAL DAILY PRN
COMMUNITY
Start: 2021-08-02

## 2021-10-28 RX ORDER — PSEUDOEPHEDRINE HCL 30 MG
100 TABLET ORAL SEE ADMIN INSTRUCTIONS
COMMUNITY
Start: 2020-12-18

## 2021-10-28 RX ORDER — CLONIDINE HYDROCHLORIDE 0.1 MG/1
0.1 TABLET ORAL PRN
Status: CANCELLED | OUTPATIENT
Start: 2021-10-28

## 2021-10-28 RX ORDER — ASPIRIN 81 MG/1
81 TABLET ORAL DAILY
COMMUNITY
End: 2021-10-28 | Stop reason: SDUPTHER

## 2021-10-28 NOTE — H&P (VIEW-ONLY)
Vladimir Willard (:  1955) is a 77 y.o. female,New patient, here for evaluation of the following chief complaint(s):  New Patient (Patti Downy in great left toe)            SUBJECTIVE/OBJECTIVE:  Ashely Arroyo has a history of peripheral vascular disease of the lower extremities. She has had this for < 1 year. Current treatment includes none. Ashely Arroyo  Developed severe toe pain about 2 months ago. This has progressed. She has claudication but she also has ataxia so she doesn't walk far and uses a walker. The toe has not opened but is very painful keeping her awake at night. She has had a recent echo at [facility]. She has had a recent ct. No aneurysm seen. Vladimir Willard is a 77 y.o. female with the following history as recorded in [facility]:  Patient Active Problem List    Diagnosis Date Noted    Anemia in chronic kidney disease, on chronic dialysis ([facility] [address] 2021     Current Outpatient Medications   Medication Sig Dispense Refill    docusate (COLACE, DULCOLAX) 100 MG CAPS Take 100 mg by mouth See Admin Instructions      famotidine (PEPCID) 20 MG tablet Take 1 tablet by mouth daily      LORazepam (ATIVAN) 0.5 MG tablet Take 1 tablet by mouth daily as needed.       Sucroferric Oxyhydroxide (VELPHORO) 500 MG CHEW Take 500 mg by mouth 3 times daily (with meals)      vitamin D3 (CHOLECALCIFEROL) 125 MCG (5000 UT) TABS tablet Take 1 tablet by mouth daily      allopurinol (ZYLOPRIM) 100 MG tablet Take 1 tablet by mouth daily      pantoprazole (PROTONIX) 40 MG tablet Take 1 tablet by mouth daily      KLOR-CON 10 10 MEQ extended release tablet Take 1 tablet by mouth daily  3    Calcium Acetate, Phos Binder, 667 MG CAPS Take 1 tablet by mouth daily      citalopram (CELEXA) 40 MG tablet Take 1 tablet by mouth daily      sucralfate (CARAFATE) 1 GM tablet Take 1 tablet by mouth nightly  5    doxazosin (CARDURA) 8 MG tablet Take 1 tablet by mouth 2 times daily  3    Multiple Vitamins-Minerals (THERAPEUTIC MULTIVITAMIN-MINERALS) tablet Take 1 tablet by mouth daily      insulin glargine (LANTUS) 100 UNIT/ML injection vial Inject into the skin nightly      melatonin 3 MG TABS tablet Take 3 mg by mouth daily      gentamicin (GARAMYCIN) 0.1 % ointment Apply topically 3 times daily Apply topically 3 times daily.  nitroGLYCERIN (NITROSTAT) 0.4 MG SL tablet Place 0.4 mg under the tongue every 5 minutes as needed for Chest pain up to max of 3 total doses. If no relief after 1 dose, call 911.  atorvastatin (LIPITOR) 20 MG tablet Take 20 mg by mouth daily      calcium carbonate (OS-ROSSANA) 1250 (500 Ca) MG chewable tablet Take 2 tablets by mouth nightly      aspirin 81 MG EC tablet Take 81 mg by mouth daily       No current facility-administered medications for this visit. Allergies: Antivert [meclizine], Augmentin [amoxicillin-pot clavulanate], Codeine, Demerol hcl [meperidine], Levaquin [levofloxacin in d5w], Lisinopril, Morphine, Sulfa antibiotics, and Ultram [tramadol]  Past Medical History:   Diagnosis Date    CAD (coronary artery disease)     Diabetes mellitus ([facility] [address]     ESRD (end stage renal disease) ([facility] [address]     Hypertension     PONV (postoperative nausea and vomiting)      Past Surgical History:   Procedure Laterality Date    CHOLECYSTECTOMY      CORONARY ARTERY BYPASS GRAFT      4/21    CT INSERT PERITONEAL CATHETER      vandy 2015    EYE SURGERY      GASTRIC BYPASS SURGERY      SINUS SURGERY      KONG AND BSO      TUBAL LIGATION       Family History   Problem Relation Age of Onset    Coronary Art Dis Father     Arrhythmia Sister     Coronary Art Dis Brother      Social History     Tobacco Use    Smoking status: Former Smoker    Smokeless tobacco: Never Used   Substance Use Topics    Alcohol use: Yes     Comment: SOCIAL       ROS  Eyes  no sudden vision change or amaurosis. Respiratory  no significant shortness of breath,  Cardiovascular  no chest pain or syncope.   No  significant leg swelling. No claudication. Musculoskeletal  no gait disturbance  Skin   new wound. Neurologic   No speech difficulty or lateralizing weakness. All other review of systems are negative. Physical Exam    /71 Comment: right  Pulse 76   Temp 97 °F (36.1 °C)   Resp 18       Neck- carotid pulses 2+ to palpation with no bruit  Cardiovascular  Regular rate and rhythm. Pulmonary  effort appears normal.  No respiratory distress. Lungs - Breath sounds normal. No wheezes or rales. Extremities -  Radial and brachial pulses are 2+ to palpation bilaterally. Right femoral pulse: present 2+; Right popliteal pulse: absent Right DP: absent; Right PT absent; Left femoral pulse: absent; Left popliteal pulse: absent; Left DP: absent; Left PT: absent No cyanosis, clubbing, or significant edema. Cyanotic discoloration of left 5th toe, ? Atheroemboli vs ischemic change  Neurologic  alert and oriented X 3. Physiologic. Face symmetric. Skin  warm, dry, and intact. no wound  Psychiatric  mood, affect, and behavior appear normal.  Judgment and thought processes appear normal.    Risk factors for atherosclerosis of all vascular beds have been reviewed with the patient including:  Family history, tobacco abuse in all forms, elevated cholesterol, hyperlipidemia, and diabetes. Lower extremity arterial scan - shows abnormal waveforms both calves with monophasic flow right DP and biphasic left popliteal and posterior tibial arteries  Individual films reviewed: Yes. These results were reviewed with the patient. Disease process is new    Reviewed on this visit: speciality care notes from Dr. Ayden Encinas were discussed with the patient including continued medical management versus proceeding with angiography and potential intervention for ischemic left toe, possible atheroemboli. Patient has opted to proceed with angiography.   Risks have been discussed with the patient including but not limited to

## 2021-10-28 NOTE — LETTER
Sara Almeida    Arteriogram Instructions    1. Report to the [facility] center at [facility] (go in the front door and to the left) on Wed. 11/3/2021 , at 6:30 am.  2. Nothing to eat or drink after midnight the night before the procedure. 3. Please take all medications as normally scheduled to take, including heart and blood pressure medicines with a sip of water. Except Losartan, Bumetanide, and Insulin, do not take these medications the morning of your procedure. 4. Do not take Lasix, insulin, or any diabetic medicine the morning of the procedure. If you take insulin, you may only take 1/2 of any scheduled nightly dose, and none the morning of the procedure. You may take all regularly scheduled heart, cholesterol, and blood pressure medicines with a sip of water. 5. If you take Glucophage, Metformin, Actos Plus Met, or Glucovance,you can not take this the day of your procedure and two days after the procedure. 6. Hold Plavix/Coumadin for 0 days prior to surgery. 7. If you have sleep apnea and require C-PAP, please bring this with you to the hospital.  8. Bring a list of all of your allergies and medications with you to the hospital.  9. Please let our nurse know if you have had an allergy to iodine, shellfish, or x-ray dye. 10. Let the nurse know if you take any of the followin. Over the counter herbal supplements  2. Diclofenec, indomethacin, ketoprofen, Caridopa/levadopa, naproxen, sulindac, piroxicam, glucosamine, Chondrotin, cocchine, or methotrexate. 11. Plan to stay at the hospital for 4 - 6 hours before being released  by the physician. You will need someone to drive you home after the procedure. 12. Medications instructed to hold: See Above   13. Please stop at your local [employer] or pharmacy and buy a bottle of Hibiclens. Wash thoroughly with this the night before and the morning of the procedure, paying special attention to the area that will be operated on.   Make sure you rinse very well. The Hibiclens should only be used prior to surgery. 14. Please register at the [address] [place], [place] [address] [facility] on Mon. 11/2/2021 before 2 pm for labs and covid-19 testing. We ask for you to self-isolate until the morning of your procedure. 15. New policy requires that anyone who comes into the hospital will be required to wear a face mask. A cloth mask is acceptable. 16. To ensure the health and safety of our patients and staff, [facility] has implemented visitor restrictions. Only one person will be allowed to accompany you for your procedure. If you or your visitor are exhibiting signs & symptoms of illness such as fever, cough, sore throat or body aches, we ask that you reschedule your procedure to a later date after your symptoms have been resolved. 17. Other Directions: If you have any questions or concerns please contact our office at [phone] and ask for Kat Casey. Unless instructed otherwise by your physician, cleanse incision/puncture site twice daily with soap and water. Apply dry gauze. Do not get in tub. Okay to shower. Do not apply any salve, cream, peroxide or alcohol to the incision. Call with any increasing redness or drainage. PLEASE NOTE:  If the patient is unable to sign his/her own paperwork, the appointed caregiver (POA, child, sibling, etc) must be present at the time of registration for all testing and procedures. Transportation to and from all testing and procedure appointments is the sole responsibility of the patient, caregiver, and/or nursing facility in which they reside. Please remember you will not be able to drive after you are discharged. Please call the office at [phone] with any questions or concerns. Please allow 48-72 hours notice for cancellations or rescheduling.  We will attempt to accommodate your needs to the best of our capabilities, however, strict policies with procedure room availability does not allow much flexibility.

## 2021-10-28 NOTE — PROGRESS NOTES
Britt Brown (:  1955) is a 77 y.o. female,New patient, here for evaluation of the following chief complaint(s):  New Patient (Claudean Nasreen in great left toe)      SUBJECTIVE/OBJECTIVE:  Sushila Gatica has a history of peripheral vascular disease of the lower extremities. She has had this for < 1 year. Current treatment includes none. Sushila Gatica  Developed severe toe pain about 2 months ago. This has progressed. She has claudication but she also has ataxia so she doesn't walk far and uses a walker. The toe has not opened but is very painful keeping her awake at night. She has had a recent echo at [facility]. She has had a recent ct. No aneurysm seen. She had     Britt Brown is a 77 y.o. female with the following history as recorded in [facility]:  Patient Active Problem List    Diagnosis Date Noted    Anemia in chronic kidney disease, on chronic dialysis [facility] 2021     Current Outpatient Medications   Medication Sig Dispense Refill    docusate (COLACE, DULCOLAX) 100 MG CAPS Take 100 mg by mouth See Admin Instructions      famotidine (PEPCID) 20 MG tablet Take 1 tablet by mouth daily      LORazepam (ATIVAN) 0.5 MG tablet Take 1 tablet by mouth daily as needed.       Sucroferric Oxyhydroxide (VELPHORO) 500 MG CHEW Take 500 mg by mouth 3 times daily (with meals)      vitamin D3 (CHOLECALCIFEROL) 125 MCG (5000 UT) TABS tablet Take 1 tablet by mouth daily      allopurinol (ZYLOPRIM) 100 MG tablet Take 1 tablet by mouth daily      pantoprazole (PROTONIX) 40 MG tablet Take 1 tablet by mouth daily      KLOR-CON 10 10 MEQ extended release tablet Take 1 tablet by mouth daily  3    Calcium Acetate, Phos Binder, 667 MG CAPS Take 1 tablet by mouth daily      citalopram (CELEXA) 40 MG tablet Take 1 tablet by mouth daily      sucralfate (CARAFATE) 1 GM tablet Take 1 tablet by mouth nightly  5    doxazosin (CARDURA) 8 MG tablet Take 1 tablet by mouth 2 times daily  3    Multiple Vitamins-Minerals (THERAPEUTIC MULTIVITAMIN-MINERALS) tablet Take 1 tablet by mouth daily      insulin glargine (LANTUS) 100 UNIT/ML injection vial Inject into the skin nightly      melatonin 3 MG TABS tablet Take 3 mg by mouth daily      gentamicin (GARAMYCIN) 0.1 % ointment Apply topically 3 times daily Apply topically 3 times daily.  nitroGLYCERIN (NITROSTAT) 0.4 MG SL tablet Place 0.4 mg under the tongue every 5 minutes as needed for Chest pain up to max of 3 total doses. If no relief after 1 dose, call 911.  atorvastatin (LIPITOR) 20 MG tablet Take 20 mg by mouth daily      calcium carbonate (OS-ROSSANA) 1250 (500 Ca) MG chewable tablet Take 2 tablets by mouth nightly      aspirin 81 MG EC tablet Take 81 mg by mouth daily       No current facility-administered medications for this visit. Allergies: Antivert [meclizine], Augmentin [amoxicillin-pot clavulanate], Codeine, Demerol hcl [meperidine], Levaquin [levofloxacin in d5w], Lisinopril, Morphine, Sulfa antibiotics, and Ultram [tramadol]  Past Medical History:   Diagnosis Date    CAD (coronary artery disease)     Diabetes mellitus ([facility] [address]     ESRD (end stage renal disease) ([facility] [address]     Hypertension     PONV (postoperative nausea and vomiting)      Past Surgical History:   Procedure Laterality Date    CHOLECYSTECTOMY      CORONARY ARTERY BYPASS GRAFT      4/21    CT INSERT PERITONEAL CATHETER      vandy 2015    EYE SURGERY      GASTRIC BYPASS SURGERY      SINUS SURGERY      KONG AND BSO      TUBAL LIGATION       Family History   Problem Relation Age of Onset    Coronary Art Dis Father     Arrhythmia Sister     Coronary Art Dis Brother      Social History     Tobacco Use    Smoking status: Former Smoker    Smokeless tobacco: Never Used   Substance Use Topics    Alcohol use: Yes     Comment: SOCIAL       ROS  Eyes  no sudden vision change or amaurosis. 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   Respiratory  no significant shortness of breath,  Cardiovascular  no chest pain or syncope. No  significant leg swelling. No claudication. Musculoskeletal  has ataxia  Skin   new wound. Neurologic   No speech difficulty or lateralizing weakness. All other review of systems are negative. Physical Exam    /71 Comment: right  Pulse 76   Temp 97 °F (36.1 °C)   Resp 18       Neck- carotid pulses 2+ to palpation with no bruit  Cardiovascular  Regular rate and rhythm. Pulmonary  effort appears normal.  No respiratory distress. Lungs - Breath sounds normal. No wheezes or rales. Extremities -  Radial and brachial pulses are 2+ to palpation bilaterally. Right femoral pulse: present 2+; Right popliteal pulse: absent Right DP: absent; Right PT absent; Left femoral pulse: absent; Left popliteal pulse: absent; Left DP: absent; Left PT: absent No cyanosis, clubbing, or significant edema. Cyanotic discoloration of left 5th toe, ? Atheroemboli vs ischemic change  Neurologic  alert and oriented X 3. Physiologic. Face symmetric. Skin  warm, dry, and intact. no wound  Psychiatric  mood, affect, and behavior appear normal.  Judgment and thought processes appear normal.    Risk factors for atherosclerosis of all vascular beds have been reviewed with the patient including:  Family history, tobacco abuse in all forms, elevated cholesterol, hyperlipidemia, and diabetes. Lower extremity arterial scan - shows abnormal waveforms both calves with monophasic flow right DP and biphasic left popliteal and posterior tibial arteries  Individual films reviewed: Yes. These results were reviewed with the patient. Disease process is new    Reviewed on this visit: speciality care notes from Dr. Shellie Sheikh were discussed with the patient including continued medical management versus proceeding with angiography and potential intervention for ischemic left toe, possible atheroemboli. Patient has opted to proceed with angiography.   Risks have been discussed with the patient including but not limited to MI, death, CVA, bleed, nerve injury, infection, contrast nephropathy, possible need for dialysis, and need for further surgery. ASSESSMENT/PLAN:  1. Pre-op testing  -     Basic Metabolic Panel; Future  -     CBC; Future  -     COVID-19; Future  2. Atheroscler of native artery of both legs with intermit claudication ([facility] [address]    1. Pre-op testing    2. Atheroscler of native artery of both legs with intermit claudication ([facility] [address]         Discussed management of ascan  which includes:  continue asa and lipitor to reduce risk of arterial thrombosis and to decrease rate of plaque buildup  Strongly encourage start/continue statin therapy -   Recommend no smoking - discussed the effect tobacco has on illness;   Proceed with angiogram with runoff and possible angioplasty/atherectomy/stent          Patient instructed to walk as much as possible. Call our office with any progressive pain in leg(s) or hip(s) with walking. Take good care of your feet. Let us know right away if you develop a wound on your foot. An electronic signature was used to authenticate this note.     --[facility], [place]

## 2021-10-28 NOTE — H&P
Gunner Solis (:  1955) is a 77 y.o. female,New patient, here for evaluation of the following chief complaint(s):  New Patient (Dixon Joao in great left toe)            SUBJECTIVE/OBJECTIVE:  Susanna Grimm has a history of peripheral vascular disease of the lower extremities. She has had this for < 1 year. Current treatment includes none. Susanna Grimm  Developed severe toe pain about 2 months ago. This has progressed. She has claudication but she also has ataxia so she doesn't walk far and uses a walker. The toe has not opened but is very painful keeping her awake at night. She has had a recent echo at [facility]. She has had a recent ct. No aneurysm seen. Gunner Solis is a 77 y.o. female with the [facility]:  Patient Active Problem List    Diagnosis Date Noted    Anemia in chronic kidney disease, on chronic dialysis ([facility] [address] 2021     Current Outpatient Medications   Medication Sig Dispense Refill    docusate (COLACE, DULCOLAX) 100 MG CAPS Take 100 mg by mouth See Admin Instructions      famotidine (PEPCID) 20 MG tablet Take 1 tablet by mouth daily      LORazepam (ATIVAN) 0.5 MG tablet Take 1 tablet by mouth daily as needed.       Sucroferric Oxyhydroxide (VELPHORO) 500 MG CHEW Take 500 mg by mouth 3 times daily (with meals)      vitamin D3 (CHOLECALCIFEROL) 125 MCG (5000 UT) TABS tablet Take 1 tablet by mouth daily      allopurinol (ZYLOPRIM) 100 MG tablet Take 1 tablet by mouth daily      pantoprazole (PROTONIX) 40 MG tablet Take 1 tablet by mouth daily      KLOR-CON 10 10 MEQ extended release tablet Take 1 tablet by mouth daily  3    Calcium Acetate, Phos Binder, 667 MG CAPS Take 1 tablet by mouth daily      citalopram (CELEXA) 40 MG tablet Take 1 tablet by mouth daily      sucralfate (CARAFATE) 1 GM tablet Take 1 tablet by mouth nightly  5    doxazosin (CARDURA) 8 MG tablet Take 1 tablet by mouth 2 times daily  3    Multiple Vitamins-Minerals (THERAPEUTIC MULTIVITAMIN-MINERALS) tablet Take 1 tablet by mouth daily      insulin glargine (LANTUS) 100 UNIT/ML injection vial Inject into the skin nightly      melatonin 3 MG TABS tablet Take 3 mg by mouth daily      gentamicin (GARAMYCIN) 0.1 % ointment Apply topically 3 times daily Apply topically 3 times daily.  nitroGLYCERIN (NITROSTAT) 0.4 MG SL tablet Place 0.4 mg under the tongue every 5 minutes as needed for Chest pain up to max of 3 total doses. If no relief after 1 dose, call 911.  atorvastatin (LIPITOR) 20 MG tablet Take 20 mg by mouth daily      calcium carbonate (OS-ROSSANA) 1250 (500 Ca) MG chewable tablet Take 2 tablets by mouth nightly      aspirin 81 MG EC tablet Take 81 mg by mouth daily       No current facility-administered medications for this visit. Allergies: Antivert [meclizine], Augmentin [amoxicillin-pot clavulanate], Codeine, Demerol hcl [meperidine], Levaquin [levofloxacin in d5w], Lisinopril, Morphine, Sulfa antibiotics, and Ultram [tramadol]  Past Medical History:   Diagnosis Date    CAD (coronary artery disease)     Diabetes mellitus ([facility] [address]     ESRD (end stage renal disease) ([facility] [address]     Hypertension     PONV (postoperative nausea and vomiting)      Past Surgical History:   Procedure Laterality Date    CHOLECYSTECTOMY      CORONARY ARTERY BYPASS GRAFT      4/21    CT INSERT PERITONEAL CATHETER      vandy 2015    EYE SURGERY      GASTRIC BYPASS SURGERY      SINUS SURGERY      KONG AND BSO      TUBAL LIGATION       Family History   Problem Relation Age of Onset    Coronary Art Dis Father     Arrhythmia Sister     Coronary Art Dis Brother      Social History     Tobacco Use    Smoking status: Former Smoker    Smokeless tobacco: Never Used   Substance Use Topics    Alcohol use: Yes     Comment: SOCIAL       ROS  Eyes  no sudden vision change or amaurosis. Respiratory  no significant shortness of breath,  Cardiovascular  no chest pain or syncope.   No  significant leg swelling. No claudication. Musculoskeletal  no gait disturbance  Skin   new wound. Neurologic   No speech difficulty or lateralizing weakness. All other review of systems are negative. Physical Exam    /71 Comment: right  Pulse 76   Temp 97 °F (36.1 °C)   Resp 18       Neck- carotid pulses 2+ to palpation with no bruit  Cardiovascular  Regular rate and rhythm. Pulmonary  effort appears normal.  No respiratory distress. Lungs - Breath sounds normal. No wheezes or rales. Extremities -  Radial and brachial pulses are 2+ to palpation bilaterally. Right femoral pulse: present 2+; Right popliteal pulse: absent Right DP: absent; Right PT absent; Left femoral pulse: absent; Left popliteal pulse: absent; Left DP: absent; Left PT: absent No cyanosis, clubbing, or significant edema. Cyanotic discoloration of left 5th toe, ? Atheroemboli vs ischemic change  Neurologic  alert and oriented X 3. Physiologic. Face symmetric. Skin  warm, dry, and intact. no wound  Psychiatric  mood, affect, and behavior appear normal.  Judgment and thought processes appear normal.    Risk factors for atherosclerosis of all vascular beds have been reviewed with the patient including:  Family history, tobacco abuse in all forms, elevated cholesterol, hyperlipidemia, and diabetes. Lower extremity arterial scan - shows abnormal waveforms both calves with monophasic flow right DP and biphasic left popliteal and posterior tibial arteries  Individual films reviewed: Yes. These results were reviewed with the patient. Disease process is new    Reviewed on this visit: speciality care notes from Dr. Corin Hdez were discussed with the patient including continued medical management versus proceeding with angiography and potential intervention for ischemic left toe, possible atheroemboli. Patient has opted to proceed with angiography.   Risks have been discussed with the patient including but not limited to MI, death, CVA, bleed, nerve injury, infection, contrast nephropathy, possible need for dialysis, and need for further surgery. ASSESSMENT/PLAN:  1. Pre-op testing  -     Basic Metabolic Panel; Future  -     CBC; Future  -     COVID-19; Future  2. Atheroscler of native artery of both legs with intermit claudication ([address]    1. Pre-op testing    2. Atheroscler of native artery of both legs with intermit claudication ([address]         Discussed management of ascan  which includes:  continue asa and lipitor to reduce risk of arterial thrombosis and to decrease rate of plaque buildup  Strongly encourage start/continue statin therapy -   Recommend no smoking - discussed the effect tobacco has on illness;   Proceed with angiogram with runoff and possible angioplasty/atherectomy/stent          Patient instructed to walk as much as possible. Call our office with any progressive pain in leg(s) or hip(s) with walking. Take good care of your feet. Let us know right away if you develop a wound on your foot. An electronic signature was used to authenticate this note.     --MI Bautista

## 2021-10-29 PROBLEM — J90 PLEURAL EFFUSION ON RIGHT: Status: RESOLVED | Noted: 2021-01-01 | Resolved: 2021-01-01

## 2021-10-29 NOTE — PROGRESS NOTES
Reason for Visit: cardiovascular follow up.    HPI:  Jennifer Salcido is a 66 y.o. female is here today for follow-up.  She was last seen in September for worsening dizziness and low blood pressure.  Her metoprolol and irbesartan were stopped and she was started on midodrine.  She is recommended to discuss decreasing fluid removal on dialysis with her nephrologist, Dr. Stallings.  She then presented to the emergency room with a syncopal episode on 10/3/2021.  Episode reportedly occurred while she was having a bowel movement.    Since discharge from the ER, her blood pressure has been much improved. She is still dizzy despite her blood pressure being improved.  She now walks with a walker to help her balance.  She is no longer taking the midodrine.  Meclizine was started for the dizziness, but also did not help.     She has a sore and red toe that is felt to be ischemic.  She is going to be having a lower extremity angiogram next week.  She is also following with a podiatrist.  Her recent mammogram showed a concerning area that is scheduled to be evaluated further with an ultrasound.  She denies any chest pain, palpitation, PND, or orthopnea.      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  - Nuclear stress (12/6/2019) normal myocardial perfusion with no evidence of ischemia, EF 70%  - Echo (5/22/2020) EF 40%, normal RV size with low normal systolic function, small pericardial effusion the posterior lateral LV with no tamponade physiology  - Echo (5/24/2020) compared to echo from 5/20/2020 the  pericardial collection along the posterior lateral LV appears similar, no evidence of hemodynamic compromise  - Echo (2020) EF 51-55%, grade 1 diastolic dysfunction, mild LA enlargement, mild MR, no significant pericardial effusion  - LHC (2020) severe three-vessel CAD, 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, normal left heart filling pressures  - Carotid ultrasound (2020) < 50% bilaterally  - PFT (2020) normal spirometry, normal lung volumes, diffusion capacity  - LHC (3/16/2021) severe three-vessel CAD, 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, normal left heart filling pressures  - Lipid panel (3/23/2021) total cholesterol 141, HDL 43, LDL 70, triglycerides 166  - 3-vessel CABG (2021) LIMA to LAD, SVG to diagonal, SVG to PDA  - Intra-Op MORENA (2021) EF appears to be 56-60%  - proBNP (2021) 7544  - Echo (2021) mild LVH, EF 60-65%, grade 1 diastolic dysfunction, mild LA enlargement, normal RV size and function, no significant valve disease  - proBNP (10/3/2021) 13,390  - BMP (10/3/2021) creatinine 8.6, BUN 46  potassium 3.6, sodium 132  - Chest x-ray (10/3/2021) lungs are clear without infiltrate, normal heart size, no acute cardiopulmonary process    Patient Active Problem List   Diagnosis   • ESRD on dialysis ([facility])   • Coronary artery disease involving native coronary artery of native heart without angina pectoris   • Type 2 diabetes mellitus, without long-term current use of insulin ([facility])   • Chronic anemia   • Persistent dry cough   • Dyslipidemia   • Essential hypertension       Social History     Tobacco Use   • Smoking status: Former Smoker     Packs/day: 0.00     Years: 2.00     Pack years: 0.00     Types: Cigarettes     Quit date:      Years since quittin.8   • Smokeless tobacco: Never Used   Vaping Use   • Vaping Use: Never used   Substance Use Topics   • Alcohol use: Not Currently     Comment: haven't drank in 3 years ( 2018)    •  Drug use: No       Family History   Problem Relation Age of Onset   • Hypertension Mother    • Lung disease Mother    • Heart disease Father    • Diabetes Father    • Abnormal EKG Father    • Breast cancer Neg Hx        The following portions of the patient's history were reviewed and updated as appropriate: allergies, current medications, past family history, past medical history, past social history, past surgical history and problem list.      Current Outpatient Medications:   •  allopurinol (ZYLOPRIM) 100 MG tablet, Take 100 mg by mouth Daily., Disp: , Rfl:   •  ascorbic acid (VITAMIN C) 500 MG tablet, Take 500 mg by mouth Daily., Disp: , Rfl:   •  aspirin 81 MG EC tablet, Take 81 mg by mouth Daily., Disp: , Rfl:   •  atorvastatin (LIPITOR) 20 MG tablet, Take 1 tablet by mouth Every Night., Disp: 90 tablet, Rfl: 4  •  bisacodyl (Dulcolax) 10 MG suppository, Insert 10 mg into the rectum Daily As Needed for Constipation., Disp: , Rfl:   •  calcium carbonate (TUMS) 500 MG chewable tablet, Chew 2 tablets Every Night., Disp: , Rfl:   •  citalopram (CeleXA) 40 MG tablet, Take 40 mg by mouth Daily., Disp: , Rfl:   •  docusate sodium (COLACE) 100 MG capsule, Take 200 mg by mouth As Needed for Constipation., Disp: , Rfl:   •  famotidine (PEPCID) 20 MG tablet, Take 1 tablet by mouth Daily., Disp: 30 tablet, Rfl: 0  •  gentamicin (GARAMYCIN) 0.1 % cream, APPLY TO EXIT SITE AS NEEDED, Disp: , Rfl:   •  insulin detemir (LEVEMIR) 100 UNIT/ML injection, Inject 20 Units under the skin into the appropriate area as directed Every Morning., Disp: , Rfl:   •  LORazepam (ATIVAN) 0.5 MG tablet, Take 0.5 mg by mouth Every 8 (Eight) Hours As Needed for Anxiety., Disp: , Rfl:   •  Melatonin 5 MG capsule, Take 5 mg by mouth Every Night., Disp: , Rfl:   •  Multiple Vitamins-Minerals (MULTIVITAMIN ADULT PO), Take 1 tablet by mouth Daily., Disp: , Rfl:   •  nitroglycerin (NITROSTAT) 0.4 MG SL tablet, 1 under the tongue as needed for angina,  "may repeat q5mins for up three doses (Patient taking differently: Place 0.4 mg under the tongue Every 5 (Five) Minutes As Needed. 1 under the tongue as needed for angina, may repeat q5mins for up three doses), Disp: 25 tablet, Rfl: 3  •  pantoprazole (PROTONIX) 40 MG EC tablet, Take 40 mg by mouth 2 (two) times a day., Disp: , Rfl:   •  sucralfate (CARAFATE) 1 g tablet, Take 1 g by mouth 4 (Four) Times a Day. Dissolve 1 tablet in water to create a slurry and drink QID; AC & HS PRN, Disp: , Rfl:   •  Sucroferric Oxyhydroxide (Velphoro) 500 MG chewable tablet, Chew 2 tablets 3 (Three) Times a Day. WITH MEALS , Disp: , Rfl:   •  vitamin D3 125 MCG (5000 UT) capsule capsule, Take 1 tablet by mouth Daily., Disp: , Rfl:     Review of Systems   Constitutional: Positive for malaise/fatigue. Negative for chills and fever.   Cardiovascular: Positive for syncope. Negative for chest pain and paroxysmal nocturnal dyspnea.   Respiratory: Negative for cough and shortness of breath.    Skin: Positive for itching and rash.   Gastrointestinal: Negative for abdominal pain and heartburn.   Neurological: Positive for dizziness and weakness. Negative for numbness.       Objective   /60 (BP Location: Right arm, Patient Position: Sitting, Cuff Size: Adult)   Pulse 96   Ht 160 cm (62.99\")   Wt 68.9 kg (152 lb)   SpO2 94%   BMI 26.93 kg/m²   Constitutional:       Appearance: Well-developed and overweight.   HENT:      Head: Normocephalic and atraumatic.   Pulmonary:      Effort: Pulmonary effort is normal.      Breath sounds: Normal breath sounds.   Cardiovascular:      Normal rate. Regular rhythm.      Murmurs: There is no murmur.      No gallop. No click.   Edema:     Peripheral edema absent.   Skin:     General: Skin is warm and dry.   Neurological:      Mental Status: Alert and oriented to person, place, and time.       Procedures      ICD-10-CM ICD-9-CM   1. Coronary artery disease involving native coronary artery of native " heart without angina pectoris  I25.10 414.01   2. Essential hypertension  I10 401.9   3. Dyslipidemia  E78.5 272.4   4. ESRD on dialysis (HCC)  N18.6 585.6    Z99.2 V45.11   5. Syncope and collapse  R55 780.2   6. Dizziness  R42 780.4   7. Preoperative evaluation to rule out surgical contraindication  Z01.818 V72.83         Assessment/Plan:  1.  Coronary artery disease: History of PCI to RCA at [facility] in 2016 and 3-vessel CABG on 4/22/2021.   No current or recent chest pain symptoms. Continue aspirin, atorvastatin, metoprolol, and SL nitro.     2.  Dyslipidemia: Good control lipid panel from 3/23/2021. Continue atorvastatin.       3.  Essential hypertension: Blood pressure appears acceptable today with reasonably good control at home. She has not had any further significant low blood pressure episodes. She has now off all antihypertensive medications due to her dizziness. She did not tolerate midodrine.     4.  Chronic kidney disease stage V: Continue peritoneal dialysis with nephrology. Discussed the need to maintain euvolemia in the relatively narrow therapeutic window.     5.  Syncope: Symptoms are most consistent with vasovagal syncope that occurred during movement.    6.  Dizziness: Similar symptoms have been concerning for orthostatic hypotension; however, she now reports dizziness even with normal blood pressure suggesting a noncardiac/nonhemodynamic etiology. She had normal LV systolic function with only grade 1 diastolic dysfunction on echo from 9/20/2021.    7.  Preoperative evaluation: Patient is a low to intermediate risk surgical candidate from a cardiac standpoint. She underwent recent complete surgical revascularization on 4/22/2020 one of her coronary artery disease. She had normal left ventricular systolic function on echo from 9/20/2001. No further cardiac testing is indicated prior to surgery/peripheral angiography.

## 2021-11-01 DIAGNOSIS — Z01.818 PRE-OP TESTING: ICD-10-CM

## 2021-11-01 LAB
ADENOVIRUS BY PCR: NOT DETECTED
ANION GAP SERPL CALCULATED.3IONS-SCNC: 18 MMOL/L (ref 7–19)
BORDETELLA PARAPERTUSSIS BY PCR: NOT DETECTED
BORDETELLA PERTUSSIS BY PCR: NOT DETECTED
BUN BLDV-MCNC: 46 MG/DL (ref 8–23)
CALCIUM SERPL-MCNC: 9.4 MG/DL (ref 8.8–10.2)
CHLAMYDOPHILIA PNEUMONIAE BY PCR: NOT DETECTED
CHLORIDE BLD-SCNC: 94 MMOL/L (ref 98–111)
CO2: 25 MMOL/L (ref 22–29)
CORONAVIRUS 229E BY PCR: NOT DETECTED
CORONAVIRUS HKU1 BY PCR: NOT DETECTED
CORONAVIRUS NL63 BY PCR: NOT DETECTED
CORONAVIRUS OC43 BY PCR: NOT DETECTED
CREAT SERPL-MCNC: 10.6 MG/DL (ref 0.5–0.9)
GFR AFRICAN AMERICAN: 4
GFR NON-AFRICAN AMERICAN: 4
GLUCOSE BLD-MCNC: 149 MG/DL (ref 74–109)
HCT VFR BLD CALC: 33.4 % (ref 37–47)
HEMOGLOBIN: 10.8 G/DL (ref 12–16)
HUMAN METAPNEUMOVIRUS BY PCR: NOT DETECTED
HUMAN RHINOVIRUS/ENTEROVIRUS BY PCR: NOT DETECTED
INFLUENZA A BY PCR: NOT DETECTED
INFLUENZA B BY PCR: NOT DETECTED
MCH RBC QN AUTO: 33.6 PG (ref 27–31)
MCHC RBC AUTO-ENTMCNC: 32.3 G/DL (ref 33–37)
MCV RBC AUTO: 104 FL (ref 81–99)
MYCOPLASMA PNEUMONIAE BY PCR: NOT DETECTED
PARAINFLUENZA VIRUS 1 BY PCR: NOT DETECTED
PARAINFLUENZA VIRUS 2 BY PCR: NOT DETECTED
PARAINFLUENZA VIRUS 3 BY PCR: NOT DETECTED
PARAINFLUENZA VIRUS 4 BY PCR: NOT DETECTED
PDW BLD-RTO: 13.2 % (ref 11.5–14.5)
PLATELET # BLD: 385 K/UL (ref 130–400)
PMV BLD AUTO: 10.7 FL (ref 9.4–12.3)
POTASSIUM SERPL-SCNC: 4.2 MMOL/L (ref 3.5–5)
RBC # BLD: 3.21 M/UL (ref 4.2–5.4)
RESPIRATORY SYNCYTIAL VIRUS BY PCR: NOT DETECTED
SARS-COV-2, PCR: NOT DETECTED
SODIUM BLD-SCNC: 137 MMOL/L (ref 136–145)
WBC # BLD: 11.2 K/UL (ref 4.8–10.8)

## 2021-11-02 ENCOUNTER — TELEPHONE (OUTPATIENT)
Dept: VASCULAR SURGERY | Age: 66
End: 2021-11-02

## 2021-11-02 NOTE — TELEPHONE ENCOUNTER
Patient has been notified to arrive at [facility] on 11/3/21 and verbally understood. For her procedure with Dr. Beatriz Dietz.

## 2021-11-03 ENCOUNTER — HOSPITAL ENCOUNTER (OUTPATIENT)
Dept: INTERVENTIONAL RADIOLOGY/VASCULAR | Age: 66
Discharge: HOME | End: 2021-11-03
Payer: MEDICARE

## 2021-11-03 VITALS
HEART RATE: 78 BPM | DIASTOLIC BLOOD PRESSURE: 82 MMHG | OXYGEN SATURATION: 97 % | HEIGHT: 63 IN | WEIGHT: 150 LBS | RESPIRATION RATE: 15 BRPM | BODY MASS INDEX: 26.58 KG/M2 | SYSTOLIC BLOOD PRESSURE: 98 MMHG | TEMPERATURE: 97.1 F

## 2021-11-03 DIAGNOSIS — I70.213 ATHEROSCLEROSIS OF NATIVE ARTERY OF BOTH LOWER EXTREMITIES WITH INTERMITTENT CLAUDICATION (HCC): ICD-10-CM

## 2021-11-03 PROBLEM — I70.229 ATHEROSCLEROSIS OF ARTERY OF EXTREMITY WITH REST PAIN (HCC): Status: ACTIVE | Noted: 2021-10-28

## 2021-11-03 PROCEDURE — 37229 HC ATHERECTOMY TIB/PER: CPT | Performed by: SURGERY

## 2021-11-03 PROCEDURE — 37229 PR REVSC OPN/PRQ TIB/PERO W/ATHRC/ANGIOP SM VSL: CPT | Performed by: SURGERY

## 2021-11-03 PROCEDURE — 6360000002 HC RX W HCPCS: Performed by: SURGERY

## 2021-11-03 PROCEDURE — 99152 MOD SED SAME PHYS/QHP 5/>YRS: CPT | Performed by: SURGERY

## 2021-11-03 PROCEDURE — 75774 ARTERY X-RAY EACH VESSEL: CPT | Performed by: SURGERY

## 2021-11-03 PROCEDURE — 75625 CONTRAST EXAM ABDOMINL AORTA: CPT | Performed by: SURGERY

## 2021-11-03 PROCEDURE — 99153 MOD SED SAME PHYS/QHP EA: CPT | Performed by: SURGERY

## 2021-11-03 PROCEDURE — 2709999900 IR AORTAGRAM ABDOMINAL SERIALOGRAM

## 2021-11-03 PROCEDURE — 75716 ARTERY X-RAYS ARMS/LEGS: CPT | Performed by: SURGERY

## 2021-11-03 PROCEDURE — 2500000003 HC RX 250 WO HCPCS: Performed by: SURGERY

## 2021-11-03 PROCEDURE — 6360000002 HC RX W HCPCS: Performed by: NURSE PRACTITIONER

## 2021-11-03 PROCEDURE — 2580000003 HC RX 258: Performed by: NURSE PRACTITIONER

## 2021-11-03 PROCEDURE — 6360000004 HC RX CONTRAST MEDICATION: Performed by: SURGERY

## 2021-11-03 RX ORDER — NITROGLYCERIN 20 MG/100ML
INJECTION INTRAVENOUS CONTINUOUS PRN
Status: COMPLETED | OUTPATIENT
Start: 2021-11-03 | End: 2021-11-03

## 2021-11-03 RX ORDER — FENTANYL CITRATE 50 UG/ML
INJECTION, SOLUTION INTRAMUSCULAR; INTRAVENOUS
Status: COMPLETED | OUTPATIENT
Start: 2021-11-03 | End: 2021-11-03

## 2021-11-03 RX ORDER — PROTAMINE SULFATE 10 MG/ML
INJECTION, SOLUTION INTRAVENOUS
Status: COMPLETED | OUTPATIENT
Start: 2021-11-03 | End: 2021-11-03

## 2021-11-03 RX ORDER — ACETAMINOPHEN 325 MG/1
650 TABLET ORAL EVERY 4 HOURS PRN
Status: DISCONTINUED | OUTPATIENT
Start: 2021-11-03 | End: 2021-11-05 | Stop reason: HOSPADM

## 2021-11-03 RX ORDER — GENTAMICIN SULFATE 1 MG/G
CREAM TOPICAL
COMMUNITY
Start: 2021-10-26

## 2021-11-03 RX ORDER — CLONIDINE HYDROCHLORIDE 0.1 MG/1
0.1 TABLET ORAL PRN
Status: DISCONTINUED | OUTPATIENT
Start: 2021-11-03 | End: 2021-11-05 | Stop reason: HOSPADM

## 2021-11-03 RX ORDER — MECLIZINE HYDROCHLORIDE 25 MG/1
TABLET ORAL
COMMUNITY
Start: 2021-10-12 | End: 2021-11-29

## 2021-11-03 RX ORDER — LIDOCAINE HYDROCHLORIDE 20 MG/ML
INJECTION, SOLUTION INFILTRATION; PERINEURAL
Status: COMPLETED | OUTPATIENT
Start: 2021-11-03 | End: 2021-11-03

## 2021-11-03 RX ORDER — SODIUM CHLORIDE 0.9 % (FLUSH) 0.9 %
10 SYRINGE (ML) INJECTION PRN
Status: DISCONTINUED | OUTPATIENT
Start: 2021-11-03 | End: 2021-11-05 | Stop reason: HOSPADM

## 2021-11-03 RX ORDER — SODIUM CHLORIDE 9 MG/ML
INJECTION, SOLUTION INTRAVENOUS CONTINUOUS
Status: DISCONTINUED | OUTPATIENT
Start: 2021-11-03 | End: 2021-11-05 | Stop reason: HOSPADM

## 2021-11-03 RX ORDER — MIDAZOLAM HYDROCHLORIDE 1 MG/ML
INJECTION INTRAMUSCULAR; INTRAVENOUS
Status: COMPLETED | OUTPATIENT
Start: 2021-11-03 | End: 2021-11-03

## 2021-11-03 RX ORDER — SODIUM CHLORIDE 9 MG/ML
25 INJECTION, SOLUTION INTRAVENOUS PRN
Status: DISCONTINUED | OUTPATIENT
Start: 2021-11-03 | End: 2021-11-05 | Stop reason: HOSPADM

## 2021-11-03 RX ORDER — ONDANSETRON 2 MG/ML
4 INJECTION INTRAMUSCULAR; INTRAVENOUS EVERY 8 HOURS PRN
Status: DISCONTINUED | OUTPATIENT
Start: 2021-11-03 | End: 2021-11-05 | Stop reason: HOSPADM

## 2021-11-03 RX ORDER — IODIXANOL 320 MG/ML
INJECTION, SOLUTION INTRAVASCULAR
Status: COMPLETED | OUTPATIENT
Start: 2021-11-03 | End: 2021-11-03

## 2021-11-03 RX ORDER — VERAPAMIL HYDROCHLORIDE 2.5 MG/ML
INJECTION, SOLUTION INTRAVENOUS
Status: COMPLETED | OUTPATIENT
Start: 2021-11-03 | End: 2021-11-03

## 2021-11-03 RX ORDER — INSULIN DETEMIR 100 [IU]/ML
20 INJECTION, SOLUTION SUBCUTANEOUS NIGHTLY
COMMUNITY
Start: 2021-10-07

## 2021-11-03 RX ORDER — BISACODYL 10 MG
10 SUPPOSITORY, RECTAL RECTAL DAILY PRN
COMMUNITY

## 2021-11-03 RX ORDER — POLYETHYLENE GLYCOL 3350 17 G/17G
1 POWDER, FOR SOLUTION ORAL
COMMUNITY
Start: 2020-12-18

## 2021-11-03 RX ORDER — HYDROCODONE BITARTRATE AND ACETAMINOPHEN 5; 325 MG/1; MG/1
2 TABLET ORAL EVERY 4 HOURS PRN
Status: DISCONTINUED | OUTPATIENT
Start: 2021-11-03 | End: 2021-11-05 | Stop reason: HOSPADM

## 2021-11-03 RX ORDER — HYDROCODONE BITARTRATE AND ACETAMINOPHEN 5; 325 MG/1; MG/1
1 TABLET ORAL EVERY 4 HOURS PRN
Status: DISCONTINUED | OUTPATIENT
Start: 2021-11-03 | End: 2021-11-05 | Stop reason: HOSPADM

## 2021-11-03 RX ORDER — HEPARIN SODIUM 5000 [USP'U]/ML
INJECTION, SOLUTION INTRAVENOUS; SUBCUTANEOUS
Status: COMPLETED | OUTPATIENT
Start: 2021-11-03 | End: 2021-11-03

## 2021-11-03 RX ORDER — ASPIRIN 81 MG/1
81 TABLET ORAL ONCE
Status: DISCONTINUED | OUTPATIENT
Start: 2021-11-03 | End: 2021-11-05 | Stop reason: HOSPADM

## 2021-11-03 RX ORDER — SODIUM CHLORIDE 0.9 % (FLUSH) 0.9 %
10 SYRINGE (ML) INJECTION EVERY 12 HOURS SCHEDULED
Status: DISCONTINUED | OUTPATIENT
Start: 2021-11-03 | End: 2021-11-05 | Stop reason: HOSPADM

## 2021-11-03 RX ORDER — ASCORBIC ACID 500 MG
500 TABLET ORAL DAILY
COMMUNITY
Start: 2020-12-08

## 2021-11-03 RX ADMIN — Medication 1000 MG: at 08:21

## 2021-11-03 RX ADMIN — MIDAZOLAM 1 MG: 1 INJECTION INTRAMUSCULAR; INTRAVENOUS at 08:41

## 2021-11-03 RX ADMIN — FENTANYL CITRATE 25 MCG: 50 INJECTION, SOLUTION INTRAMUSCULAR; INTRAVENOUS at 09:45

## 2021-11-03 RX ADMIN — NITROGLYCERIN 5000 MCG: 20 INJECTION INTRAVENOUS at 09:04

## 2021-11-03 RX ADMIN — HEPARIN SODIUM 5000 UNITS: 5000 INJECTION INTRAVENOUS; SUBCUTANEOUS at 08:53

## 2021-11-03 RX ADMIN — FENTANYL CITRATE 25 MCG: 50 INJECTION, SOLUTION INTRAMUSCULAR; INTRAVENOUS at 08:57

## 2021-11-03 RX ADMIN — MIDAZOLAM 0.5 MG: 1 INJECTION INTRAMUSCULAR; INTRAVENOUS at 08:56

## 2021-11-03 RX ADMIN — VERAPAMIL HYDROCHLORIDE 5 MG: 2.5 INJECTION, SOLUTION INTRAVENOUS at 09:05

## 2021-11-03 RX ADMIN — LIDOCAINE HYDROCHLORIDE 10 ML: 20 INJECTION, SOLUTION INFILTRATION; PERINEURAL at 08:43

## 2021-11-03 RX ADMIN — HEPARIN SODIUM 5000 UNITS: 5000 INJECTION INTRAVENOUS; SUBCUTANEOUS at 08:38

## 2021-11-03 RX ADMIN — IODIXANOL 160 ML: 320 INJECTION, SOLUTION INTRAVASCULAR at 10:41

## 2021-11-03 RX ADMIN — MIDAZOLAM 0.5 MG: 1 INJECTION INTRAMUSCULAR; INTRAVENOUS at 09:45

## 2021-11-03 RX ADMIN — PROTAMINE SULFATE 30 MG: 10 INJECTION, SOLUTION INTRAVENOUS at 10:16

## 2021-11-03 RX ADMIN — FENTANYL CITRATE 25 MCG: 50 INJECTION, SOLUTION INTRAMUSCULAR; INTRAVENOUS at 08:41

## 2021-11-03 RX ADMIN — MIDAZOLAM 0.5 MG: 1 INJECTION INTRAMUSCULAR; INTRAVENOUS at 09:10

## 2021-11-03 RX ADMIN — FENTANYL CITRATE 25 MCG: 50 INJECTION, SOLUTION INTRAMUSCULAR; INTRAVENOUS at 09:10

## 2021-11-03 RX ADMIN — SODIUM CHLORIDE: 9 INJECTION, SOLUTION INTRAVENOUS at 07:29

## 2021-11-03 NOTE — BRIEF OP NOTE
Brief Postoperative Note      Patient: Agueda Carter  YOB: 1955  MRN: [id number]    Date of Procedure:     Pre-Op Diagnosis: * No surgery found *    Post-Op Diagnosis: Same           * Surgery not found *    Assistant:  * Surgery not found *    Anesthesia: * No surgery found *    Estimated Blood Loss (mL): Minimal    Complications:  Other: pseudoaneurysm formation left DPA after atherectomy/ba    Specimens:   * Cannot find log *    Implants:  * No surgical log found *      Drains: * No LDAs found *    Findings: Left distal CARIDAD/DPA occlusion, Left PTA diffuse multiple stenoses    Electronically signed by Ce Singh MD on 11/3/2021 at 10:45 AM

## 2021-11-08 NOTE — OP NOTE
Preprocedure diagnosis:  1. Atherosclerosis native arteries bilateral lower extremities with rest pain left great toe  2. Diabetes mellitus type 2  3. End-stage renal disease on peritoneal dialysis  4. Coronary artery disease    Post procedure diagnosis: Same    Procedure:  1. Percutaneous cannulation right common femoral artery with 5 Western Litzy and later 5 Macedonian 70 cm sheath  2. Suprarenal abdominal aortogram with bilateral iliofemoral arteriogram and bilateral lower extremity arteriogram  3. Selective left lower extremity arteriogram  4. Left dorsalis pedis artery atherectomy with CSI 1.25 solid crown  5.  Balloon angioplasty left anterior tibial/dorsalis pedis artery with 2.5 mm x 40 mm coyote balloon, 2.5 mm x 100 mm Vascutrak balloon, 2 mm x 220 mm coyote balloon  6. Completion left lower extremity arteriograms  7. Minx closure right common femoral artery puncture site    Surgeon: Luis Mix MD    Anesthesia: Intravenous/moderate sedation plus local    Estimated blood loss: Less than 50 mL    Findings:  1. The renal arteries are very small bilaterally and there is barely a nephrogram noticed consistent with history of longstanding renal failure. The infrarenal abdominal aorta, bilateral common, internal, and external iliac arteries are all fairly widely patent. 2.  The right common femoral, profunda femoris, superficial femoral arteries are all fairly widely patent. There is a 30% stenosis in the right popliteal artery behind the knee. The anterior tibial artery is patent without any high-grade stenosis. However, the dorsalis pedis artery is not well visualized on this side. The tibial peroneal trunk is patent. The peroneal artery is patent but small. There is a diffusely diseased fairly small right posterior tibial artery. 3.  The left common femoral, profunda femoris, and superficial femoral arteries are all fairly widely patent.   There is a greater than 50% stenosis of the left popliteal artery behind the knee. The left anterior tibial artery is fairly widely patent. However, in the mid foot there is a greater than 90% stenosis of the dorsalis pedis artery. The tibial peroneal trunk is patent. The peroneal artery is small but patent. The posterior double artery is a small vessel and diffusely diseased. The lateral plantar artery is very small but patent. The medial plantar artery is patent. Procedure in detail:    After the patient was consented and given intravenous antibiotics, she was brought to angiography and placed on the table supine position. Intravenous/moderate sedation was administered and both groins were prepped and draped in usual sterile procedure. Skin and subcutaneous tissues in the right groin were anesthetized lidocaine. Micropuncture needle was used to cannulate the right common femoral artery over the femoral head under ultrasound guidance and fluoroscopic visualization. Seldinger technique was utilized place a 5 Western Litzy glide sheath. Over an 035 wire, an Omni Flush catheter was placed up into the suprarenal abdominal artery. Suprarenal abdominal aortogram with bilateral iliofemoral arteriograms were performed with the above findings. The catheter was withdrawn to the distal abdominal aorta and distal abdominal aortogram with bilateral lower extremity arteriograms were performed with the above findings. Patient was given intravenous heparin and then utilizing the Omni Flush catheter and an angled Glidewire, I passed the Glidewire down into the left popliteal artery. I then exchanged for a 5 Cymraes 70 cm sheath. The tip of this is placed in the left above-knee popliteal artery. Selective left lower extremity arteriograms were performed with the above findings.   Because her posterior tibial artery is so small and diffusely diseased, I felt that she may benefit more from my dorsalis pedis artery intervention then posterior double artery intervention. I was able to traverse the near complete occlusion of the dorsalis pedis artery and then exchanged for the CSI Viper wire. Orbital atherectomy was performed of the distal anterior tibial and dorsalis pedis artery with a CSI 1.25 solid crown. I performed runs on slow and medium speed. Following atherectomy, arteriograms were performed. There is still some stenosis. Therefore, low pressure balloon angioplasty was performed with a 2.5 mm x 40 mm coyote balloon. This balloon was left inflated for 3 minutes. Arteriograms show significant residual stenosis versus dissection. Therefore, 2.5 mm vascutrak balloon angioplasty was performed. This balloon was left inflated for 3 minutes as well. Arteriograms after this show extravasation of dye so low pressure two 2.5 mm x 40 mm coyote balloon was placed for 5 minutes and her heparin was reversed as well. This balloon was left inflated for 5 minutes. Arteriograms again show some extravasation but it appears to be contained and there is no hematoma in the foot. I did perform a 2 mm x 220 mm coyote low pressure balloon inflation and left this inflated for another 5 minutes. Following this, there is still what appears to be a pseudoaneurysm but no hematoma of the foot. The artery is now fairly widely patent. I felt it best that we should terminate the procedure at this point. The right comfortable our sheath was removed and the puncture site closed with a minx device. The patient tolerated the procedure well. She is brought back to cath holding in good condition. The plan will be to bring her back to the office to see how she is doing in the next week to 2 weeks. If her toe symptoms have improved, I would not proceed with any additional intervention. If her toe pain is no better, I may consider bring her back for another arteriogram and possible intervention for the popliteal artery stenosis in the posterior tibial artery stenoses.

## 2021-11-15 NOTE — PROGRESS NOTES
Subjective   Chief Complaint   Patient presents with   • Follow-up     Pt is here for follow up on sternal healing        Patient ID: Jennifer Salcido is a 66 y.o. female who is here for follow-up having had Coronary artery bypass grafting-3 vessel (left internal mammary artery/left anterior descending, reverse saphenous vein graft/diagonal artery second, and reverse saphenous vein graft/posterior descending artery), right endoscopic vein harvest performed on 4/22/2021 by Dr. Wood.      History of Present Illness  Post operative recovery was uneventful without any major complications as an inpatient.    She did require readmission for volume status but also was found to have a right pleural effusion and this was treated with right chest tube placement.  Her volume status is much improved.  She was seen as an outpatient with confirmation that the effusion had been resolved.  She does have some ongoing chest discomfort having previous reported sternal click.  Given her diabetes, obesity, and renal failure she was advised to maintain strict 5 pound weight restriction for sternal precautions.  She returns today for interval follow-up.  Her glycemic control has been good.  Unfortunately she does continue to have some chest pain reports this is getting better.      The following portions of the patient's history were reviewed and updated as appropriate: allergies, current medications, past family history, past medical history, past social history, past surgical history and problem list.    Review of Systems   Constitutional: Positive for activity change (improving ). Negative for appetite change, chills, diaphoresis, fever and unexpected weight change. Fatigue: improving    HENT: Negative for dental problem, hearing loss, nosebleeds, sore throat, trouble swallowing and voice change.    Eyes: Negative for photophobia, redness and visual disturbance.   Respiratory: Positive for shortness of breath (with exertion). Negative  "for apnea, cough, chest tightness, wheezing and stridor.    Cardiovascular: Positive for chest pain (incisional-improving ). Negative for palpitations and leg swelling.   Gastrointestinal: Negative for abdominal distention, abdominal pain, blood in stool, constipation, diarrhea, nausea and vomiting.   Endocrine: Negative for cold intolerance, heat intolerance, polyphagia and polyuria.   Genitourinary: Negative for decreased urine volume, difficulty urinating, dysuria, flank pain, frequency, hematuria and urgency.   Musculoskeletal: Positive for arthralgias. Negative for back pain, gait problem, joint swelling, myalgias and neck pain.   Skin: Positive for color change (bruising). Negative for pallor, rash and wound.   Allergic/Immunologic: Negative for immunocompromised state.   Neurological: Positive for weakness. Negative for dizziness, tremors, seizures, syncope, speech difficulty, light-headedness, numbness and headaches.   Hematological: Does not bruise/bleed easily.   Psychiatric/Behavioral: Negative for confusion, sleep disturbance and suicidal ideas. The patient is not nervous/anxious.        Objective   Visit Vitals  /70 (BP Location: Left arm, Patient Position: Sitting, Cuff Size: Adult)   Pulse 87   Ht 160 cm (63\")   Wt 70 kg (154 lb 6.4 oz)   SpO2 97%   BMI 27.35 kg/m²     Physical Exam:    General: No acute distress, in good spirits  Cardiovascular: RRR, no murmur, rubs, or gallops.    Pulmonary: Clear to auscultation bilaterally, no wheezing, rubs, or rales.  Chest: Sternum is unstable to my exam.  There is a click.  Incisions well-healed.  Abdomen: Soft, non distended, and non tender.  Extremities: Warm, moves all extremities.  Neurologic:  No focal deficits, CN II-XII intact grossly.            Assessment/Plan       Diagnoses and all orders for this visit:    1. Coronary artery disease involving native coronary artery of native heart without angina pectoris (Primary)    2. Pleural effusion    3. " ESRD on dialysis (HCC)    4. Type 2 diabetes mellitus with chronic kidney disease on chronic dialysis, without long-term current use of insulin (HCC)         Overall, Jennifer Salcido is doing well from a cardiac surgery standpoint.  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  We discussed potentially a CT scan to evaluate this further.  We discussed options for treatment including surveillance/expectant management versus consideration of surgery.  She favors expectant management at this time.  Discussed she can be reevaluated any given time if she would so choose.    At this time I told her she should proceed with her activities as tolerated with no further weight restriction.    She has seen Dr. Coker.      Patient's (Body mass index is 27.35 kg/m².) indicates that they are obese (BMI >30) with obesity-related health conditions that include hypertension, coronary heart disease, diabetes mellitus, dyslipidemias and GERD . Obesity is improving with lifestyle modifications. BMI is is above average; BMI management plan is completed.     Jennifer Salcido  reports that she quit smoking about 13 years ago. Her smoking use included cigarettes. She smoked 0.00 packs per day for 2.00 years. She has never used smokeless tobacco. She has been congratulated on smoking cessation.        Advance Care Planning   ACP discussion was declined by the patient. Patient does not have an advance directive, declines further assistance.      Although I have not given her a specific return to clinic appointment, should I be of any further assistant future, please do not hesitate to contact me.

## 2021-11-18 ENCOUNTER — OFFICE VISIT (OUTPATIENT)
Dept: VASCULAR SURGERY | Age: 66
End: 2021-11-18
Payer: MEDICARE

## 2021-11-18 VITALS
HEIGHT: 63 IN | RESPIRATION RATE: 20 BRPM | OXYGEN SATURATION: 98 % | WEIGHT: 150 LBS | SYSTOLIC BLOOD PRESSURE: 121 MMHG | DIASTOLIC BLOOD PRESSURE: 72 MMHG | BODY MASS INDEX: 26.58 KG/M2 | TEMPERATURE: 96.1 F | HEART RATE: 93 BPM

## 2021-11-18 DIAGNOSIS — I70.213 ATHEROSCLEROSIS OF NATIVE ARTERY OF BOTH LOWER EXTREMITIES WITH INTERMITTENT CLAUDICATION (HCC): ICD-10-CM

## 2021-11-18 DIAGNOSIS — Z01.818 PRE-OP TESTING: Primary | ICD-10-CM

## 2021-11-18 PROCEDURE — G8484 FLU IMMUNIZE NO ADMIN: HCPCS | Performed by: NURSE PRACTITIONER

## 2021-11-18 PROCEDURE — G8400 PT W/DXA NO RESULTS DOC: HCPCS | Performed by: NURSE PRACTITIONER

## 2021-11-18 PROCEDURE — G8417 CALC BMI ABV UP PARAM F/U: HCPCS | Performed by: NURSE PRACTITIONER

## 2021-11-18 PROCEDURE — G8427 DOCREV CUR MEDS BY ELIG CLIN: HCPCS | Performed by: NURSE PRACTITIONER

## 2021-11-18 PROCEDURE — 1090F PRES/ABSN URINE INCON ASSESS: CPT | Performed by: NURSE PRACTITIONER

## 2021-11-18 PROCEDURE — 99214 OFFICE O/P EST MOD 30 MIN: CPT | Performed by: NURSE PRACTITIONER

## 2021-11-18 PROCEDURE — 3017F COLORECTAL CA SCREEN DOC REV: CPT | Performed by: NURSE PRACTITIONER

## 2021-11-18 PROCEDURE — 1036F TOBACCO NON-USER: CPT | Performed by: NURSE PRACTITIONER

## 2021-11-18 PROCEDURE — 4040F PNEUMOC VAC/ADMIN/RCVD: CPT | Performed by: NURSE PRACTITIONER

## 2021-11-18 PROCEDURE — 1123F ACP DISCUSS/DSCN MKR DOCD: CPT | Performed by: NURSE PRACTITIONER

## 2021-11-18 RX ORDER — DOXYCYCLINE HYCLATE 100 MG
100 TABLET ORAL 2 TIMES DAILY
Qty: 14 TABLET | Refills: 0 | Status: SHIPPED | OUTPATIENT
Start: 2021-11-18

## 2021-11-18 NOTE — PROGRESS NOTES
Klaudia Worthington (:  1955) is a 77 y.o. female,Established patient, here for evaluation of the following chief complaint(s):  Follow-up (sjs to discuss options)            SUBJECTIVE/OBJECTIVE:  Hilary Kaur has a history of peripheral vascular disease of the lower extremities. She had angio 11/3 with 1.  Percutaneous cannulation right common femoral artery with 5 Western Litzy and later 5 French 70 cm sheath  2. Suprarenal abdominal aortogram with bilateral iliofemoral arteriogram and bilateral lower extremity arteriogram  3. Selective left lower extremity arteriogram  4. Left dorsalis pedis artery atherectomy with CSI 1.25 solid crown  5.  Balloon angioplasty left anterior tibial/dorsalis pedis artery with 2.5 mm x 40 mm coyote balloon, 2.5 mm x 100 mm Vascutrak balloon, 2 mm x 220 mm coyote balloon  6. Completion left lower extremity arteriograms  7. Minx closure right common femoral artery puncture site. She comes in today still with continuing pain to left great toe. She has several dry ulcerations of the foot. She also has some scant clear drainage from the entry site. Klaudia Worthington is a 77 y.o. female with the following history as recorded in [facility]:  Patient Active Problem List    Diagnosis Date Noted    Atherosclerosis of native artery of both lower extremities with intermittent claudication (HCC)     Atherosclerosis of artery of extremity with rest pain ([facility] [address] 10/28/2021    Anemia in chronic kidney disease, on chronic dialysis ([facility] [address] 2021     Current Outpatient Medications   Medication Sig Dispense Refill    doxycycline hyclate (VIBRA-TABS) 100 MG tablet Take 1 tablet by mouth 2 times daily 14 tablet 0    mupirocin (BACTROBAN NASAL) 2 % nasal ointment Take by Nasal route 2 times daily x 5 days starting on Wed.  2021 1 g 0    Melatonin 5 MG CAPS Take 5 mg by mouth nightly      gentamicin (GARAMYCIN) 0.1 % cream APPLY TO EXIT SITE AS NEEDED      polyethylene glycol (MIRALAX) 17 GM/SCOOP powder Take 1 packet by mouth      Methoxy PEG-Epoetin Beta (MIRCERA IJ) Inject 75 mcg into the skin      meclizine (ANTIVERT) 25 MG tablet       LEVEMIR 100 UNIT/ML injection vial Inject 20 Units into the skin nightly       bisacodyl (DULCOLAX) 10 MG suppository Place 10 mg rectally daily as needed      ascorbic acid (VITAMIN C) 500 MG tablet Take 500 mg by mouth daily      atorvastatin (LIPITOR) 20 MG tablet Take 20 mg by mouth daily      calcium carbonate (OS-ROSSANA) 1250 (500 Ca) MG chewable tablet Take 2 tablets by mouth nightly      docusate (COLACE, DULCOLAX) 100 MG CAPS Take 100 mg by mouth See Admin Instructions      famotidine (PEPCID) 20 MG tablet Take 1 tablet by mouth daily      LORazepam (ATIVAN) 0.5 MG tablet Take 1 tablet by mouth daily as needed.  Sucroferric Oxyhydroxide (VELPHORO) 500 MG CHEW Take 500 mg by mouth 3 times daily (with meals)      aspirin 81 MG EC tablet Take 81 mg by mouth daily      vitamin D3 (CHOLECALCIFEROL) 125 MCG (5000 UT) TABS tablet Take 1 tablet by mouth daily      allopurinol (ZYLOPRIM) 100 MG tablet Take 1 tablet by mouth daily      pantoprazole (PROTONIX) 40 MG tablet Take 1 tablet by mouth daily      KLOR-CON 10 10 MEQ extended release tablet Take 1 tablet by mouth daily  3    Calcium Acetate, Phos Binder, 667 MG CAPS Take 1 tablet by mouth daily      citalopram (CELEXA) 40 MG tablet Take 1 tablet by mouth daily      sucralfate (CARAFATE) 1 GM tablet Take 1 tablet by mouth nightly  5    Multiple Vitamins-Minerals (THERAPEUTIC MULTIVITAMIN-MINERALS) tablet Take 1 tablet by mouth daily      nitroGLYCERIN (NITROSTAT) 0.4 MG SL tablet Place 0.4 mg under the tongue every 5 minutes as needed for Chest pain up to max of 3 total doses. If no relief after 1 dose, call 911. No current facility-administered medications for this visit.      Allergies: Tramadol, Sulfasalazine, Codeine, Demerol hcl [meperidine], Lisinopril, Morphine, Sulfa antibiotics, and Pentoxifylline  Past Medical History:   Diagnosis Date    Arthritis     CAD (coronary artery disease)     Diabetes mellitus ([facility] [address]     ESRD (end stage renal disease) ([facility] [address]     Hemodialysis patient ([facility] [address]     History of blood transfusion     Hyperlipidemia     Hypertension     PONV (postoperative nausea and vomiting)      Past Surgical History:   Procedure Laterality Date    CHOLECYSTECTOMY      CORONARY ARTERY BYPASS GRAFT      4/21    CT INSERT PERITONEAL CATHETER      tommy 2015    EYE SURGERY      GASTRIC BYPASS SURGERY      SINUS SURGERY      KONG AND BSO      TUBAL LIGATION       Family History   Problem Relation Age of Onset    Coronary Art Dis Father     Arrhythmia Sister     Coronary Art Dis Brother      Social History     Tobacco Use    Smoking status: Former Smoker    Smokeless tobacco: Never Used   Substance Use Topics    Alcohol use: Yes     Comment: SOCIAL       ROS  Eyes  no sudden vision change or amaurosis. Respiratory  no significant shortness of breath,  Cardiovascular  no chest pain or syncope. No  significant leg swelling. no claudication. Musculoskeletal  no gait disturbance  Skin  has wound. Neurologic   No speech difficulty or lateralizing weakness. All other review of systems are negative. Physical Exam    /72 (Site: Right Upper Arm)   Pulse 93   Temp 96.1 °F (35.6 °C)   Resp 20   Ht 5' 3\" (1.6 m)   Wt 150 lb (68 kg)   SpO2 98%   BMI 26.57 kg/m²       Neck- carotid pulses 2+ to palpation with no bruit  Cardiovascular  Regular rate and rhythm. Pulmonary  effort appears normal.  No respiratory distress. Lungs - Breath sounds normal. No wheezes or rales. Extremities -  - Radial and brachial pulses are 2+ to palpation bilaterally. Right femoral pulse: present 2+; Right popliteal pulse: absent Right DP: absent; Right PT absent; Left femoral pulse: present 2+; Left popliteal pulse: absent; Left DP: absent;  Left PT: absent No cyanosis, clubbing, or significant edema. No signs atheroembolic event. Neurologic  alert and oriented X 3. Physiologic. Face symmetric. Skin  warm, dry, and intact. Ischemic left first toe. Scant clear drainage without redness at entry site. Psychiatric  mood, affect, and behavior appear normal.  Judgment and thought processes appear normal.    Risk factors for atherosclerosis of all vascular beds have been reviewed with the patient including:  Family history, tobacco abuse in all forms, elevated cholesterol, hyperlipidemia, and diabetes. Reviewed on this visit: op notes    Reviewed previous studies including: edd  Individual images were reviewed. I agree with the findings  Results were discussed with the patient. Options have been discussed with the patient including continued medical management vs. proceeding with intraoperative angiogram with runoff and possible angioplasty/atherectomy/stent  . Patient has opted to proceed with this. Risks have been discussed with the patient including but not limited to MI, death, CVA, bleed, nerve injury, and infection. ASSESSMENT/PLAN:  1. Pre-op testing  -     APTT; Future  -     Basic Metabolic Panel; Future  -     CBC; Future  -     EKG 12 Lead; Future  -     Protime-INR; Future  -     XR CHEST (2 VW); Future  -     MRSA DNA Probe, Nasal; Future  -     mupirocin (BACTROBAN NASAL) 2 % nasal ointment; Take by Nasal route 2 times daily x 5 days starting on Wed. 11/24/2021, Disp-1 g, R-0, Normal  2. Atherosclerosis of native artery of both lower extremities with intermittent claudication ([address]    1. Pre-op testing    2.  Atherosclerosis of native artery of both lower extremities with intermittent claudication ([address]     chronic illness with exacerbation or progression    Discussed management of edd which includes:  continue asa to maintain patency of stents, to reduce risk of arterial thrombosis and to decrease rate of plaque buildup  Strongly encourage start/continue statin therapy -   Recommend no smoking - discussed the effect tobacco has on illness;   Proceed with intraoperative angiogram with runoff and possible angioplasty/atherectomy/stent    Doxycycline for 1 week due to scant drainage from entry site        Patient instructed to walk as much as possible. Call our office with any progressive pain in leg(s) or hip(s) with walking. Take good care of your feet. Let us know right away if you develop a wound on your foot. An electronic signature was used to authenticate this note.     --Janessa Jordan, APRN

## 2021-11-19 RX ORDER — SODIUM CHLORIDE 0.9 % (FLUSH) 0.9 %
10 SYRINGE (ML) INJECTION EVERY 12 HOURS SCHEDULED
Status: CANCELLED | OUTPATIENT
Start: 2021-11-19

## 2021-11-19 RX ORDER — SODIUM CHLORIDE 0.9 % (FLUSH) 0.9 %
10 SYRINGE (ML) INJECTION PRN
Status: CANCELLED | OUTPATIENT
Start: 2021-11-19

## 2021-11-19 RX ORDER — ASPIRIN 81 MG/1
81 TABLET ORAL ONCE
Status: CANCELLED | OUTPATIENT
Start: 2021-11-19 | End: 2021-11-19

## 2021-11-19 RX ORDER — SODIUM CHLORIDE 9 MG/ML
25 INJECTION, SOLUTION INTRAVENOUS PRN
Status: CANCELLED | OUTPATIENT
Start: 2021-11-19

## 2021-11-19 NOTE — H&P (VIEW-ONLY)
Donald Arango (:  1955) is a 77 y.o. female,Established patient, here for evaluation of the following chief complaint(s):  Follow-up (sjs to discuss options)            SUBJECTIVE/OBJECTIVE:  Mikey has a history of peripheral vascular disease of the lower extremities. She had angio 11/3 with 1.  Percutaneous cannulation right common femoral artery with 5 Western Litzy and later 5 French 70 cm sheath  2. Suprarenal abdominal aortogram with bilateral iliofemoral arteriogram and bilateral lower extremity arteriogram  3. Selective left lower extremity arteriogram  4. Left dorsalis pedis artery atherectomy with CSI 1.25 solid crown  5.  Balloon angioplasty left anterior tibial/dorsalis pedis artery with 2.5 mm x 40 mm coyote balloon, 2.5 mm x 100 mm Vascutrak balloon, 2 mm x 220 mm coyote balloon  6. Completion left lower extremity arteriograms  7. Minx closure right common femoral artery puncture site. She comes in today still with continuing pain to left great toe. She has several dry ulcerations of the foot. She also has some scant clear drainage from the entry site. Donald Arango is a 77 y.o. female with the following history as [facility] in [facility]:  Patient Active Problem List    Diagnosis Date Noted    Atherosclerosis of native artery of both lower extremities with intermittent claudication (HCC)     Atherosclerosis of artery of extremity with rest pain ([facility] [address] 10/28/2021    Anemia in chronic kidney disease, on chronic dialysis ([facility] [address] 2021     Current Outpatient Medications   Medication Sig Dispense Refill    doxycycline hyclate (VIBRA-TABS) 100 MG tablet Take 1 tablet by mouth 2 times daily 14 tablet 0    mupirocin (BACTROBAN NASAL) 2 % nasal ointment Take by Nasal route 2 times daily x 5 days starting on Wed.  2021 1 g 0    Melatonin 5 MG CAPS Take 5 mg by mouth nightly      gentamicin (GARAMYCIN) 0.1 % cream APPLY TO EXIT SITE AS NEEDED      polyethylene glycol (MIRALAX) 17 GM/SCOOP powder Take 1 packet by mouth      Methoxy PEG-Epoetin Beta (MIRCERA IJ) Inject 75 mcg into the skin      meclizine (ANTIVERT) 25 MG tablet       LEVEMIR 100 UNIT/ML injection vial Inject 20 Units into the skin nightly       bisacodyl (DULCOLAX) 10 MG suppository Place 10 mg rectally daily as needed      ascorbic acid (VITAMIN C) 500 MG tablet Take 500 mg by mouth daily      atorvastatin (LIPITOR) 20 MG tablet Take 20 mg by mouth daily      calcium carbonate (OS-ROSSANA) 1250 (500 Ca) MG chewable tablet Take 2 tablets by mouth nightly      docusate (COLACE, DULCOLAX) 100 MG CAPS Take 100 mg by mouth See Admin Instructions      famotidine (PEPCID) 20 MG tablet Take 1 tablet by mouth daily      LORazepam (ATIVAN) 0.5 MG tablet Take 1 tablet by mouth daily as needed.  Sucroferric Oxyhydroxide (VELPHORO) 500 MG CHEW Take 500 mg by mouth 3 times daily (with meals)      aspirin 81 MG EC tablet Take 81 mg by mouth daily      vitamin D3 (CHOLECALCIFEROL) 125 MCG (5000 UT) TABS tablet Take 1 tablet by mouth daily      allopurinol (ZYLOPRIM) 100 MG tablet Take 1 tablet by mouth daily      pantoprazole (PROTONIX) 40 MG tablet Take 1 tablet by mouth daily      KLOR-CON 10 10 MEQ extended release tablet Take 1 tablet by mouth daily  3    Calcium Acetate, Phos Binder, 667 MG CAPS Take 1 tablet by mouth daily      citalopram (CELEXA) 40 MG tablet Take 1 tablet by mouth daily      sucralfate (CARAFATE) 1 GM tablet Take 1 tablet by mouth nightly  5    Multiple Vitamins-Minerals (THERAPEUTIC MULTIVITAMIN-MINERALS) tablet Take 1 tablet by mouth daily      nitroGLYCERIN (NITROSTAT) 0.4 MG SL tablet Place 0.4 mg under the tongue every 5 minutes as needed for Chest pain up to max of 3 total doses. If no relief after 1 dose, call 911. No current facility-administered medications for this visit.      Allergies: Tramadol, Sulfasalazine, Codeine, Demerol hcl [meperidine], Lisinopril, Morphine, Sulfa antibiotics, and Pentoxifylline  Past Medical History:   Diagnosis Date    Arthritis     CAD (coronary artery disease)     Diabetes mellitus ([facility] [address]     ESRD (end stage renal disease) ([facility] [address]     Hemodialysis patient ([facility] [address]     History of blood transfusion     Hyperlipidemia     Hypertension     PONV (postoperative nausea and vomiting)      Past Surgical History:   Procedure Laterality Date    CHOLECYSTECTOMY      CORONARY ARTERY BYPASS GRAFT      4/21    CT INSERT PERITONEAL CATHETER      tommy 2015    EYE SURGERY      GASTRIC BYPASS SURGERY      SINUS SURGERY      KONG AND BSO      TUBAL LIGATION       Family History   Problem Relation Age of Onset    Coronary Art Dis Father     Arrhythmia Sister     Coronary Art Dis Brother      Social History     Tobacco Use    Smoking status: Former Smoker    Smokeless tobacco: Never Used   Substance Use Topics    Alcohol use: Yes     Comment: SOCIAL       ROS  Eyes  no sudden vision change or amaurosis. Respiratory  no significant shortness of breath,  Cardiovascular  no chest pain or syncope. No  significant leg swelling. no claudication. Musculoskeletal  no gait disturbance  Skin  has wound. Neurologic   No speech difficulty or lateralizing weakness. All other review of systems are negative. Physical Exam    /72 (Site: Right Upper Arm)   Pulse 93   Temp 96.1 °F (35.6 °C)   Resp 20   Ht 5' 3\" (1.6 m)   Wt 150 lb (68 kg)   SpO2 98%   BMI 26.57 kg/m²       Neck- carotid pulses 2+ to palpation with no bruit  Cardiovascular  Regular rate and rhythm. Pulmonary  effort appears normal.  No respiratory distress. Lungs - Breath sounds normal. No wheezes or rales. Extremities -  - Radial and brachial pulses are 2+ to palpation bilaterally. Right femoral pulse: present 2+; Right popliteal pulse: absent Right DP: absent; Right PT absent; Left femoral pulse: present 2+; Left popliteal pulse: absent; Left DP: absent;  Left PT: absent encourage start/continue statin therapy -   Recommend no smoking - discussed the effect tobacco has on illness;   Proceed with intraoperative angiogram left lower extremity with runoff and possible angioplasty/atherectomy/stent

## 2021-11-19 NOTE — H&P
Shay Patel (:  1955) is a 77 y.o. female,Established patient, here for evaluation of the following chief complaint(s):  Follow-up (sjs to discuss options)            SUBJECTIVE/OBJECTIVE:  Don rBidges has a history of peripheral vascular disease of the lower extremities. She had angio 11/3 with 1.  Percutaneous cannulation right common femoral artery with 5 Western Litzy and later 5 French 70 cm sheath  2. Suprarenal abdominal aortogram with bilateral iliofemoral arteriogram and bilateral lower extremity arteriogram  3. Selective left lower extremity arteriogram  4. Left dorsalis pedis artery atherectomy with CSI 1.25 solid crown  5.  Balloon angioplasty left anterior tibial/dorsalis pedis artery with 2.5 mm x 40 mm coyote balloon, 2.5 mm x 100 mm Vascutrak balloon, 2 mm x 220 mm coyote balloon  6. Completion left lower extremity arteriograms  7. Minx closure right common femoral artery puncture site. She comes in today still with continuing pain to left great toe. She has several dry ulcerations of the foot. She also has some scant clear drainage from the entry site. Shay Patel is a 77 y.o. female with the following history as recorded in [facility]:  Patient Active Problem List    Diagnosis Date Noted    Atherosclerosis of native artery of both lower extremities with intermittent claudication (HCC)     Atherosclerosis of artery of extremity with rest pain ([facility] [address] 10/28/2021    Anemia in chronic kidney disease, on chronic dialysis ([facility] [address] 2021     Current Outpatient Medications   Medication Sig Dispense Refill    doxycycline hyclate (VIBRA-TABS) 100 MG tablet Take 1 tablet by mouth 2 times daily 14 tablet 0    mupirocin (BACTROBAN NASAL) 2 % nasal ointment Take by Nasal route 2 times daily x 5 days starting on Wed.  2021 1 g 0    Melatonin 5 MG CAPS Take 5 mg by mouth nightly      gentamicin (GARAMYCIN) 0.1 % cream APPLY TO EXIT SITE AS NEEDED      polyethylene glycol (MIRALAX) 17 antibiotics, and Pentoxifylline  Past Medical History:   Diagnosis Date    Arthritis     CAD (coronary artery disease)     [facility] [address]     ESRD (end stage renal disease) ([facility] [address]     Hemodialysis patient ([facility] [address]     History of blood transfusion     Hyperlipidemia     Hypertension     PONV (postoperative nausea and vomiting)      Past Surgical History:   Procedure Laterality Date    CHOLECYSTECTOMY      CORONARY ARTERY BYPASS GRAFT      4/21    CT INSERT PERITONEAL CATHETER      tommy 2015    EYE SURGERY      GASTRIC BYPASS SURGERY      SINUS SURGERY      KONG AND BSO      TUBAL LIGATION       Family History   Problem Relation Age of Onset    Coronary Art Dis Father     Arrhythmia Sister     Coronary Art Dis Brother      Social History     Tobacco Use    Smoking status: Former Smoker    Smokeless tobacco: Never Used   Substance Use Topics    Alcohol use: Yes     Comment: SOCIAL       ROS  Eyes  no sudden vision change or amaurosis. Respiratory  no significant shortness of breath,  Cardiovascular  no chest pain or syncope. No  significant leg swelling. no claudication. Musculoskeletal  no gait disturbance  Skin  has wound. Neurologic   No speech difficulty or lateralizing weakness. All other review of systems are negative. Physical Exam    /72 (Site: Right Upper Arm)   Pulse 93   Temp 96.1 °F (35.6 °C)   Resp 20   Ht 5' 3\" (1.6 m)   Wt 150 lb (68 kg)   SpO2 98%   BMI 26.57 kg/m²       Neck- carotid pulses 2+ to palpation with no bruit  Cardiovascular  Regular rate and rhythm. Pulmonary  effort appears normal.  No respiratory distress. Lungs - Breath sounds normal. No wheezes or rales. Extremities -  - Radial and brachial pulses are 2+ to palpation bilaterally. Right femoral pulse: present 2+; Right popliteal pulse: absent Right DP: absent; Right PT absent; Left femoral pulse: present 2+; Left popliteal pulse: absent; Left DP: absent;  Left PT: absent No cyanosis, clubbing, or significant edema. No signs atheroembolic event. Neurologic  alert and oriented X 3. Physiologic. Face symmetric. Skin  warm, dry, and intact. Ischemic left first toe. Scant clear drainage without redness at entry site. Psychiatric  mood, affect, and behavior appear normal.  Judgment and thought processes appear normal.    Risk factors for atherosclerosis of all vascular beds have been reviewed with the patient including:  Family history, tobacco abuse in all forms, elevated cholesterol, hyperlipidemia, and diabetes. Reviewed on this visit: op notes    Reviewed previous studies including: edd  Individual images were reviewed. I agree with the findings  Results were discussed with the patient. Options have been discussed with the patient including continued medical management vs. proceeding with intraoperative angiogram with runoff and possible angioplasty/atherectomy/stent  . Patient has opted to proceed with this. Risks have been discussed with the patient including but not limited to MI, death, CVA, bleed, nerve injury, and infection. ASSESSMENT/PLAN:  1. Pre-op testing  -     APTT; Future  -     Basic Metabolic Panel; Future  -     CBC; Future  -     EKG 12 Lead; Future  -     Protime-INR; Future  -     XR CHEST (2 VW); Future  -     MRSA DNA Probe, Nasal; Future  -     mupirocin (BACTROBAN NASAL) 2 % nasal ointment; Take by Nasal route 2 times daily x 5 days starting on Wed. 11/24/2021, Disp-1 g, R-0, Normal  2. Atherosclerosis of native artery of both lower extremities with intermittent claudication ([address]    1. Pre-op testing    2.  Atherosclerosis of native artery of both lower extremities with intermittent claudication ([address]     chronic illness with exacerbation or progression    Discussed management of edd which includes:  continue asa to maintain patency of stents, to reduce risk of arterial thrombosis and to decrease rate of plaque buildup  Strongly encourage start/continue statin therapy -   Recommend no smoking - discussed the effect tobacco has on illness;   Proceed with intraoperative angiogram left lower extremity with runoff and possible angioplasty/atherectomy/stent

## 2021-11-23 ENCOUNTER — TELEPHONE (OUTPATIENT)
Dept: VASCULAR SURGERY | Age: 66
End: 2021-11-23

## 2021-11-23 RX ORDER — CEPHALEXIN 500 MG/1
500 CAPSULE ORAL 2 TIMES DAILY
Qty: 14 CAPSULE | Refills: 0 | Status: SHIPPED | OUTPATIENT
Start: 2021-11-23

## 2021-11-29 ENCOUNTER — HOSPITAL ENCOUNTER (OUTPATIENT)
Dept: PREADMISSION TESTING | Age: 66
Setting detail: OUTPATIENT SURGERY
Discharge: HOME | End: 2021-12-03
Payer: MEDICARE

## 2021-11-29 ENCOUNTER — HOSPITAL ENCOUNTER (OUTPATIENT)
Dept: GENERAL RADIOLOGY | Age: 66
Discharge: HOME | End: 2021-11-29
Payer: MEDICARE

## 2021-11-29 VITALS — BODY MASS INDEX: 26.93 KG/M2 | HEIGHT: 63 IN | WEIGHT: 152 LBS

## 2021-11-29 DIAGNOSIS — Z01.818 PRE-OP TESTING: ICD-10-CM

## 2021-11-29 LAB
ABO/RH: NORMAL
ANION GAP SERPL CALCULATED.3IONS-SCNC: 17 MMOL/L (ref 7–19)
ANTIBODY SCREEN: NORMAL
APTT: 38.2 SEC (ref 26–36.2)
BUN BLDV-MCNC: 32 MG/DL (ref 8–23)
CALCIUM SERPL-MCNC: 9.4 MG/DL (ref 8.8–10.2)
CHLORIDE BLD-SCNC: 94 MMOL/L (ref 98–111)
CO2: 27 MMOL/L (ref 22–29)
CREAT SERPL-MCNC: 10.5 MG/DL (ref 0.5–0.9)
EKG P AXIS: 49 DEGREES
EKG P-R INTERVAL: 148 MS
EKG Q-T INTERVAL: 400 MS
EKG QRS DURATION: 108 MS
EKG QTC CALCULATION (BAZETT): 440 MS
EKG T AXIS: 138 DEGREES
GFR AFRICAN AMERICAN: 4
GFR NON-AFRICAN AMERICAN: 4
GLUCOSE BLD-MCNC: 173 MG/DL (ref 74–109)
HCT VFR BLD CALC: 34.4 % (ref 37–47)
HEMOGLOBIN: 10.8 G/DL (ref 12–16)
INR BLD: 0.98 (ref 0.88–1.18)
MCH RBC QN AUTO: 33 PG (ref 27–31)
MCHC RBC AUTO-ENTMCNC: 31.4 G/DL (ref 33–37)
MCV RBC AUTO: 105.2 FL (ref 81–99)
PDW BLD-RTO: 13.4 % (ref 11.5–14.5)
PLATELET # BLD: 335 K/UL (ref 130–400)
PMV BLD AUTO: 11 FL (ref 9.4–12.3)
POTASSIUM SERPL-SCNC: 3.3 MMOL/L (ref 3.5–5)
PROTHROMBIN TIME: 13.2 SEC (ref 12–14.6)
RBC # BLD: 3.27 M/UL (ref 4.2–5.4)
SARS-COV-2, PCR: NOT DETECTED
SODIUM BLD-SCNC: 138 MMOL/L (ref 136–145)
WBC # BLD: 8.2 K/UL (ref 4.8–10.8)

## 2021-11-29 PROCEDURE — 71046 X-RAY EXAM CHEST 2 VIEWS: CPT

## 2021-11-29 PROCEDURE — 85730 THROMBOPLASTIN TIME PARTIAL: CPT

## 2021-11-29 PROCEDURE — 86900 BLOOD TYPING SEROLOGIC ABO: CPT

## 2021-11-29 PROCEDURE — 80048 BASIC METABOLIC PNL TOTAL CA: CPT

## 2021-11-29 PROCEDURE — 93010 ELECTROCARDIOGRAM REPORT: CPT | Performed by: INTERNAL MEDICINE

## 2021-11-29 PROCEDURE — 85027 COMPLETE CBC AUTOMATED: CPT

## 2021-11-29 PROCEDURE — 87641 MR-STAPH DNA AMP PROBE: CPT

## 2021-11-29 PROCEDURE — 93005 ELECTROCARDIOGRAM TRACING: CPT

## 2021-11-29 PROCEDURE — U0003 INFECTIOUS AGENT DETECTION BY NUCLEIC ACID (DNA OR RNA); SEVERE ACUTE RESPIRATORY SYNDROME CORONAVIRUS 2 (SARS-COV-2) (CORONAVIRUS DISEASE [COVID-19]), AMPLIFIED PROBE TECHNIQUE, MAKING USE OF HIGH THROUGHPUT TECHNOLOGIES AS DESCRIBED BY CMS-2020-01-R: HCPCS

## 2021-11-29 PROCEDURE — U0005 INFEC AGEN DETEC AMPLI PROBE: HCPCS

## 2021-11-29 PROCEDURE — 86850 RBC ANTIBODY SCREEN: CPT

## 2021-11-29 PROCEDURE — 86901 BLOOD TYPING SEROLOGIC RH(D): CPT

## 2021-11-29 PROCEDURE — 85610 PROTHROMBIN TIME: CPT

## 2021-11-30 ENCOUNTER — APPOINTMENT (OUTPATIENT)
Dept: INTERVENTIONAL RADIOLOGY/VASCULAR | Age: 66
End: 2021-11-30
Attending: SURGERY
Payer: MEDICARE

## 2021-11-30 ENCOUNTER — ANESTHESIA EVENT (OUTPATIENT)
Dept: OPERATING ROOM | Age: 66
End: 2021-11-30
Payer: MEDICARE

## 2021-11-30 ENCOUNTER — ANESTHESIA (OUTPATIENT)
Dept: OPERATING ROOM | Age: 66
End: 2021-11-30
Payer: MEDICARE

## 2021-11-30 ENCOUNTER — HOSPITAL ENCOUNTER (OUTPATIENT)
Age: 66
Setting detail: OUTPATIENT SURGERY
Discharge: HOME | End: 2021-11-30
Attending: SURGERY | Admitting: SURGERY
Payer: MEDICARE

## 2021-11-30 VITALS — DIASTOLIC BLOOD PRESSURE: 74 MMHG | SYSTOLIC BLOOD PRESSURE: 138 MMHG | OXYGEN SATURATION: 100 % | TEMPERATURE: 96 F

## 2021-11-30 VITALS
HEIGHT: 63 IN | RESPIRATION RATE: 16 BRPM | TEMPERATURE: 98.8 F | SYSTOLIC BLOOD PRESSURE: 121 MMHG | OXYGEN SATURATION: 100 % | DIASTOLIC BLOOD PRESSURE: 56 MMHG | BODY MASS INDEX: 26.93 KG/M2 | WEIGHT: 152 LBS | HEART RATE: 70 BPM

## 2021-11-30 DIAGNOSIS — I70.229 ATHEROSCLEROSIS OF ARTERY OF EXTREMITY WITH REST PAIN (HCC): Primary | ICD-10-CM

## 2021-11-30 LAB
ABO/RH: NORMAL
ANION GAP SERPL CALCULATED.3IONS-SCNC: 14 MMOL/L (ref 7–19)
ANTIBODY SCREEN: NORMAL
BUN BLDV-MCNC: 35 MG/DL (ref 8–23)
CALCIUM SERPL-MCNC: 9.5 MG/DL (ref 8.8–10.2)
CHLORIDE BLD-SCNC: 96 MMOL/L (ref 98–111)
CO2: 30 MMOL/L (ref 22–29)
CREAT SERPL-MCNC: 10.7 MG/DL (ref 0.5–0.9)
GFR AFRICAN AMERICAN: 4
GFR NON-AFRICAN AMERICAN: 4
GLUCOSE BLD-MCNC: 152 MG/DL (ref 74–109)
HCT VFR BLD CALC: 35 % (ref 37–47)
HEMOGLOBIN: 11.1 G/DL (ref 12–16)
MCH RBC QN AUTO: 32.8 PG (ref 27–31)
MCHC RBC AUTO-ENTMCNC: 31.7 G/DL (ref 33–37)
MCV RBC AUTO: 103.6 FL (ref 81–99)
MRSA SCREEN RT-PCR: NOT DETECTED
PDW BLD-RTO: 13.3 % (ref 11.5–14.5)
PLATELET # BLD: 328 K/UL (ref 130–400)
PMV BLD AUTO: 10.6 FL (ref 9.4–12.3)
POTASSIUM SERPL-SCNC: 3 MMOL/L (ref 3.5–4.9)
RBC # BLD: 3.38 M/UL (ref 4.2–5.4)
SODIUM BLD-SCNC: 140 MMOL/L (ref 136–145)
WBC # BLD: 8.5 K/UL (ref 4.8–10.8)

## 2021-11-30 PROCEDURE — C1887 CATHETER, GUIDING: HCPCS | Performed by: SURGERY

## 2021-11-30 PROCEDURE — 6370000000 HC RX 637 (ALT 250 FOR IP): Performed by: ANESTHESIOLOGY

## 2021-11-30 PROCEDURE — C2623 CATH, TRANSLUMIN, DRUG-COAT: HCPCS | Performed by: SURGERY

## 2021-11-30 PROCEDURE — 85027 COMPLETE CBC AUTOMATED: CPT

## 2021-11-30 PROCEDURE — 36415 COLL VENOUS BLD VENIPUNCTURE: CPT

## 2021-11-30 PROCEDURE — C1769 GUIDE WIRE: HCPCS | Performed by: SURGERY

## 2021-11-30 PROCEDURE — 37229 PR REVSC OPN/PRQ TIB/PERO W/ATHRC/ANGIOP SM VSL: CPT | Performed by: SURGERY

## 2021-11-30 PROCEDURE — 3600000015 HC SURGERY LEVEL 5 ADDTL 15MIN: Performed by: SURGERY

## 2021-11-30 PROCEDURE — 86850 RBC ANTIBODY SCREEN: CPT

## 2021-11-30 PROCEDURE — C1725 CATH, TRANSLUMIN NON-LASER: HCPCS | Performed by: SURGERY

## 2021-11-30 PROCEDURE — 3600000005 HC SURGERY LEVEL 5 BASE: Performed by: SURGERY

## 2021-11-30 PROCEDURE — C1893 INTRO/SHEATH, FIXED,NON-PEEL: HCPCS | Performed by: SURGERY

## 2021-11-30 PROCEDURE — 2500000003 HC RX 250 WO HCPCS: Performed by: NURSE ANESTHETIST, CERTIFIED REGISTERED

## 2021-11-30 PROCEDURE — 2500000003 HC RX 250 WO HCPCS: Performed by: SURGERY

## 2021-11-30 PROCEDURE — 7100000001 HC PACU RECOVERY - ADDTL 15 MIN: Performed by: SURGERY

## 2021-11-30 PROCEDURE — 86900 BLOOD TYPING SEROLOGIC ABO: CPT

## 2021-11-30 PROCEDURE — 6360000002 HC RX W HCPCS: Performed by: ANESTHESIOLOGY

## 2021-11-30 PROCEDURE — 86901 BLOOD TYPING SEROLOGIC RH(D): CPT

## 2021-11-30 PROCEDURE — 2580000003 HC RX 258: Performed by: SURGERY

## 2021-11-30 PROCEDURE — 2580000003 HC RX 258: Performed by: NURSE ANESTHETIST, CERTIFIED REGISTERED

## 2021-11-30 PROCEDURE — C1724 CATH, TRANS ATHEREC,ROTATION: HCPCS | Performed by: SURGERY

## 2021-11-30 PROCEDURE — C1894 INTRO/SHEATH, NON-LASER: HCPCS | Performed by: SURGERY

## 2021-11-30 PROCEDURE — 6360000002 HC RX W HCPCS: Performed by: SURGERY

## 2021-11-30 PROCEDURE — 6360000002 HC RX W HCPCS: Performed by: NURSE ANESTHETIST, CERTIFIED REGISTERED

## 2021-11-30 PROCEDURE — 7100000000 HC PACU RECOVERY - FIRST 15 MIN: Performed by: SURGERY

## 2021-11-30 PROCEDURE — C1760 CLOSURE DEV, VASC: HCPCS | Performed by: SURGERY

## 2021-11-30 PROCEDURE — 37224 PR REVSC OPN/PRG FEM/POP W/ANGIOPLASTY UNI: CPT | Performed by: SURGERY

## 2021-11-30 PROCEDURE — 2709999900 HC NON-CHARGEABLE SUPPLY: Performed by: SURGERY

## 2021-11-30 PROCEDURE — 80048 BASIC METABOLIC PNL TOTAL CA: CPT

## 2021-11-30 PROCEDURE — 3700000001 HC ADD 15 MINUTES (ANESTHESIA): Performed by: SURGERY

## 2021-11-30 PROCEDURE — 7100000010 HC PHASE II RECOVERY - FIRST 15 MIN: Performed by: SURGERY

## 2021-11-30 PROCEDURE — 75710 ARTERY X-RAYS ARM/LEG: CPT | Performed by: SURGERY

## 2021-11-30 PROCEDURE — 3700000000 HC ANESTHESIA ATTENDED CARE: Performed by: SURGERY

## 2021-11-30 PROCEDURE — 6360000002 HC RX W HCPCS: Performed by: NURSE PRACTITIONER

## 2021-11-30 PROCEDURE — 7100000011 HC PHASE II RECOVERY - ADDTL 15 MIN: Performed by: SURGERY

## 2021-11-30 RX ORDER — METOCLOPRAMIDE HYDROCHLORIDE 5 MG/ML
10 INJECTION INTRAMUSCULAR; INTRAVENOUS
Status: DISCONTINUED | OUTPATIENT
Start: 2021-11-30 | End: 2021-11-30 | Stop reason: HOSPADM

## 2021-11-30 RX ORDER — ONDANSETRON 2 MG/ML
INJECTION INTRAMUSCULAR; INTRAVENOUS PRN
Status: DISCONTINUED | OUTPATIENT
Start: 2021-11-30 | End: 2021-11-30

## 2021-11-30 RX ORDER — HYDROMORPHONE HYDROCHLORIDE 1 MG/ML
0.25 INJECTION, SOLUTION INTRAMUSCULAR; INTRAVENOUS; SUBCUTANEOUS EVERY 5 MIN PRN
Status: DISCONTINUED | OUTPATIENT
Start: 2021-11-30 | End: 2021-11-30 | Stop reason: HOSPADM

## 2021-11-30 RX ORDER — SODIUM CHLORIDE 9 MG/ML
INJECTION, SOLUTION INTRAVENOUS CONTINUOUS PRN
Status: DISCONTINUED | OUTPATIENT
Start: 2021-11-30 | End: 2021-11-30 | Stop reason: SDUPTHER

## 2021-11-30 RX ORDER — SUCCINYLCHOLINE CHLORIDE 20 MG/ML
INJECTION INTRAMUSCULAR; INTRAVENOUS PRN
Status: DISCONTINUED | OUTPATIENT
Start: 2021-11-30 | End: 2021-11-30 | Stop reason: SDUPTHER

## 2021-11-30 RX ORDER — SODIUM CHLORIDE 450 MG/100ML
INJECTION, SOLUTION INTRAVENOUS CONTINUOUS PRN
Status: DISCONTINUED | OUTPATIENT
Start: 2021-11-30 | End: 2021-11-30 | Stop reason: SDUPTHER

## 2021-11-30 RX ORDER — SODIUM CHLORIDE 0.9 % (FLUSH) 0.9 %
5-40 SYRINGE (ML) INJECTION EVERY 12 HOURS SCHEDULED
Status: DISCONTINUED | OUTPATIENT
Start: 2021-11-30 | End: 2021-11-30 | Stop reason: HOSPADM

## 2021-11-30 RX ORDER — SCOLOPAMINE TRANSDERMAL SYSTEM 1 MG/1
1 PATCH, EXTENDED RELEASE TRANSDERMAL ONCE
Status: DISCONTINUED | OUTPATIENT
Start: 2021-11-30 | End: 2021-11-30 | Stop reason: HOSPADM

## 2021-11-30 RX ORDER — SODIUM CHLORIDE 0.9 % (FLUSH) 0.9 %
5-40 SYRINGE (ML) INJECTION PRN
Status: DISCONTINUED | OUTPATIENT
Start: 2021-11-30 | End: 2021-11-30 | Stop reason: HOSPADM

## 2021-11-30 RX ORDER — OXYCODONE HYDROCHLORIDE AND ACETAMINOPHEN 5; 325 MG/1; MG/1
1 TABLET ORAL EVERY 8 HOURS PRN
Qty: 30 TABLET | Refills: 0 | Status: SHIPPED | OUTPATIENT
Start: 2021-11-30 | End: 2021-12-14

## 2021-11-30 RX ORDER — FENTANYL CITRATE 50 UG/ML
50 INJECTION, SOLUTION INTRAMUSCULAR; INTRAVENOUS
Status: DISCONTINUED | OUTPATIENT
Start: 2021-11-30 | End: 2021-11-30 | Stop reason: HOSPADM

## 2021-11-30 RX ORDER — PROTAMINE SULFATE 10 MG/ML
INJECTION, SOLUTION INTRAVENOUS PRN
Status: DISCONTINUED | OUTPATIENT
Start: 2021-11-30 | End: 2021-11-30 | Stop reason: SDUPTHER

## 2021-11-30 RX ORDER — ASPIRIN 81 MG/1
81 TABLET ORAL ONCE
Status: DISCONTINUED | OUTPATIENT
Start: 2021-11-30 | End: 2021-11-30 | Stop reason: HOSPADM

## 2021-11-30 RX ORDER — APREPITANT 40 MG/1
40 CAPSULE ORAL ONCE
Status: COMPLETED | OUTPATIENT
Start: 2021-11-30 | End: 2021-11-30

## 2021-11-30 RX ORDER — CLOPIDOGREL BISULFATE 75 MG/1
75 TABLET ORAL DAILY
Qty: 30 TABLET | Refills: 5 | Status: SHIPPED | OUTPATIENT
Start: 2021-11-30

## 2021-11-30 RX ORDER — METOCLOPRAMIDE 10 MG/1
10 TABLET ORAL ONCE
Status: COMPLETED | OUTPATIENT
Start: 2021-11-30 | End: 2021-11-30

## 2021-11-30 RX ORDER — HYDRALAZINE HYDROCHLORIDE 20 MG/ML
5 INJECTION INTRAMUSCULAR; INTRAVENOUS EVERY 10 MIN PRN
Status: DISCONTINUED | OUTPATIENT
Start: 2021-11-30 | End: 2021-11-30 | Stop reason: HOSPADM

## 2021-11-30 RX ORDER — SODIUM CHLORIDE 0.9 % (FLUSH) 0.9 %
10 SYRINGE (ML) INJECTION EVERY 12 HOURS SCHEDULED
Status: DISCONTINUED | OUTPATIENT
Start: 2021-11-30 | End: 2021-11-30 | Stop reason: HOSPADM

## 2021-11-30 RX ORDER — ONDANSETRON 2 MG/ML
4 INJECTION INTRAMUSCULAR; INTRAVENOUS EVERY 8 HOURS PRN
Status: DISCONTINUED | OUTPATIENT
Start: 2021-11-30 | End: 2021-11-30 | Stop reason: HOSPADM

## 2021-11-30 RX ORDER — LIDOCAINE HYDROCHLORIDE 10 MG/ML
INJECTION, SOLUTION INFILTRATION; PERINEURAL PRN
Status: DISCONTINUED | OUTPATIENT
Start: 2021-11-30 | End: 2021-11-30 | Stop reason: SDUPTHER

## 2021-11-30 RX ORDER — ROCURONIUM BROMIDE 10 MG/ML
INJECTION, SOLUTION INTRAVENOUS PRN
Status: DISCONTINUED | OUTPATIENT
Start: 2021-11-30 | End: 2021-11-30 | Stop reason: SDUPTHER

## 2021-11-30 RX ORDER — DIPHENHYDRAMINE HYDROCHLORIDE 50 MG/ML
12.5 INJECTION INTRAMUSCULAR; INTRAVENOUS
Status: DISCONTINUED | OUTPATIENT
Start: 2021-11-30 | End: 2021-11-30 | Stop reason: HOSPADM

## 2021-11-30 RX ORDER — SODIUM CHLORIDE 9 MG/ML
25 INJECTION, SOLUTION INTRAVENOUS PRN
Status: DISCONTINUED | OUTPATIENT
Start: 2021-11-30 | End: 2021-11-30 | Stop reason: HOSPADM

## 2021-11-30 RX ORDER — SODIUM CHLORIDE, SODIUM LACTATE, POTASSIUM CHLORIDE, CALCIUM CHLORIDE 600; 310; 30; 20 MG/100ML; MG/100ML; MG/100ML; MG/100ML
INJECTION, SOLUTION INTRAVENOUS CONTINUOUS
Status: DISCONTINUED | OUTPATIENT
Start: 2021-11-30 | End: 2021-11-30 | Stop reason: HOSPADM

## 2021-11-30 RX ORDER — MIDAZOLAM HYDROCHLORIDE 1 MG/ML
INJECTION INTRAMUSCULAR; INTRAVENOUS PRN
Status: DISCONTINUED | OUTPATIENT
Start: 2021-11-30 | End: 2021-11-30 | Stop reason: SDUPTHER

## 2021-11-30 RX ORDER — ACETAMINOPHEN 325 MG/1
650 TABLET ORAL EVERY 4 HOURS PRN
Status: DISCONTINUED | OUTPATIENT
Start: 2021-11-30 | End: 2021-11-30 | Stop reason: HOSPADM

## 2021-11-30 RX ORDER — PROPOFOL 10 MG/ML
INJECTION, EMULSION INTRAVENOUS PRN
Status: DISCONTINUED | OUTPATIENT
Start: 2021-11-30 | End: 2021-11-30 | Stop reason: SDUPTHER

## 2021-11-30 RX ORDER — DEXAMETHASONE SODIUM PHOSPHATE 10 MG/ML
INJECTION, SOLUTION INTRAMUSCULAR; INTRAVENOUS PRN
Status: DISCONTINUED | OUTPATIENT
Start: 2021-11-30 | End: 2021-11-30 | Stop reason: SDUPTHER

## 2021-11-30 RX ORDER — ONDANSETRON 2 MG/ML
INJECTION INTRAMUSCULAR; INTRAVENOUS PRN
Status: DISCONTINUED | OUTPATIENT
Start: 2021-11-30 | End: 2021-11-30 | Stop reason: SDUPTHER

## 2021-11-30 RX ORDER — HEPARIN SODIUM 1000 [USP'U]/ML
INJECTION, SOLUTION INTRAVENOUS; SUBCUTANEOUS PRN
Status: DISCONTINUED | OUTPATIENT
Start: 2021-11-30 | End: 2021-11-30 | Stop reason: SDUPTHER

## 2021-11-30 RX ORDER — MIDAZOLAM HYDROCHLORIDE 1 MG/ML
2 INJECTION INTRAMUSCULAR; INTRAVENOUS
Status: DISCONTINUED | OUTPATIENT
Start: 2021-11-30 | End: 2021-11-30 | Stop reason: HOSPADM

## 2021-11-30 RX ORDER — HYDROMORPHONE HYDROCHLORIDE 1 MG/ML
0.5 INJECTION, SOLUTION INTRAMUSCULAR; INTRAVENOUS; SUBCUTANEOUS EVERY 5 MIN PRN
Status: DISCONTINUED | OUTPATIENT
Start: 2021-11-30 | End: 2021-11-30 | Stop reason: HOSPADM

## 2021-11-30 RX ORDER — LIDOCAINE HYDROCHLORIDE 10 MG/ML
1 INJECTION, SOLUTION EPIDURAL; INFILTRATION; INTRACAUDAL; PERINEURAL
Status: DISCONTINUED | OUTPATIENT
Start: 2021-11-30 | End: 2021-11-30 | Stop reason: HOSPADM

## 2021-11-30 RX ORDER — SODIUM CHLORIDE 0.9 % (FLUSH) 0.9 %
10 SYRINGE (ML) INJECTION PRN
Status: DISCONTINUED | OUTPATIENT
Start: 2021-11-30 | End: 2021-11-30 | Stop reason: HOSPADM

## 2021-11-30 RX ORDER — OXYCODONE HYDROCHLORIDE AND ACETAMINOPHEN 5; 325 MG/1; MG/1
1 TABLET ORAL EVERY 4 HOURS PRN
Status: DISCONTINUED | OUTPATIENT
Start: 2021-11-30 | End: 2021-11-30 | Stop reason: HOSPADM

## 2021-11-30 RX ORDER — FAMOTIDINE 20 MG/1
20 TABLET, FILM COATED ORAL
Status: COMPLETED | OUTPATIENT
Start: 2021-11-30 | End: 2021-11-30

## 2021-11-30 RX ORDER — FENTANYL CITRATE 50 UG/ML
INJECTION, SOLUTION INTRAMUSCULAR; INTRAVENOUS PRN
Status: DISCONTINUED | OUTPATIENT
Start: 2021-11-30 | End: 2021-11-30 | Stop reason: SDUPTHER

## 2021-11-30 RX ORDER — OXYCODONE HYDROCHLORIDE AND ACETAMINOPHEN 5; 325 MG/1; MG/1
2 TABLET ORAL EVERY 4 HOURS PRN
Status: DISCONTINUED | OUTPATIENT
Start: 2021-11-30 | End: 2021-11-30 | Stop reason: HOSPADM

## 2021-11-30 RX ORDER — PROMETHAZINE HYDROCHLORIDE 25 MG/ML
6.25 INJECTION, SOLUTION INTRAMUSCULAR; INTRAVENOUS
Status: DISCONTINUED | OUTPATIENT
Start: 2021-11-30 | End: 2021-11-30 | Stop reason: HOSPADM

## 2021-11-30 RX ORDER — LABETALOL HYDROCHLORIDE 5 MG/ML
5 INJECTION, SOLUTION INTRAVENOUS EVERY 10 MIN PRN
Status: DISCONTINUED | OUTPATIENT
Start: 2021-11-30 | End: 2021-11-30 | Stop reason: HOSPADM

## 2021-11-30 RX ADMIN — FENTANYL CITRATE 50 MCG: 50 INJECTION, SOLUTION INTRAMUSCULAR; INTRAVENOUS at 14:01

## 2021-11-30 RX ADMIN — PROPOFOL 150 MG: 10 INJECTION, EMULSION INTRAVENOUS at 12:29

## 2021-11-30 RX ADMIN — SODIUM CHLORIDE: 4.5 INJECTION, SOLUTION INTRAVENOUS at 12:23

## 2021-11-30 RX ADMIN — LIDOCAINE HYDROCHLORIDE 50 MG: 10 INJECTION, SOLUTION INFILTRATION; PERINEURAL at 12:29

## 2021-11-30 RX ADMIN — FENTANYL CITRATE 25 MCG: 50 INJECTION, SOLUTION INTRAMUSCULAR; INTRAVENOUS at 12:58

## 2021-11-30 RX ADMIN — SUCCINYLCHOLINE CHLORIDE 120 MG: 20 INJECTION, SOLUTION INTRAMUSCULAR; INTRAVENOUS at 12:29

## 2021-11-30 RX ADMIN — DEXAMETHASONE SODIUM PHOSPHATE 10 MG: 10 INJECTION, SOLUTION INTRAMUSCULAR; INTRAVENOUS at 13:01

## 2021-11-30 RX ADMIN — SUGAMMADEX 200 MG: 100 INJECTION, SOLUTION INTRAVENOUS at 14:37

## 2021-11-30 RX ADMIN — FAMOTIDINE 20 MG: 20 TABLET ORAL at 10:32

## 2021-11-30 RX ADMIN — SODIUM CHLORIDE: 9 INJECTION, SOLUTION INTRAVENOUS at 13:57

## 2021-11-30 RX ADMIN — FENTANYL CITRATE 25 MCG: 50 INJECTION, SOLUTION INTRAMUSCULAR; INTRAVENOUS at 12:29

## 2021-11-30 RX ADMIN — HEPARIN SODIUM 1000 UNITS: 1000 INJECTION, SOLUTION INTRAVENOUS; SUBCUTANEOUS at 13:03

## 2021-11-30 RX ADMIN — METOCLOPRAMIDE 10 MG: 10 TABLET ORAL at 10:32

## 2021-11-30 RX ADMIN — MIDAZOLAM 2 MG: 1 INJECTION INTRAMUSCULAR; INTRAVENOUS at 12:21

## 2021-11-30 RX ADMIN — HEPARIN SODIUM 4000 UNITS: 1000 INJECTION, SOLUTION INTRAVENOUS; SUBCUTANEOUS at 12:57

## 2021-11-30 RX ADMIN — ROCURONIUM BROMIDE 5 MG: 10 INJECTION, SOLUTION INTRAVENOUS at 12:29

## 2021-11-30 RX ADMIN — APREPITANT 40 MG: 40 CAPSULE ORAL at 10:32

## 2021-11-30 RX ADMIN — ONDANSETRON 4 MG: 2 INJECTION INTRAMUSCULAR; INTRAVENOUS at 13:19

## 2021-11-30 RX ADMIN — LIDOCAINE HYDROCHLORIDE 50 MG: 10 INJECTION, SOLUTION INFILTRATION; PERINEURAL at 14:34

## 2021-11-30 RX ADMIN — ROCURONIUM BROMIDE 45 MG: 10 INJECTION, SOLUTION INTRAVENOUS at 12:37

## 2021-11-30 RX ADMIN — PHENYLEPHRINE HYDROCHLORIDE 50 MCG/MIN: 10 INJECTION INTRAVENOUS at 12:34

## 2021-11-30 RX ADMIN — ROCURONIUM BROMIDE 10 MG: 10 INJECTION, SOLUTION INTRAVENOUS at 13:58

## 2021-11-30 RX ADMIN — Medication 1000 MG: at 10:48

## 2021-11-30 RX ADMIN — PROTAMINE SULFATE 30 MG: 10 INJECTION, SOLUTION INTRAVENOUS at 14:26

## 2021-11-30 ASSESSMENT — LIFESTYLE VARIABLES: SMOKING_STATUS: 0

## 2021-11-30 ASSESSMENT — PAIN SCALES - GENERAL
PAINLEVEL_OUTOF10: 0

## 2021-11-30 ASSESSMENT — ENCOUNTER SYMPTOMS: SHORTNESS OF BREATH: 0

## 2021-11-30 NOTE — ANESTHESIA PRE PROCEDURE
Department of Anesthesiology  Preprocedure Note       Name:  Kaiser Moraes   Age:  77 y.o.  :  1955                                          MRN:  [id number]         Date:  2021      Surgeon: Mellisa Chan):  Lois Bone MD    Procedure: Procedure(s):  INTRA-OP AORTOGRAM WITH RUNOFF POSSIBLE ANGIOPLASTY, ATHERECTOMY, STENT WITH PEDAL ACCESS    Medications prior to admission:   Prior to Admission medications    Medication Sig Start Date End Date Taking? Authorizing Provider   cephALEXin (KEFLEX) 500 MG capsule Take 1 capsule by mouth 2 times daily 21   Kianna Knee, APRN   doxycycline hyclate (VIBRA-TABS) 100 MG tablet Take 1 tablet by mouth 2 times daily 21   Kianna Knee, APRN   mupirocin [facility] NASAL) 2 % nasal ointment Take by Nasal route 2 times daily x 5 days starting on Wed.  2021   Kianna Knee, APRN   Melatonin 5 MG CAPS Take 5 mg by mouth nightly    Historical Provider, MD   gentamicin (GARAMYCIN) 0.1 % cream APPLY TO EXIT SITE AS NEEDED 10/26/21   Historical Provider, MD   polyethylene glycol (MIRALAX) 17 GM/SCOOP powder Take 1 packet by mouth 20   Historical Provider, MD   Methoxy PEG-Epoetin Beta (MIRCERA IJ) Inject 75 mcg into the skin 10/25/21   Historical Provider, MD   LEVEMIR 100 UNIT/ML injection vial Inject 20 Units into the skin nightly  10/7/21   Historical Provider, MD   bisacodyl (DULCOLAX) 10 MG suppository Place 10 mg rectally daily as needed    Historical Provider, MD   ascorbic acid (VITAMIN C) 500 MG tablet Take 500 mg by mouth daily 20   Historical Provider, MD   atorvastatin (LIPITOR) 20 MG tablet Take 20 mg by mouth daily 10/21/21   Historical Provider, MD   calcium carbonate (OS-ROSSANA) 1250 (500 Ca) MG chewable tablet Take 2 tablets by mouth nightly    Historical Provider, MD   docusate (COLACE, DULCOLAX) 100 MG CAPS Take 100 mg by mouth See Admin Instructions 20   Historical Provider, MD   famotidine (PEPCID) 20 MG tablet Take 1 tablet by mouth daily 4/28/21   Historical Provider, MD   LORazepam (ATIVAN) 0.5 MG tablet Take 1 tablet by mouth daily as needed. 8/2/21   Historical Provider, MD   Sucroferric Oxyhydroxide (VELPHORO) 500 MG CHEW Take 500 mg by mouth 3 times daily (with meals) 9/1/21   Historical Provider, MD   aspirin 81 MG EC tablet Take 81 mg by mouth daily    Historical Provider, MD   vitamin D3 (CHOLECALCIFEROL) 125 MCG (5000 UT) TABS tablet Take 1 tablet by mouth daily 8/2/21   Historical Provider, MD   allopurinol (ZYLOPRIM) 100 MG tablet Take 1 tablet by mouth daily 6/18/18   Historical Provider, MD   pantoprazole (PROTONIX) 40 MG tablet Take 1 tablet by mouth daily 6/18/18   Historical Provider, MD   citalopram (CELEXA) 40 MG tablet Take 1 tablet by mouth daily 6/18/18   Historical Provider, MD   sucralfate (CARAFATE) 1 GM tablet Take 1 tablet by mouth nightly 7/14/18   Historical Provider, MD   Multiple Vitamins-Minerals (THERAPEUTIC MULTIVITAMIN-MINERALS) tablet Take 1 tablet by mouth daily    Historical Provider, MD   nitroGLYCERIN (NITROSTAT) 0.4 MG SL tablet Place 0.4 mg under the tongue every 5 minutes as needed for Chest pain up to max of 3 total doses. If no relief after 1 dose, call 911. Historical Provider, MD       Current medications:    Current Facility-Administered Medications   Medication Dose Route Frequency Provider Last Rate Last Admin    0.9 % sodium chloride infusion  25 mL IntraVENous PRN Jackqulyn Primus, APRN        sodium chloride flush 0.9 % injection 10 mL  10 mL IntraVENous 2 times per day Jackqulyn Primus, APRN        sodium chloride flush 0.9 % injection 10 mL  10 mL IntraVENous PRN Jackqulyn Primus, APRN        vancomycin (VANCOCIN) 1000 mg in dextrose 5% 250 mL IVPB  1,000 mg IntraVENous On Call to [facility], APRN        aspirin EC tablet 81 mg  81 mg Oral Once Dionisio Baez APRN           Allergies:     Allergies   Allergen Reactions    Tramadol Nausea And Vomiting    Sulfasalazine Nausea And Vomiting    Codeine     Demerol Hcl [Meperidine]     Lisinopril     Morphine     Sulfa Antibiotics     Pentoxifylline Nausea And Vomiting       Problem List:    Patient Active Problem List   Diagnosis Code    Anemia in chronic kidney disease, on chronic dialysis ([facility]) N18.6, D63.1, Z99.2    Atherosclerosis of artery of extremity with rest pain ([facility]) I70.229    Atherosclerosis of native artery of both lower extremities with intermittent claudication ([facility] [address] I70.213       Past Medical History:        Diagnosis Date    Arthritis     CAD (coronary artery disease)     Diabetes mellitus ([facility] [address]     ESRD (end stage renal disease) ([facility] [address]     Hemodialysis patient ([facility]     History of blood transfusion     Hyperlipidemia     Hypertension     PONV (postoperative nausea and vomiting)        Past Surgical History:        Procedure Laterality Date    CHOLECYSTECTOMY      CORONARY ARTERY BYPASS GRAFT      4/21    CT INSERT PERITONEAL CATHETER      tommy 2015    DILATATION, ESOPHAGUS      EYE SURGERY      EYE SURGERY      GASTRIC BYPASS SURGERY      HYSTERECTOMY      SINUS SURGERY      KONG AND BSO      TUBAL LIGATION         Social History:    Social History     Tobacco Use    Smoking status: Former Smoker    Smokeless tobacco: Never Used   Substance Use Topics    Alcohol use: Not Currently     Comment: SOCIAL                                Counseling given: Not Answered      Vital Signs (Current):   Vitals:    11/30/21 0958   BP: 128/82   Pulse: 79   Resp: 14   Temp: 98.4 °F (36.9 °C)   TempSrc: Temporal   SpO2: (!) 79%   Weight: 152 lb (68.9 kg)   Height: 5' 3\" (1.6 m)                                              BP Readings from Last 3 Encounters:   11/30/21 128/82   11/18/21 121/72   11/03/21 98/82       NPO Status:                                                                                 BMI:   Wt Readings from Last 3 Encounters:   11/30/21 152 lb (68.9 kg) 11/29/21 152 lb (68.9 kg)   11/18/21 150 lb (68 kg)     Body mass index is 26.93 kg/m². CBC:   Lab Results   Component Value Date    WBC 8.2 11/29/2021    RBC 3.27 11/29/2021    HGB 10.8 11/29/2021    HCT 34.4 11/29/2021    .2 11/29/2021    RDW 13.4 11/29/2021     11/29/2021       CMP:   Lab Results   Component Value Date     11/29/2021    K 3.3 11/29/2021    CL 94 11/29/2021    CO2 27 11/29/2021    BUN 32 11/29/2021    CREATININE 10.5 11/29/2021    GFRAA 4 11/29/2021    LABGLOM 4 11/29/2021    GLUCOSE 173 11/29/2021    CALCIUM 9.4 11/29/2021       POC Tests: No results for input(s): POCGLU, POCNA, POCK, POCCL, POCBUN, POCHEMO, POCHCT in the last 72 hours. Coags:   Lab Results   Component Value Date    PROTIME 13.2 11/29/2021    INR 0.98 11/29/2021    APTT 38.2 11/29/2021       HCG (If Applicable): No results found for: PREGTESTUR, PREGSERUM, HCG, HCGQUANT     ABGs: No results found for: PHART, PO2ART, ZND1RTA, HHU2HAJ, BEART, H1UAGETG     Type & Screen (If Applicable):  No results found for: LABABO, LABRH    Drug/Infectious Status (If Applicable):  No results found for: HIV, HEPCAB    COVID-19 Screening (If Applicable):   Lab Results   Component Value Date    COVID19 Not Detected 11/29/2021           Anesthesia Evaluation  Patient summary reviewed and Nursing notes reviewed   history of anesthetic complications: PONV. Airway: Mallampati: II  TM distance: >3 FB   Neck ROM: full  Mouth opening: > = 3 FB Dental: normal exam   (+) partials      Pulmonary:Negative Pulmonary ROS and normal exam  breath sounds clear to auscultation      (-) shortness of breath and not a current smoker          Patient did not smoke on day of surgery.                  Cardiovascular:    (+) hypertension:, CAD:, CABG/stent (4/2021 cabg 3v Yanick Martínez ):,     (-)  angina and  CHF    NYHA Classification: I  ECG reviewed  Rhythm: regular  Rate: normal           Beta Blocker:  Not on Beta Blocker         Neuro/Psych: Negative Neuro/Psych ROS  (+) seizures:,    (-) CVA and depression/anxiety            GI/Hepatic/Renal: Neg GI/Hepatic/Renal ROS  (+) renal disease (CCPD at home , stat drain this am ): ESRD and dialysis,      (-) hiatal hernia and GERD       Endo/Other: Negative Endo/Other ROS   (+) DiabetesType II DM, , blood dyscrasia: anemia, arthritis:., electrolyte abnormalities, . Pt had PAT visit. Abdominal:       Abdomen: soft. Vascular:   + PVD, aortic or cerebral, . Other Findings:             Anesthesia Plan      general     ASA 4     (Iv zofran within 30 min of closing )  Induction: intravenous. BIS  MIPS: Postoperative opioids intended and Prophylactic antiemetics administered. Anesthetic plan and risks discussed with patient. Use of blood products discussed with patient whom. Plan discussed with CRNA.     Attending anesthesiologist reviewed and agrees with Pre Eval content              Jose Yanez MD   11/30/2021

## 2021-11-30 NOTE — BRIEF OP NOTE
Brief Postoperative Note      Patient: Klaudia Worthington  YOB: 1955  MRN: [id number]    Date of Procedure: 11/30/2021    Pre-Op Diagnosis: I70.213    Post-Op Diagnosis: Same       Procedure:  1. Ultrasound-guided antegrade cannulation left proximal superficial femoral artery with 5 Bulgarian glide sheath  2. Selective left lower extremity arteriograms  3. Left posterior tibial artery atherectomy with CSI 1.5 solid crown  4. Left posterior tibial artery balloon angioplasty with a 2 mm diameter coyote balloon  5. Left popliteal artery balloon angioplasty with 5 mm x 40 mm  balloon and 6 mm x 60 mm Lutonix drug-coated balloon  6. Completion left lower extremity arteriograms  7.   Minx closure left proximal superficial femoral artery puncture site    Surgeon(s):  Sandra Hansen MD    Assistant:  * No surgical staff found *    Anesthesia: Choice    Estimated Blood Loss (mL): Minimal    Complications: None    Specimens:   * No specimens in log *    Implants:  * No implants in log *      Drains: * No LDAs found *    Findings: left PTA multiple stenoses, short occlusions, distal DPA occlusion, Popliteal artery 60-70% stenosis    Electronically signed by Micheline Chapin MD on 11/30/2021 at 2:35 PM

## 2021-11-30 NOTE — PROGRESS NOTES
Dr. Mckinnon Ranks at bedside. Left thigh assessed, no changes. Pulses reviewed with md as well, ok to home.

## 2021-11-30 NOTE — BRIEF OP NOTE
Brief Postoperative Note      Patient: Darling Pettit  YOB: 1955  MRN: [id number]    Date of Procedure: 11/30/2021    Pre-Op Diagnosis: I70.213    Post-Op Diagnosis: Same       Procedure(s):  INTRA-OP AORTOGRAM WITH RUNOFF POSSIBLE ANGIOPLASTY, ATHERECTOMY, STENT WITH PEDAL ACCESS    Surgeon(s):  Elida Aragon MD    Assistant:  * No surgical staff found *    Anesthesia: Choice    Estimated Blood Loss (mL): less than 50     Complications: None    Specimens:   * No specimens in log *    Implants:  * No implants in log *      Drains: * No LDAs found *    Findings: aneurysm mid upper arm, stenosis cephalic vein around subclavian insertion    Electronically signed by Gilberto Rodriguez MD on 11/30/2021 at 11:22 AM

## 2021-12-03 ENCOUNTER — HOSPITAL ENCOUNTER (OUTPATIENT)
Dept: VASCULAR LAB | Age: 66
Discharge: HOME | End: 2021-12-03
Payer: MEDICARE

## 2021-12-03 ENCOUNTER — OFFICE VISIT (OUTPATIENT)
Dept: VASCULAR SURGERY | Age: 66
End: 2021-12-03

## 2021-12-03 DIAGNOSIS — I70.213 ATHEROSCLER OF NATIVE ARTERY OF BOTH LEGS WITH INTERMIT CLAUDICATION (HCC): Primary | ICD-10-CM

## 2021-12-03 DIAGNOSIS — I70.213 ATHEROSCLER OF NATIVE ARTERY OF BOTH LEGS WITH INTERMIT CLAUDICATION (HCC): ICD-10-CM

## 2021-12-03 DIAGNOSIS — I99.8 ISCHEMIC TOE: ICD-10-CM

## 2021-12-03 DIAGNOSIS — I70.213 ATHEROSCLEROSIS OF NATIVE ARTERY OF BOTH LOWER EXTREMITIES WITH INTERMITTENT CLAUDICATION (HCC): Primary | ICD-10-CM

## 2021-12-03 DIAGNOSIS — I70.229 ATHEROSCLEROSIS OF ARTERY OF EXTREMITY WITH REST PAIN (HCC): Primary | ICD-10-CM

## 2021-12-03 PROCEDURE — 93922 UPR/L XTREMITY ART 2 LEVELS: CPT

## 2021-12-03 PROCEDURE — 93926 LOWER EXTREMITY STUDY: CPT

## 2021-12-03 PROCEDURE — 99024 POSTOP FOLLOW-UP VISIT: CPT | Performed by: NURSE PRACTITIONER

## 2021-12-03 RX ORDER — OXYCODONE AND ACETAMINOPHEN 10; 325 MG/1; MG/1
1 TABLET ORAL EVERY 4 HOURS PRN
Qty: 30 TABLET | Refills: 0 | Status: SHIPPED | OUTPATIENT
Start: 2021-12-03 | End: 2021-12-18

## 2021-12-03 NOTE — PROGRESS NOTES
Alexx Martinez (:  1955) is a 77 y.o. female,Established patient, here for evaluation of the following chief complaint(s):  No chief complaint on file. SUBJECTIVE/OBJECTIVE:  Deisy Stout has a history of peripheral vascular disease of the lower extremities. She came to us with an ischemic toe the end of October. Dr. Pratibha Gill performed  1. Percutaneous cannulation right common femoral artery with 5 Western Litzy and later 5 Ukrainian 70 cm sheath  2. Suprarenal abdominal aortogram with bilateral iliofemoral arteriogram and bilateral lower extremity arteriogram  3. Selective left lower extremity arteriogram  4. Left dorsalis pedis artery atherectomy with CSI 1.25 solid crown  5.  Balloon angioplasty left anterior tibial/dorsalis pedis artery with 2.5 mm x 40 mm coyote balloon, 2.5 mm x 100 mm Vascutrak balloon, 2 mm x 220 mm coyote balloon. He then went back and performed 1. Ultrasound-guided antegrade cannulation left proximal superficial femoral artery with 5 Ukrainian glide sheath  2. Selective left lower extremity arteriograms  3. Left posterior tibial artery atherectomy with CSI 1.5 solid crown  4. Left posterior tibial artery balloon angioplasty with a 2 mm diameter coyote balloon  5. Left popliteal artery balloon angioplasty with 5 mm x 40 mm  balloon and 6 mm x 60 mm Lutonix drug-coated balloon 1 week ago. She has continued foot pain and toe pain. She has not had any swelling. She was concerned because of the continued pain she might have a arterial clot. We brought her in for ascan.     Alexx Martinez is a 77 y.o. female with the following history as recorded in [facility]:  Patient Active Problem List    Diagnosis Date Noted    Atherosclerosis of native artery of both lower extremities with intermittent claudication ([address]     Atherosclerosis of artery of extremity with rest pain ([address] 10/28/2021    Anemia in chronic kidney disease, on chronic dialysis ([address] 2021     Current Outpatient Medications   Medication Sig Dispense Refill    oxyCODONE-acetaminophen (PERCOCET)  MG per tablet Take 1 tablet by mouth every 4 hours as needed for Pain for up to 15 days. Intended supply: 30 days 30 tablet 0    clopidogrel (PLAVIX) 75 MG tablet Take 1 tablet by mouth daily 30 tablet 5    oxyCODONE-acetaminophen (PERCOCET) 5-325 MG per tablet Take 1 tablet by mouth every 8 hours as needed for Pain for up to 14 days. Intended supply: 5 days. Take lowest dose possible to manage pain 30 tablet 0    cephALEXin (KEFLEX) 500 MG capsule Take 1 capsule by mouth 2 times daily 14 capsule 0    doxycycline hyclate (VIBRA-TABS) 100 MG tablet Take 1 tablet by mouth 2 times daily 14 tablet 0    mupirocin (BACTROBAN NASAL) 2 % nasal ointment Take by Nasal route 2 times daily x 5 days starting on Wed. 11/24/2021 1 g 0    Melatonin 5 MG CAPS Take 5 mg by mouth nightly      gentamicin (GARAMYCIN) 0.1 % cream APPLY TO EXIT SITE AS NEEDED      polyethylene glycol (MIRALAX) 17 GM/SCOOP powder Take 1 packet by mouth      Methoxy PEG-Epoetin Beta (MIRCERA IJ) Inject 75 mcg into the skin      LEVEMIR 100 UNIT/ML injection vial Inject 20 Units into the skin nightly       bisacodyl (DULCOLAX) 10 MG suppository Place 10 mg rectally daily as needed      ascorbic acid (VITAMIN C) 500 MG tablet Take 500 mg by mouth daily      atorvastatin (LIPITOR) 20 MG tablet Take 20 mg by mouth daily      calcium carbonate (OS-ROSSANA) 1250 (500 Ca) MG chewable tablet Take 2 tablets by mouth nightly      docusate (COLACE, DULCOLAX) 100 MG CAPS Take 100 mg by mouth See Admin Instructions      famotidine (PEPCID) 20 MG tablet Take 1 tablet by mouth daily      LORazepam (ATIVAN) 0.5 MG tablet Take 1 tablet by mouth daily as needed.       Sucroferric Oxyhydroxide (VELPHORO) 500 MG CHEW Take 500 mg by mouth 3 times daily (with meals)      aspirin 81 MG EC tablet Take 81 mg by mouth daily      vitamin D3 (CHOLECALCIFEROL) 125 MCG (5000 UT) TABS tablet Take 1 tablet by mouth daily      allopurinol (ZYLOPRIM) 100 MG tablet Take 1 tablet by mouth daily      pantoprazole (PROTONIX) 40 MG tablet Take 1 tablet by mouth daily      citalopram (CELEXA) 40 MG tablet Take 1 tablet by mouth daily      sucralfate (CARAFATE) 1 GM tablet Take 1 tablet by mouth nightly  5    Multiple Vitamins-Minerals (THERAPEUTIC MULTIVITAMIN-MINERALS) tablet Take 1 tablet by mouth daily      nitroGLYCERIN (NITROSTAT) 0.4 MG SL tablet Place 0.4 mg under the tongue every 5 minutes as needed for Chest pain up to max of 3 total doses. If no relief after 1 dose, call 911. No current facility-administered medications for this visit.      Allergies: Tramadol, Sulfasalazine, Codeine, Demerol hcl [meperidine], Lisinopril, Morphine, Sulfa antibiotics, and Pentoxifylline  Past Medical History:   Diagnosis Date    Arthritis     CAD (coronary artery disease)     Diabetes mellitus ([facility]     ESRD (end stage renal disease) ([facility]     Hemodialysis patient ([facility]     History of blood transfusion     Hyperlipidemia     Hypertension     PONV (postoperative nausea and vomiting)      Past Surgical History:   Procedure Laterality Date    CHOLECYSTECTOMY      CORONARY ARTERY BYPASS GRAFT      4/21    CT INSERT PERITONEAL CATHETER      tommy 2015    DILATATION, ESOPHAGUS      EYE SURGERY      EYE SURGERY      GASTRIC BYPASS SURGERY      HYSTERECTOMY      SINUS SURGERY      KONG AND BSO      TUBAL LIGATION      VASCULAR SURGERY N/A 11/30/2021    POST TIBIAL ARTHECTOMY; BALLOON ANGIOPLASTY; POPLITEAL BALLOON ANGIOPLASTY performed by [facility] Allen [facility] at [facility] OR     Family History   Problem Relation Age of Onset    Coronary Art Dis Father     Diabetes Father     Kidney Disease Father     Stroke Father     Heart Disease Father     Heart Attack Father     Arrhythmia Sister     Heart Attack Sister     Coronary Art Dis Brother     Diabetes Brother     Heart Disease Brother     Heart Attack Brother     Heart Disease Mother     High Blood Pressure Mother      Social History     Tobacco Use    Smoking status: Former Smoker    Smokeless tobacco: Never Used   Substance Use Topics    Alcohol use: Not Currently     Comment: SOCIAL         Physical Exam    There were no vitals taken for this visit. Extremities -    Right femoral pulse: present 2+; Right popliteal pulse: absent Right DP: absent; Right PT absent; Left femoral pulse: present 2+; Left popliteal pulse: absent; Left DP: absent; Left PT: absent No cyanosis, clubbing, or significant edema. No signs atheroembolic event. Skin  toe continues to be ischemic. No open ulceration  Psychiatric  mood, affect, and behavior appear normal.  Judgment and thought processes appear normal.    Risk factors for atherosclerosis of all vascular beds have been reviewed with the patient including:  Family history, tobacco abuse in all forms, elevated cholesterol, hyperlipidemia, and diabetes. Lower extremity arterial scan: Individual films reviewed:  Based on ankle-brachial indices and doppler waveforms, the patient has    relatively normal flow to the bilateral lower extremity arterial system at   [facility], the left great toe pressure is 0 which could be consistent with    very small vessel disease or occlusion      Yes. These results were reviewed with the patient. Disease process is stable           ASSESSMENT/PLAN:  1. Atherosclerosis of native artery of both lower extremities with intermittent claudication ([address]  2. Ischemic toe    1. Atherosclerosis of native artery of both lower extremities with intermittent claudication ([address]    2. Ischemic toe        Long discussion with patient. Dr. Pratibha Gill discussed with her and the family after the procedure she only had a 50% chance of saving the leg. She may need a BKA. Today, however, her blood flow to the ankle looks good.   She does not have blood flow into the toe which is unchanged. we know she has very poor flow into the foot. Her blood vessels are very small. She does not want an amputation at this time. Dr. Ezekiel Morales has given her a script of percocet to take for pain. We will see her back in 1 week. Patient instructed to walk as much as possible. Call our office with any progressive pain in leg(s) or hip(s) with walking. Take good care of your feet. Let us know right away if you develop a wound on your foot. An electronic signature was used to authenticate this note.     --MI Arrieta

## 2021-12-06 ENCOUNTER — APPOINTMENT (OUTPATIENT)
Dept: CT IMAGING | Age: 66
End: 2021-12-06
Payer: MEDICARE

## 2021-12-06 ENCOUNTER — HOSPITAL ENCOUNTER (EMERGENCY)
Age: 66
Discharge: HOME | End: 2021-12-06
Attending: EMERGENCY MEDICINE
Payer: MEDICARE

## 2021-12-06 VITALS
TEMPERATURE: 98.8 F | BODY MASS INDEX: 26.57 KG/M2 | DIASTOLIC BLOOD PRESSURE: 76 MMHG | SYSTOLIC BLOOD PRESSURE: 123 MMHG | HEART RATE: 78 BPM | OXYGEN SATURATION: 98 % | RESPIRATION RATE: 20 BRPM | WEIGHT: 150 LBS

## 2021-12-06 DIAGNOSIS — M27.40 MAXILLARY CYST: ICD-10-CM

## 2021-12-06 DIAGNOSIS — I73.9 PERIPHERAL VASCULAR DISEASE (HCC): ICD-10-CM

## 2021-12-06 DIAGNOSIS — S00.83XA CONTUSION OF FACE, INITIAL ENCOUNTER: Primary | ICD-10-CM

## 2021-12-06 LAB
ALBUMIN SERPL-MCNC: 2.2 G/DL (ref 3.5–5.2)
ALP BLD-CCNC: 99 U/L (ref 35–104)
ALT SERPL-CCNC: 15 U/L (ref 5–33)
ANION GAP SERPL CALCULATED.3IONS-SCNC: 16 MMOL/L (ref 7–19)
APTT: 52 SEC (ref 26–36.2)
AST SERPL-CCNC: 21 U/L (ref 5–32)
BASOPHILS ABSOLUTE: 0.1 K/UL (ref 0–0.2)
BASOPHILS RELATIVE PERCENT: 0.5 % (ref 0–1)
BILIRUB SERPL-MCNC: 0.4 MG/DL (ref 0.2–1.2)
BUN BLDV-MCNC: 43 MG/DL (ref 8–23)
CALCIUM SERPL-MCNC: 8.8 MG/DL (ref 8.8–10.2)
CHLORIDE BLD-SCNC: 92 MMOL/L (ref 98–111)
CO2: 25 MMOL/L (ref 22–29)
CREAT SERPL-MCNC: 11.2 MG/DL (ref 0.5–0.9)
EOSINOPHILS ABSOLUTE: 0.3 K/UL (ref 0–0.6)
EOSINOPHILS RELATIVE PERCENT: 2.8 % (ref 0–5)
GFR AFRICAN AMERICAN: 4
GFR NON-AFRICAN AMERICAN: 3
GLUCOSE BLD-MCNC: 84 MG/DL (ref 74–109)
HCT VFR BLD CALC: 27.5 % (ref 37–47)
HEMOGLOBIN: 9 G/DL (ref 12–16)
IMMATURE GRANULOCYTES #: 0.1 K/UL
INR BLD: 1.2 (ref 0.88–1.18)
LYMPHOCYTES ABSOLUTE: 1 K/UL (ref 1.1–4.5)
LYMPHOCYTES RELATIVE PERCENT: 9.2 % (ref 20–40)
MAGNESIUM: 2.4 MG/DL (ref 1.6–2.4)
MCH RBC QN AUTO: 32.8 PG (ref 27–31)
MCHC RBC AUTO-ENTMCNC: 32.7 G/DL (ref 33–37)
MCV RBC AUTO: 100.4 FL (ref 81–99)
MONOCYTES ABSOLUTE: 1.2 K/UL (ref 0–0.9)
MONOCYTES RELATIVE PERCENT: 10.9 % (ref 0–10)
NEUTROPHILS ABSOLUTE: 8.4 K/UL (ref 1.5–7.5)
NEUTROPHILS RELATIVE PERCENT: 76.1 % (ref 50–65)
PDW BLD-RTO: 12.9 % (ref 11.5–14.5)
PLATELET # BLD: 287 K/UL (ref 130–400)
PMV BLD AUTO: 10.5 FL (ref 9.4–12.3)
POTASSIUM REFLEX MAGNESIUM: 2.6 MMOL/L (ref 3.5–5)
PROTHROMBIN TIME: 15.4 SEC (ref 12–14.6)
RBC # BLD: 2.74 M/UL (ref 4.2–5.4)
SODIUM BLD-SCNC: 133 MMOL/L (ref 136–145)
TOTAL PROTEIN: 5.2 G/DL (ref 6.6–8.7)
WBC # BLD: 11 K/UL (ref 4.8–10.8)

## 2021-12-06 PROCEDURE — 99284 EMERGENCY DEPT VISIT MOD MDM: CPT

## 2021-12-06 PROCEDURE — 70486 CT MAXILLOFACIAL W/O DYE: CPT

## 2021-12-06 PROCEDURE — 80053 COMPREHEN METABOLIC PANEL: CPT

## 2021-12-06 PROCEDURE — 70450 CT HEAD/BRAIN W/O DYE: CPT

## 2021-12-06 PROCEDURE — 85025 COMPLETE CBC W/AUTO DIFF WBC: CPT

## 2021-12-06 PROCEDURE — 85610 PROTHROMBIN TIME: CPT

## 2021-12-06 PROCEDURE — 6360000002 HC RX W HCPCS: Performed by: EMERGENCY MEDICINE

## 2021-12-06 PROCEDURE — 85730 THROMBOPLASTIN TIME PARTIAL: CPT

## 2021-12-06 PROCEDURE — 96374 THER/PROPH/DIAG INJ IV PUSH: CPT

## 2021-12-06 PROCEDURE — 83735 ASSAY OF MAGNESIUM: CPT

## 2021-12-06 PROCEDURE — 72125 CT NECK SPINE W/O DYE: CPT

## 2021-12-06 PROCEDURE — 36415 COLL VENOUS BLD VENIPUNCTURE: CPT

## 2021-12-06 RX ORDER — HYDROMORPHONE HYDROCHLORIDE 1 MG/ML
1 INJECTION, SOLUTION INTRAMUSCULAR; INTRAVENOUS; SUBCUTANEOUS ONCE
Status: COMPLETED | OUTPATIENT
Start: 2021-12-06 | End: 2021-12-06

## 2021-12-06 RX ADMIN — HYDROMORPHONE HYDROCHLORIDE 1 MG: 1 INJECTION, SOLUTION INTRAMUSCULAR; INTRAVENOUS; SUBCUTANEOUS at 12:48

## 2021-12-06 ASSESSMENT — ENCOUNTER SYMPTOMS
BACK PAIN: 0
NAUSEA: 0
RHINORRHEA: 0
FACIAL SWELLING: 1
SHORTNESS OF BREATH: 0
SORE THROAT: 0
VOMITING: 0
ABDOMINAL PAIN: 0
COLOR CHANGE: 1
DIARRHEA: 0

## 2021-12-06 ASSESSMENT — PAIN SCALES - GENERAL
PAINLEVEL_OUTOF10: 5
PAINLEVEL_OUTOF10: 6

## 2021-12-06 NOTE — ED NOTES
Pt to ED post fall at home. Pt was walking when she got 'tripped up' and fell down, hitting her face on the table and hurting her L foot. Pt unable to bear weight after fall. Pt had recent vascular sx by rufus.      Damaris Garnett RN  12/06/21 3431

## 2021-12-06 NOTE — ED NOTES
ASSESSMENT:  Pt to ED with co fall and recent vascular surgery to L leg. Pt reports getting up out of bed, losing her footing, and hitting her face on the bedside table. SKIN:  Warm, dry, pink. Cap refill < 2 sec  CARDIAC:  S1 S2 noted  LUNGS: clear upper and lower lobes. Respirations even and unlabored. ABDOMEN: bowel sounds noted upper and lower quadrants. Soft and tender. EXTREMITIES: bilateral DP and PT. No edema noted. L dp and pt dopplered and marked. L foot cold to touch and moddled appearing/purple in color    Pt alert and oriented x4. Pupils equal and reactive. No distress noted. Side rails up and call light within reach.        Kalyani Elder RN  12/06/21 8629

## 2021-12-06 NOTE — ED PROVIDER NOTES
[facility] EMERGENCY DEPT  eMERGENCY dEPARTMENT eNCOUnter      Pt Name: Britt Brown  MRN: [id number]  [place] 1955  Date of evaluation: 12/6/2021  Provider: Roque Bello MD    [address] [place] [address]       Chief Complaint   Patient presents with    Fall         HISTORY OF PRESENT ILLNESS   (Location/Symptom, Timing/Onset,Context/Setting, Quality, Duration, Modifying Factors, Severity)  Note limiting factors. Britt Brown is a 77 y.o. female who presents to the emergency department for a fall. The patient states that she does home peritoneal dialysis and after she unhooked herself she stood on the scale and lost her balance. She fell and hit her face on the vanity. She did not lose consciousness. She is not lightheaded or dizzy. The patient also reports that she is having extreme pain in her left foot which affects her balance. She has been seeing Dr. Kennedy Calhoun and went to the OR Nov 30. She states she has a pulse down to her ankle but her foot is gangrenous. They are discussing amputation but she does not want to proceed with amputation. Her pain is worsening. She takes Plavix and aspirin. HPI    NursingNotes were reviewed. REVIEW OF SYSTEMS    (2-9 systems for level 4, 10 or more for level 5)     Review of Systems   Constitutional: Negative for chills and fever. HENT: Positive for facial swelling. Negative for rhinorrhea and sore throat. Respiratory: Negative for shortness of breath. Cardiovascular: Negative for chest pain and leg swelling. Gastrointestinal: Negative for abdominal pain, diarrhea, nausea and vomiting. Genitourinary: Negative for difficulty urinating. Musculoskeletal: Positive for arthralgias. Negative for back pain and neck pain. Skin: Positive for color change. Negative for rash. Neurological: Negative for weakness and headaches. Psychiatric/Behavioral: Negative for confusion.        A complete review of systems was performed and is negative except as noted above in the Inject 20 Units into the skin nightly     LORAZEPAM (ATIVAN) 0.5 MG TABLET    Take 1 tablet by mouth daily as needed. MELATONIN 5 MG CAPS    Take 5 mg by mouth nightly    METHOXY PEG-EPOETIN BETA (MIRCERA IJ)    Inject 75 mcg into the skin    MULTIPLE VITAMINS-MINERALS (THERAPEUTIC MULTIVITAMIN-MINERALS) TABLET    Take 1 tablet by mouth daily    MUPIROCIN (BACTROBAN NASAL) 2 % NASAL OINTMENT    Take by Nasal route 2 times daily x 5 days starting on Wed. 11/24/2021    NITROGLYCERIN (NITROSTAT) 0.4 MG SL TABLET    Place 0.4 mg under the tongue every 5 minutes as needed for Chest pain up to max of 3 total doses. If no relief after 1 dose, call 911. OXYCODONE-ACETAMINOPHEN (PERCOCET)  MG PER TABLET    Take 1 tablet by mouth every 4 hours as needed for Pain for up to 15 days. Intended supply: 30 days    OXYCODONE-ACETAMINOPHEN (PERCOCET) 5-325 MG PER TABLET    Take 1 tablet by mouth every 8 hours as needed for Pain for up to 14 days. Intended supply: 5 days.  Take lowest dose possible to manage pain    PANTOPRAZOLE (PROTONIX) 40 MG TABLET    Take 1 tablet by mouth daily    POLYETHYLENE GLYCOL (MIRALAX) 17 GM/SCOOP POWDER    Take 1 packet by mouth    SUCRALFATE (CARAFATE) 1 GM TABLET    Take 1 tablet by mouth nightly    SUCROFERRIC OXYHYDROXIDE (VELPHORO) 500 MG CHEW    Take 500 mg by mouth 3 times daily (with meals)    VITAMIN D3 (CHOLECALCIFEROL) 125 MCG (5000 UT) TABS TABLET    Take 1 tablet by mouth daily       ALLERGIES     Tramadol, Sulfasalazine, Codeine, Demerol hcl [meperidine], Lisinopril, Morphine, Sulfa antibiotics, and Pentoxifylline    FAMILY HISTORY       Family History   Problem Relation Age of Onset    Coronary Art Dis Father     Diabetes Father     Kidney Disease Father     Stroke Father     Heart Disease Father     Heart Attack Father     Arrhythmia Sister     Heart Attack Sister     Coronary Art Dis Brother     Diabetes Brother     Heart Disease Brother     Heart Attack Brother  Heart Disease Mother     High Blood Pressure Mother           SOCIAL HISTORY       Social History     Socioeconomic History    Marital status:      Spouse name: None    Number of children: 1    Years of education: None    Highest education level: None   Occupational History    Occupation: Un-employed   Tobacco Use    Smoking status: Former Smoker    Smokeless tobacco: Never Used   Vaping Use    Vaping Use: Never used   Substance and Sexual Activity    Alcohol use: Not Currently     Comment: SOCIAL    Drug use: No    Sexual activity: Not Currently     Partners: Male   Other Topics Concern    None   Social History Narrative    None     Social Determinants of Health     Financial Resource Strain:     Difficulty of Paying Living Expenses: Not on file   Food Insecurity:     Worried About Running Out of Food in the Last Year: Not on file    Zayra of Food in the Last Year: Not on file   Transportation Needs:     Lack of Transportation (Medical): Not on file    Lack of Transportation (Non-Medical):  Not on file   Physical Activity:     Days of Exercise per Week: Not on file    Minutes of Exercise per Session: Not on file   Stress:     Feeling of Stress : Not on file   Social Connections:     Frequency of Communication with Friends and Family: Not on file    Frequency of Social Gatherings with Friends and Family: Not on file    Attends Caodaism Services: Not on file    Active Member of [address] [place] [address] or Organizations: Not on file    Attends Club or Organization Meetings: Not on file    Marital Status: Not on file   Intimate Partner Violence:     Fear of Current or Ex-Partner: Not on file    Emotionally Abused: Not on file    Physically Abused: Not on file    Sexually Abused: Not on file   Housing Stability:     Unable to Pay for Housing in the Last Year: Not on file    Number of [place] in the Last Year: Not on file    Unstable Housing in the Last Year: Not on file SCREENINGS             PHYSICAL EXAM    (up to 7 for level 4, 8 or more for level 5)     ED Triage Vitals   BP Temp Temp Source Pulse Resp SpO2 Height Weight   12/06/21 1107 12/06/21 1107 12/06/21 1107 12/06/21 1107 12/06/21 1107 12/06/21 1107 -- 12/06/21 1105   108/79 97.7 °F (36.5 °C) Temporal 68 20 99 %  150 lb (68 kg)       Physical Exam  Constitutional:       General: She is not in acute distress. Appearance: She is well-developed. She is not diaphoretic. HENT:      Head: Normocephalic. Eyes:      Pupils: Pupils are equal, round, and reactive to light. Cardiovascular:      Rate and Rhythm: Normal rate and regular rhythm. Heart sounds: Normal heart sounds. Pulmonary:      Effort: Pulmonary effort is normal. No respiratory distress. Breath sounds: Normal breath sounds. Abdominal:      General: Bowel sounds are normal. There is no distension. Palpations: Abdomen is soft. Tenderness: There is no abdominal tenderness. Musculoskeletal:         General: Normal range of motion. Cervical back: Normal range of motion and neck supple. Feet:      Comments: Erythematous left foot, cold to the touch, exquisitely tender, black toes, dopplerable pulse at marked areas  Skin:     General: Skin is warm and dry. Findings: No rash. Neurological:      Mental Status: She is alert and oriented to person, place, and time. Cranial Nerves: No cranial nerve deficit. Motor: No abnormal muscle tone.       Coordination: Coordination normal.   Psychiatric:         Behavior: Behavior normal.         DIAGNOSTIC RESULTS     EKG: All EKG's are interpreted by the Emergency Department Physician who either signs or Co-signs this chart in the absence of a cardiologist.        RADIOLOGY:   Non-plain film images such as CT, Ultrasound and MRI are read by the radiologist. Plainradiographic images are visualized and preliminarily interpreted by the emergency physician with the below findings:        Interpretation per the Radiologist below, if available at the time of this note:    CT FACIAL BONES WO CONTRAST   Final Result   1. No acute facial bone fracture. 2. Chronic paranasal sinus disease, especially left maxillary sinus,   with apex right septal bowing. Signed by Dr Magnus Anderson      CT Cervical Spine WO Contrast   Final Result   1. No evidence of acute osseous injury in the cervical spine. 2. Underlying degenerative change with multilevel degenerative   listhesis, as described above. Signed by Dr Maurice Washington   Final Result   1. No intracranial hemorrhage, edema or mass effect. 2. Small chronic lacunar infarct right caudate nucleus. 3. Mild atrophy. Low-density in the hemispheric white matter is   nonspecific and likely due to chronic small vessel disease. Vascular   calcification is noted. 4. A 1.7 cm hyperdense retention cyst versus polypoid mass in the   right maxillary sinus with a Hounsfield unit measurement of 49. The full report of this exam was immediately signed and available to   the emergency room. The patient is currently in the emergency room.    Signed by Dr Liana Lee            ED BEDSIDE ULTRASOUND:   Performed by ED Physician - none    LABS:  Labs Reviewed   CBC WITH AUTO DIFFERENTIAL - Abnormal; Notable for the following components:       Result Value    WBC 11.0 (*)     RBC 2.74 (*)     Hemoglobin 9.0 (*)     Hematocrit 27.5 (*)     .4 (*)     MCH 32.8 (*)     MCHC 32.7 (*)     Neutrophils % 76.1 (*)     Lymphocytes % 9.2 (*)     Monocytes % 10.9 (*)     Neutrophils Absolute 8.4 (*)     Lymphocytes Absolute 1.0 (*)     Monocytes Absolute 1.20 (*)     All other components within normal limits   COMPREHENSIVE METABOLIC PANEL W/ REFLEX TO MG FOR LOW K - Abnormal; Notable for the following components:    Sodium 133 (*)     Potassium reflex Magnesium 2.6 (*)     Chloride 92 (*)     BUN 43 (*)     CREATININE 11.2 (*) GFR Non- 3 (*)     GFR  4 (*)     Total Protein 5.2 (*)     Albumin 2.2 (*)     All other components within normal limits    Narrative:     CALL  Jimenez  K[facility] tel. ,  Chemistry results called to and read back by Reji/RN/ER, 12/06/2021 13:11,  by Mook Loza - Abnormal; Notable for the following components:    Protime 15.4 (*)     INR 1.20 (*)     All other components within normal limits   APTT - Abnormal; Notable for the following components:    aPTT 52.0 (*)     All other components within normal limits   MAGNESIUM    Narrative:     CALL  Jimenez  [facility] tel. ,  Chemistry results called to and read back by Reji/RN/ER, 12/06/2021 13:11,  by [facility]       All other labs were within normal range or not returned as of this dictation. EMERGENCY DEPARTMENT COURSE and DIFFERENTIALDIAGNOSIS/MDM:   Vitals:    Vitals:    12/06/21 1105 12/06/21 1107   BP:  108/79   Pulse:  68   Resp:  20   Temp:  97.7 °F (36.5 °C)   TempSrc:  Temporal   SpO2:  99%   Weight: 150 lb (68 kg)        MDM  D/w Dr Lino Vidal and he states that the only thing he can offer at this point is amputation. No need for new studies at this time. If she wants a true 2nd opinion, she can follow up with Bria Sherman or Juliana at [place]. If she wants to further discuss amputation, he can see her in the office to get it scheduled. CONSULTS:  None    PROCEDURES:  Unless otherwise notedbelow, none     Procedures    FINAL IMPRESSION     1. Contusion of face, initial encounter    2. Maxillary cyst    3.  Peripheral vascular disease (HCC)          DISPOSITION/PLAN   DISPOSITION        PATIENT REFERRED TO:  @FUP@    DISCHARGE MEDICATIONS:  New Prescriptions    No medications on file          (Please note that portions of this note were completed with a voice recognition program.  Efforts were made to edit the dictations butoccasionally words are mis-transcribed.)    Joseph Shafer MD (electronically signed)  AttendingEmergency Physician         Juan M Crowder MD  12/06/21 0979

## 2021-12-07 DIAGNOSIS — I70.213 ATHEROSCLEROSIS OF NATIVE ARTERY OF BOTH LOWER EXTREMITIES WITH INTERMITTENT CLAUDICATION (HCC): Primary | ICD-10-CM

## 2021-12-08 NOTE — OP NOTE
Preprocedure diagnosis:  1. Atherosclerosis of native arteries bilateral lower extremities with rest pain left lower extremity  2. Ischemic left great toe, likely secondary to small vessel disease versus embolization  3. End-stage renal disease on peritoneal dialysis    Post procedure diagnosis: Same    Procedure:  1. Ultrasound guided antegrade cannulation of left proximal superficial femoral artery with 5 Japanese glide sheath  2. Selective left lower extremity arteriograms  3. Atherectomy left posterior tibial artery with CSI 1.25 solid crown  4. Balloon angioplasty left posterior tibial artery with 2 mm x 220 mm diameter balloon  5. Left popliteal artery balloon angioplasty with 5 mm x 40 mm  balloon and 6 mm x 60 mm Lutonix drug-coated balloon  6. Completion left lower extremity arteriograms  7. Minx closure left proximal superficial femoral artery puncture site    Surgeon: Braxton Morales MD    Anesthesia: General    Estimated blood loss: Less than 50 mL    Findings:  1. The left superficial femoral artery is fairly widely patent. There is a moderate 60 to 70% stenosis in the left popliteal artery behind the knee. The left anterior tibial artery is widely patent all the way down to the foot but the distal dorsalis pedis artery is occluded. The tibial peroneal trunk is fairly widely patent. The peroneal artery is patent. There is severe multifocal high-grade stenoses/occlusions in the posterior tibial artery. The lateral and medial plantar arteries are very small vessels. She has severe distal pedal arterial disease. Procedure in detail:    After the patient was consented and given intravenous antibiotics, she was brought to the hybrid operating room placed on the table supine position. General anesthesia was achieved and the bilateral groins, left leg, and foot were prepped and draped in usual sterile procedure.     Micropuncture needle was used to cannulate the proximal left superficial femoral artery under ultrasound guidance. I punctured here to try to avoid pannus. Seldinger technique was utilized to place a 5 Western Litzy glide sheath. Selective left lower extremity arteriograms were performed with above findings. The patient was given intravenous heparin. I was able to traverse the high-grade stenoses in the left posterior tibial artery with an 014 command wire and CXI catheter. I then exchanged for the Viper wire. Orbital atherectomy was performed the left posterior tibial artery with CSI 1.25 solid crown. Multiple passes were performed on low, medium, and finally high speed on multiple levels of the posterior tibial artery. Arteriograms show some luminal gain but still significant stenosis. Balloon angioplasty was performed of the left posterior tibial artery with a 2 mm x 220 mm coyote balloon. This balloon was left inflated for at least 3 minutes. Angiograms after this show significant spasm so intra-arterial nitroglycerin was administered. Final arteriograms reveal a good result. Of note, the medial plantar artery is not well visualized but I think it is consistent with spasm over embolization. The posterior double artery is now widely patent. Balloon angioplasty was performed of the left popliteal artery behind the knee with a 5 mm  balloon. I followed this with a 6 mm x 60 mm Lutonix drug-coated balloon. The drug-coated balloon was left inflated for 3 minutes. Completion arteriograms here show an excellent result with no significant residual stenosis nor flow-limiting dissection. The 5 Slovak sheath was removed and the puncture site closed with a minx device. Pressure was applied for at least 10 minutes and then the patient was awakened from general anesthesia and brought to the recovery room in good condition.

## 2021-12-09 PROBLEM — E87.6 HYPOKALEMIA: Status: ACTIVE | Noted: 2021-01-01

## 2021-12-10 PROBLEM — I73.9 PERIPHERAL VASCULAR DISEASE (HCC): Status: ACTIVE | Noted: 2021-01-01

## 2021-12-10 PROBLEM — I99.8 ISCHEMIC LEG: Status: ACTIVE | Noted: 2021-01-01

## 2021-12-10 PROBLEM — E11.51 SMALL VESSEL ARTERIAL DISEASE DUE TO TYPE 2 DIABETES MELLITUS (HCC): Status: ACTIVE | Noted: 2021-01-01

## 2021-12-13 PROBLEM — E87.1 HYPONATREMIA: Status: ACTIVE | Noted: 2021-01-01

## 2021-12-13 NOTE — ANESTHESIA PREPROCEDURE EVALUATION
Anesthesia Evaluation     Patient summary reviewed   history of anesthetic complications: PONV  NPO Solid Status: > 8 hours  NPO Liquid Status: > 8 hours           Airway   Mallampati: I  TM distance: >3 FB  Neck ROM: full  Dental    (+) partials    Pulmonary    (+) pleural effusion (s/p chest tube may 2021, resolved), sleep apnea (does not use cpap after 130 lbs weight loss),   (-) asthma  Cardiovascular   Exercise tolerance: poor (<4 METS)    ECG reviewed    (+) hypertension, past MI , CAD, CABG >6 Months, cardiac stents (x2, 2016) more than 12 months ago dysrhythmias, PVD, hyperlipidemia,     ROS comment: Echo 9/2021 EF 60-65%    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

## 2021-12-13 NOTE — ANESTHESIA PROCEDURE NOTES
Airway  Urgency: elective    Date/Time: 12/13/2021 10:36 AM  Airway not difficult    General Information and Staff    Patient location during procedure: OR  CRNA: Ayanna Vergara CRNA    Indications and Patient Condition  Indications for airway management: airway protection    Preoxygenated: yes  Mask difficulty assessment: 1 - vent by mask    Final Airway Details  Final airway type: endotracheal airway      Successful airway: ETT  Cuffed: yes   Successful intubation technique: direct laryngoscopy  Facilitating devices/methods: intubating stylet  Endotracheal tube insertion site: oral  Blade: Gray  Blade size: 2  ETT size (mm): 7.0  Cormack-Lehane Classification: grade IIa - partial view of glottis  Placement verified by: chest auscultation and capnometry   Cuff volume (mL): 8  Measured from: lips  ETT/EBT  to lips (cm): 21  Number of attempts at approach: 1  Assessment: lips, teeth, and gum same as pre-op and atraumatic intubation    Additional Comments  Lip accidentally pinched with intubation with small laceration.

## 2021-12-14 NOTE — ANESTHESIA POSTPROCEDURE EVALUATION
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        Post Anesthesia Care and Evaluation    Patient location during evaluation: PACU  Patient participation: complete - patient participated  Level of consciousness: awake and alert  Pain management: adequate  Airway patency: patent  Anesthetic complications: No anesthetic complications    Cardiovascular status: acceptable  Respiratory status: acceptable  Hydration status: acceptable    Comments: Blood pressure 108/49, pulse 82, temperature 98.2 °F (36.8 °C), temperature source Axillary, resp. rate 16, height 160 cm (63\"), weight 71 kg (156 lb 8 oz), SpO2 100 %, not currently breastfeeding.    Pt discharged from PACU based on johnnie score >8      "

## 2021-12-20 NOTE — ANESTHESIA POSTPROCEDURE EVALUATION
"Patient: Jennifer Salcido    Procedure Summary     Date: 12/20/21 Room / Location: [facility] OR  / [facility] HYBRID OR 12    Anesthesia Start: 0815 Anesthesia Stop: 0840    Procedure: HEMODIALYSIS CATHETER INSERTION (Right Neck) Diagnosis:     Surgeons: Simon Coates MD Provider: Srikanth Esparza CRNA    Anesthesia Type: general ASA Status: 3          Anesthesia Type: general    Vitals  Vitals Value Taken Time   /53 12/20/21 1310   Temp 99.4 °F (37.4 °C) 12/20/21 1310   Pulse 85 12/20/21 1310   Resp 15 12/20/21 1310   SpO2 100 % 12/20/21 1310           Post Anesthesia Care and Evaluation    Patient location during evaluation: PACU  Patient participation: complete - patient participated  Level of consciousness: awake and alert  Pain management: adequate  Airway patency: patent  Anesthetic complications: No anesthetic complications    Cardiovascular status: acceptable  Respiratory status: acceptable  Hydration status: acceptable    Comments: Blood pressure 103/50, pulse 86, temperature 98.4 °F (36.9 °C), temperature source Oral, resp. rate 16, height 160 cm (63\"), weight 71 kg (156 lb 8 oz), SpO2 100 %, not currently breastfeeding.    Pt discharged from PACU based on johnnie score >8      "

## 2021-12-20 NOTE — ANESTHESIA PREPROCEDURE EVALUATION
Anesthesia Evaluation     Patient summary reviewed   history of anesthetic complications: PONV  NPO Solid Status: > 8 hours  NPO Liquid Status: > 8 hours           Airway   Mallampati: I  TM distance: >3 FB  Neck ROM: full  Dental    (+) partials    Pulmonary    (+) pleural effusion (s/p chest tube may 2021, resolved), sleep apnea (does not use cpap after 130 lbs weight loss),   (-) asthma  Cardiovascular   Exercise tolerance: poor (<4 METS)    ECG reviewed    (+) hypertension, past MI , CAD, CABG >6 Months, cardiac stents (x2, 2016) more than 12 months ago dysrhythmias, PVD, hyperlipidemia,     ROS comment: Echo 9/2021 EF 60-65%    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 I confirmed with her that she is comfortable with rescinding the NO CPR.)  intravenous induction     Anesthetic plan, all risks, benefits, and alternatives have been provided, discussed and informed consent has been obtained with: patient.

## 2021-12-28 NOTE — TELEPHONE ENCOUNTER
Lynne ROMAN from [facility] has concerns about left BKA done on 12/13/21.  Lynne has concerns about blisters that have opened up around sutured area of stump.  Lynne sent pics through email and they are scanned into pt's chart.     Sent a picture to Dr Coates and order has been placed for referral to [facility] by Dr Coates.  Pt will be evaluated and cared for there.  Called Lynne at [phone] with plans by .

## 2021-12-28 NOTE — TELEPHONE ENCOUNTER
Called Lynne ROMAN at [facility] to see if pt is going to be able to make it to the 3:00 appt at [facility] since I had not heard back from her.  Lynne states she had not listened to my messages and pt is at dialysis and will be finishing up and she will find out if they can transport over to [facility] after leaving dialysis.  I gave her the direct line number to wound care.

## 2021-12-30 NOTE — TELEPHONE ENCOUNTER
Pt is at a nursing facility and was just seen.  I asked Dr. Coates if she needed to keep this appt.  He said no.  He will be seeing her in  on 1/7/22 and said that at that time, he will decide when she should follow up in our office.

## 2022-01-01 ENCOUNTER — ANESTHESIA (OUTPATIENT)
Dept: PERIOP | Facility: HOSPITAL | Age: 67
End: 2022-01-01

## 2022-01-01 ENCOUNTER — HOSPITAL ENCOUNTER (INPATIENT)
Facility: HOSPITAL | Age: 67
LOS: 6 days | End: 2022-01-20
Attending: EMERGENCY MEDICINE | Admitting: FAMILY MEDICINE

## 2022-01-01 ENCOUNTER — APPOINTMENT (OUTPATIENT)
Dept: GENERAL RADIOLOGY | Facility: HOSPITAL | Age: 67
End: 2022-01-01

## 2022-01-01 ENCOUNTER — APPOINTMENT (OUTPATIENT)
Dept: CT IMAGING | Facility: HOSPITAL | Age: 67
End: 2022-01-01

## 2022-01-01 ENCOUNTER — HOSPITAL ENCOUNTER (INPATIENT)
Facility: HOSPITAL | Age: 67
LOS: 5 days | Discharge: NURSING, REHAB OR LONG-TERM CARE | End: 2022-01-10
Attending: EMERGENCY MEDICINE | Admitting: SURGERY

## 2022-01-01 ENCOUNTER — LAB REQUISITION (OUTPATIENT)
Dept: LAB | Facility: HOSPITAL | Age: 67
End: 2022-01-01

## 2022-01-01 ENCOUNTER — ANESTHESIA EVENT (OUTPATIENT)
Dept: PERIOP | Facility: HOSPITAL | Age: 67
End: 2022-01-01

## 2022-01-01 ENCOUNTER — OFFICE VISIT (OUTPATIENT)
Dept: WOUND CARE | Facility: HOSPITAL | Age: 67
End: 2022-01-01

## 2022-01-01 ENCOUNTER — TELEPHONE (OUTPATIENT)
Dept: VASCULAR SURGERY | Facility: CLINIC | Age: 67
End: 2022-01-01

## 2022-01-01 VITALS
BODY MASS INDEX: 26.84 KG/M2 | SYSTOLIC BLOOD PRESSURE: 80 MMHG | DIASTOLIC BLOOD PRESSURE: 48 MMHG | RESPIRATION RATE: 14 BRPM | OXYGEN SATURATION: 100 % | TEMPERATURE: 98.3 F | HEIGHT: 63 IN | HEART RATE: 102 BPM | WEIGHT: 151.46 LBS

## 2022-01-01 VITALS
HEIGHT: 63 IN | RESPIRATION RATE: 16 BRPM | HEART RATE: 95 BPM | TEMPERATURE: 98.7 F | WEIGHT: 150.5 LBS | OXYGEN SATURATION: 93 % | BODY MASS INDEX: 26.67 KG/M2 | DIASTOLIC BLOOD PRESSURE: 63 MMHG | SYSTOLIC BLOOD PRESSURE: 140 MMHG

## 2022-01-01 DIAGNOSIS — Z78.9 DECREASED ACTIVITIES OF DAILY LIVING (ADL): ICD-10-CM

## 2022-01-01 DIAGNOSIS — Z74.09 IMPAIRED MOBILITY: ICD-10-CM

## 2022-01-01 DIAGNOSIS — I25.10 CORONARY ARTERY DISEASE INVOLVING NATIVE CORONARY ARTERY OF NATIVE HEART WITHOUT ANGINA PECTORIS: ICD-10-CM

## 2022-01-01 DIAGNOSIS — Z00.00 ENCOUNTER FOR GENERAL ADULT MEDICAL EXAMINATION WITHOUT ABNORMAL FINDINGS: ICD-10-CM

## 2022-01-01 DIAGNOSIS — N18.9 CHRONIC KIDNEY DISEASE, UNSPECIFIED CKD STAGE: ICD-10-CM

## 2022-01-01 DIAGNOSIS — Z99.2 ESRD ON DIALYSIS: ICD-10-CM

## 2022-01-01 DIAGNOSIS — Z99.2 TYPE 2 DIABETES MELLITUS WITH CHRONIC KIDNEY DISEASE ON CHRONIC DIALYSIS, WITHOUT LONG-TERM CURRENT USE OF INSULIN: ICD-10-CM

## 2022-01-01 DIAGNOSIS — D64.9 CHRONIC ANEMIA: ICD-10-CM

## 2022-01-01 DIAGNOSIS — E87.1 HYPONATREMIA: ICD-10-CM

## 2022-01-01 DIAGNOSIS — N18.6 TYPE 2 DIABETES MELLITUS WITH CHRONIC KIDNEY DISEASE ON CHRONIC DIALYSIS, WITHOUT LONG-TERM CURRENT USE OF INSULIN: ICD-10-CM

## 2022-01-01 DIAGNOSIS — I73.9 PERIPHERAL VASCULAR DISEASE: ICD-10-CM

## 2022-01-01 DIAGNOSIS — I10 ESSENTIAL HYPERTENSION: ICD-10-CM

## 2022-01-01 DIAGNOSIS — Z01.818 PRE-OP EVALUATION: Primary | ICD-10-CM

## 2022-01-01 DIAGNOSIS — E11.22 TYPE 2 DIABETES MELLITUS WITH CHRONIC KIDNEY DISEASE ON CHRONIC DIALYSIS, WITHOUT LONG-TERM CURRENT USE OF INSULIN: ICD-10-CM

## 2022-01-01 DIAGNOSIS — E11.628 TYPE 2 DIABETES MELLITUS WITH OTHER SKIN COMPLICATIONS: ICD-10-CM

## 2022-01-01 DIAGNOSIS — T87.50: ICD-10-CM

## 2022-01-01 DIAGNOSIS — L89.313 PRESSURE ULCER OF RIGHT BUTTOCK, STAGE 3: ICD-10-CM

## 2022-01-01 DIAGNOSIS — E78.5 DYSLIPIDEMIA: ICD-10-CM

## 2022-01-01 DIAGNOSIS — K92.2 GASTROINTESTINAL HEMORRHAGE, UNSPECIFIED GASTROINTESTINAL HEMORRHAGE TYPE: Primary | ICD-10-CM

## 2022-01-01 DIAGNOSIS — I99.8 ISCHEMIA OF FOOT: ICD-10-CM

## 2022-01-01 DIAGNOSIS — N18.6 ESRD ON DIALYSIS: ICD-10-CM

## 2022-01-01 DIAGNOSIS — N18.6 END STAGE RENAL DISEASE: ICD-10-CM

## 2022-01-01 DIAGNOSIS — Z89.612 ACQUIRED ABSENCE OF LEFT LEG ABOVE KNEE: ICD-10-CM

## 2022-01-01 DIAGNOSIS — E87.6 HYPOKALEMIA: ICD-10-CM

## 2022-01-01 DIAGNOSIS — I99.8 ISCHEMIC LEG: ICD-10-CM

## 2022-01-01 DIAGNOSIS — E11.51 SMALL VESSEL ARTERIAL DISEASE DUE TO TYPE 2 DIABETES MELLITUS: ICD-10-CM

## 2022-01-01 DIAGNOSIS — R57.9 SHOCK: ICD-10-CM

## 2022-01-01 LAB
ABO GROUP BLD: NORMAL
ALBUMIN SERPL-MCNC: 1.5 G/DL (ref 3.5–5.2)
ALBUMIN SERPL-MCNC: 1.7 G/DL (ref 3.5–5.2)
ALBUMIN SERPL-MCNC: 1.8 G/DL (ref 3.5–5.2)
ALBUMIN SERPL-MCNC: 1.9 G/DL (ref 3.5–5.2)
ALBUMIN SERPL-MCNC: 2.1 G/DL (ref 3.5–5.2)
ALBUMIN SERPL-MCNC: 2.3 G/DL (ref 3.5–5.2)
ALBUMIN/GLOB SERPL: 0.6 G/DL
ALBUMIN/GLOB SERPL: 0.7 G/DL
ALBUMIN/GLOB SERPL: 0.7 G/DL
ALBUMIN/GLOB SERPL: 0.8 G/DL
ALBUMIN/GLOB SERPL: 0.8 G/DL
ALBUMIN/GLOB SERPL: 0.9 G/DL
ALP SERPL-CCNC: 123 U/L (ref 39–117)
ALP SERPL-CCNC: 133 U/L (ref 39–117)
ALP SERPL-CCNC: 80 U/L (ref 39–117)
ALP SERPL-CCNC: 84 U/L (ref 39–117)
ALP SERPL-CCNC: 87 U/L (ref 39–117)
ALP SERPL-CCNC: 92 U/L (ref 39–117)
ALT SERPL W P-5'-P-CCNC: 6 U/L (ref 1–33)
ALT SERPL W P-5'-P-CCNC: 7 U/L (ref 1–33)
ALT SERPL W P-5'-P-CCNC: <5 U/L (ref 1–33)
AMMONIA BLD-SCNC: 12 UMOL/L (ref 11–51)
ANION GAP SERPL CALCULATED.3IONS-SCNC: 10 MMOL/L (ref 5–15)
ANION GAP SERPL CALCULATED.3IONS-SCNC: 10 MMOL/L (ref 5–15)
ANION GAP SERPL CALCULATED.3IONS-SCNC: 13 MMOL/L (ref 5–15)
ANION GAP SERPL CALCULATED.3IONS-SCNC: 14 MMOL/L (ref 5–15)
ANION GAP SERPL CALCULATED.3IONS-SCNC: 14 MMOL/L (ref 5–15)
ANION GAP SERPL CALCULATED.3IONS-SCNC: 5 MMOL/L (ref 5–15)
ANION GAP SERPL CALCULATED.3IONS-SCNC: 7 MMOL/L (ref 5–15)
ANION GAP SERPL CALCULATED.3IONS-SCNC: 8 MMOL/L (ref 5–15)
APTT PPP: 40.9 SECONDS (ref 24.1–35)
APTT PPP: 45.5 SECONDS (ref 24.1–35)
ARTERIAL PATENCY WRIST A: ABNORMAL
ARTERIAL PATENCY WRIST A: POSITIVE
AST SERPL-CCNC: 14 U/L (ref 1–32)
AST SERPL-CCNC: 16 U/L (ref 1–32)
AST SERPL-CCNC: 17 U/L (ref 1–32)
AST SERPL-CCNC: 20 U/L (ref 1–32)
AST SERPL-CCNC: 22 U/L (ref 1–32)
AST SERPL-CCNC: 25 U/L (ref 1–32)
ATMOSPHERIC PRESS: 751 MMHG
ATMOSPHERIC PRESS: 753 MMHG
BASE EXCESS BLDA CALC-SCNC: 3.5 MMOL/L (ref 0–2)
BASE EXCESS BLDA CALC-SCNC: 4.5 MMOL/L (ref 0–2)
BASOPHILS # BLD AUTO: 0 10*3/MM3 (ref 0–0.2)
BASOPHILS # BLD AUTO: 0.01 10*3/MM3 (ref 0–0.2)
BASOPHILS # BLD AUTO: 0.03 10*3/MM3 (ref 0–0.2)
BASOPHILS # BLD AUTO: 0.05 10*3/MM3 (ref 0–0.2)
BASOPHILS # BLD AUTO: 0.05 10*3/MM3 (ref 0–0.2)
BASOPHILS NFR BLD AUTO: 0 % (ref 0–1.5)
BASOPHILS NFR BLD AUTO: 0.1 % (ref 0–1.5)
BASOPHILS NFR BLD AUTO: 0.2 % (ref 0–1.5)
BASOPHILS NFR BLD AUTO: 0.7 % (ref 0–1.5)
BASOPHILS NFR BLD AUTO: 0.7 % (ref 0–1.5)
BDY SITE: ABNORMAL
BDY SITE: ABNORMAL
BH BB BLOOD EXPIRATION DATE: NORMAL
BH BB BLOOD EXPIRATION DATE: NORMAL
BH BB BLOOD TYPE BARCODE: 6200
BH BB BLOOD TYPE BARCODE: 6200
BH BB DISPENSE STATUS: NORMAL
BH BB DISPENSE STATUS: NORMAL
BH BB PRODUCT CODE: NORMAL
BH BB PRODUCT CODE: NORMAL
BH BB UNIT NUMBER: NORMAL
BH BB UNIT NUMBER: NORMAL
BILIRUB SERPL-MCNC: 0.2 MG/DL (ref 0–1.2)
BILIRUB SERPL-MCNC: 0.2 MG/DL (ref 0–1.2)
BILIRUB SERPL-MCNC: 0.3 MG/DL (ref 0–1.2)
BLD GP AB SCN SERPL QL: NEGATIVE
BODY TEMPERATURE: 37 C
BODY TEMPERATURE: 37 C
BUN SERPL-MCNC: 12 MG/DL (ref 8–23)
BUN SERPL-MCNC: 12 MG/DL (ref 8–23)
BUN SERPL-MCNC: 13 MG/DL (ref 8–23)
BUN SERPL-MCNC: 14 MG/DL (ref 8–23)
BUN SERPL-MCNC: 16 MG/DL (ref 8–23)
BUN SERPL-MCNC: 17 MG/DL (ref 8–23)
BUN SERPL-MCNC: 20 MG/DL (ref 8–23)
BUN SERPL-MCNC: 21 MG/DL (ref 8–23)
BUN SERPL-MCNC: 8 MG/DL (ref 8–23)
BUN SERPL-MCNC: 8 MG/DL (ref 8–23)
BUN/CREAT SERPL: 2.7 (ref 7–25)
BUN/CREAT SERPL: 2.9 (ref 7–25)
BUN/CREAT SERPL: 3.2 (ref 7–25)
BUN/CREAT SERPL: 3.6 (ref 7–25)
BUN/CREAT SERPL: 4 (ref 7–25)
BUN/CREAT SERPL: 4 (ref 7–25)
BUN/CREAT SERPL: 4.1 (ref 7–25)
BUN/CREAT SERPL: 4.3 (ref 7–25)
BUN/CREAT SERPL: 4.4 (ref 7–25)
BUN/CREAT SERPL: 4.4 (ref 7–25)
BUN/CREAT SERPL: 4.7 (ref 7–25)
BUN/CREAT SERPL: 5.6 (ref 7–25)
CALCIUM SPEC-SCNC: 7.3 MG/DL (ref 8.6–10.5)
CALCIUM SPEC-SCNC: 7.7 MG/DL (ref 8.6–10.5)
CALCIUM SPEC-SCNC: 7.8 MG/DL (ref 8.6–10.5)
CALCIUM SPEC-SCNC: 7.9 MG/DL (ref 8.6–10.5)
CALCIUM SPEC-SCNC: 8 MG/DL (ref 8.6–10.5)
CALCIUM SPEC-SCNC: 8.1 MG/DL (ref 8.6–10.5)
CALCIUM SPEC-SCNC: 8.1 MG/DL (ref 8.6–10.5)
CALCIUM SPEC-SCNC: 8.2 MG/DL (ref 8.6–10.5)
CALCIUM SPEC-SCNC: 8.2 MG/DL (ref 8.6–10.5)
CALCIUM SPEC-SCNC: 8.3 MG/DL (ref 8.6–10.5)
CALCIUM SPEC-SCNC: 8.4 MG/DL (ref 8.6–10.5)
CALCIUM SPEC-SCNC: 8.8 MG/DL (ref 8.6–10.5)
CHLORIDE SERPL-SCNC: 100 MMOL/L (ref 98–107)
CHLORIDE SERPL-SCNC: 101 MMOL/L (ref 98–107)
CHLORIDE SERPL-SCNC: 101 MMOL/L (ref 98–107)
CHLORIDE SERPL-SCNC: 103 MMOL/L (ref 98–107)
CHLORIDE SERPL-SCNC: 93 MMOL/L (ref 98–107)
CHLORIDE SERPL-SCNC: 94 MMOL/L (ref 98–107)
CHLORIDE SERPL-SCNC: 96 MMOL/L (ref 98–107)
CHLORIDE SERPL-SCNC: 97 MMOL/L (ref 98–107)
CHLORIDE SERPL-SCNC: 98 MMOL/L (ref 98–107)
CHLORIDE SERPL-SCNC: 98 MMOL/L (ref 98–107)
CHOLEST SERPL-MCNC: 84 MG/DL (ref 0–200)
CHOLEST SERPL-MCNC: 84 MG/DL (ref 0–200)
CO2 SERPL-SCNC: 22 MMOL/L (ref 22–29)
CO2 SERPL-SCNC: 23 MMOL/L (ref 22–29)
CO2 SERPL-SCNC: 24 MMOL/L (ref 22–29)
CO2 SERPL-SCNC: 24 MMOL/L (ref 22–29)
CO2 SERPL-SCNC: 26 MMOL/L (ref 22–29)
CO2 SERPL-SCNC: 27 MMOL/L (ref 22–29)
CO2 SERPL-SCNC: 27 MMOL/L (ref 22–29)
CO2 SERPL-SCNC: 28 MMOL/L (ref 22–29)
CO2 SERPL-SCNC: 28 MMOL/L (ref 22–29)
CO2 SERPL-SCNC: 29 MMOL/L (ref 22–29)
CO2 SERPL-SCNC: 34 MMOL/L (ref 22–29)
CO2 SERPL-SCNC: 34 MMOL/L (ref 22–29)
CREAT SERPL-MCNC: 2.21 MG/DL (ref 0.57–1)
CREAT SERPL-MCNC: 2.95 MG/DL (ref 0.57–1)
CREAT SERPL-MCNC: 3.01 MG/DL (ref 0.57–1)
CREAT SERPL-MCNC: 3.03 MG/DL (ref 0.57–1)
CREAT SERPL-MCNC: 3.06 MG/DL (ref 0.57–1)
CREAT SERPL-MCNC: 3.19 MG/DL (ref 0.57–1)
CREAT SERPL-MCNC: 3.61 MG/DL (ref 0.57–1)
CREAT SERPL-MCNC: 4.13 MG/DL (ref 0.57–1)
CREAT SERPL-MCNC: 4.43 MG/DL (ref 0.57–1)
CREAT SERPL-MCNC: 4.49 MG/DL (ref 0.57–1)
CREAT SERPL-MCNC: 4.62 MG/DL (ref 0.57–1)
CREAT SERPL-MCNC: 5.29 MG/DL (ref 0.57–1)
CROSSMATCH INTERPRETATION: NORMAL
CROSSMATCH INTERPRETATION: NORMAL
CYTO UR: NORMAL
DEPRECATED RDW RBC AUTO: 57.4 FL (ref 37–54)
DEPRECATED RDW RBC AUTO: 61.2 FL (ref 37–54)
DEPRECATED RDW RBC AUTO: 63.5 FL (ref 37–54)
DEPRECATED RDW RBC AUTO: 63.6 FL (ref 37–54)
DEPRECATED RDW RBC AUTO: 63.8 FL (ref 37–54)
DEPRECATED RDW RBC AUTO: 63.8 FL (ref 37–54)
DEPRECATED RDW RBC AUTO: 64.8 FL (ref 37–54)
DEPRECATED RDW RBC AUTO: 65.2 FL (ref 37–54)
DEPRECATED RDW RBC AUTO: 65.7 FL (ref 37–54)
DEPRECATED RDW RBC AUTO: 66 FL (ref 37–54)
DEPRECATED RDW RBC AUTO: 66.3 FL (ref 37–54)
DEPRECATED RDW RBC AUTO: 66.5 FL (ref 37–54)
EOSINOPHIL # BLD AUTO: 0 10*3/MM3 (ref 0–0.4)
EOSINOPHIL # BLD AUTO: 0.03 10*3/MM3 (ref 0–0.4)
EOSINOPHIL # BLD AUTO: 0.07 10*3/MM3 (ref 0–0.4)
EOSINOPHIL # BLD AUTO: 0.07 10*3/MM3 (ref 0–0.4)
EOSINOPHIL # BLD AUTO: 0.27 10*3/MM3 (ref 0–0.4)
EOSINOPHIL # BLD AUTO: 0.29 10*3/MM3 (ref 0–0.4)
EOSINOPHIL # BLD AUTO: 0.29 10*3/MM3 (ref 0–0.4)
EOSINOPHIL # BLD AUTO: 0.35 10*3/MM3 (ref 0–0.4)
EOSINOPHIL # BLD AUTO: 0.39 10*3/MM3 (ref 0–0.4)
EOSINOPHIL # BLD AUTO: 0.45 10*3/MM3 (ref 0–0.4)
EOSINOPHIL # BLD AUTO: 0.49 10*3/MM3 (ref 0–0.4)
EOSINOPHIL NFR BLD AUTO: 0 % (ref 0.3–6.2)
EOSINOPHIL NFR BLD AUTO: 0.3 % (ref 0.3–6.2)
EOSINOPHIL NFR BLD AUTO: 0.6 % (ref 0.3–6.2)
EOSINOPHIL NFR BLD AUTO: 1.1 % (ref 0.3–6.2)
EOSINOPHIL NFR BLD AUTO: 4.4 % (ref 0.3–6.2)
EOSINOPHIL NFR BLD AUTO: 4.9 % (ref 0.3–6.2)
EOSINOPHIL NFR BLD AUTO: 5.3 % (ref 0.3–6.2)
EOSINOPHIL NFR BLD AUTO: 5.8 % (ref 0.3–6.2)
EOSINOPHIL NFR BLD AUTO: 6.8 % (ref 0.3–6.2)
EOSINOPHIL NFR BLD AUTO: 7.1 % (ref 0.3–6.2)
EOSINOPHIL NFR BLD AUTO: 8.9 % (ref 0.3–6.2)
ERYTHROCYTE [DISTWIDTH] IN BLOOD BY AUTOMATED COUNT: 16.5 % (ref 12.3–15.4)
ERYTHROCYTE [DISTWIDTH] IN BLOOD BY AUTOMATED COUNT: 16.6 % (ref 12.3–15.4)
ERYTHROCYTE [DISTWIDTH] IN BLOOD BY AUTOMATED COUNT: 16.9 % (ref 12.3–15.4)
ERYTHROCYTE [DISTWIDTH] IN BLOOD BY AUTOMATED COUNT: 16.9 % (ref 12.3–15.4)
ERYTHROCYTE [DISTWIDTH] IN BLOOD BY AUTOMATED COUNT: 17 % (ref 12.3–15.4)
ERYTHROCYTE [DISTWIDTH] IN BLOOD BY AUTOMATED COUNT: 17.2 % (ref 12.3–15.4)
ERYTHROCYTE [DISTWIDTH] IN BLOOD BY AUTOMATED COUNT: 17.2 % (ref 12.3–15.4)
ERYTHROCYTE [DISTWIDTH] IN BLOOD BY AUTOMATED COUNT: 18.2 % (ref 12.3–15.4)
ERYTHROCYTE [DISTWIDTH] IN BLOOD BY AUTOMATED COUNT: 18.7 % (ref 12.3–15.4)
ERYTHROCYTE [DISTWIDTH] IN BLOOD BY AUTOMATED COUNT: 18.9 % (ref 12.3–15.4)
ERYTHROCYTE [DISTWIDTH] IN BLOOD BY AUTOMATED COUNT: 19.2 % (ref 12.3–15.4)
ERYTHROCYTE [DISTWIDTH] IN BLOOD BY AUTOMATED COUNT: 19.6 % (ref 12.3–15.4)
GFR SERPL CREATININE-BSD FRML MDRD: 10 ML/MIN/1.73
GFR SERPL CREATININE-BSD FRML MDRD: 10 ML/MIN/1.73
GFR SERPL CREATININE-BSD FRML MDRD: 11 ML/MIN/1.73
GFR SERPL CREATININE-BSD FRML MDRD: 13 ML/MIN/1.73
GFR SERPL CREATININE-BSD FRML MDRD: 15 ML/MIN/1.73
GFR SERPL CREATININE-BSD FRML MDRD: 16 ML/MIN/1.73
GFR SERPL CREATININE-BSD FRML MDRD: 16 ML/MIN/1.73
GFR SERPL CREATININE-BSD FRML MDRD: 22 ML/MIN/1.73
GFR SERPL CREATININE-BSD FRML MDRD: 8 ML/MIN/1.73
GFR SERPL CREATININE-BSD FRML MDRD: 9 ML/MIN/1.73
GFR SERPL CREATININE-BSD FRML MDRD: ABNORMAL ML/MIN/{1.73_M2}
GLOBULIN UR ELPH-MCNC: 2.1 GM/DL
GLOBULIN UR ELPH-MCNC: 2.1 GM/DL
GLOBULIN UR ELPH-MCNC: 2.5 GM/DL
GLOBULIN UR ELPH-MCNC: 2.5 GM/DL
GLOBULIN UR ELPH-MCNC: 3.1 GM/DL
GLOBULIN UR ELPH-MCNC: 3.1 GM/DL
GLUCOSE BLDC GLUCOMTR-MCNC: 102 MG/DL (ref 70–130)
GLUCOSE BLDC GLUCOMTR-MCNC: 105 MG/DL (ref 70–130)
GLUCOSE BLDC GLUCOMTR-MCNC: 109 MG/DL (ref 70–130)
GLUCOSE BLDC GLUCOMTR-MCNC: 112 MG/DL (ref 70–130)
GLUCOSE BLDC GLUCOMTR-MCNC: 173 MG/DL (ref 70–130)
GLUCOSE BLDC GLUCOMTR-MCNC: 82 MG/DL (ref 70–130)
GLUCOSE BLDC GLUCOMTR-MCNC: 85 MG/DL (ref 70–130)
GLUCOSE BLDC GLUCOMTR-MCNC: 85 MG/DL (ref 70–130)
GLUCOSE BLDC GLUCOMTR-MCNC: 87 MG/DL (ref 70–130)
GLUCOSE BLDC GLUCOMTR-MCNC: 90 MG/DL (ref 70–130)
GLUCOSE BLDC GLUCOMTR-MCNC: 91 MG/DL (ref 70–130)
GLUCOSE BLDC GLUCOMTR-MCNC: 92 MG/DL (ref 70–130)
GLUCOSE BLDC GLUCOMTR-MCNC: 94 MG/DL (ref 70–130)
GLUCOSE BLDC GLUCOMTR-MCNC: 96 MG/DL (ref 70–130)
GLUCOSE SERPL-MCNC: 103 MG/DL (ref 65–99)
GLUCOSE SERPL-MCNC: 117 MG/DL (ref 65–99)
GLUCOSE SERPL-MCNC: 120 MG/DL (ref 65–99)
GLUCOSE SERPL-MCNC: 123 MG/DL (ref 65–99)
GLUCOSE SERPL-MCNC: 125 MG/DL (ref 65–99)
GLUCOSE SERPL-MCNC: 127 MG/DL (ref 65–99)
GLUCOSE SERPL-MCNC: 139 MG/DL (ref 65–99)
GLUCOSE SERPL-MCNC: 77 MG/DL (ref 65–99)
GLUCOSE SERPL-MCNC: 82 MG/DL (ref 65–99)
GLUCOSE SERPL-MCNC: 90 MG/DL (ref 65–99)
GLUCOSE SERPL-MCNC: 93 MG/DL (ref 65–99)
GLUCOSE SERPL-MCNC: 99 MG/DL (ref 65–99)
HCO3 BLDA-SCNC: 27.7 MMOL/L (ref 20–26)
HCO3 BLDA-SCNC: 28.1 MMOL/L (ref 20–26)
HCT VFR BLD AUTO: 22.2 % (ref 34–46.6)
HCT VFR BLD AUTO: 22.4 % (ref 34–46.6)
HCT VFR BLD AUTO: 23.4 % (ref 34–46.6)
HCT VFR BLD AUTO: 23.5 % (ref 34–46.6)
HCT VFR BLD AUTO: 23.7 % (ref 34–46.6)
HCT VFR BLD AUTO: 24 % (ref 34–46.6)
HCT VFR BLD AUTO: 24.2 % (ref 34–46.6)
HCT VFR BLD AUTO: 24.4 % (ref 34–46.6)
HCT VFR BLD AUTO: 24.5 % (ref 34–46.6)
HCT VFR BLD AUTO: 24.7 % (ref 34–46.6)
HCT VFR BLD AUTO: 25.1 % (ref 34–46.6)
HCT VFR BLD AUTO: 25.3 % (ref 34–46.6)
HCT VFR BLD AUTO: 25.9 % (ref 34–46.6)
HCT VFR BLD AUTO: 26.3 % (ref 34–46.6)
HCT VFR BLD AUTO: 26.4 % (ref 34–46.6)
HCT VFR BLD AUTO: 26.5 % (ref 34–46.6)
HCT VFR BLD AUTO: 26.6 % (ref 34–46.6)
HCT VFR BLD AUTO: 26.8 % (ref 34–46.6)
HCT VFR BLD AUTO: 26.8 % (ref 34–46.6)
HCT VFR BLD AUTO: 27 % (ref 34–46.6)
HCT VFR BLD AUTO: 27.1 % (ref 34–46.6)
HCT VFR BLD AUTO: 27.5 % (ref 34–46.6)
HCT VFR BLD AUTO: 28.3 % (ref 34–46.6)
HCT VFR BLD AUTO: 28.4 % (ref 34–46.6)
HCT VFR BLD AUTO: 29 % (ref 34–46.6)
HDLC SERPL-MCNC: 33 MG/DL (ref 40–60)
HDLC SERPL-MCNC: 36 MG/DL (ref 40–60)
HGB BLD-MCNC: 6.7 G/DL (ref 12–15.9)
HGB BLD-MCNC: 6.7 G/DL (ref 12–15.9)
HGB BLD-MCNC: 7.3 G/DL (ref 12–15.9)
HGB BLD-MCNC: 7.3 G/DL (ref 12–15.9)
HGB BLD-MCNC: 7.4 G/DL (ref 12–15.9)
HGB BLD-MCNC: 7.5 G/DL (ref 12–15.9)
HGB BLD-MCNC: 7.5 G/DL (ref 12–15.9)
HGB BLD-MCNC: 7.6 G/DL (ref 12–15.9)
HGB BLD-MCNC: 7.8 G/DL (ref 12–15.9)
HGB BLD-MCNC: 7.9 G/DL (ref 12–15.9)
HGB BLD-MCNC: 8 G/DL (ref 12–15.9)
HGB BLD-MCNC: 8 G/DL (ref 12–15.9)
HGB BLD-MCNC: 8.2 G/DL (ref 12–15.9)
HGB BLD-MCNC: 8.4 G/DL (ref 12–15.9)
HGB BLD-MCNC: 8.4 G/DL (ref 12–15.9)
HGB BLD-MCNC: 8.5 G/DL (ref 12–15.9)
HGB BLD-MCNC: 8.7 G/DL (ref 12–15.9)
HGB BLD-MCNC: 8.7 G/DL (ref 12–15.9)
HGB BLD-MCNC: 8.8 G/DL (ref 12–15.9)
HGB BLD-MCNC: 8.9 G/DL (ref 12–15.9)
HGB BLD-MCNC: 9 G/DL (ref 12–15.9)
HGB BLD-MCNC: 9.1 G/DL (ref 12–15.9)
HGB BLD-MCNC: 9.3 G/DL (ref 12–15.9)
HOLD SPECIMEN: NORMAL
IMM GRANULOCYTES # BLD AUTO: 0.02 10*3/MM3 (ref 0–0.05)
IMM GRANULOCYTES # BLD AUTO: 0.03 10*3/MM3 (ref 0–0.05)
IMM GRANULOCYTES # BLD AUTO: 0.04 10*3/MM3 (ref 0–0.05)
IMM GRANULOCYTES # BLD AUTO: 0.04 10*3/MM3 (ref 0–0.05)
IMM GRANULOCYTES # BLD AUTO: 0.05 10*3/MM3 (ref 0–0.05)
IMM GRANULOCYTES # BLD AUTO: 0.05 10*3/MM3 (ref 0–0.05)
IMM GRANULOCYTES # BLD AUTO: 0.06 10*3/MM3 (ref 0–0.05)
IMM GRANULOCYTES # BLD AUTO: 0.21 10*3/MM3 (ref 0–0.05)
IMM GRANULOCYTES NFR BLD AUTO: 0.3 % (ref 0–0.5)
IMM GRANULOCYTES NFR BLD AUTO: 0.4 % (ref 0–0.5)
IMM GRANULOCYTES NFR BLD AUTO: 0.5 % (ref 0–0.5)
IMM GRANULOCYTES NFR BLD AUTO: 0.6 % (ref 0–0.5)
IMM GRANULOCYTES NFR BLD AUTO: 0.6 % (ref 0–0.5)
IMM GRANULOCYTES NFR BLD AUTO: 0.8 % (ref 0–0.5)
IMM GRANULOCYTES NFR BLD AUTO: 1.5 % (ref 0–0.5)
INR PPP: 1.1 (ref 0.91–1.09)
INR PPP: 1.15 (ref 0.91–1.09)
LAB AP CASE REPORT: NORMAL
LDLC SERPL CALC-MCNC: 22 MG/DL (ref 0–100)
LDLC SERPL CALC-MCNC: 23 MG/DL (ref 0–100)
LDLC/HDLC SERPL: 0.47 {RATIO}
LDLC/HDLC SERPL: 0.53 {RATIO}
LYMPHOCYTES # BLD AUTO: 0.57 10*3/MM3 (ref 0.7–3.1)
LYMPHOCYTES # BLD AUTO: 0.85 10*3/MM3 (ref 0.7–3.1)
LYMPHOCYTES # BLD AUTO: 0.96 10*3/MM3 (ref 0.7–3.1)
LYMPHOCYTES # BLD AUTO: 0.98 10*3/MM3 (ref 0.7–3.1)
LYMPHOCYTES # BLD AUTO: 1 10*3/MM3 (ref 0.7–3.1)
LYMPHOCYTES # BLD AUTO: 1.11 10*3/MM3 (ref 0.7–3.1)
LYMPHOCYTES # BLD AUTO: 1.13 10*3/MM3 (ref 0.7–3.1)
LYMPHOCYTES # BLD AUTO: 1.19 10*3/MM3 (ref 0.7–3.1)
LYMPHOCYTES # BLD AUTO: 1.28 10*3/MM3 (ref 0.7–3.1)
LYMPHOCYTES # BLD AUTO: 1.31 10*3/MM3 (ref 0.7–3.1)
LYMPHOCYTES # BLD AUTO: 1.31 10*3/MM3 (ref 0.7–3.1)
LYMPHOCYTES NFR BLD AUTO: 10.8 % (ref 19.6–45.3)
LYMPHOCYTES NFR BLD AUTO: 14.9 % (ref 19.6–45.3)
LYMPHOCYTES NFR BLD AUTO: 15.7 % (ref 19.6–45.3)
LYMPHOCYTES NFR BLD AUTO: 17.9 % (ref 19.6–45.3)
LYMPHOCYTES NFR BLD AUTO: 18.1 % (ref 19.6–45.3)
LYMPHOCYTES NFR BLD AUTO: 19.4 % (ref 19.6–45.3)
LYMPHOCYTES NFR BLD AUTO: 23.4 % (ref 19.6–45.3)
LYMPHOCYTES NFR BLD AUTO: 26.4 % (ref 19.6–45.3)
LYMPHOCYTES NFR BLD AUTO: 7.7 % (ref 19.6–45.3)
LYMPHOCYTES NFR BLD AUTO: 8.9 % (ref 19.6–45.3)
LYMPHOCYTES NFR BLD AUTO: 9.5 % (ref 19.6–45.3)
Lab: ABNORMAL
Lab: ABNORMAL
MCH RBC QN AUTO: 29.9 PG (ref 26.6–33)
MCH RBC QN AUTO: 29.9 PG (ref 26.6–33)
MCH RBC QN AUTO: 30.2 PG (ref 26.6–33)
MCH RBC QN AUTO: 30.7 PG (ref 26.6–33)
MCH RBC QN AUTO: 31.2 PG (ref 26.6–33)
MCH RBC QN AUTO: 31.2 PG (ref 26.6–33)
MCH RBC QN AUTO: 31.6 PG (ref 26.6–33)
MCH RBC QN AUTO: 31.7 PG (ref 26.6–33)
MCH RBC QN AUTO: 31.8 PG (ref 26.6–33)
MCH RBC QN AUTO: 32 PG (ref 26.6–33)
MCH RBC QN AUTO: 32 PG (ref 26.6–33)
MCH RBC QN AUTO: 32.5 PG (ref 26.6–33)
MCHC RBC AUTO-ENTMCNC: 29.9 G/DL (ref 31.5–35.7)
MCHC RBC AUTO-ENTMCNC: 29.9 G/DL (ref 31.5–35.7)
MCHC RBC AUTO-ENTMCNC: 30.2 G/DL (ref 31.5–35.7)
MCHC RBC AUTO-ENTMCNC: 30.4 G/DL (ref 31.5–35.7)
MCHC RBC AUTO-ENTMCNC: 30.6 G/DL (ref 31.5–35.7)
MCHC RBC AUTO-ENTMCNC: 30.7 G/DL (ref 31.5–35.7)
MCHC RBC AUTO-ENTMCNC: 31 G/DL (ref 31.5–35.7)
MCHC RBC AUTO-ENTMCNC: 31.2 G/DL (ref 31.5–35.7)
MCHC RBC AUTO-ENTMCNC: 31.5 G/DL (ref 31.5–35.7)
MCHC RBC AUTO-ENTMCNC: 31.7 G/DL (ref 31.5–35.7)
MCHC RBC AUTO-ENTMCNC: 32.8 G/DL (ref 31.5–35.7)
MCHC RBC AUTO-ENTMCNC: 33.5 G/DL (ref 31.5–35.7)
MCV RBC AUTO: 101.7 FL (ref 79–97)
MCV RBC AUTO: 102.2 FL (ref 79–97)
MCV RBC AUTO: 103.1 FL (ref 79–97)
MCV RBC AUTO: 104.3 FL (ref 79–97)
MCV RBC AUTO: 104.7 FL (ref 79–97)
MCV RBC AUTO: 105.2 FL (ref 79–97)
MCV RBC AUTO: 105.7 FL (ref 79–97)
MCV RBC AUTO: 94.4 FL (ref 79–97)
MCV RBC AUTO: 95 FL (ref 79–97)
MCV RBC AUTO: 95.1 FL (ref 79–97)
MCV RBC AUTO: 96.7 FL (ref 79–97)
MCV RBC AUTO: 97.2 FL (ref 79–97)
MODALITY: ABNORMAL
MODALITY: ABNORMAL
MONOCYTES # BLD AUTO: 0.54 10*3/MM3 (ref 0.1–0.9)
MONOCYTES # BLD AUTO: 0.56 10*3/MM3 (ref 0.1–0.9)
MONOCYTES # BLD AUTO: 0.58 10*3/MM3 (ref 0.1–0.9)
MONOCYTES # BLD AUTO: 0.65 10*3/MM3 (ref 0.1–0.9)
MONOCYTES # BLD AUTO: 0.7 10*3/MM3 (ref 0.1–0.9)
MONOCYTES # BLD AUTO: 0.73 10*3/MM3 (ref 0.1–0.9)
MONOCYTES # BLD AUTO: 0.74 10*3/MM3 (ref 0.1–0.9)
MONOCYTES # BLD AUTO: 0.77 10*3/MM3 (ref 0.1–0.9)
MONOCYTES # BLD AUTO: 0.77 10*3/MM3 (ref 0.1–0.9)
MONOCYTES # BLD AUTO: 0.82 10*3/MM3 (ref 0.1–0.9)
MONOCYTES # BLD AUTO: 0.85 10*3/MM3 (ref 0.1–0.9)
MONOCYTES NFR BLD AUTO: 10.7 % (ref 5–12)
MONOCYTES NFR BLD AUTO: 12.1 % (ref 5–12)
MONOCYTES NFR BLD AUTO: 12.8 % (ref 5–12)
MONOCYTES NFR BLD AUTO: 13.1 % (ref 5–12)
MONOCYTES NFR BLD AUTO: 14.6 % (ref 5–12)
MONOCYTES NFR BLD AUTO: 5.1 % (ref 5–12)
MONOCYTES NFR BLD AUTO: 6.9 % (ref 5–12)
MONOCYTES NFR BLD AUTO: 9 % (ref 5–12)
MONOCYTES NFR BLD AUTO: 9 % (ref 5–12)
MONOCYTES NFR BLD AUTO: 9.1 % (ref 5–12)
MONOCYTES NFR BLD AUTO: 9.1 % (ref 5–12)
NEUTROPHILS NFR BLD AUTO: 12.18 10*3/MM3 (ref 1.7–7)
NEUTROPHILS NFR BLD AUTO: 2.62 10*3/MM3 (ref 1.7–7)
NEUTROPHILS NFR BLD AUTO: 2.67 10*3/MM3 (ref 1.7–7)
NEUTROPHILS NFR BLD AUTO: 3.46 10*3/MM3 (ref 1.7–7)
NEUTROPHILS NFR BLD AUTO: 4.15 10*3/MM3 (ref 1.7–7)
NEUTROPHILS NFR BLD AUTO: 4.16 10*3/MM3 (ref 1.7–7)
NEUTROPHILS NFR BLD AUTO: 4.51 10*3/MM3 (ref 1.7–7)
NEUTROPHILS NFR BLD AUTO: 4.7 10*3/MM3 (ref 1.7–7)
NEUTROPHILS NFR BLD AUTO: 4.81 10*3/MM3 (ref 1.7–7)
NEUTROPHILS NFR BLD AUTO: 52.5 % (ref 42.7–76)
NEUTROPHILS NFR BLD AUTO: 52.8 % (ref 42.7–76)
NEUTROPHILS NFR BLD AUTO: 61.4 % (ref 42.7–76)
NEUTROPHILS NFR BLD AUTO: 63.4 % (ref 42.7–76)
NEUTROPHILS NFR BLD AUTO: 65.5 % (ref 42.7–76)
NEUTROPHILS NFR BLD AUTO: 67.7 % (ref 42.7–76)
NEUTROPHILS NFR BLD AUTO: 7.35 10*3/MM3 (ref 1.7–7)
NEUTROPHILS NFR BLD AUTO: 74.5 % (ref 42.7–76)
NEUTROPHILS NFR BLD AUTO: 75.6 % (ref 42.7–76)
NEUTROPHILS NFR BLD AUTO: 79.2 % (ref 42.7–76)
NEUTROPHILS NFR BLD AUTO: 84.3 % (ref 42.7–76)
NEUTROPHILS NFR BLD AUTO: 84.3 % (ref 42.7–76)
NEUTROPHILS NFR BLD AUTO: 9.36 10*3/MM3 (ref 1.7–7)
NRBC BLD AUTO-RTO: 0 /100 WBC (ref 0–0.2)
NRBC BLD AUTO-RTO: 0.1 /100 WBC (ref 0–0.2)
PATH REPORT.FINAL DX SPEC: NORMAL
PATH REPORT.GROSS SPEC: NORMAL
PCO2 BLDA: 35 MM HG (ref 35–45)
PCO2 BLDA: 42.1 MM HG (ref 35–45)
PCO2 TEMP ADJ BLD: 35 MM HG (ref 35–45)
PCO2 TEMP ADJ BLD: 42.1 MM HG (ref 35–45)
PH BLDA: 7.43 PH UNITS (ref 7.35–7.45)
PH BLDA: 7.51 PH UNITS (ref 7.35–7.45)
PH, TEMP CORRECTED: 7.43 PH UNITS (ref 7.35–7.45)
PH, TEMP CORRECTED: 7.51 PH UNITS (ref 7.35–7.45)
PLATELET # BLD AUTO: 147 10*3/MM3 (ref 140–450)
PLATELET # BLD AUTO: 243 10*3/MM3 (ref 140–450)
PLATELET # BLD AUTO: 253 10*3/MM3 (ref 140–450)
PLATELET # BLD AUTO: 263 10*3/MM3 (ref 140–450)
PLATELET # BLD AUTO: 265 10*3/MM3 (ref 140–450)
PLATELET # BLD AUTO: 269 10*3/MM3 (ref 140–450)
PLATELET # BLD AUTO: 289 10*3/MM3 (ref 140–450)
PLATELET # BLD AUTO: 296 10*3/MM3 (ref 140–450)
PLATELET # BLD AUTO: 300 10*3/MM3 (ref 140–450)
PLATELET # BLD AUTO: 301 10*3/MM3 (ref 140–450)
PLATELET # BLD AUTO: 330 10*3/MM3 (ref 140–450)
PLATELET # BLD AUTO: 360 10*3/MM3 (ref 140–450)
PMV BLD AUTO: 10.1 FL (ref 6–12)
PMV BLD AUTO: 10.3 FL (ref 6–12)
PMV BLD AUTO: 10.3 FL (ref 6–12)
PMV BLD AUTO: 10.4 FL (ref 6–12)
PMV BLD AUTO: 10.5 FL (ref 6–12)
PMV BLD AUTO: 10.5 FL (ref 6–12)
PMV BLD AUTO: 10.6 FL (ref 6–12)
PMV BLD AUTO: 10.7 FL (ref 6–12)
PMV BLD AUTO: 10.8 FL (ref 6–12)
PMV BLD AUTO: 10.8 FL (ref 6–12)
PMV BLD AUTO: 11 FL (ref 6–12)
PMV BLD AUTO: 11.2 FL (ref 6–12)
PO2 BLDA: 71.2 MM HG (ref 83–108)
PO2 BLDA: 79.9 MM HG (ref 83–108)
PO2 TEMP ADJ BLD: 71.2 MM HG (ref 83–108)
PO2 TEMP ADJ BLD: 79.9 MM HG (ref 83–108)
POTASSIUM SERPL-SCNC: 3.2 MMOL/L (ref 3.5–5.2)
POTASSIUM SERPL-SCNC: 3.3 MMOL/L (ref 3.5–5.2)
POTASSIUM SERPL-SCNC: 3.4 MMOL/L (ref 3.5–5.2)
POTASSIUM SERPL-SCNC: 3.6 MMOL/L (ref 3.5–5.2)
POTASSIUM SERPL-SCNC: 3.7 MMOL/L (ref 3.5–5.2)
POTASSIUM SERPL-SCNC: 3.7 MMOL/L (ref 3.5–5.2)
POTASSIUM SERPL-SCNC: 3.8 MMOL/L (ref 3.5–5.2)
POTASSIUM SERPL-SCNC: 3.9 MMOL/L (ref 3.5–5.2)
POTASSIUM SERPL-SCNC: 3.9 MMOL/L (ref 3.5–5.2)
POTASSIUM SERPL-SCNC: 4.2 MMOL/L (ref 3.5–5.2)
POTASSIUM SERPL-SCNC: 4.5 MMOL/L (ref 3.5–5.2)
POTASSIUM SERPL-SCNC: 4.6 MMOL/L (ref 3.5–5.2)
PROT SERPL-MCNC: 3.8 G/DL (ref 6–8.5)
PROT SERPL-MCNC: 3.9 G/DL (ref 6–8.5)
PROT SERPL-MCNC: 4 G/DL (ref 6–8.5)
PROT SERPL-MCNC: 4.4 G/DL (ref 6–8.5)
PROT SERPL-MCNC: 5.2 G/DL (ref 6–8.5)
PROT SERPL-MCNC: 5.4 G/DL (ref 6–8.5)
PROTHROMBIN TIME: 13.8 SECONDS (ref 11.9–14.6)
PROTHROMBIN TIME: 14.3 SECONDS (ref 11.9–14.6)
QT INTERVAL: 480 MS
QTC INTERVAL: 567 MS
RBC # BLD AUTO: 2.12 10*6/MM3 (ref 3.77–5.28)
RBC # BLD AUTO: 2.28 10*6/MM3 (ref 3.77–5.28)
RBC # BLD AUTO: 2.38 10*6/MM3 (ref 3.77–5.28)
RBC # BLD AUTO: 2.42 10*6/MM3 (ref 3.77–5.28)
RBC # BLD AUTO: 2.5 10*6/MM3 (ref 3.77–5.28)
RBC # BLD AUTO: 2.51 10*6/MM3 (ref 3.77–5.28)
RBC # BLD AUTO: 2.53 10*6/MM3 (ref 3.77–5.28)
RBC # BLD AUTO: 2.56 10*6/MM3 (ref 3.77–5.28)
RBC # BLD AUTO: 2.58 10*6/MM3 (ref 3.77–5.28)
RBC # BLD AUTO: 2.78 10*6/MM3 (ref 3.77–5.28)
RBC # BLD AUTO: 2.84 10*6/MM3 (ref 3.77–5.28)
RBC # BLD AUTO: 2.85 10*6/MM3 (ref 3.77–5.28)
RH BLD: POSITIVE
SAO2 % BLDCOA: 96.1 % (ref 94–99)
SAO2 % BLDCOA: 97.8 % (ref 94–99)
SARS-COV-2 RNA PNL SPEC NAA+PROBE: DETECTED
SARS-COV-2 RNA PNL SPEC NAA+PROBE: NOT DETECTED
SODIUM SERPL-SCNC: 131 MMOL/L (ref 136–145)
SODIUM SERPL-SCNC: 132 MMOL/L (ref 136–145)
SODIUM SERPL-SCNC: 133 MMOL/L (ref 136–145)
SODIUM SERPL-SCNC: 134 MMOL/L (ref 136–145)
SODIUM SERPL-SCNC: 134 MMOL/L (ref 136–145)
SODIUM SERPL-SCNC: 135 MMOL/L (ref 136–145)
SODIUM SERPL-SCNC: 136 MMOL/L (ref 136–145)
SODIUM SERPL-SCNC: 136 MMOL/L (ref 136–145)
SODIUM SERPL-SCNC: 137 MMOL/L (ref 136–145)
SODIUM SERPL-SCNC: 138 MMOL/L (ref 136–145)
T&S EXPIRATION DATE: NORMAL
TRIGL SERPL-MCNC: 145 MG/DL (ref 0–150)
TRIGL SERPL-MCNC: 177 MG/DL (ref 0–150)
TSH SERPL DL<=0.05 MIU/L-ACNC: 2.21 UIU/ML (ref 0.27–4.2)
UNIT  ABO: NORMAL
UNIT  ABO: NORMAL
UNIT  RH: NORMAL
UNIT  RH: NORMAL
VENTILATOR MODE: ABNORMAL
VENTILATOR MODE: ABNORMAL
VLDLC SERPL-MCNC: 25 MG/DL (ref 5–40)
VLDLC SERPL-MCNC: 29 MG/DL (ref 5–40)
WBC NRBC COR # BLD: 11.11 10*3/MM3 (ref 3.4–10.8)
WBC NRBC COR # BLD: 14.43 10*3/MM3 (ref 3.4–10.8)
WBC NRBC COR # BLD: 4.96 10*3/MM3 (ref 3.4–10.8)
WBC NRBC COR # BLD: 5.08 10*3/MM3 (ref 3.4–10.8)
WBC NRBC COR # BLD: 5.46 10*3/MM3 (ref 3.4–10.8)
WBC NRBC COR # BLD: 5.97 10*3/MM3 (ref 3.4–10.8)
WBC NRBC COR # BLD: 6.14 10*3/MM3 (ref 3.4–10.8)
WBC NRBC COR # BLD: 6.45 10*3/MM3 (ref 3.4–10.8)
WBC NRBC COR # BLD: 6.76 10*3/MM3 (ref 3.4–10.8)
WBC NRBC COR # BLD: 7.18 10*3/MM3 (ref 3.4–10.8)
WBC NRBC COR # BLD: 7.83 10*3/MM3 (ref 3.4–10.8)
WBC NRBC COR # BLD: 9.29 10*3/MM3 (ref 3.4–10.8)

## 2022-01-01 PROCEDURE — 82803 BLOOD GASES ANY COMBINATION: CPT

## 2022-01-01 PROCEDURE — 25010000002 HYDROMORPHONE 1 MG/ML SOLUTION: Performed by: CLINICAL NURSE SPECIALIST

## 2022-01-01 PROCEDURE — 85018 HEMOGLOBIN: CPT | Performed by: INTERNAL MEDICINE

## 2022-01-01 PROCEDURE — 25010000002 DESMOPRESSIN ACETATE PF 4 MCG/ML SOLUTION 1 ML AMPULE: Performed by: EMERGENCY MEDICINE

## 2022-01-01 PROCEDURE — 97165 OT EVAL LOW COMPLEX 30 MIN: CPT

## 2022-01-01 PROCEDURE — 30233N1 TRANSFUSION OF NONAUTOLOGOUS RED BLOOD CELLS INTO PERIPHERAL VEIN, PERCUTANEOUS APPROACH: ICD-10-PCS | Performed by: INTERNAL MEDICINE

## 2022-01-01 PROCEDURE — 86923 COMPATIBILITY TEST ELECTRIC: CPT

## 2022-01-01 PROCEDURE — 97530 THERAPEUTIC ACTIVITIES: CPT

## 2022-01-01 PROCEDURE — 25010000002 HEPARIN (PORCINE) PER 1000 UNITS: Performed by: INTERNAL MEDICINE

## 2022-01-01 PROCEDURE — 99285 EMERGENCY DEPT VISIT HI MDM: CPT

## 2022-01-01 PROCEDURE — P9016 RBC LEUKOCYTES REDUCED: HCPCS

## 2022-01-01 PROCEDURE — 36415 COLL VENOUS BLD VENIPUNCTURE: CPT | Performed by: INTERNAL MEDICINE

## 2022-01-01 PROCEDURE — 71045 X-RAY EXAM CHEST 1 VIEW: CPT

## 2022-01-01 PROCEDURE — 25010000002 ONDANSETRON PER 1 MG: Performed by: NURSE ANESTHETIST, CERTIFIED REGISTERED

## 2022-01-01 PROCEDURE — 36600 WITHDRAWAL OF ARTERIAL BLOOD: CPT

## 2022-01-01 PROCEDURE — 80053 COMPREHEN METABOLIC PANEL: CPT | Performed by: FAMILY MEDICINE

## 2022-01-01 PROCEDURE — 70450 CT HEAD/BRAIN W/O DYE: CPT

## 2022-01-01 PROCEDURE — 25010000002 CEFAZOLIN PER 500 MG: Performed by: SURGERY

## 2022-01-01 PROCEDURE — 85014 HEMATOCRIT: CPT | Performed by: INTERNAL MEDICINE

## 2022-01-01 PROCEDURE — 85610 PROTHROMBIN TIME: CPT | Performed by: EMERGENCY MEDICINE

## 2022-01-01 PROCEDURE — 80048 BASIC METABOLIC PNL TOTAL CA: CPT | Performed by: SURGERY

## 2022-01-01 PROCEDURE — 85025 COMPLETE CBC W/AUTO DIFF WBC: CPT | Performed by: FAMILY MEDICINE

## 2022-01-01 PROCEDURE — 85014 HEMATOCRIT: CPT | Performed by: FAMILY MEDICINE

## 2022-01-01 PROCEDURE — 99024 POSTOP FOLLOW-UP VISIT: CPT | Performed by: SURGERY

## 2022-01-01 PROCEDURE — 0Y6D0Z3 DETACHMENT AT LEFT UPPER LEG, LOW, OPEN APPROACH: ICD-10-PCS | Performed by: SURGERY

## 2022-01-01 PROCEDURE — C1889 IMPLANT/INSERT DEVICE, NOC: HCPCS | Performed by: SURGERY

## 2022-01-01 PROCEDURE — 88311 DECALCIFY TISSUE: CPT | Performed by: SURGERY

## 2022-01-01 PROCEDURE — 80061 LIPID PANEL: CPT | Performed by: FAMILY MEDICINE

## 2022-01-01 PROCEDURE — 85018 HEMOGLOBIN: CPT | Performed by: FAMILY MEDICINE

## 2022-01-01 PROCEDURE — 99233 SBSQ HOSP IP/OBS HIGH 50: CPT | Performed by: CLINICAL NURSE SPECIALIST

## 2022-01-01 PROCEDURE — 80053 COMPREHEN METABOLIC PANEL: CPT | Performed by: EMERGENCY MEDICINE

## 2022-01-01 PROCEDURE — 85018 HEMOGLOBIN: CPT | Performed by: NURSE PRACTITIONER

## 2022-01-01 PROCEDURE — 82962 GLUCOSE BLOOD TEST: CPT

## 2022-01-01 PROCEDURE — 87635 SARS-COV-2 COVID-19 AMP PRB: CPT | Performed by: SURGERY

## 2022-01-01 PROCEDURE — 85025 COMPLETE CBC W/AUTO DIFF WBC: CPT | Performed by: SURGERY

## 2022-01-01 PROCEDURE — 27590 AMPUTATE LEG AT THIGH: CPT | Performed by: SURGERY

## 2022-01-01 PROCEDURE — 85025 COMPLETE CBC W/AUTO DIFF WBC: CPT | Performed by: EMERGENCY MEDICINE

## 2022-01-01 PROCEDURE — 97110 THERAPEUTIC EXERCISES: CPT

## 2022-01-01 PROCEDURE — 25010000002 HYDROMORPHONE PER 4 MG: Performed by: SURGERY

## 2022-01-01 PROCEDURE — 86850 RBC ANTIBODY SCREEN: CPT | Performed by: SURGERY

## 2022-01-01 PROCEDURE — 36430 TRANSFUSION BLD/BLD COMPNT: CPT

## 2022-01-01 PROCEDURE — 25010000002 ENOXAPARIN PER 10 MG: Performed by: SURGERY

## 2022-01-01 PROCEDURE — 25010000002 FENTANYL CITRATE (PF) 100 MCG/2ML SOLUTION: Performed by: NURSE ANESTHETIST, CERTIFIED REGISTERED

## 2022-01-01 PROCEDURE — C1751 CATH, INF, PER/CENT/MIDLINE: HCPCS

## 2022-01-01 PROCEDURE — 99497 ADVNCD CARE PLAN 30 MIN: CPT | Performed by: CLINICAL NURSE SPECIALIST

## 2022-01-01 PROCEDURE — 86850 RBC ANTIBODY SCREEN: CPT | Performed by: EMERGENCY MEDICINE

## 2022-01-01 PROCEDURE — 85730 THROMBOPLASTIN TIME PARTIAL: CPT | Performed by: EMERGENCY MEDICINE

## 2022-01-01 PROCEDURE — 25010000002 MIDAZOLAM PER 1 MG: Performed by: ANESTHESIOLOGY

## 2022-01-01 PROCEDURE — 97166 OT EVAL MOD COMPLEX 45 MIN: CPT | Performed by: OCCUPATIONAL THERAPIST

## 2022-01-01 PROCEDURE — 25010000002 HYDROMORPHONE PER 4 MG: Performed by: ANESTHESIOLOGY

## 2022-01-01 PROCEDURE — 80048 BASIC METABOLIC PNL TOTAL CA: CPT | Performed by: INTERNAL MEDICINE

## 2022-01-01 PROCEDURE — 87635 SARS-COV-2 COVID-19 AMP PRB: CPT | Performed by: FAMILY MEDICINE

## 2022-01-01 PROCEDURE — 86901 BLOOD TYPING SEROLOGIC RH(D): CPT | Performed by: SURGERY

## 2022-01-01 PROCEDURE — 86900 BLOOD TYPING SEROLOGIC ABO: CPT

## 2022-01-01 PROCEDURE — 88307 TISSUE EXAM BY PATHOLOGIST: CPT | Performed by: SURGERY

## 2022-01-01 PROCEDURE — 25010000002 EPOETIN ALFA-EPBX 10000 UNIT/ML SOLUTION: Performed by: INTERNAL MEDICINE

## 2022-01-01 PROCEDURE — 85014 HEMATOCRIT: CPT | Performed by: NURSE PRACTITIONER

## 2022-01-01 PROCEDURE — 11042 DBRDMT SUBQ TIS 1ST 20SQCM/<: CPT | Performed by: NURSE PRACTITIONER

## 2022-01-01 PROCEDURE — 0 CEFAZOLIN PER 500 MG: Performed by: SURGERY

## 2022-01-01 PROCEDURE — 86920 COMPATIBILITY TEST SPIN: CPT

## 2022-01-01 PROCEDURE — 84443 ASSAY THYROID STIM HORMONE: CPT | Performed by: FAMILY MEDICINE

## 2022-01-01 PROCEDURE — 25010000002 PROPOFOL 10 MG/ML EMULSION: Performed by: NURSE ANESTHETIST, CERTIFIED REGISTERED

## 2022-01-01 PROCEDURE — 86900 BLOOD TYPING SEROLOGIC ABO: CPT | Performed by: EMERGENCY MEDICINE

## 2022-01-01 PROCEDURE — 99222 1ST HOSP IP/OBS MODERATE 55: CPT | Performed by: CLINICAL NURSE SPECIALIST

## 2022-01-01 PROCEDURE — 5A1D70Z PERFORMANCE OF URINARY FILTRATION, INTERMITTENT, LESS THAN 6 HOURS PER DAY: ICD-10-PCS | Performed by: INTERNAL MEDICINE

## 2022-01-01 PROCEDURE — 73560 X-RAY EXAM OF KNEE 1 OR 2: CPT

## 2022-01-01 PROCEDURE — 86901 BLOOD TYPING SEROLOGIC RH(D): CPT | Performed by: INTERNAL MEDICINE

## 2022-01-01 PROCEDURE — 86900 BLOOD TYPING SEROLOGIC ABO: CPT | Performed by: SURGERY

## 2022-01-01 PROCEDURE — 02HV33Z INSERTION OF INFUSION DEVICE INTO SUPERIOR VENA CAVA, PERCUTANEOUS APPROACH: ICD-10-PCS | Performed by: INTERNAL MEDICINE

## 2022-01-01 PROCEDURE — 86850 RBC ANTIBODY SCREEN: CPT | Performed by: INTERNAL MEDICINE

## 2022-01-01 PROCEDURE — 93010 ELECTROCARDIOGRAM REPORT: CPT | Performed by: INTERNAL MEDICINE

## 2022-01-01 PROCEDURE — 97162 PT EVAL MOD COMPLEX 30 MIN: CPT

## 2022-01-01 PROCEDURE — 97162 PT EVAL MOD COMPLEX 30 MIN: CPT | Performed by: PHYSICAL THERAPIST

## 2022-01-01 PROCEDURE — 82140 ASSAY OF AMMONIA: CPT | Performed by: FAMILY MEDICINE

## 2022-01-01 PROCEDURE — 87635 SARS-COV-2 COVID-19 AMP PRB: CPT | Performed by: EMERGENCY MEDICINE

## 2022-01-01 PROCEDURE — 99223 1ST HOSP IP/OBS HIGH 75: CPT | Performed by: SURGERY

## 2022-01-01 PROCEDURE — 99222 1ST HOSP IP/OBS MODERATE 55: CPT | Performed by: INTERNAL MEDICINE

## 2022-01-01 PROCEDURE — 93005 ELECTROCARDIOGRAM TRACING: CPT | Performed by: EMERGENCY MEDICINE

## 2022-01-01 PROCEDURE — 85027 COMPLETE CBC AUTOMATED: CPT | Performed by: INTERNAL MEDICINE

## 2022-01-01 PROCEDURE — 25010000002 FENTANYL CITRATE (PF) 50 MCG/ML SOLUTION: Performed by: ANESTHESIOLOGY

## 2022-01-01 PROCEDURE — 86900 BLOOD TYPING SEROLOGIC ABO: CPT | Performed by: INTERNAL MEDICINE

## 2022-01-01 PROCEDURE — 86901 BLOOD TYPING SEROLOGIC RH(D): CPT | Performed by: EMERGENCY MEDICINE

## 2022-01-01 PROCEDURE — 73552 X-RAY EXAM OF FEMUR 2/>: CPT

## 2022-01-01 PROCEDURE — 86901 BLOOD TYPING SEROLOGIC RH(D): CPT

## 2022-01-01 DEVICE — WAX,BONE,NATURAL
Type: IMPLANTABLE DEVICE | Site: LEG | Status: FUNCTIONAL
Brand: MEDLINE INDUSTRIES

## 2022-01-01 RX ORDER — ONDANSETRON 2 MG/ML
4 INJECTION INTRAMUSCULAR; INTRAVENOUS EVERY 6 HOURS PRN
Status: DISCONTINUED | OUTPATIENT
Start: 2022-01-01 | End: 2022-01-01 | Stop reason: HOSPADM

## 2022-01-01 RX ORDER — GLYCOPYRROLATE 0.2 MG/ML
0.4 INJECTION INTRAMUSCULAR; INTRAVENOUS
Status: DISCONTINUED | OUTPATIENT
Start: 2022-01-01 | End: 2022-01-01 | Stop reason: HOSPADM

## 2022-01-01 RX ORDER — HYDROCODONE BITARTRATE AND ACETAMINOPHEN 5; 325 MG/1; MG/1
1 TABLET ORAL EVERY 4 HOURS PRN
Qty: 30 TABLET | Refills: 0 | Status: SHIPPED | OUTPATIENT
Start: 2022-01-01 | End: 2022-01-01 | Stop reason: HOSPADM

## 2022-01-01 RX ORDER — CYCLOBENZAPRINE HCL 10 MG
5 TABLET ORAL 3 TIMES DAILY PRN
Status: DISCONTINUED | OUTPATIENT
Start: 2022-01-01 | End: 2022-01-01 | Stop reason: HOSPADM

## 2022-01-01 RX ORDER — PROPOFOL 10 MG/ML
VIAL (ML) INTRAVENOUS AS NEEDED
Status: DISCONTINUED | OUTPATIENT
Start: 2022-01-01 | End: 2022-01-01 | Stop reason: SURG

## 2022-01-01 RX ORDER — ACETAMINOPHEN 500 MG
1000 TABLET ORAL ONCE
Status: COMPLETED | OUTPATIENT
Start: 2022-01-01 | End: 2022-01-01

## 2022-01-01 RX ORDER — FENTANYL CITRATE 50 UG/ML
25 INJECTION, SOLUTION INTRAMUSCULAR; INTRAVENOUS
Status: COMPLETED | OUTPATIENT
Start: 2022-01-01 | End: 2022-01-01

## 2022-01-01 RX ORDER — ALPRAZOLAM 0.5 MG/1
0.5 TABLET ORAL ONCE AS NEEDED
Status: COMPLETED | OUTPATIENT
Start: 2022-01-01 | End: 2022-01-01

## 2022-01-01 RX ORDER — DIPHENHYDRAMINE HYDROCHLORIDE 50 MG/ML
25 INJECTION INTRAMUSCULAR; INTRAVENOUS EVERY 4 HOURS PRN
Status: DISCONTINUED | OUTPATIENT
Start: 2022-01-01 | End: 2022-01-01 | Stop reason: HOSPADM

## 2022-01-01 RX ORDER — ONDANSETRON 2 MG/ML
4 INJECTION INTRAMUSCULAR; INTRAVENOUS
Status: DISCONTINUED | OUTPATIENT
Start: 2022-01-01 | End: 2022-01-01 | Stop reason: HOSPADM

## 2022-01-01 RX ORDER — NEOSTIGMINE METHYLSULFATE 5 MG/5 ML
SYRINGE (ML) INTRAVENOUS AS NEEDED
Status: DISCONTINUED | OUTPATIENT
Start: 2022-01-01 | End: 2022-01-01 | Stop reason: SURG

## 2022-01-01 RX ORDER — SODIUM CHLORIDE 0.9 % (FLUSH) 0.9 %
10 SYRINGE (ML) INJECTION EVERY 12 HOURS SCHEDULED
Status: DISCONTINUED | OUTPATIENT
Start: 2022-01-01 | End: 2022-01-01 | Stop reason: HOSPADM

## 2022-01-01 RX ORDER — HYDROCODONE BITARTRATE AND ACETAMINOPHEN 5; 325 MG/1; MG/1
1 TABLET ORAL EVERY 4 HOURS PRN
Status: ACTIVE | OUTPATIENT
Start: 2022-01-01 | End: 2022-01-01

## 2022-01-01 RX ORDER — ASPIRIN 81 MG/1
81 TABLET ORAL DAILY
Status: DISCONTINUED | OUTPATIENT
Start: 2022-01-01 | End: 2022-01-01 | Stop reason: HOSPADM

## 2022-01-01 RX ORDER — ACETAMINOPHEN 325 MG/1
650 TABLET ORAL EVERY 4 HOURS PRN
Status: DISCONTINUED | OUTPATIENT
Start: 2022-01-01 | End: 2022-01-01

## 2022-01-01 RX ORDER — CITALOPRAM 10 MG/1
10 TABLET ORAL DAILY
Status: DISCONTINUED | OUTPATIENT
Start: 2022-01-01 | End: 2022-01-01 | Stop reason: HOSPADM

## 2022-01-01 RX ORDER — NOREPINEPHRINE BIT/0.9 % NACL 8 MG/250ML
.02-.3 INFUSION BOTTLE (ML) INTRAVENOUS
Status: DISCONTINUED | OUTPATIENT
Start: 2022-01-01 | End: 2022-01-01

## 2022-01-01 RX ORDER — GABAPENTIN 300 MG/1
300 CAPSULE ORAL EVERY 12 HOURS SCHEDULED
Qty: 24 CAPSULE | Refills: 0 | Status: SHIPPED | OUTPATIENT
Start: 2022-01-01 | End: 2022-01-01 | Stop reason: HOSPADM

## 2022-01-01 RX ORDER — ROCURONIUM BROMIDE 10 MG/ML
INJECTION, SOLUTION INTRAVENOUS AS NEEDED
Status: DISCONTINUED | OUTPATIENT
Start: 2022-01-01 | End: 2022-01-01 | Stop reason: SURG

## 2022-01-01 RX ORDER — PANTOPRAZOLE SODIUM 40 MG/10ML
40 INJECTION, POWDER, LYOPHILIZED, FOR SOLUTION INTRAVENOUS EVERY 12 HOURS SCHEDULED
Status: DISCONTINUED | OUTPATIENT
Start: 2022-01-01 | End: 2022-01-01 | Stop reason: HOSPADM

## 2022-01-01 RX ORDER — LORAZEPAM 2 MG/ML
2 CONCENTRATE ORAL
Status: DISCONTINUED | OUTPATIENT
Start: 2022-01-01 | End: 2022-01-01 | Stop reason: HOSPADM

## 2022-01-01 RX ORDER — IPRATROPIUM BROMIDE AND ALBUTEROL SULFATE 2.5; .5 MG/3ML; MG/3ML
3 SOLUTION RESPIRATORY (INHALATION) EVERY 4 HOURS PRN
Status: DISCONTINUED | OUTPATIENT
Start: 2022-01-01 | End: 2022-01-01 | Stop reason: HOSPADM

## 2022-01-01 RX ORDER — ASPIRIN 81 MG/1
81 TABLET ORAL DAILY
Status: DISCONTINUED | OUTPATIENT
Start: 2022-01-01 | End: 2022-01-01

## 2022-01-01 RX ORDER — SCOLOPAMINE TRANSDERMAL SYSTEM 1 MG/1
1 PATCH, EXTENDED RELEASE TRANSDERMAL
Status: DISCONTINUED | OUTPATIENT
Start: 2022-01-01 | End: 2022-01-01 | Stop reason: HOSPADM

## 2022-01-01 RX ORDER — ACETAMINOPHEN 650 MG/1
650 SUPPOSITORY RECTAL EVERY 4 HOURS PRN
Status: DISCONTINUED | OUTPATIENT
Start: 2022-01-01 | End: 2022-01-01 | Stop reason: HOSPADM

## 2022-01-01 RX ORDER — SODIUM CHLORIDE, SODIUM LACTATE, POTASSIUM CHLORIDE, CALCIUM CHLORIDE 600; 310; 30; 20 MG/100ML; MG/100ML; MG/100ML; MG/100ML
100 INJECTION, SOLUTION INTRAVENOUS CONTINUOUS
Status: DISCONTINUED | OUTPATIENT
Start: 2022-01-01 | End: 2022-01-01

## 2022-01-01 RX ORDER — ONDANSETRON 4 MG/1
4 TABLET, FILM COATED ORAL EVERY 6 HOURS PRN
Status: DISCONTINUED | OUTPATIENT
Start: 2022-01-01 | End: 2022-01-01 | Stop reason: HOSPADM

## 2022-01-01 RX ORDER — GABAPENTIN 300 MG/1
300 CAPSULE ORAL EVERY 12 HOURS SCHEDULED
Status: DISCONTINUED | OUTPATIENT
Start: 2022-01-01 | End: 2022-01-01

## 2022-01-01 RX ORDER — MIDODRINE HYDROCHLORIDE 2.5 MG/1
10 TABLET ORAL
Status: DISCONTINUED | OUTPATIENT
Start: 2022-01-01 | End: 2022-01-01 | Stop reason: HOSPADM

## 2022-01-01 RX ORDER — SODIUM CHLORIDE 0.9 % (FLUSH) 0.9 %
10 SYRINGE (ML) INJECTION AS NEEDED
Status: DISCONTINUED | OUTPATIENT
Start: 2022-01-01 | End: 2022-01-01 | Stop reason: HOSPADM

## 2022-01-01 RX ORDER — DOCUSATE SODIUM 100 MG/1
100 CAPSULE, LIQUID FILLED ORAL 2 TIMES DAILY
Status: DISCONTINUED | OUTPATIENT
Start: 2022-01-01 | End: 2022-01-01 | Stop reason: HOSPADM

## 2022-01-01 RX ORDER — SODIUM CHLORIDE 0.9 % (FLUSH) 0.9 %
3-10 SYRINGE (ML) INJECTION AS NEEDED
Status: DISCONTINUED | OUTPATIENT
Start: 2022-01-01 | End: 2022-01-01 | Stop reason: HOSPADM

## 2022-01-01 RX ORDER — FLUMAZENIL 0.1 MG/ML
0.2 INJECTION INTRAVENOUS AS NEEDED
Status: DISCONTINUED | OUTPATIENT
Start: 2022-01-01 | End: 2022-01-01 | Stop reason: HOSPADM

## 2022-01-01 RX ORDER — SODIUM CHLORIDE 9 MG/ML
50 INJECTION, SOLUTION INTRAVENOUS CONTINUOUS
Status: DISCONTINUED | OUTPATIENT
Start: 2022-01-01 | End: 2022-01-01

## 2022-01-01 RX ORDER — MIDODRINE HYDROCHLORIDE 5 MG/1
5 TABLET ORAL
Start: 2022-01-01

## 2022-01-01 RX ORDER — MIDODRINE HYDROCHLORIDE 2.5 MG/1
5 TABLET ORAL
Status: DISCONTINUED | OUTPATIENT
Start: 2022-01-01 | End: 2022-01-01

## 2022-01-01 RX ORDER — MIDODRINE HYDROCHLORIDE 2.5 MG/1
10 TABLET ORAL
Status: DISCONTINUED | OUTPATIENT
Start: 2022-01-01 | End: 2022-01-01

## 2022-01-01 RX ORDER — ONDANSETRON 2 MG/ML
4 INJECTION INTRAMUSCULAR; INTRAVENOUS AS NEEDED
Status: DISCONTINUED | OUTPATIENT
Start: 2022-01-01 | End: 2022-01-01 | Stop reason: HOSPADM

## 2022-01-01 RX ORDER — HYDROCORTISONE ACETATE 25 MG/1
25 SUPPOSITORY RECTAL 2 TIMES DAILY
Status: DISCONTINUED | OUTPATIENT
Start: 2022-01-01 | End: 2022-01-01 | Stop reason: HOSPADM

## 2022-01-01 RX ORDER — LORAZEPAM 2 MG/ML
2 INJECTION INTRAMUSCULAR
Status: DISCONTINUED | OUTPATIENT
Start: 2022-01-01 | End: 2022-01-01 | Stop reason: HOSPADM

## 2022-01-01 RX ORDER — HEPARIN SODIUM 1000 [USP'U]/ML
3200 INJECTION, SOLUTION INTRAVENOUS; SUBCUTANEOUS AS NEEDED
Status: DISCONTINUED | OUTPATIENT
Start: 2022-01-01 | End: 2022-01-01 | Stop reason: HOSPADM

## 2022-01-01 RX ORDER — HYDROMORPHONE HYDROCHLORIDE 1 MG/ML
0.5 INJECTION, SOLUTION INTRAMUSCULAR; INTRAVENOUS; SUBCUTANEOUS
Status: DISCONTINUED | OUTPATIENT
Start: 2022-01-01 | End: 2022-01-01 | Stop reason: HOSPADM

## 2022-01-01 RX ORDER — ACETAMINOPHEN 650 MG/1
650 SUPPOSITORY RECTAL EVERY 4 HOURS PRN
Status: DISCONTINUED | OUTPATIENT
Start: 2022-01-01 | End: 2022-01-01

## 2022-01-01 RX ORDER — ATORVASTATIN CALCIUM 10 MG/1
20 TABLET, FILM COATED ORAL NIGHTLY
Status: DISCONTINUED | OUTPATIENT
Start: 2022-01-01 | End: 2022-01-01 | Stop reason: HOSPADM

## 2022-01-01 RX ORDER — LORAZEPAM 2 MG/ML
0.5 INJECTION INTRAMUSCULAR
Status: DISCONTINUED | OUTPATIENT
Start: 2022-01-01 | End: 2022-01-01 | Stop reason: HOSPADM

## 2022-01-01 RX ORDER — DIPHENHYDRAMINE HCL 25 MG
25 CAPSULE ORAL EVERY 4 HOURS PRN
Status: DISCONTINUED | OUTPATIENT
Start: 2022-01-01 | End: 2022-01-01 | Stop reason: HOSPADM

## 2022-01-01 RX ORDER — NALOXONE HCL 0.4 MG/ML
0.4 VIAL (ML) INJECTION
Status: DISCONTINUED | OUTPATIENT
Start: 2022-01-01 | End: 2022-01-01 | Stop reason: HOSPADM

## 2022-01-01 RX ORDER — ATROPINE SULFATE 10 MG/ML
2 SOLUTION/ DROPS OPHTHALMIC 2 TIMES DAILY PRN
Status: DISCONTINUED | OUTPATIENT
Start: 2022-01-01 | End: 2022-01-01 | Stop reason: HOSPADM

## 2022-01-01 RX ORDER — HYDROMORPHONE HYDROCHLORIDE 1 MG/ML
0.2 INJECTION, SOLUTION INTRAMUSCULAR; INTRAVENOUS; SUBCUTANEOUS
Status: COMPLETED | OUTPATIENT
Start: 2022-01-01 | End: 2022-01-01

## 2022-01-01 RX ORDER — CITALOPRAM 20 MG/1
40 TABLET ORAL DAILY
Status: DISCONTINUED | OUTPATIENT
Start: 2022-01-01 | End: 2022-01-01 | Stop reason: HOSPADM

## 2022-01-01 RX ORDER — OXYCODONE HYDROCHLORIDE AND ACETAMINOPHEN 5; 325 MG/1; MG/1
1 TABLET ORAL EVERY 4 HOURS PRN
Status: DISCONTINUED | OUTPATIENT
Start: 2022-01-01 | End: 2022-01-01 | Stop reason: HOSPADM

## 2022-01-01 RX ORDER — TRANEXAMIC ACID 100 MG/ML
500 INJECTION, SOLUTION INTRAVENOUS ONCE
Status: COMPLETED | OUTPATIENT
Start: 2022-01-01 | End: 2022-01-01

## 2022-01-01 RX ORDER — DEXTROSE MONOHYDRATE 25 G/50ML
25 INJECTION, SOLUTION INTRAVENOUS
Status: DISCONTINUED | OUTPATIENT
Start: 2022-01-01 | End: 2022-01-01 | Stop reason: HOSPADM

## 2022-01-01 RX ORDER — OXYCODONE AND ACETAMINOPHEN 10; 325 MG/1; MG/1
1 TABLET ORAL EVERY 6 HOURS PRN
Status: DISCONTINUED | OUTPATIENT
Start: 2022-01-01 | End: 2022-01-01 | Stop reason: HOSPADM

## 2022-01-01 RX ORDER — DIPHENOXYLATE HYDROCHLORIDE AND ATROPINE SULFATE 2.5; .025 MG/1; MG/1
1 TABLET ORAL
Status: DISCONTINUED | OUTPATIENT
Start: 2022-01-01 | End: 2022-01-01 | Stop reason: HOSPADM

## 2022-01-01 RX ORDER — BUSPIRONE HYDROCHLORIDE 5 MG/1
5 TABLET ORAL 2 TIMES DAILY
Status: ON HOLD | COMMUNITY
End: 2022-01-01

## 2022-01-01 RX ORDER — LORAZEPAM 2 MG/ML
1 INJECTION INTRAMUSCULAR
Status: DISCONTINUED | OUTPATIENT
Start: 2022-01-01 | End: 2022-01-01 | Stop reason: HOSPADM

## 2022-01-01 RX ORDER — ONDANSETRON 2 MG/ML
INJECTION INTRAMUSCULAR; INTRAVENOUS AS NEEDED
Status: DISCONTINUED | OUTPATIENT
Start: 2022-01-01 | End: 2022-01-01 | Stop reason: SURG

## 2022-01-01 RX ORDER — ALBUMIN (HUMAN) 12.5 G/50ML
12.5 SOLUTION INTRAVENOUS AS NEEDED
Status: DISCONTINUED | OUTPATIENT
Start: 2022-01-01 | End: 2022-01-01

## 2022-01-01 RX ORDER — HALOPERIDOL 5 MG/ML
2 INJECTION INTRAMUSCULAR EVERY 4 HOURS PRN
Status: DISCONTINUED | OUTPATIENT
Start: 2022-01-01 | End: 2022-01-01 | Stop reason: HOSPADM

## 2022-01-01 RX ORDER — HYDROCORTISONE ACETATE 25 MG/1
25 SUPPOSITORY RECTAL 2 TIMES DAILY
Start: 2022-01-01

## 2022-01-01 RX ORDER — FAMOTIDINE 20 MG/1
20 TABLET, FILM COATED ORAL DAILY
Status: DISCONTINUED | OUTPATIENT
Start: 2022-01-01 | End: 2022-01-01 | Stop reason: HOSPADM

## 2022-01-01 RX ORDER — ONDANSETRON 4 MG/1
4 TABLET, ORALLY DISINTEGRATING ORAL EVERY 6 HOURS PRN
Status: ON HOLD | COMMUNITY
End: 2022-01-01

## 2022-01-01 RX ORDER — CEPHALEXIN 500 MG/1
500 CAPSULE ORAL 2 TIMES DAILY
COMMUNITY
Start: 2021-01-01 | End: 2022-01-01 | Stop reason: HOSPADM

## 2022-01-01 RX ORDER — PROCHLORPERAZINE EDISYLATE 5 MG/ML
5 INJECTION INTRAMUSCULAR; INTRAVENOUS EVERY 6 HOURS PRN
Status: DISCONTINUED | OUTPATIENT
Start: 2022-01-01 | End: 2022-01-01 | Stop reason: HOSPADM

## 2022-01-01 RX ORDER — GLYCOPYRROLATE 0.2 MG/ML
0.2 INJECTION INTRAMUSCULAR; INTRAVENOUS
Status: DISCONTINUED | OUTPATIENT
Start: 2022-01-01 | End: 2022-01-01 | Stop reason: HOSPADM

## 2022-01-01 RX ORDER — LORAZEPAM 2 MG/ML
1 CONCENTRATE ORAL
Status: DISCONTINUED | OUTPATIENT
Start: 2022-01-01 | End: 2022-01-01 | Stop reason: HOSPADM

## 2022-01-01 RX ORDER — LABETALOL HYDROCHLORIDE 5 MG/ML
5 INJECTION, SOLUTION INTRAVENOUS
Status: DISCONTINUED | OUTPATIENT
Start: 2022-01-01 | End: 2022-01-01 | Stop reason: HOSPADM

## 2022-01-01 RX ORDER — PROCHLORPERAZINE MALEATE 10 MG
5 TABLET ORAL EVERY 6 HOURS PRN
Status: DISCONTINUED | OUTPATIENT
Start: 2022-01-01 | End: 2022-01-01 | Stop reason: HOSPADM

## 2022-01-01 RX ORDER — LORAZEPAM 2 MG/ML
0.25 CONCENTRATE ORAL EVERY 8 HOURS
Status: DISCONTINUED | OUTPATIENT
Start: 2022-01-01 | End: 2022-01-01 | Stop reason: HOSPADM

## 2022-01-01 RX ORDER — ALLOPURINOL 100 MG/1
100 TABLET ORAL DAILY
Status: DISCONTINUED | OUTPATIENT
Start: 2022-01-01 | End: 2022-01-01

## 2022-01-01 RX ORDER — CLOPIDOGREL BISULFATE 75 MG/1
75 TABLET ORAL DAILY
Status: DISCONTINUED | OUTPATIENT
Start: 2022-01-01 | End: 2022-01-01 | Stop reason: HOSPADM

## 2022-01-01 RX ORDER — HALOPERIDOL 2 MG/ML
2 SOLUTION ORAL EVERY 4 HOURS PRN
Status: DISCONTINUED | OUTPATIENT
Start: 2022-01-01 | End: 2022-01-01 | Stop reason: HOSPADM

## 2022-01-01 RX ORDER — OXYCODONE AND ACETAMINOPHEN 10; 325 MG/1; MG/1
1 TABLET ORAL ONCE AS NEEDED
Status: DISCONTINUED | OUTPATIENT
Start: 2022-01-01 | End: 2022-01-01 | Stop reason: HOSPADM

## 2022-01-01 RX ORDER — IBUPROFEN 600 MG/1
600 TABLET ORAL ONCE AS NEEDED
Status: DISCONTINUED | OUTPATIENT
Start: 2022-01-01 | End: 2022-01-01 | Stop reason: HOSPADM

## 2022-01-01 RX ORDER — ACETAMINOPHEN 325 MG/1
650 TABLET ORAL EVERY 4 HOURS PRN
COMMUNITY

## 2022-01-01 RX ORDER — CITALOPRAM 10 MG/1
10 TABLET ORAL DAILY
Start: 2022-01-01

## 2022-01-01 RX ORDER — ACETAMINOPHEN 160 MG/5ML
650 SOLUTION ORAL EVERY 4 HOURS PRN
Status: DISCONTINUED | OUTPATIENT
Start: 2022-01-01 | End: 2022-01-01

## 2022-01-01 RX ORDER — LORAZEPAM 2 MG/ML
0.5 CONCENTRATE ORAL
Status: DISCONTINUED | OUTPATIENT
Start: 2022-01-01 | End: 2022-01-01 | Stop reason: HOSPADM

## 2022-01-01 RX ORDER — ATORVASTATIN CALCIUM 10 MG/1
20 TABLET, FILM COATED ORAL NIGHTLY
Status: DISCONTINUED | OUTPATIENT
Start: 2022-01-01 | End: 2022-01-01

## 2022-01-01 RX ORDER — ACETAMINOPHEN 160 MG/5ML
650 SOLUTION ORAL EVERY 4 HOURS PRN
Status: DISCONTINUED | OUTPATIENT
Start: 2022-01-01 | End: 2022-01-01 | Stop reason: HOSPADM

## 2022-01-01 RX ORDER — HEPARIN SODIUM 1000 [USP'U]/ML
3600 INJECTION, SOLUTION INTRAVENOUS; SUBCUTANEOUS AS NEEDED
Status: DISCONTINUED | OUTPATIENT
Start: 2022-01-01 | End: 2022-01-01 | Stop reason: HOSPADM

## 2022-01-01 RX ORDER — PANTOPRAZOLE SODIUM 40 MG/1
40 TABLET, DELAYED RELEASE ORAL 2 TIMES DAILY
Start: 2022-01-01

## 2022-01-01 RX ORDER — NICOTINE POLACRILEX 4 MG
15 LOZENGE BUCCAL
Status: DISCONTINUED | OUTPATIENT
Start: 2022-01-01 | End: 2022-01-01 | Stop reason: HOSPADM

## 2022-01-01 RX ORDER — ACETAMINOPHEN 325 MG/1
650 TABLET ORAL EVERY 4 HOURS PRN
Status: DISCONTINUED | OUTPATIENT
Start: 2022-01-01 | End: 2022-01-01 | Stop reason: HOSPADM

## 2022-01-01 RX ORDER — BISACODYL 5 MG/1
10 TABLET, DELAYED RELEASE ORAL DAILY PRN
Status: DISCONTINUED | OUTPATIENT
Start: 2022-01-01 | End: 2022-01-01 | Stop reason: HOSPADM

## 2022-01-01 RX ORDER — FENTANYL CITRATE 50 UG/ML
INJECTION, SOLUTION INTRAMUSCULAR; INTRAVENOUS AS NEEDED
Status: DISCONTINUED | OUTPATIENT
Start: 2022-01-01 | End: 2022-01-01 | Stop reason: SURG

## 2022-01-01 RX ORDER — MIDAZOLAM HYDROCHLORIDE 1 MG/ML
0.5 INJECTION INTRAMUSCULAR; INTRAVENOUS
Status: DISCONTINUED | OUTPATIENT
Start: 2022-01-01 | End: 2022-01-01 | Stop reason: HOSPADM

## 2022-01-01 RX ORDER — LIDOCAINE HYDROCHLORIDE 10 MG/ML
0.5 INJECTION, SOLUTION EPIDURAL; INFILTRATION; INTRACAUDAL; PERINEURAL ONCE AS NEEDED
Status: DISCONTINUED | OUTPATIENT
Start: 2022-01-01 | End: 2022-01-01 | Stop reason: HOSPADM

## 2022-01-01 RX ORDER — SODIUM CHLORIDE 0.9 % (FLUSH) 0.9 %
3 SYRINGE (ML) INJECTION EVERY 12 HOURS SCHEDULED
Status: DISCONTINUED | OUTPATIENT
Start: 2022-01-01 | End: 2022-01-01 | Stop reason: HOSPADM

## 2022-01-01 RX ORDER — NALOXONE HCL 0.4 MG/ML
0.04 VIAL (ML) INJECTION AS NEEDED
Status: DISCONTINUED | OUTPATIENT
Start: 2022-01-01 | End: 2022-01-01 | Stop reason: HOSPADM

## 2022-01-01 RX ORDER — GABAPENTIN 300 MG/1
300 CAPSULE ORAL EVERY 12 HOURS SCHEDULED
Status: DISCONTINUED | OUTPATIENT
Start: 2022-01-01 | End: 2022-01-01 | Stop reason: HOSPADM

## 2022-01-01 RX ORDER — HYDROCODONE BITARTRATE AND ACETAMINOPHEN 5; 325 MG/1; MG/1
1 TABLET ORAL EVERY 4 HOURS PRN
Status: DISCONTINUED | OUTPATIENT
Start: 2022-01-01 | End: 2022-01-01 | Stop reason: HOSPADM

## 2022-01-01 RX ORDER — BUPIVACAINE HCL/0.9 % NACL/PF 0.1 %
2 PLASTIC BAG, INJECTION (ML) EPIDURAL ONCE
Status: COMPLETED | OUTPATIENT
Start: 2022-01-01 | End: 2022-01-01

## 2022-01-01 RX ORDER — PROCHLORPERAZINE 25 MG
25 SUPPOSITORY, RECTAL RECTAL EVERY 12 HOURS PRN
Status: DISCONTINUED | OUTPATIENT
Start: 2022-01-01 | End: 2022-01-01 | Stop reason: HOSPADM

## 2022-01-01 RX ORDER — LIDOCAINE HYDROCHLORIDE 20 MG/ML
INJECTION, SOLUTION EPIDURAL; INFILTRATION; INTRACAUDAL; PERINEURAL AS NEEDED
Status: DISCONTINUED | OUTPATIENT
Start: 2022-01-01 | End: 2022-01-01 | Stop reason: SURG

## 2022-01-01 RX ADMIN — ENOXAPARIN SODIUM 30 MG: 30 INJECTION SUBCUTANEOUS at 06:28

## 2022-01-01 RX ADMIN — ASPIRIN 81 MG: 81 TABLET, COATED ORAL at 17:53

## 2022-01-01 RX ADMIN — ATORVASTATIN CALCIUM 20 MG: 10 TABLET, FILM COATED ORAL at 21:39

## 2022-01-01 RX ADMIN — CITALOPRAM 40 MG: 20 TABLET, FILM COATED ORAL at 09:20

## 2022-01-01 RX ADMIN — HYDROMORPHONE HYDROCHLORIDE 1.5 MG: 1 INJECTION, SOLUTION INTRAMUSCULAR; INTRAVENOUS; SUBCUTANEOUS at 14:14

## 2022-01-01 RX ADMIN — MIDODRINE HYDROCHLORIDE 10 MG: 2.5 TABLET ORAL at 16:48

## 2022-01-01 RX ADMIN — FAMOTIDINE 20 MG: 20 TABLET, FILM COATED ORAL at 09:18

## 2022-01-01 RX ADMIN — PANTOPRAZOLE SODIUM 40 MG: 40 INJECTION, POWDER, FOR SOLUTION INTRAVENOUS at 20:58

## 2022-01-01 RX ADMIN — OXYCODONE AND ACETAMINOPHEN 1 TABLET: 325; 10 TABLET ORAL at 06:22

## 2022-01-01 RX ADMIN — ALLOPURINOL 100 MG: 100 TABLET ORAL at 08:32

## 2022-01-01 RX ADMIN — PROPOFOL 150 MG: 10 INJECTION, EMULSION INTRAVENOUS at 07:58

## 2022-01-01 RX ADMIN — GABAPENTIN 300 MG: 300 CAPSULE ORAL at 21:34

## 2022-01-01 RX ADMIN — MIDODRINE HYDROCHLORIDE 10 MG: 2.5 TABLET ORAL at 12:20

## 2022-01-01 RX ADMIN — ACETAMINOPHEN 650 MG: 325 TABLET ORAL at 01:50

## 2022-01-01 RX ADMIN — GABAPENTIN 300 MG: 300 CAPSULE ORAL at 09:20

## 2022-01-01 RX ADMIN — LORAZEPAM 0.26 MG: 2 SOLUTION, CONCENTRATE ORAL at 05:34

## 2022-01-01 RX ADMIN — PANTOPRAZOLE SODIUM 40 MG: 40 INJECTION, POWDER, FOR SOLUTION INTRAVENOUS at 08:31

## 2022-01-01 RX ADMIN — SODIUM CHLORIDE, PRESERVATIVE FREE 10 ML: 5 INJECTION INTRAVENOUS at 20:39

## 2022-01-01 RX ADMIN — BISACODYL 10 MG: 5 TABLET, COATED ORAL at 15:11

## 2022-01-01 RX ADMIN — TRANEXAMIC ACID 500 MG: 100 INJECTION, SOLUTION INTRAVENOUS at 11:57

## 2022-01-01 RX ADMIN — DOCUSATE SODIUM 100 MG: 100 CAPSULE, LIQUID FILLED ORAL at 09:20

## 2022-01-01 RX ADMIN — OXYCODONE AND ACETAMINOPHEN 1 TABLET: 325; 10 TABLET ORAL at 01:34

## 2022-01-01 RX ADMIN — MIDODRINE HYDROCHLORIDE 10 MG: 2.5 TABLET ORAL at 17:29

## 2022-01-01 RX ADMIN — MIDODRINE HYDROCHLORIDE 10 MG: 2.5 TABLET ORAL at 14:58

## 2022-01-01 RX ADMIN — CYCLOBENZAPRINE 5 MG: 10 TABLET, FILM COATED ORAL at 20:53

## 2022-01-01 RX ADMIN — SODIUM CHLORIDE, PRESERVATIVE FREE 10 ML: 5 INJECTION INTRAVENOUS at 08:30

## 2022-01-01 RX ADMIN — CITALOPRAM 10 MG: 10 TABLET, FILM COATED ORAL at 08:18

## 2022-01-01 RX ADMIN — ROCURONIUM BROMIDE 50 MG: 10 INJECTION INTRAVENOUS at 07:58

## 2022-01-01 RX ADMIN — CLOPIDOGREL 75 MG: 75 TABLET, FILM COATED ORAL at 09:20

## 2022-01-01 RX ADMIN — GABAPENTIN 300 MG: 300 CAPSULE ORAL at 09:46

## 2022-01-01 RX ADMIN — SODIUM CHLORIDE 500 ML: 9 INJECTION, SOLUTION INTRAVENOUS at 05:37

## 2022-01-01 RX ADMIN — ACETAMINOPHEN 650 MG: 325 TABLET ORAL at 14:11

## 2022-01-01 RX ADMIN — SODIUM CHLORIDE, PRESERVATIVE FREE 10 ML: 5 INJECTION INTRAVENOUS at 21:39

## 2022-01-01 RX ADMIN — FAMOTIDINE 20 MG: 20 TABLET, FILM COATED ORAL at 08:33

## 2022-01-01 RX ADMIN — SODIUM CHLORIDE, PRESERVATIVE FREE 10 ML: 5 INJECTION INTRAVENOUS at 20:32

## 2022-01-01 RX ADMIN — MIDODRINE HYDROCHLORIDE 10 MG: 2.5 TABLET ORAL at 17:16

## 2022-01-01 RX ADMIN — HYDROMORPHONE HYDROCHLORIDE 0.5 MG: 1 INJECTION, SOLUTION INTRAMUSCULAR; INTRAVENOUS; SUBCUTANEOUS at 14:58

## 2022-01-01 RX ADMIN — GABAPENTIN 300 MG: 300 CAPSULE ORAL at 08:40

## 2022-01-01 RX ADMIN — HYDROCODONE BITARTRATE AND ACETAMINOPHEN 1 TABLET: 5; 325 TABLET ORAL at 03:40

## 2022-01-01 RX ADMIN — EPOETIN ALFA-EPBX 10000 UNITS: 10000 INJECTION, SOLUTION INTRAVENOUS; SUBCUTANEOUS at 09:21

## 2022-01-01 RX ADMIN — FENTANYL CITRATE 25 MCG: 50 INJECTION, SOLUTION INTRAMUSCULAR; INTRAVENOUS at 10:08

## 2022-01-01 RX ADMIN — DOCUSATE SODIUM 100 MG: 100 CAPSULE, LIQUID FILLED ORAL at 09:19

## 2022-01-01 RX ADMIN — MIDODRINE HYDROCHLORIDE 10 MG: 2.5 TABLET ORAL at 11:44

## 2022-01-01 RX ADMIN — MIDAZOLAM 0.5 MG: 1 INJECTION INTRAMUSCULAR; INTRAVENOUS at 07:43

## 2022-01-01 RX ADMIN — ACETAMINOPHEN 650 MG: 325 TABLET ORAL at 04:47

## 2022-01-01 RX ADMIN — HYDROCORTISONE ACETATE 25 MG: 25 SUPPOSITORY RECTAL at 20:58

## 2022-01-01 RX ADMIN — SODIUM CHLORIDE, PRESERVATIVE FREE 10 ML: 5 INJECTION INTRAVENOUS at 10:32

## 2022-01-01 RX ADMIN — SODIUM CHLORIDE, PRESERVATIVE FREE 10 ML: 5 INJECTION INTRAVENOUS at 21:13

## 2022-01-01 RX ADMIN — ACETAMINOPHEN 650 MG: 325 TABLET ORAL at 21:39

## 2022-01-01 RX ADMIN — MIDODRINE HYDROCHLORIDE 10 MG: 2.5 TABLET ORAL at 17:28

## 2022-01-01 RX ADMIN — SODIUM CHLORIDE, PRESERVATIVE FREE 10 ML: 5 INJECTION INTRAVENOUS at 21:02

## 2022-01-01 RX ADMIN — HYDROMORPHONE HYDROCHLORIDE 0.5 MG: 1 INJECTION, SOLUTION INTRAMUSCULAR; INTRAVENOUS; SUBCUTANEOUS at 09:53

## 2022-01-01 RX ADMIN — MIDODRINE HYDROCHLORIDE 10 MG: 2.5 TABLET ORAL at 08:18

## 2022-01-01 RX ADMIN — MIDODRINE HYDROCHLORIDE 10 MG: 2.5 TABLET ORAL at 18:17

## 2022-01-01 RX ADMIN — OXYCODONE AND ACETAMINOPHEN 1 TABLET: 325; 10 TABLET ORAL at 22:07

## 2022-01-01 RX ADMIN — SODIUM CHLORIDE, PRESERVATIVE FREE 10 ML: 5 INJECTION INTRAVENOUS at 09:47

## 2022-01-01 RX ADMIN — GABAPENTIN 300 MG: 300 CAPSULE ORAL at 21:01

## 2022-01-01 RX ADMIN — ASPIRIN 81 MG: 81 TABLET, COATED ORAL at 08:18

## 2022-01-01 RX ADMIN — OXYCODONE AND ACETAMINOPHEN 1 TABLET: 325; 10 TABLET ORAL at 08:40

## 2022-01-01 RX ADMIN — FENTANYL CITRATE 25 MCG: 50 INJECTION, SOLUTION INTRAMUSCULAR; INTRAVENOUS at 10:20

## 2022-01-01 RX ADMIN — BISACODYL 10 MG: 5 TABLET, COATED ORAL at 09:17

## 2022-01-01 RX ADMIN — SODIUM CHLORIDE, PRESERVATIVE FREE 10 ML: 5 INJECTION INTRAVENOUS at 20:37

## 2022-01-01 RX ADMIN — PANTOPRAZOLE SODIUM 40 MG: 40 INJECTION, POWDER, FOR SOLUTION INTRAVENOUS at 08:18

## 2022-01-01 RX ADMIN — HEPARIN SODIUM 3200 UNITS: 1000 INJECTION, SOLUTION INTRAVENOUS; SUBCUTANEOUS at 19:40

## 2022-01-01 RX ADMIN — GABAPENTIN 300 MG: 300 CAPSULE ORAL at 21:56

## 2022-01-01 RX ADMIN — PANTOPRAZOLE SODIUM 40 MG: 40 INJECTION, POWDER, FOR SOLUTION INTRAVENOUS at 20:44

## 2022-01-01 RX ADMIN — CITALOPRAM 40 MG: 20 TABLET, FILM COATED ORAL at 08:33

## 2022-01-01 RX ADMIN — HYDROMORPHONE HYDROCHLORIDE 0.5 MG: 1 INJECTION, SOLUTION INTRAMUSCULAR; INTRAVENOUS; SUBCUTANEOUS at 15:09

## 2022-01-01 RX ADMIN — CITALOPRAM 10 MG: 10 TABLET, FILM COATED ORAL at 08:32

## 2022-01-01 RX ADMIN — SODIUM CHLORIDE, PRESERVATIVE FREE 10 ML: 5 INJECTION INTRAVENOUS at 08:19

## 2022-01-01 RX ADMIN — ATORVASTATIN CALCIUM 20 MG: 10 TABLET, FILM COATED ORAL at 20:32

## 2022-01-01 RX ADMIN — PANTOPRAZOLE SODIUM 40 MG: 40 INJECTION, POWDER, FOR SOLUTION INTRAVENOUS at 20:36

## 2022-01-01 RX ADMIN — MIDODRINE HYDROCHLORIDE 10 MG: 2.5 TABLET ORAL at 08:13

## 2022-01-01 RX ADMIN — VASOPRESSIN 0.03 UNITS/MIN: 20 INJECTION INTRAVENOUS at 13:06

## 2022-01-01 RX ADMIN — SODIUM CHLORIDE, PRESERVATIVE FREE 10 ML: 5 INJECTION INTRAVENOUS at 20:58

## 2022-01-01 RX ADMIN — ATORVASTATIN CALCIUM 20 MG: 10 TABLET, FILM COATED ORAL at 21:14

## 2022-01-01 RX ADMIN — ENOXAPARIN SODIUM 30 MG: 30 INJECTION SUBCUTANEOUS at 06:22

## 2022-01-01 RX ADMIN — SODIUM CHLORIDE, PRESERVATIVE FREE 10 ML: 5 INJECTION INTRAVENOUS at 10:19

## 2022-01-01 RX ADMIN — MIDODRINE HYDROCHLORIDE 10 MG: 2.5 TABLET ORAL at 10:18

## 2022-01-01 RX ADMIN — ALPRAZOLAM 0.5 MG: 0.5 TABLET ORAL at 20:53

## 2022-01-01 RX ADMIN — GABAPENTIN 300 MG: 300 CAPSULE ORAL at 21:14

## 2022-01-01 RX ADMIN — GABAPENTIN 300 MG: 300 CAPSULE ORAL at 08:12

## 2022-01-01 RX ADMIN — ACETAMINOPHEN 650 MG: 325 TABLET ORAL at 07:15

## 2022-01-01 RX ADMIN — HYDROMORPHONE HYDROCHLORIDE 0.2 MG: 1 INJECTION, SOLUTION INTRAMUSCULAR; INTRAVENOUS; SUBCUTANEOUS at 10:29

## 2022-01-01 RX ADMIN — OXYCODONE AND ACETAMINOPHEN 1 TABLET: 325; 10 TABLET ORAL at 21:13

## 2022-01-01 RX ADMIN — MIDODRINE HYDROCHLORIDE 10 MG: 2.5 TABLET ORAL at 09:20

## 2022-01-01 RX ADMIN — FAMOTIDINE 20 MG: 20 TABLET, FILM COATED ORAL at 16:40

## 2022-01-01 RX ADMIN — OXYCODONE AND ACETAMINOPHEN 1 TABLET: 325; 10 TABLET ORAL at 09:42

## 2022-01-01 RX ADMIN — HYDROMORPHONE HYDROCHLORIDE 0.5 MG: 1 INJECTION, SOLUTION INTRAMUSCULAR; INTRAVENOUS; SUBCUTANEOUS at 01:59

## 2022-01-01 RX ADMIN — HEPARIN SODIUM 3600 UNITS: 1000 INJECTION, SOLUTION INTRAVENOUS; SUBCUTANEOUS at 18:46

## 2022-01-01 RX ADMIN — FAMOTIDINE 20 MG: 20 TABLET, FILM COATED ORAL at 08:12

## 2022-01-01 RX ADMIN — DESMOPRESSIN ACETATE 20.68 MCG: 4 INJECTION INTRAVENOUS; SUBCUTANEOUS at 12:05

## 2022-01-01 RX ADMIN — SODIUM CHLORIDE, PRESERVATIVE FREE 10 ML: 5 INJECTION INTRAVENOUS at 08:32

## 2022-01-01 RX ADMIN — PANTOPRAZOLE SODIUM 40 MG: 40 INJECTION, POWDER, FOR SOLUTION INTRAVENOUS at 10:19

## 2022-01-01 RX ADMIN — ONDANSETRON 4 MG: 2 INJECTION INTRAMUSCULAR; INTRAVENOUS at 09:17

## 2022-01-01 RX ADMIN — OXYCODONE AND ACETAMINOPHEN 1 TABLET: 325; 10 TABLET ORAL at 18:02

## 2022-01-01 RX ADMIN — Medication 5 MG: at 09:21

## 2022-01-01 RX ADMIN — HYDROMORPHONE HYDROCHLORIDE 0.5 MG: 1 INJECTION, SOLUTION INTRAMUSCULAR; INTRAVENOUS; SUBCUTANEOUS at 13:03

## 2022-01-01 RX ADMIN — ACETAMINOPHEN 650 MG: 325 TABLET ORAL at 20:53

## 2022-01-01 RX ADMIN — EPOETIN ALFA-EPBX 10000 UNITS: 10000 INJECTION, SOLUTION INTRAVENOUS; SUBCUTANEOUS at 08:31

## 2022-01-01 RX ADMIN — ASPIRIN 81 MG: 81 TABLET, COATED ORAL at 09:20

## 2022-01-01 RX ADMIN — ASPIRIN 81 MG: 81 TABLET, COATED ORAL at 08:13

## 2022-01-01 RX ADMIN — ENOXAPARIN SODIUM 30 MG: 30 INJECTION SUBCUTANEOUS at 05:35

## 2022-01-01 RX ADMIN — MIDODRINE HYDROCHLORIDE 10 MG: 2.5 TABLET ORAL at 08:32

## 2022-01-01 RX ADMIN — SODIUM CHLORIDE 50 ML/HR: 9 INJECTION, SOLUTION INTRAVENOUS at 07:41

## 2022-01-01 RX ADMIN — OXYCODONE HYDROCHLORIDE AND ACETAMINOPHEN 1 TABLET: 5; 325 TABLET ORAL at 14:59

## 2022-01-01 RX ADMIN — SODIUM CHLORIDE, PRESERVATIVE FREE 10 ML: 5 INJECTION INTRAVENOUS at 09:19

## 2022-01-01 RX ADMIN — ATORVASTATIN CALCIUM 20 MG: 10 TABLET, FILM COATED ORAL at 22:07

## 2022-01-01 RX ADMIN — FENTANYL CITRATE 25 MCG: 50 INJECTION, SOLUTION INTRAMUSCULAR; INTRAVENOUS at 09:53

## 2022-01-01 RX ADMIN — Medication 0.02 MCG/KG/MIN: at 13:15

## 2022-01-01 RX ADMIN — PANTOPRAZOLE SODIUM 40 MG: 40 INJECTION, POWDER, FOR SOLUTION INTRAVENOUS at 10:32

## 2022-01-01 RX ADMIN — CITALOPRAM 10 MG: 10 TABLET, FILM COATED ORAL at 10:19

## 2022-01-01 RX ADMIN — MIDODRINE HYDROCHLORIDE 10 MG: 2.5 TABLET ORAL at 09:18

## 2022-01-01 RX ADMIN — ASPIRIN 81 MG: 81 TABLET, COATED ORAL at 09:46

## 2022-01-01 RX ADMIN — GABAPENTIN 300 MG: 300 CAPSULE ORAL at 09:18

## 2022-01-01 RX ADMIN — OXYCODONE AND ACETAMINOPHEN 1 TABLET: 325; 10 TABLET ORAL at 11:55

## 2022-01-01 RX ADMIN — SODIUM CHLORIDE, PRESERVATIVE FREE 10 ML: 5 INJECTION INTRAVENOUS at 08:13

## 2022-01-01 RX ADMIN — HYDROCORTISONE ACETATE 25 MG: 25 SUPPOSITORY RECTAL at 10:19

## 2022-01-01 RX ADMIN — OXYCODONE HYDROCHLORIDE AND ACETAMINOPHEN 1 TABLET: 5; 325 TABLET ORAL at 04:27

## 2022-01-01 RX ADMIN — Medication 2 G: at 08:02

## 2022-01-01 RX ADMIN — LIDOCAINE HYDROCHLORIDE 100 MG: 20 INJECTION, SOLUTION EPIDURAL; INFILTRATION; INTRACAUDAL; PERINEURAL at 07:58

## 2022-01-01 RX ADMIN — ATORVASTATIN CALCIUM 20 MG: 10 TABLET, FILM COATED ORAL at 21:56

## 2022-01-01 RX ADMIN — Medication 0.1 MCG/KG/MIN: at 16:59

## 2022-01-01 RX ADMIN — ALLOPURINOL 100 MG: 100 TABLET ORAL at 08:18

## 2022-01-01 RX ADMIN — FENTANYL CITRATE 25 MCG: 50 INJECTION, SOLUTION INTRAMUSCULAR; INTRAVENOUS at 09:58

## 2022-01-01 RX ADMIN — ACETAMINOPHEN 650 MG: 325 TABLET ORAL at 21:00

## 2022-01-01 RX ADMIN — EPOETIN ALFA-EPBX 10000 UNITS: 10000 INJECTION, SOLUTION INTRAVENOUS; SUBCUTANEOUS at 12:20

## 2022-01-01 RX ADMIN — HYDROCODONE BITARTRATE AND ACETAMINOPHEN 1 TABLET: 5; 325 TABLET ORAL at 11:18

## 2022-01-01 RX ADMIN — ATORVASTATIN CALCIUM 20 MG: 10 TABLET, FILM COATED ORAL at 21:36

## 2022-01-01 RX ADMIN — LORAZEPAM 0.26 MG: 2 SOLUTION, CONCENTRATE ORAL at 14:05

## 2022-01-01 RX ADMIN — HYDROCORTISONE ACETATE 25 MG: 25 SUPPOSITORY RECTAL at 21:39

## 2022-01-01 RX ADMIN — HYDROMORPHONE HYDROCHLORIDE 0.2 MG: 1 INJECTION, SOLUTION INTRAMUSCULAR; INTRAVENOUS; SUBCUTANEOUS at 10:18

## 2022-01-01 RX ADMIN — GABAPENTIN 300 MG: 300 CAPSULE ORAL at 21:10

## 2022-01-01 RX ADMIN — CLOPIDOGREL 75 MG: 75 TABLET, FILM COATED ORAL at 09:18

## 2022-01-01 RX ADMIN — PANTOPRAZOLE SODIUM 40 MG: 40 INJECTION, POWDER, FOR SOLUTION INTRAVENOUS at 09:46

## 2022-01-01 RX ADMIN — CITALOPRAM 10 MG: 10 TABLET, FILM COATED ORAL at 09:46

## 2022-01-01 RX ADMIN — HYDROCORTISONE ACETATE 25 MG: 25 SUPPOSITORY RECTAL at 08:31

## 2022-01-01 RX ADMIN — LORAZEPAM 0.26 MG: 2 SOLUTION, CONCENTRATE ORAL at 20:39

## 2022-01-01 RX ADMIN — PANTOPRAZOLE SODIUM 40 MG: 40 INJECTION, POWDER, FOR SOLUTION INTRAVENOUS at 16:21

## 2022-01-01 RX ADMIN — SODIUM CHLORIDE 50 ML/HR: 9 INJECTION, SOLUTION INTRAVENOUS at 17:28

## 2022-01-01 RX ADMIN — PANTOPRAZOLE SODIUM 40 MG: 40 INJECTION, POWDER, FOR SOLUTION INTRAVENOUS at 20:32

## 2022-01-01 RX ADMIN — MIDODRINE HYDROCHLORIDE 10 MG: 2.5 TABLET ORAL at 17:10

## 2022-01-01 RX ADMIN — ASPIRIN 81 MG: 81 TABLET, COATED ORAL at 08:31

## 2022-01-01 RX ADMIN — MIDODRINE HYDROCHLORIDE 10 MG: 2.5 TABLET ORAL at 12:08

## 2022-01-01 RX ADMIN — Medication 0.02 MCG/KG/MIN: at 06:34

## 2022-01-01 RX ADMIN — MIDODRINE HYDROCHLORIDE 10 MG: 2.5 TABLET ORAL at 11:50

## 2022-01-01 RX ADMIN — PANTOPRAZOLE SODIUM 40 MG: 40 INJECTION, POWDER, FOR SOLUTION INTRAVENOUS at 20:39

## 2022-01-01 RX ADMIN — ACETAMINOPHEN 1000 MG: 500 TABLET ORAL at 07:42

## 2022-01-01 RX ADMIN — FAMOTIDINE 20 MG: 20 TABLET, FILM COATED ORAL at 09:20

## 2022-01-01 RX ADMIN — PANTOPRAZOLE SODIUM 40 MG: 40 INJECTION, POWDER, FOR SOLUTION INTRAVENOUS at 21:39

## 2022-01-01 RX ADMIN — LORAZEPAM 0.26 MG: 2 SOLUTION, CONCENTRATE ORAL at 13:20

## 2022-01-01 RX ADMIN — DOCUSATE SODIUM 100 MG: 100 CAPSULE, LIQUID FILLED ORAL at 14:59

## 2022-01-01 RX ADMIN — FENTANYL CITRATE 100 MCG: 50 INJECTION, SOLUTION INTRAMUSCULAR; INTRAVENOUS at 08:28

## 2022-01-01 RX ADMIN — ASPIRIN 81 MG: 81 TABLET, COATED ORAL at 08:32

## 2022-01-01 RX ADMIN — HYDROMORPHONE HYDROCHLORIDE 0.5 MG: 1 INJECTION, SOLUTION INTRAMUSCULAR; INTRAVENOUS; SUBCUTANEOUS at 21:06

## 2022-01-01 RX ADMIN — ATORVASTATIN CALCIUM 20 MG: 10 TABLET, FILM COATED ORAL at 21:34

## 2022-01-01 RX ADMIN — GLYCOPYRROLATE 0.4 MG: 0.2 INJECTION, SOLUTION INTRAMUSCULAR; INTRAVENOUS at 09:21

## 2022-01-01 RX ADMIN — ASPIRIN 81 MG: 81 TABLET, COATED ORAL at 09:18

## 2022-01-01 RX ADMIN — DOCUSATE SODIUM 100 MG: 100 CAPSULE, LIQUID FILLED ORAL at 21:56

## 2022-01-01 RX ADMIN — MIDODRINE HYDROCHLORIDE 10 MG: 2.5 TABLET ORAL at 18:21

## 2022-01-01 RX ADMIN — CITALOPRAM 40 MG: 20 TABLET, FILM COATED ORAL at 08:12

## 2022-01-01 RX ADMIN — ALLOPURINOL 100 MG: 100 TABLET ORAL at 10:18

## 2022-01-01 RX ADMIN — SODIUM CHLORIDE, PRESERVATIVE FREE 10 ML: 5 INJECTION INTRAVENOUS at 20:26

## 2022-01-01 RX ADMIN — EPOETIN ALFA-EPBX 10000 UNITS: 10000 INJECTION, SOLUTION INTRAVENOUS; SUBCUTANEOUS at 08:13

## 2022-01-01 RX ADMIN — ACETAMINOPHEN 650 MG: 325 TABLET ORAL at 16:01

## 2022-01-01 RX ADMIN — SODIUM CHLORIDE, PRESERVATIVE FREE 10 ML: 5 INJECTION INTRAVENOUS at 16:21

## 2022-01-01 RX ADMIN — MIDODRINE HYDROCHLORIDE 10 MG: 2.5 TABLET ORAL at 08:31

## 2022-01-01 RX ADMIN — HYDROMORPHONE HYDROCHLORIDE 0.2 MG: 1 INJECTION, SOLUTION INTRAMUSCULAR; INTRAVENOUS; SUBCUTANEOUS at 10:03

## 2022-01-01 RX ADMIN — CITALOPRAM 40 MG: 20 TABLET, FILM COATED ORAL at 09:19

## 2022-01-01 RX ADMIN — OXYCODONE AND ACETAMINOPHEN 1 TABLET: 325; 10 TABLET ORAL at 05:32

## 2022-01-01 RX ADMIN — ATORVASTATIN CALCIUM 20 MG: 10 TABLET, FILM COATED ORAL at 20:58

## 2022-01-01 RX ADMIN — HYDROMORPHONE HYDROCHLORIDE 0.2 MG: 1 INJECTION, SOLUTION INTRAMUSCULAR; INTRAVENOUS; SUBCUTANEOUS at 09:48

## 2022-01-01 RX ADMIN — HYDROCORTISONE ACETATE 25 MG: 25 SUPPOSITORY RECTAL at 10:32

## 2022-01-01 RX ADMIN — MIDODRINE HYDROCHLORIDE 5 MG: 2.5 TABLET ORAL at 17:53

## 2022-01-01 RX ADMIN — ENOXAPARIN SODIUM 30 MG: 30 INJECTION SUBCUTANEOUS at 05:33

## 2022-01-01 RX ADMIN — ATORVASTATIN CALCIUM 20 MG: 10 TABLET, FILM COATED ORAL at 20:36

## 2022-01-01 RX ADMIN — CLOPIDOGREL 75 MG: 75 TABLET, FILM COATED ORAL at 08:33

## 2022-01-01 RX ADMIN — OXYCODONE AND ACETAMINOPHEN 1 TABLET: 325; 10 TABLET ORAL at 18:28

## 2022-01-01 RX ADMIN — ATORVASTATIN CALCIUM 20 MG: 10 TABLET, FILM COATED ORAL at 21:10

## 2022-01-01 RX ADMIN — CLOPIDOGREL 75 MG: 75 TABLET, FILM COATED ORAL at 08:13

## 2022-01-01 RX ADMIN — ALLOPURINOL 100 MG: 100 TABLET ORAL at 09:46

## 2022-01-01 RX ADMIN — DOCUSATE SODIUM 100 MG: 100 CAPSULE, LIQUID FILLED ORAL at 21:14

## 2022-01-04 NOTE — TELEPHONE ENCOUNTER
Lynne ROMAN from [facility] called stating patient's stump looks worse and is necrotic around staples with bloody discharge.    Photo will be added into pt's chart from nursing home. Pt's appt is 01/07/22 in Wound Care.

## 2022-01-04 NOTE — TELEPHONE ENCOUNTER
Called Lynne ROMAN at [facility] and scheduled appt for pt with Dr Coates for 01/05/22 at 1:00.

## 2022-01-05 PROBLEM — I70.213 ATHEROSCLEROSIS OF NATIVE ARTERY OF BOTH LOWER EXTREMITIES WITH INTERMITTENT CLAUDICATION (HCC): Status: ACTIVE | Noted: 2022-01-01

## 2022-01-05 PROBLEM — Z01.818 PRE-OP EVALUATION: Status: ACTIVE | Noted: 2022-01-01

## 2022-01-05 PROBLEM — Z99.2 DEPENDENCE ON RENAL DIALYSIS (HCC): Status: ACTIVE | Noted: 2018-11-21

## 2022-01-05 PROBLEM — E83.30 DISORDER OF PHOSPHORUS METABOLISM, UNSPECIFIED: Status: ACTIVE | Noted: 2021-01-12

## 2022-01-05 PROBLEM — A48.0 GAS GANGRENE (HCC): Status: ACTIVE | Noted: 2021-01-01

## 2022-01-05 PROBLEM — M19.90 OSTEOARTHRITIS: Status: ACTIVE | Noted: 2022-01-01

## 2022-01-05 PROBLEM — G47.33 OBSTRUCTIVE SLEEP APNEA SYNDROME: Status: ACTIVE | Noted: 2022-01-01

## 2022-01-05 PROBLEM — T78.2XXA ANAPHYLACTIC SHOCK, UNSPECIFIED, INITIAL ENCOUNTER: Status: ACTIVE | Noted: 2021-01-01

## 2022-01-05 PROBLEM — R52 PAIN, UNSPECIFIED: Status: ACTIVE | Noted: 2021-01-01

## 2022-01-05 PROBLEM — N18.6 ESRD ON PERITONEAL DIALYSIS (HCC): Status: ACTIVE | Noted: 2022-01-01

## 2022-01-05 PROBLEM — R40.20 LOSS OF CONSCIOUSNESS: Status: ACTIVE | Noted: 2017-12-15

## 2022-01-05 PROBLEM — E78.5 HYPERLIPIDEMIA: Status: ACTIVE | Noted: 2022-01-01

## 2022-01-05 PROBLEM — E87.20 ACIDOSIS, METABOLIC: Status: ACTIVE | Noted: 2018-03-26

## 2022-01-05 PROBLEM — R77.0 ABNORMALITY OF ALBUMIN: Status: ACTIVE | Noted: 2020-12-01

## 2022-01-05 PROBLEM — J32.9 SINUS INFECTION: Status: ACTIVE | Noted: 2019-09-06

## 2022-01-05 PROBLEM — E44.0 MODERATE PROTEIN-CALORIE MALNUTRITION (HCC): Status: ACTIVE | Noted: 2021-01-01

## 2022-01-05 PROBLEM — R06.02 SHORTNESS OF BREATH: Status: ACTIVE | Noted: 2021-01-01

## 2022-01-05 PROBLEM — D50.9 IRON DEFICIENCY ANEMIA: Status: ACTIVE | Noted: 2020-11-17

## 2022-01-05 PROBLEM — R10.9 ABDOMINAL PAIN: Status: ACTIVE | Noted: 2018-03-26

## 2022-01-05 PROBLEM — T87.50 AMPUTATION STUMP NECROSIS (HCC): Status: ACTIVE | Noted: 2022-01-01

## 2022-01-05 PROBLEM — T85.71XA DIALYSIS-ASSOCIATED PERITONITIS (HCC): Status: ACTIVE | Noted: 2018-03-26

## 2022-01-05 PROBLEM — E11.40 TYPE 2 DIABETES MELLITUS WITH DIABETIC NEUROPATHY, UNSPECIFIED (HCC): Status: ACTIVE | Noted: 2020-11-17

## 2022-01-05 PROBLEM — Z99.2 ESRD ON PERITONEAL DIALYSIS (HCC): Status: ACTIVE | Noted: 2022-01-01

## 2022-01-05 PROBLEM — N25.81 SECONDARY HYPERPARATHYROIDISM OF RENAL ORIGIN (HCC): Status: ACTIVE | Noted: 2021-01-01

## 2022-01-05 PROBLEM — N25.0 RENAL OSTEODYSTROPHY: Status: ACTIVE | Noted: 2018-03-26

## 2022-01-05 PROBLEM — J90 PLEURAL EFFUSION, NOT ELSEWHERE CLASSIFIED: Status: ACTIVE | Noted: 2021-01-01

## 2022-01-05 PROBLEM — Z91.89 AT RISK FOR FLUID VOLUME IMBALANCE: Status: ACTIVE | Noted: 2018-03-26

## 2022-01-05 PROBLEM — G40.909 SEIZURE DISORDER (HCC): Status: ACTIVE | Noted: 2022-01-01

## 2022-01-05 PROBLEM — K92.0 HEMATEMESIS WITH NAUSEA: Status: ACTIVE | Noted: 2019-12-02

## 2022-01-05 PROBLEM — K21.9 GASTROESOPHAGEAL REFLUX DISEASE: Status: ACTIVE | Noted: 2022-01-01

## 2022-01-05 PROBLEM — Z49.32 ENCOUNTER FOR ADEQUACY TESTING FOR PERITONEAL DIALYSIS (HCC): Status: ACTIVE | Noted: 2018-11-21

## 2022-01-05 PROBLEM — T78.40XA ALLERGY, UNSPECIFIED, INITIAL ENCOUNTER: Status: ACTIVE | Noted: 2021-01-01

## 2022-01-05 NOTE — H&P
Jennifer Salcido  [id number]  [id number]  40/40  Ben Shelton*  1/5/2022    Chief Complaint   Patient presents with   • Direct Admit - [facility]       HPI: This is a 66-year-old female with past medical history including hypertension, hyperlipidemia, end-stage renal disease currently on dialysis, CAD status post CABG, and significant peripheral arterial disease.  I had seen her back in early December when she presented here with left foot pain.  She had been following at [facility] and undergone multiple interventions with Dr. Riley and was found to have significant tibial and small vessel disease in the left lower leg and foot.  She was having chronic rest pain to that foot and ultimately had been recommended to undergo below-knee amputation.  She presented here for second opinion and after reviewing all of the previous imaging as well as her recent angiogram with Dr. Riley I had agreed with that recommendation.  As such she opted to undergo a left below-knee amputation which was done on 12/13.  Preoperatively we have discussed that given her peripheral arterial disease one of the risks of the procedure was that she may have issues with healing and if that should occur she may require conversion to an above-knee amputation.  She initially tolerated the below-knee amputation well and gone to a nursing facility.  Here in the hospital prior to discharge she had a very small area of blister to the posterior flap but it was otherwise healthy and healing well.  Also during that admission I placed a right internal jugular vein permacath as she had previously been on peritoneal dialysis but the only facilities that could take her for continued rehab would not take peritoneal dialysis and so she was switched to dialysis.  Following discharge we have been contacted by the nursing home if she had began to have some bloody drainage from the medial aspect of her incision as well as some ecchymosis.  She was  seen in our wound care center about a week ago at that time and noted that she had bumped her stump a few times despite having a stump protector.  It appeared that she had developed hematoma and had some ecchymotic changes and some desquamation of epidermis to the posterior flap.  Plan was to attempt local care of the area to try and salvage the BKA however she was to return back to the office today and has had further breakdown of that posterior flap with some eschar along the staple line and further drainage of likely hematoma with some signs of infection.  Given these findings she was directed to the emergency room for admission and further intervention.  When seen at the bedside in the ER she complains of some pain to the stump with palpation.  She denies any fevers or chills.  She has been having some sanguinous type drainage from the medial aspect of that staple line but otherwise the staples remain in place.  She denies any other chest pain or shortness of breath.  She has no other acute complaints.    Past Medical History:   Diagnosis Date   • Arthritis     LEFT SHOULDER   • CHF (congestive heart failure) ([facility])    • Chronic pericarditis 4/22/2021   • Collar bone fracture 12/2020    SLING PLACED TO HEAL   • Coronary artery disease     LEFT MAIN AND 3 OTHER AREAS--CABG SCHEDULED FOR APRIL 22, 2021   • Dyslipidemia 9/28/2020   • Elevated cholesterol    • End stage kidney disease (HCC)     currently HD (5/19/2021), hopes to return to [facility]    • Essential hypertension 11/2/2018   • Gastroesophageal reflux disease without esophagitis 4/28/2021   • GERD (gastroesophageal reflux disease)    • Gout    • History of transfusion    • Hyperlipidemia    • Hypertension    • Overweight (BMI 25.0-29.9) 4/22/2021   • Peritoneal dialysis status ([facility])     EVERY NIGHT FOR 10 HOURS, (5/19/2021 currently on hold)   • PONV (postoperative nausea and vomiting)    • Sleep apnea     RESOLVED   • Status post gastric bypass for obesity  4/22/2021   • Type 2 diabetes mellitus with kidney complication, with long-term current use of insulin (HCC) 11/2/2018       Past Surgical History:   Procedure Laterality Date   • BELOW KNEE AMPUTATION Left 12/13/2021    Procedure: LEFT LOWER EXTREMITY AMPUTATION BELOW KNEE;  Surgeon: Simon Coates MD;  Location:  [facility] OR;  Service: Vascular;  Laterality: Left;   • BREAST BIOPSY Right     FATTY TUMOR   • CARDIAC CATHETERIZATION N/A 10/1/2019    Procedure: Left Heart Cath;  Surgeon: Srikanth Coker MD;  Location:  [facility] CATH INVASIVE LOCATION;  Service: Cardiology   • CARDIAC CATHETERIZATION N/A 7/14/2020    Procedure: Left Heart Cath;  Surgeon: Srikanth Coker MD;  Location:  [facility] CATH INVASIVE LOCATION;  Service: Cardiology;  Laterality: N/A;   • CARDIAC CATHETERIZATION N/A 3/23/2021    Procedure: Left Heart Cath;  Surgeon: Srikanth Coker MD;  Location:  [facility] CATH INVASIVE LOCATION;  Service: Cardiology;  Laterality: N/A;   • CHEST TUBE INSERTION Right 6/7/2021    Procedure: CHEST TUBE INSERTION;  Surgeon: Antony Wood MD;  Location:  [facility] OR;  Service: Cardiothoracic;  Laterality: Right;   • CORONARY ANGIOPLASTY WITH STENT PLACEMENT  2016    X 2   • CORONARY ARTERY BYPASS GRAFT N/A 4/22/2021    Procedure: CORONARY ARTERY BYPASS GRAFT X 3 WITH LEFT INTERNAL MAMMARY ARTERY, RIGHT LOWER EXTREMITY ENDOSCOPIC VEIN HARVEST, AND PERFUSION; TRANSESOPHAGEAL ECHOCARDIOGRAM;  Surgeon: Antony Wood MD;  Location:  [facility] OR;  Service: Cardiothoracic;  Laterality: N/A;   • EYE SURGERY Bilateral     IOC LENS IMPLANTS   • EYE SURGERY Bilateral     RETINAL SURGERY   • GALLBLADDER SURGERY     • GASTRIC BYPASS     • HYSTERECTOMY     • INSERTION HEMODIALYSIS CATHETER Right 4/1/2021    Procedure: HEMODIALYSIS CATHETER INSERTION;  Surgeon: Simon Coates MD;  Location:  [facility] HYBRID OR 12;  Service: Vascular;  Laterality: Right;   • INSERTION HEMODIALYSIS CATHETER Right 12/20/2021    Procedure:  HEMODIALYSIS CATHETER INSERTION;  Surgeon: Simon Coates MD;  Location: [facility] OR 12;  Service: Vascular;  Laterality: Right;   • INSERTION PERITONEAL DIALYSIS CATHETER     • OOPHORECTOMY Bilateral    • SINUS SURGERY     • SINUS SURGERY  1980   • TUBAL ABDOMINAL LIGATION         Family History   Problem Relation Age of Onset   • Hypertension Mother    • Lung disease Mother    • Heart disease Father    • Diabetes Father    • Abnormal EKG Father    • Breast cancer Neg Hx        Social History     Socioeconomic History   • Marital status:    Tobacco Use   • Smoking status: Former Smoker     Packs/day: 0.00     Years: 2.00     Pack years: 0.00     Types: Cigarettes     Quit date:      Years since quittin.0   • Smokeless tobacco: Never Used   Vaping Use   • Vaping Use: Never used   Substance and Sexual Activity   • Alcohol use: Not Currently     Comment: haven't drank in 3 years ( 2018)    • Drug use: No   • Sexual activity: Defer       Allergies   Allergen Reactions   • Codeine Nausea And Vomiting   • Demerol [Meperidine] Nausea Only   • Levaquin [Levofloxacin] Nausea And Vomiting   • Lisinopril Swelling   • Morphine Nausea And Vomiting   • Sulfasalazine Nausea And Vomiting   • Ultram [Tramadol] Mental Status Change   • Trental [Pentoxifylline] GI Intolerance       (Not in a hospital admission)      Review of Systems   Constitutional: Positive for fatigue. Negative for activity change, appetite change, chills, diaphoresis and fever.   HENT: Negative.  Negative for congestion, sneezing, sore throat and trouble swallowing.    Eyes: Negative.  Negative for visual disturbance.   Respiratory: Negative.  Negative for chest tightness and shortness of breath.    Cardiovascular: Negative.  Negative for chest pain, palpitations and leg swelling.   Gastrointestinal: Negative.  Negative for abdominal distention, abdominal pain, nausea and vomiting.   Endocrine: Negative.    Genitourinary:  "Negative.    Musculoskeletal: Negative.    Skin: Positive for wound.   Allergic/Immunologic: Negative.    Neurological: Negative.    Hematological: Negative.    Psychiatric/Behavioral: Negative.        /64 (BP Location: Right arm, Patient Position: Lying)   Pulse 71   Temp 98.7 °F (37.1 °C) (Oral)   Resp 16   Ht 160 cm (63\")   Wt 68.9 kg (152 lb)   SpO2 100%   BMI 26.93 kg/m²   Physical Exam  Vitals reviewed.   Constitutional:       Appearance: Normal appearance.   HENT:      Head: Normocephalic and atraumatic.      Nose: Nose normal.      Mouth/Throat:      Mouth: Mucous membranes are moist.   Eyes:      Extraocular Movements: Extraocular movements intact.      Pupils: Pupils are equal, round, and reactive to light.   Cardiovascular:      Rate and Rhythm: Normal rate and regular rhythm.      Pulses:           Carotid pulses are 2+ on the right side and 2+ on the left side.       Radial pulses are 2+ on the right side and 2+ on the left side.        Femoral pulses are 2+ on the right side and 2+ on the left side.       Popliteal pulses are 2+ on the right side and 1+ on the left side.        Dorsalis pedis pulses are 1+ on the right side.        Posterior tibial pulses are detected w/ Doppler on the right side.      Comments: Right lower extremity is warm with a palpable femoral, popliteal, and DP pulse.  The PT is a Doppler signal.  On the left lower extremity she is status post the below-knee amputation.  She has some fullness to the BKA stump and has eschar along the mid and lateral parts of the staple line and extending back onto the posterior flap.  The skin just below this and the posterior flap is somewhat dusky and does not appear as though it will be viable long-term.  Along the medial aspect of the previously placed staple line there is also some what is drainage that appears to be old hematoma that is draining from that site.  She also has some ecchymosis along the posterior flap consistent " with possible recent trauma to the area.    She has a right internal jugular vein permacath in place with site clean/dry/intact.  Pulmonary:      Effort: Pulmonary effort is normal. No respiratory distress.   Abdominal:      General: There is no distension.      Palpations: Abdomen is soft. There is no mass.      Tenderness: There is no abdominal tenderness.      Comments: She has a peritoneal dialysis catheter in place.   Musculoskeletal:         General: Normal range of motion.      Cervical back: Normal range of motion and neck supple.   Skin:     General: Skin is warm and dry.      Capillary Refill: Capillary refill takes less than 2 seconds.   Neurological:      General: No focal deficit present.      Mental Status: She is alert and oriented to person, place, and time.   Psychiatric:         Mood and Affect: Mood normal.         Behavior: Behavior normal.         Thought Content: Thought content normal.         Judgment: Judgment normal.         Lab Results (last 7 days)     ** No results found for the last 168 hours. **          Imaging Results (Last 7 Days)     ** No results found for the last 168 hours. **          Impression/Plan: This is a 66-year-old female who presents with wounds and breakdown of the incision line of a left below-knee amputation that was done on 12/13.  It was done in the setting of significant peripheral arterial disease with left foot rest pain.  Unfortunately I think due to her peripheral arterial disease as well as what sounds like likely trauma that was sustained to the amputation stump she has had breakdown of her incision line and evidence of eschar and necrosis of tissue along the posterior flap as well as formation of some hematoma within that below-knee amputation stump at this point based on the clinical picture of the stump it will not be salvageable.  She will require conversion to above-knee amputation as a definitive procedure.  I discussed this at length with her and her  family member at the bedside today and she understands and agrees to proceed.  As such she will be admitted under my service we will plan to proceed to the OR tomorrow for left above-knee amputation.  I reviewed her pertinent labs.  She will be n.p.o. after midnight.  Risks/benefits of above-knee amputation were explained at great length to the patient which include but are not limited to bleeding, infection, vessel damage, nerve damage, failure of wound to heal, MI, stroke, and death. The patient understands all the risks and wishes for me to proceed.    Simon Coates MD

## 2022-01-05 NOTE — ED PROVIDER NOTES
Emergency Medicine Provider Note    Subjective:    HISTORY OF PRESENT ILLNESS     This is a very pleasant 66 y.o. female with a past medical history of ESRD, hypertension, hyperlipidemia, prior amputation of the left lower extremity who presents to the emergency department today with a chief complaint of necrotic wound over the left lower extremity.  This is reportedly been progressive over the past several days.  Dr. Coates is aware and has plans to take her to the operating room tomorrow.  She denies any acute changes.          History is obtained from the patient and chart review.       Review of Systems: All other systems are reviewed and are negative other than noted in the HPI.    Past Medical History:  Past Medical History:   Diagnosis Date   • Arthritis     LEFT SHOULDER   • CHF (congestive heart failure) (Formerly [facility])    • Chronic pericarditis 4/22/2021   • Collar bone fracture 12/2020    SLING PLACED TO HEAL   • Coronary artery disease     LEFT MAIN AND 3 OTHER AREAS--CABG SCHEDULED FOR APRIL 22, 2021   • Dyslipidemia 9/28/2020   • Elevated cholesterol    • End stage kidney disease (HCC)     currently HD (5/19/2021), hopes to return to PD    • Essential hypertension 11/2/2018   • Gastroesophageal reflux disease without esophagitis 4/28/2021   • GERD (gastroesophageal reflux disease)    • Gout    • History of transfusion    • Hyperlipidemia    • Hypertension    • Overweight (BMI 25.0-29.9) 4/22/2021   • Peritoneal dialysis status (Formerly [facility])     EVERY NIGHT FOR 10 HOURS, (5/19/2021 currently on hold)   • PONV (postoperative nausea and vomiting)    • Sleep apnea     RESOLVED   • Status post gastric bypass for obesity 4/22/2021   • Type 2 diabetes mellitus with kidney complication, with long-term current use of insulin (Formerly [facility]) 11/2/2018       Allergies:  Allergies   Allergen Reactions   • Codeine Nausea And Vomiting   • Demerol [Meperidine] Nausea Only   • Levaquin [Levofloxacin] Nausea And Vomiting   • Lisinopril Swelling    • Morphine Nausea And Vomiting   • Sulfasalazine Nausea And Vomiting   • Ultram [Tramadol] Mental Status Change   • Trental [Pentoxifylline] GI Intolerance       Past Surgical History:  Past Surgical History:   Procedure Laterality Date   • BELOW KNEE AMPUTATION Left 12/13/2021    Procedure: LEFT LOWER EXTREMITY AMPUTATION BELOW KNEE;  Surgeon: Simon Coates MD;  Location:  [facility] OR;  Service: Vascular;  Laterality: Left;   • BREAST BIOPSY Right     FATTY TUMOR   • CARDIAC CATHETERIZATION N/A 10/1/2019    Procedure: Left Heart Cath;  Surgeon: Srikanth Coker MD;  Location:  [facility] CATH INVASIVE LOCATION;  Service: Cardiology   • CARDIAC CATHETERIZATION N/A 7/14/2020    Procedure: Left Heart Cath;  Surgeon: Srikanth Coker MD;  Location:  [facility] CATH INVASIVE [facility];  Service: Cardiology;  Laterality: N/A;   • CARDIAC CATHETERIZATION N/A 3/23/2021    Procedure: Left Heart Cath;  Surgeon: Srikanth Coker MD;  Location:  [facility] CATH INVASIVE [facility];  Service: Cardiology;  Laterality: N/A;   • CHEST TUBE INSERTION Right 6/7/2021    Procedure: CHEST TUBE INSERTION;  Surgeon: Antony Wood MD;  Location:  [facility] OR;  Service: Cardiothoracic;  Laterality: Right;   • CORONARY ANGIOPLASTY WITH STENT PLACEMENT  2016    X 2   • CORONARY ARTERY BYPASS GRAFT N/A 4/22/2021    Procedure: CORONARY ARTERY BYPASS GRAFT X 3 WITH LEFT INTERNAL MAMMARY ARTERY, RIGHT LOWER EXTREMITY ENDOSCOPIC VEIN HARVEST, AND PERFUSION; TRANSESOPHAGEAL ECHOCARDIOGRAM;  Surgeon: Antony Wood MD;  Location:  [facility] OR;  Service: Cardiothoracic;  Laterality: N/A;   • EYE SURGERY Bilateral     IOC LENS IMPLANTS   • EYE SURGERY Bilateral     RETINAL SURGERY   • GALLBLADDER SURGERY     • GASTRIC BYPASS     • HYSTERECTOMY     • INSERTION HEMODIALYSIS CATHETER Right 4/1/2021    Procedure: HEMODIALYSIS CATHETER INSERTION;  Surgeon: Simon Coates MD;  Location:  [facility] HYBRID OR 12;  Service: Vascular;  Laterality: Right;   •  INSERTION HEMODIALYSIS CATHETER Right 2021    Procedure: HEMODIALYSIS CATHETER INSERTION;  Surgeon: Simon Coates MD;  Location: [facility] OR 12;  Service: Vascular;  Laterality: Right;   • INSERTION PERITONEAL DIALYSIS CATHETER     • OOPHORECTOMY Bilateral    • SINUS SURGERY     • SINUS SURGERY  1980   • TUBAL ABDOMINAL LIGATION         Family History:  Family History   Problem Relation Age of Onset   • Hypertension Mother    • Lung disease Mother    • Heart disease Father    • Diabetes Father    • Abnormal EKG Father    • Breast cancer Neg Hx        Social History:  Social History     Socioeconomic History   • Marital status:    Tobacco Use   • Smoking status: Former Smoker     Packs/day: 0.00     Years: 2.00     Pack years: 0.00     Types: Cigarettes     Quit date:      Years since quittin.0   • Smokeless tobacco: Never Used   Vaping Use   • Vaping Use: Never used   Substance and Sexual Activity   • Alcohol use: Not Currently     Comment: haven't drank in 3 years ( 2018)    • Drug use: No   • Sexual activity: Defer       Home Medications:  Prior to Admission medications    Medication Sig Start Date End Date Taking? Authorizing Provider   allopurinol (ZYLOPRIM) 100 MG tablet Take 100 mg by mouth Daily.    Elmer Thao MD   aluminum-magnesium hydroxide-simethicone (MAALOX MAX) 400-400-40 MG/5ML suspension Take 15 mL by mouth Every 6 (Six) Hours As Needed for Heartburn. 21   Bob Carrizales MD   ascorbic acid (VITAMIN C) 500 MG tablet Take 500 mg by mouth Daily. 20   Elmer Thao MD   aspirin 81 MG EC tablet Take 81 mg by mouth Daily.    Elmer Thao MD   atorvastatin (LIPITOR) 20 MG tablet Take 1 tablet by mouth Every Night. 21   Srikanth Coker MD   bisacodyl (Dulcolax) 10 MG suppository Insert 10 mg into the rectum Daily As Needed for Constipation.    Elmer Thao MD   calcium carbonate (TUMS) 500 MG chewable tablet Chew 2  tablets Every Night.    Elmer Thao MD   citalopram (CeleXA) 40 MG tablet Take 40 mg by mouth Daily.    Elmer Thao MD   clopidogrel (PLAVIX) 75 MG tablet Take 75 mg by mouth Daily.    Elmer Thao MD   cyclobenzaprine (FLEXERIL) 5 MG tablet Take 1 tablet by mouth 3 (Three) Times a Day As Needed for Muscle Spasms. 12/23/21   Bob Carrizales MD   docusate sodium (COLACE) 100 MG capsule Take 200 mg by mouth As Needed for Constipation.    Elmer Thao MD   famotidine (PEPCID) 20 MG tablet Take 1 tablet by mouth Daily. 4/28/21   Antony Wood MD   gabapentin (NEURONTIN) 300 MG capsule Take 1 capsule by mouth Every 12 (Twelve) Hours. 12/23/21   Bob Carrizales MD   gentamicin (GARAMYCIN) 0.1 % cream APPLY TO EXIT SITE AS NEEDED 8/4/21   Elmer Thao MD   hydrocortisone (ANUSOL-HC) 25 MG suppository Insert 1 suppository into the rectum 2 (Two) Times a Day. 12/23/21   Bob Carrizales MD   Melatonin 5 MG capsule Take 5 mg by mouth Every Night.    Emler Thao MD   midodrine (PROAMATINE) 10 MG tablet Take 1 tablet by mouth 3 (Three) Times a Day Before Meals. 12/23/21   Bob Carrizales MD   Multiple Vitamins-Minerals (MULTIVITAMIN ADULT PO) Take 1 tablet by mouth Daily.    Elmer Thao MD   nitroglycerin (NITROSTAT) 0.4 MG SL tablet 1 under the tongue as needed for angina, may repeat q5mins for up three doses  Patient taking differently: Place 0.4 mg under the tongue Every 5 (Five) Minutes As Needed. 1 under the tongue as needed for angina, may repeat q5mins for up three doses 5/11/21   Srikanth Coker MD   oxyCODONE-acetaminophen (PERCOCET) 5-325 MG per tablet Take 1 tablet by mouth Every 4 (Four) Hours As Needed for Moderate Pain . 12/23/21   Bob Carrizales MD   polyethylene glycol (polyethylene glycol) 17 g packet Take 17 g by mouth Daily. 12/23/21   Bob Carrizales MD   sucralfate (CARAFATE) 1 g tablet Take 1 g by mouth 4 (Four) Times a Day. Dissolve  1 tablet in water to create a slurry and drink QID; AC & HS PRN    ProviderElmer MD   Sucroferric Oxyhydroxide (Velphoro) 500 MG chewable tablet Chew 2 tablets 3 (Three) Times a Day. WITH MEALS     ProviderElmer MD   vitamin D3 125 MCG (5000 UT) capsule capsule Take 1 tablet by mouth Daily.    ProviderElmer MD         Objective:    PHYSICAL EXAM     Vitals:   Vitals:    01/05/22 1901   BP: 167/78   Pulse: 68   Resp:    Temp:    SpO2: 99%     GENERAL: Well appearing, in no acute distress.   HEENT: Moist mucous membranes, oropharynx clear without lesions, exudates, thrush.   EYES: No scleral icterus, conjunctivae clear.   NECK: No cervical lymphadenopathy, no stiffness.  CARDIAC: Normal rate, regular rhythm, no murmurs, 2+ peripheral pulses in all four extremities, normal capillary refill.   PULMONARY: Normal work of breathing on room air, lungs are clear to auscultation bilaterally without wheezes, crackles, rhonchi.  ABDOMINAL: Normal bowel sounds, abdomen is soft, non-tender, non-distended, no hepatomegaly or splenomegaly.   MUSCULOSKELETAL: Normal range of motion.  Left lower extremity with a prior amputation that is currently covered.  Patient requesting would not uncover it.  NEUROLOGIC: Alert and oriented x 3, EOM grossly intact and moves all four extremities with normal strength.  SKIN: Warm and dry without rashes.   PSYCHIATRIC: Mood and affect are normal.     PROCEDURES     Procedures    LAB AND RADIOLOGY RESULTS     Lab Results (last 24 hours)     Procedure Component Value Units Date/Time    COVID-19,[employer] IN-HOUSE,Nasal Swab No Transport Media 3-4 HR TAT - Swab, Nasal Cavity [[id number]]  (Normal) Collected: 01/05/22 1511    Specimen: Swab from Nasal Cavity Updated: 01/05/22 1619     COVID19 Not Detected    Narrative:      Fact sheet for providers: https://www.fda.gov/media/600657/download     Fact sheet for patients: https://www.fda.gov/media/273199/download    Test performed by  PCR.    Consider negative results in combination with clinical observations, patient history, and epidemiological information.  Fact sheet for providers: https://www.fda.gov/media/033200/download     Fact sheet for patients: https://www.fda.gov/media/193674/download    Test performed by PCR.    Consider negative results in combination with clinical observations, patient history, and epidemiological information.    CBC & Differential [[id number]]  (Abnormal) Collected: 01/05/22 1515    Specimen: Blood Updated: 01/05/22 1534    Narrative:      The following orders were created for panel order CBC & Differential.  Procedure                               Abnormality         Status                     ---------                               -----------         ------                     CBC Auto Differential[[id number]]        Abnormal            Final result                 Please view results for these tests on the individual orders.    Comprehensive Metabolic Panel [[id number]]  (Abnormal) Collected: 01/05/22 1515    Specimen: Blood Updated: 01/05/22 1604     Glucose 117 mg/dL      BUN 13 mg/dL      Creatinine 4.43 mg/dL      Sodium 135 mmol/L      Potassium 3.6 mmol/L      Comment: Slight hemolysis detected by analyzer. Results may be affected.        Chloride 94 mmol/L      CO2 34.0 mmol/L      Calcium 8.4 mg/dL      Total Protein 5.4 g/dL      Albumin 2.30 g/dL      ALT (SGPT) 6 U/L      AST (SGOT) 22 U/L      Comment: Slight hemolysis detected by analyzer. Results may be affected.        Alkaline Phosphatase 123 U/L      Total Bilirubin 0.2 mg/dL      eGFR Non African Amer 10 mL/min/1.73      Comment: <15 Indicative of kidney failure.        eGFR   Amer --     Comment: <15 Indicative of kidney failure.        Globulin 3.1 gm/dL      A/G Ratio 0.7 g/dL      BUN/Creatinine Ratio 2.9     Anion Gap 7.0 mmol/L     Narrative:      GFR Normal >60  Chronic Kidney Disease <60  Kidney Failure <15      Protime-INR  [[id number]]  (Abnormal) Collected: 01/05/22 1515    Specimen: Blood Updated: 01/05/22 1542     Protime 13.8 Seconds      INR 1.10    aPTT [[id number]]  (Abnormal) Collected: 01/05/22 1515    Specimen: Blood Updated: 01/05/22 1542     PTT 40.9 seconds     CBC Auto Differential [[id number]]  (Abnormal) Collected: 01/05/22 1515    Specimen: Blood Updated: 01/05/22 1534     WBC 6.45 10*3/mm3      RBC 2.78 10*6/mm3      Hemoglobin 8.8 g/dL      Hematocrit 28.4 %      .2 fL      MCH 31.7 pg      MCHC 31.0 g/dL      RDW 16.9 %      RDW-SD 63.6 fl      MPV 11.0 fL      Platelets 300 10*3/mm3      Neutrophil % 74.5 %      Lymphocyte % 14.9 %      Monocyte % 9.0 %      Eosinophil % 1.1 %      Basophil % 0.2 %      Immature Grans % 0.3 %      Neutrophils, Absolute 4.81 10*3/mm3      Lymphocytes, Absolute 0.96 10*3/mm3      Monocytes, Absolute 0.58 10*3/mm3      Eosinophils, Absolute 0.07 10*3/mm3      Basophils, Absolute 0.01 10*3/mm3      Immature Grans, Absolute 0.02 10*3/mm3      nRBC 0.0 /100 WBC           XR Chest 1 View    Result Date: 12/20/2021  Narrative: Frontal upright radiograph of the chest  HISTORY: Right IJ permacath  COMPARISON: 10/3/2021.  FINDINGS:  New right IJ permacath with tip projecting over the right atrium. New mild interstitial thickening with right suprahilar patchy infiltrate. No consolidation. The lungs are well expanded. No pleural effusion or pneumothorax. Prior coronary bypass. Heart size is stable.      Impression: 1. New right IJ permacath is in good position. 2. New interstitial coarsening as well as a new faint right suprahilar patchy infiltrate, appearance favoring a mild volume overload/pulmonary edema. 3. No pneumothorax. This report was finalized on 12/20/2021 09:17 by Dr Cale Doan, .    IR Insert Tunneled CV Catheter Without Port 5 Plus, FL C Arm During Surgery    Result Date: 12/23/2021  Narrative: Performed by Dr. Coates. Please see procedure note.  This report was finalized  on 12/23/2021 15:26 by Dr. Simon Coates MD.      ED course:    Medications   sodium chloride 0.9 % flush 10 mL (has no administration in time range)   sodium chloride 0.9 % flush 10 mL (has no administration in time range)   enoxaparin (LOVENOX) syringe 30 mg (has no administration in time range)   HYDROcodone-acetaminophen (NORCO) 5-325 MG per tablet 1 tablet (has no administration in time range)   HYDROmorphone (DILAUDID) injection 0.5 mg (has no administration in time range)     And   naloxone (NARCAN) injection 0.4 mg (has no administration in time range)   ondansetron (ZOFRAN) tablet 4 mg (has no administration in time range)     Or   ondansetron (ZOFRAN) injection 4 mg (has no administration in time range)   aspirin EC tablet 81 mg (has no administration in time range)   atorvastatin (LIPITOR) tablet 20 mg (has no administration in time range)   citalopram (CeleXA) tablet 40 mg (has no administration in time range)   famotidine (PEPCID) tablet 20 mg (20 mg Oral Given 1/5/22 1640)   gabapentin (NEURONTIN) capsule 300 mg (has no administration in time range)   midodrine (PROAMATINE) tablet 10 mg (10 mg Oral Given 1/5/22 1817)          Amount and/or complexity of data reviewed:    • Clinical lab tests ordered and reviewed.    • Discuss the patient with another provider: Dr. Coates      Risk of significant complications, morbidity, and/or mortality.    •  Presenting problem: moderate  •  Diagnostic procedures: moderate  •  Management options: moderate        MEDICAL DECISION MAKING     Patient presents with left lower extremity wound. Upon arrival to the Emergency Department patient is in no acute distress vital signs are reassuring.  IV access is obtained and labs are sent.  Labs show stable renal function, no hyperkalemia, CBC with stable anemia, Covid testing is negative.  Patient has no signs of sepsis at this time.  Vascular surgery has evaluated the patient at bedside and she will be admitted to their  service for plans for operative repair tomorrow.  She is in stable condition.        Diagnosis:    Final diagnoses:   Pre-op evaluation         ED Disposition:     ED Disposition     ED Disposition Condition Comment    Decision to Admit  Level of Care: Remote Telemetry [26]   Diagnosis: Pre-op evaluation [131565]   Admitting Physician: CHERIE HOLLOWAY [[id number]]   Attending Physician: CHERIE HOLLOWAY [[id number]]   Certification: I Certify That Inpatient Hospital Services Are Medically Necessary For Greater Than 2 Midnights            No follow-up provider specified.       Medication List      No changes were made to your prescriptions during this visit.              Ben Shelton MD  01/05/22 2025

## 2022-01-05 NOTE — ED NOTES
The following fall interventions were initiated:    [x] Patient and/or family given education   [x] Call light within reach and educated on how to use   [x] Bed rails up per protocol    [x] Bed locked and in the lowest position   [] Bed alarm set and on loudest setting   [] Fall wrist band applied   [] Non skid footwear applied   [x] Room free of clutter   [x] Patient items within reach   [x] Adequate lighting provided  [] Falls sign present    [] Patient moved closer to nursing station   [] Restraints applied        Katja Barbour, RN  01/05/22 [id number]

## 2022-01-06 NOTE — OP NOTE
VASCULAR SURGERY OPERATIVE REPORT    DATE OF PROCEDURE: 01/06/22    ATTENDING SURGEON: Simon Coates MD    OR STAFF: Circulator: Katja Moscoso RN  Scrub Person: Marie Starr MARTHA  Assistant: Jennifer Rader    ANESTHESIA: General    PRE-OPERATIVE DIAGNOSIS: Amputation stump necrosis (HCC) [T87.50]    POST-OPERATIVE DIAGNOSIS: Post-Op Diagnosis Codes:     * Amputation stump necrosis (HCC) [T87.50]    PROCEDURE(S): Left lower extremity above knee amputation    INTRAOPERATIVE FINDINGS: Viable skin and soft tissue margins grossly at the amputation margin.    SPECIMENS: Above knee left lower extremity    IMPLANTS: None    DRAINS: None    Estimated Blood Loss: <500ml    COMPLICATIONS: None apparent    CONDITION AT COMPLETION: Stable    DISPOSITION: PACU    INDICATIONS FOR PROCEDURE:   Patient is a 66 y.o. female who presented with posterior flap necrosis and signs of infection to a previously performed left below-knee amputation.  She has a history of significant peripheral arterial disease as well as coronary disease, end-stage renal disease on dialysis, and diabetes.  She had previously been having rest pain to the left foot and underwent a left below-knee amputation but given her underlying PAD as well as likely recent trauma to the stump she had poor healing and some necrotic tissue around the incision line with eschar.  On exam of the stump itself given the amount of tissue involvement it was felt that this was not salvageable and so she is brought to the operating room today for conversion to a left above-knee amputation. All risks, benefits, and alternatives to above elective procedures were discussed with the patient and informed consent was accordingly obtained at that time.    DETAILS OF PROCEDURE:   Patient was brought to the operating suite and appropriately identified.  General anesthesia was administered and intravenous antibiotics given. In supine position, the operative site was prepped and draped  in the usual sterile fashion. A #15 blade was used to make a fish mouth incision in the distal left thigh. The incision was deepened through subcutaneous tissue, fascia, and muscle using electrocautery. The superficial femoral artery and vein were identified and circumferentially dissected free. Each was divided between two Katheryn clamps and then the proximal end suture ligated with 2-0 silk suture. The distal ends were ligated with 2-0 silk ties. The soft tissue was then removed from the femur using a periosteal elevator, and the femur was divided with an oscillating saw.  The posterior portion of the amputation was then completed with the amputation knife.  The wound was inspected for hemostasis and was clean and dry at the end of the case.  Muscle was closed over the bone and the ligated vessels using interrupted 2-0 Vicryl suture.  The fascia was reapproximated using interrupted 0 Vicryl suture. Subcutaneous tissue was reapproximated using interrupted 3-0 Vicryl suture. All instrument and sponge counts were confirmed, and skin was reapproximated using surgical skin staples. The skin was cleaned and dried, and a sterile dressing was applied. Patient was then safely able to be extubated and transferred to recovery. Iwas present for all aspects of the procedure, and there were no apparent intraoperative complications.

## 2022-01-06 NOTE — ANESTHESIA PREPROCEDURE EVALUATION
Anesthesia Evaluation     Patient summary reviewed   history of anesthetic complications: PONV  NPO Solid Status: > 8 hours  NPO Liquid Status: > 8 hours           Airway   Mallampati: I  TM distance: >3 FB  Neck ROM: full  Dental    (+) partials    Pulmonary    (+) pleural effusion (s/p chest tube may 2021, resolved), sleep apnea (does not use cpap after 130 lbs weight loss),   (-) asthma  Cardiovascular   Exercise tolerance: poor (<4 METS)    ECG reviewed    (+) hypertension, past MI , CAD, CABG >6 Months, cardiac stents (x2, 2016) more than 12 months ago dysrhythmias, PVD, hyperlipidemia,     ROS comment: Echo 9/2021 EF 60-65%    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    Neuro/Psych  (+) seizures (in past...not medicated),     (-) TIA, CVA  GI/Hepatic/Renal/Endo    (+)  GERD,  renal disease (last received two days ago) dialysis, diabetes mellitus using insulin,   (-) liver disease    ROS Comment: S/p gastric bypass 2004    Musculoskeletal     Abdominal    Substance History      OB/GYN          Other   arthritis, blood dyscrasia anemia,                       Anesthesia Plan    ASA 3     general     intravenous induction     Anesthetic plan, all risks, benefits, and alternatives have been provided, discussed and informed consent has been obtained with: patient.

## 2022-01-06 NOTE — ANESTHESIA PROCEDURE NOTES
Airway  Urgency: elective    Date/Time: 1/6/2022 7:59 AM  Airway not difficult    General Information and Staff    Patient location during procedure: OR  CRNA: Jerome Renae CRNA    Indications and Patient Condition  Indications for airway management: airway protection    Preoxygenated: yes  MILS maintained throughout  Mask difficulty assessment: 1 - vent by mask    Final Airway Details  Final airway type: endotracheal airway      Successful airway: ETT  Cuffed: yes   Successful intubation technique: direct laryngoscopy  Endotracheal tube insertion site: oral  Blade: Gray  Blade size: 2  ETT size (mm): 7.5  Cormack-Lehane Classification: grade I - full view of glottis  Placement verified by: chest auscultation and capnometry   Cuff volume (mL): 5  Measured from: lips  ETT/EBT  to lips (cm): 22  Number of attempts at approach: 1  Assessment: lips, teeth, and gum same as pre-op and atraumatic intubation

## 2022-01-06 NOTE — PLAN OF CARE
Goal Outcome Evaluation:               [facility]

## 2022-01-06 NOTE — CONSULTS
Nephrology ([facility]) Consult Note      Patient:  Jennifer Salcido  YOB: 1955  Date of Service: 1/6/2022  MRN: [id number]   Acct: [id number]   Primary Care Physician: Akhil Golden DO  Advance Directive:   Code Status and Medical Interventions:   Ordered at: 01/05/22 1507     Level Of Support Discussed With:    Patient     Code Status (Patient has no pulse and is not breathing):    CPR (Attempt to Resuscitate)     Medical Interventions (Patient has pulse or is breathing):    Full Support     Admit Date: 1/5/2022       Hospital Day: 1  Referring Provider: No Known Provider      Patient Seen, Chart, Consults, Notes, Labs, Radiology studies reviewed.      Subjective:  Jennifer Salcido is a 66 y.o. female  whom we were consulted for end-stage renal disease management.  Patient has hypertension, hyperlipidemia and end-stage renal disease.  She has carotid disease and status post CABG.  She also has significant history of peripheral vascular disease.  Patient has been dealing with her left lower extremity and had several intervention on it with vascular surgery.  She then underwent left below the knee amputation done on 12/13.  Patient continued to have an area that is blistered over her stump.  Patient was previously on peritoneal dialysis and a PermCath was placed when she had to switch technique to be able to go to a nursing home.  Patient has been started having bloody drainage from her incision along with ecchymosis.  She was also getting wound care.  Patient was admitted on 1/6 and underwent n left lower extremity above-the-knee amputation for her stump necrosis.  Seen and examined on dialysis.  Patient was still complaining of pain and she was very depressed.    Dialysis:   Access-right IJ PermCath, blood flow rate 400, , UF :2 liters        Allergies:  Codeine, Demerol [meperidine], Levaquin [levofloxacin], Lisinopril, Morphine, Sulfasalazine, Ultram [tramadol],  and Trental [pentoxifylline]    Home Meds:  Medications Prior to Admission   Medication Sig Dispense Refill Last Dose   • acetaminophen (TYLENOL) 325 MG tablet Take 650 mg by mouth Every 4 (Four) Hours As Needed for Mild Pain .      • cephalexin (KEFLEX) 500 MG capsule Take 500 mg by mouth 2 (Two) Times a Day.      • allopurinol (ZYLOPRIM) 100 MG tablet Take 100 mg by mouth Daily.      • aluminum-magnesium hydroxide-simethicone (MAALOX MAX) 400-400-40 MG/5ML suspension Take 15 mL by mouth Every 6 (Six) Hours As Needed for Heartburn.      • ascorbic acid (VITAMIN C) 500 MG tablet Take 500 mg by mouth Daily.      • aspirin 81 MG EC tablet Take 81 mg by mouth Daily.      • atorvastatin (LIPITOR) 20 MG tablet Take 1 tablet by mouth Every Night. 90 tablet 4    • bisacodyl (Dulcolax) 10 MG suppository Insert 10 mg into the rectum Daily As Needed for Constipation.      • calcium carbonate (TUMS) 500 MG chewable tablet Chew 2 tablets Every Night.      • citalopram (CeleXA) 40 MG tablet Take 40 mg by mouth Daily.      • clopidogrel (PLAVIX) 75 MG tablet Take 75 mg by mouth Daily.      • cyclobenzaprine (FLEXERIL) 5 MG tablet Take 1 tablet by mouth 3 (Three) Times a Day As Needed for Muscle Spasms.      • docusate sodium (COLACE) 100 MG capsule Take 200 mg by mouth Daily As Needed for Constipation.      • famotidine (PEPCID) 20 MG tablet Take 1 tablet by mouth Daily. 30 tablet 0    • gabapentin (NEURONTIN) 300 MG capsule Take 1 capsule by mouth Every 12 (Twelve) Hours. 24 capsule 0    • gentamicin (GARAMYCIN) 0.1 % cream APPLY TO EXIT SITE AS NEEDED      • Melatonin 5 MG capsule Take 5 mg by mouth Every Night.      • midodrine (PROAMATINE) 10 MG tablet Take 1 tablet by mouth 3 (Three) Times a Day Before Meals.      • Multiple Vitamins-Minerals (MULTIVITAMIN ADULT PO) Take 1 tablet by mouth Daily.      • nitroglycerin (NITROSTAT) 0.4 MG SL tablet 1 under the tongue as needed for angina, may repeat q5mins for up three doses 25  tablet 3    • oxyCODONE-acetaminophen (PERCOCET) 5-325 MG per tablet Take 1 tablet by mouth Every 4 (Four) Hours As Needed for Moderate Pain . 24 tablet 0    • sucralfate (CARAFATE) 1 g tablet Take 1 g by mouth 4 (Four) Times a Day Before Meals & at Bedtime. Dissolve 1 tablet in water to create a slurry and drink      • Sucroferric Oxyhydroxide (Velphoro) 500 MG chewable tablet Chew 2 tablets 3 (Three) Times a Day Before Meals.      • vitamin D3 125 MCG (5000 UT) capsule capsule Take 1 tablet by mouth Daily.          Medicines:  Current Facility-Administered Medications   Medication Dose Route Frequency Provider Last Rate Last Admin   • aspirin EC tablet 81 mg  81 mg Oral Daily Simon Coates MD       • atorvastatin (LIPITOR) tablet 20 mg  20 mg Oral Nightly Simon Coates MD   20 mg at 01/05/22 2134   • citalopram (CeleXA) tablet 40 mg  40 mg Oral Daily Simon Coates MD       • [START ON 1/7/2022] clopidogrel (PLAVIX) tablet 75 mg  75 mg Oral Daily Simon Coates MD       • enoxaparin (LOVENOX) syringe 30 mg  30 mg Subcutaneous Q24H Simon Coates MD       • famotidine (PEPCID) tablet 20 mg  20 mg Oral Daily Simon Coates MD   20 mg at 01/05/22 1640   • gabapentin (NEURONTIN) capsule 300 mg  300 mg Oral Q12H Simon Coates MD   300 mg at 01/05/22 2134   • HYDROcodone-acetaminophen (NORCO) 5-325 MG per tablet 1 tablet  1 tablet Oral Q4H PRN Simon Coates MD       • HYDROmorphone (DILAUDID) injection 0.5 mg  0.5 mg Intravenous Q2H PRN Simon Coates MD   0.5 mg at 01/06/22 1509    And   • naloxone (NARCAN) injection 0.4 mg  0.4 mg Intravenous Q5 Min PRN Simon Coates MD       • midodrine (PROAMATINE) tablet 10 mg  10 mg Oral TID AC Simon Caotes MD   10 mg at 01/05/22 1817   • ondansetron (ZOFRAN) tablet 4 mg  4 mg Oral Q6H PRN Simon Coates MD        Or   • ondansetron (ZOFRAN) injection 4 mg  4 mg Intravenous Q6H  Simon Rucker MD       • sodium chloride 0.9 % flush 10 mL  10 mL Intravenous Q12H Simon Coates MD       • sodium chloride 0.9 % flush 10 mL  10 mL Intravenous Simon Rucker MD       • sodium chloride 0.9 % infusion  50 mL/hr Intravenous Continuous Ting Ruano MD 50 mL/hr at 01/06/22 0752 Currently Infusing at 01/06/22 0752       Past Medical History:  Past Medical History:   Diagnosis Date   • Arthritis     LEFT SHOULDER   • CHF (congestive heart failure) ([facility])    • Chronic pericarditis 4/22/2021   • Collar bone fracture 12/2020    SLING PLACED TO HEAL   • Coronary artery disease     LEFT MAIN AND 3 OTHER AREAS--CABG SCHEDULED FOR APRIL 22, 2021   • Dyslipidemia 9/28/2020   • Elevated cholesterol    • End stage kidney disease ([facility])     currently HD (5/19/2021), hopes to return to [facility]    • Essential hypertension 11/2/2018   • Gastroesophageal reflux disease without esophagitis 4/28/2021   • GERD (gastroesophageal reflux disease)    • Gout    • History of transfusion    • Hyperlipidemia    • Hypertension    • Overweight (BMI 25.0-29.9) 4/22/2021   • Peritoneal dialysis status ([facility])     EVERY NIGHT FOR 10 HOURS, (5/19/2021 currently on hold)   • PONV (postoperative nausea and vomiting)    • Sleep apnea     RESOLVED   • Status post gastric bypass for obesity 4/22/2021   • Type 2 diabetes mellitus with kidney complication, with long-term current use of insulin ([facility]) 11/2/2018       Past Surgical History:  Past Surgical History:   Procedure Laterality Date   • BELOW KNEE AMPUTATION Left 12/13/2021    Procedure: LEFT LOWER EXTREMITY AMPUTATION BELOW KNEE;  Surgeon: Simon Coates MD;  Location:  [facility] OR;  Service: Vascular;  Laterality: Left;   • BREAST BIOPSY Right     FATTY TUMOR   • CARDIAC CATHETERIZATION N/A 10/1/2019    Procedure: Left Heart Cath;  Surgeon: Srikanth Coker MD;  Location:  [facility] CATH INVASIVE LOCATION;  Service: Cardiology   • CARDIAC  CATHETERIZATION N/A 7/14/2020    Procedure: Left Heart Cath;  Surgeon: Srikanth Coker MD;  Location:  [facility] CATH INVASIVE LOCATION;  Service: Cardiology;  Laterality: N/A;   • CARDIAC CATHETERIZATION N/A 3/23/2021    Procedure: Left Heart Cath;  Surgeon: Srikanth Coker MD;  Location:  [facility] CATH INVASIVE LOCATION;  Service: Cardiology;  Laterality: N/A;   • CHEST TUBE INSERTION Right 6/7/2021    Procedure: CHEST TUBE INSERTION;  Surgeon: Antony Wood MD;  Location:  [facility] OR;  Service: Cardiothoracic;  Laterality: Right;   • CORONARY ANGIOPLASTY WITH STENT PLACEMENT  2016    X 2   • CORONARY ARTERY BYPASS GRAFT N/A 4/22/2021    Procedure: CORONARY ARTERY BYPASS GRAFT X 3 WITH LEFT INTERNAL MAMMARY ARTERY, RIGHT LOWER EXTREMITY ENDOSCOPIC VEIN HARVEST, AND PERFUSION; TRANSESOPHAGEAL ECHOCARDIOGRAM;  Surgeon: Antony Wood MD;  Location:  [facility] OR;  Service: Cardiothoracic;  Laterality: N/A;   • EYE SURGERY Bilateral     IOC LENS IMPLANTS   • EYE SURGERY Bilateral     RETINAL SURGERY   • GALLBLADDER SURGERY     • GASTRIC BYPASS     • HYSTERECTOMY     • INSERTION HEMODIALYSIS CATHETER Right 4/1/2021    Procedure: HEMODIALYSIS CATHETER INSERTION;  Surgeon: Simon Coates MD;  Location:  [facility] HYBRID OR 12;  Service: Vascular;  Laterality: Right;   • INSERTION HEMODIALYSIS CATHETER Right 12/20/2021    Procedure: HEMODIALYSIS CATHETER INSERTION;  Surgeon: Simon Coates MD;  Location:  [facility] HYBRID OR 12;  Service: Vascular;  Laterality: Right;   • INSERTION PERITONEAL DIALYSIS CATHETER     • OOPHORECTOMY Bilateral    • SINUS SURGERY     • SINUS SURGERY  07/1980   • TUBAL ABDOMINAL LIGATION         Family History  Family History   Problem Relation Age of Onset   • Hypertension Mother    • Lung disease Mother    • Heart disease Father    • Diabetes Father    • Abnormal EKG Father    • Breast cancer Neg Hx        Social History  Social History     Socioeconomic History   • Marital status:  "   Tobacco Use   • Smoking status: Former Smoker     Packs/day: 0.00     Years: 2.00     Pack years: 0.00     Types: Cigarettes     Quit date:      Years since quittin.0   • Smokeless tobacco: Never Used   Vaping Use   • Vaping Use: Never used   Substance and Sexual Activity   • Alcohol use: Not Currently     Comment: haven't drank in 3 years ( 2018)    • Drug use: No   • Sexual activity: Defer         Review of Systems:  History obtained from chart review and the patient  General ROS: No fever or chills  Respiratory ROS: No cough, shortness of breath, wheezing  Cardiovascular ROS: no chest pain or dyspnea on exertion  Gastrointestinal ROS: No abdominal pain or melena  Genito-Urinary ROS: No dysuria or hematuria  14 point ROS reviewed with the patient and negative except as noted above and in the HPI unless unable to obtain.    Objective:  BP 97/53 (BP Location: Right arm, Patient Position: Lying)   Pulse 72   Temp 97.4 °F (36.3 °C) (Axillary)   Resp 16   Ht 160 cm (63\")   Wt 68.9 kg (152 lb)   SpO2 100%   BMI 26.93 kg/m²   No intake or output data in the 24 hours ending 22 1706  General: awake/alert    Head: Atraumatic, normocephalic  Neck: No mass, no JVD  Chest:  clear to auscultation bilaterally without respiratory distress  CVS: regular rate and rhythm  Abdominal: soft, nontender, normal bowel sounds  Extremities: Left below the knee amputation  Skin: warm and dry without rash  Neuro: No focal motor deficits  Musculoskeletal: No obvious joint effusions    Labs:  Lab Results (last 72 hours)     Procedure Component Value Units Date/Time    POC Glucose Once [[id number]]  (Normal) Collected: 22 1015    Specimen: Blood Updated: 22 1030     Glucose 91 mg/dL      Comment: : [id number] Lax Krista  SMeter ID: [id number]       Tissue Pathology Exam [[id number]] Collected: 22 0836    Specimen: Tissue from Leg, Left Updated: 22 1007    CBC & Differential [[id number]]  " (Abnormal) Collected: 01/06/22 0646    Specimen: Blood Updated: 01/06/22 0651    Narrative:      The following orders were created for panel order CBC & Differential.  Procedure                               Abnormality         Status                     ---------                               -----------         ------                     CBC Auto Differential[[id number]]        Abnormal            Final result                 Please view results for these tests on the individual orders.    CBC Auto Differential [[id number]]  (Abnormal) Collected: 01/06/22 0646    Specimen: Blood Updated: 01/06/22 0651     WBC 4.96 10*3/mm3      RBC 2.56 10*6/mm3      Hemoglobin 8.2 g/dL      Hematocrit 26.8 %      .7 fL      MCH 32.0 pg      MCHC 30.6 g/dL      RDW 16.9 %      RDW-SD 64.8 fl      MPV 10.6 fL      Platelets 269 10*3/mm3      Neutrophil % 52.8 %      Lymphocyte % 26.4 %      Monocyte % 13.1 %      Eosinophil % 7.1 %      Basophil % 0.2 %      Immature Grans % 0.4 %      Neutrophils, Absolute 2.62 10*3/mm3      Lymphocytes, Absolute 1.31 10*3/mm3      Monocytes, Absolute 0.65 10*3/mm3      Eosinophils, Absolute 0.35 10*3/mm3      Basophils, Absolute 0.01 10*3/mm3      Immature Grans, Absolute 0.02 10*3/mm3      nRBC 0.0 /100 WBC     Basic Metabolic Panel [[id number]]  (Abnormal) Collected: 01/06/22 0530    Specimen: Blood Updated: 01/06/22 0551     Glucose 82 mg/dL      BUN 17 mg/dL      Creatinine 5.29 mg/dL      Sodium 138 mmol/L      Potassium 3.8 mmol/L      Chloride 97 mmol/L      CO2 34.0 mmol/L      Calcium 8.2 mg/dL      eGFR   Amer --     Comment: <15 Indicative of kidney failure.        eGFR Non African Amer 8 mL/min/1.73      Comment: <15 Indicative of kidney failure.        BUN/Creatinine Ratio 3.2     Anion Gap 7.0 mmol/L     Narrative:      GFR Normal >60  Chronic Kidney Disease <60  Kidney Failure <15      COVID-19,[employer] IN-HOUSE,Nasal Swab No Transport Media 3-4 HR TAT - Swab, Nasal  Cavity [[id number]]  (Normal) Collected: 01/05/22 1511    Specimen: Swab from Nasal Cavity Updated: 01/05/22 1619     COVID19 Not Detected    Narrative:      Fact sheet for providers: https://www.fda.gov/media/311877/download     Fact sheet for patients: https://www.fda.gov/media/686250/download    Test performed by PCR.    Consider negative results in combination with clinical observations, patient history, and epidemiological information.  Fact sheet for providers: https://www.fda.gov/media/934137/download     Fact sheet for patients: https://www.fda.gov/media/821934/download    Test performed by PCR.    Consider negative results in combination with clinical observations, patient history, and epidemiological information.    Comprehensive Metabolic Panel [[id number]]  (Abnormal) Collected: 01/05/22 1515    Specimen: Blood Updated: 01/05/22 1604     Glucose 117 mg/dL      BUN 13 mg/dL      Creatinine 4.43 mg/dL      Sodium 135 mmol/L      Potassium 3.6 mmol/L      Comment: Slight hemolysis detected by analyzer. Results may be affected.        Chloride 94 mmol/L      CO2 34.0 mmol/L      Calcium 8.4 mg/dL      Total Protein 5.4 g/dL      Albumin 2.30 g/dL      ALT (SGPT) 6 U/L      AST (SGOT) 22 U/L      Comment: Slight hemolysis detected by analyzer. Results may be affected.        Alkaline Phosphatase 123 U/L      Total Bilirubin 0.2 mg/dL      eGFR Non African Amer 10 mL/min/1.73      Comment: <15 Indicative of kidney failure.        eGFR   Amer --     Comment: <15 Indicative of kidney failure.        Globulin 3.1 gm/dL      A/G Ratio 0.7 g/dL      BUN/Creatinine Ratio 2.9     Anion Gap 7.0 mmol/L     Narrative:      GFR Normal >60  Chronic Kidney Disease <60  Kidney Failure <15      aPTT [[id number]]  (Abnormal) Collected: 01/05/22 1515    Specimen: Blood Updated: 01/05/22 1542     PTT 40.9 seconds     Protime-INR [[id number]]  (Abnormal) Collected: 01/05/22 1515    Specimen: Blood Updated: 01/05/22 1542      Protime 13.8 Seconds      INR 1.10    CBC & Differential [[id number]]  (Abnormal) Collected: 01/05/22 1515    Specimen: Blood Updated: 01/05/22 1534    Narrative:      The following orders were created for panel order CBC & Differential.  Procedure                               Abnormality         Status                     ---------                               -----------         ------                     CBC Auto Differential[[id number]]        Abnormal            Final result                 Please view results for these tests on the individual orders.    CBC Auto Differential [[id number]]  (Abnormal) Collected: 01/05/22 1515    Specimen: Blood Updated: 01/05/22 1534     WBC 6.45 10*3/mm3      RBC 2.78 10*6/mm3      Hemoglobin 8.8 g/dL      Hematocrit 28.4 %      .2 fL      MCH 31.7 pg      MCHC 31.0 g/dL      RDW 16.9 %      RDW-SD 63.6 fl      MPV 11.0 fL      Platelets 300 10*3/mm3      Neutrophil % 74.5 %      Lymphocyte % 14.9 %      Monocyte % 9.0 %      Eosinophil % 1.1 %      Basophil % 0.2 %      Immature Grans % 0.3 %      Neutrophils, Absolute 4.81 10*3/mm3      Lymphocytes, Absolute 0.96 10*3/mm3      Monocytes, Absolute 0.58 10*3/mm3      Eosinophils, Absolute 0.07 10*3/mm3      Basophils, Absolute 0.01 10*3/mm3      Immature Grans, Absolute 0.02 10*3/mm3      nRBC 0.0 /100 WBC           Radiology:   Imaging Results (Last 72 Hours)     ** No results found for the last 72 hours. **          Culture:  No components found for: WOUNDCUL, 3  No components found for: CSFCUL, 3  No components found for: BC, 3  No components found for: URINECUL, 3      Assessment   -End-stage renal disease  -Type 2 diabetes with renal disease  -Peripheral vascular disease-status post left above-the-knee amputation  -Anemia of chronic kidney disease    Plan:  We will continue dialysis on Tuesday Thursday and Saturday.  Follow-up labs.  Continue pain management and wound care.  We will add Retacrit for anemia  management.      Thank you for the consult, we appreciate the opportunity to provide care to your patients.  Feel free to contact me if I can be of any further assistance.      Davon Stallings MD  1/6/2022  17:06 CST

## 2022-01-06 NOTE — ANESTHESIA POSTPROCEDURE EVALUATION
"Patient: Jennifer Salcido    Procedure Summary     Date: 01/06/22 Room / Location: [facility] OR  /  [facility] HYBRID OR 12    Anesthesia Start: 0752 Anesthesia Stop: 0935    Procedure: LEFT LOWER EXTREMITY AMPUTATION ABOVE KNEE (Left Thigh) Diagnosis:       Amputation stump necrosis (HCC)      (Amputation stump necrosis (HCC) [T87.50])    Surgeons: Simon Coates MD Provider: Jerome Renae CRNA    Anesthesia Type: general ASA Status: 3          Anesthesia Type: general    Vitals  Vitals Value Taken Time   /63 01/06/22 1116   Temp 98.5 °F (36.9 °C) 01/06/22 1141   Pulse 73 01/06/22 1141   Resp 15 01/06/22 1116   SpO2 100 % 01/06/22 1141           Post Anesthesia Care and Evaluation    Patient location during evaluation: PACU  Patient participation: complete - patient participated  Level of consciousness: awake and alert  Pain management: adequate  Airway patency: patent  Anesthetic complications: No anesthetic complications    Cardiovascular status: acceptable  Respiratory status: acceptable  Hydration status: acceptable    Comments: Blood pressure 97/53, pulse 72, temperature 97.4 °F (36.3 °C), temperature source Axillary, resp. rate 16, height 160 cm (63\"), weight 68.9 kg (152 lb), SpO2 100 %, not currently breastfeeding.    Pt discharged from PACU based on johnnie score >8      "

## 2022-01-07 NOTE — THERAPY EVALUATION
Patient Name: Jennifer Salcido  : 1955    MRN: [id number]                              Today's Date: 2022       Admit Date: 2022    Visit Dx:     ICD-10-CM ICD-9-CM   1. Pre-op evaluation  Z01.818 V72.84   2. Amputation stump necrosis (HCC)  T87.50 997.69   3. Impaired mobility  Z74.09 799.89     Patient Active Problem List   Diagnosis   • ESRD on peritoneal dialysis (HCC)   • Coronary artery disease involving native coronary artery of native heart without angina pectoris   • Type 2 diabetes mellitus, without long-term current use of insulin (HCC)   • Chronic anemia   • Dyslipidemia   • Essential hypertension   • Hypokalemia   • Ischemic left leg   • Peripheral vascular disease (HCC)   • Small vessel arterial disease due to type 2 diabetes mellitus (HCC)   • Hyponatremia   • Abdominal pain   • Abnormality of albumin   • Acidosis, metabolic   • Allergy, unspecified, initial encounter   • Anaphylactic shock, unspecified, initial encounter   • At risk for fluid volume imbalance   • Atherosclerosis of native artery of both lower extremities with intermittent claudication (HCC)   • Dependence on renal dialysis (HCC)   • Encounter for adequacy testing for peritoneal dialysis (HCC)   • ESRD on peritoneal dialysis (HCC)   • Gas gangrene (HCC)   • Gastroesophageal reflux disease   • Hyperlipidemia   • Iron deficiency anemia   • Loss of consciousness (formerly [facility])   • Moderate protein-calorie malnutrition (HCC)   • Hematemesis with nausea   • Obstructive sleep apnea syndrome   • Osteoarthritis   • Disorder of phosphorus metabolism, unspecified   • Pain, unspecified   • Dialysis-associated peritonitis (HCC)   • Pleural effusion, not elsewhere classified   • Renal osteodystrophy   • Secondary hyperparathyroidism of renal origin (HCC)   • Seizure disorder (formerly [facility])   • Shortness of breath   • Sinus infection   • Type 2 diabetes mellitus with diabetic neuropathy, unspecified (formerly [facility])   • Pre-op evaluation   • Amputation stump  necrosis ([facility])     Past Medical History:   Diagnosis Date   • Arthritis     LEFT SHOULDER   • CHF (congestive heart failure) ([facility])    • Chronic pericarditis 4/22/2021   • Collar bone fracture 12/2020    SLING PLACED TO HEAL   • Coronary artery disease     LEFT MAIN AND 3 OTHER AREAS--CABG SCHEDULED FOR APRIL 22, 2021   • Dyslipidemia 9/28/2020   • Elevated cholesterol    • End stage kidney disease ([facility])     currently HD (5/19/2021), hopes to return to PD    • Essential hypertension 11/2/2018   • Gastroesophageal reflux disease without esophagitis 4/28/2021   • GERD (gastroesophageal reflux disease)    • Gout    • History of transfusion    • Hyperlipidemia    • Hypertension    • Overweight (BMI 25.0-29.9) 4/22/2021   • Peritoneal dialysis status ([facility])     EVERY NIGHT FOR 10 HOURS, (5/19/2021 currently on hold)   • PONV (postoperative nausea and vomiting)    • Sleep apnea     RESOLVED   • Status post gastric bypass for obesity 4/22/2021   • Type 2 diabetes mellitus with kidney complication, with long-term current use of insulin ([facility]) 11/2/2018     Past Surgical History:   Procedure Laterality Date   • ABOVE KNEE AMPUTATION Left 1/6/2022    Procedure: LEFT LOWER EXTREMITY AMPUTATION ABOVE KNEE;  Surgeon: Simon Coates MD;  Location:  [facility];  Service: Vascular;  Laterality: Left;   • BELOW KNEE AMPUTATION Left 12/13/2021    Procedure: LEFT LOWER EXTREMITY AMPUTATION BELOW KNEE;  Surgeon: Simon Coates MD;  Location:  [facility] OR;  Service: Vascular;  Laterality: Left;   • BREAST BIOPSY Right     FATTY TUMOR   • CARDIAC CATHETERIZATION N/A 10/1/2019    Procedure: Left Heart Cath;  Surgeon: Srikanth Coker MD;  Location:  [facility];  Service: Cardiology   • CARDIAC CATHETERIZATION N/A 7/14/2020    Procedure: Left Heart Cath;  Surgeon: Srikanth Coker MD;  Location:  [facility];  Service: Cardiology;  Laterality: N/A;   • CARDIAC CATHETERIZATION N/A  3/23/2021    Procedure: Left Heart Cath;  Surgeon: Srikanth Coker MD;  Location:  [facility];  Service: Cardiology;  Laterality: N/A;   • CHEST TUBE INSERTION Right 6/7/2021    Procedure: CHEST TUBE INSERTION;  Surgeon: Antony Wood MD;  Location:  [facility] OR;  Service: Cardiothoracic;  Laterality: Right;   • CORONARY ANGIOPLASTY WITH STENT PLACEMENT  2016    X 2   • CORONARY ARTERY BYPASS GRAFT N/A 4/22/2021    Procedure: CORONARY ARTERY BYPASS GRAFT X 3 WITH LEFT INTERNAL MAMMARY ARTERY, RIGHT LOWER EXTREMITY ENDOSCOPIC VEIN HARVEST, AND PERFUSION; TRANSESOPHAGEAL ECHOCARDIOGRAM;  Surgeon: Antony Wood MD;  Location:  [facility] OR;  Service: Cardiothoracic;  Laterality: N/A;   • EYE SURGERY Bilateral     IOC LENS IMPLANTS   • EYE SURGERY Bilateral     RETINAL SURGERY   • GALLBLADDER SURGERY     • GASTRIC BYPASS     • HYSTERECTOMY     • INSERTION HEMODIALYSIS CATHETER Right 4/1/2021    Procedure: HEMODIALYSIS CATHETER INSERTION;  Surgeon: Simon Coates MD;  Location:  [facility];  Service: Vascular;  Laterality: Right;   • INSERTION HEMODIALYSIS CATHETER Right 12/20/2021    Procedure: HEMODIALYSIS CATHETER INSERTION;  Surgeon: Simon Coates MD;  Location:  [facility];  Service: Vascular;  Laterality: Right;   • INSERTION PERITONEAL DIALYSIS CATHETER     • OOPHORECTOMY Bilateral    • SINUS SURGERY     • SINUS SURGERY  07/1980   • TUBAL ABDOMINAL LIGATION        General Information     Row Name 01/07/22 8530          Physical Therapy Time and Intention    Document Type evaluation  s/p L AKA, recent L BKA but had necrotic tissue  -MS     Mode of Treatment physical therapy; co-treatment  -MS     Row Name 01/07/22 5492          General Information    Patient Profile Reviewed yes  -MS     Prior Level of Function --  pt was working with therapy at nursing home, unknown level of activity. Was transfering mod/maxA when d/c from here  -MS     Row Name 01/07/22 7838           Living Environment    Lives With facility resident  -MS     Row Name 01/07/22 0940          Cognition    Orientation Status (Cognition) oriented to; person; place; situation  -MS     Row Name 01/07/22 0940          Safety Issues, Functional Mobility    Impairments Affecting Function (Mobility) balance; cognition; endurance/activity tolerance; pain; strength  -MS           User Key  (r) = Recorded By, (t) = Taken By, (c) = Cosigned By    Initials Name Provider Type    Analisa Ceballos, PT, DPT, NCS Physical Therapist               Mobility     Row Name 01/07/22 0940          Bed Mobility    Bed Mobility scooting/bridging; supine-sit; sit-supine  -MS     Scooting/Bridging [place] (Bed Mobility) dependent (less than 25% patient effort); 2 person assist; verbal cues; nonverbal cues (demo/gesture)  -MS     Supine-Sit [place] (Bed Mobility) minimum assist (75% patient effort); verbal cues; nonverbal cues (demo/gesture)  -MS     Sit-Supine [place] (Bed Mobility) maximum assist (25% patient effort); verbal cues; nonverbal cues (demo/gesture)  -MS     Assistive Device (Bed Mobility) bed rails; draw sheet; head of bed elevated  -MS     Row Name 01/07/22 0940          Sit-Stand Transfer    Sit-Stand [place] (Transfers) maximum assist (25% patient effort); 2 person assist; verbal cues; nonverbal cues (demo/gesture)  -MS     Assistive Device (Sit-Stand Transfers) walker, front-wheeled  -MS           User Key  (r) = Recorded By, (t) = Taken By, (c) = Cosigned By    Initials Name Provider Type    Analisa Ceballos, PT, DPT, NCS Physical Therapist               Obj/Interventions     Row Name 01/07/22 0940          Range of Motion Comprehensive    General Range of Motion bilateral upper extremity ROM WFL; bilateral lower extremity ROM WFL  -MS     Row Name 01/07/22 0940          Strength Comprehensive (MMT)    Comment, General Manual Muscle Testing (MMT) Assessment grossly 4/5  -MS     Row Name  01/07/22 0940          Balance    Balance Assessment sitting static balance; standing static balance  -MS     Static Sitting Balance mild impairment; supported; sitting, edge of bed  -MS     Static Standing Balance moderate impairment; supported  -MS           User Key  (r) = Recorded By, (t) = Taken By, (c) = Cosigned By    Initials Name Provider Type    MS Hinson Analisa HOLLAND, PT, DPT, NCS Physical Therapist               Goals/Plan     Row Name 01/07/22 0940          Bed Mobility Goal 1 (PT)    Activity/Assistive Device (Bed Mobility Goal 1, PT) bed mobility activities, all  -MS     [place] Level/Cues Needed (Bed Mobility Goal 1, PT) minimum assist (75% or more patient effort); tactile cues required; verbal cues required  -MS     Time Frame (Bed Mobility Goal 1, PT) long term goal (LTG); by discharge  -MS     Progress/Outcomes (Bed Mobility Goal 1, PT) goal ongoing  -MS     Row Name 01/07/22 0940          Transfer Goal 1 (PT)    Activity/Assistive Device (Transfer Goal 1, PT) sit-to-stand/stand-to-sit; bed-to-chair/chair-to-bed; walker, rolling  -MS     [place] Level/Cues Needed (Transfer Goal 1, PT) minimum assist (75% or more patient effort); tactile cues required; verbal cues required  -MS     Time Frame (Transfer Goal 1, PT) long term goal (LTG); by discharge  -MS     Row Name 01/07/22 0940          Problem Specific Goal 1 (PT)    Problem Specific Goal 1 (PT) pt will maintain standing for 5 min with CGA with use of RW to increase strength and improve balance  -MS     Time Frame (Problem Specific Goal 1, PT) long-term goal (LTG); by discharge  -MS     Progress/Outcome (Problem Specific Goal 1, PT) goal ongoing  -MS           User Key  (r) = Recorded By, (t) = Taken By, (c) = Cosigned By    Initials Name Provider Type    MS Analisa Hinson, PT, DPT, NCS Physical Therapist               Clinical Impression     Row Name 01/07/22 0940          Pain    Additional Documentation Pain Scale: Numbers  Pre/Post-Treatment (Group)  -MS     Row Name 01/07/22 0940          Pain Scale: Numbers Pre/Post-Treatment    Pretreatment Pain Rating 9/10  -MS     Posttreatment Pain Rating 9/10  -MS     Pain Location - Side Left  -MS     Pre/Posttreatment Pain Comment residual limb  -MS     Pain Intervention(s) Medication (See MAR); Repositioned  -MS     Row Name 01/07/22 0940          Plan of Care Review    Plan of Care Reviewed With patient  -MS     Progress no change  -MS     Outcome Summary The patient presents drowsy with slow processing. She is oriented to person, place, and situation. She requires signfiicant increased time to initiate and complete any movement task. She was able to sit EOB and stand twice. She will benefit from continued PT to work on increased strength, improved processing for motor tasks, standing, and transfers. Recommend discharge back to SNF.  -MS     Row Name 01/07/22 0940          Therapy Assessment/Plan (PT)    Patient/Family Therapy Goals Statement (PT) decrease pain  -MS     Rehab Potential (PT) good, to achieve stated therapy goals  -MS     Criteria for Skilled Interventions Met (PT) yes; meets criteria; skilled treatment is necessary  -MS     Predicted Duration of Therapy Intervention (PT) until discharge  -MS     Row Name 01/07/22 0940          Positioning and Restraints    Post Treatment Position bed  -MS     In Bed fowlers; call light within reach; encouraged to call for assist; side rails up x2  -MS           User Key  (r) = Recorded By, (t) = Taken By, (c) = Cosigned By    Initials Name Provider Type    MS Analisa Hinson R, PT, DPT, NCS Physical Therapist               Outcome Measures     Row Name 01/07/22 0940          How much help from another person do you currently need...    Turning from your back to your side while in flat bed without using bedrails? 2  -MS     Moving from lying on back to sitting on the side of a flat bed without bedrails? 2  -MS     Moving to and from a bed to a  chair (including a wheelchair)? 1  -MS     Standing up from a chair using your arms (e.g., wheelchair, bedside chair)? 2  -MS     Climbing 3-5 steps with a railing? 1  -MS     To walk in hospital room? 1  -MS     AM-PAC 6 Clicks Score (PT) 9  -MS     Row Name 01/07/22 0940          Functional Assessment    Outcome Measure Options AM-PAC 6 Clicks Basic Mobility (PT)  -MS           User Key  (r) = Recorded By, (t) = Taken By, (c) = Cosigned By    Initials Name Provider Type    MS Sravan Analisa HOLLAND, PT, DPT, NCS Physical Therapist                             Physical Therapy Education                 Title: PT OT SLP Therapies (In Progress)     Topic: Physical Therapy (In Progress)     Point: Mobility training (Done)     Learning Progress Summary           Patient Acceptance, E, VU by MS at 1/7/2022 1040    Comment: role of PT in her care                   Point: Home exercise program (Not Started)     Learner Progress:  Not documented in this visit.          Point: Body mechanics (Not Started)     Learner Progress:  Not documented in this visit.          Point: Precautions (Not Started)     Learner Progress:  Not documented in this visit.                      User Key     Initials Effective Dates Name Provider Type Discipline    MS 06/19/18 -  Analisa Hinson, PT, DPT, NCS Physical Therapist PT              PT Recommendation and Plan  Planned Therapy Interventions (PT): balance training, bed mobility training, gait training, patient/family education, strengthening, transfer training, neuromuscular re-education  Plan of Care Reviewed With: patient  Progress: no change  Outcome Summary: The patient presents drowsy with slow processing. She is oriented to person, place, and situation. She requires signfiicant increased time to initiate and complete any movement task. She was able to sit EOB and stand twice. She will benefit from continued PT to work on increased strength, improved processing for motor tasks, standing,  and transfers. Recommend discharge back to SNF.     Time Calculation:    PT Charges     Row Name 01/07/22 0940             Time Calculation    Start Time 0940  5 min chart review  -MS      Stop Time 1030  -MS      Time Calculation (min) 50 min  -MS      PT Received On 01/07/22  -MS      PT Goal Re-Cert Due Date 01/17/22  -MS              Untimed Charges    PT Eval/Re-eval Minutes 55  -MS              Total Minutes    Untimed Charges Total Minutes 55  -MS       Total Minutes 55  -MS            User Key  (r) = Recorded By, (t) = Taken By, (c) = Cosigned By    Initials Name Provider Type    MS Analisa Hinson, PT, DPT, NCS Physical Therapist              Therapy Charges for Today     Code Description Service Date Service Provider Modifiers Qty    [id number] HC PT EVAL MOD COMPLEXITY 4 1/7/2022 Analisa Hinson PT, DPT, NCS GP 1          PT G-Codes  Outcome Measure Options: AM-PAC 6 Clicks Basic Mobility (PT)  AM-PAC 6 Clicks Score (PT): 9    Analisa Hinson PT, DPT, NCS  1/7/2022

## 2022-01-07 NOTE — THERAPY EVALUATION
Patient Name: Jennifer Salcido  : 1955    MRN: [id number]                              Today's Date: 2022       Admit Date: 2022    Visit Dx:     ICD-10-CM ICD-9-CM   1. Pre-op evaluation  Z01.818 V72.84   2. Amputation stump necrosis (HCC)  T87.50 997.69   3. Impaired mobility  Z74.09 799.89   4. Decreased activities of daily living (ADL)  Z78.9 V49.89     Patient Active Problem List   Diagnosis   • ESRD on peritoneal dialysis (HCC)   • Coronary artery disease involving native coronary artery of native heart without angina pectoris   • Type 2 diabetes mellitus, without long-term current use of insulin (HCC)   • Chronic anemia   • Dyslipidemia   • Essential hypertension   • Hypokalemia   • Ischemic left leg   • Peripheral vascular disease (HCC)   • Small vessel arterial disease due to type 2 diabetes mellitus (HCC)   • Hyponatremia   • Abdominal pain   • Abnormality of albumin   • Acidosis, metabolic   • Allergy, unspecified, initial encounter   • Anaphylactic shock, unspecified, initial encounter   • At risk for fluid volume imbalance   • Atherosclerosis of native artery of both lower extremities with intermittent claudication ([facility])   • Dependence on renal dialysis ([facility])   • Encounter for adequacy testing for peritoneal dialysis ([facility])   • ESRD on peritoneal dialysis ([facility])   • Gas gangrene ([facility])   • Gastroesophageal reflux disease   • Hyperlipidemia   • Iron deficiency anemia   • Loss of consciousness ([facility])   • Moderate protein-calorie malnutrition ([facility])   • Hematemesis with nausea   • Obstructive sleep apnea syndrome   • Osteoarthritis   • Disorder of phosphorus metabolism, unspecified   • Pain, unspecified   • Dialysis-associated peritonitis (HCC)   • Pleural effusion, not elsewhere classified   • Renal osteodystrophy   • Secondary hyperparathyroidism of renal origin ([facility])   • Seizure disorder ([facility])   • Shortness of breath   • Sinus infection   • Type 2 diabetes mellitus with diabetic neuropathy,  unspecified ([facility])   • Pre-op evaluation   • Amputation stump necrosis ([facility])     Past Medical History:   Diagnosis Date   • Arthritis     LEFT SHOULDER   • CHF (congestive heart failure) ([facility])    • Chronic pericarditis 4/22/2021   • Collar bone fracture 12/2020    SLING PLACED TO HEAL   • Coronary artery disease     LEFT MAIN AND 3 OTHER AREAS--CABG SCHEDULED FOR APRIL 22, 2021   • Dyslipidemia 9/28/2020   • Elevated cholesterol    • End stage kidney disease ([facility])     currently HD (5/19/2021), hopes to return to PD    • Essential hypertension 11/2/2018   • Gastroesophageal reflux disease without esophagitis 4/28/2021   • GERD (gastroesophageal reflux disease)    • Gout    • History of transfusion    • Hyperlipidemia    • Hypertension    • Overweight (BMI 25.0-29.9) 4/22/2021   • Peritoneal dialysis status ([facility])     EVERY NIGHT FOR 10 HOURS, (5/19/2021 currently on hold)   • PONV (postoperative nausea and vomiting)    • Sleep apnea     RESOLVED   • Status post gastric bypass for obesity 4/22/2021   • Type 2 diabetes mellitus with kidney complication, with long-term current use of insulin ([facility]) 11/2/2018     Past Surgical History:   Procedure Laterality Date   • ABOVE KNEE AMPUTATION Left 1/6/2022    Procedure: LEFT LOWER EXTREMITY AMPUTATION ABOVE KNEE;  Surgeon: Simon Coates MD;  Location: [facility] OR 12;  Service: Vascular;  Laterality: Left;   • BELOW KNEE AMPUTATION Left 12/13/2021    Procedure: LEFT LOWER EXTREMITY AMPUTATION BELOW KNEE;  Surgeon: Simon Coates MD;  Location: [facility] OR;  Service: Vascular;  Laterality: Left;   • BREAST BIOPSY Right     FATTY TUMOR   • CARDIAC CATHETERIZATION N/A 10/1/2019    Procedure: Left Heart Cath;  Surgeon: Srikanth Coker MD;  Location:  [facility] CATH INVASIVE LOCATION;  Service: Cardiology   • CARDIAC CATHETERIZATION N/A 7/14/2020    Procedure: Left Heart Cath;  Surgeon: Srikanth Coker MD;  Location:  [facility] CATH INVASIVE LOCATION;  Service:  Cardiology;  Laterality: N/A;   • CARDIAC CATHETERIZATION N/A 3/23/2021    Procedure: Left Heart Cath;  Surgeon: Srikanth Coker MD;  Location:  [facility] CATH INVASIVE LOCATION;  Service: Cardiology;  Laterality: N/A;   • CHEST TUBE INSERTION Right 6/7/2021    Procedure: CHEST TUBE INSERTION;  Surgeon: Antony Wood MD;  Location:  [facility] OR;  Service: Cardiothoracic;  Laterality: Right;   • CORONARY ANGIOPLASTY WITH STENT PLACEMENT  2016    X 2   • CORONARY ARTERY BYPASS GRAFT N/A 4/22/2021    Procedure: CORONARY ARTERY BYPASS GRAFT X 3 WITH LEFT INTERNAL MAMMARY ARTERY, RIGHT LOWER EXTREMITY ENDOSCOPIC VEIN HARVEST, AND PERFUSION; TRANSESOPHAGEAL ECHOCARDIOGRAM;  Surgeon: Antony Wood MD;  Location:  [facility] OR;  Service: Cardiothoracic;  Laterality: N/A;   • EYE SURGERY Bilateral     IOC LENS IMPLANTS   • EYE SURGERY Bilateral     RETINAL SURGERY   • GALLBLADDER SURGERY     • GASTRIC BYPASS     • HYSTERECTOMY     • INSERTION HEMODIALYSIS CATHETER Right 4/1/2021    Procedure: HEMODIALYSIS CATHETER INSERTION;  Surgeon: Simon Coates MD;  Location:  [facility] HYBRID OR 12;  Service: Vascular;  Laterality: Right;   • INSERTION HEMODIALYSIS CATHETER Right 12/20/2021    Procedure: HEMODIALYSIS CATHETER INSERTION;  Surgeon: Simon Coates MD;  Location:  [facility] HYBRID OR 12;  Service: Vascular;  Laterality: Right;   • INSERTION PERITONEAL DIALYSIS CATHETER     • OOPHORECTOMY Bilateral    • SINUS SURGERY     • SINUS SURGERY  07/1980   • TUBAL ABDOMINAL LIGATION        General Information     Row Name 01/07/22 0949          General Information    Prior Level of Function --  unable to obtain from pt due to confusion  -BLAZE (r)  (t) BLAZE (c)     Existing Precautions/Restrictions fall  periteneal dialysis line, L AKA  -BLAZE (r)  (t) BLAZE (c)     Barriers to Rehab cognitive status; physical barrier  -BLAZE (r)  (t) BLAZE (c)     Row Name 01/07/22 0949          Occupational Profile    Reason for Services/Referral  (Occupational Profile) Amputation stump necrosis s/p L AKA 01/06  -MW (r) JR (t) MW (c)           User Key  (r) = Recorded By, (t) = Taken By, (c) = Cosigned By    Initials Name Provider Type    MW Ayanna Tejeda, OTR/L Occupational Therapist    Leida Culp, OT Student OT Student                 Mobility/ADL's     Row Name 01/07/22 1142          Bed Mobility    Bed Mobility scooting/bridging; supine-sit; sit-supine  -MW (r) JR (t) MW (c)     Scooting/Bridging [place] (Bed Mobility) dependent (less than 25% patient effort); 2 person assist; verbal cues; nonverbal cues (demo/gesture)  -MW (r) JR (t) MW (c)     Supine-Sit [place] (Bed Mobility) minimum assist (75% patient effort); verbal cues; nonverbal cues (demo/gesture)  -MW (r) JR (t) MW (c)     Sit-Supine [place] (Bed Mobility) maximum assist (25% patient effort); verbal cues; nonverbal cues (demo/gesture)  -MW (r) JR (t) MW (c)     Assistive Device (Bed Mobility) bed rails; draw sheet; head of bed elevated  -MW (r) JR (t) MW (c)     Comment (Bed Mobility) RN admin IV pain meds just prior to session, drowsy, significantly increased time to process  -MW (r) JR (t) MW (c)     Row Name 01/07/22 1142          Transfers    Transfers sit-stand transfer  -MW (r) JR (t) MW (c)     Comment (Transfers) x2 stands  -MW (r) JR (t) MW (c)     Sit-Stand [place] (Transfers) maximum assist (25% patient effort); 2 person assist; verbal cues; nonverbal cues (demo/gesture)  -MW (r) JR (t) MW (c)     Row Name 01/07/22 1142          Sit-Stand Transfer    Assistive Device (Sit-Stand Transfers) walker, front-wheeled  -MW (r) JR (t) MW (c)     Row Name 01/07/22 1142          Activities of Daily Living    BADL Assessment/Intervention lower body dressing  -MW (r) JR (t) MW (c)     Row Name 01/07/22 1142          Lower Body Dressing Assessment/Training    [place] Level (Lower Body Dressing) doff; don; socks; maximum assist (25% patient effort)  -BLAZE [place])  (t)  MW (c)     Position (Lower Body Dressing) edge of bed sitting  -MW (r) JR (t) MW (c)     Comment (Lower Body Dressing) anticipated  -MW (r) JR (t) MW (c)           User Key  (r) = Recorded By, (t) = Taken By, (c) = Cosigned By    Initials Name Provider Type    BLAZE Ayanna Tejeda, OTR/L Occupational Therapist    Leida Culp, OT Student OT Student               Obj/Interventions     Row Name 01/07/22 0949          Strength Comprehensive (MMT)    Comment, General Manual Muscle Testing (MMT) Assessment grossly 4/5  -MW (r) JR (t) MW (c)           User Key  (r) = Recorded By, (t) = Taken By, (c) = Cosigned By    Initials Name Provider Type    MW Ayanna Tejeda, OTR/L Occupational Therapist    Leida Culp, OT Student OT Student               Goals/Plan     Row Name 01/07/22 0949          Transfer Goal 1 (OT)    Activity/Assistive Device (Transfer Goal 1, OT) bed-to-chair/chair-to-bed; walker, rolling; commode, 3-in-1; wheelchair transfer  -MW (r) JR (t) MW (c)     [place] Level/Cues Needed (Transfer Goal 1, OT) moderate assist (50-74% patient effort); verbal cues required  -MW (r) JR (t) MW (c)     Time Frame (Transfer Goal 1, OT) long term goal (LTG); 10 days  -MW (r) JR (t) MW (c)     Progress/Outcome (Transfer Goal 1, OT) goal ongoing  -MW (r) JR (t) MW (c)     Row Name 01/07/22 0949          Dressing Goal 1 (OT)    Activity/Device (Dressing Goal 1, OT) lower body dressing  -MW (r) JR (t) MW (c)     [place]/Cues Needed (Dressing Goal 1, OT) minimum assist (75% or more patient effort); verbal cues required  -MW (r) JR (t) MW (c)     Time Frame (Dressing Goal 1, OT) 10 days; long term goal (LTG)  -MW (r) JR (t) MW (c)     Progress/Outcome (Dressing Goal 1, OT) goal ongoing  -MW (r) JR (t) MW (c)     Row Name 01/07/22 0949          Toileting Goal 1 (OT)    Activity/Device (Toileting Goal 1, OT) toileting skills, all; commode, 3-in-1  -BLAZE (r)  (t) BLAZE (c)     [place] Level/Cues Needed  (Toileting Goal 1, OT) verbal cues required; moderate assist (50-74% patient effort)  -MW (r) JR (t) MW (c)     Time Frame (Toileting Goal 1, OT) 10 days; long term goal (LTG)  -MW (r) JR (t) MW (c)     Progress/Outcome (Toileting Goal 1, OT) goal ongoing  -MW (r) JR (t) MW (c)           User Key  (r) = Recorded By, (t) = Taken By, (c) = Cosigned By    Initials Name Provider Type    MW Ayanna Tejeda, OTR/L Occupational Therapist    Leida Culp, OT Student OT Student               Clinical Impression     Row Name 01/07/22 0949          Pain Assessment    Additional Documentation Pain Scale: Numbers Pre/Post-Treatment (Group)  -MW (r) JR (t) MW (c)     Row Name 01/07/22 0939          Pain Scale: Numbers Pre/Post-Treatment    Pain Location - Orientation generalized  -MW (r) JR (t) MW (c)     Pain Location extremity  -MW (r) JR (t) MW (c)     Row Name 01/07/22 0949          Plan of Care Review    Outcome Summary OT eval completed. Pt c/o severe pain upon entering room, rating it 6/10. IV pain med admin just prior to mobility, increased drowsiness with increased time. Significantly increased time to come to EOB. Stood x2 with maxAx2. Anticipate maxA for LB dressing and toileting tasks. OT indicated to address strength, endurance, and balance to increase independence and decrease fall risk. Recommend d/c SNF.  -MW     Row Name 01/07/22 0949          Therapy Assessment/Plan (OT)    Rehab Potential (OT) good, to achieve stated therapy goals  -MW (r) JR (t) MW (c)     Criteria for Skilled Therapeutic Interventions Met (OT) skilled treatment is necessary; yes  -MW (r) JR (t) MW (c)     Therapy Frequency (OT) 5 times/wk  -MW (r) JR (t) MW (c)     Predicted Duration of Therapy Intervention (OT) 10 days  -MW (r) JR (t) MW (c)     Row Name 01/07/22 0917          Therapy Plan Review/Discharge Plan (OT)    Anticipated Discharge Disposition (OT) skilled nursing facility  -MW (r) JR (t) MW (c)           User Key  (r) =  Recorded By, (t) = Taken By, (c) = Cosigned By    Initials Name Provider Type    MW Ayanna Tejeda, OTR/L Occupational Therapist    Leida Culp, OT Student OT Student               Outcome Measures     Row Name 01/07/22 0949          How much help from another is currently needed...    Putting on and taking off regular lower body clothing? 1  -MW (r) JR (t) MW (c)     Bathing (including washing, rinsing, and drying) 2  -MW (r) JR (t) MW (c)     Toileting (which includes using toilet bed pan or urinal) 2  -MW (r) JR (t) MW (c)     Putting on and taking off regular upper body clothing 3  -MW (r) JR (t) MW (c)     Taking care of personal grooming (such as brushing teeth) 3  -MW (r) JR (t) MW (c)     Eating meals 3  -MW (r) JR (t) MW (c)     AM-PAC 6 Clicks Score (OT) 14  -MW (r) JR (t)     Row Name 01/07/22 0940          How much help from another person do you currently need...    Turning from your back to your side while in flat bed without using bedrails? 2  -MS     Moving from lying on back to sitting on the side of a flat bed without bedrails? 2  -MS     Moving to and from a bed to a chair (including a wheelchair)? 1  -MS     Standing up from a chair using your arms (e.g., wheelchair, bedside chair)? 2  -MS     Climbing 3-5 steps with a railing? 1  -MS     To walk in hospital room? 1  -MS     AM-PAC 6 Clicks Score (PT) 9  -MS     Row Name 01/07/22 0949 01/07/22 0940       Functional Assessment    Outcome Measure Options AM-PAC 6 Clicks Daily Activity (OT)  -MW (r) JR (t) MW (c) AM-PAC 6 Clicks Basic Mobility (PT)  -MS          User Key  (r) = Recorded By, (t) = Taken By, (c) = Cosigned By    Initials Name Provider Type    Analisa Ceballos, PT, DPT, NCS Physical Therapist    Ayanna Cruz, OTR/L Occupational Therapist    Leida Culp, OT Student OT Student                Occupational Therapy Education                 Title: PT OT SLP Therapies (In Progress)     Topic: Occupational Therapy (In  Progress)     Point: ADL training (In Progress)     Description:   Instruct learner(s) on proper safety adaptation and remediation techniques during self care or transfers.   Instruct in proper use of assistive devices.              Learning Progress Summary           Patient Acceptance, E,D, NR by  at 1/7/2022 1158                   Point: Home exercise program (In Progress)     Description:   Instruct learner(s) on appropriate technique for monitoring, assisting and/or progressing therapeutic exercises/activities.              Learning Progress Summary           Patient Acceptance, E,D, NR by  at 1/7/2022 1158                   Point: Precautions (In Progress)     Description:   Instruct learner(s) on prescribed precautions during self-care and functional transfers.              Learning Progress Summary           Patient Acceptance, E,D, NR by  at 1/7/2022 1158                   Point: Body mechanics (In Progress)     Description:   Instruct learner(s) on proper positioning and spine alignment during self-care, functional mobility activities and/or exercises.              Learning Progress Summary           Patient Acceptance, E,D, NR by  at 1/7/2022 1158                               User Key     Initials Effective Dates Name Provider Type Discipline     08/28/18 -  Ayanna Tejeda, OTR/L Occupational Therapist OT              OT Recommendation and Plan     Plan of Care Review  Outcome Summary: OT eval completed. Pt c/o severe pain upon entering room, rating it 6/10. IV pain med admin just prior to mobility, increased drowsiness with increased time. Significantly increased time to come to EOB. Stood x2 with maxAx2. Anticipate maxA for LB dressing and toileting tasks. OT indicated to address strength, endurance, and balance to increase independence and decrease fall risk. Recommend d/c SNF.     Time Calculation:    Time Calculation- OT     Row Name 01/07/22 1158             Time Calculation- OT    OT  Start Time 0949  -MW      OT Stop Time 1018  -MW      OT Time Calculation (min) 29 min  -MW      OT Received On 01/07/22  -MW      OT Goal Re-Cert Due Date 01/17/22  -MW            User Key  (r) = Recorded By, (t) = Taken By, (c) = Cosigned By    Initials Name Provider Type    Ayanna Cruz, OTR/L Occupational Therapist              Therapy Charges for Today     Code Description Service Date Service Provider Modifiers Qty    [id number] HC OT EVAL LOW COMPLEXITY 2 1/7/2022 Ayanna Tejeda, OTR/L GO 1               Ayanna Tejeda OTR/L  1/7/2022

## 2022-01-07 NOTE — PROGRESS NOTES
Nephrology ([facility]) Progress Note      Patient:  Jennifer Salcido  YOB: 1955  Date of Service: 1/7/2022  MRN: [id number]   Acct: [id number]   Primary Care Physician: Akhil Golden DO  Advance Directive:   Code Status and Medical Interventions:   Ordered at: 01/05/22 1507     Level Of Support Discussed With:    Patient     Code Status (Patient has no pulse and is not breathing):    CPR (Attempt to Resuscitate)     Medical Interventions (Patient has pulse or is breathing):    Full Support     Admit Date: 1/5/2022       Hospital Day: 2  Referring Provider: No Known Provider      Patient personally seen and examined.  Complete chart including Consults, Notes, Operative Reports, Labs, Cardiology, and Radiology studies reviewed as able.        Subjective:  Jennifer Salcido is a 66 y.o. female for whom we were consulted for evaluation and treatment of end stage renal disease on hemodialysis. Previously was on peritoneal dialysis but had switched to hemodialysis in order to facilitate placement at nursing home.  History of hypertension, CAD with prior CABG, PVD, and hyperlipidemia. Recently underwent left BKA on 12/13. Continued to have poor healing and drainage from stump. Admitted on 1/06 for left above knee amputation. She had dialysis later on 1/06 following her surgery. Tolerated well    Today is awake, alert. Intermittent pain at surgical site. No new overnight issues.    Allergies:  Codeine, Demerol [meperidine], Levaquin [levofloxacin], Lisinopril, Morphine, Sulfasalazine, Ultram [tramadol], and Trental [pentoxifylline]    Home Meds:  Medications Prior to Admission   Medication Sig Dispense Refill Last Dose   • acetaminophen (TYLENOL) 325 MG tablet Take 650 mg by mouth Every 4 (Four) Hours As Needed for Mild Pain .      • cephalexin (KEFLEX) 500 MG capsule Take 500 mg by mouth 2 (Two) Times a Day.      • allopurinol (ZYLOPRIM) 100 MG tablet Take 100 mg by mouth Daily.       • aluminum-magnesium hydroxide-simethicone (MAALOX MAX) 400-400-40 MG/5ML suspension Take 15 mL by mouth Every 6 (Six) Hours As Needed for Heartburn.      • ascorbic acid (VITAMIN C) 500 MG tablet Take 500 mg by mouth Daily.      • aspirin 81 MG EC tablet Take 81 mg by mouth Daily.      • atorvastatin (LIPITOR) 20 MG tablet Take 1 tablet by mouth Every Night. 90 tablet 4    • bisacodyl (Dulcolax) 10 MG suppository Insert 10 mg into the rectum Daily As Needed for Constipation.      • calcium carbonate (TUMS) 500 MG chewable tablet Chew 2 tablets Every Night.      • citalopram (CeleXA) 40 MG tablet Take 40 mg by mouth Daily.      • clopidogrel (PLAVIX) 75 MG tablet Take 75 mg by mouth Daily.      • cyclobenzaprine (FLEXERIL) 5 MG tablet Take 1 tablet by mouth 3 (Three) Times a Day As Needed for Muscle Spasms.      • docusate sodium (COLACE) 100 MG capsule Take 200 mg by mouth Daily As Needed for Constipation.      • famotidine (PEPCID) 20 MG tablet Take 1 tablet by mouth Daily. 30 tablet 0    • gabapentin (NEURONTIN) 300 MG capsule Take 1 capsule by mouth Every 12 (Twelve) Hours. 24 capsule 0    • gentamicin (GARAMYCIN) 0.1 % cream APPLY TO EXIT SITE AS NEEDED      • Melatonin 5 MG capsule Take 5 mg by mouth Every Night.      • midodrine (PROAMATINE) 10 MG tablet Take 1 tablet by mouth 3 (Three) Times a Day Before Meals.      • Multiple Vitamins-Minerals (MULTIVITAMIN ADULT PO) Take 1 tablet by mouth Daily.      • nitroglycerin (NITROSTAT) 0.4 MG SL tablet 1 under the tongue as needed for angina, may repeat q5mins for up three doses 25 tablet 3    • oxyCODONE-acetaminophen (PERCOCET) 5-325 MG per tablet Take 1 tablet by mouth Every 4 (Four) Hours As Needed for Moderate Pain . 24 tablet 0    • sucralfate (CARAFATE) 1 g tablet Take 1 g by mouth 4 (Four) Times a Day Before Meals & at Bedtime. Dissolve 1 tablet in water to create a slurry and drink      • Sucroferric Oxyhydroxide (Velphoro) 500 MG chewable tablet Chew  2 tablets 3 (Three) Times a Day Before Meals.      • vitamin D3 125 MCG (5000 UT) capsule capsule Take 1 tablet by mouth Daily.          Medicines:  Current Facility-Administered Medications   Medication Dose Route Frequency Provider Last Rate Last Admin   • aspirin EC tablet 81 mg  81 mg Oral Daily Simon Coates MD   81 mg at 01/07/22 0813   • atorvastatin (LIPITOR) tablet 20 mg  20 mg Oral Nightly Simon Coates MD   20 mg at 01/06/22 2207   • citalopram (CeleXA) tablet 40 mg  40 mg Oral Daily Simon Coates MD   40 mg at 01/07/22 0812   • clopidogrel (PLAVIX) tablet 75 mg  75 mg Oral Daily Simon Coates MD   75 mg at 01/07/22 0813   • enoxaparin (LOVENOX) syringe 30 mg  30 mg Subcutaneous Q24H Simon Coates MD   30 mg at 01/07/22 0535   • epoetin jani-epbx (RETACRIT) injection 10,000 Units  10,000 Units Subcutaneous Once per day on Mon Wed Fri Davon Stallings MD   10,000 Units at 01/07/22 0813   • famotidine (PEPCID) tablet 20 mg  20 mg Oral Daily Simon Coates MD   20 mg at 01/07/22 0812   • gabapentin (NEURONTIN) capsule 300 mg  300 mg Oral Q12H iSmon Coates MD   300 mg at 01/07/22 0812   • heparin (porcine) injection 3,200 Units  3,200 Units Intracatheter PRN Davon Stallings MD   3,200 Units at 01/06/22 1940   • HYDROcodone-acetaminophen (NORCO) 5-325 MG per tablet 1 tablet  1 tablet Oral Q4H PRN Simon Coates MD       • HYDROmorphone (DILAUDID) injection 0.5 mg  0.5 mg Intravenous Q2H PRN Simon Coates MD   0.5 mg at 01/07/22 0953    And   • naloxone (NARCAN) injection 0.4 mg  0.4 mg Intravenous Q5 Min PRN Simon Coates MD       • midodrine (PROAMATINE) tablet 10 mg  10 mg Oral TID AC Simon Coates MD   10 mg at 01/07/22 0813   • ondansetron (ZOFRAN) tablet 4 mg  4 mg Oral Q6H PRN Simon Coates MD        Or   • ondansetron (ZOFRAN) injection 4 mg  4 mg Intravenous Q6H PRN Simon Coates  MD [place]       • oxyCODONE-acetaminophen (PERCOCET)  MG per tablet 1 tablet  1 tablet Oral Q6H PRN Simon Coates MD   1 tablet at 01/07/22 0532   • sodium chloride 0.9 % bolus 100 mL  100 mL Intravenous PRN Davon Stallings MD       • sodium chloride 0.9 % flush 10 mL  10 mL Intravenous Q12H Simon Coates MD   10 mL at 01/07/22 0813   • sodium chloride 0.9 % flush 10 mL  10 mL Intravenous PRN Simon Coates MD           Past Medical History:  Past Medical History:   Diagnosis Date   • Arthritis     LEFT SHOULDER   • CHF (congestive heart failure) ([facility])    • Chronic pericarditis 4/22/2021   • Collar bone fracture 12/2020    SLING PLACED TO HEAL   • Coronary artery disease     LEFT MAIN AND 3 OTHER AREAS--CABG SCHEDULED FOR APRIL 22, 2021   • Dyslipidemia 9/28/2020   • Elevated cholesterol    • End stage kidney disease (HCC)     currently HD (5/19/2021), hopes to return to PD    • Essential hypertension 11/2/2018   • Gastroesophageal reflux disease without esophagitis 4/28/2021   • GERD (gastroesophageal reflux disease)    • Gout    • History of transfusion    • Hyperlipidemia    • Hypertension    • Overweight (BMI 25.0-29.9) 4/22/2021   • Peritoneal dialysis status ([facility])     EVERY NIGHT FOR 10 HOURS, (5/19/2021 currently on hold)   • PONV (postoperative nausea and vomiting)    • Sleep apnea     RESOLVED   • Status post gastric bypass for obesity 4/22/2021   • Type 2 diabetes mellitus with kidney complication, with long-term current use of insulin ([facility]) 11/2/2018       Past Surgical History:  Past Surgical History:   Procedure Laterality Date   • ABOVE KNEE AMPUTATION Left 1/6/2022    Procedure: LEFT LOWER EXTREMITY AMPUTATION ABOVE KNEE;  Surgeon: Simon Coates MD;  Location: [facility] OR ;  Service: Vascular;  Laterality: Left;   • BELOW KNEE AMPUTATION Left 12/13/2021    Procedure: LEFT LOWER EXTREMITY AMPUTATION BELOW KNEE;  Surgeon: Simon Coates,  MD;  Location:  [facility] OR;  Service: Vascular;  Laterality: Left;   • BREAST BIOPSY Right     FATTY TUMOR   • CARDIAC CATHETERIZATION N/A 10/1/2019    Procedure: Left Heart Cath;  Surgeon: Srikanth Coker MD;  Location:  [facility] CATH INVASIVE LOCATION;  Service: Cardiology   • CARDIAC CATHETERIZATION N/A 7/14/2020    Procedure: Left Heart Cath;  Surgeon: Srikanth Coker MD;  Location:  [facility] CATH INVASIVE LOCATION;  Service: Cardiology;  Laterality: N/A;   • CARDIAC CATHETERIZATION N/A 3/23/2021    Procedure: Left Heart Cath;  Surgeon: Srikanth Coker MD;  Location:  [facility] CATH INVASIVE LOCATION;  Service: Cardiology;  Laterality: N/A;   • CHEST TUBE INSERTION Right 6/7/2021    Procedure: CHEST TUBE INSERTION;  Surgeon: Antony Wood MD;  Location:  [facility] OR;  Service: Cardiothoracic;  Laterality: Right;   • CORONARY ANGIOPLASTY WITH STENT PLACEMENT  2016    X 2   • CORONARY ARTERY BYPASS GRAFT N/A 4/22/2021    Procedure: CORONARY ARTERY BYPASS GRAFT X 3 WITH LEFT INTERNAL MAMMARY ARTERY, RIGHT LOWER EXTREMITY ENDOSCOPIC VEIN HARVEST, AND PERFUSION; TRANSESOPHAGEAL ECHOCARDIOGRAM;  Surgeon: Antony Wood MD;  Location:  [facility] OR;  Service: Cardiothoracic;  Laterality: N/A;   • EYE SURGERY Bilateral     IOC LENS IMPLANTS   • EYE SURGERY Bilateral     RETINAL SURGERY   • GALLBLADDER SURGERY     • GASTRIC BYPASS     • HYSTERECTOMY     • INSERTION HEMODIALYSIS CATHETER Right 4/1/2021    Procedure: HEMODIALYSIS CATHETER INSERTION;  Surgeon: Simon Coates MD;  Location: [facility] HYBRID OR 12;  Service: Vascular;  Laterality: Right;   • INSERTION HEMODIALYSIS CATHETER Right 12/20/2021    Procedure: HEMODIALYSIS CATHETER INSERTION;  Surgeon: Simon Coates MD;  Location: [facility] HYBRID OR 12;  Service: Vascular;  Laterality: Right;   • INSERTION PERITONEAL DIALYSIS CATHETER     • OOPHORECTOMY Bilateral    • SINUS SURGERY     • SINUS SURGERY  07/1980   • TUBAL ABDOMINAL LIGATION         Family  History  Family History   Problem Relation Age of Onset   • Hypertension Mother    • Lung disease Mother    • Heart disease Father    • Diabetes Father    • Abnormal EKG Father    • Breast cancer Neg Hx        Social History  Social History     Socioeconomic History   • Marital status:    Tobacco Use   • Smoking status: Former Smoker     Packs/day: 0.00     Years: 2.00     Pack years: 0.00     Types: Cigarettes     Quit date:      Years since quittin.0   • Smokeless tobacco: Never Used   Vaping Use   • Vaping Use: Never used   Substance and Sexual Activity   • Alcohol use: Not Currently     Comment: haven't drank in 3 years ( 2018)    • Drug use: No   • Sexual activity: Defer       Review of Systems:  History obtained from chart review and the patient  General ROS: No fever or chills  Respiratory ROS: No cough, shortness of breath, wheezing  Cardiovascular ROS: No chest pain or palpitations  Gastrointestinal ROS: No abdominal pain or melena  Genito-Urinary ROS: No dysuria or hematuria  Psych ROS: No anxiety and depression  14 point ROS reviewed with the patient and negative except as noted above and in the HPI unless unable to obtain.    Objective:  Patient Vitals for the past 24 hrs:   BP Temp Temp src Pulse Resp SpO2 Weight   22 0753 150/65 98.1 °F (36.7 °C) Oral 92 16 100 % --   22 0505 148/64 98 °F (36.7 °C) Oral 88 16 100 % --   22 2345 133/66 98.7 °F (37.1 °C) Oral 89 16 100 % --   22 2053 137/64 98.5 °F (36.9 °C) Oral 87 16 97 % --   22 1945 117/67 98.1 °F (36.7 °C) Oral 86 18 -- 68.3 kg (150 lb 8 oz)   22 1250 97/53 97.4 °F (36.3 °C) Axillary 72 16 100 % --   22 1220 115/54 97.6 °F (36.4 °C) Oral 72 15 100 % --   22 1150 119/56 97.8 °F (36.6 °C) Axillary 72 12 100 % --   22 1141 -- 98.5 °F (36.9 °C) Temporal 73 -- 100 % --   22 1116 129/63 -- -- 74 15 99 % --   22 1111 -- -- -- 71 -- 100 % --   22 1106 126/65 -- -- 73 15  100 % --   01/06/22 1056 114/55 -- -- 67 -- 99 % --   01/06/22 1051 -- -- -- 72 -- 94 % --   01/06/22 1046 117/60 -- -- 68 -- 94 % --   01/06/22 1041 -- -- -- 70 -- 97 % --   01/06/22 1036 106/73 -- -- 68 -- 98 % --       Intake/Output Summary (Last 24 hours) at 1/7/2022 1030  Last data filed at 1/6/2022 1945  Gross per 24 hour   Intake --   Output 500 ml   Net -500 ml     General: awake/alert   Chest:  clear to auscultation bilaterally without respiratory distress  CVS: regular rate and rhythm  Abdominal: soft, nontender, positive bowel sounds  Extremities: left AKA and no cyanosis or edema  Skin: warm and dry without rash      Labs:  Results from last 7 days   Lab Units 01/07/22  0719 01/06/22  0646 01/05/22  1515   WBC 10*3/mm3 9.29 4.96 6.45   HEMOGLOBIN g/dL 7.9* 8.2* 8.8*   HEMATOCRIT % 26.4* 26.8* 28.4*   PLATELETS 10*3/mm3 289 269 300         Results from last 7 days   Lab Units 01/07/22  0719 01/06/22  0530 01/05/22  1515   SODIUM mmol/L 136 138 135*   POTASSIUM mmol/L 4.6 3.8 3.6   CHLORIDE mmol/L 100 97* 94*   CO2 mmol/L 23.0 34.0* 34.0*   BUN mg/dL 8 17 13   CREATININE mg/dL 2.95* 5.29* 4.43*   CALCIUM mg/dL 8.1* 8.2* 8.4*   BILIRUBIN mg/dL  --   --  0.2   ALK PHOS U/L  --   --  123*   ALT (SGPT) U/L  --   --  6   AST (SGOT) U/L  --   --  22   GLUCOSE mg/dL 103* 82 117*       Radiology:   Imaging Results (Last 72 Hours)     ** No results found for the last 72 hours. **          Culture:  No results found for: BLOODCX, URINECX, WOUNDCX, MRSACX, RESPCX, STOOLCX      Assessment   1.  End stage renal disease on HD TTS  2.  Type 2 diabetes with nephropathy  3.  Hypertension  4.  S/p left AKA on 1/06  5.  Anemia of CKD    Plan:  1.  Continue routine hemodialysis. Next due on 1/08  2.  Monitor labs      Dick Fisher, APRN  1/7/2022  10:30 CST

## 2022-01-07 NOTE — PLAN OF CARE
Goal Outcome Evaluation:              Outcome Summary: OT eval completed. Pt c/o severe pain upon entering room, rating it 6/10. IV pain med admin just prior to mobility, increased drowsiness with increased time. Significantly increased time to come to EOB. Stood x2 with maxAx2. Anticipate maxA for LB dressing and toileting tasks. OT indicated to address strength, endurance, and balance to increase independence and decrease fall risk. Recommend d/c SNF.

## 2022-01-07 NOTE — PLAN OF CARE
Goal Outcome Evaluation:  Plan of Care Reviewed With: patient        Progress: no change  Outcome Summary: Pt post op LAKA.  Ace wrap drsg intact.  Went to dialysis yesterday and 500 ml was taken off. Pt stated she doesn't void.   Has right IJ permacath.  Pt c/o pain frequently, medicated with Dilaudid and percocet.  Turn q2.

## 2022-01-07 NOTE — PLAN OF CARE
Goal Outcome Evaluation:  Plan of Care Reviewed With: patient        Progress: no change  Outcome Summary: The patient presents drowsy with slow processing. She is oriented to person, place, and situation. She requires signfiicant increased time to initiate and complete any movement task. She was able to sit EOB and stand twice. She will benefit from continued PT to work on increased strength, improved processing for motor tasks, standing, and transfers. Recommend discharge back to SNF.

## 2022-01-07 NOTE — CASE MANAGEMENT/SOCIAL WORK
Discharge Planning Assessment  [facility]     Patient Name: Jennifer Salcido  MRN: [id number]  Today's Date: 1/7/2022    Admit Date: 1/5/2022     Discharge Needs Assessment     Row Name 01/07/22 1016       Living Environment    Lives With facility resident    Name(s) of Who Lives With Patient Pt is from [facility]    Current Living Arrangements extended care facility    Primary Care Provided by other (see comments)    Provides Primary Care For no one, unable/limited ability to care for self    Quality of Family Relationships supportive; helpful; involved    Able to Return to Prior Arrangements yes       Resource/Environmental Concerns    Resource/Environmental Concerns none       Transition Planning    Patient/Family Anticipates Transition to inpatient rehabilitation facility    Patient/Family Anticipated Services at Transition skilled nursing       Discharge Needs Assessment    Current Outpatient/Agency/Support Group skilled nursing facility    Outpatient/Agency/Support Group Needs skilled nursing facility    Discharge Facility/Level of Care Needs nursing facility, skilled               Discharge Plan     Row Name 01/07/22 1018       Plan    Plan [facility]    Patient/Family in Agreement with Plan yes    Plan Comments Pt is a resident of [facility]. Per Sarah lopez, bed on courtesy bed hold at SNF level care.  [phone].              Continued Care and Services - Admitted Since 1/5/2022    Coordination has not been started for this encounter.     Selected Continued Care - Prior Encounters Includes selections from prior encounters from 10/7/2021 to 1/7/2022    Discharged on 12/23/2021 Admission date: 12/9/2021 - Discharge disposition: Skilled Nursing Facility (DC - External)    Destination     Service Provider Selected Services Address Phone Fax Patient Preferred    [facility]  [facility] [address] [facility] [address] [phone] --                       Demographic  Summary    No documentation.                Functional Status    No documentation.                Psychosocial    No documentation.                Abuse/Neglect    No documentation.                Legal    No documentation.                Substance Abuse    No documentation.                Patient Forms    No documentation.                   KELSEY Manuel

## 2022-01-07 NOTE — PROGRESS NOTES
LOS: 2 days   Patient Care Team:  Akhil Golden DO as PCP - General (Family Medicine)  Feng Sarabia MD as Referring Physician (Pediatrics)  Srikanth Coker MD as Cardiologist (Cardiology)    Chief Complaint: Postoperative day #1 status post left above-knee amputation    Subjective     Patient seen and examined at bedside. No acute events overnight. Postoperative day #1 status post left above-knee amputation. She previously undergone a left below-knee amputation due to severe PAD and rest pain in the left foot. Given her significant PAD as well as some local trauma to the amputation stump she had difficulties with healing and some necrosis of the posterior flap. Ultimately when seen in the office it was felt that the flap would not be viable and she required conversion to a left above-knee amputation. She underwent that procedure yesterday. She also is on chronic hemodialysis and nephrology was consulted and she underwent routine hemodialysis yesterday via her right internal jugular vein permacath with no issues. This morning she continues to complain of some pain at the amputation stump site but is relieved with pain medication. We have discussed giving her PCA for pain control however when she had the BKA she felt that it made her very sleepy and so she did not want the PCA. As such she continues on p.o. Percocet as well as IV Dilaudid for breakthrough pain. I offered her PCA again this morning but she again declined. Otherwise she remains hemodynamically stable and hemoglobin is 7.9 from 8.2 preop. She has no other acute complaints.    Objective     Vital Signs  Temp:  [97.4 °F (36.3 °C)-98.7 °F (37.1 °C)] 98.1 °F (36.7 °C)  Heart Rate:  [64-92] 92  Resp:  [12-18] 16  BP: ()/(53-73) 150/65    Physical Exam  Vitals reviewed.   Constitutional:       Appearance: Normal appearance.   HENT:      Head: Normocephalic and atraumatic.      Nose: Nose normal.      Mouth/Throat:      Mouth: Mucous  membranes are moist.   Eyes:      Extraocular Movements: Extraocular movements intact.      Pupils: Pupils are equal, round, and reactive to light.   Cardiovascular:      Rate and Rhythm: Normal rate and regular rhythm.      Pulses:           Carotid pulses are 2+ on the right side and 2+ on the left side.       Radial pulses are 2+ on the right side and 2+ on the left side.        Femoral pulses are 2+ on the right side and 2+ on the left side.       Popliteal pulses are 2+ on the right side.        Dorsalis pedis pulses are 1+ on the right side.        Posterior tibial pulses are detected w/ Doppler on the right side.      Comments: Left above-knee amputation stump with dressing in place. It is clean/dry/intact. She has some mild tenderness to palpation of the amputation stump.    Right internal jugular vein permacath is in place. Site is clean/dry/intact.  Pulmonary:      Effort: Pulmonary effort is normal. No respiratory distress.   Abdominal:      General: There is no distension.      Palpations: Abdomen is soft. There is no mass.      Tenderness: There is no abdominal tenderness.   Musculoskeletal:         General: Normal range of motion.      Cervical back: Normal range of motion and neck supple.   Skin:     General: Skin is warm and dry.      Capillary Refill: Capillary refill takes less than 2 seconds.   Neurological:      General: No focal deficit present.      Mental Status: She is alert and oriented to person, place, and time.   Psychiatric:         Mood and Affect: Mood normal.         Behavior: Behavior normal.         Thought Content: Thought content normal.         Judgment: Judgment normal.         Laboratory Data:   Results from last 7 days   Lab Units 01/07/22  0719 01/06/22  0646 01/05/22  1515   WBC 10*3/mm3 9.29 4.96 6.45   HEMOGLOBIN g/dL 7.9* 8.2* 8.8*   HEMATOCRIT % 26.4* 26.8* 28.4*   PLATELETS 10*3/mm3 289 269 300       Results from last 7 days   Lab Units 01/07/22  0719 01/06/22  0530  01/05/22  1515   SODIUM mmol/L 136 138 135*   POTASSIUM mmol/L 4.6 3.8 3.6   CHLORIDE mmol/L 100 97* 94*   CO2 mmol/L 23.0 34.0* 34.0*   BUN mg/dL 8 17 13   CREATININE mg/dL 2.95* 5.29* 4.43*   CALCIUM mg/dL 8.1* 8.2* 8.4*   BILIRUBIN mg/dL  --   --  0.2   ALK PHOS U/L  --   --  123*   ALT (SGPT) U/L  --   --  6   AST (SGOT) U/L  --   --  22   GLUCOSE mg/dL 103* 82 117*     Results from last 7 days   Lab Units 01/05/22  1515   INR  1.10*   APTT seconds 40.9*            Medication Review: Reviewed, no changes.    Assessment/Plan       Amputation stump necrosis (HCC)    Pre-op evaluation      66-year-old female postoperative day #1 status post conversion of left below-knee amputation to above-knee amputation due to necrosis of the posterior flap and poor healing.  -Pain control as needed. Currently with Percocet and IV Dilaudid for breakthrough. PCA was offered but she declined as it made her feel very sleepy and somnolent on her last admission.  -Continue routine hemodialysis per nephrology. Appreciate their assistance.  -PT/OT eval today. Okay to be out of bed as tolerated with PT.  -Diet as tolerated  -Case management evaluation for placement at discharge.  -We will plan to take down her stump dressing likely on Monday    Electronically signed by Simon Coates MD, 01/07/22, 8:38 AM CST.

## 2022-01-07 NOTE — PLAN OF CARE
Goal Outcome Evaluation:  Plan of Care Reviewed With: patient        Progress: no change  Outcome Summary: pt received post op. c/o pain, med x2 this shift. no distress noted for period of time pt was on floor. she has beed in dialysis for some time now. awaiting return.

## 2022-01-08 NOTE — THERAPY TREATMENT NOTE
Acute Care - Physical Therapy Treatment Note  [facility]     Patient Name: Jennifer Salcido  : 1955  MRN: [id number]  Today's Date: 2022      Visit Dx:     ICD-10-CM ICD-9-CM   1. Pre-op evaluation  Z01.818 V72.84   2. Amputation stump necrosis (HCC)  T87.50 997.69   3. Impaired mobility  Z74.09 799.89   4. Decreased activities of daily living (ADL)  Z78.9 V49.89     Patient Active Problem List   Diagnosis   • ESRD on peritoneal dialysis (HCC)   • Coronary artery disease involving native coronary artery of native heart without angina pectoris   • Type 2 diabetes mellitus, without long-term current use of insulin (HCC)   • Chronic anemia   • Dyslipidemia   • Essential hypertension   • Hypokalemia   • Ischemic left leg   • Peripheral vascular disease (HCC)   • Small vessel arterial disease due to type 2 diabetes mellitus (HCC)   • Hyponatremia   • Abdominal pain   • Abnormality of albumin   • Acidosis, metabolic   • Allergy, unspecified, initial encounter   • Anaphylactic shock, unspecified, initial encounter   • At risk for fluid volume imbalance   • Atherosclerosis of native artery of both lower extremities with intermittent claudication ([facility])   • Dependence on renal dialysis (HCC)   • Encounter for adequacy testing for peritoneal dialysis ([facility])   • ESRD on peritoneal dialysis ([facility])   • Gas gangrene ([facility])   • Gastroesophageal reflux disease   • Hyperlipidemia   • Iron deficiency anemia   • Loss of consciousness ([facility])   • Moderate protein-calorie malnutrition (HCC)   • Hematemesis with nausea   • Obstructive sleep apnea syndrome   • Osteoarthritis   • Disorder of phosphorus metabolism, unspecified   • Pain, unspecified   • Dialysis-associated peritonitis (HCC)   • Pleural effusion, not elsewhere classified   • Renal osteodystrophy   • Secondary hyperparathyroidism of renal origin ([facility])   • Seizure disorder ([facility])   • Shortness of breath   • Sinus infection   • Type 2 diabetes mellitus with diabetic  neuropathy, unspecified ([facility])   • Pre-op evaluation   • Amputation stump necrosis ([facility])     Past Medical History:   Diagnosis Date   • Arthritis     LEFT SHOULDER   • CHF (congestive heart failure) ([facility])    • Chronic pericarditis 4/22/2021   • Collar bone fracture 12/2020    SLING PLACED TO HEAL   • Coronary artery disease     LEFT MAIN AND 3 OTHER AREAS--CABG SCHEDULED FOR APRIL 22, 2021   • Dyslipidemia 9/28/2020   • Elevated cholesterol    • End stage kidney disease ([facility])     currently HD (5/19/2021), hopes to return to [facility]    • Essential hypertension 11/2/2018   • Gastroesophageal reflux disease without esophagitis 4/28/2021   • GERD (gastroesophageal reflux disease)    • Gout    • History of transfusion    • Hyperlipidemia    • Hypertension    • Overweight (BMI 25.0-29.9) 4/22/2021   • Peritoneal dialysis status ([facility])     EVERY NIGHT FOR 10 HOURS, (5/19/2021 currently on hold)   • PONV (postoperative nausea and vomiting)    • Sleep apnea     RESOLVED   • Status post gastric bypass for obesity 4/22/2021   • Type 2 diabetes mellitus with kidney complication, with long-term current use of insulin ([facility]) 11/2/2018     Past Surgical History:   Procedure Laterality Date   • ABOVE KNEE AMPUTATION Left 1/6/2022    Procedure: LEFT LOWER EXTREMITY AMPUTATION ABOVE KNEE;  Surgeon: Simon Coates MD;  Location: [facility] HYBRID OR 12;  Service: Vascular;  Laterality: Left;   • BELOW KNEE AMPUTATION Left 12/13/2021    Procedure: LEFT LOWER EXTREMITY AMPUTATION BELOW KNEE;  Surgeon: Simon Coates MD;  Location: [facility] OR;  Service: Vascular;  Laterality: Left;   • BREAST BIOPSY Right     FATTY TUMOR   • CARDIAC CATHETERIZATION N/A 10/1/2019    Procedure: Left Heart Cath;  Surgeon: Srikanth Coker MD;  Location:  [facility] CATH INVASIVE LOCATION;  Service: Cardiology   • CARDIAC CATHETERIZATION N/A 7/14/2020    Procedure: Left Heart Cath;  Surgeon: Srikanth Coker MD;  Location:  [facility] CATH INVASIVE LOCATION;   Service: Cardiology;  Laterality: N/A;   • CARDIAC CATHETERIZATION N/A 3/23/2021    Procedure: Left Heart Cath;  Surgeon: Srikanth Coker MD;  Location:  [facility] CATH INVASIVE LOCATION;  Service: Cardiology;  Laterality: N/A;   • CHEST TUBE INSERTION Right 6/7/2021    Procedure: CHEST TUBE INSERTION;  Surgeon: Antony Wood MD;  Location:  [facility] OR;  Service: Cardiothoracic;  Laterality: Right;   • CORONARY ANGIOPLASTY WITH STENT PLACEMENT  2016    X 2   • CORONARY ARTERY BYPASS GRAFT N/A 4/22/2021    Procedure: CORONARY ARTERY BYPASS GRAFT X 3 WITH LEFT INTERNAL MAMMARY ARTERY, RIGHT LOWER EXTREMITY ENDOSCOPIC VEIN HARVEST, AND PERFUSION; TRANSESOPHAGEAL ECHOCARDIOGRAM;  Surgeon: Antony Wood MD;  Location:  [facility] OR;  Service: Cardiothoracic;  Laterality: N/A;   • EYE SURGERY Bilateral     IOC LENS IMPLANTS   • EYE SURGERY Bilateral     RETINAL SURGERY   • GALLBLADDER SURGERY     • GASTRIC BYPASS     • HYSTERECTOMY     • INSERTION HEMODIALYSIS CATHETER Right 4/1/2021    Procedure: HEMODIALYSIS CATHETER INSERTION;  Surgeon: Simon Coates MD;  Location:  [facility] HYBRID OR 12;  Service: Vascular;  Laterality: Right;   • INSERTION HEMODIALYSIS CATHETER Right 12/20/2021    Procedure: HEMODIALYSIS CATHETER INSERTION;  Surgeon: Simon Coates MD;  Location:  [facility] HYBRID OR 12;  Service: Vascular;  Laterality: Right;   • INSERTION PERITONEAL DIALYSIS CATHETER     • OOPHORECTOMY Bilateral    • SINUS SURGERY     • SINUS SURGERY  07/1980   • TUBAL ABDOMINAL LIGATION       PT Assessment (last 12 hours)     PT Evaluation and Treatment     Row Name 01/08/22 1430 01/08/22 1000       Physical Therapy Time and Intention    Document Type therapy note (daily note)  -AB --    Mode of Treatment physical therapy  -AB --    Session Not Performed -- patient unavailable for treatment  -AB    Comment, Session Not Performed -- at dialysis  -AB    Comment attempted to stand x 2  -AB --    Row Name 01/08/22 1430           General Information    Existing Precautions/Restrictions fall  L AKA, periteneal dialysis line  -AB     Row Name 01/08/22 1430          Bed Mobility    Scooting/Bridging [place] (Bed Mobility) dependent (less than 25% patient effort)  -AB     Supine-Sit [place] (Bed Mobility) maximum assist (25% patient effort); dependent (less than 25% patient effort)  -AB     Sit-Supine [place] (Bed Mobility) maximum assist (25% patient effort)  -AB     Row Name 01/08/22 1430          Transfers    Sit-Stand [place] (Transfers) maximum assist (25% patient effort); 2 person assist  -AB     Row Name 01/08/22 1430          Sit-Stand Transfer    Assistive Device (Sit-Stand Transfers) walker, front-wheeled  -AB     Row Name 01/08/22 1430          Balance    Static Sitting Balance mild impairment; sitting, edge of bed  -AB     Row Name 01/08/22 1430          Motor Skills    Therapeutic Exercise aerobic  -AB     Row Name 01/08/22 1430          Aerobic Exercise    Comment, Aerobic Exercise (Therapeutic Exercise) R LE AROM sitting 15 reps  -AB     Row Name             Wound 01/06/22 0905 Left distal thigh Amputation stump    Wound - Properties Group Placement Date: 01/06/22  -AC Placement Time: 0905  -AC Present on Hospital Admission: N  -AC Side: Left  -AC Orientation: distal  -AC Location: thigh  -AC Primary Wound Type: Amputation s  -AC Additional Comments: xeroform, fluffs, kerlix, ace wrap  -AC     Retired Wound - Properties Group Date first assessed: 01/06/22  -AC Time first assessed: 0905  -AC Present on Hospital Admission: N  -AC Side: Left  -AC Location: thigh  -AC Primary Wound Type: Amputation s  -AC Additional Comments: xeroform, fluffs, kerlix, ace wrap  -AC     Row Name             Wound 12/23/21 0700 Right gluteal Blisters    Wound - Properties Group Placement Date: 12/23/21  -SP Placement Time: 0700  -SP Side: Right  -SP Location: gluteal  -SP Primary Wound Type: Blisters  -SP     Retired Wound -  Properties Group Date first assessed: 12/23/21  -SP Time first assessed: 0700  -SP Side: Right  -SP Location: gluteal  -SP Primary Wound Type: Blisters  -SP     Row Name 01/08/22 1430          Positioning and Restraints    Pre-Treatment Position in bed  -AB     Post Treatment Position bed  -AB     In Bed fowlers; call light within reach  -AB           User Key  (r) = Recorded By, (t) = Taken By, (c) = Cosigned By    Initials Name Provider Type    AB Radha Abarca PTA Physical Therapy Assistant    Katja Beckford, RN Registered Nurse    Jaime Cook RN Registered Nurse                Physical Therapy Education                 Title: PT OT SLP Therapies (In Progress)     Topic: Physical Therapy (In Progress)     Point: Mobility training (Done)     Learning Progress Summary           Patient Acceptance, E, VU by MS at 1/7/2022 1040    Comment: role of PT in her care                   Point: Home exercise program (Not Started)     Learner Progress:  Not documented in this visit.          Point: Body mechanics (Not Started)     Learner Progress:  Not documented in this visit.          Point: Precautions (Not Started)     Learner Progress:  Not documented in this visit.                      User Key     Initials Effective Dates Name Provider Type Discipline    MS 06/19/18 -  Analisa Hinson, PT, DPT, NCS Physical Therapist PT              PT Recommendation and Plan             Time Calculation:    PT Charges     Row Name 01/08/22 1430             Time Calculation    Start Time 1430  -AB      Stop Time 1455  -AB      Time Calculation (min) 25 min  -AB      PT Received On 01/08/22  -AB              Time Calculation- PT    Total Timed Code Minutes- PT 25 minute(s)  -AB            User Key  (r) = Recorded By, (t) = Taken By, (c) = Cosigned By    Initials Name Provider Type    Radha Nielsen PTA Physical Therapy Assistant              Therapy Charges for Today     Code Description Service Date Service  Provider Modifiers Qty    [id number] HC PT THERAPEUTIC ACT EA 15 MIN 1/8/2022 Radha Abarca, PTA GP 2          PT G-Codes  Outcome Measure Options: AM-PAC 6 Clicks Daily Activity (OT)  AM-PAC 6 Clicks Score (PT): 9  AM-PAC 6 Clicks Score (OT): 14    Radha Abarca, OSMEL  1/8/2022

## 2022-01-08 NOTE — PROGRESS NOTES
Nephrology ([facility]) Progress Note      Patient:  Jennifer Salcido  YOB: 1955  Date of Service: 1/8/2022  MRN: [id number]   Acct: [id number]   Primary Care Physician: Akhil Golden DO  Advance Directive:   Code Status and Medical Interventions:   Ordered at: 01/05/22 1507     Level Of Support Discussed With:    Patient     Code Status (Patient has no pulse and is not breathing):    CPR (Attempt to Resuscitate)     Medical Interventions (Patient has pulse or is breathing):    Full Support     Admit Date: 1/5/2022       Hospital Day: 3  Referring Provider: No Known Provider      Patient personally seen and examined.  Complete chart including Consults, Notes, Operative Reports, Labs, Cardiology, and Radiology studies reviewed as able.        Subjective:  Jennifer Salcido is a 66 y.o. female for whom we were consulted for evaluation and treatment of end stage renal disease on hemodialysis. Previously was on peritoneal dialysis but had switched to hemodialysis in order to facilitate placement at nursing home.  History of hypertension, CAD with prior CABG, PVD, and hyperlipidemia. Recently underwent left BKA on 12/13. Continued to have poor healing and drainage from stump. Admitted on 1/06 for left above knee amputation. She had dialysis later on 1/06 following her surgery. Tolerated well    Today is awake, alert. Intermittent pain at surgical site. She is status post dialysis today.    Allergies:  Codeine, Demerol [meperidine], Levaquin [levofloxacin], Lisinopril, Morphine, Sulfasalazine, Ultram [tramadol], and Trental [pentoxifylline]    Home Meds:  Medications Prior to Admission   Medication Sig Dispense Refill Last Dose   • acetaminophen (TYLENOL) 325 MG tablet Take 650 mg by mouth Every 4 (Four) Hours As Needed for Mild Pain .      • cephalexin (KEFLEX) 500 MG capsule Take 500 mg by mouth 2 (Two) Times a Day.      • allopurinol (ZYLOPRIM) 100 MG tablet Take 100 mg by  mouth Daily.      • aluminum-magnesium hydroxide-simethicone (MAALOX MAX) 400-400-40 MG/5ML suspension Take 15 mL by mouth Every 6 (Six) Hours As Needed for Heartburn.      • ascorbic acid (VITAMIN C) 500 MG tablet Take 500 mg by mouth Daily.      • aspirin 81 MG EC tablet Take 81 mg by mouth Daily.      • atorvastatin (LIPITOR) 20 MG tablet Take 1 tablet by mouth Every Night. 90 tablet 4    • bisacodyl (Dulcolax) 10 MG suppository Insert 10 mg into the rectum Daily As Needed for Constipation.      • calcium carbonate (TUMS) 500 MG chewable tablet Chew 2 tablets Every Night.      • citalopram (CeleXA) 40 MG tablet Take 40 mg by mouth Daily.      • clopidogrel (PLAVIX) 75 MG tablet Take 75 mg by mouth Daily.      • cyclobenzaprine (FLEXERIL) 5 MG tablet Take 1 tablet by mouth 3 (Three) Times a Day As Needed for Muscle Spasms.      • docusate sodium (COLACE) 100 MG capsule Take 200 mg by mouth Daily As Needed for Constipation.      • famotidine (PEPCID) 20 MG tablet Take 1 tablet by mouth Daily. 30 tablet 0    • gabapentin (NEURONTIN) 300 MG capsule Take 1 capsule by mouth Every 12 (Twelve) Hours. 24 capsule 0    • gentamicin (GARAMYCIN) 0.1 % cream APPLY TO EXIT SITE AS NEEDED      • Melatonin 5 MG capsule Take 5 mg by mouth Every Night.      • midodrine (PROAMATINE) 10 MG tablet Take 1 tablet by mouth 3 (Three) Times a Day Before Meals.      • Multiple Vitamins-Minerals (MULTIVITAMIN ADULT PO) Take 1 tablet by mouth Daily.      • nitroglycerin (NITROSTAT) 0.4 MG SL tablet 1 under the tongue as needed for angina, may repeat q5mins for up three doses 25 tablet 3    • oxyCODONE-acetaminophen (PERCOCET) 5-325 MG per tablet Take 1 tablet by mouth Every 4 (Four) Hours As Needed for Moderate Pain . 24 tablet 0    • sucralfate (CARAFATE) 1 g tablet Take 1 g by mouth 4 (Four) Times a Day Before Meals & at Bedtime. Dissolve 1 tablet in water to create a slurry and drink      • Sucroferric Oxyhydroxide (Velphoro) 500 MG  chewable tablet Chew 2 tablets 3 (Three) Times a Day Before Meals.      • vitamin D3 125 MCG (5000 UT) capsule capsule Take 1 tablet by mouth Daily.          Medicines:  Current Facility-Administered Medications   Medication Dose Route Frequency Provider Last Rate Last Admin   • aspirin EC tablet 81 mg  81 mg Oral Daily Simon Coates MD   81 mg at 01/08/22 0832   • atorvastatin (LIPITOR) tablet 20 mg  20 mg Oral Nightly Simon Coates MD   20 mg at 01/07/22 2110   • bisacodyl (DULCOLAX) EC tablet 10 mg  10 mg Oral Daily PRN Simon Coates MD   10 mg at 01/08/22 1511   • citalopram (CeleXA) tablet 40 mg  40 mg Oral Daily Simon Coates MD   40 mg at 01/08/22 0833   • clopidogrel (PLAVIX) tablet 75 mg  75 mg Oral Daily Simon Coates MD   75 mg at 01/08/22 0833   • dextrose (D50W) (25 g/50 mL) IV injection 25 g  25 g Intravenous Q15 Min PRN Simon Coates MD       • dextrose (GLUTOSE) oral gel 15 g  15 g Oral Q15 Min PRN Simon Coates MD       • docusate sodium (COLACE) capsule 100 mg  100 mg Oral BID Simon Coates MD   100 mg at 01/08/22 1459   • enoxaparin (LOVENOX) syringe 30 mg  30 mg Subcutaneous Q24H Simon Coates MD   30 mg at 01/08/22 0533   • epoetin jani-epbx (RETACRIT) injection 10,000 Units  10,000 Units Subcutaneous Once per day on Mon Wed Fri Davon Stallings MD   10,000 Units at 01/07/22 0813   • famotidine (PEPCID) tablet 20 mg  20 mg Oral Daily Simon Coates MD   20 mg at 01/08/22 0833   • gabapentin (NEURONTIN) capsule 300 mg  300 mg Oral Q12H Simon Coates MD   300 mg at 01/08/22 0840   • glucagon (human recombinant) (GLUCAGEN DIAGNOSTIC) injection 1 mg  1 mg Subcutaneous Q15 Min PRN Simon Coates MD       • heparin (porcine) injection 3,200 Units  3,200 Units Intracatheter PRN Davon Stallings MD   3,200 Units at 01/06/22 1940   • HYDROcodone-acetaminophen (NORCO) 5-325 MG per  tablet 1 tablet  1 tablet Oral Q4H PRN Simon Coates MD   1 tablet at 01/08/22 1118   • HYDROmorphone (DILAUDID) injection 0.5 mg  0.5 mg Intravenous Q2H PRN Simon Coates MD   0.5 mg at 01/08/22 1458    And   • naloxone (NARCAN) injection 0.4 mg  0.4 mg Intravenous Q5 Min PRN Simon Coates MD       • insulin lispro (humaLOG) injection 0-9 Units  0-9 Units Subcutaneous TID AC Simon Coates MD       • midodrine (PROAMATINE) tablet 10 mg  10 mg Oral TID AC Simon Coates MD   10 mg at 01/08/22 1728   • ondansetron (ZOFRAN) tablet 4 mg  4 mg Oral Q6H PRN Simon Coates MD        Or   • ondansetron (ZOFRAN) injection 4 mg  4 mg Intravenous Q6H PRN Simon Coates MD       • oxyCODONE-acetaminophen (PERCOCET)  MG per tablet 1 tablet  1 tablet Oral Q6H PRN Simon Coates MD   1 tablet at 01/08/22 0840   • sodium chloride 0.9 % bolus 100 mL  100 mL Intravenous PRN Davon Stallings MD       • sodium chloride 0.9 % flush 10 mL  10 mL Intravenous Q12H Simon Coates MD   10 mL at 01/08/22 0830   • sodium chloride 0.9 % flush 10 mL  10 mL Intravenous PRN Simon Coates MD           Past Medical History:  Past Medical History:   Diagnosis Date   • Arthritis     LEFT SHOULDER   • CHF (congestive heart failure) (HCC)    • Chronic pericarditis 4/22/2021   • Collar bone fracture 12/2020    SLING PLACED TO HEAL   • Coronary artery disease     LEFT MAIN AND 3 OTHER AREAS--CABG SCHEDULED FOR APRIL 22, 2021   • Dyslipidemia 9/28/2020   • Elevated cholesterol    • End stage kidney disease (HCC)     currently HD (5/19/2021), hopes to return to PD    • Essential hypertension 11/2/2018   • Gastroesophageal reflux disease without esophagitis 4/28/2021   • GERD (gastroesophageal reflux disease)    • Gout    • History of transfusion    • Hyperlipidemia    • Hypertension    • Overweight (BMI 25.0-29.9) 4/22/2021   • Peritoneal dialysis status (HCC)      EVERY NIGHT FOR 10 HOURS, (5/19/2021 currently on hold)   • PONV (postoperative nausea and vomiting)    • Sleep apnea     RESOLVED   • Status post gastric bypass for obesity 4/22/2021   • Type 2 diabetes mellitus with kidney complication, with long-term current use of insulin ([facility]) 11/2/2018       Past Surgical History:  Past Surgical History:   Procedure Laterality Date   • ABOVE KNEE AMPUTATION Left 1/6/2022    Procedure: LEFT LOWER EXTREMITY AMPUTATION ABOVE KNEE;  Surgeon: Simon Coates MD;  Location:  [facility] HYBRID OR 12;  Service: Vascular;  Laterality: Left;   • BELOW KNEE AMPUTATION Left 12/13/2021    Procedure: LEFT LOWER EXTREMITY AMPUTATION BELOW KNEE;  Surgeon: Simon Coates MD;  Location:  [facility] OR;  Service: Vascular;  Laterality: Left;   • BREAST BIOPSY Right     FATTY TUMOR   • CARDIAC CATHETERIZATION N/A 10/1/2019    Procedure: Left Heart Cath;  Surgeon: Srikanth Coker MD;  Location:  [facility] CATH INVASIVE LOCATION;  Service: Cardiology   • CARDIAC CATHETERIZATION N/A 7/14/2020    Procedure: Left Heart Cath;  Surgeon: Srikanth Coker MD;  Location:  [facility] CATH INVASIVE LOCATION;  Service: Cardiology;  Laterality: N/A;   • CARDIAC CATHETERIZATION N/A 3/23/2021    Procedure: Left Heart Cath;  Surgeon: Srikanth Coker MD;  Location:  [facility] CATH INVASIVE LOCATION;  Service: Cardiology;  Laterality: N/A;   • CHEST TUBE INSERTION Right 6/7/2021    Procedure: CHEST TUBE INSERTION;  Surgeon: Antony Wood MD;  Location: [facility] OR;  Service: Cardiothoracic;  Laterality: Right;   • CORONARY ANGIOPLASTY WITH STENT PLACEMENT  2016    X 2   • CORONARY ARTERY BYPASS GRAFT N/A 4/22/2021    Procedure: CORONARY ARTERY BYPASS GRAFT X 3 WITH LEFT INTERNAL MAMMARY ARTERY, RIGHT LOWER EXTREMITY ENDOSCOPIC VEIN HARVEST, AND PERFUSION; TRANSESOPHAGEAL ECHOCARDIOGRAM;  Surgeon: Antony Wood MD;  Location: [facility] OR;  Service: Cardiothoracic;  Laterality: N/A;   • EYE SURGERY Bilateral      IOC LENS IMPLANTS   • EYE SURGERY Bilateral     RETINAL SURGERY   • GALLBLADDER SURGERY     • GASTRIC BYPASS     • HYSTERECTOMY     • INSERTION HEMODIALYSIS CATHETER Right 2021    Procedure: HEMODIALYSIS CATHETER INSERTION;  Surgeon: Simon Coates MD;  Location:  [facility] HYBRID OR 12;  Service: Vascular;  Laterality: Right;   • INSERTION HEMODIALYSIS CATHETER Right 2021    Procedure: HEMODIALYSIS CATHETER INSERTION;  Surgeon: Simon Coates MD;  Location:  [facility] HYBRID OR 12;  Service: Vascular;  Laterality: Right;   • INSERTION PERITONEAL DIALYSIS CATHETER     • OOPHORECTOMY Bilateral    • SINUS SURGERY     • SINUS SURGERY  1980   • TUBAL ABDOMINAL LIGATION         Family History  Family History   Problem Relation Age of Onset   • Hypertension Mother    • Lung disease Mother    • Heart disease Father    • Diabetes Father    • Abnormal EKG Father    • Breast cancer Neg Hx        Social History  Social History     Socioeconomic History   • Marital status:    Tobacco Use   • Smoking status: Former Smoker     Packs/day: 0.00     Years: 2.00     Pack years: 0.00     Types: Cigarettes     Quit date:      Years since quittin.0   • Smokeless tobacco: Never Used   Vaping Use   • Vaping Use: Never used   Substance and Sexual Activity   • Alcohol use: Not Currently     Comment: haven't drank in 3 years ( 2018)    • Drug use: No   • Sexual activity: Defer       Review of Systems:  History obtained from chart review and the patient  General ROS: No fever or chills  Respiratory ROS: No cough, shortness of breath, wheezing  Cardiovascular ROS: No chest pain or palpitations  Gastrointestinal ROS: No abdominal pain or melena  Genito-Urinary ROS: No dysuria or hematuria  Psych ROS: No anxiety and depression  14 point ROS reviewed with the patient and negative except as noted above and in the HPI unless unable to obtain.    Objective:  Patient Vitals for the past 24 hrs:   BP Temp Temp src  Pulse Resp SpO2   01/08/22 1500 -- -- -- -- -- 98 %   01/08/22 1422 145/61 98.4 °F (36.9 °C) Oral 87 16 99 %   01/08/22 0731 149/66 98.6 °F (37 °C) Oral 80 16 97 %   01/08/22 0552 150/63 98.5 °F (36.9 °C) Oral 83 16 95 %   01/08/22 0055 149/69 99 °F (37.2 °C) Oral 84 16 97 %   01/07/22 2005 136/60 98.9 °F (37.2 °C) Oral 80 16 95 %       Intake/Output Summary (Last 24 hours) at 1/8/2022 1747  Last data filed at 1/8/2022 0928  Gross per 24 hour   Intake 120 ml   Output --   Net 120 ml     General: awake/alert   Chest:  clear to auscultation bilaterally without respiratory distress  CVS: regular rate and rhythm  Abdominal: soft, nontender, positive bowel sounds  Extremities: left AKA and no cyanosis or edema  Skin: warm and dry without rash      Labs:  Results from last 7 days   Lab Units 01/08/22  0547 01/07/22  0719 01/06/22  0646   WBC 10*3/mm3 6.76 9.29 4.96   HEMOGLOBIN g/dL 7.4* 7.9* 8.2*   HEMATOCRIT % 23.5* 26.4* 26.8*   PLATELETS 10*3/mm3 296 289 269         Results from last 7 days   Lab Units 01/08/22  0547 01/07/22  0719 01/06/22  0530 01/05/22  1515 01/05/22  1515   SODIUM mmol/L 135* 136 138   < > 135*   POTASSIUM mmol/L 4.5 4.6 3.8   < > 3.6   CHLORIDE mmol/L 103 100 97*   < > 94*   CO2 mmol/L 27.0 23.0 34.0*   < > 34.0*   BUN mg/dL 17 8 17   < > 13   CREATININE mg/dL 4.13* 2.95* 5.29*   < > 4.43*   CALCIUM mg/dL 8.8 8.1* 8.2*   < > 8.4*   BILIRUBIN mg/dL  --   --   --   --  0.2   ALK PHOS U/L  --   --   --   --  123*   ALT (SGPT) U/L  --   --   --   --  6   AST (SGOT) U/L  --   --   --   --  22   GLUCOSE mg/dL 125* 103* 82   < > 117*    < > = values in this interval not displayed.       Radiology:   Imaging Results (Last 72 Hours)     ** No results found for the last 72 hours. **          Culture:  No results found for: BLOODCX, URINECX, WOUNDCX, MRSACX, RESPCX, STOOLCX      Assessment   1.  End stage renal disease on HD TTS  2.  Type 2 diabetes with nephropathy  3.  Hypertension  4.  S/p left AKA on  1/06  5.  Anemia of CKD    Plan:  1.  Continue routine hemodialysis. Next due on 1/11  2.  Monitor labs      Davon Stallings MD  1/8/2022  17:47 CST

## 2022-01-08 NOTE — PROGRESS NOTES
LOS: 3 days   Patient Care Team:  Akhil Golden DO as PCP - General (Family Medicine)  Feng Sarabia MD as Referring Physician (Pediatrics)  Srikanth Coker MD as Cardiologist (Cardiology)    Chief Complaint: Postoperative day #2 status post left above-knee amputation    Subjective     Patient seen and examined at bedside. No acute events overnight. Postoperative day #2 status post left above-knee amputation.  Seen while on hemodialysis in the dialysis unit.  Complains of some pain at the amputation stump but controlled with current regimen.  Otherwise has been complaining of some constipation and so has been started on Dulcolax as needed and Colace.  Otherwise no acute complaints.    Objective     Vital Signs  Temp:  [98.4 °F (36.9 °C)-99 °F (37.2 °C)] 98.6 °F (37 °C)  Heart Rate:  [79-84] 80  Resp:  [16] 16  BP: (136-150)/(60-69) 149/66    Physical Exam  Vitals reviewed.   Constitutional:       Appearance: Normal appearance.   HENT:      Head: Normocephalic and atraumatic.      Nose: Nose normal.      Mouth/Throat:      Mouth: Mucous membranes are moist.   Eyes:      Extraocular Movements: Extraocular movements intact.      Pupils: Pupils are equal, round, and reactive to light.   Cardiovascular:      Rate and Rhythm: Normal rate and regular rhythm.      Pulses:           Carotid pulses are 2+ on the right side and 2+ on the left side.       Radial pulses are 2+ on the right side and 2+ on the left side.        Femoral pulses are 2+ on the right side and 2+ on the left side.       Popliteal pulses are 2+ on the right side.        Dorsalis pedis pulses are 1+ on the right side.        Posterior tibial pulses are detected w/ Doppler on the right side.      Comments: Left above-knee amputation stump with dressing in place. It is clean/dry/intact. She has some mild tenderness to palpation of the amputation stump.    Right internal jugular vein permacath is in place. Site is  clean/dry/intact.  Pulmonary:      Effort: Pulmonary effort is normal. No respiratory distress.   Abdominal:      General: There is no distension.      Palpations: Abdomen is soft. There is no mass.      Tenderness: There is no abdominal tenderness.   Musculoskeletal:         General: Normal range of motion.      Cervical back: Normal range of motion and neck supple.   Skin:     General: Skin is warm and dry.      Capillary Refill: Capillary refill takes less than 2 seconds.   Neurological:      General: No focal deficit present.      Mental Status: She is alert and oriented to person, place, and time.   Psychiatric:         Mood and Affect: Mood normal.         Behavior: Behavior normal.         Thought Content: Thought content normal.         Judgment: Judgment normal.         Laboratory Data:   Results from last 7 days   Lab Units 01/08/22  0547 01/07/22  0719 01/06/22  0646   WBC 10*3/mm3 6.76 9.29 4.96   HEMOGLOBIN g/dL 7.4* 7.9* 8.2*   HEMATOCRIT % 23.5* 26.4* 26.8*   PLATELETS 10*3/mm3 296 289 269       Results from last 7 days   Lab Units 01/08/22  0547 01/07/22  0719 01/06/22  0530 01/05/22  1515 01/05/22  1515   SODIUM mmol/L 135* 136 138   < > 135*   POTASSIUM mmol/L 4.5 4.6 3.8   < > 3.6   CHLORIDE mmol/L 103 100 97*   < > 94*   CO2 mmol/L 27.0 23.0 34.0*   < > 34.0*   BUN mg/dL 17 8 17   < > 13   CREATININE mg/dL 4.13* 2.95* 5.29*   < > 4.43*   CALCIUM mg/dL 8.8 8.1* 8.2*   < > 8.4*   BILIRUBIN mg/dL  --   --   --   --  0.2   ALK PHOS U/L  --   --   --   --  123*   ALT (SGPT) U/L  --   --   --   --  6   AST (SGOT) U/L  --   --   --   --  22   GLUCOSE mg/dL 125* 103* 82   < > 117*    < > = values in this interval not displayed.     Results from last 7 days   Lab Units 01/05/22  1515   INR  1.10*   APTT seconds 40.9*            Medication Review: Reviewed, no changes.    Assessment/Plan       Amputation stump necrosis (HCC)    Pre-op evaluation      66-year-old female postoperative day #2 status post  conversion of left below-knee amputation to above-knee amputation due to necrosis of the posterior flap and poor healing.  -Pain control as needed. Currently with Percocet and IV Dilaudid for breakthrough. PCA was offered but she declined as it made her feel very sleepy and somnolent on her last admission.  Pain controlled.  -Continue routine hemodialysis per nephrology. Appreciate their assistance.  -Continue out of bed as tolerated with ongoing PT/OT care.  -Diet as tolerated  -Case management evaluation for placement at discharge.  -We will plan to take down her stump dressing likely on Monday    Electronically signed by Simon Coates MD, 01/08/22, 11:30 AM CST.

## 2022-01-08 NOTE — PLAN OF CARE
Goal Outcome Evaluation:  Plan of Care Reviewed With: patient        Progress: no change  Outcome Summary: PO percocet given at 0130 a.m. on 1-8-22;  old peritoneal dialysis tube used at home is in place;  Dialysis Tu-Th-Sat;  permacath in place;  left AKA (has an ace wrap ;  last BM was 1-4-22, pt requests some anti constipation medicine)

## 2022-01-09 NOTE — PROGRESS NOTES
LOS: 4 days   Patient Care Team:  Akhil Golden DO as PCP - General (Family Medicine)  Feng Sarabia MD as Referring Physician (Pediatrics)  Srikanth Coker MD as Cardiologist (Cardiology)    Chief Complaint: Postoperative day #3 status post left above-knee amputation    Subjective     Patient seen and examined at bedside. No acute events overnight. Postoperative day #3 status post left above-knee amputation.  Pain controlled with Percocet and breakthrough Dilaudid.  Otherwise resting comfortably this morning.  No other acute complaints.  Objective     Vital Signs  Temp:  [98.4 °F (36.9 °C)-99.8 °F (37.7 °C)] 99.8 °F (37.7 °C)  Heart Rate:  [76-95] 91  Resp:  [16-18] 18  BP: (145-159)/(61-72) 148/67    Physical Exam  Vitals reviewed.   Constitutional:       Appearance: Normal appearance.   HENT:      Head: Normocephalic and atraumatic.      Nose: Nose normal.      Mouth/Throat:      Mouth: Mucous membranes are moist.   Eyes:      Extraocular Movements: Extraocular movements intact.      Pupils: Pupils are equal, round, and reactive to light.   Cardiovascular:      Rate and Rhythm: Normal rate and regular rhythm.      Pulses:           Carotid pulses are 2+ on the right side and 2+ on the left side.       Radial pulses are 2+ on the right side and 2+ on the left side.        Femoral pulses are 2+ on the right side and 2+ on the left side.       Popliteal pulses are 2+ on the right side.        Dorsalis pedis pulses are 1+ on the right side.        Posterior tibial pulses are detected w/ Doppler on the right side.      Comments: Left above-knee amputation stump with dressing in place. It is clean/dry/intact. She has some mild tenderness to palpation of the amputation stump.    Right internal jugular vein permacath is in place. Site is clean/dry/intact.  Pulmonary:      Effort: Pulmonary effort is normal. No respiratory distress.   Abdominal:      General: There is no distension.      Palpations:  Abdomen is soft. There is no mass.      Tenderness: There is no abdominal tenderness.   Musculoskeletal:         General: Normal range of motion.      Cervical back: Normal range of motion and neck supple.   Skin:     General: Skin is warm and dry.      Capillary Refill: Capillary refill takes less than 2 seconds.   Neurological:      General: No focal deficit present.      Mental Status: She is alert and oriented to person, place, and time.   Psychiatric:         Mood and Affect: Mood normal.         Behavior: Behavior normal.         Thought Content: Thought content normal.         Judgment: Judgment normal.         Laboratory Data:   Results from last 7 days   Lab Units 01/09/22 0758 01/08/22  0547 01/07/22  0719   WBC 10*3/mm3 5.46 6.76 9.29   HEMOGLOBIN g/dL 7.3* 7.4* 7.9*   HEMATOCRIT % 24.2* 23.5* 26.4*   PLATELETS 10*3/mm3 301 296 289       Results from last 7 days   Lab Units 01/09/22 0758 01/08/22  0547 01/07/22  0719 01/06/22  0530 01/05/22  1515   SODIUM mmol/L 135* 135* 136   < > 135*   POTASSIUM mmol/L 3.9 4.5 4.6   < > 3.6   CHLORIDE mmol/L 101 103 100   < > 94*   CO2 mmol/L 26.0 27.0 23.0   < > 34.0*   BUN mg/dL 12 17 8   < > 13   CREATININE mg/dL 3.03* 4.13* 2.95*   < > 4.43*   CALCIUM mg/dL 8.2* 8.8 8.1*   < > 8.4*   BILIRUBIN mg/dL  --   --   --   --  0.2   ALK PHOS U/L  --   --   --   --  123*   ALT (SGPT) U/L  --   --   --   --  6   AST (SGOT) U/L  --   --   --   --  22   GLUCOSE mg/dL 123* 125* 103*   < > 117*    < > = values in this interval not displayed.     Results from last 7 days   Lab Units 01/05/22  1515   INR  1.10*   APTT seconds 40.9*            Medication Review: Reviewed, no changes.    Assessment/Plan       Amputation stump necrosis (HCC)    Pre-op evaluation      66-year-old female postoperative day #3 status post conversion of left below-knee amputation to above-knee amputation due to necrosis of the posterior flap and poor healing.  -Pain control as needed. Currently with  Percocet and IV Dilaudid for breakthrough. PCA was offered but she declined as it made her feel very sleepy and somnolent on her last admission.  Pain controlled.  -Continue routine hemodialysis per nephrology. Appreciate their assistance.  -Continue out of bed as tolerated with ongoing PT/OT care.  -Diet as tolerated  -Case management evaluation for placement at discharge.  -We will plan to unveil amputation stump dressing tomorrow.    Electronically signed by Simon Coates MD, 01/09/22, 10:15 AM CST.

## 2022-01-09 NOTE — PROGRESS NOTES
Nephrology ([facility]) Progress Note      Patient:  Jennifer Salcido  YOB: 1955  Date of Service: 2022  MRN: [id number]   Acct: [id number]   Primary Care Physician: Akhil Golden DO  Advance Directive:   Code Status and Medical Interventions:   Ordered at: 22 1507     Level Of Support Discussed With:    Patient     Code Status (Patient has no pulse and is not breathing):    CPR (Attempt to Resuscitate)     Medical Interventions (Patient has pulse or is breathing):    Full Support     Admit Date: 2022       Hospital Day: 4  Referring Provider: No Known Provider      Patient personally seen and examined.  Complete chart including Consults, Notes, Operative Reports, Labs, Cardiology, and Radiology studies reviewed as able.        Subjective:  Jennifer Salcido is a 66 y.o. female for whom we were consulted for evaluation and treatment of end stage renal disease on hemodialysis. Previously was on peritoneal dialysis but had switched to hemodialysis in order to facilitate placement at nursing home.  History of hypertension, CAD with prior CABG, PVD, and hyperlipidemia. Recently underwent left BKA on . Continued to have poor healing and drainage from stump. Admitted on  for left above knee amputation. She had dialysis later on  following her surgery. Tolerated well    Today is awake, alert. Intermittent pain at surgical site. She is status post dialysis on . Patient is now interested to go home after her discharge.    Allergies:  Codeine, Demerol [meperidine], Levaquin [levofloxacin], Lisinopril, Morphine, Sulfasalazine, Ultram [tramadol], and Trental [pentoxifylline]    Home Meds:  Medications Prior to Admission   Medication Sig Dispense Refill Last Dose   • acetaminophen (TYLENOL) 325 MG tablet Take 650 mg by mouth Every 4 (Four) Hours As Needed for Mild Pain .      • [] cephalexin (KEFLEX) 500 MG capsule Take 500 mg by mouth 2 (Two) Times  a Day.      • allopurinol (ZYLOPRIM) 100 MG tablet Take 100 mg by mouth Daily.      • aluminum-magnesium hydroxide-simethicone (MAALOX MAX) 400-400-40 MG/5ML suspension Take 15 mL by mouth Every 6 (Six) Hours As Needed for Heartburn.      • ascorbic acid (VITAMIN C) 500 MG tablet Take 500 mg by mouth Daily.      • aspirin 81 MG EC tablet Take 81 mg by mouth Daily.      • atorvastatin (LIPITOR) 20 MG tablet Take 1 tablet by mouth Every Night. 90 tablet 4    • bisacodyl (Dulcolax) 10 MG suppository Insert 10 mg into the rectum Daily As Needed for Constipation.      • calcium carbonate (TUMS) 500 MG chewable tablet Chew 2 tablets Every Night.      • citalopram (CeleXA) 40 MG tablet Take 40 mg by mouth Daily.      • clopidogrel (PLAVIX) 75 MG tablet Take 75 mg by mouth Daily.      • cyclobenzaprine (FLEXERIL) 5 MG tablet Take 1 tablet by mouth 3 (Three) Times a Day As Needed for Muscle Spasms.      • docusate sodium (COLACE) 100 MG capsule Take 200 mg by mouth Daily As Needed for Constipation.      • famotidine (PEPCID) 20 MG tablet Take 1 tablet by mouth Daily. 30 tablet 0    • gabapentin (NEURONTIN) 300 MG capsule Take 1 capsule by mouth Every 12 (Twelve) Hours. 24 capsule 0    • gentamicin (GARAMYCIN) 0.1 % cream APPLY TO EXIT SITE AS NEEDED      • Melatonin 5 MG capsule Take 5 mg by mouth Every Night.      • midodrine (PROAMATINE) 10 MG tablet Take 1 tablet by mouth 3 (Three) Times a Day Before Meals.      • Multiple Vitamins-Minerals (MULTIVITAMIN ADULT PO) Take 1 tablet by mouth Daily.      • nitroglycerin (NITROSTAT) 0.4 MG SL tablet 1 under the tongue as needed for angina, may repeat q5mins for up three doses 25 tablet 3    • oxyCODONE-acetaminophen (PERCOCET) 5-325 MG per tablet Take 1 tablet by mouth Every 4 (Four) Hours As Needed for Moderate Pain . 24 tablet 0    • sucralfate (CARAFATE) 1 g tablet Take 1 g by mouth 4 (Four) Times a Day Before Meals & at Bedtime. Dissolve 1 tablet in water to create a slurry  and drink      • Sucroferric Oxyhydroxide (Velphoro) 500 MG chewable tablet Chew 2 tablets 3 (Three) Times a Day Before Meals.      • vitamin D3 125 MCG (5000 UT) capsule capsule Take 1 tablet by mouth Daily.          Medicines:  Current Facility-Administered Medications   Medication Dose Route Frequency Provider Last Rate Last Admin   • aspirin EC tablet 81 mg  81 mg Oral Daily Simon Coates MD   81 mg at 01/09/22 0918   • atorvastatin (LIPITOR) tablet 20 mg  20 mg Oral Nightly Simon Coates MD   20 mg at 01/08/22 2156   • bisacodyl (DULCOLAX) EC tablet 10 mg  10 mg Oral Daily PRN Simon Coates MD   10 mg at 01/09/22 0917   • citalopram (CeleXA) tablet 40 mg  40 mg Oral Daily Simon Coates MD   40 mg at 01/09/22 0919   • clopidogrel (PLAVIX) tablet 75 mg  75 mg Oral Daily Simon Coates MD   75 mg at 01/09/22 0918   • dextrose (D50W) (25 g/50 mL) IV injection 25 g  25 g Intravenous Q15 Min PRN Simon Coates MD       • dextrose (GLUTOSE) oral gel 15 g  15 g Oral Q15 Min PRN Simon Coates MD       • docusate sodium (COLACE) capsule 100 mg  100 mg Oral BID Simon Coates MD   100 mg at 01/09/22 0919   • enoxaparin (LOVENOX) syringe 30 mg  30 mg Subcutaneous Q24H Simon Coates MD   30 mg at 01/09/22 0628   • epoetin jani-epbx (RETACRIT) injection 10,000 Units  10,000 Units Subcutaneous Once per day on Mon Wed Fri Davon Stallings MD   10,000 Units at 01/07/22 0813   • famotidine (PEPCID) tablet 20 mg  20 mg Oral Daily Simon Coates MD   20 mg at 01/09/22 0918   • gabapentin (NEURONTIN) capsule 300 mg  300 mg Oral Q12H Simon Coates MD   300 mg at 01/09/22 0918   • glucagon (human recombinant) (GLUCAGEN DIAGNOSTIC) injection 1 mg  1 mg Subcutaneous Q15 Min PRN Simon Coates MD       • heparin (porcine) injection 3,200 Units  3,200 Units Intracatheter PRN Davon Stallings MD   3,200 Units at 01/06/22  1940   • HYDROcodone-acetaminophen (NORCO) 5-325 MG per tablet 1 tablet  1 tablet Oral Q4H PRN Simon Coates MD   1 tablet at 01/09/22 0340   • HYDROmorphone (DILAUDID) injection 0.5 mg  0.5 mg Intravenous Q2H PRN Simon Coates MD   0.5 mg at 01/08/22 1458    And   • naloxone (NARCAN) injection 0.4 mg  0.4 mg Intravenous Q5 Min PRN Simon Coates MD       • insulin lispro (humaLOG) injection 0-9 Units  0-9 Units Subcutaneous TID AC Simon Coates MD       • midodrine (PROAMATINE) tablet 10 mg  10 mg Oral TID AC Simon Coates MD   10 mg at 01/09/22 1144   • ondansetron (ZOFRAN) tablet 4 mg  4 mg Oral Q6H PRN Simon Coates MD        Or   • ondansetron (ZOFRAN) injection 4 mg  4 mg Intravenous Q6H PRN Simon Coates MD       • oxyCODONE-acetaminophen (PERCOCET)  MG per tablet 1 tablet  1 tablet Oral Q6H PRN Simon Coates MD   1 tablet at 01/09/22 0942   • sodium chloride 0.9 % bolus 100 mL  100 mL Intravenous PRN Davon Stallings MD       • sodium chloride 0.9 % flush 10 mL  10 mL Intravenous Q12H Simon Coates MD   10 mL at 01/09/22 0919   • sodium chloride 0.9 % flush 10 mL  10 mL Intravenous PRN Simon Coates MD           Past Medical History:  Past Medical History:   Diagnosis Date   • Arthritis     LEFT SHOULDER   • CHF (congestive heart failure) (HCC)    • Chronic pericarditis 4/22/2021   • Collar bone fracture 12/2020    SLING PLACED TO HEAL   • Coronary artery disease     LEFT MAIN AND 3 OTHER AREAS--CABG SCHEDULED FOR APRIL 22, 2021   • Dyslipidemia 9/28/2020   • Elevated cholesterol    • End stage kidney disease (HCC)     currently HD (5/19/2021), hopes to return to [facility]    • Essential hypertension 11/2/2018   • Gastroesophageal reflux disease without esophagitis 4/28/2021   • GERD (gastroesophageal reflux disease)    • Gout    • History of transfusion    • Hyperlipidemia    • Hypertension    • Overweight (BMI  25.0-29.9) 4/22/2021   • Peritoneal dialysis status ([facility])     EVERY NIGHT FOR 10 HOURS, (5/19/2021 currently on hold)   • PONV (postoperative nausea and vomiting)    • Sleep apnea     RESOLVED   • Status post gastric bypass for obesity 4/22/2021   • Type 2 diabetes mellitus with kidney complication, with long-term current use of insulin ([facility]) 11/2/2018       Past Surgical History:  Past Surgical History:   Procedure Laterality Date   • ABOVE KNEE AMPUTATION Left 1/6/2022    Procedure: LEFT LOWER EXTREMITY AMPUTATION ABOVE KNEE;  Surgeon: Simon Coates MD;  Location:  [facility] HYBRID OR 12;  Service: Vascular;  Laterality: Left;   • BELOW KNEE AMPUTATION Left 12/13/2021    Procedure: LEFT LOWER EXTREMITY AMPUTATION BELOW KNEE;  Surgeon: Simon Coates MD;  Location:  [facility] OR;  Service: Vascular;  Laterality: Left;   • BREAST BIOPSY Right     FATTY TUMOR   • CARDIAC CATHETERIZATION N/A 10/1/2019    Procedure: Left Heart Cath;  Surgeon: Srikanth Coker MD;  Location:  [facility] CATH INVASIVE LOCATION;  Service: Cardiology   • CARDIAC CATHETERIZATION N/A 7/14/2020    Procedure: Left Heart Cath;  Surgeon: Srikanth Coker MD;  Location:  [facility] CATH INVASIVE LOCATION;  Service: Cardiology;  Laterality: N/A;   • CARDIAC CATHETERIZATION N/A 3/23/2021    Procedure: Left Heart Cath;  Surgeon: Srikanth Coker MD;  Location:  [facility] CATH INVASIVE LOCATION;  Service: Cardiology;  Laterality: N/A;   • CHEST TUBE INSERTION Right 6/7/2021    Procedure: CHEST TUBE INSERTION;  Surgeon: Antony Wood MD;  Location: [facility] OR;  Service: Cardiothoracic;  Laterality: Right;   • CORONARY ANGIOPLASTY WITH STENT PLACEMENT  2016    X 2   • CORONARY ARTERY BYPASS GRAFT N/A 4/22/2021    Procedure: CORONARY ARTERY BYPASS GRAFT X 3 WITH LEFT INTERNAL MAMMARY ARTERY, RIGHT LOWER EXTREMITY ENDOSCOPIC VEIN HARVEST, AND PERFUSION; TRANSESOPHAGEAL ECHOCARDIOGRAM;  Surgeon: Antony Wood MD;  Location: [facility] OR;  Service:  Cardiothoracic;  Laterality: N/A;   • EYE SURGERY Bilateral     IOC LENS IMPLANTS   • EYE SURGERY Bilateral     RETINAL SURGERY   • GALLBLADDER SURGERY     • GASTRIC BYPASS     • HYSTERECTOMY     • INSERTION HEMODIALYSIS CATHETER Right 2021    Procedure: HEMODIALYSIS CATHETER INSERTION;  Surgeon: Simon Coates MD;  Location: [facility] OR 12;  Service: Vascular;  Laterality: Right;   • INSERTION HEMODIALYSIS CATHETER Right 2021    Procedure: HEMODIALYSIS CATHETER INSERTION;  Surgeon: Simon Coates MD;  Location: [facility] OR 12;  Service: Vascular;  Laterality: Right;   • INSERTION PERITONEAL DIALYSIS CATHETER     • OOPHORECTOMY Bilateral    • SINUS SURGERY     • SINUS SURGERY  1980   • TUBAL ABDOMINAL LIGATION         Family History  Family History   Problem Relation Age of Onset   • Hypertension Mother    • Lung disease Mother    • Heart disease Father    • Diabetes Father    • Abnormal EKG Father    • Breast cancer Neg Hx        Social History  Social History     Socioeconomic History   • Marital status:    Tobacco Use   • Smoking status: Former Smoker     Packs/day: 0.00     Years: 2.00     Pack years: 0.00     Types: Cigarettes     Quit date:      Years since quittin.0   • Smokeless tobacco: Never Used   Vaping Use   • Vaping Use: Never used   Substance and Sexual Activity   • Alcohol use: Not Currently     Comment: haven't drank in 3 years ( 2018)    • Drug use: No   • Sexual activity: Defer       Review of Systems:  History obtained from chart review and the patient  General ROS: No fever or chills  Respiratory ROS: No cough, shortness of breath, wheezing  Cardiovascular ROS: No chest pain or palpitations  Gastrointestinal ROS: No abdominal pain or melena  Genito-Urinary ROS: No dysuria or hematuria  Psych ROS: No anxiety and depression  14 point ROS reviewed with the patient and negative except as noted above and in the HPI unless unable to  obtain.    Objective:  Patient Vitals for the past 24 hrs:   BP Temp Temp src Pulse Resp SpO2   01/09/22 0746 148/67 99.8 °F (37.7 °C) Oral 91 18 97 %   01/09/22 0334 159/72 98.6 °F (37 °C) Oral 95 18 97 %   01/08/22 2354 155/66 98.5 °F (36.9 °C) Oral 81 16 99 %   01/08/22 2056 151/62 98.9 °F (37.2 °C) Oral 76 16 98 %     No intake or output data in the 24 hours ending 01/09/22 1634  General: awake/alert   Chest:  clear to auscultation bilaterally without respiratory distress  CVS: regular rate and rhythm  Abdominal: soft, nontender, positive bowel sounds  Extremities: left AKA and no cyanosis or edema  Skin: warm and dry without rash      Labs:  Results from last 7 days   Lab Units 01/09/22  0758 01/08/22  0547 01/07/22  0719   WBC 10*3/mm3 5.46 6.76 9.29   HEMOGLOBIN g/dL 7.3* 7.4* 7.9*   HEMATOCRIT % 24.2* 23.5* 26.4*   PLATELETS 10*3/mm3 301 296 289         Results from last 7 days   Lab Units 01/09/22  0758 01/08/22  0547 01/07/22  0719 01/06/22  0530 01/05/22  1515   SODIUM mmol/L 135* 135* 136   < > 135*   POTASSIUM mmol/L 3.9 4.5 4.6   < > 3.6   CHLORIDE mmol/L 101 103 100   < > 94*   CO2 mmol/L 26.0 27.0 23.0   < > 34.0*   BUN mg/dL 12 17 8   < > 13   CREATININE mg/dL 3.03* 4.13* 2.95*   < > 4.43*   CALCIUM mg/dL 8.2* 8.8 8.1*   < > 8.4*   BILIRUBIN mg/dL  --   --   --   --  0.2   ALK PHOS U/L  --   --   --   --  123*   ALT (SGPT) U/L  --   --   --   --  6   AST (SGOT) U/L  --   --   --   --  22   GLUCOSE mg/dL 123* 125* 103*   < > 117*    < > = values in this interval not displayed.       Radiology:   Imaging Results (Last 72 Hours)     ** No results found for the last 72 hours. **          Culture:  No results found for: BLOODCX, URINECX, WOUNDCX, MRSACX, RESPCX, STOOLCX      Assessment   1.  End stage renal disease on HD TTS  2.  Type 2 diabetes with nephropathy  3.  Hypertension  4.  S/p left AKA on 1/06  5.  Anemia of CKD    Plan:  1.  Continue routine hemodialysis. Next due on 1/11  2.  Monitor  labs      Davon Stallings MD  1/9/2022  16:34 CST

## 2022-01-09 NOTE — THERAPY TREATMENT NOTE
Acute Care - Physical Therapy Treatment Note  [facility]     Patient Name: Jennifer Salcido  : 1955  MRN: [id number]  Today's Date: 2022      Visit Dx:     ICD-10-CM ICD-9-CM   1. Pre-op evaluation  Z01.818 V72.84   2. Amputation stump necrosis (HCC)  T87.50 997.69   3. Impaired mobility  Z74.09 799.89   4. Decreased activities of daily living (ADL)  Z78.9 V49.89     Patient Active Problem List   Diagnosis   • ESRD on peritoneal dialysis (HCC)   • Coronary artery disease involving native coronary artery of native heart without angina pectoris   • Type 2 diabetes mellitus, without long-term current use of insulin (HCC)   • Chronic anemia   • Dyslipidemia   • Essential hypertension   • Hypokalemia   • Ischemic left leg   • Peripheral vascular disease (HCC)   • Small vessel arterial disease due to type 2 diabetes mellitus (HCC)   • Hyponatremia   • Abdominal pain   • Abnormality of albumin   • Acidosis, metabolic   • Allergy, unspecified, initial encounter   • Anaphylactic shock, unspecified, initial encounter   • At risk for fluid volume imbalance   • Atherosclerosis of native artery of both lower extremities with intermittent claudication ([facility])   • Dependence on renal dialysis (HCC)   • Encounter for adequacy testing for peritoneal dialysis ([facility])   • ESRD on peritoneal dialysis ([facility])   • Gas gangrene ([facility])   • Gastroesophageal reflux disease   • Hyperlipidemia   • Iron deficiency anemia   • Loss of consciousness ([facility])   • Moderate protein-calorie malnutrition (HCC)   • Hematemesis with nausea   • Obstructive sleep apnea syndrome   • Osteoarthritis   • Disorder of phosphorus metabolism, unspecified   • Pain, unspecified   • Dialysis-associated peritonitis (HCC)   • Pleural effusion, not elsewhere classified   • Renal osteodystrophy   • Secondary hyperparathyroidism of renal origin ([facility])   • Seizure disorder ([facility])   • Shortness of breath   • Sinus infection   • Type 2 diabetes mellitus with diabetic  neuropathy, unspecified ([facility])   • Pre-op evaluation   • Amputation stump necrosis ([facility])     Past Medical History:   Diagnosis Date   • Arthritis     LEFT SHOULDER   • CHF (congestive heart failure) ([facility])    • Chronic pericarditis 4/22/2021   • Collar bone fracture 12/2020    SLING PLACED TO HEAL   • Coronary artery disease     LEFT MAIN AND 3 OTHER AREAS--CABG SCHEDULED FOR APRIL 22, 2021   • Dyslipidemia 9/28/2020   • Elevated cholesterol    • End stage kidney disease ([facility])     currently HD (5/19/2021), hopes to return to PD    • Essential hypertension 11/2/2018   • Gastroesophageal reflux disease without esophagitis 4/28/2021   • GERD (gastroesophageal reflux disease)    • Gout    • History of transfusion    • Hyperlipidemia    • Hypertension    • Overweight (BMI 25.0-29.9) 4/22/2021   • Peritoneal dialysis status ([facility])     EVERY NIGHT FOR 10 HOURS, (5/19/2021 currently on hold)   • PONV (postoperative nausea and vomiting)    • Sleep apnea     RESOLVED   • Status post gastric bypass for obesity 4/22/2021   • Type 2 diabetes mellitus with kidney complication, with long-term current use of insulin ([facility]) 11/2/2018     Past Surgical History:   Procedure Laterality Date   • ABOVE KNEE AMPUTATION Left 1/6/2022    Procedure: LEFT LOWER EXTREMITY AMPUTATION ABOVE KNEE;  Surgeon: Simon Coates MD;  Location: [facility] HYBRID OR 12;  Service: Vascular;  Laterality: Left;   • BELOW KNEE AMPUTATION Left 12/13/2021    Procedure: LEFT LOWER EXTREMITY AMPUTATION BELOW KNEE;  Surgeon: Simon Coates MD;  Location: [facility] OR;  Service: Vascular;  Laterality: Left;   • BREAST BIOPSY Right     FATTY TUMOR   • CARDIAC CATHETERIZATION N/A 10/1/2019    Procedure: Left Heart Cath;  Surgeon: Srikanth Coker MD;  Location:  [facility] CATH INVASIVE LOCATION;  Service: Cardiology   • CARDIAC CATHETERIZATION N/A 7/14/2020    Procedure: Left Heart Cath;  Surgeon: Srikanth Coker MD;  Location:  [facility] CATH INVASIVE LOCATION;   Service: Cardiology;  Laterality: N/A;   • CARDIAC CATHETERIZATION N/A 3/23/2021    Procedure: Left Heart Cath;  Surgeon: Srikanth Coker MD;  Location:  [facility] CATH INVASIVE LOCATION;  Service: Cardiology;  Laterality: N/A;   • CHEST TUBE INSERTION Right 6/7/2021    Procedure: CHEST TUBE INSERTION;  Surgeon: Antony Wood MD;  Location:  [facility] OR;  Service: Cardiothoracic;  Laterality: Right;   • CORONARY ANGIOPLASTY WITH STENT PLACEMENT  2016    X 2   • CORONARY ARTERY BYPASS GRAFT N/A 4/22/2021    Procedure: CORONARY ARTERY BYPASS GRAFT X 3 WITH LEFT INTERNAL MAMMARY ARTERY, RIGHT LOWER EXTREMITY ENDOSCOPIC VEIN HARVEST, AND PERFUSION; TRANSESOPHAGEAL ECHOCARDIOGRAM;  Surgeon: Antony Wood MD;  Location:  [facility] OR;  Service: Cardiothoracic;  Laterality: N/A;   • EYE SURGERY Bilateral     IOC LENS IMPLANTS   • EYE SURGERY Bilateral     RETINAL SURGERY   • GALLBLADDER SURGERY     • GASTRIC BYPASS     • HYSTERECTOMY     • INSERTION HEMODIALYSIS CATHETER Right 4/1/2021    Procedure: HEMODIALYSIS CATHETER INSERTION;  Surgeon: Simon Coates MD;  Location:  [facility] HYBRID OR 12;  Service: Vascular;  Laterality: Right;   • INSERTION HEMODIALYSIS CATHETER Right 12/20/2021    Procedure: HEMODIALYSIS CATHETER INSERTION;  Surgeon: Simon Coates MD;  Location:  [facility] HYBRID OR 12;  Service: Vascular;  Laterality: Right;   • INSERTION PERITONEAL DIALYSIS CATHETER     • OOPHORECTOMY Bilateral    • SINUS SURGERY     • SINUS SURGERY  07/1980   • TUBAL ABDOMINAL LIGATION       PT Assessment (last 12 hours)     PT Evaluation and Treatment     Row Name 01/09/22 1044          Physical Therapy Time and Intention    Subjective Information complains of; weakness; pain  -     Document Type therapy note (daily note)  -     Mode of Treatment physical therapy  -     Row Name 01/09/22 1044          General Information    Existing Precautions/Restrictions fall  L AKA, L periteneal dialysis line  -      Barriers to Rehab medically complex; previous functional deficit; cognitive status; physical barrier  -     Row Name 01/09/22 1044          Pain Scale: Numbers Pre/Post-Treatment    Pretreatment Pain Rating 6/10  -     Posttreatment Pain Rating 6/10  -     Pain Location - Side Left  -     Pain Location residual limb  -     Row Name 01/09/22 1044          Pain Scale: Word Pre/Post-Treatment    Pain Intervention(s) Medication (See MAR); Repositioned  -     Row Name 01/09/22 1044          Bed Mobility    Bed Mobility rolling right; scooting/bridging  -     Rolling Right [place] (Bed Mobility) contact guard; verbal cues  -     Comment (Bed Mobility) bridging x 10  -     Row Name 01/09/22 1044          Aerobic Exercise    Comment, Aerobic Exercise (Therapeutic Exercise) RLE A-AAROM X 15 reps L AKA A-AAROM in R sidelying x 10 reps  -     Row Name             Wound 01/06/22 0905 Left distal thigh Amputation stump    Wound - Properties Group Placement Date: 01/06/22  -AC Placement Time: 0905  -AC Present on Hospital Admission: N  -AC Side: Left  -AC Orientation: distal  -AC Location: thigh  -AC Primary Wound Type: Amputation s  -AC Additional Comments: xeroform, fluffs, kerlix, ace wrap  -AC     Retired Wound - Properties Group Date first assessed: 01/06/22  -AC Time first assessed: 0905  -AC Present on Hospital Admission: N  -AC Side: Left  -AC Location: thigh  -AC Primary Wound Type: Amputation s  -AC Additional Comments: xeroform, fluffs, kerlix, ace wrap  -AC     Row Name             Wound 12/23/21 0700 Right gluteal Blisters    Wound - Properties Group Placement Date: 12/23/21  -SP Placement Time: 0700  -SP Side: Right  -SP Location: gluteal  -SP Primary Wound Type: Blisters  -SP     Retired Wound - Properties Group Date first assessed: 12/23/21  -SP Time first assessed: 0700  -SP Side: Right  -SP Location: gluteal  -SP Primary Wound Type: Blisters  -SP     Row Name 01/09/22 1044          Plan  of Care Review    Plan of Care Reviewed With patient  -     Progress no change  -     Outcome Summary pt tends to keep her head turned to left, enncouraged pt to work on neck ROM and looking more to the right,pt peformed RLE A-AAROM x 15 reps, pt able to roll to right cga, performed L AKA A-AAROM  in R sidelying, performed R single leg bridges x 10 reps, pt would benefit from [facility]  -     Row Name 01/09/22 1044          Positioning and Restraints    Pre-Treatment Position in bed  -     Post Treatment Position bed  -     In Bed side lying right; call light within reach; encouraged to call for assist; exit alarm on  -           User Key  (r) = Recorded By, (t) = Taken By, (c) = Cosigned By    Initials Name Provider Type     Veronica Velasco PTA Physical Therapy Assistant    Katja Beckford, RN Registered Nurse    Jaime Cook RN Registered Nurse                Physical Therapy Education                 Title: PT OT SLP Therapies (In Progress)     Topic: Physical Therapy (In Progress)     Point: Mobility training (Done)     Learning Progress Summary           Patient Acceptance, E, VU by MS at 1/7/2022 1040    Comment: role of PT in her care                   Point: Home exercise program (Not Started)     Learner Progress:  Not documented in this visit.          Point: Body mechanics (Not Started)     Learner Progress:  Not documented in this visit.          Point: Precautions (Done)     Learning Progress Summary           Patient Acceptance, E,TB, VU by  at 1/9/2022 1120    Comment: neck postioning                               User Key     Initials Effective Dates Name Provider Type [facility] 06/16/21 -  Veronica Velasco PTA Physical Therapy Assistant PT    MS 06/19/18 -  Analisa Hinson, PT, DPT, NCS Physical Therapist PT              PT Recommendation and Plan     Plan of Care Reviewed With: patient  Progress: no change  Outcome Summary: pt tends to keep her head turned to left,  enncouraged pt to work on neck ROM and looking more to the right,pt peformed RLE A-AAROM x 15 reps, pt able to roll to right cga, performed L AKA A-AAROM  in R sidelying, performed R single leg bridges x 10 reps, pt would benefit from SNF       Time Calculation:    PT Charges     Row Name 01/09/22 1120             Time Calculation    Start Time 1044  -      Stop Time 1111  -      Time Calculation (min) 27 min  -AH      PT Received On 01/09/22  -              Time Calculation- PT    Total Timed Code Minutes- PT 27 minute(s)  -              Timed Charges    50257 - PT Therapeutic Exercise Minutes 27  -AH              Total Minutes    Timed Charges Total Minutes 27  -AH       Total Minutes 27  -AH            User Key  (r) = Recorded By, (t) = Taken By, (c) = Cosigned By    Initials Name Provider Type     Veronica Velasco PTA Physical Therapy Assistant              Therapy Charges for Today     Code Description Service Date Service Provider Modifiers Qty    82175708841 HC PT THER PROC EA 15 MIN 1/9/2022 Veronica Velasco PTA GP 2          PT G-Codes  Outcome Measure Options: AM-PAC 6 Clicks Daily Activity (OT)  AM-PAC 6 Clicks Score (PT): 9  AM-PAC 6 Clicks Score (OT): 14    Veronica Velasoc PTA  1/9/2022

## 2022-01-09 NOTE — PLAN OF CARE
Goal Outcome Evaluation:              Outcome Summary: Uneventful night, slept soundly, arouses with turning but quickly returns to sleep

## 2022-01-09 NOTE — PLAN OF CARE
Goal Outcome Evaluation:  Plan of Care Reviewed With: patient        Progress: no change  Outcome Summary: pt tends to keep her head turned to left, enncouraged pt to work on neck ROM and looking more to the right,pt peformed RLE A-AAROM x 15 reps, pt able to roll to right cga, performed L AKA A-AAROM  in R sidelying, performed R single leg bridges x 10 reps, pt would benefit from SNF

## 2022-01-09 NOTE — PLAN OF CARE
Goal Outcome Evaluation:  Plan of Care Reviewed With: patient           Outcome Summary: Pt has slept at intervals this shift. Dressing dry and intact to left AKA. Dr. Coates is to change dressing tomorrow and possible discharge to SNF. Po pain med given X 1.

## 2022-01-10 NOTE — PLAN OF CARE
Problem: Adult Inpatient Plan of Care  Goal: Plan of Care Review  Flowsheets (Taken 1/10/2022 1017)  Progress: improving  Plan of Care Reviewed With: patient  Outcome Summary: To improve her bed mobility and transfers sit-stand and bed-chair, pt. performed ue exs!   Goal Outcome Evaluation:  Plan of Care Reviewed With: patient        Progress: improving  Outcome Summary: To improve her bed mobility and transfers sit-stand and bed-chair, pt. performed ue exs!

## 2022-01-10 NOTE — THERAPY TREATMENT NOTE
Acute Care - Occupational Therapy Treatment Note  [facility]     Patient Name: Jennifer Salcido  : 1955  MRN: [id number]  Today's Date: 1/10/2022             Admit Date: 2022       ICD-10-CM ICD-9-CM   1. Pre-op evaluation  Z01.818 V72.84   2. Amputation stump necrosis (HCC)  T87.50 997.69   3. Impaired mobility  Z74.09 799.89   4. Decreased activities of daily living (ADL)  Z78.9 V49.89   5. Hypokalemia  E87.6 276.8   6. Peripheral vascular disease (HCC)  I73.9 443.9   7. Ischemia of foot  I99.8 459.9   8. Small vessel arterial disease due to type 2 diabetes mellitus (HCC)  E11.51 250.70     443.81   9. Hyponatremia  E87.1 276.1   10. Ischemic left leg  I99.8 459.9   11. Essential hypertension  I10 401.9   12. Dyslipidemia  E78.5 272.4   13. Chronic anemia  D64.9 285.9   14. Type 2 diabetes mellitus with chronic kidney disease on chronic dialysis, without long-term current use of insulin ([facility])  E11.22 250.40    N18.6 585.9    Z99.2 V45.11   15. Coronary artery disease involving native coronary artery of native heart without angina pectoris  I25.10 414.01   16. ESRD on peritoneal dialysis ([facility])  N18.6 585.6    Z99.2 V45.11     Patient Active Problem List   Diagnosis   • ESRD on peritoneal dialysis ([facility])   • Coronary artery disease involving native coronary artery of native heart without angina pectoris   • Type 2 diabetes mellitus, without long-term current use of insulin ([facility])   • Chronic anemia   • Dyslipidemia   • Essential hypertension   • Hypokalemia   • Ischemic left leg   • Peripheral vascular disease (HCC)   • Small vessel arterial disease due to type 2 diabetes mellitus (HCC)   • Hyponatremia   • Abdominal pain   • Abnormality of albumin   • Acidosis, metabolic   • Allergy, unspecified, initial encounter   • Anaphylactic shock, unspecified, initial encounter   • At risk for fluid volume imbalance   • Atherosclerosis of native artery of both lower extremities with intermittent claudication (HCC)   •  Dependence on renal dialysis ([facility])   • Encounter for adequacy testing for peritoneal dialysis ([facility])   • ESRD on peritoneal dialysis ([facility])   • Gas gangrene ([facility])   • Gastroesophageal reflux disease   • Hyperlipidemia   • Iron deficiency anemia   • Loss of consciousness ([facility])   • Moderate protein-calorie malnutrition ([facility])   • Hematemesis with nausea   • Obstructive sleep apnea syndrome   • Osteoarthritis   • Disorder of phosphorus metabolism, unspecified   • Pain, unspecified   • Dialysis-associated peritonitis ([facility])   • Pleural effusion, not elsewhere classified   • Renal osteodystrophy   • Secondary hyperparathyroidism of renal origin ([facility])   • Seizure disorder ([facility])   • Shortness of breath   • Sinus infection   • Type 2 diabetes mellitus with diabetic neuropathy, unspecified ([facility])   • Pre-op evaluation   • Amputation stump necrosis ([facility])     Past Medical History:   Diagnosis Date   • Arthritis     LEFT SHOULDER   • CHF (congestive heart failure) ([facility])    • Chronic pericarditis 4/22/2021   • Collar bone fracture 12/2020    SLING PLACED TO HEAL   • Coronary artery disease     LEFT MAIN AND 3 OTHER AREAS--CABG SCHEDULED FOR APRIL 22, 2021   • Dyslipidemia 9/28/2020   • Elevated cholesterol    • End stage kidney disease ([facility])     currently HD (5/19/2021), hopes to return to PD    • Essential hypertension 11/2/2018   • Gastroesophageal reflux disease without esophagitis 4/28/2021   • GERD (gastroesophageal reflux disease)    • Gout    • History of transfusion    • Hyperlipidemia    • Hypertension    • Overweight (BMI 25.0-29.9) 4/22/2021   • Peritoneal dialysis status ([facility])     EVERY NIGHT FOR 10 HOURS, (5/19/2021 currently on hold)   • PONV (postoperative nausea and vomiting)    • Sleep apnea     RESOLVED   • Status post gastric bypass for obesity 4/22/2021   • Type 2 diabetes mellitus with kidney complication, with long-term current use of insulin ([facility]) 11/2/2018     Past Surgical History:   Procedure Laterality Date    • ABOVE KNEE AMPUTATION Left 1/6/2022    Procedure: LEFT LOWER EXTREMITY AMPUTATION ABOVE KNEE;  Surgeon: Simon Coates MD;  Location:  [facility] HYBRID OR 12;  Service: Vascular;  Laterality: Left;   • BELOW KNEE AMPUTATION Left 12/13/2021    Procedure: LEFT LOWER EXTREMITY AMPUTATION BELOW KNEE;  Surgeon: Simon Coates MD;  Location:  [facility] OR;  Service: Vascular;  Laterality: Left;   • BREAST BIOPSY Right     FATTY TUMOR   • CARDIAC CATHETERIZATION N/A 10/1/2019    Procedure: Left Heart Cath;  Surgeon: Srikanth Coker MD;  Location:  [facility] CATH INVASIVE LOCATION;  Service: Cardiology   • CARDIAC CATHETERIZATION N/A 7/14/2020    Procedure: Left Heart Cath;  Surgeon: Srikanth Coker MD;  Location:  [facility] CATH INVASIVE LOC[facility];  Service: Cardiology;  Laterality: N/A;   • CARDIAC CATHETERIZATION N/A 3/23/2021    Procedure: Left Heart Cath;  Surgeon: Srikanth Coker MD;  Location:  [facility] CATH INVASIVE LOC[facility];  Service: Cardiology;  Laterality: N/A;   • CHEST TUBE INSERTION Right 6/7/2021    Procedure: CHEST TUBE INSERTION;  Surgeon: Antony Wood MD;  Location:  [facility] OR;  Service: Cardiothoracic;  Laterality: Right;   • CORONARY ANGIOPLASTY WITH STENT PLACEMENT  2016    X 2   • CORONARY ARTERY BYPASS GRAFT N/A 4/22/2021    Procedure: CORONARY ARTERY BYPASS GRAFT X 3 WITH LEFT INTERNAL MAMMARY ARTERY, RIGHT LOWER EXTREMITY ENDOSCOPIC VEIN HARVEST, AND PERFUSION; TRANSESOPHAGEAL ECHOCARDIOGRAM;  Surgeon: Antony Wood MD;  Location:  [facility] OR;  Service: Cardiothoracic;  Laterality: N/A;   • EYE SURGERY Bilateral     IOC LENS IMPLANTS   • EYE SURGERY Bilateral     RETINAL SURGERY   • GALLBLADDER SURGERY     • GASTRIC BYPASS     • HYSTERECTOMY     • INSERTION HEMODIALYSIS CATHETER Right 4/1/2021    Procedure: HEMODIALYSIS CATHETER INSERTION;  Surgeon: Simon Coates MD;  Location:  [facility] HYBRID OR 12;  Service: Vascular;  Laterality: Right;   • INSERTION HEMODIALYSIS CATHETER Right  12/20/2021    Procedure: HEMODIALYSIS CATHETER INSERTION;  Surgeon: Simon Coates MD;  Location: [facility] OR 12;  Service: Vascular;  Laterality: Right;   • INSERTION PERITONEAL DIALYSIS CATHETER     • OOPHORECTOMY Bilateral    • SINUS SURGERY     • SINUS SURGERY  07/1980   • TUBAL ABDOMINAL LIGATION           OT ASSESSMENT FLOWSHEET (last 12 hours)     OT Evaluation and Treatment     Row Name 01/10/22 1017                   OT Time and Intention    Subjective Information complains of; weakness; fatigue; pain  -        Document Type therapy note (daily note)  -        Mode of Treatment occupational therapy  -        Patient Effort adequate  -                  General Information    Existing Precautions/Restrictions fall  L.AKA!  -                  Pain Assessment    Additional Documentation Pain Scale: Word Pre/Post-Treatment (Group)  -                  Pain Scale: Numbers Pre/Post-Treatment    Pretreatment Pain Rating 5/10  -        Posttreatment Pain Rating 5/10  -        Pain Location - Side Left  -        Pain Location residual limb  -        Pain Intervention(s) Rest  -                  Bed Mobility    Comment (Bed Mobility) fowlers in bed!  -                  Motor Skills    Motor Skills therapeutic exercise  -        Therapeutic Exercise shoulder; elbow/forearm  -                  Shoulder (Therapeutic Exercise)    Shoulder (Therapeutic Exercise) AROM (active range of motion); strengthening exercise  -        Shoulder AROM (Therapeutic Exercise) bilateral; flexion; extension; sitting; 10 repetitions; 2 sets  -        Shoulder Strengthening (Therapeutic Exercise) bilateral; flexion; extension; sitting; 2 lb free weight; 3 lb free weight; 10 repetitions; 2 sets  -                  Elbow/Forearm (Therapeutic Exercise)    Elbow/Forearm (Therapeutic Exercise) AROM (active range of motion); strengthening exercise  -        Elbow/Forearm AROM (Therapeutic Exercise)  bilateral; flexion; extension; sitting; 10 repetitions; 2 sets  -        Elbow/Forearm Strengthening (Therapeutic Exercise) bilateral; flexion; extension; sitting; 2 lb free weight; 3 lb free weight; 10 repetitions; 2 sets  -CJ                  Wound 01/06/22 0905 Left distal thigh Amputation stump    Wound - Properties Group Placement Date: 01/06/22  -AC Placement Time: 0905  -AC Present on Hospital Admission: N  -AC Side: Left  -AC Orientation: distal  -AC Location: thigh  -AC Primary Wound Type: Amputation s  -AC Additional Comments: xeroform, fluffs, kerlix, ace wrap  -AC        Retired Wound - Properties Group Date first assessed: 01/06/22  -AC Time first assessed: 0905 -AC Present on Hospital Admission: N  -AC Side: Left  -AC Location: thigh  -AC Primary Wound Type: Amputation s  -AC Additional Comments: xeroform, fluffs, kerlix, ace wrap  -AC                  Wound 12/23/21 0700 Right gluteal Blisters    Wound - Properties Group Placement Date: 12/23/21  -SP Placement Time: 0700  -SP Side: Right  -SP Location: gluteal  -SP Primary Wound Type: Blisters  -SP        Retired Wound - Properties Group Date first assessed: 12/23/21  -SP Time first assessed: 0700  -SP Side: Right  -SP Location: gluteal  -SP Primary Wound Type: Blisters  -SP                  Positioning and Restraints    Pre-Treatment Position in bed  -CJ        Post Treatment Position bed  -CJ        In Bed fowlers; call light within reach; encouraged to call for assist; side rails up x2  -CJ                  Progress Summary (OT)    Progress Toward Functional Goals (OT) progress toward functional goals is fair  -        Barriers to Overall Progress (OT) Fall/pain/L. AKA!  -CJ        Impairments Still Limiting Function (OT) Plan d/c!  -CJ              User Key  (r) = Recorded By, (t) = Taken By, (c) = Cosigned By    Initials Name Effective Dates    CJ Cosme Rader COTA 06/16/21 -     AC Katja Moscoso RN 06/16/21 -     SP Ximena  MERRY Sandoval 07/09/21 -                  Occupational Therapy Education                 Title: PT OT SLP Therapies (In Progress)     Topic: Occupational Therapy (In Progress)     Point: ADL training (In Progress)     Description:   Instruct learner(s) on proper safety adaptation and remediation techniques during self care or transfers.   Instruct in proper use of assistive devices.              Learning Progress Summary           Patient Acceptance, E,D, NR by  at 1/7/2022 1158                   Point: Home exercise program (Done)     Description:   Instruct learner(s) on appropriate technique for monitoring, assisting and/or progressing therapeutic exercises/activities.              Learning Progress Summary           Patient Acceptance, E,TB, VU,DU,NR by  at 1/10/2022 1017    Comment: Pt. completes 2 sets x 10 reps ue exs!    Acceptance, E,D, NR by  at 1/7/2022 1158                   Point: Precautions (Done)     Description:   Instruct learner(s) on prescribed precautions during self-care and functional transfers.              Learning Progress Summary           Patient Acceptance, E,TB, VU,DU,NR by  at 1/10/2022 1017    Comment: Pt. completes 2 sets x 10 reps ue exs!    Acceptance, E,D, NR by  at 1/7/2022 1158                   Point: Body mechanics (Done)     Description:   Instruct learner(s) on proper positioning and spine alignment during self-care, functional mobility activities and/or exercises.              Learning Progress Summary           Patient Acceptance, E,TB, VU,DU,NR by  at 1/10/2022 1017    Comment: Pt. completes 2 sets x 10 reps ue exs!    Acceptance, E,D, NR by  at 1/7/2022 1158                               User Key     Initials Effective Dates Name Provider Type Discipline     06/16/21 -  Cosme Rader COTA Occupational Therapy Assistant OT     08/28/18 -  Ayanna Tejeda, OTR/L Occupational Therapist OT                  OT Recommendation and Plan     Progress Toward  Functional Goals (OT): progress toward functional goals is fair  Plan of Care Review  Plan of Care Reviewed With: patient  Progress: improving  Outcome Summary: To improve her bed mobility and transfers sit-stand and bed-chair, pt. performed ue exs!  Plan of Care Reviewed With: patient  Outcome Summary: To improve her bed mobility and transfers sit-stand and bed-chair, pt. performed ue exs!     Outcome Measures     Row Name 01/10/22 1017             How much help from another is currently needed...    Putting on and taking off regular lower body clothing? 1  -CJ      Bathing (including washing, rinsing, and drying) 2  -CJ      Toileting (which includes using toilet bed pan or urinal) 2  -CJ      Putting on and taking off regular upper body clothing 3  -CJ      Taking care of personal grooming (such as brushing teeth) 3  -CJ      Eating meals 3  -CJ      AM-PAC 6 Clicks Score (OT) 14  -CJ              Functional Assessment    Outcome Measure Options AM-PAC 6 Clicks Daily Activity (OT)  -CJ            User Key  (r) = Recorded By, (t) = Taken By, (c) = Cosigned By    Initials Name Provider Type    Cosme Chadwick COTA Occupational Therapy Assistant                Time Calculation:    Time Calculation- OT     Row Name 01/10/22 1017             Time Calculation- OT    OT Start Time 1017  -      OT Stop Time 1046  -      OT Time Calculation (min) 29 min  -CJ      Total Timed Code Minutes- OT 29 minute(s)  -CJ      OT Received On 01/10/22  -              Timed Charges    19427 - OT Therapeutic Exercise Minutes 29  -CJ              Total Minutes    Timed Charges Total Minutes 29  -CJ       Total Minutes 29  -CJ            User Key  (r) = Recorded By, (t) = Taken By, (c) = Cosigned By    Initials Name Provider Type    Cosme Chadwick COTA Occupational Therapy Assistant              Therapy Charges for Today     Code Description Service Date Service Provider Modifiers Qty    [id number]  OT THER PROC EA  15 MIN 1/10/2022 Cosme Rader COTA GO 2               TOI Velasco  1/10/2022

## 2022-01-10 NOTE — PROGRESS NOTES
Nephrology ([facility]) Progress Note      Patient:  Jennifer Salcido  YOB: 1955  Date of Service: 1/10/2022  MRN: [id number]   Acct: [id number]   Primary Care Physician: Akhil Golden DO  Advance Directive:   Code Status and Medical Interventions:   Ordered at: 22 1507     Level Of Support Discussed With:    Patient     Code Status (Patient has no pulse and is not breathing):    CPR (Attempt to Resuscitate)     Medical Interventions (Patient has pulse or is breathing):    Full Support     Admit Date: 2022       Hospital Day: 5  Referring Provider: No Known Provider      Patient personally seen and examined.  Complete chart including Consults, Notes, Operative Reports, Labs, Cardiology, and Radiology studies reviewed as able.        Subjective:  Jennifer Salcido is a 66 y.o. female for whom we were consulted for evaluation and treatment of end stage renal disease on hemodialysis. Previously was on peritoneal dialysis but had switched to hemodialysis in order to facilitate placement at nursing home.  History of hypertension, CAD with prior CABG, PVD, and hyperlipidemia. Recently underwent left BKA on . Continued to have poor healing and drainage from stump. Admitted on  for left above knee amputation. She had dialysis later on  following her surgery. Tolerated well. Additional HD on     Today is awake, alert. Looking forward to discharge today. No new overnight issues.    Allergies:  Codeine, Demerol [meperidine], Levaquin [levofloxacin], Lisinopril, Morphine, Sulfasalazine, Ultram [tramadol], and Trental [pentoxifylline]    Home Meds:  Medications Prior to Admission   Medication Sig Dispense Refill Last Dose   • acetaminophen (TYLENOL) 325 MG tablet Take 650 mg by mouth Every 4 (Four) Hours As Needed for Mild Pain .      • [] cephalexin (KEFLEX) 500 MG capsule Take 500 mg by mouth 2 (Two) Times a Day.      • allopurinol (ZYLOPRIM) 100 MG  tablet Take 100 mg by mouth Daily.      • aluminum-magnesium hydroxide-simethicone (MAALOX MAX) 400-400-40 MG/5ML suspension Take 15 mL by mouth Every 6 (Six) Hours As Needed for Heartburn.      • ascorbic acid (VITAMIN C) 500 MG tablet Take 500 mg by mouth Daily.      • aspirin 81 MG EC tablet Take 81 mg by mouth Daily.      • atorvastatin (LIPITOR) 20 MG tablet Take 1 tablet by mouth Every Night. 90 tablet 4    • bisacodyl (Dulcolax) 10 MG suppository Insert 10 mg into the rectum Daily As Needed for Constipation.      • calcium carbonate (TUMS) 500 MG chewable tablet Chew 2 tablets Every Night.      • citalopram (CeleXA) 40 MG tablet Take 40 mg by mouth Daily.      • clopidogrel (PLAVIX) 75 MG tablet Take 75 mg by mouth Daily.      • cyclobenzaprine (FLEXERIL) 5 MG tablet Take 1 tablet by mouth 3 (Three) Times a Day As Needed for Muscle Spasms.      • docusate sodium (COLACE) 100 MG capsule Take 200 mg by mouth Daily As Needed for Constipation.      • famotidine (PEPCID) 20 MG tablet Take 1 tablet by mouth Daily. 30 tablet 0    • gabapentin (NEURONTIN) 300 MG capsule Take 1 capsule by mouth Every 12 (Twelve) Hours. 24 capsule 0    • gentamicin (GARAMYCIN) 0.1 % cream APPLY TO EXIT SITE AS NEEDED      • Melatonin 5 MG capsule Take 5 mg by mouth Every Night.      • midodrine (PROAMATINE) 10 MG tablet Take 1 tablet by mouth 3 (Three) Times a Day Before Meals.      • Multiple Vitamins-Minerals (MULTIVITAMIN ADULT PO) Take 1 tablet by mouth Daily.      • nitroglycerin (NITROSTAT) 0.4 MG SL tablet 1 under the tongue as needed for angina, may repeat q5mins for up three doses 25 tablet 3    • oxyCODONE-acetaminophen (PERCOCET) 5-325 MG per tablet Take 1 tablet by mouth Every 4 (Four) Hours As Needed for Moderate Pain . 24 tablet 0    • sucralfate (CARAFATE) 1 g tablet Take 1 g by mouth 4 (Four) Times a Day Before Meals & at Bedtime. Dissolve 1 tablet in water to create a slurry and drink      • Sucroferric Oxyhydroxide  (Velphoro) 500 MG chewable tablet Chew 2 tablets 3 (Three) Times a Day Before Meals.      • vitamin D3 125 MCG (5000 UT) capsule capsule Take 1 tablet by mouth Daily.          Medicines:  Current Facility-Administered Medications   Medication Dose Route Frequency Provider Last Rate Last Admin   • aspirin EC tablet 81 mg  81 mg Oral Daily Simon Coates MD   81 mg at 01/10/22 0920   • atorvastatin (LIPITOR) tablet 20 mg  20 mg Oral Nightly Simon Coates MD   20 mg at 01/09/22 2114   • bisacodyl (DULCOLAX) EC tablet 10 mg  10 mg Oral Daily PRN Simon Coates MD   10 mg at 01/09/22 0917   • citalopram (CeleXA) tablet 40 mg  40 mg Oral Daily Simon Coates MD   40 mg at 01/10/22 0920   • clopidogrel (PLAVIX) tablet 75 mg  75 mg Oral Daily Simon Coates MD   75 mg at 01/10/22 0920   • dextrose (D50W) (25 g/50 mL) IV injection 25 g  25 g Intravenous Q15 Min PRN Simon Coates MD       • dextrose (GLUTOSE) oral gel 15 g  15 g Oral Q15 Min PRN Simon Coates MD       • docusate sodium (COLACE) capsule 100 mg  100 mg Oral BID Simon Coates MD   100 mg at 01/10/22 0920   • enoxaparin (LOVENOX) syringe 30 mg  30 mg Subcutaneous Q24H Simon Coates MD   30 mg at 01/10/22 0622   • epoetin jani-epbx (RETACRIT) injection 10,000 Units  10,000 Units Subcutaneous Once per day on Mon Wed Fri Davon Stallings MD   10,000 Units at 01/10/22 0921   • famotidine (PEPCID) tablet 20 mg  20 mg Oral Daily Simon Coates MD   20 mg at 01/10/22 0920   • gabapentin (NEURONTIN) capsule 300 mg  300 mg Oral Q12H Simon Coates MD   300 mg at 01/10/22 0920   • glucagon (human recombinant) (GLUCAGEN DIAGNOSTIC) injection 1 mg  1 mg Subcutaneous Q15 Min PRN Simon Coates MD       • heparin (porcine) injection 3,200 Units  3,200 Units Intracatheter PRN Davon Stallings MD   3,200 Units at 01/06/22 1940   • HYDROcodone-acetaminophen (NORCO)  5-325 MG per tablet 1 tablet  1 tablet Oral Q4H PRN Simon Coates MD   1 tablet at 01/09/22 0340   • HYDROmorphone (DILAUDID) injection 0.5 mg  0.5 mg Intravenous Q2H PRN Simon Coates MD   0.5 mg at 01/08/22 1458    And   • naloxone (NARCAN) injection 0.4 mg  0.4 mg Intravenous Q5 Min PRN Simon Coates MD       • insulin lispro (humaLOG) injection 0-9 Units  0-9 Units Subcutaneous TID AC Simon Coates MD       • midodrine (PROAMATINE) tablet 10 mg  10 mg Oral TID AC Simon Coates MD   10 mg at 01/10/22 0920   • ondansetron (ZOFRAN) tablet 4 mg  4 mg Oral Q6H PRN Simon Coates MD        Or   • ondansetron (ZOFRAN) injection 4 mg  4 mg Intravenous Q6H PRN Simon Coates MD       • oxyCODONE-acetaminophen (PERCOCET)  MG per tablet 1 tablet  1 tablet Oral Q6H PRN Simon Coates MD   1 tablet at 01/10/22 0622   • sodium chloride 0.9 % bolus 100 mL  100 mL Intravenous PRN Davon Stallings MD       • sodium chloride 0.9 % flush 10 mL  10 mL Intravenous Q12H Simon Coates MD   10 mL at 01/09/22 2113   • sodium chloride 0.9 % flush 10 mL  10 mL Intravenous PRN Simon Coates MD           Past Medical History:  Past Medical History:   Diagnosis Date   • Arthritis     LEFT SHOULDER   • CHF (congestive heart failure) (HCC)    • Chronic pericarditis 4/22/2021   • Collar bone fracture 12/2020    SLING PLACED TO HEAL   • Coronary artery disease     LEFT MAIN AND 3 OTHER AREAS--CABG SCHEDULED FOR APRIL 22, 2021   • Dyslipidemia 9/28/2020   • Elevated cholesterol    • End stage kidney disease (HCC)     currently HD (5/19/2021), hopes to return to [facility]    • Essential hypertension 11/2/2018   • Gastroesophageal reflux disease without esophagitis 4/28/2021   • GERD (gastroesophageal reflux disease)    • Gout    • History of transfusion    • Hyperlipidemia    • Hypertension    • Overweight (BMI 25.0-29.9) 4/22/2021   • Peritoneal dialysis  status ([facility])     EVERY NIGHT FOR 10 HOURS, (5/19/2021 currently on hold)   • PONV (postoperative nausea and vomiting)    • Sleep apnea     RESOLVED   • Status post gastric bypass for obesity 4/22/2021   • Type 2 diabetes mellitus with kidney complication, with long-term current use of insulin ([facility]) 11/2/2018       Past Surgical History:  Past Surgical History:   Procedure Laterality Date   • ABOVE KNEE AMPUTATION Left 1/6/2022    Procedure: LEFT LOWER EXTREMITY AMPUTATION ABOVE KNEE;  Surgeon: Simon Coates MD;  Location:  [facility] HYBRID OR 12;  Service: Vascular;  Laterality: Left;   • BELOW KNEE AMPUTATION Left 12/13/2021    Procedure: LEFT LOWER EXTREMITY AMPUTATION BELOW KNEE;  Surgeon: Simon Coates MD;  Location:  [facility] OR;  Service: Vascular;  Laterality: Left;   • BREAST BIOPSY Right     FATTY TUMOR   • CARDIAC CATHETERIZATION N/A 10/1/2019    Procedure: Left Heart Cath;  Surgeon: Srikanth Coker MD;  Location:  [facility] CATH INVASIVE LOCATION;  Service: Cardiology   • CARDIAC CATHETERIZATION N/A 7/14/2020    Procedure: Left Heart Cath;  Surgeon: Srikanth Coker MD;  Location:  [facility] CATH INVASIVE LOC[facility];  Service: Cardiology;  Laterality: N/A;   • CARDIAC CATHETERIZATION N/A 3/23/2021    Procedure: Left Heart Cath;  Surgeon: Srikanth Coker MD;  Location:  [facility] CATH INVASIVE LOC[facility];  Service: Cardiology;  Laterality: N/A;   • CHEST TUBE INSERTION Right 6/7/2021    Procedure: CHEST TUBE INSERTION;  Surgeon: Antony Wood MD;  Location: [facility] OR;  Service: Cardiothoracic;  Laterality: Right;   • CORONARY ANGIOPLASTY WITH STENT PLACEMENT  2016    X 2   • CORONARY ARTERY BYPASS GRAFT N/A 4/22/2021    Procedure: CORONARY ARTERY BYPASS GRAFT X 3 WITH LEFT INTERNAL MAMMARY ARTERY, RIGHT LOWER EXTREMITY ENDOSCOPIC VEIN HARVEST, AND PERFUSION; TRANSESOPHAGEAL ECHOCARDIOGRAM;  Surgeon: Antony Wood MD;  Location: [facility] OR;  Service: Cardiothoracic;  Laterality: N/A;   • EYE SURGERY  Bilateral     IOC LENS IMPLANTS   • EYE SURGERY Bilateral     RETINAL SURGERY   • GALLBLADDER SURGERY     • GASTRIC BYPASS     • HYSTERECTOMY     • INSERTION HEMODIALYSIS CATHETER Right 2021    Procedure: HEMODIALYSIS CATHETER INSERTION;  Surgeon: Simon Coates MD;  Location:  [facility] HYBRID OR 12;  Service: Vascular;  Laterality: Right;   • INSERTION HEMODIALYSIS CATHETER Right 2021    Procedure: HEMODIALYSIS CATHETER INSERTION;  Surgeon: Simon Coates MD;  Location:  [facility] HYBRID OR 12;  Service: Vascular;  Laterality: Right;   • INSERTION PERITONEAL DIALYSIS CATHETER     • OOPHORECTOMY Bilateral    • SINUS SURGERY     • SINUS SURGERY  1980   • TUBAL ABDOMINAL LIGATION         Family History  Family History   Problem Relation Age of Onset   • Hypertension Mother    • Lung disease Mother    • Heart disease Father    • Diabetes Father    • Abnormal EKG Father    • Breast cancer Neg Hx        Social History  Social History     Socioeconomic History   • Marital status:    Tobacco Use   • Smoking status: Former Smoker     Packs/day: 0.00     Years: 2.00     Pack years: 0.00     Types: Cigarettes     Quit date:      Years since quittin.0   • Smokeless tobacco: Never Used   Vaping Use   • Vaping Use: Never used   Substance and Sexual Activity   • Alcohol use: Not Currently     Comment: haven't drank in 3 years ( 2018)    • Drug use: No   • Sexual activity: Defer       Review of Systems:  History obtained from chart review and the patient  General ROS: No fever or chills  Respiratory ROS: No cough, shortness of breath, wheezing  Cardiovascular ROS: No chest pain or palpitations  Gastrointestinal ROS: No abdominal pain or melena  Genito-Urinary ROS: No dysuria or hematuria  Psych ROS: No anxiety and depression  14 point ROS reviewed with the patient and negative except as noted above and in the HPI unless unable to obtain.    Objective:  Patient Vitals for the past 24 hrs:   BP  Temp Temp src Pulse Resp SpO2   01/10/22 0745 143/60 98.9 °F (37.2 °C) Oral 78 16 99 %   01/10/22 0456 153/71 98.9 °F (37.2 °C) Oral 83 16 100 %   01/09/22 2346 147/63 98.6 °F (37 °C) Oral 73 16 99 %   01/09/22 1949 159/71 98.9 °F (37.2 °C) Oral 80 16 99 %     No intake or output data in the 24 hours ending 01/10/22 1040  General: awake/alert   Chest:  clear to auscultation bilaterally without respiratory distress  CVS: regular rate and rhythm  Abdominal: soft, nontender, positive bowel sounds  Extremities: left AKA and no cyanosis or edema  Skin: warm and dry without rash      Labs:  Results from last 7 days   Lab Units 01/10/22  0703 01/09/22  0758 01/08/22  0547   WBC 10*3/mm3 5.08 5.46 6.76   HEMOGLOBIN g/dL 8.0* 7.3* 7.4*   HEMATOCRIT % 26.3* 24.2* 23.5*   PLATELETS 10*3/mm3 330 301 296         Results from last 7 days   Lab Units 01/10/22  0703 01/09/22  0758 01/08/22  0547 01/06/22  0530 01/05/22  1515   SODIUM mmol/L 132* 135* 135*   < > 135*   POTASSIUM mmol/L 3.9 3.9 4.5   < > 3.6   CHLORIDE mmol/L 98 101 103   < > 94*   CO2 mmol/L 24.0 26.0 27.0   < > 34.0*   BUN mg/dL 20 12 17   < > 13   CREATININE mg/dL 4.62* 3.03* 4.13*   < > 4.43*   CALCIUM mg/dL 8.1* 8.2* 8.8   < > 8.4*   BILIRUBIN mg/dL  --   --   --   --  0.2   ALK PHOS U/L  --   --   --   --  123*   ALT (SGPT) U/L  --   --   --   --  6   AST (SGOT) U/L  --   --   --   --  22   GLUCOSE mg/dL 77 123* 125*   < > 117*    < > = values in this interval not displayed.       Radiology:   Imaging Results (Last 72 Hours)     ** No results found for the last 72 hours. **          Culture:  No results found for: BLOODCX, URINECX, WOUNDCX, MRSACX, RESPCX, STOOLCX      Assessment   1.  End stage renal disease on HD TTS  2.  Type 2 diabetes with nephropathy  3.  Hypertension  4.  S/p left AKA on 1/06  5.  Anemia of CKD    Plan:  1.  Dialysis next due 1/11  2.  OK for discharge from renal standpoint. Follow up will be via dialysis clinic      Dick Fisher,  APRN  1/10/2022  10:40 CST

## 2022-01-10 NOTE — PLAN OF CARE
Goal Outcome Evaluation:  Plan of Care Reviewed With: patient        Progress: improving  Outcome Summary: PT tx completed. PT c/o L residual pn. ModA x 2 bed mobility, ModA x 2 sit<>stand at bedside. Decreased balance and tolerance to activity. Benefit from rehab/SNF.

## 2022-01-10 NOTE — THERAPY TREATMENT NOTE
Acute Care - Physical Therapy Treatment Note  [facility]     Patient Name: Jennifer Salcido  : 1955  MRN: [id number]  Today's Date: 1/10/2022      Visit Dx:     ICD-10-CM ICD-9-CM   1. Pre-op evaluation  Z01.818 V72.84   2. Amputation stump necrosis (HCC)  T87.50 997.69   3. Impaired mobility  Z74.09 799.89   4. Decreased activities of daily living (ADL)  Z78.9 V49.89     Patient Active Problem List   Diagnosis   • ESRD on peritoneal dialysis (HCC)   • Coronary artery disease involving native coronary artery of native heart without angina pectoris   • Type 2 diabetes mellitus, without long-term current use of insulin (HCC)   • Chronic anemia   • Dyslipidemia   • Essential hypertension   • Hypokalemia   • Ischemic left leg   • Peripheral vascular disease (HCC)   • Small vessel arterial disease due to type 2 diabetes mellitus (HCC)   • Hyponatremia   • Abdominal pain   • Abnormality of albumin   • Acidosis, metabolic   • Allergy, unspecified, initial encounter   • Anaphylactic shock, unspecified, initial encounter   • At risk for fluid volume imbalance   • Atherosclerosis of native artery of both lower extremities with intermittent claudication ([facility])   • Dependence on renal dialysis (HCC)   • Encounter for adequacy testing for peritoneal dialysis ([facility])   • ESRD on peritoneal dialysis ([facility])   • Gas gangrene ([facility])   • Gastroesophageal reflux disease   • Hyperlipidemia   • Iron deficiency anemia   • Loss of consciousness ([facility])   • Moderate protein-calorie malnutrition (HCC)   • Hematemesis with nausea   • Obstructive sleep apnea syndrome   • Osteoarthritis   • Disorder of phosphorus metabolism, unspecified   • Pain, unspecified   • Dialysis-associated peritonitis (HCC)   • Pleural effusion, not elsewhere classified   • Renal osteodystrophy   • Secondary hyperparathyroidism of renal origin ([facility])   • Seizure disorder ([facility])   • Shortness of breath   • Sinus infection   • Type 2 diabetes mellitus with diabetic  neuropathy, unspecified ([facility])   • Pre-op evaluation   • Amputation stump necrosis ([facility])     Past Medical History:   Diagnosis Date   • Arthritis     LEFT SHOULDER   • CHF (congestive heart failure) ([facility])    • Chronic pericarditis 4/22/2021   • Collar bone fracture 12/2020    SLING PLACED TO HEAL   • Coronary artery disease     LEFT MAIN AND 3 OTHER AREAS--CABG SCHEDULED FOR APRIL 22, 2021   • Dyslipidemia 9/28/2020   • Elevated cholesterol    • End stage kidney disease ([facility])     currently HD (5/19/2021), hopes to return to PD    • Essential hypertension 11/2/2018   • Gastroesophageal reflux disease without esophagitis 4/28/2021   • GERD (gastroesophageal reflux disease)    • Gout    • History of transfusion    • Hyperlipidemia    • Hypertension    • Overweight (BMI 25.0-29.9) 4/22/2021   • Peritoneal dialysis status ([facility])     EVERY NIGHT FOR 10 HOURS, (5/19/2021 currently on hold)   • PONV (postoperative nausea and vomiting)    • Sleep apnea     RESOLVED   • Status post gastric bypass for obesity 4/22/2021   • Type 2 diabetes mellitus with kidney complication, with long-term current use of insulin ([facility]) 11/2/2018     Past Surgical History:   Procedure Laterality Date   • ABOVE KNEE AMPUTATION Left 1/6/2022    Procedure: LEFT LOWER EXTREMITY AMPUTATION ABOVE KNEE;  Surgeon: Simon Coates MD;  Location: [facility] HYBRID OR 12;  Service: Vascular;  Laterality: Left;   • BELOW KNEE AMPUTATION Left 12/13/2021    Procedure: LEFT LOWER EXTREMITY AMPUTATION BELOW KNEE;  Surgeon: Simon Coatse MD;  Location: [facility] OR;  Service: Vascular;  Laterality: Left;   • BREAST BIOPSY Right     FATTY TUMOR   • CARDIAC CATHETERIZATION N/A 10/1/2019    Procedure: Left Heart Cath;  Surgeon: Srikanth Coker MD;  Location:  [facility] CATH INVASIVE LOCATION;  Service: Cardiology   • CARDIAC CATHETERIZATION N/A 7/14/2020    Procedure: Left Heart Cath;  Surgeon: Srikanth Coker MD;  Location:  [facility] CATH INVASIVE LOCATION;   Service: Cardiology;  Laterality: N/A;   • CARDIAC CATHETERIZATION N/A 3/23/2021    Procedure: Left Heart Cath;  Surgeon: Srikanth Coker MD;  Location:  [facility] CATH INVASIVE LOCATION;  Service: Cardiology;  Laterality: N/A;   • CHEST TUBE INSERTION Right 6/7/2021    Procedure: CHEST TUBE INSERTION;  Surgeon: Antony Wood MD;  Location:  [facility] OR;  Service: Cardiothoracic;  Laterality: Right;   • CORONARY ANGIOPLASTY WITH STENT PLACEMENT  2016    X 2   • CORONARY ARTERY BYPASS GRAFT N/A 4/22/2021    Procedure: CORONARY ARTERY BYPASS GRAFT X 3 WITH LEFT INTERNAL MAMMARY ARTERY, RIGHT LOWER EXTREMITY ENDOSCOPIC VEIN HARVEST, AND PERFUSION; TRANSESOPHAGEAL ECHOCARDIOGRAM;  Surgeon: Antony Wood MD;  Location:  [facility] OR;  Service: Cardiothoracic;  Laterality: N/A;   • EYE SURGERY Bilateral     IOC LENS IMPLANTS   • EYE SURGERY Bilateral     RETINAL SURGERY   • GALLBLADDER SURGERY     • GASTRIC BYPASS     • HYSTERECTOMY     • INSERTION HEMODIALYSIS CATHETER Right 4/1/2021    Procedure: HEMODIALYSIS CATHETER INSERTION;  Surgeon: Simon Coates MD;  Location:  [facility] HYBRID OR 12;  Service: Vascular;  Laterality: Right;   • INSERTION HEMODIALYSIS CATHETER Right 12/20/2021    Procedure: HEMODIALYSIS CATHETER INSERTION;  Surgeon: Simon Coates MD;  Location:  [facility] HYBRID OR 12;  Service: Vascular;  Laterality: Right;   • INSERTION PERITONEAL DIALYSIS CATHETER     • OOPHORECTOMY Bilateral    • SINUS SURGERY     • SINUS SURGERY  07/1980   • TUBAL ABDOMINAL LIGATION       PT Assessment (last 12 hours)     PT Evaluation and Treatment     Row Name 01/10/22 0996          Physical Therapy Time and Intention    Subjective Information complains of; pain  -KJ     Document Type therapy note (daily note)  -KJ     Mode of Treatment physical therapy  -KJ     Patient Effort adequate  -KJ     Row Name 01/10/22 0929          General Information    Existing Precautions/Restrictions fall  L AKA  -KJ     Row Name  01/10/22 0945          Pain Scale: Numbers Pre/Post-Treatment    Pretreatment Pain Rating 6/10  -KJ     Posttreatment Pain Rating 6/10  -KJ     Pain Location - Side Left  -KJ     Pain Location - Orientation generalized  -KJ     Pain Location residual limb  -KJ     Row Name 01/10/22 0945          Bed Mobility    Rolling Right [place] (Bed Mobility) verbal cues; minimum assist (75% patient effort)  -KJ     Scooting/Bridging [place] (Bed Mobility) minimum assist (75% patient effort)  -KJ     Supine-Sit [place] (Bed Mobility) verbal cues; moderate assist (50% patient effort); 2 person assist  -KJ     Sit-Supine [place] (Bed Mobility) moderate assist (50% patient effort)  -KJ     Assistive Device (Bed Mobility) bed rails  -KJ     Row Name 01/10/22 0945          Transfers    Sit-Stand [place] (Transfers) verbal cues; moderate assist (50% patient effort); 2 person assist  -KJ     Row Name 01/10/22 0945          Sit-Stand Transfer    Assistive Device (Sit-Stand Transfers) walker, front-wheeled  -KJ     Row Name 01/10/22 0945          Gait/Stairs (Locomotion)    Comment (Gait/Stairs) stood x 3 at bedside  -KJ     Row Name 01/10/22 0945          Aerobic Exercise    Comment, Aerobic Exercise (Therapeutic Exercise) AROM RLE  -KJ     Row Name             Wound 01/06/22 0905 Left distal thigh Amputation stump    Wound - Properties Group Placement Date: 01/06/22 - Placement Time: 0905 -AC Present on Hospital Admission: N  -AC Side: Left  -AC Orientation: distal  -AC Location: thigh  -AC Primary Wound Type: Amputation s  -AC Additional Comments: xeroform, fluffs, kerlix, ace wrap  -AC     Retired Wound - Properties Group Date first assessed: 01/06/22  - Time first assessed: 0905 - Present on Hospital Admission: N  -AC Side: Left  -AC Location: thigh  -AC Primary Wound Type: Amputation s  -AC Additional Comments: xeroform, fluffs, kerlix, ace wrap  -AC     Row Name             Wound 12/23/21 0700  Right gluteal Blisters    Wound - Properties Group Placement Date: 12/23/21  -SP Placement Time: 0700  -SP Side: Right  -SP Location: gluteal  -SP Primary Wound Type: Blisters  -SP     Retired Wound - Properties Group Date first assessed: 12/23/21  -SP Time first assessed: 0700  -SP Side: Right  -SP Location: gluteal  -SP Primary Wound Type: Blisters  -SP     Row Name 01/10/22 0945          Positioning and Restraints    Pre-Treatment Position in bed  -KJ     Post Treatment Position bed  -KJ     In Bed call light within reach  -KJ           User Key  (r) = Recorded By, (t) = Taken By, (c) = Cosigned By    Initials Name Provider Type    Katheryn Giordano PTA Physical Therapy Assistant    Katja Beckford, RN Registered Nurse    Jaime Cook RN Registered Nurse                Physical Therapy Education                 Title: PT OT SLP Therapies (In Progress)     Topic: Physical Therapy (In Progress)     Point: Mobility training (Done)     Learning Progress Summary           Patient Acceptance, E, VU by MS at 1/7/2022 1040    Comment: role of PT in her care                   Point: Home exercise program (Not Started)     Learner Progress:  Not documented in this visit.          Point: Body mechanics (Not Started)     Learner Progress:  Not documented in this visit.          Point: Precautions (Done)     Learning Progress Summary           Patient Acceptance, E,TB, VU by  at 1/9/2022 1120    Comment: neck postioning                               User Key     Initials Effective Dates Name Provider Type Discipline     06/16/21 -  Veronica Velasco PTA Physical Therapy Assistant PT    MS 06/19/18 -  Analisa Hinson, PT, DPT, NCS Physical Therapist PT              PT Recommendation and Plan     Plan of Care Reviewed With: patient  Progress: improving  Outcome Summary: PT tx completed. PT c/o L residual pn. ModA x 2 bed mobility, ModA x 2 sit<>stand at bedside. Decreased balance and tolerance to activity.  Benefit from rehab/SNF.       Time Calculation:    PT Charges     Row Name 01/10/22 1036             Time Calculation    Start Time 0945  -KJ      Stop Time 1009  -KJ      Time Calculation (min) 24 min  -KJ      PT Received On 01/10/22  -KJ      PT Goal Re-Cert Due Date 01/17/22  -KJ              Time Calculation- PT    Total Timed Code Minutes- PT 24 minute(s)  -KJ            User Key  (r) = Recorded By, (t) = Taken By, (c) = Cosigned By    Initials Name Provider Type    Katheryn Giordano PTA Physical Therapy Assistant              Therapy Charges for Today     Code Description Service Date Service Provider Modifiers Qty    89869925755 HC PT THER PROC EA 15 MIN 1/10/2022 Katheryn Woods PTA GP 1    45381408621 HC PT THERAPEUTIC ACT EA 15 MIN 1/10/2022 Katheryn Woods PTA GP 1          PT G-Codes  Outcome Measure Options: AM-PAC 6 Clicks Daily Activity (OT)  AM-PAC 6 Clicks Score (PT): 9  AM-PAC 6 Clicks Score (OT): 14    Katheryn Woods PTA  1/10/2022

## 2022-01-10 NOTE — CASE MANAGEMENT/SOCIAL WORK
Continued Stay Note   [place]     Patient Name: Jennifer Salcido  MRN: [id number]  Today's Date: 1/10/2022    Admit Date: 1/5/2022     Discharge Plan     Row Name 01/10/22 1006       Plan    Plan [facility]    Patient/Family in Agreement with Plan yes    Final Discharge Disposition Code 03 - skilled nursing facility (SNF)    Final Note Pt is being d/c'ed to [facility] [phone] today at the skilled level. Any rx will need to be faxed to them at [phone]. Spoke with pt's son, Troy [phone], to inform. Pt will go by [facility] [phone].               Discharge Codes    No documentation.               Expected Discharge Date and Time     Expected Discharge Date Expected Discharge Time    [place] 10, 2022             KELSEY Portillo

## 2022-01-10 NOTE — DISCHARGE SUMMARY
Date of Discharge:  1/10/2022    Discharge Diagnosis: Necrosis of left BKA stump    Presenting Problem/History of Present Illness  Pre-op evaluation [Z01.818]       Hospital Course  Patient is a 66 y.o. female with past medical history including diabetes, hypertension, hyperlipidemia, end-stage renal disease currently on dialysis, CAD status post CABG, and significant peripheral arterial disease.  I had seen her back in early December when she presented here with left foot pain.  She had been following at [facility] and undergone multiple interventions with Dr. Riley and was found to have significant tibial and small vessel disease in the left lower leg and foot.  She was having chronic rest pain to that foot and ultimately had been recommended to undergo below-knee amputation.  She presented here for second opinion and after reviewing all of the previous imaging as well as her recent angiogram with Dr. Riley I had agreed with that recommendation.  As such she opted to undergo a left below-knee amputation which was done on 12/13.  Preoperatively we have discussed that given her peripheral arterial disease one of the risks of the procedure was that she may have issues with healing and if that should occur she may require conversion to an above-knee amputation.  She initially tolerated the below-knee amputation well and gone to a nursing facility.  Here in the hospital prior to discharge she had a very small area of blister to the posterior flap but it was otherwise healthy and healing well.  Also during that admission I placed a right internal jugular vein permacath as she had previously been on peritoneal dialysis but the only facilities that could take her for continued rehab would not take peritoneal dialysis and so she was switched to dialysis.  Following discharge we had been contacted by the nursing home as she had developed some bloody drainage from the medial aspect of her incision as well as some  ecchymosis.  She was seen in our wound care center at that time and noted that she had bumped her stump a few times despite having a stump protector.  It appeared that she had developed hematoma and had some ecchymotic changes and some desquamation of epidermis to the posterior flap.  Plan was to attempt local care of the area to try and salvage the BKA however she was to return back to the office on 1/5 and has had further breakdown of that posterior flap with some eschar along the staple line and further drainage of likely hematoma with some signs of infection.  Given these findings she was directed to the emergency room for admission and further intervention. She was admitted, and ultimately inderwent conversion to a left above knee amputation on 1/6. She tolerated the procedure well. She was transferred back to  post-op for ongoing care. Dressing was unveiled today, and amputation stump is healing well with staples in place. Mepilex dressing was placed over the staple line today, and ACE wrap applied for ongoing compression. On exam, no sign of hematoma or infection. Pain is controlled on PO meds. She has continued on regular hemodialysis via her R IJ permacath while here with no issue. She had has a bed available at [facility], and will return there today. She can continue PT as tolerated, and I will see her back in my office in one month for staple removal. All questions were answered at the bedside this AM.     Procedures Performed  Procedure(s):  LEFT LOWER EXTREMITY AMPUTATION ABOVE KNEE       Consults:   Consults     Date and Time Order Name Status Description    1/5/2022  3:57 PM Inpatient Nephrology Consult Completed     12/9/2021  2:54 PM Inpatient Nephrology Consult Completed             Condition on Discharge:  Stable    Discharge Medications     Discharge Medications      New Medications      Instructions Start Date   HYDROcodone-acetaminophen 5-325 MG per tablet  Commonly known as: NORCO   1 tablet,  Oral, Every 4 Hours PRN         Continue These Medications      Instructions Start Date   acetaminophen 325 MG tablet  Commonly known as: TYLENOL   650 mg, Oral, Every 4 Hours PRN      allopurinol 100 MG tablet  Commonly known as: ZYLOPRIM   100 mg, Oral, Daily      aluminum-magnesium hydroxide-simethicone 400-400-40 MG/5ML suspension  Commonly known as: MAALOX MAX   15 mL, Oral, Every 6 Hours PRN      ascorbic acid 500 MG tablet  Commonly known as: VITAMIN C   500 mg, Oral, Daily      aspirin 81 MG EC tablet   81 mg, Oral, Daily      atorvastatin 20 MG tablet  Commonly known as: LIPITOR   20 mg, Oral, Nightly      calcium carbonate 500 MG chewable tablet  Commonly known as: TUMS   2 tablets, Oral, Nightly      citalopram 40 MG tablet  Commonly known as: CeleXA   40 mg, Oral, Daily      clopidogrel 75 MG tablet  Commonly known as: PLAVIX   75 mg, Oral, Daily      cyclobenzaprine 5 MG tablet  Commonly known as: FLEXERIL   5 mg, Oral, 3 Times Daily PRN      docusate sodium 100 MG capsule  Commonly known as: COLACE   200 mg, Oral, Daily PRN      Dulcolax 10 MG suppository  Generic drug: bisacodyl   10 mg, Rectal, Daily PRN      famotidine 20 MG tablet  Commonly known as: PEPCID   20 mg, Oral, Daily      gabapentin 300 MG capsule  Commonly known as: NEURONTIN   300 mg, Oral, Every 12 Hours Scheduled      gentamicin 0.1 % cream  Commonly known as: GARAMYCIN   APPLY TO EXIT SITE AS NEEDED      Melatonin 5 MG capsule   5 mg, Oral, Nightly      midodrine 10 MG tablet  Commonly known as: PROAMATINE   10 mg, Oral, 3 Times Daily Before Meals      multivitamin with minerals tablet tablet   1 tablet, Oral, Daily      nitroglycerin 0.4 MG SL tablet  Commonly known as: NITROSTAT   1 under the tongue as needed for angina, may repeat q5mins for up three doses      sucralfate 1 g tablet  Commonly known as: CARAFATE   1 g, Oral, 4 Times Daily Before Meals & Nightly, Dissolve 1 tablet in water to create a slurry and drink      vitamin  D3 125 MCG (5000 UT) capsule capsule   1 tablet, Oral, Daily         Stop These Medications    cephalexin 500 MG capsule  Commonly known as: KEFLEX     oxyCODONE-acetaminophen 5-325 MG per tablet  Commonly known as: PERCOCET        ASK your doctor about these medications      Instructions Start Date   Velphoro 500 MG chewable tablet  Generic drug: Sucroferric Oxyhydroxide   2 tablets, Oral, 3 Times Daily Before Meals             Discharge Diet:   Diet Instructions     Advance Diet As Tolerated      Diet: Regular, Consistent Carbohydrate      Discharge Diet:  Regular  Consistent Carbohydrate             Activity at Discharge:   Activity Instructions     Activity as Tolerated            Follow-up Appointments  Future Appointments   Date Time Provider Department Center   2/28/2022 11:15 AM Srikanth Coker MD [facility]     Additional Instructions for the Follow-ups that You Need to Schedule     Discharge Follow-up with Specified Provider: Vascular-Dr. Coates; 1 Month   As directed      To: Vascular-Dr. Coates    Follow Up: 1 Month                Simon Coates MD  01/10/22  10:04 CST

## 2022-01-10 NOTE — PLAN OF CARE
Goal Outcome Evaluation:              Outcome Summary: C/o surgical pain, treated with p.o. analgesic. VSS with systolic in 140s-150s. SpO2 high 90s on room air. Pt wishes to discharge to home.

## 2022-01-11 NOTE — THERAPY DISCHARGE NOTE
Acute Care - Occupational Therapy Discharge Summary  [facility]     Patient Name: Jennifer Salcido  : 1955  MRN: [id number]    Today's Date: 2022                 Admit Date: 2022        OT Recommendation and Plan    Visit Dx:    ICD-10-CM ICD-9-CM   1. Pre-op evaluation  Z01.818 V72.84   2. Amputation stump necrosis (HCC)  T87.50 997.69   3. Impaired mobility  Z74.09 799.89   4. Decreased activities of daily living (ADL)  Z78.9 V49.89   5. Hypokalemia  E87.6 276.8   6. Peripheral vascular disease (HCC)  I73.9 443.9   7. Ischemia of foot  I99.8 459.9   8. Small vessel arterial disease due to type 2 diabetes mellitus (HCC)  E11.51 250.70     443.81   9. Hyponatremia  E87.1 276.1   10. Ischemic left leg  I99.8 459.9   11. Essential hypertension  I10 401.9   12. Dyslipidemia  E78.5 272.4   13. Chronic anemia  D64.9 285.9   14. Type 2 diabetes mellitus with chronic kidney disease on chronic dialysis, without long-term current use of insulin ([facility])  E11.22 250.40    N18.6 585.9    Z99.2 V45.11   15. Coronary artery disease involving native coronary artery of native heart without angina pectoris  I25.10 414.01   16. ESRD on peritoneal dialysis (HCC)  N18.6 585.6    Z99.2 V45.11                OT Rehab Goals     Row Name 22 0800             Transfer Goal 1 (OT)    Activity/Assistive Device (Transfer Goal 1, OT) bed-to-chair/chair-to-bed; walker, rolling; commode, 3-in-1; wheelchair transfer  -TS      [place] Level/Cues Needed (Transfer Goal 1, OT) moderate assist (50-74% patient effort); verbal cues required  -TS      Time Frame (Transfer Goal 1, OT) long term goal (LTG); 10 days  -TS      Progress/Outcome (Transfer Goal 1, OT) goal not met  -TS              Dressing Goal 1 (OT)    Activity/Device (Dressing Goal 1, OT) lower body dressing  -TS      [place]/Cues Needed (Dressing Goal 1, OT) minimum assist (75% or more patient effort); verbal cues required  -TS      Time Frame (Dressing Goal 1,  OT) 10 days; long term goal (LTG)  -TS      Progress/Outcome (Dressing Goal 1, OT) goal not met  -TS              Toileting Goal 1 (OT)    Activity/Device (Toileting Goal 1, OT) toileting skills, all; commode, 3-in-1  -TS      [place] Level/Cues Needed (Toileting Goal 1, OT) verbal cues required; moderate assist (50-74% patient effort)  -TS      Time Frame (Toileting Goal 1, OT) 10 days; long term goal (LTG)  -TS      Progress/Outcome (Toileting Goal 1, OT) goal not met  -TS            User Key  (r) = Recorded By, (t) = Taken By, (c) = Cosigned By    Initials Name Provider Type Discipline    TS Rosalee Avelar COTA Occupational Therapy Assistant OT                 Outcome Measures     Row Name 01/10/22 1017             How much help from another is currently needed...    Putting on and taking off regular lower body clothing? 1  -CJ      Bathing (including washing, rinsing, and drying) 2  -CJ      Toileting (which includes using toilet bed pan or urinal) 2  -CJ      Putting on and taking off regular upper body clothing 3  -CJ      Taking care of personal grooming (such as brushing teeth) 3  -CJ      Eating meals 3  -CJ      AM-PAC 6 Clicks Score (OT) 14  -CJ              Functional Assessment    Outcome Measure Options AM-PAC 6 Clicks Daily Activity (OT)  -CJ            User Key  (r) = Recorded By, (t) = Taken By, (c) = Cosigned By    Initials Name Provider Type    CJ Cosme Rader COTA Occupational Therapy Assistant                Timed Therapy Charges  Total Units: 2    Charges  Total Units: 2    Procedure Name Documented Minutes Units Code    HC OT THER PROC EA 15 MIN 29  2    22277 (CPT®)               Documented Minutes  Total Minutes: 29    Therapy Provided Minutes    14067 - OT Therapeutic Exercise Minutes 29                    OT Discharge Summary  Anticipated Discharge Disposition (OT): skilled nursing facility  Reason for Discharge: Discharge from facility  Outcomes Achieved: Refer to plan  of care for updates on goals achieved  Discharge Destination: [facility]      TOI Franks  1/11/2022

## 2022-01-11 NOTE — THERAPY DISCHARGE NOTE
Acute Care - Physical Therapy Discharge Summary  [facility]       Patient Name: Jennifer Salcido  : 1955  MRN: [id number]    Today's Date: 2022                 Admit Date: 2022      PT Recommendation and Plan    Visit Dx:    ICD-10-CM ICD-9-CM   1. Pre-op evaluation  Z01.818 V72.84   2. Amputation stump necrosis (HCC)  T87.50 997.69   3. Impaired mobility  Z74.09 799.89   4. Decreased activities of daily living (ADL)  Z78.9 V49.89   5. Hypokalemia  E87.6 276.8   6. Peripheral vascular disease (HCC)  I73.9 443.9   7. Ischemia of foot  I99.8 459.9   8. Small vessel arterial disease due to type 2 diabetes mellitus ([facility])  E11.51 250.70     443.81   9. Hyponatremia  E87.1 276.1   10. Ischemic left leg  I99.8 459.9   11. Essential hypertension  I10 401.9   12. Dyslipidemia  E78.5 272.4   13. Chronic anemia  D64.9 285.9   14. Type 2 diabetes mellitus with chronic kidney disease on chronic dialysis, without long-term current use of insulin ([facility])  E11.22 250.40    N18.6 585.9    Z99.2 V45.11   15. Coronary artery disease involving native coronary artery of native heart without angina pectoris  I25.10 414.01   16. ESRD on peritoneal dialysis ([facility])  N18.6 585.6    Z99.2 V45.11        Outcome Measures     Row Name 01/10/22 1017             How much help from another is currently needed...    Putting on and taking off regular lower body clothing? 1  -CJ      Bathing (including washing, rinsing, and drying) 2  -CJ      Toileting (which includes using toilet bed pan or urinal) 2  -CJ      Putting on and taking off regular upper body clothing 3  -CJ      Taking care of personal grooming (such as brushing teeth) 3  -CJ      Eating meals 3  -CJ      AM-PAC 6 Clicks Score (OT) 14  -CJ              Functional Assessment    Outcome Measure Options AM-PAC 6 Clicks Daily Activity (OT)  -CJ            User Key  (r) = Recorded By, (t) = Taken By, (c) = Cosigned By    Initials Name Provider Type    CJ Prasanth, Cosme R, CM  Occupational Therapy Assistant                     PT Rehab Goals     Row Name 01/11/22 0726             Bed Mobility Goal 1 (PT)    Activity/Assistive Device (Bed Mobility Goal 1, PT) bed mobility activities, all  -      [place] Level/Cues Needed (Bed Mobility Goal 1, PT) minimum assist (75% or more patient effort); tactile cues required; verbal cues required  -      Time Frame (Bed Mobility Goal 1, PT) long term goal (LTG); by discharge  -      Progress/Outcomes (Bed Mobility Goal 1, PT) goal not met  -              Transfer Goal 1 (PT)    Activity/Assistive Device (Transfer Goal 1, PT) sit-to-stand/stand-to-sit; bed-to-chair/chair-to-bed; walker, rolling  -      [place] Level/Cues Needed (Transfer Goal 1, PT) minimum assist (75% or more patient effort); tactile cues required; verbal cues required  -      Time Frame (Transfer Goal 1, PT) long term goal (LTG); by discharge  -      Progress/Outcome (Transfer Goal 1, PT) goal not met  -              Problem Specific Goal 1 (PT)    Problem Specific Goal 1 (PT) pt will maintain standing for 5 min with CGA with use of RW to increase strength and improve balance  -      Time Frame (Problem Specific Goal 1, PT) long-term goal (LTG); by discharge  -      Progress/Outcome (Problem Specific Goal 1, PT) goal not met  -            User Key  (r) = Recorded By, (t) = Taken By, (c) = Cosigned By    Initials Name Provider Type Discipline    Veronica Brooks PTA Physical Therapy Assistant PT                    PT Discharge Summary  Reason for Discharge: Discharge from facility  Outcomes Achieved: Refer to plan of care for updates on goals achieved  Discharge Destination: [facility]      Veronica Velasco PTA   1/11/2022

## 2022-01-14 PROBLEM — Z89.612 S/P AKA (ABOVE KNEE AMPUTATION), LEFT (HCC): Status: ACTIVE | Noted: 2022-01-01

## 2022-01-14 PROBLEM — K92.2 GASTROINTESTINAL HEMORRHAGE: Status: ACTIVE | Noted: 2022-01-01

## 2022-01-14 PROBLEM — K62.5 RECTAL BLEEDING: Status: ACTIVE | Noted: 2022-01-01

## 2022-01-14 PROBLEM — D62 ACUTE BLOOD LOSS ANEMIA: Status: ACTIVE | Noted: 2022-01-01

## 2022-01-14 PROBLEM — R57.8 HEMORRHAGIC SHOCK (HCC): Status: ACTIVE | Noted: 2022-01-01

## 2022-01-14 NOTE — H&P
[facility]  HISTORY AND PHYSICAL    Date of Admission: 1/14/2022  Primary Care Physician: Akhil Golden DO    Subjective     Chief Complaint: Rectal bleeding, low blood pressure.     History of Present Illness  This is a 66-year-old  female patient who has currently been at [facility] since being dismissed from the hospital on 12/23 and then again on 1/10.  I am familiar with this patient as I took care of her in December 2021.  She at that time had undergone a left BKA secondary to a severely ischemic left lower extremity resulting in a chronic left foot wound.  At that time, she was on peritoneal dialysis and ultimately switched over to hemodialysis so that she could go to rehab postdischarge.  No one would take her with peritoneal dialysis needs.  She was discharged from that stay on 12/23 and then had to come back on 1/5 to have her stump revised and ended up having an AKA at that time.  She was again discharged on 1/10.  All of her vascular interventions have been done by Dr. Coates.  She still has staples to the stump.    She comes back in today because she has been noting rectal bleeding over the last 2-3 days.  She has seen it herself.  She states that at times it has been dark at times and has been bright red.  She came in today with hypotension and a hemoglobin of 6.7.  Dr. Payne placed a left IJ central line for blood administration.  She is also on 0.03 of norepinephrine at this point.  He had her evaluated by Dr. Ruano with general surgery.  There were some concerns that she might be having significant bleeding from hemorrhoidal source.  It is felt that this is not the source at this point.  History is given that the patient has had multiple gastrointestinal bleeds in the past and these have been investigated with panendoscopy.  An exact source of bleeding has not been identified in the past.  I do not have any  records to this effect.  I also looked through all of our records as well as care everywhere records from [facility], [place] and [facility].    Nursing and Dr. Ruano have discussed the case with her sons.  They wish for her to be a DNR/DNI.  They wish for her to receive supportive measures, blood transfusion, and pressors if needed, however, they do not wish for anything aggressive to be done beyond that.  We will satisfy their wishes and see how she does over the next 24-48 hours.    She is alert and coherent.  She provided adequate history.  She remembered me from her previous hospitalization.  She is frustrated at her failing health.  She also does not wish to do anything overly aggressive at this point.  I am she wants something to drink.      Review of Systems   Otherwise complete ROS reviewed and negative except as mentioned in the HPI.    Past Medical History:   Past Medical History:   Diagnosis Date   • Arthritis     LEFT SHOULDER   • CHF (congestive heart failure) ([facility])    • Chronic pericarditis 4/22/2021   • Collar bone fracture 12/2020    SLING PLACED TO HEAL   • Coronary artery disease     LEFT MAIN AND 3 OTHER AREAS--CABG SCHEDULED FOR APRIL 22, 2021   • Dyslipidemia 9/28/2020   • Elevated cholesterol    • End stage kidney disease (HCC)     currently HD (5/19/2021), hopes to return to PD    • Essential hypertension 11/2/2018   • Gastroesophageal reflux disease without esophagitis 4/28/2021   • GERD (gastroesophageal reflux disease)    • Gout    • History of transfusion    • Hyperlipidemia    • Hypertension    • Overweight (BMI 25.0-29.9) 4/22/2021   • Peritoneal dialysis status ([facility])     EVERY NIGHT FOR 10 HOURS, (5/19/2021 currently on hold)   • PONV (postoperative nausea and vomiting)    • Sleep apnea     RESOLVED   • Status post gastric bypass for obesity 4/22/2021   • Type 2 diabetes mellitus with kidney complication, with long-term current use of insulin ([facility]) 11/2/2018     Past Surgical  History:  Past Surgical History:   Procedure Laterality Date   • ABOVE KNEE AMPUTATION Left 1/6/2022    Procedure: LEFT LOWER EXTREMITY AMPUTATION ABOVE KNEE;  Surgeon: Simon Coates MD;  Location:  [facility] HYBRID OR 12;  Service: Vascular;  Laterality: Left;   • BELOW KNEE AMPUTATION Left 12/13/2021    Procedure: LEFT LOWER EXTREMITY AMPUTATION BELOW KNEE;  Surgeon: Simon Coates MD;  Location:  [facility] OR;  Service: Vascular;  Laterality: Left;   • BREAST BIOPSY Right     FATTY TUMOR   • CARDIAC CATHETERIZATION N/A 10/1/2019    Procedure: Left Heart Cath;  Surgeon: Srikanth Coker MD;  Location:  [facility] CATH INVASIVE LOCATION;  Service: Cardiology   • CARDIAC CATHETERIZATION N/A 7/14/2020    Procedure: Left Heart Cath;  Surgeon: Srikanth Coker MD;  Location:  [facility] CATH INVASIVE LOC[facility];  Service: Cardiology;  Laterality: N/A;   • CARDIAC CATHETERIZATION N/A 3/23/2021    Procedure: Left Heart Cath;  Surgeon: Srikanth Coker MD;  Location:  [facility] CATH INVASIVE [facility];  Service: Cardiology;  Laterality: N/A;   • CHEST TUBE INSERTION Right 6/7/2021    Procedure: CHEST TUBE INSERTION;  Surgeon: Antony Wood MD;  Location:  [facility] OR;  Service: Cardiothoracic;  Laterality: Right;   • CORONARY ANGIOPLASTY WITH STENT PLACEMENT  2016    X 2   • CORONARY ARTERY BYPASS GRAFT N/A 4/22/2021    Procedure: CORONARY ARTERY BYPASS GRAFT X 3 WITH LEFT INTERNAL MAMMARY ARTERY, RIGHT LOWER EXTREMITY ENDOSCOPIC VEIN HARVEST, AND PERFUSION; TRANSESOPHAGEAL ECHOCARDIOGRAM;  Surgeon: Antony Wood MD;  Location:  [facility] OR;  Service: Cardiothoracic;  Laterality: N/A;   • EYE SURGERY Bilateral     IOC LENS IMPLANTS   • EYE SURGERY Bilateral     RETINAL SURGERY   • GALLBLADDER SURGERY     • GASTRIC BYPASS     • HYSTERECTOMY     • INSERTION HEMODIALYSIS CATHETER Right 4/1/2021    Procedure: HEMODIALYSIS CATHETER INSERTION;  Surgeon: Simon Coates MD;  Location:  [facility] HYBRID OR 12;  Service: Vascular;   Laterality: Right;   • INSERTION HEMODIALYSIS CATHETER Right 12/20/2021    Procedure: HEMODIALYSIS CATHETER INSERTION;  Surgeon: Simon Coates MD;  Location: [place] [address];  Service: Vascular;  Laterality: Right;   • INSERTION PERITONEAL DIALYSIS CATHETER     • OOPHORECTOMY Bilateral    • SINUS SURGERY     • SINUS SURGERY  07/1980   • TUBAL ABDOMINAL LIGATION       Social History:  reports that she quit smoking about 14 years ago. Her smoking use included cigarettes. She smoked 0.00 packs per day for 2.00 years. She has never used smokeless tobacco. She reports previous alcohol use. She reports that she does not use drugs.    Family History: family history includes Abnormal EKG in her father; Diabetes in her father; Heart disease in her father; Hypertension in her mother; Lung disease in her mother.       Allergies:  Allergies   Allergen Reactions   • Codeine Nausea And Vomiting   • Demerol [Meperidine] Nausea Only   • Levaquin [Levofloxacin] Nausea And Vomiting   • Lisinopril Swelling   • Morphine Nausea And Vomiting   • Sulfasalazine Nausea And Vomiting   • Ultram [Tramadol] Mental Status Change   • Trental [Pentoxifylline] GI Intolerance       Medications:  Prior to Admission medications    Medication Sig Start Date End Date Taking? Authorizing Provider   acetaminophen (TYLENOL) 325 MG tablet Take 650 mg by mouth Every 4 (Four) Hours As Needed for Mild Pain .    Elmer Thao MD   allopurinol (ZYLOPRIM) 100 MG tablet Take 100 mg by mouth Daily.    Elmer Thao MD   aluminum-magnesium hydroxide-simethicone (MAALOX MAX) 400-400-40 MG/5ML suspension Take 15 mL by mouth Every 6 (Six) Hours As Needed for Heartburn. 12/23/21   Bob Carrizales MD   ascorbic acid (VITAMIN C) 500 MG tablet Take 500 mg by mouth Daily. 12/8/20   Elmer Thao MD   aspirin 81 MG EC tablet Take 81 mg by mouth Daily.    Elmer Thao MD   atorvastatin (LIPITOR) 20 MG tablet Take 1 tablet by mouth  Every Night. 7/19/21   Srikanth Coker MD   bisacodyl (Dulcolax) 10 MG suppository Insert 10 mg into the rectum Daily As Needed for Constipation.    Elmer Thao MD   calcium carbonate (TUMS) 500 MG chewable tablet Chew 2 tablets Every Night.    Elmer Thao MD   citalopram (CeleXA) 40 MG tablet Take 40 mg by mouth Daily.    Elmer Thao MD   clopidogrel (PLAVIX) 75 MG tablet Take 75 mg by mouth Daily.    Elmer Thao MD   cyclobenzaprine (FLEXERIL) 5 MG tablet Take 1 tablet by mouth 3 (Three) Times a Day As Needed for Muscle Spasms. 12/23/21   Bob Carrizales MD   docusate sodium (COLACE) 100 MG capsule Take 200 mg by mouth Daily As Needed for Constipation.    Elmer Thao MD   famotidine (PEPCID) 20 MG tablet Take 1 tablet by mouth Daily. 4/28/21   Antony Wood MD   gabapentin (NEURONTIN) 300 MG capsule Take 1 capsule by mouth Every 12 (Twelve) Hours. 1/10/22   Simon Coates MD   gentamicin (GARAMYCIN) 0.1 % cream APPLY TO EXIT SITE AS NEEDED 8/4/21   Elmer Thao MD   HYDROcodone-acetaminophen (NORCO) 5-325 MG per tablet Take 1 tablet by mouth Every 4 (Four) Hours As Needed for Moderate Pain  for up to 5 days. 1/10/22 1/15/22  Simon Coates MD   Melatonin 5 MG capsule Take 5 mg by mouth Every Night.    Elmer Thao MD   midodrine (PROAMATINE) 10 MG tablet Take 1 tablet by mouth 3 (Three) Times a Day Before Meals. 12/23/21   Bob Carrizales MD   Multiple Vitamins-Minerals (MULTIVITAMIN ADULT PO) Take 1 tablet by mouth Daily.    Elmer Thao MD   nitroglycerin (NITROSTAT) 0.4 MG SL tablet 1 under the tongue as needed for angina, may repeat q5mins for up three doses 5/11/21   Srikanth Coker MD   sucralfate (CARAFATE) 1 g tablet Take 1 g by mouth 4 (Four) Times a Day Before Meals & at Bedtime. Dissolve 1 tablet in water to create a slurry and drink    Elmer Thao MD   Sucroferric Oxyhydroxide (Velphoro) 500 MG  "chewable tablet Chew 2 tablets 3 (Three) Times a Day Before Meals.    Provider, MD Elmer   vitamin D3 125 MCG (5000 UT) capsule capsule Take 1 tablet by mouth Daily.    Provider, MD Elmer     I have utilized all available immediate resources to obtain, update, and review the patient's current medications.    Objective     Vital Signs: BP (!) 87/48   Pulse 93   Temp 97.7 °F (36.5 °C)   Resp 18   Ht 160 cm (63\")   Wt 68.9 kg (152 lb)   SpO2 100%   BMI 26.93 kg/m²   Physical Exam  Constitutional:       Appearance: She is well-developed.      Comments: Chronically ill-appearing 66-year-old  female patient.  No family at the bedside.   HENT:      Head: Normocephalic and atraumatic.   Eyes:      Conjunctiva/sclera: Conjunctivae normal.      Pupils: Pupils are equal, round, and reactive to light.   Neck:      Vascular: No JVD.   Cardiovascular:      Rate and Rhythm: Normal rate and regular rhythm.      Heart sounds: Normal heart sounds. No murmur heard.  No friction rub. No gallop.       Comments: Right IJ permacath, left IJ CVC.  Pulmonary:      Effort: Pulmonary effort is normal. No respiratory distress.      Breath sounds: Normal breath sounds. No wheezing or rales.   Chest:      Chest wall: No tenderness.   Abdominal:      General: Bowel sounds are normal. There is no distension.      Palpations: Abdomen is soft.      Tenderness: There is no abdominal tenderness. There is no guarding or rebound.   Musculoskeletal:         General: No tenderness or deformity. Normal range of motion.      Cervical back: Neck supple.      Comments: Left AKA site is clean.  Some bruising in the midportion of the surgical site.  Staples are intact.   Skin:     General: Skin is warm and dry.      Findings: No rash.   Neurological:      General: No focal deficit present.      Mental Status: She is alert and oriented to person, place, and time.      Cranial Nerves: No cranial nerve deficit.      Motor: Weakness " present. No abnormal muscle tone.      Deep Tendon Reflexes: Reflexes normal.   Psychiatric:      Comments: Flat affect, but conversational.        Results Reviewed:  Lab Results (last 24 hours)     Procedure Component Value Units Date/Time    Johnson Draw [[id number]] Collected: 01/14/22 1100    Specimen: Blood from Arm, Right Updated: 01/14/22 1201    Narrative:      The following orders were created for panel order Johnson Draw.  Procedure                               Abnormality         Status                     ---------                               -----------         ------                     Red Top[[id number]]                                          Final result               Johnson Blood Culture Lalit...[[id number]]                      Final result               [place] Top[[id number]]                                         In process                   Please view results for these tests on the individual orders.    Johnson Blood Culture Bottle Set [[id number]] Collected: 01/14/22 1100    Specimen: Blood from Arm, Right Updated: 01/14/22 1201     Extra Tube Hold for add-ons.     Comment: Auto resulted.       Red Top [[id number]] Collected: 01/14/22 1100    Specimen: Blood from Arm, Right Updated: 01/14/22 1201     Extra Tube Hold for add-ons.     Comment: Auto resulted.       Comprehensive Metabolic Panel [[id number]]  (Abnormal) Collected: 01/14/22 1100    Specimen: Blood from Arm, Right Updated: 01/14/22 1158     Glucose 127 mg/dL      BUN 12 mg/dL      Creatinine 3.01 mg/dL      Sodium 134 mmol/L      Potassium 3.3 mmol/L      Chloride 93 mmol/L      CO2 27.0 mmol/L      Calcium 8.3 mg/dL      Total Protein 5.2 g/dL      Albumin 2.10 g/dL      ALT (SGPT) <5 U/L      AST (SGOT) 17 U/L      Alkaline Phosphatase 133 U/L      Total Bilirubin 0.2 mg/dL      eGFR Non African Amer 16 mL/min/1.73      Globulin 3.1 gm/dL      A/G Ratio 0.7 g/dL      BUN/Creatinine Ratio 4.0     Anion Gap 14.0 mmol/L     Narrative:       GFR Normal >60  Chronic Kidney Disease <60  Kidney Failure <15      CBC & Differential [[id number]]  (Abnormal) Collected: 01/14/22 1100    Specimen: Blood from Arm, Right Updated: 01/14/22 1151    Narrative:      The following orders were created for panel order CBC & Differential.  Procedure                               Abnormality         Status                     ---------                               -----------         ------                     CBC Auto Differential[[id number]]        Abnormal            Final result                 Please view results for these tests on the individual orders.    CBC Auto Differential [[id number]]  (Abnormal) Collected: 01/14/22 1100    Specimen: Blood from Arm, Right Updated: 01/14/22 1151     WBC 11.11 10*3/mm3      RBC 2.12 10*6/mm3      Hemoglobin 6.7 g/dL      Hematocrit 22.4 %      .7 fL      MCH 31.6 pg      MCHC 29.9 g/dL      RDW 17.2 %      RDW-SD 66.5 fl      MPV 10.5 fL      Platelets 360 10*3/mm3      Neutrophil % 84.3 %      Lymphocyte % 7.7 %      Monocyte % 6.9 %      Eosinophil % 0.6 %      Basophil % 0.0 %      Immature Grans % 0.5 %      Neutrophils, Absolute 9.36 10*3/mm3      Lymphocytes, Absolute 0.85 10*3/mm3      Monocytes, Absolute 0.77 10*3/mm3      Eosinophils, Absolute 0.07 10*3/mm3      Basophils, Absolute 0.00 10*3/mm3      Immature Grans, Absolute 0.06 10*3/mm3      nRBC 0.0 /100 WBC     Protime-INR [[id number]]  (Abnormal) Collected: 01/14/22 1100    Specimen: Blood from Arm, Right Updated: 01/14/22 1143     Protime 14.3 Seconds      INR 1.15    aPTT [[id number]]  (Abnormal) Collected: 01/14/22 1100    Specimen: Blood from Arm, Right Updated: 01/14/22 1143     PTT 45.5 seconds     [place] Top [[id number]] Collected: 01/14/22 1100    Specimen: Blood from Arm, Right Updated: 01/14/22 1124        Imaging Results (Last 24 Hours)     Procedure Component Value Units Date/Time    XR Chest 1 View [[id number]] Resulted: 01/14/22 1351     Updated:  01/14/22 1351    XR Chest 1 View [[id number]] Collected: 01/14/22 1205     Updated: 01/14/22 1209    Narrative:      EXAMINATION: XR CHEST 1 VW-     1/14/2022 11:43 AM CST     HISTORY: Hypotension     1 view chest x-ray.     Comparison is made with December 20, 2021.     Stable heart size.  CABG changes.     Unchanged right jugular permacath.     Well-expanded lungs with no pneumonia, pneumothorax, or congestive  failure.     Summary:  1. No acute disease.  This report was finalized on 01/14/2022 12:06 by Dr. Fracisco Leblanc MD.        I have personally reviewed and interpreted the radiology studies and ECG obtained at time of admission.     Assessment / Plan     Assessment:   Active Hospital Problems    Diagnosis    • **Rectal bleeding    • Acute blood loss anemia    • Hemorrhagic shock superimposed on chronic hypotension    • Peripheral vascular disease (HCC)    • Coronary artery disease involving native coronary artery of native heart without angina pectoris    • Diet-controlled diabetes mellitus (HCC)    • S/P AKA (above knee amputation), left (HCC)    • ESRD on hemodialysis (previously on PD)      Plan:   The patient will be admitted to my service here at [facility].  She presents to the emergency department with weakness, low blood pressure, and rectal bleeding.    Hemoglobin was 8.4 on 1/11.  6.7 at presentation today.  Ordered 2 units packed red blood cells by the emergency department.  Blood infusing at the time that I saw her.  Serial H&H. Monitor for further bleeding.  IV Protonix.    We need to stop Plavix, but do need to continue aspirin.  She has a history of CABG in April 2021 and had coronary stent placement in 2016.  She also has severe peripheral vascular disease.    She is currently on a small dose of norepinephrine.  She has chronic hypotension at baseline and takes midodrine.    The ER consulted Dr. Ruano with general surgery to assess for possible brisk bleeding or hemorrhoidal  bleeding in need of surgical intervention.  Nothing of the sort is seen at this time.  The patient and family also do not wish for any significant intervention.  She has had panendoscopy multiple times in the past according to her and her family.  I have no records on this.  They state that no bleeding sources ever been found. No plans to consult gastroenterology at this time.  Continue transfusion and supportive care.  Allow clear liquids for now.    Consult nephrology for ongoing dialysis.  She has been dialyzing on Tuesday, Thursday, and Saturday recently.  She was previously on peritoneal dialysis.      Reconcile and resume other outpatient medications as appropriate.    SCD to the right lower extremity for DVT prophylaxis.    Code Status/Advanced Care Plan: DNR/DNI per her sons and herself.     The patient's surrogate decision maker is her son, Troy Salcido.     I discussed my findings and recommendations with the patient.    Estimated length of stay is indeterminate at present.     The patient was seen and examined by me on 01/14/22 at 1420.    Electronically signed by Feng Vides DO, 01/14/22, 14:52 CST.

## 2022-01-14 NOTE — CASE MANAGEMENT/SOCIAL WORK
Discharge Planning Assessment  [facility]     Patient Name: Jennifer Salcido  MRN: [id number]  Today's Date: 1/14/2022    Admit Date: 1/14/2022     Discharge Needs Assessment     Row Name 01/14/22 1435       Living Environment    Lives With facility resident    Name(s) of Who Lives With Patient [facility]    Current Living Arrangements extended care facility    Quality of Family Relationships supportive; involved       Resource/Environmental Concerns    Resource/Environmental Concerns none       Transition Planning    Patient/Family Anticipates Transition to long-term care facility    Patient/Family Anticipated Services at Transition skilled nursing    Transportation Anticipated family or friend will provide; health plan transportation       Discharge Needs Assessment    Current Outpatient/Agency/Support Group skilled nursing facility    Concerns to be Addressed care coordination/care conferences; discharge planning    Outpatient/Agency/Support Group Needs skilled nursing facility    Discharge Facility/Level of Care Needs nursing facility, skilled    Current Discharge Risk chronically ill    Discharge Coordination/Progress Patient is currently residing at [facility].  Social service consult received regarding her sons wanting her to be a long term resident at [facility].  SW has informed Sarah with [facility] admissions of sons' request.  Plan at this time is for patient to return to [facility] upon discharge [phone].  Patient is a dialysis patient.               Discharge Plan    No documentation.               Continued Care and Services - Admitted Since 1/14/2022    Coordination has not been started for this encounter.     Selected Continued Care - Prior Encounters Includes selections from prior encounters from 10/16/2021 to 1/14/2022    Discharged on 1/10/2022 Admission date: 1/5/2022 - Discharge disposition: Skilled Nursing Facility (DC - External)    Destination     Service Provider Selected Services Address Phone  Fax Patient Preferred    [facility]  Skilled Nursing [address], [facility] [address] [phone] --                Discharged on 12/23/2021 Admission date: 12/9/2021 - Discharge disposition: Skilled Nursing Facility (DC - External)    Destination     Service Provider Selected Services Address Phone Fax Patient Preferred    [facility]  Skilled Nursing [address], [facility] [address] [phone] --                       Demographic Summary    No documentation.                Functional Status    No documentation.                Psychosocial    No documentation.                Abuse/Neglect    No documentation.                Legal    No documentation.                Substance Abuse    No documentation.                Patient Forms    No documentation.                   MATTIE ScottW

## 2022-01-14 NOTE — ED NOTES
Michael perez, [phone], and soham, [phone] to be contacted in case of change condition.  Pt has been made DNR per michael after discussing with dr barnard.     Antony Muniz RN  01/14/22 1695

## 2022-01-14 NOTE — CASE MANAGEMENT/SOCIAL WORK
Sons requested to talk with someone about NH.  SPoke to both sons.  One lives here and the other lives in [facility].  They are both present.  Pt is in [facility] for rehab, the son says.  They are feeling that she is not rehab material at this time and wants permanent placement in the NH.  They do not feel she will not ever be capable of living on her own again.  She has altered mental status now and has had 2 surgeries to further amputate her leg.  I encouraged them to speak to the SW at the NH since she is already there and also told them that our SW will be discussing things with them also and will be available to them.  Dr Payne has already told them that she will be admitted today. I will enter an SW consult.  .11:51 CST

## 2022-01-14 NOTE — ED NOTES
Dr andino at bedside.  Dark, tarry, bloody stool removed per dr andino.     Antony Muniz RN  01/14/22 9594

## 2022-01-14 NOTE — ED PROVIDER NOTES
Maximilian   Is a 66 years old who came to the ED with with lower GI bleeding.  She is on Plavix and has been on chronic hemodialysis.  She is somewhat confused.  Came to the ER for evaluation      Rectal Bleeding  Quality:  Bright red and maroon  Amount:  Copious  Timing:  Constant  Chronicity:  New  Context: hemorrhoids    Context: not anal fissures and not rectal injury    Similar prior episodes: no    Relieved by:  Nothing  Worsened by:  Nothing  Ineffective treatments:  None tried  Associated symptoms: no abdominal pain, no dizziness, no hematemesis, no light-headedness and no recent illness    Risk factors: anticoagulant use    Risk factors: no hx of colorectal cancer, no hx of IBD and no liver disease        Review of Systems   Constitutional: Negative.    HENT: Negative.    Eyes: Negative.    Respiratory: Negative.    Cardiovascular: Negative.    Gastrointestinal: Positive for hematochezia. Negative for abdominal pain and hematemesis.   Endocrine: Negative.    Genitourinary: Negative.    Skin: Negative.    Neurological: Negative.  Negative for dizziness and light-headedness.   Hematological: Negative.    All other systems reviewed and are negative.      Past Medical History:   Diagnosis Date   • Arthritis     LEFT SHOULDER   • CHF (congestive heart failure) (HCC)    • Chronic pericarditis 4/22/2021   • Collar bone fracture 12/2020    SLING PLACED TO HEAL   • Coronary artery disease     LEFT MAIN AND 3 OTHER AREAS--CABG SCHEDULED FOR APRIL 22, 2021   • Dyslipidemia 9/28/2020   • Elevated cholesterol    • End stage kidney disease (HCC)     currently HD (5/19/2021), hopes to return to [facility]    • Essential hypertension 11/2/2018   • Gastroesophageal reflux disease without esophagitis 4/28/2021   • GERD (gastroesophageal reflux disease)    • Gout    • History of transfusion    • Hyperlipidemia    • Hypertension    • Overweight (BMI 25.0-29.9) 4/22/2021   • Peritoneal dialysis status (HCC)     EVERY NIGHT FOR 10  HOURS, (5/19/2021 currently on hold)   • PONV (postoperative nausea and vomiting)    • Sleep apnea     RESOLVED   • Status post gastric bypass for obesity 4/22/2021   • Type 2 diabetes mellitus with kidney complication, with long-term current use of insulin ([facility]) 11/2/2018       Allergies   Allergen Reactions   • Codeine Nausea And Vomiting   • Demerol [Meperidine] Nausea Only   • Levaquin [Levofloxacin] Nausea And Vomiting   • Lisinopril Swelling   • Morphine Nausea And Vomiting   • Sulfasalazine Nausea And Vomiting   • Ultram [Tramadol] Mental Status Change   • Trental [Pentoxifylline] GI Intolerance       Past Surgical History:   Procedure Laterality Date   • ABOVE KNEE AMPUTATION Left 1/6/2022    Procedure: LEFT LOWER EXTREMITY AMPUTATION ABOVE KNEE;  Surgeon: Simon Coates MD;  Location:  [facility] HYBRID OR 12;  Service: Vascular;  Laterality: Left;   • BELOW KNEE AMPUTATION Left 12/13/2021    Procedure: LEFT LOWER EXTREMITY AMPUTATION BELOW KNEE;  Surgeon: Simon Coates MD;  Location:  [facility] OR;  Service: Vascular;  Laterality: Left;   • BREAST BIOPSY Right     FATTY TUMOR   • CARDIAC CATHETERIZATION N/A 10/1/2019    Procedure: Left Heart Cath;  Surgeon: Srikanth Coker MD;  Location:  [facility] CATH INVASIVE LOCATION;  Service: Cardiology   • CARDIAC CATHETERIZATION N/A 7/14/2020    Procedure: Left Heart Cath;  Surgeon: Srikanth Coker MD;  Location:  [facility] CATH INVASIVE [facility];  Service: Cardiology;  Laterality: N/A;   • CARDIAC CATHETERIZATION N/A 3/23/2021    Procedure: Left Heart Cath;  Surgeon: Srikanth Coker MD;  Location:  [facility] CATH INVASIVE [facility];  Service: Cardiology;  Laterality: N/A;   • CHEST TUBE INSERTION Right 6/7/2021    Procedure: CHEST TUBE INSERTION;  Surgeon: Antony Wood MD;  Location:  [facility] OR;  Service: Cardiothoracic;  Laterality: Right;   • CORONARY ANGIOPLASTY WITH STENT PLACEMENT  2016    X 2   • CORONARY ARTERY BYPASS GRAFT N/A 4/22/2021    Procedure:  CORONARY ARTERY BYPASS GRAFT X 3 WITH LEFT INTERNAL MAMMARY ARTERY, RIGHT LOWER EXTREMITY ENDOSCOPIC VEIN HARVEST, AND PERFUSION; TRANSESOPHAGEAL ECHOCARDIOGRAM;  Surgeon: Antony Wood MD;  Location: [facility] OR;  Service: Cardiothoracic;  Laterality: N/A;   • EYE SURGERY Bilateral     IOC LENS IMPLANTS   • EYE SURGERY Bilateral     RETINAL SURGERY   • GALLBLADDER SURGERY     • GASTRIC BYPASS     • HYSTERECTOMY     • INSERTION HEMODIALYSIS CATHETER Right 2021    Procedure: HEMODIALYSIS CATHETER INSERTION;  Surgeon: Simon Coates MD;  Location:  [facility] HYBRID OR 12;  Service: Vascular;  Laterality: Right;   • INSERTION HEMODIALYSIS CATHETER Right 2021    Procedure: HEMODIALYSIS CATHETER INSERTION;  Surgeon: Simon Coates MD;  Location:  [facility] HYBRID OR 12;  Service: Vascular;  Laterality: Right;   • INSERTION PERITONEAL DIALYSIS CATHETER     • OOPHORECTOMY Bilateral    • SINUS SURGERY     • SINUS SURGERY  1980   • TUBAL ABDOMINAL LIGATION         Family History   Problem Relation Age of Onset   • Hypertension Mother    • Lung disease Mother    • Heart disease Father    • Diabetes Father    • Abnormal EKG Father    • Breast cancer Neg Hx        Social History     Socioeconomic History   • Marital status:    Tobacco Use   • Smoking status: Former Smoker     Packs/day: 0.00     Years: 2.00     Pack years: 0.00     Types: Cigarettes     Quit date:      Years since quittin.0   • Smokeless tobacco: Never Used   Vaping Use   • Vaping Use: Never used   Substance and Sexual Activity   • Alcohol use: Not Currently     Comment: haven't drank in 3 years ( 2018)    • Drug use: No   • Sexual activity: Defer           Objective   Physical Exam  Vitals and nursing note reviewed. Exam conducted with a chaperone present.   Constitutional:       General: She is awake. She is not in acute distress.     Appearance: Normal appearance. She is well-developed. She is toxic-appearing.   HENT:       Head: Normocephalic and atraumatic.      Nose:      Right Nostril: No epistaxis.      Left Nostril: No epistaxis.   Eyes:      General: Lids are normal. No scleral icterus.     Conjunctiva/sclera: Conjunctivae normal.      Pupils: Pupils are equal, round, and reactive to light.   Neck:      Vascular: No hepatojugular reflux or JVD.   Cardiovascular:      Rate and Rhythm: Normal rate and regular rhythm.      Chest Wall: PMI is not displaced.      Pulses: Normal pulses. No decreased pulses.      Heart sounds: Normal heart sounds. No murmur heard.      Pulmonary:      Effort: Pulmonary effort is normal. No accessory muscle usage or respiratory distress.      Breath sounds: Normal breath sounds. No decreased breath sounds or wheezing.   Abdominal:      General: Abdomen is flat. Bowel sounds are normal. There is no distension or abdominal bruit.      Palpations: Abdomen is soft. There is no shifting dullness, fluid wave, mass or pulsatile mass.      Tenderness: There is no abdominal tenderness. There is no right CVA tenderness, left CVA tenderness, guarding or rebound.      Hernia: No hernia is present.   Genitourinary:     Comments: Appears to be a hemorrhoid which has spontaneously burst there is large amount of blood coming out of it.  Musculoskeletal:         General: Normal range of motion.      Cervical back: Normal range of motion and neck supple. No rigidity.      Right lower leg: No edema.      Left lower leg: No edema.      Comments: left AKA   Skin:     General: Skin is warm and dry.      Capillary Refill: Capillary refill takes 2 to 3 seconds.      Coloration: Skin is pale. Skin is not cyanotic, jaundiced or mottled.   Neurological:      General: No focal deficit present.      Mental Status: She is alert and oriented to person, place, and time. Mental status is at baseline.      GCS: GCS eye subscore is 4. GCS verbal subscore is 5. GCS motor subscore is 6.      Cranial Nerves: Cranial nerves are intact.  No cranial nerve deficit.      Sensory: Sensation is intact.      Motor: Motor function is intact.      Deep Tendon Reflexes: Reflexes are normal and symmetric.   Psychiatric:         Behavior: Behavior normal. Behavior is cooperative.         Central Line At Bedside    Date/Time: 1/14/2022 1:11 PM  Performed by: Param Payne MD  Authorized by: Param Payne MD     Consent:     Consent obtained:  Written    Consent given by:  Guardian    Risks discussed:  Arterial puncture, bleeding, infection, incorrect placement, nerve damage and pneumothorax    Alternatives discussed:  Delayed treatment  Pre-procedure details:     Hand hygiene: Hand hygiene performed prior to insertion      Sterile barrier technique: All elements of maximal sterile technique followed      Skin preparation:  2% chlorhexidine    Skin preparation agent: Skin preparation agent completely dried prior to procedure    Anesthesia (see MAR for exact dosages):     Anesthesia method:  Local infiltration    Local anesthetic:  Lidocaine 1% w/o epi  Procedure details:     Location:  R internal jugular    Patient position:  Trendelenburg    Procedural supplies:  Triple lumen    Catheter size:  8 Fr    Landmarks identified: yes      Ultrasound guidance: yes      Sterile ultrasound techniques: Sterile gel and sterile probe covers were used      Number of attempts:  1    Successful placement: yes    Post-procedure details:     Post-procedure:  Dressing applied    Assessment:  Blood return through all ports, free fluid flow and no pneumothorax on x-ray    Patient tolerance of procedure:  Tolerated well, no immediate complications               ED Course  ED Course as of 01/14/22 1643   Fri Jan 14, 2022   1106 He was informed about the guarded prognosis of the patient [TS]   1106 Patient was given TXA and desmopressin for her platelet abnormalities that need to her renal and insufficiency and also that she is on Plavix [TS]   1222 Iron consulted [TS]   1312  Surgery has seen the patient [TS]   1312 Family wants the patient to be DNI and DO NOT RESUSCITATE but they want aggressive treatment we will place the patient on Levophed transfuse blood. [TS]   1312 May even be having upper GI bleed since the stools are darker colored. [TS]   1319 Family states that she has had GI bleeding before but negative work-ups.  I will transfuse her blood I have given her TXA and desmopressin Dr. Ruano came and saw the patient she thinks this now probably is upper GI bleed.  The area of the hemorrhoid has been noted.  Has been packed. [TS]   1320 She is having darkish blood coming out of her rectum now her prognosis extremely guarded family is aware of that. [TS]   1333 Normal sinus rhythm with nonspecific T wave changes T wave inversion in the high lateral leads [TS]      ED Course User Index  [TS] Param Payne MD                                                 MDM  Number of Diagnoses or Management Options  Diagnosis management comments: I considered upper gastrointestinal etiology, gastritis, gastroenteritis, peptic ulcer disease, gastroesophageal reflux disease, esophagitis, Rosina-Montague tear of the esophagus, lower gastrointestinal etiology, ischemia of bowel, colitis, diverticular disease, intestinal polyps, colon cancer, arteriovenous malformation, angiodysplasia, Osler-Weber-Rendu syndrome, hemorrhoids, bleeding secondary to drugs, NSAIDS use and anticoagulant therapy as a possible cause of GI bleed in this patient.       Amount and/or Complexity of Data Reviewed  Clinical lab tests: reviewed and ordered  Tests in the radiology section of CPT®: ordered and reviewed  Tests in the medicine section of CPT®: ordered and reviewed    Risk of Complications, Morbidity, and/or Mortality  Presenting problems: moderate  Diagnostic procedures: moderate  Management options: moderate        Final diagnoses:   Gastrointestinal hemorrhage, unspecified gastrointestinal hemorrhage type    Chronic kidney disease, unspecified CKD stage   Shock (HCC)       ED Disposition  ED Disposition     ED Disposition Condition Comment    Decision to Admit  Level of Care: Critical Care [6]   Diagnosis: Gastrointestinal hemorrhage, unspecified gastrointestinal hemorrhage type [[id number]]   Admitting Physician: CHULA DEUTSCH [[id number]]   Attending Physician: CHULA DEUTSCH [[id number]]   Certification: I Certify That Inpatient Hospital Services Are Medically Necessary For Greater Than 2 Midnights            No follow-up provider specified.       Medication List      No changes were made to your prescriptions during this visit.          Param Payne MD  01/14/22 1333       Param Payne MD  01/14/22 1333       Param Payne MD  01/14/22 6242

## 2022-01-15 NOTE — PLAN OF CARE
Goal Outcome Evaluation:  Plan of Care Reviewed With: patient        Progress: improving  Outcome Summary: Recieved pt from ICU, report from Jacques Ray. Pt alert and oriented x4 , denies pain. dressing on left stump dry and intact. Dressing on coccyx dry and intact. Bedalarm in place. Went to dialysis this afternoon.

## 2022-01-15 NOTE — SIGNIFICANT NOTE
"   01/14/22 2030   NIH Stroke Scale   Interval baseline   1a. Level of Consciousness 1-->Not alert, but arousable by minor stimulation to obey, answer, or respond   1b. LOC Questions 0-->Answers both questions correctly   1c. LOC Commands 0-->Performs both tasks correctly   2. Best Gaze 1-->Partial gaze palsy, gaze is abnormal in one or both eyes, but forced deviation or total gaze paresis is not present   3. Visual 1-->Partial hemianopia  (pt reports double vision \"side by side\")   4. Facial Palsy 1-->Minor paralysis (flattened nasolabial fold, asymmetry on smiling)  (L side)   5a. Motor Arm, Left 1-->Drift, limb holds 90 (or 45) degrees, but drifts down before full 10 seconds, does not hit bed or other support   5b. Motor Arm, Right 1-->Drift, limb holds 90 (or 45) degrees, but drifts down before full 10 secs, does not hit bed or other support   6a. Motor Leg, Left (UN) Amputation or joint fusion   6b. Motor Leg, Right 0-->No drift, leg holds 30 degree position for full 5 secs   7. Limb Ataxia 0-->Absent   8. Sensory 0-->Normal, no sensory loss   9. Best Language 1-->Mild-to-moderate aphasia, some obvious loss of fluency or facility of comprehension, without significant limitation on ideas expressed or form of expression. Reduction of speech and/or comprehension, however, makes conversation. . . (see row details)   10. Dysarthria 1-->Mild-to-moderate dysarthria, patient slurs at least some words and, at worst, can be understood with some difficulty   11. Extinction and Inattention (formerly Neglect) 0-->No abnormality   Total (NIH Stroke Scale) 8   Upon initial assessment, pt demonstrated decreased alertness, slight slurred speech, inability to maintain upper extremities extended for full count and reported \"double vision\" (side by side). Pt remained oriented and answered all questions appropriately. MD notified and stat head CT was obtained along with ABGs. Both imaging and lab work came back WNL.   "

## 2022-01-15 NOTE — SIGNIFICANT NOTE
01/15/22 0650   Provider Notification   Reason for Communication Evaluate  (New patient consult)   Provider Name ERNESTINA Rowland   Notification Route Answering service   Response Waiting for response

## 2022-01-15 NOTE — CONSULTS
Nephrology ([facility]) Consult Note      Patient:  Jennifer Salcido  YOB: 1955  Date of Service: 1/15/2022  MRN: [id number]   Acct: [id number]   Primary Care Physician: Akhil Golden DO  Advance Directive:   Code Status and Medical Interventions:   Ordered at: 01/14/22 1445     Medical Intervention Limits:    NO intubation (DNI)     Level Of Support Discussed With:    Patient    Health Care Surrogate     Code Status (Patient has no pulse and is not breathing):    No CPR (Do Not Attempt to Resuscitate)     Medical Interventions (Patient has pulse or is breathing):    Limited Support     Admit Date: 1/14/2022       Hospital Day: 1  Referring Provider: No Known Provider      Patient Seen, Chart, Consults, Notes, Labs, Radiology studies reviewed.    Chief complaint: End-stage renal disease/bright red blood per rectum    Subjective:  Jennifer Salcido is a 66 y.o. female  whom we were consulted for end-stage renal disease.  She has diabetic nephropathy, currently on hemodialysis 3 times a week.  She goes to [facility] on Tuesday Thursday Saturday.  Patient also has peripheral vascular disease, right AKA , coronary artery disease, congestive heart failure and anemia of chronic kidney disease.  She is currently at [facility] after amputation, participating in physical therapy.  She was sent to [facility] as she was found to have bright red blood per rectum.  She is currently converted to hemodialysis as she has to be in a nursing home.  Surgical consultation was requested, due to complexity of medical conditions it was recommended to manage her conservatively.  Family agreed to proceed with blood transfusion only.    Currently seen on hemodialysis  Hemodialysis Access: Permacath  Hemodialysis: 3 hours  Ultrafiltration: 1000 cc  2K bath  Blood flow rate is 450 cc/min.    Allergies:  Codeine, Demerol [meperidine], Levaquin [levofloxacin],  Lisinopril, Morphine, Sulfasalazine, Ultram [tramadol], and Trental [pentoxifylline]    Home Meds:  Medications Prior to Admission   Medication Sig Dispense Refill Last Dose   • acetaminophen (TYLENOL) 325 MG tablet Take 650 mg by mouth Every 4 (Four) Hours As Needed for Mild Pain .      • allopurinol (ZYLOPRIM) 100 MG tablet Take 100 mg by mouth Daily.      • aluminum-magnesium hydroxide-simethicone (MAALOX MAX) 400-400-40 MG/5ML suspension Take 15 mL by mouth Every 6 (Six) Hours As Needed for Heartburn.      • ascorbic acid (VITAMIN C) 500 MG tablet Take 500 mg by mouth Daily.      • aspirin 81 MG EC tablet Take 81 mg by mouth Daily.      • atorvastatin (LIPITOR) 20 MG tablet Take 1 tablet by mouth Every Night. 90 tablet 4    • bisacodyl (Dulcolax) 10 MG suppository Insert 10 mg into the rectum Daily As Needed for Constipation.      • calcium carbonate (TUMS) 500 MG chewable tablet Chew 2 tablets Every Night.      • citalopram (CeleXA) 40 MG tablet Take 40 mg by mouth Daily.      • clopidogrel (PLAVIX) 75 MG tablet Take 75 mg by mouth Daily.      • cyclobenzaprine (FLEXERIL) 5 MG tablet Take 1 tablet by mouth 3 (Three) Times a Day As Needed for Muscle Spasms.      • docusate sodium (COLACE) 100 MG capsule Take 200 mg by mouth Daily As Needed for Constipation.      • famotidine (PEPCID) 20 MG tablet Take 1 tablet by mouth Daily. 30 tablet 0    • gabapentin (NEURONTIN) 300 MG capsule Take 1 capsule by mouth Every 12 (Twelve) Hours. 24 capsule 0    • gentamicin (GARAMYCIN) 0.1 % cream Apply 1 application topically to the appropriate area as directed As Needed (for exit site).      • HYDROcodone-acetaminophen (NORCO) 5-325 MG per tablet Take 1 tablet by mouth Every 4 (Four) Hours As Needed for Moderate Pain  for up to 5 days. 30 tablet 0    • Melatonin 5 MG capsule Take 5 mg by mouth Every Night.      • midodrine (PROAMATINE) 10 MG tablet Take 1 tablet by mouth 3 (Three) Times a Day Before Meals.      • Multiple  Vitamins-Minerals (MULTIVITAMIN ADULT PO) Take 1 tablet by mouth Daily.      • nitroglycerin (NITROSTAT) 0.4 MG SL tablet 1 under the tongue as needed for angina, may repeat q5mins for up three doses 25 tablet 3    • sucralfate (CARAFATE) 1 g tablet Take 1 g by mouth 4 (Four) Times a Day Before Meals & at Bedtime. Dissolve 1 tablet in water to create a slurry and drink      • Sucroferric Oxyhydroxide (Velphoro) 500 MG chewable tablet Chew 2 tablets 3 (Three) Times a Day Before Meals. (Patient not taking: Reported on 1/14/2022)   Not Taking at Unknown time   • vitamin D3 125 MCG (5000 UT) capsule capsule Take 1 tablet by mouth Daily.          Medicines:  Current Facility-Administered Medications   Medication Dose Route Frequency Provider Last Rate Last Admin   • acetaminophen (TYLENOL) tablet 650 mg  650 mg Oral Q4H PRN Feng Vides DO   650 mg at 01/15/22 0715    Or   • acetaminophen (TYLENOL) 160 MG/5ML solution 650 mg  650 mg Oral Q4H PRN Feng Vides DO        Or   • acetaminophen (TYLENOL) suppository 650 mg  650 mg Rectal Q4H PRN Feng Vides DO       • allopurinol (ZYLOPRIM) tablet 100 mg  100 mg Oral Daily Feng Vides DO   100 mg at 01/15/22 0946   • aspirin EC tablet 81 mg  81 mg Oral Daily Feng Vides DO   81 mg at 01/15/22 0946   • atorvastatin (LIPITOR) tablet 20 mg  20 mg Oral Nightly Feng Vides DO   20 mg at 01/14/22 2036   • citalopram (CeleXA) tablet 10 mg  10 mg Oral Daily Feng Vides DO   10 mg at 01/15/22 0946   • cyclobenzaprine (FLEXERIL) tablet 5 mg  5 mg Oral TID PRN Feng Vides DO       • HYDROcodone-acetaminophen (NORCO) 5-325 MG per tablet 1 tablet  1 tablet Oral Q4H PRN Feng Vides DO       • midodrine (PROAMATINE) tablet 10 mg  10 mg Oral TID AC Feng Vides DO   10 mg at 01/14/22 1821   • ondansetron (ZOFRAN) injection 4 mg  4 mg Intravenous Q6H PRN Feng Vides DO       • pantoprazole (PROTONIX) injection 40 mg  40 mg Intravenous Q12H  Feng Vides DO   40 mg at 01/15/22 0946   • sodium chloride 0.9 % flush 10 mL  10 mL Intravenous PRN Param Payne MD       • sodium chloride 0.9 % flush 10 mL  10 mL Intravenous Q12H Feng Vides DO   10 mL at 01/15/22 0947   • sodium chloride 0.9 % flush 10 mL  10 mL Intravenous PRN Feng Vides DO       • Tranexamic Acid 689 mg in sodium chloride 0.9 % 100 mL  10 mg/kg Intravenous Once Param Payne MD   Held at 01/14/22 1152       Past Medical History:  Past Medical History:   Diagnosis Date   • Arthritis     LEFT SHOULDER   • CHF (congestive heart failure) ([facility])    • Chronic pericarditis 4/22/2021   • Collar bone fracture 12/2020    SLING PLACED TO HEAL   • Coronary artery disease     LEFT MAIN AND 3 OTHER AREAS--CABG SCHEDULED FOR APRIL 22, 2021   • Dyslipidemia 9/28/2020   • Elevated cholesterol    • End stage kidney disease ([facility])     currently HD (5/19/2021), hopes to return to [facility]    • Essential hypertension 11/2/2018   • Gastroesophageal reflux disease without esophagitis 4/28/2021   • GERD (gastroesophageal reflux disease)    • Gout    • History of transfusion    • Hyperlipidemia    • Hypertension    • Overweight (BMI 25.0-29.9) 4/22/2021   • Peritoneal dialysis status ([facility])     EVERY NIGHT FOR 10 HOURS, (5/19/2021 currently on hold)   • PONV (postoperative nausea and vomiting)    • Sleep apnea     RESOLVED   • Status post gastric bypass for obesity 4/22/2021   • Type 2 diabetes mellitus with kidney complication, with long-term current use of insulin ([facility]) 11/2/2018       Past Surgical History:  Past Surgical History:   Procedure Laterality Date   • ABOVE KNEE AMPUTATION Left 1/6/2022    Procedure: LEFT LOWER EXTREMITY AMPUTATION ABOVE KNEE;  Surgeon: Simon Coates MD;  Location: [place] [address];  Service: Vascular;  Laterality: Left;   • BELOW KNEE AMPUTATION Left 12/13/2021    Procedure: LEFT LOWER EXTREMITY AMPUTATION BELOW KNEE;  Surgeon: Simon Coates MD;   Location:  [facility] OR;  Service: Vascular;  Laterality: Left;   • BREAST BIOPSY Right     FATTY TUMOR   • CARDIAC CATHETERIZATION N/A 10/1/2019    Procedure: Left Heart Cath;  Surgeon: Srikanth Coker MD;  Location:  [facility] CATH INVASIVE LOCATION;  Service: Cardiology   • CARDIAC CATHETERIZATION N/A 7/14/2020    Procedure: Left Heart Cath;  Surgeon: Srikanth Coker MD;  Location:  [facility] CATH [facility]AS[facility];  Service: Cardiology;  Laterality: N/A;   • CARDIAC CATHETERIZATION N/A 3/23/2021    Procedure: Left Heart Cath;  Surgeon: Srikanth Coker MD;  Location:  [facility] CATH INVAS[facility];  Service: Cardiology;  Laterality: N/A;   • CHEST TUBE INSERTION Right 6/7/2021    Procedure: CHEST TUBE INSERTION;  Surgeon: Antony Wood MD;  Location:  [facility] OR;  Service: Cardiothoracic;  Laterality: Right;   • CORONARY ANGIOPLASTY WITH STENT PLACEMENT  2016    X 2   • CORONARY ARTERY BYPASS GRAFT N/A 4/22/2021    Procedure: CORONARY ARTERY BYPASS GRAFT X 3 WITH LEFT INTERNAL MAMMARY ARTERY, RIGHT LOWER EXTREMITY ENDOSCOPIC VEIN HARVEST, AND PERFUSION; TRANSESOPHAGEAL ECHOCARDIOGRAM;  Surgeon: Antony Wood MD;  Location:  [facility] OR;  Service: Cardiothoracic;  Laterality: N/A;   • EYE SURGERY Bilateral     IOC LENS IMPLANTS   • EYE SURGERY Bilateral     RETINAL SURGERY   • GALLBLADDER SURGERY     • GASTRIC BYPASS     • HYSTERECTOMY     • INSERTION HEMODIALYSIS CATHETER Right 4/1/2021    Procedure: HEMODIALYSIS CATHETER INSERTION;  Surgeon: Simon Coates MD;  Location: [facility] HYBRID OR 12;  Service: Vascular;  Laterality: Right;   • INSERTION HEMODIALYSIS CATHETER Right 12/20/2021    Procedure: HEMODIALYSIS CATHETER INSERTION;  Surgeon: Simon Coates MD;  Location: [facility] HYBRID OR 12;  Service: Vascular;  Laterality: Right;   • INSERTION PERITONEAL DIALYSIS CATHETER     • OOPHORECTOMY Bilateral    • SINUS SURGERY     • SINUS SURGERY  07/1980   • TUBAL ABDOMINAL LIGATION         Family  "History  Family History   Problem Relation Age of Onset   • Hypertension Mother    • Lung disease Mother    • Heart disease Father    • Diabetes Father    • Abnormal EKG Father    • Breast cancer Neg Hx        Social History  Social History     Socioeconomic History   • Marital status:    Tobacco Use   • Smoking status: Former Smoker     Packs/day: 0.00     Years: 2.00     Pack years: 0.00     Types: Cigarettes     Quit date:      Years since quittin.0   • Smokeless tobacco: Never Used   Vaping Use   • Vaping Use: Never used   Substance and Sexual Activity   • Alcohol use: Not Currently     Comment: haven't drank in 3 years ( 2018)    • Drug use: No   • Sexual activity: Defer         Review of Systems:  History obtained from chart review and the patient  General ROS: No fever or chills  Respiratory ROS: No cough, shortness of breath, wheezing  Cardiovascular ROS: no chest pain or dyspnea on exertion  Gastrointestinal ROS: positive for - blood in stools  Genito-Urinary ROS: No dysuria or hematuria  14 point ROS reviewed with the patient and negative except as noted above and in the HPI unless unable to obtain.    Objective:  /69   Pulse 72   Temp 97.3 °F (36.3 °C) (Axillary)   Resp 16   Ht 160 cm (63\")   Wt 68.8 kg (151 lb 11.2 oz)   SpO2 97%   BMI 26.87 kg/m²     Intake/Output Summary (Last 24 hours) at 1/15/2022 1415  Last data filed at 1/15/2022 0400  Gross per 24 hour   Intake 2098.88 ml   Output --   Net 2098.88 ml     General: awake/alert   H&H: Normocephalic atraumatic head  Neck: Supple with no JVD or carotid bruits.  Chest:  clear to auscultation bilaterally without respiratory distress  CVS: regular rate and rhythm  Abdominal: soft, nontender, normal bowel sounds  Extremities: Right AKA  Skin: warm and dry without rash      Labs:    Results from last 7 days   Lab Units 01/15/22  0818 01/15/22  0639 22  2318 22  1100 22  1100 22  0300 22  0300   WBC " 10*3/mm3  --  7.83  --   --  11.11*  --  6.07   HEMOGLOBIN g/dL 8.7* 8.5* 9.3*   < > 6.7*   < > 8.4*   HEMATOCRIT % 27.5* 26.8* 29.0*   < > 22.4*   < > 25.9*   PLATELETS 10*3/mm3  --  265  --   --  360  --  371    < > = values in this interval not displayed.       Results from last 7 days   Lab Units 01/15/22  0639 01/14/22  1100 01/11/22  0300   SODIUM mmol/L 134* 134* 133*   POTASSIUM mmol/L 3.4* 3.3* 4.5   CHLORIDE mmol/L 96* 93* 98   CO2 mmol/L 28.0 27.0 23.0   BUN mg/dL 16 12 28*   CREATININE mg/dL 3.61* 3.01* 5.37*   CALCIUM mg/dL 8.0* 8.3* 8.3*   BILIRUBIN mg/dL  --  0.2 0.2   ALK PHOS U/L  --  133* 105   ALT (SGPT) U/L  --  <5 <5   AST (SGOT) U/L  --  17 14   GLUCOSE mg/dL 120* 127* 69         Radiology:   Imaging Results (Last 24 Hours)     Procedure Component Value Units Date/Time    CT Head Without Contrast [[id number]] Collected: 01/14/22 2105     Updated: 01/14/22 2109    Narrative:      EXAMINATION: CT HEAD WO CONTRAST-      1/14/2022 8:48 PM CST     HISTORY: Mental status change, unknown cause; K92.2-Gastrointestinal  hemorrhage, unspecified; N18.9-Chronic kidney disease, unspecified;  R57.9-Shock, unspecified     In order to have a CT radiation dose as low as reasonably achievable  Automated Exposure Control was utilized for adjustment of the mA and/or  KV according to patient size.     DLP in mGycm= 729.     Axial, sagittal, and coronal noncontrast CT imaging of the head.     The visualized paranasal sinuses are clear.     The brain and ventricles have an age appropriate appearance.   There is no hemorrhage or mass-effect.   No acute infarction is seen.     No calvarial abnormality.       Impression:      1. No acute intracranial abnormality is seen.                                         This report was finalized on 01/14/2022 21:05 by Dr. Fracisco Leblanc MD.    XR Chest 1 View [[id number]] Collected: 01/14/22 1416     Updated: 01/14/22 1421    Narrative:      EXAMINATION:  XR CHEST 1 VW-  1/14/2022  1:50 PM CST     HISTORY: Central line placement. GI hemorrhage. Shock.     COMPARISON: 1/14/2022.     FINDINGS:  There is a new left IJ central venous catheter in good  position with no evidence of pneumothorax. The catheter tip is near the  junction of the brachiocephalic vein and superior vena cava. There is a  right IJ double lumen dialysis catheter in place. The lungs are expanded  and clear. Heart size is within normal limits. Prior median sternotomy.       Impression:      1. New left IJ central venous catheter with no evidence of pneumothorax.  2. No infiltrate or effusion.        This report was finalized on 01/14/2022 14:18 by Dr. Sukhi Ramirez MD.          Culture:  No components found for: WOUNDCUL, 3  No components found for: CSFCUL, 3  No components found for: BC, 3  No components found for: URINECUL, 3      Assessment   1.  End-stage renal disease/seen on hemodialysis.  2.  Type II diabetic nephropathy.  3.  Peripheral vascular disease/right AKA.  4.  Anemia of chronic kidney disease.  5.  Lower GI bleed.  6.  Hyponatremia.    Plan:  1.  Tolerating dialysis well.  2.  Patient is currently DNI and DNR  3.  Resume ASA.  4.  Resume dialysis treatment today      Thank you for the consult, we appreciate the opportunity to provide care to your patients.  Feel free to contact me if I can be of any further assistance.      Cristian Hernandez MD  1/15/2022  14:15 CST

## 2022-01-15 NOTE — PROGRESS NOTES
[facility] Medicine Services  INPATIENT PROGRESS NOTE    Patient Name: Jennifer Salcido  Date of Admission: 1/14/2022  Today's Date: 01/15/22  Length of Stay: 1  Primary Care Physician: Akhil Golden DO    Subjective   Chief Complaint: Rectal bleeding.   HPI   Nursing saw only a small amount of bloody output per rectum overnight. Looked dark.     Hemodynamically stable.  Off norepinephrine since arriving in the intensive care unit.    Hemoglobin improved and equilibrated after transfusion yesterday.    Plan to transfer to the floor today.      She states that she feels much better.  Her color looks much better.  She is asking if she can have regular food.  I had placed her on clear liquids yesterday afternoon.    Discussed by phone with her son, Troy.  He can be reached at [phone].    Review of Systems   All pertinent negatives and positives are as above. All other systems have been reviewed and are negative unless otherwise stated.     Objective    Temp:  [97.3 °F (36.3 °C)-98.3 °F (36.8 °C)] 97.3 °F (36.3 °C)  Heart Rate:  [61-94] 63  Resp:  [15-18] 16  BP: ()/(42-73) 137/57  Physical Exam  Constitutional:       Appearance: She is well-developed.      Comments: Chronically ill-appearing 66-year-old  female patient.  No family at the bedside. Discussed with her nurse, Willem.   HENT:      Head: Normocephalic and atraumatic.   Eyes:      Conjunctiva/sclera: Conjunctivae normal.      Pupils: Pupils are equal, round, and reactive to light.   Neck:      Vascular: No JVD.   Cardiovascular:      Rate and Rhythm: Normal rate and regular rhythm.      Heart sounds: Normal heart sounds. No murmur heard.  No friction rub. No gallop.       Comments: Right IJ permacath, left IJ CVC.  Pulmonary:      Effort: Pulmonary effort is normal. No respiratory distress.      Breath sounds: Normal breath sounds. No wheezing or rales.   Chest:      Chest wall: No  tenderness.   Abdominal:      General: Bowel sounds are normal. There is no distension.      Palpations: Abdomen is soft.      Tenderness: There is no abdominal tenderness. There is no guarding or rebound.   Musculoskeletal:         General: No tenderness or deformity. Normal range of motion.      Cervical back: Neck supple.      Comments: Left AKA site is clean.  Some bruising in the midportion of the surgical site.  Staples are intact.   Skin:     General: Skin is warm and dry.      Findings: No rash.   Neurological:      General: No focal deficit present.      Mental Status: She is alert and oriented to person, place, and time.      Cranial Nerves: No cranial nerve deficit.      Motor: Weakness present. No abnormal muscle tone.      Deep Tendon Reflexes: Reflexes normal.   Psychiatric:      Comments: Flat affect, but conversational.     Intake/Output Summary (Last 24 hours) at 1/15/2022 0752  Last data filed at 1/15/2022 0400  Gross per 24 hour   Intake 2098.88 ml   Output --   Net 2098.88 ml     Results Review:  I have reviewed the labs, radiology results, and diagnostic studies.    Laboratory Data:   Results from last 7 days   Lab Units 01/15/22  0639 01/14/22  2318 01/14/22  1100 01/11/22  0300 01/11/22  0300   WBC 10*3/mm3 7.83  --  11.11*  --  6.07   HEMOGLOBIN g/dL 8.5* 9.3* 6.7*   < > 8.4*   HEMATOCRIT % 26.8* 29.0* 22.4*   < > 25.9*   PLATELETS 10*3/mm3 265  --  360  --  371    < > = values in this interval not displayed.     Results from last 7 days   Lab Units 01/15/22  0639 01/14/22  1100 01/11/22  0300   SODIUM mmol/L 134* 134* 133*   POTASSIUM mmol/L 3.4* 3.3* 4.5   CHLORIDE mmol/L 96* 93* 98   CO2 mmol/L 28.0 27.0 23.0   BUN mg/dL 16 12 28*   CREATININE mg/dL 3.61* 3.01* 5.37*   CALCIUM mg/dL 8.0* 8.3* 8.3*   BILIRUBIN mg/dL  --  0.2 0.2   ALK PHOS U/L  --  133* 105   ALT (SGPT) U/L  --  <5 <5   AST (SGOT) U/L  --  17 14   GLUCOSE mg/dL 120* 127* 69     I have reviewed the patient's current  medications.     Assessment/Plan     Active Hospital Problems    Diagnosis    • **Rectal bleeding    • Acute blood loss anemia    • Hemorrhagic shock superimposed on chronic hypotension    • Peripheral vascular disease (HCC)    • Coronary artery disease involving native coronary artery of native heart without angina pectoris    • Diet-controlled diabetes mellitus (HCC)    • S/P AKA (above knee amputation), left (HCC)    • ESRD on hemodialysis (previously on PD)      Plan:   The patient was admitted to my service here at [facility] on 1/14. She presented to the emergency department with weakness, low blood pressure, and rectal bleeding.     Hemoglobin was 8.4 on 1/11.  6.7 at presentation today.  Ordered 2 units packed red blood cells by the emergency department.  Blood was infusing at the time that I saw her on 1/14.  Hemoglobin went to 9.3 posttransfusion and has equilibrated to 8.5 this morning.  Continue to monitor H&H every 8 hours.Monitor for further bleeding of which there has been very little.  IV Protonix.     We can stop Plavix, but do need to continue aspirin.  She has a history of CABG in April 2021 and had coronary stent placement in 2016.  She also has severe peripheral vascular disease.     She required a small dose of norepinephrine in the emergency department to maintain adequate mean arterial pressure.  She has been off norepinephrine since coming to the intensive care unit.  She does have chronic hypotension as a baseline and is on midodrine.  Midodrine resumed.  Hemodynamically stable this morning.     The ER consulted Dr. Ruano with general surgery to assess for possible brisk bleeding or hemorrhoidal bleeding in need of surgical intervention.  Nothing of the sort is seen at this time.  The patient and family also do not wish for any significant intervention.  She has had panendoscopy multiple times in the past according to her and her family.  I have no records on this.  They state  that no bleeding sources ever been found. No plans to consult gastroenterology at this time.  Continue transfusion and supportive care.  Allowed clear liquids at admission and will now advance diet as tolerated.      Consulted nephrology for ongoing dialysis.  She has been dialyzing on Tuesday, Thursday, and Saturday recently.  She was previously on peritoneal dialysis.       Reconciled and resumed other outpatient medications as appropriate.     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  All of her vascular interventions recently have been performed by Dr. Coates.  [place] are still in place from her intervention on 1/6.  I do not see any specific needs to consult him at this point in time, but will if the need arises.  I will at least let him know that she is in the hospital.    Would keep the left IJ CVC until tomorrow and if she remains stable, it can be removed.    PT/OT.    SCD to the right lower extremity for DVT prophylaxis.    Transfer to the floor today.    Plan will be to eventually transition back to [facility] for ongoing therapy and skilled nursing care.    Electronically signed by Feng Vides DO, 01/15/22, 07:49 CST.

## 2022-01-16 NOTE — PLAN OF CARE
Goal Outcome Evaluation:  Plan of Care Reviewed With: patient        Progress: no change  Outcome Summary: alert, disoriented @ Xs, forgetful, hallucinations, demanding, after she fell out of bed just before shift change and did not c/o of any pain at the time, later decided she thought her R knee was broke and her stump was broke, Dr Brower notified, did xray of both, both negative, then decided she had a headache from hitting her head when she fell (had not previously told anyone she hit her head) head CT done, also negative, tylenol given with relief, pulled IV out and was pulling at other tubes and lines, xanax given, patient slept well, coccyx red with open areas on both sides, photo taken, turning, staples to Left stump, SCD to R leg, bed alarm set on middle setting, safety maintained this shift

## 2022-01-16 NOTE — PLAN OF CARE
"Goal Outcome Evaluation:  Plan of Care Reviewed With: patient        Progress: no change  Outcome Summary: Pt was confused and hallucinating this morning. Disoriented to place, situation. Pt was very lethargic this AM and somewhat difficult to wake. Fell asleep frequently during conversation. Pt is now A&Ox4. , room air. Denies n/t. c/o pain LLE with repositioning and bedpan use. Pain medicine offered if needed, pt requests no narcotic use as it makes her \"loopy.\" PRN tylenol given as requested. LBM 1/16 - small bloody stool. Tele - normal sinus. YEAGER. PPP. LYLYA - SARAH, staples CDI. 2 small PI of coccyx, pt positioned off wounds. Blood glucose monitored. Call light within reach. Safety maintained. Bed alarm set.  "

## 2022-01-16 NOTE — PROGRESS NOTES
[facility] Medicine Services  INPATIENT PROGRESS NOTE    Length of Stay: 2  Date of Admission: 1/14/2022  Primary Care Physician: Akhil Golden DO    Subjective   Chief Complaint: GI bleed.  Failure to thrive.  Dialysis patient . CAD.    HPI   Patient denies any chest pain.  Patient not requiring oxygen.  Blood . pressure is stable.  Hemoglobin is also stable.  Small amount of bright red blood per nurses, possible hemorrhoids, start Anusol suppository.  Afebrile.    Review of Systems   Unable to obtain patient did not want to talk.  Otherwise as stated above.    All pertinent negatives and positives are as above. All other systems have been reviewed and are negative unless otherwise stated.     Objective    Temp:  [97.7 °F (36.5 °C)-98 °F (36.7 °C)] 97.7 °F (36.5 °C)  Heart Rate:  [66-81] 66  Resp:  [16-18] 16  BP: (140-147)/(56-69) 146/56  No intake or output data in the 24 hours ending 01/16/22 1623  Physical Exam  Vitals and nursing note reviewed.   Constitutional:       Appearance: She is well-developed.   HENT:      Head: Normocephalic.   Eyes:      Conjunctiva/sclera: Conjunctivae normal.      Pupils: Pupils are equal, round, and reactive to light.   Neck:      Vascular: No JVD.   Cardiovascular:      Rate and Rhythm: Normal rate and regular rhythm.      Heart sounds: Normal heart sounds. No murmur heard.  No friction rub. No gallop.    Pulmonary:      Effort: No respiratory distress.      Breath sounds: No wheezing or rales.      Comments: Not on oxygen.  Diminished breath sound bilateral, clear.  Chest:      Chest wall: No tenderness.   Abdominal:      General: Bowel sounds are normal. There is no distension.      Palpations: Abdomen is soft.      Tenderness: There is no abdominal tenderness. There is no guarding or rebound.   Musculoskeletal:         General: No tenderness or deformity.      Cervical back: Neck supple.   Skin:     General: Skin is warm and dry.       Capillary Refill: Capillary refill takes 2 to 3 seconds.      Findings: No rash.   Neurological:      Mental Status: She is alert.      Cranial Nerves: No cranial nerve deficit.      Motor: Weakness present. No abnormal muscle tone.      Coordination: Coordination abnormal.      Gait: Gait abnormal.      Deep Tendon Reflexes: Reflexes normal.   Psychiatric:         Behavior: Behavior normal.         Thought Content: Thought content normal.         Results Review:  Lab Results (last 24 hours)     Procedure Component Value Units Date/Time    POC Glucose Once [[id number]]  (Normal) Collected: 01/16/22 1211    Specimen: Blood Updated: 01/16/22 1223     Glucose 87 mg/dL      Comment: : [id number] Flory AshleyMeter ID: [id number]       POC Glucose Once [[id number]]  (Normal) Collected: 01/16/22 0831    Specimen: Blood Updated: 01/16/22 0842     Glucose 82 mg/dL      Comment: : [id number] GlycoMimeticsMeter ID: [id number]       Comprehensive Metabolic Panel [[id number]]  (Abnormal) Collected: 01/16/22 0501    Specimen: Blood Updated: 01/16/22 0644     Glucose 90 mg/dL      BUN 8 mg/dL      Creatinine 2.21 mg/dL      Sodium 137 mmol/L      Potassium 3.2 mmol/L      Chloride 101 mmol/L      CO2 29.0 mmol/L      Calcium 7.9 mg/dL      Total Protein 3.9 g/dL      Albumin 1.80 g/dL      ALT (SGPT) <5 U/L      AST (SGOT) 14 U/L      Alkaline Phosphatase 87 U/L      Total Bilirubin 0.3 mg/dL      eGFR Non African Amer 22 mL/min/1.73      Globulin 2.1 gm/dL      A/G Ratio 0.9 g/dL      BUN/Creatinine Ratio 3.6     Anion Gap 7.0 mmol/L     Narrative:      GFR Normal >60  Chronic Kidney Disease <60  Kidney Failure <15      Lipid Panel [[id number]]  (Abnormal) Collected: 01/16/22 0501    Specimen: Blood Updated: 01/16/22 0629     Total Cholesterol 84 mg/dL      Triglycerides 145 mg/dL      HDL Cholesterol 36 mg/dL      LDL Cholesterol  23 mg/dL      VLDL Cholesterol 25 mg/dL      LDL/HDL Ratio 0.53    Narrative:       Cholesterol Reference Ranges  (U.S. Department of Health and Human Services ATP III Classifications)    Desirable          <200 mg/dL  Borderline High    200-239 mg/dL  High Risk          >240 mg/dL      Triglyceride Reference Ranges  (U.S. Department of Health and Human Services ATP III Classifications)    Normal           <150 mg/dL  Borderline High  150-199 mg/dL  High             200-499 mg/dL  Very High        >500 mg/dL    HDL Reference Ranges  (U.S. Department of Health and Human Services ATP III Classifcations)    Low     <40 mg/dl (major risk factor for CHD)  High    >60 mg/dl ('negative' risk factor for CHD)        LDL Reference Ranges  (U.S. Department of Health and Human Services ATP III Classifcations)    Optimal          <100 mg/dL  Near Optimal     100-129 mg/dL  Borderline High  130-159 mg/dL  High             160-189 mg/dL  Very High        >189 mg/dL    TSH [[id number]]  (Normal) Collected: 01/16/22 0501    Specimen: Blood Updated: 01/16/22 0629     TSH 2.210 uIU/mL     CBC & Differential [[id number]]  (Abnormal) Collected: 01/16/22 0501    Specimen: Blood Updated: 01/16/22 0546    Narrative:      The following orders were created for panel order CBC & Differential.  Procedure                               Abnormality         Status                     ---------                               -----------         ------                     CBC Auto Differential[[id number]]        Abnormal            Final result                 Please view results for these tests on the individual orders.    CBC Auto Differential [[id number]]  (Abnormal) Collected: 01/16/22 0501    Specimen: Blood Updated: 01/16/22 0546     WBC 7.18 10*3/mm3      RBC 2.85 10*6/mm3      Hemoglobin 8.9 g/dL      Hematocrit 27.1 %      MCV 95.1 fL      MCH 31.2 pg      MCHC 32.8 g/dL      RDW 19.2 %      RDW-SD 66.0 fl      MPV 10.8 fL      Platelets 243 10*3/mm3      Neutrophil % 65.5 %      Lymphocyte % 15.7 %      Monocyte % 10.7 %       Eosinophil % 6.8 %      Basophil % 0.7 %      Immature Grans % 0.6 %      Neutrophils, Absolute 4.70 10*3/mm3      Lymphocytes, Absolute 1.13 10*3/mm3      Monocytes, Absolute 0.77 10*3/mm3      Eosinophils, Absolute 0.49 10*3/mm3      Basophils, Absolute 0.05 10*3/mm3      Immature Grans, Absolute 0.04 10*3/mm3      nRBC 0.0 /100 WBC     POC Glucose Once [[id number]]  (Normal) Collected: 01/16/22 0145    Specimen: Blood Updated: 01/16/22 0201     Glucose 85 mg/dL      Comment: : [id number]  Jovan AnnMeter ID: [id number]       Hemoglobin & Hematocrit, Blood [[id number]]  (Abnormal) Collected: 01/16/22 0008    Specimen: Blood Updated: 01/16/22 0025     Hemoglobin 8.2 g/dL      Hematocrit 25.9 %     Ammonia [[id number]]  (Normal) Collected: 01/15/22 1551    Specimen: Blood Updated: 01/15/22 1626     Ammonia 12 umol/L            Cultures:  No results found for: BLOODCX, URINECX, WOUNDCX, MRSACX, RESPCX, STOOLCX    Radiology Data:    Imaging Results (Last 24 Hours)     Procedure Component Value Units Date/Time    CT Head Without Contrast [[id number]] Collected: 01/16/22 0738     Updated: 01/16/22 0744    Narrative:      CT HEAD WO CONTRAST- 1/15/2022 10:48 PM CST     HISTORY: Head trauma, minor (Age >= 65y); K92.2-Gastrointestinal  hemorrhage, unspecified; N18.9-Chronic kidney disease, unspecified;  R57.9-Shock, unspecified     COMPARISON: None      DLP: 752 mGy cm. All CT scans are performed using dose optimization  techniques as appropriate to the performed exam and including at least  one of the following: Automated exposure control, adjustment of the mA  and/or kV according to size, and the use of the iterative reconstruction  technique.     TECHNIQUE: Serial axial tomographic images of the brain were obtained  without the use of intravenous contrast.      FINDINGS:   The midline structures are nondisplaced. There is mild cerebral and  cerebellar atrophy, with an associated increase in the prominence of  the  ventricles and sulci. The basilar cisterns are normal in size and  configuration. There is no evidence of intracranial hemorrhage or  mass-effect. There is low attenuation in the periventricular white  matter, consistent with chronic ischemic change. There are no abnormal  extra-axial fluid collections. There is no evidence of tonsillar  herniation.      The included orbits and their contents are unremarkable. A 1.9 cm mucous  retention cyst or polyp is noted within the right maxillary sinus. There  is mucoperiosteal thickening within the left maxillary sinus. An empty  sella is present.. The visualized osseous structures and overlying soft  tissues of the skull and face are intact.        Impression:         1. Mild cerebral and cerebellar atrophy with chronic microvascular  disease but no evidence of acute intracranial process.  2. Chronic maxillary sinus disease.        This report was finalized on 01/16/2022 07:41 by Dr. Je Lyn MD.    XR Knee 1 or 2 View Right [[id number]] Collected: 01/15/22 2119     Updated: 01/15/22 2122    Narrative:      EXAMINATION: XR KNEE 1 OR 2 VW RIGHT-     1/15/2022 9:17 PM CST     HISTORY: fall; K92.2-Gastrointestinal hemorrhage, unspecified;  N18.9-Chronic kidney disease, unspecified; R57.9-Shock, unspecified     Right knee, 2 views.     Osteopenia.  Diffuse arterial calcification.     Intact distal femur and proximal tibia and fibula.     Summary:  1. No acute fracture.  This report was finalized on 01/15/2022 21:19 by Dr. Fracisco Leblanc MD.    XR Femur 2 View Left [[id number]] Collected: 01/15/22 2118     Updated: 01/15/22 2121    Narrative:      EXAMINATION: XR FEMUR 2 VW LEFT-     1/15/2022 9:16 PM CST     HISTORY: fall; K92.2-Gastrointestinal hemorrhage, unspecified;  N18.9-Chronic kidney disease, unspecified; R57.9-Shock, unspecified     Left femur, 2 views.     Distal femur amputation.  Skin clips noted at the stump.     No fracture or soft tissue foreign body.      Summary:  1. No acute bony abnormality is seen.  This report was finalized on 01/15/2022 21:18 by Dr. Fracisco Leblanc MD.          Allergies   Allergen Reactions   • Codeine Nausea And Vomiting   • Demerol [Meperidine] Nausea Only   • Levaquin [Levofloxacin] Nausea And Vomiting   • Lisinopril Swelling   • Morphine Nausea And Vomiting   • Sulfasalazine Nausea And Vomiting   • Ultram [Tramadol] Mental Status Change   • Trental [Pentoxifylline] GI Intolerance       Scheduled meds:   allopurinol, 100 mg, Oral, Daily  aspirin, 81 mg, Oral, Daily  atorvastatin, 20 mg, Oral, Nightly  citalopram, 10 mg, Oral, Daily  [START ON 1/17/2022] epoetin jani/jani-epbx, 10,000 Units, Subcutaneous, Once per day on Mon Wed Fri  midodrine, 10 mg, Oral, TID AC  pantoprazole, 40 mg, Intravenous, Q12H  sodium chloride, 10 mL, Intravenous, Q12H  tranexamic acid, 10 mg/kg, Intravenous, Once        PRN meds:  •  acetaminophen **OR** acetaminophen **OR** acetaminophen  •  cyclobenzaprine  •  heparin (porcine)  •  ondansetron  •  [COMPLETED] Insert peripheral IV **AND** sodium chloride  •  sodium chloride    Assessment/Plan       Rectal bleeding    Coronary artery disease involving native coronary artery of native heart without angina pectoris    Diet-controlled diabetes mellitus (HCC)    Peripheral vascular disease (HCC)    ESRD on hemodialysis (previously on PD)    Acute blood loss anemia    S/P AKA (above knee amputation), left (HCC)    Hemorrhagic shock superimposed on chronic hypotension      Plan:   HPI.The patient was admitted to my service here at [facility] on 1/14. She presented to the emergency department with weakness, low blood pressure, and rectal bleeding.    GI bleed. Hemoglobin was 8.4 on 1/11.  6.7 at presentation today.  Ordered 2 units packed red blood cells by the emergency department.  Blood was infusing at the time that I saw her on 1/14.  Hemoglobin went to 9.3 posttransfusion and has equilibrated to 8.5 this  morning.  Continue to monitor H&H every 8 hours.Monitor for further bleeding of which there has been very little.  IV Protonix. Status 2 unit of blood transfusion 1/14/2022.  Hemoglobin has been stable.  Patient still have a little bit of bright red blood per rectum per nursing staff, possible hemorrhoid.  We will start Anusol suppository twice daily.  Protonix twice daily.  Zofran as needed.  Hemoglobin stable.  No sign of acute bleed.    Anemia.  Erythropoietin .    CAD/hyperlipidemia.  Status post CABG April 2021.  History of stent placement 2016.  Stop Plavix.  We will continue continue aspirin.   Aspirin.  Lipitor.    Dialysis/end-stage renal disease.Consulted nephrology for ongoing dialysis.  She has been dialyzing on Tuesday, Thursday, and Saturday recently.  She was previously on peritoneal dialysis.      Hypokalemia.  Per nephrology.    Hypotension.  On midodrine.    Hemorrhoid.The ER consulted Dr. Ruano with general surgery to assess for possible brisk bleeding or hemorrhoidal bleeding in need of surgical intervention.  Nothing of the sort is seen at this time.  The patient and family also do not wish for any significant intervention.  She has had panendoscopy multiple times in the past according to her and her family.  I have no records on this.  They state that no bleeding sources ever been found. No plans to consult gastroenterology at this time.  Continue transfusion and supportive care.  Allowed clear liquids at admission and will now advance diet as tolerated.     Peripheral vascular disease. 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  All of her vascular interventions recently have been performed by Dr. Coates.  Cathy are still in place from her intervention on 1/6.  I do not see any specific needs to consult him at this point in time, but will if the need arises.  I will at least let him know that she is in the hospital.    Depression/anxiety  . Celexa.  Xanax as needed.    Gout.  Allopurinol.    Chronic pain.  Flexeril as needed.    SCD right lower extremity for DVT prophylaxis.    Nutrition.  Regular/consistent carb diet.    Deconditioning.  PT and OT consult.    Discharge Planning: Patient will be transferred back to [facility].    Electronically signed by Bob Carrizales MD, 01/16/22, 4:23 PM CST.

## 2022-01-16 NOTE — NURSING NOTE
Pt had fallen out of bed. Was returned from dialysis with all 4 bed rails up and bed still elevated, no bed alarm on.   Pt threw herself onto the floor over the bedrails. She stated she was going to try to walk somewhere, on her one leg, to see how far she could get, and she didn't realize how high up the bed was up. No injuries noted.  Pt is confused and very impulsive.VSS.  Dr Brower notified, no new orders.

## 2022-01-16 NOTE — PROGRESS NOTES
Nephrology ([facility]) Progress Note      Patient:  Jennifer Salcido  YOB: 1955  Date of Service: 1/16/2022  MRN: [id number]   Acct: [id number]   Primary Care Physician: Akhil Golden DO  Advance Directive:   Code Status and Medical Interventions:   Ordered at: 01/14/22 1445     Medical Intervention Limits:    NO intubation (DNI)     Level Of Support Discussed With:    Patient    Health Care Surrogate     Code Status (Patient has no pulse and is not breathing):    No CPR (Do Not Attempt to Resuscitate)     Medical Interventions (Patient has pulse or is breathing):    Limited Support     Admit Date: 1/14/2022       Hospital Day: 2  Referring Provider: No Known Provider      Patient personally seen and examined.  Complete chart including Consults, Notes, Operative Reports, Labs, Cardiology, and Radiology studies reviewed as able.    Chief complaint: End-stage renal disease/bright red blood per rectum.    Subjective:  Jennifer Salcido is a 66 y.o. female for whom we were consulted for evaluation and treatment of end-stage renal disease.  Patient has diabetic nephropathy, gets dialysis 3 times a week at [facility].  She also has peripheral vascular disease, coronary artery disease, CABG, congestive heart failure and anemia of chronic kidney disease.  After right AKA, she was sent to [facility] for rehabilitation.  She was sent back with bright blood per rectum.  Gastroenterology and surgery recommended conservative management due to complexity of medical problem.  Family agreed to make her DNR and DNI.  She has received routine hemodialysis treatment on January 15.    This afternoon she feels well, still very weak    Allergies:  Codeine, Demerol [meperidine], Levaquin [levofloxacin], Lisinopril, Morphine, Sulfasalazine, Ultram [tramadol], and Trental [pentoxifylline]    Home Meds:  Medications Prior to Admission   Medication Sig Dispense Refill Last  Dose   • acetaminophen (TYLENOL) 325 MG tablet Take 650 mg by mouth Every 4 (Four) Hours As Needed for Mild Pain .      • allopurinol (ZYLOPRIM) 100 MG tablet Take 100 mg by mouth Daily.      • aluminum-magnesium hydroxide-simethicone (MAALOX MAX) 400-400-40 MG/5ML suspension Take 15 mL by mouth Every 6 (Six) Hours As Needed for Heartburn.      • ascorbic acid (VITAMIN C) 500 MG tablet Take 500 mg by mouth Daily.      • aspirin 81 MG EC tablet Take 81 mg by mouth Daily.      • atorvastatin (LIPITOR) 20 MG tablet Take 1 tablet by mouth Every Night. 90 tablet 4    • bisacodyl (Dulcolax) 10 MG suppository Insert 10 mg into the rectum Daily As Needed for Constipation.      • calcium carbonate (TUMS) 500 MG chewable tablet Chew 2 tablets Every Night.      • citalopram (CeleXA) 40 MG tablet Take 40 mg by mouth Daily.      • clopidogrel (PLAVIX) 75 MG tablet Take 75 mg by mouth Daily.      • cyclobenzaprine (FLEXERIL) 5 MG tablet Take 1 tablet by mouth 3 (Three) Times a Day As Needed for Muscle Spasms.      • docusate sodium (COLACE) 100 MG capsule Take 200 mg by mouth Daily As Needed for Constipation.      • famotidine (PEPCID) 20 MG tablet Take 1 tablet by mouth Daily. 30 tablet 0    • gabapentin (NEURONTIN) 300 MG capsule Take 1 capsule by mouth Every 12 (Twelve) Hours. 24 capsule 0    • gentamicin (GARAMYCIN) 0.1 % cream Apply 1 application topically to the appropriate area as directed As Needed (for exit site).      • [] HYDROcodone-acetaminophen (NORCO) 5-325 MG per tablet Take 1 tablet by mouth Every 4 (Four) Hours As Needed for Moderate Pain  for up to 5 days. 30 tablet 0    • Melatonin 5 MG capsule Take 5 mg by mouth Every Night.      • midodrine (PROAMATINE) 10 MG tablet Take 1 tablet by mouth 3 (Three) Times a Day Before Meals.      • Multiple Vitamins-Minerals (MULTIVITAMIN ADULT PO) Take 1 tablet by mouth Daily.      • nitroglycerin (NITROSTAT) 0.4 MG SL tablet 1 under the tongue as needed for angina,  may repeat q5mins for up three doses 25 tablet 3    • sucralfate (CARAFATE) 1 g tablet Take 1 g by mouth 4 (Four) Times a Day Before Meals & at Bedtime. Dissolve 1 tablet in water to create a slurry and drink      • Sucroferric Oxyhydroxide (Velphoro) 500 MG chewable tablet Chew 2 tablets 3 (Three) Times a Day Before Meals. (Patient not taking: Reported on 1/14/2022)   Not Taking at Unknown time   • vitamin D3 125 MCG (5000 UT) capsule capsule Take 1 tablet by mouth Daily.          Medicines:  Current Facility-Administered Medications   Medication Dose Route Frequency Provider Last Rate Last Admin   • acetaminophen (TYLENOL) tablet 650 mg  650 mg Oral Q4H PRN Feng Vides DO   650 mg at 01/16/22 1411    Or   • acetaminophen (TYLENOL) 160 MG/5ML solution 650 mg  650 mg Oral Q4H PRN Feng Vides DO        Or   • acetaminophen (TYLENOL) suppository 650 mg  650 mg Rectal Q4H PRN Feng Vides DO       • allopurinol (ZYLOPRIM) tablet 100 mg  100 mg Oral Daily Feng Vidse DO   100 mg at 01/16/22 0818   • aspirin EC tablet 81 mg  81 mg Oral Daily Feng Vides DO   81 mg at 01/16/22 0818   • atorvastatin (LIPITOR) tablet 20 mg  20 mg Oral Nightly Feng Vides DO   20 mg at 01/15/22 2032   • citalopram (CeleXA) tablet 10 mg  10 mg Oral Daily Feng Vides DO   10 mg at 01/16/22 0818   • cyclobenzaprine (FLEXERIL) tablet 5 mg  5 mg Oral TID PRN Feng Vides DO   5 mg at 01/15/22 2053   • [START ON 1/17/2022] epoetin jani-epbx (RETACRIT) injection 10,000 Units  10,000 Units Subcutaneous Once per day on Mon Wed Fri Cristian Hernandez MD       • heparin (porcine) injection 3,600 Units  3,600 Units Intracatheter PRN Cristian Hernandez MD   3,600 Units at 01/15/22 1846   • midodrine (PROAMATINE) tablet 10 mg  10 mg Oral TID AC Feng Vides DO   10 mg at 01/16/22 1208   • ondansetron (ZOFRAN) injection 4 mg  4 mg Intravenous Q6H PRN Feng Vides DO       • pantoprazole (PROTONIX) injection 40 mg  40 mg  Intravenous Q12H Feng Vides, DO   40 mg at 01/16/22 0818   • sodium chloride 0.9 % flush 10 mL  10 mL Intravenous PRN Param Payne MD       • sodium chloride 0.9 % flush 10 mL  10 mL Intravenous Q12H Feng Vides, DO   10 mL at 01/16/22 0819   • sodium chloride 0.9 % flush 10 mL  10 mL Intravenous PRN Feng Vides DO       • Tranexamic Acid 689 mg in sodium chloride 0.9 % 100 mL  10 mg/kg Intravenous Once Param Payne MD   Held at 01/14/22 1152       Past Medical History:  Past Medical History:   Diagnosis Date   • Arthritis     LEFT SHOULDER   • CHF (congestive heart failure) ([facility])    • Chronic pericarditis 4/22/2021   • Collar bone fracture 12/2020    SLING PLACED TO HEAL   • Coronary artery disease     LEFT MAIN AND 3 OTHER AREAS--CABG SCHEDULED FOR APRIL 22, 2021   • Dyslipidemia 9/28/2020   • Elevated cholesterol    • End stage kidney disease ([facility])     currently HD (5/19/2021), hopes to return to [facility]    • Essential hypertension 11/2/2018   • Gastroesophageal reflux disease without esophagitis 4/28/2021   • GERD (gastroesophageal reflux disease)    • Gout    • History of transfusion    • Hyperlipidemia    • Hypertension    • Overweight (BMI 25.0-29.9) 4/22/2021   • Peritoneal dialysis status ([facility])     EVERY NIGHT FOR 10 HOURS, (5/19/2021 currently on hold)   • PONV (postoperative nausea and vomiting)    • Sleep apnea     RESOLVED   • Status post gastric bypass for obesity 4/22/2021   • Type 2 diabetes mellitus with kidney complication, with long-term current use of insulin ([facility]) 11/2/2018       Past Surgical History:  Past Surgical History:   Procedure Laterality Date   • ABOVE KNEE AMPUTATION Left 1/6/2022    Procedure: LEFT LOWER EXTREMITY AMPUTATION ABOVE KNEE;  Surgeon: Simon Coates MD;  Location: [place] [id number];  Service: Vascular;  Laterality: Left;   • BELOW KNEE AMPUTATION Left 12/13/2021    Procedure: LEFT LOWER EXTREMITY AMPUTATION BELOW KNEE;  Surgeon: Simon Coates  MD [place];  Location:  [facility] OR;  Service: Vascular;  Laterality: Left;   • BREAST BIOPSY Right     FATTY TUMOR   • CARDIAC CATHETERIZATION N/A 10/1/2019    Procedure: Left Heart Cath;  Surgeon: Srikanth Coker MD;  Location:  [facility] CATH INVASIVE LOCATION;  Service: Cardiology   • CARDIAC CATHETERIZATION N/A 7/14/2020    Procedure: Left Heart Cath;  Surgeon: Srikanth Coker MD;  Location:  [facility] CATH INVASIVE [facility];  Service: Cardiology;  Laterality: N/A;   • CARDIAC CATHETERIZATION N/A 3/23/2021    Procedure: Left Heart Cath;  Surgeon: Srikanth Coker MD;  Location:  [facility] CATH INVASIVE [facility];  Service: Cardiology;  Laterality: N/A;   • CHEST TUBE INSERTION Right 6/7/2021    Procedure: CHEST TUBE INSERTION;  Surgeon: Antony Wood MD;  Location:  [facility] OR;  Service: Cardiothoracic;  Laterality: Right;   • CORONARY ANGIOPLASTY WITH STENT PLACEMENT  2016    X 2   • CORONARY ARTERY BYPASS GRAFT N/A 4/22/2021    Procedure: CORONARY ARTERY BYPASS GRAFT X 3 WITH LEFT INTERNAL MAMMARY ARTERY, RIGHT LOWER EXTREMITY ENDOSCOPIC VEIN HARVEST, AND PERFUSION; TRANSESOPHAGEAL ECHOCARDIOGRAM;  Surgeon: Antony Wood MD;  Location:  [facility] OR;  Service: Cardiothoracic;  Laterality: N/A;   • EYE SURGERY Bilateral     IOC LENS IMPLANTS   • EYE SURGERY Bilateral     RETINAL SURGERY   • GALLBLADDER SURGERY     • GASTRIC BYPASS     • HYSTERECTOMY     • INSERTION HEMODIALYSIS CATHETER Right 4/1/2021    Procedure: HEMODIALYSIS CATHETER INSERTION;  Surgeon: Simon Coates MD;  Location:  [facility] HYBRID OR 12;  Service: Vascular;  Laterality: Right;   • INSERTION HEMODIALYSIS CATHETER Right 12/20/2021    Procedure: HEMODIALYSIS CATHETER INSERTION;  Surgeon: Simon Coates MD;  Location: [facility] HYBRID OR 12;  Service: Vascular;  Laterality: Right;   • INSERTION PERITONEAL DIALYSIS CATHETER     • OOPHORECTOMY Bilateral    • SINUS SURGERY     • SINUS SURGERY  07/1980   • TUBAL ABDOMINAL LIGATION         Family  History  Family History   Problem Relation Age of Onset   • Hypertension Mother    • Lung disease Mother    • Heart disease Father    • Diabetes Father    • Abnormal EKG Father    • Breast cancer Neg Hx        Social History  Social History     Socioeconomic History   • Marital status:    Tobacco Use   • Smoking status: Former Smoker     Packs/day: 0.00     Years: 2.00     Pack years: 0.00     Types: Cigarettes     Quit date:      Years since quittin.0   • Smokeless tobacco: Never Used   Vaping Use   • Vaping Use: Never used   Substance and Sexual Activity   • Alcohol use: Not Currently     Comment: haven't drank in 3 years ( 2018)    • Drug use: No   • Sexual activity: Defer       Review of Systems:  History obtained from chart review and the patient  General ROS: No fever or chills  Respiratory ROS: No cough, shortness of breath, wheezing  Cardiovascular ROS: No chest pain or palpitations  Gastrointestinal ROS: positive for - blood in stools  Genito-Urinary ROS: No dysuria or hematuria  Psych ROS: No anxiety and depression  14 point ROS reviewed with the patient and negative except as noted above and in the HPI unless unable to obtain.    Objective:  Patient Vitals for the past 24 hrs:   BP Temp Temp src Pulse Resp SpO2   22 1147 141/56 97.8 °F (36.6 °C) Axillary 69 16 99 %   22 0700 140/63 97.8 °F (36.6 °C) Axillary 74 16 100 %   22 0401 147/69 98 °F (36.7 °C) Axillary 67 18 99 %   01/15/22 2355 141/63 98 °F (36.7 °C) Oral 66 16 99 %   01/15/22 1903 146/66 97.9 °F (36.6 °C) Oral 81 18 98 %     No intake or output data in the 24 hours ending 22 1456  General: awake/alert   HEENT: Normocephalic atraumatic head  Neck: Supple with no JVD accorded wheeze.  Chest:  clear to auscultation bilaterally without respiratory distress  CVS: regular rate and rhythm  Abdominal: soft, nontender, positive bowel sounds  Extremities: Right AKA  Skin: warm and dry without  rash      Labs:  Results from last 7 days   Lab Units 01/16/22  0501 01/16/22  0008 01/15/22  1551 01/15/22  0818 01/15/22  0639 01/14/22  2318 01/14/22  1100   WBC 10*3/mm3 7.18  --   --   --  7.83  --  11.11*   HEMOGLOBIN g/dL 8.9* 8.2* 8.0*   < > 8.5*   < > 6.7*   HEMATOCRIT % 27.1* 25.9* 25.1*   < > 26.8*   < > 22.4*   PLATELETS 10*3/mm3 243  --   --   --  265  --  360    < > = values in this interval not displayed.         Results from last 7 days   Lab Units 01/16/22  0501 01/15/22  0639 01/14/22  1100 01/11/22  0300 01/11/22  0300   SODIUM mmol/L 137 134* 134*   < > 133*   POTASSIUM mmol/L 3.2* 3.4* 3.3*   < > 4.5   CHLORIDE mmol/L 101 96* 93*   < > 98   CO2 mmol/L 29.0 28.0 27.0   < > 23.0   BUN mg/dL 8 16 12   < > 28*   CREATININE mg/dL 2.21* 3.61* 3.01*   < > 5.37*   CALCIUM mg/dL 7.9* 8.0* 8.3*   < > 8.3*   BILIRUBIN mg/dL 0.3  --  0.2  --  0.2   ALK PHOS U/L 87  --  133*  --  105   ALT (SGPT) U/L <5  --  <5  --  <5   AST (SGOT) U/L 14  --  17  --  14   GLUCOSE mg/dL 90 120* 127*   < > 69    < > = values in this interval not displayed.       Radiology:   Imaging Results (Last 72 Hours)     Procedure Component Value Units Date/Time    CT Head Without Contrast [[id number]] Collected: 01/16/22 0738     Updated: 01/16/22 0744    Narrative:      CT HEAD WO CONTRAST- 1/15/2022 10:48 PM CST     HISTORY: Head trauma, minor (Age >= 65y); K92.2-Gastrointestinal  hemorrhage, unspecified; N18.9-Chronic kidney disease, unspecified;  R57.9-Shock, unspecified     COMPARISON: None      DLP: 752 mGy cm. All CT scans are performed using dose optimization  techniques as appropriate to the performed exam and including at least  one of the following: Automated exposure control, adjustment of the mA  and/or kV according to size, and the use of the iterative reconstruction  technique.     TECHNIQUE: Serial axial tomographic images of the brain were obtained  without the use of intravenous contrast.      FINDINGS:   The midline  structures are nondisplaced. There is mild cerebral and  cerebellar atrophy, with an associated increase in the prominence of the  ventricles and sulci. The basilar cisterns are normal in size and  configuration. There is no evidence of intracranial hemorrhage or  mass-effect. There is low attenuation in the periventricular white  matter, consistent with chronic ischemic change. There are no abnormal  extra-axial fluid collections. There is no evidence of tonsillar  herniation.      The included orbits and their contents are unremarkable. A 1.9 cm mucous  retention cyst or polyp is noted within the right maxillary sinus. There  is mucoperiosteal thickening within the left maxillary sinus. An empty  sella is present.. The visualized osseous structures and overlying soft  tissues of the skull and face are intact.        Impression:         1. Mild cerebral and cerebellar atrophy with chronic microvascular  disease but no evidence of acute intracranial process.  2. Chronic maxillary sinus disease.        This report was finalized on 01/16/2022 07:41 by Dr. Je Lyn MD.    XR Knee 1 or 2 View Right [[id number]] Collected: 01/15/22 2119     Updated: 01/15/22 2122    Narrative:      EXAMINATION: XR KNEE 1 OR 2 VW RIGHT-     1/15/2022 9:17 PM CST     HISTORY: fall; K92.2-Gastrointestinal hemorrhage, unspecified;  N18.9-Chronic kidney disease, unspecified; R57.9-Shock, unspecified     Right knee, 2 views.     Osteopenia.  Diffuse arterial calcification.     Intact distal femur and proximal tibia and fibula.     Summary:  1. No acute fracture.  This report was finalized on 01/15/2022 21:19 by Dr. Fracisco Leblanc MD.    XR Femur 2 View Left [[id number]] Collected: 01/15/22 2118     Updated: 01/15/22 2121    Narrative:      EXAMINATION: XR FEMUR 2 VW LEFT-     1/15/2022 9:16 PM CST     HISTORY: fall; K92.2-Gastrointestinal hemorrhage, unspecified;  N18.9-Chronic kidney disease, unspecified; R57.9-Shock, unspecified      Left femur, 2 views.     Distal femur amputation.  Skin clips noted at the stump.     No fracture or soft tissue foreign body.     Summary:  1. No acute bony abnormality is seen.  This report was finalized on 01/15/2022 21:18 by Dr. Fracisco Leblanc MD.    CT Head Without Contrast [[id number]] Collected: 01/14/22 2105     Updated: 01/14/22 2109    Narrative:      EXAMINATION: CT HEAD WO CONTRAST-      1/14/2022 8:48 PM CST     HISTORY: Mental status change, unknown cause; K92.2-Gastrointestinal  hemorrhage, unspecified; N18.9-Chronic kidney disease, unspecified;  R57.9-Shock, unspecified     In order to have a CT radiation dose as low as reasonably achievable  Automated Exposure Control was utilized for adjustment of the mA and/or  KV according to patient size.     DLP in mGycm= 729.     Axial, sagittal, and coronal noncontrast CT imaging of the head.     The visualized paranasal sinuses are clear.     The brain and ventricles have an age appropriate appearance.   There is no hemorrhage or mass-effect.   No acute infarction is seen.     No calvarial abnormality.       Impression:      1. No acute intracranial abnormality is seen.                                         This report was finalized on 01/14/2022 21:05 by Dr. Fracisco Leblanc MD.    XR Chest 1 View [[id number]] Collected: 01/14/22 1416     Updated: 01/14/22 1421    Narrative:      EXAMINATION:  XR CHEST 1 VW-  1/14/2022 1:50 PM CST     HISTORY: Central line placement. GI hemorrhage. Shock.     COMPARISON: 1/14/2022.     FINDINGS:  There is a new left IJ central venous catheter in good  position with no evidence of pneumothorax. The catheter tip is near the  junction of the brachiocephalic vein and superior vena cava. There is a  right IJ double lumen dialysis catheter in place. The lungs are expanded  and clear. Heart size is within normal limits. Prior median sternotomy.       Impression:      1. New left IJ central venous catheter with no evidence of  pneumothorax.  2. No infiltrate or effusion.        This report was finalized on 01/14/2022 14:18 by Dr. Sukhi Ramirez MD.    XR Chest 1 View [[id number]] Collected: 01/14/22 1205     Updated: 01/14/22 1209    Narrative:      EXAMINATION: XR CHEST 1 VW-     1/14/2022 11:43 AM CST     HISTORY: Hypotension     1 view chest x-ray.     Comparison is made with December 20, 2021.     Stable heart size.  CABG changes.     Unchanged right jugular permacath.     Well-expanded lungs with no pneumonia, pneumothorax, or congestive  failure.     Summary:  1. No acute disease.  This report was finalized on 01/14/2022 12:06 by Dr. Fracisco Leblanc MD.          Culture:  No results found for: BLOODCX, URINECX, WOUNDCX, MRSACX, RESPCX, STOOLCX      Assessment   1. End-stage renal disease on maintenance dialysis.  2.  Type II diabetic nephropathy.  3.  Peripheral vascular disease/right AKA.  4.  Anemia secondary to blood loss and CKD.  5.  Lower GI bleed.  6.  Hyponatremia now resolved.  7.  Failure to thrive.    Plan:  1.  Transfuse as needed.  2.  Continue LONNIE.  3.  May discharge back to nursing home soon.      Cristian Hernandez MD  1/16/2022  14:56 CST

## 2022-01-17 NOTE — DISCHARGE SUMMARY
[facility] Medicine Services  DISCHARGE SUMMARY       Date of Admission: 1/14/2022  Date of Discharge:  1/17/2022  Primary Care Physician: Akhil Golden DO    Presenting Problem/History of Present Illness:  Gastrointestinal hemorrhage, unspecified gastrointestinal hemorrhage type [K92.2]     Final Discharge Diagnoses:  Active Hospital Problems    Diagnosis    • **Rectal bleeding    • Acute blood loss anemia    • S/P AKA (above knee amputation), left (HCC)    • Hemorrhagic shock superimposed on chronic hypotension    • ESRD on hemodialysis (previously on PD)    • Peripheral vascular disease (HCC)    • Diet-controlled diabetes mellitus (HCC)    • Coronary artery disease involving native coronary artery of native heart without angina pectoris        Consults: Nephrology.  General surgery.    Pertinent Test Results:   Results for orders placed in visit on 09/22/21    Adult Transthoracic Echo Complete W/ Cont if Necessary Per Protocol    Imaging Results (All)     Procedure Component Value Units Date/Time    CT Head Without Contrast [[id number]] Collected: 01/16/22 0738     Updated: 01/16/22 0744    Narrative:      CT HEAD WO CONTRAST- 1/15/2022 10:48 PM CST     HISTORY: Head trauma, minor (Age >= 65y); K92.2-Gastrointestinal  hemorrhage, unspecified; N18.9-Chronic kidney disease, unspecified;  R57.9-Shock, unspecified     COMPARISON: None      DLP: 752 mGy cm. All CT scans are performed using dose optimization  techniques as appropriate to the performed exam and including at least  one of the following: Automated exposure control, adjustment of the mA  and/or kV according to size, and the use of the iterative reconstruction  technique.     TECHNIQUE: Serial axial tomographic images of the brain were obtained  without the use of intravenous contrast.      FINDINGS:   The midline structures are nondisplaced. There is mild cerebral and  cerebellar atrophy, with an associated  increase in the prominence of the  ventricles and sulci. The basilar cisterns are normal in size and  configuration. There is no evidence of intracranial hemorrhage or  mass-effect. There is low attenuation in the periventricular white  matter, consistent with chronic ischemic change. There are no abnormal  extra-axial fluid collections. There is no evidence of tonsillar  herniation.      The included orbits and their contents are unremarkable. A 1.9 cm mucous  retention cyst or polyp is noted within the right maxillary sinus. There  is mucoperiosteal thickening within the left maxillary sinus. An empty  sella is present.. The visualized osseous structures and overlying soft  tissues of the skull and face are intact.        Impression:         1. Mild cerebral and cerebellar atrophy with chronic microvascular  disease but no evidence of acute intracranial process.  2. Chronic maxillary sinus disease.        This report was finalized on 01/16/2022 07:41 by Dr. Je Lyn MD.    XR Knee 1 or 2 View Right [[id number]] Collected: 01/15/22 2119     Updated: 01/15/22 2122    Narrative:      EXAMINATION: XR KNEE 1 OR 2 VW RIGHT-     1/15/2022 9:17 PM CST     HISTORY: fall; K92.2-Gastrointestinal hemorrhage, unspecified;  N18.9-Chronic kidney disease, unspecified; R57.9-Shock, unspecified     Right knee, 2 views.     Osteopenia.  Diffuse arterial calcification.     Intact distal femur and proximal tibia and fibula.     Summary:  1. No acute fracture.  This report was finalized on 01/15/2022 21:19 by Dr. Fracisco Leblanc MD.    XR Femur 2 View Left [[id number]] Collected: 01/15/22 2118     Updated: 01/15/22 2121    Narrative:      EXAMINATION: XR FEMUR 2 VW LEFT-     1/15/2022 9:16 PM CST     HISTORY: fall; K92.2-Gastrointestinal hemorrhage, unspecified;  N18.9-Chronic kidney disease, unspecified; R57.9-Shock, unspecified     Left femur, 2 views.     Distal femur amputation.  Skin clips noted at the stump.     No fracture  or soft tissue foreign body.     Summary:  1. No acute bony abnormality is seen.  This report was finalized on 01/15/2022 21:18 by Dr. Fracisco Leblanc MD.    CT Head Without Contrast [[id number]] Collected: 01/14/22 2105     Updated: 01/14/22 2109    Narrative:      EXAMINATION: CT HEAD WO CONTRAST-      1/14/2022 8:48 PM CST     HISTORY: Mental status change, unknown cause; K92.2-Gastrointestinal  hemorrhage, unspecified; N18.9-Chronic kidney disease, unspecified;  R57.9-Shock, unspecified     In order to have a CT radiation dose as low as reasonably achievable  Automated Exposure Control was utilized for adjustment of the mA and/or  KV according to patient size.     DLP in mGycm= 729.     Axial, sagittal, and coronal noncontrast CT imaging of the head.     The visualized paranasal sinuses are clear.     The brain and ventricles have an age appropriate appearance.   There is no hemorrhage or mass-effect.   No acute infarction is seen.     No calvarial abnormality.       Impression:      1. No acute intracranial abnormality is seen.                                         This report was finalized on 01/14/2022 21:05 by Dr. Fracisco Leblanc MD.    XR Chest 1 View [[id number]] Collected: 01/14/22 1416     Updated: 01/14/22 1421    Narrative:      EXAMINATION:  XR CHEST 1 VW-  1/14/2022 1:50 PM CST     HISTORY: Central line placement. GI hemorrhage. Shock.     COMPARISON: 1/14/2022.     FINDINGS:  There is a new left IJ central venous catheter in good  position with no evidence of pneumothorax. The catheter tip is near the  junction of the brachiocephalic vein and superior vena cava. There is a  right IJ double lumen dialysis catheter in place. The lungs are expanded  and clear. Heart size is within normal limits. Prior median sternotomy.       Impression:      1. New left IJ central venous catheter with no evidence of pneumothorax.  2. No infiltrate or effusion.        This report was finalized on 01/14/2022 14:18 by   Sukhi Ramirez MD.    XR Chest 1 View [[id number]] Collected: 01/14/22 1205     Updated: 01/14/22 1209    Narrative:      EXAMINATION: XR CHEST 1 VW-     1/14/2022 11:43 AM CST     HISTORY: Hypotension     1 view chest x-ray.     Comparison is made with December 20, 2021.     Stable heart size.  CABG changes.     Unchanged right jugular permacath.     Well-expanded lungs with no pneumonia, pneumothorax, or congestive  failure.     Summary:  1. No acute disease.  This report was finalized on 01/14/2022 12:06 by Dr. Fracisco Leblanc MD.          Chief Complaint on Day of Discharge: None.    History of Present Illness on Day of Discharge:   This is a 66-year-old  female patient who has currently been at [facility] since being dismissed from the hospital on 12/23 and then again on 1/10.  I am familiar with this patient as I took care of her in December 2021.  She at that time had undergone a left BKA secondary to a severely ischemic left lower extremity resulting in a chronic left foot wound.  At that time, she was on peritoneal dialysis and ultimately switched over to hemodialysis so that she could go to rehab postdischarge.  No one would take her with peritoneal dialysis needs.  She was discharged from that stay on 12/23 and then had to come back on 1/5 to have her stump revised and ended up having an AKA at that time.  She was again discharged on 1/10.  All of her vascular interventions have been done by Dr. Coates.  She still has staples to the stump.     She comes back in today because she has been noting rectal bleeding over the last 2-3 days.  She has seen it herself.  She states that at times it has been dark at times and has been bright red.  She came in today with hypotension and a hemoglobin of 6.7.  Dr. Payne placed a left IJ central line for blood administration.  She is also on 0.03 of norepinephrine at this point.  He had her evaluated by Dr. Ruano with general surgery.  There were  some concerns that she might be having significant bleeding from hemorrhoidal source.  It is felt that this is not the source at this point.  History is given that the patient has had multiple gastrointestinal bleeds in the past and these have been investigated with panendoscopy.  An exact source of bleeding has not been identified in the past.  I do not have any records to this effect.  I also looked through all of our records as well as care everywhere records from [facility] and [facility].     Nursing and Dr. Ruano have discussed the case with her sons.  They wish for her to be a DNR/DNI.  They wish for her to receive supportive measures, blood transfusion, and pressors if needed, however, they do not wish for anything aggressive to be done beyond that.  We will satisfy their wishes and see how she does over the next 24-48 hours.     She is alert and coherent.  She provided adequate history.  She remembered me from her previous hospitalization.  She is frustrated at her failing health.  She also does not wish to do anything overly aggressive at this point.  I am she wants something to drink.      Hospital Course:  The patient is a 66 y.o. female who presented to [facility] with GI bleed/anemia/CAD/dialysis/hemorrhoid.       HPI.The patient was admitted to my service here at [facility] on 1/14. She presented to the emergency department with weakness, low blood pressure, and rectal bleeding.     GI bleed. Hemoglobin was 8.4 on 1/11.  6.7 at presentation today.  Ordered 2 units packed red blood cells by the emergency department.  Blood was infusing at the time that I saw her on 1/14.  Hemoglobin went to 9.3 posttransfusion and has equilibrated to 8.5 this morning.  Continue to monitor H&H every 8 hours.Monitor for further bleeding of which there has been very little.  IV Protonix. Status 2 unit of blood transfusion 1/14/2022.  Hemoglobin has been stable.  Patient still have a little  bit of bright red blood per rectum per nursing staff, possible hemorrhoid.  We will start Anusol suppository twice daily.  Protonix twice daily.  Zofran as needed.  Hemoglobin stable.  No sign of acute bleed.     Anemia.  Erythropoietin .     CAD/hyperlipidemia.  Status post CABG April 2021.  History of stent placement 2016.  Stop Plavix.  We will continue continue aspirin.   Aspirin.  Lipitor.     Dialysis/end-stage renal disease.Consulted nephrology for ongoing dialysis.  She has been dialyzing on Tuesday, Thursday, and Saturday recently.  She was previously on peritoneal dialysis.       Hypokalemia.  Per nephrology.     Hypotension.  On midodrine.     Hemorrhoid.The ER consulted Dr. Ruano with general surgery to assess for possible brisk bleeding or hemorrhoidal bleeding in need of surgical intervention.  Nothing of the sort is seen at this time.  The patient and family also do not wish for any significant intervention.  She has had panendoscopy multiple times in the past according to her and her family.  I have no records on this.  They state that no bleeding sources ever been found. No plans to consult gastroenterology at this time.  Continue transfusion and supportive care.  Allowed clear liquids at admission and will now advance diet as tolerated.   Anusol suppository twice daily.     Peripheral vascular disease. 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  All of her vascular interventions recently have been performed by Dr. Coates.  Cathy are still in place from her intervention on 1/6.  I do not see any specific needs to consult him at this point in time, but will if the need arises.  I will at least let him know that she is in the hospital.     Depression/anxiety . Celexa.  Xanax as needed.     Gout.  Allopurinol.     Chronic pain.  Flexeril as needed.     SCD right lower extremity for DVT prophylaxis.     Nutrition.  Regular/consistent carb  "diet.     Deconditioning.  PT and OT consult.     Vital signs stable, afebrile. Patient will be transferred back to [facility].  Follow-up with with nursing home physician as soon as able.    Condition on Discharge: Stable.    Physical Exam on Discharge:  /77 (BP Location: Right arm, Patient Position: Lying)   Pulse 77   Temp 98.3 °F (36.8 °C) (Axillary)   Resp 16   Ht 160 cm (63\")   Wt 68.8 kg (151 lb 11.2 oz)   SpO2 100%   BMI 26.87 kg/m²   Physical Exam  Vitals and nursing note reviewed.   Constitutional:       Appearance: She is well-developed.   HENT:      Head: Normocephalic.   Eyes:      Conjunctiva/sclera: Conjunctivae normal.      Pupils: Pupils are equal, round, and reactive to light.   Neck:      Vascular: No JVD.   Cardiovascular:      Rate and Rhythm: Normal rate and regular rhythm.      Heart sounds: Normal heart sounds. No murmur heard.  No friction rub. No gallop.    Pulmonary:      Effort: No respiratory distress.      Breath sounds: No wheezing or rales.      Comments: Not on oxygen.  Diminished breath sound bilateral, clear.  Chest:      Chest wall: No tenderness.   Abdominal:      General: Bowel sounds are normal. There is no distension.      Palpations: Abdomen is soft.      Tenderness: There is no abdominal tenderness. There is no guarding or rebound.   Musculoskeletal:         General: AKA of the left leg . suture in place, clean dry intact.     Cervical back: Neck supple.   Skin:     General: Skin is warm and dry.      Capillary Refill: Capillary refill takes 2 to 3 seconds.      Findings: No rash.   Neurological:      Mental Status: She is alert.      Cranial Nerves: No cranial nerve deficit.      Motor: Weakness present. No abnormal muscle tone.      Coordination: Coordination abnormal.      Gait: Gait abnormal.      Deep Tendon Reflexes: Reflexes normal.   Psychiatric:         Behavior: Behavior normal.         Thought Content: Thought content normal. "        Discharge Disposition:  Skilled Nursing Facility (DC - External)    Discharge Medications:     Discharge Medications      New Medications      Instructions Start Date   hydrocortisone 25 MG suppository  Commonly known as: ANUSOL-HC   25 mg, Rectal, 2 Times Daily      pantoprazole 40 MG EC tablet  Commonly known as: PROTONIX   40 mg, Oral, 2 Times Daily         Changes to Medications      Instructions Start Date   citalopram 10 MG tablet  Commonly known as: CeleXA  What changed:   · medication strength  · how much to take   10 mg, Oral, Daily   Start Date: January 18, 2022     midodrine 5 MG tablet  Commonly known as: PROAMATINE  What changed:   · medication strength  · how much to take   5 mg, Oral, 3 Times Daily Before Meals         Continue These Medications      Instructions Start Date   acetaminophen 325 MG tablet  Commonly known as: TYLENOL   650 mg, Oral, Every 4 Hours PRN      allopurinol 100 MG tablet  Commonly known as: ZYLOPRIM   100 mg, Oral, Daily      aluminum-magnesium hydroxide-simethicone 400-400-40 MG/5ML suspension  Commonly known as: MAALOX MAX   15 mL, Oral, Every 6 Hours PRN      aspirin 81 MG EC tablet   81 mg, Oral, Daily      atorvastatin 20 MG tablet  Commonly known as: LIPITOR   20 mg, Oral, Nightly      calcium carbonate 500 MG chewable tablet  Commonly known as: TUMS   2 tablets, Oral, Nightly      cyclobenzaprine 5 MG tablet  Commonly known as: FLEXERIL   5 mg, Oral, 3 Times Daily PRN      docusate sodium 100 MG capsule  Commonly known as: COLACE   200 mg, Oral, Daily PRN      Dulcolax 10 MG suppository  Generic drug: bisacodyl   10 mg, Rectal, Daily PRN      gentamicin 0.1 % cream  Commonly known as: GARAMYCIN   1 application, Topical, As Needed      nitroglycerin 0.4 MG SL tablet  Commonly known as: NITROSTAT   1 under the tongue as needed for angina, may repeat q5mins for up three doses      vitamin D3 125 MCG (5000 UT) capsule capsule   1 tablet, Oral, Daily         Stop These  Medications    ascorbic acid 500 MG tablet  Commonly known as: VITAMIN C     clopidogrel 75 MG tablet  Commonly known as: PLAVIX     famotidine 20 MG tablet  Commonly known as: PEPCID     gabapentin 300 MG capsule  Commonly known as: NEURONTIN     HYDROcodone-acetaminophen 5-325 MG per tablet  Commonly known as: NORCO     Melatonin 5 MG capsule     multivitamin with minerals tablet tablet     sucralfate 1 g tablet  Commonly known as: CARAFATE     Velphoro 500 MG chewable tablet  Generic drug: Sucroferric Oxyhydroxide            Discharge Diet:   Diet Instructions     Advance Diet As Tolerated            Activity at Discharge:   Activity Instructions     Activity as Tolerated            Discharge Care Plan/Instructions: Discharge back to nursing via ambulance.    Follow-up Appointments:   Future Appointments   Date Time Provider Department Center   2/11/2022  2:30 PM Simon Coates MD [facility]   4/1/2022 11:15 AM Srikanth Coker MD [facility]   Follow-up with nursing home physician as soon as able.    Electronically signed by Bob Carrizales MD, 01/17/22, 17:08 CST.    Time: Greater than 30 minutes

## 2022-01-17 NOTE — CASE MANAGEMENT/SOCIAL WORK
Continued Stay Note  [facility]     Patient Name: Jennifer Salcido  MRN: [id number]  Today's Date: 1/17/2022    Admit Date: 1/14/2022     Discharge Plan     Row Name 01/17/22 1623       Plan    Final Discharge Disposition Code 03 - skilled nursing facility (SNF)    Final Note Pt can dc back to [facility] today, skilled level. Pt will need a new covid test prior to dc. Pt goes to [facility] on T/Th/Sat for HD and Violette at [facility] is notifying them of pt dc. DC summary/scripts/Covid result will need to be faxed to [phone]. Call report number is [phone].               Discharge Codes    No documentation.                     JOSIE Colorado

## 2022-01-17 NOTE — CASE MANAGEMENT/SOCIAL WORK
Continued Stay Note   [facility]     Patient Name: Jennifer Salcido  MRN: [id number]  Today's Date: 1/17/2022    Admit Date: 1/14/2022     Discharge Plan     Row Name 01/17/22 1236       Plan    Plan Comments Events noted. Plan is for return to [facility] [place]. Will continue to follow.               Discharge Codes    No documentation.                     JOSIE [place]

## 2022-01-17 NOTE — PLAN OF CARE
Goal Outcome Evaluation:  Plan of Care Reviewed With: patient        Progress: no change  Outcome Summary: A&Ox4. VSS. Medicated with Tylenol for c/o pain, relief noted. Con't to have bloody clots in stool but has decreased throughout shift. Staples to LLE CDI. Turn and reposition every 2hrs. Resting well.

## 2022-01-17 NOTE — PROGRESS NOTES
Nephrology ([facility]) Progress Note      Patient:  Jennifer Salcido  YOB: 1955  Date of Service: 1/17/2022  MRN: [id number]   Acct: [id number]   Primary Care Physician: Akhil Golden DO  Advance Directive:   Code Status and Medical Interventions:   Ordered at: 01/14/22 1445     Medical Intervention Limits:    NO intubation (DNI)     Level Of Support Discussed With:    Patient    Health Care Surrogate     Code Status (Patient has no pulse and is not breathing):    No CPR (Do Not Attempt to Resuscitate)     Medical Interventions (Patient has pulse or is breathing):    Limited Support     Admit Date: 1/14/2022       Hospital Day: 3  Referring Provider: No Known Provider      Patient personally seen and examined.  Complete chart including Consults, Notes, Operative Reports, Labs, Cardiology, and Radiology studies reviewed as able.        Subjective:  Jennifer Salcido is a 66 y.o. female for whom we were consulted for evaluation and treatment of end stage renal disease on hemodialysis. Recently converted from peritoneal dialysis. Outpatient HD on Tuesday Thursday Saturday at [facility]. History of peripheral vascular disease, CAD with prior CABG, CHF.  Recently had left AKA. Shortly after discharge she developed BRBPR and returned to hospital. Hemoglobin 6.7 on arrival. Received PRBC transfusion. Initially on Levophed but this was weaned of fand she moved to medical floor. HD tolerated well on 1/15.    Today is awake and alert, very weak. No new overnight issues.    Allergies:  Codeine, Demerol [meperidine], Levaquin [levofloxacin], Lisinopril, Morphine, Sulfasalazine, Ultram [tramadol], and Trental [pentoxifylline]    Home Meds:  Medications Prior to Admission   Medication Sig Dispense Refill Last Dose   • acetaminophen (TYLENOL) 325 MG tablet Take 650 mg by mouth Every 4 (Four) Hours As Needed for Mild Pain .      • allopurinol (ZYLOPRIM) 100 MG tablet  Take 100 mg by mouth Daily.      • aluminum-magnesium hydroxide-simethicone (MAALOX MAX) 400-400-40 MG/5ML suspension Take 15 mL by mouth Every 6 (Six) Hours As Needed for Heartburn.      • ascorbic acid (VITAMIN C) 500 MG tablet Take 500 mg by mouth Daily.      • aspirin 81 MG EC tablet Take 81 mg by mouth Daily.      • atorvastatin (LIPITOR) 20 MG tablet Take 1 tablet by mouth Every Night. 90 tablet 4    • bisacodyl (Dulcolax) 10 MG suppository Insert 10 mg into the rectum Daily As Needed for Constipation.      • calcium carbonate (TUMS) 500 MG chewable tablet Chew 2 tablets Every Night.      • citalopram (CeleXA) 40 MG tablet Take 40 mg by mouth Daily.      • clopidogrel (PLAVIX) 75 MG tablet Take 75 mg by mouth Daily.      • cyclobenzaprine (FLEXERIL) 5 MG tablet Take 1 tablet by mouth 3 (Three) Times a Day As Needed for Muscle Spasms.      • docusate sodium (COLACE) 100 MG capsule Take 200 mg by mouth Daily As Needed for Constipation.      • famotidine (PEPCID) 20 MG tablet Take 1 tablet by mouth Daily. 30 tablet 0    • gabapentin (NEURONTIN) 300 MG capsule Take 1 capsule by mouth Every 12 (Twelve) Hours. 24 capsule 0    • gentamicin (GARAMYCIN) 0.1 % cream Apply 1 application topically to the appropriate area as directed As Needed (for exit site).      • [] HYDROcodone-acetaminophen (NORCO) 5-325 MG per tablet Take 1 tablet by mouth Every 4 (Four) Hours As Needed for Moderate Pain  for up to 5 days. 30 tablet 0    • Melatonin 5 MG capsule Take 5 mg by mouth Every Night.      • midodrine (PROAMATINE) 10 MG tablet Take 1 tablet by mouth 3 (Three) Times a Day Before Meals.      • Multiple Vitamins-Minerals (MULTIVITAMIN ADULT PO) Take 1 tablet by mouth Daily.      • nitroglycerin (NITROSTAT) 0.4 MG SL tablet 1 under the tongue as needed for angina, may repeat q5mins for up three doses 25 tablet 3    • sucralfate (CARAFATE) 1 g tablet Take 1 g by mouth 4 (Four) Times a Day Before Meals & at Bedtime.  Dissolve 1 tablet in water to create a slurry and drink      • Sucroferric Oxyhydroxide (Velphoro) 500 MG chewable tablet Chew 2 tablets 3 (Three) Times a Day Before Meals. (Patient not taking: Reported on 1/14/2022)   Not Taking at Unknown time   • vitamin D3 125 MCG (5000 UT) capsule capsule Take 1 tablet by mouth Daily.          Medicines:  Current Facility-Administered Medications   Medication Dose Route Frequency Provider Last Rate Last Admin   • acetaminophen (TYLENOL) tablet 650 mg  650 mg Oral Q4H PRN Feng Vides DO   650 mg at 01/17/22 0447    Or   • acetaminophen (TYLENOL) 160 MG/5ML solution 650 mg  650 mg Oral Q4H PRN Feng Vides DO        Or   • acetaminophen (TYLENOL) suppository 650 mg  650 mg Rectal Q4H PRN Feng Vides DO       • allopurinol (ZYLOPRIM) tablet 100 mg  100 mg Oral Daily Feng Vides DO   100 mg at 01/16/22 0818   • atorvastatin (LIPITOR) tablet 20 mg  20 mg Oral Nightly Feng Vides DO   20 mg at 01/16/22 2058   • citalopram (CeleXA) tablet 10 mg  10 mg Oral Daily Feng Vides DO   10 mg at 01/16/22 0818   • cyclobenzaprine (FLEXERIL) tablet 5 mg  5 mg Oral TID PRN Feng Vides DO   5 mg at 01/15/22 2053   • epoetin jani-epbx (RETACRIT) injection 10,000 Units  10,000 Units Subcutaneous Once per day on Mon Wed Fri Cristian Hernandez MD       • heparin (porcine) injection 3,600 Units  3,600 Units Intracatheter PRN Cristian Hernandez MD   3,600 Units at 01/15/22 1846   • hydrocortisone (ANUSOL-HC) suppository 25 mg  25 mg Rectal BID Bob Carrizales MD   25 mg at 01/16/22 2058   • midodrine (PROAMATINE) tablet 10 mg  10 mg Oral TID AC Feng Vides DO   10 mg at 01/16/22 1729   • ondansetron (ZOFRAN) injection 4 mg  4 mg Intravenous Q6H PRN Feng Vides DO       • pantoprazole (PROTONIX) injection 40 mg  40 mg Intravenous Q12H Feng Vides DO   40 mg at 01/16/22 2058   • sodium chloride 0.9 % flush 10 mL  10 mL Intravenous PRN Param Payne MD       •  sodium chloride 0.9 % flush 10 mL  10 mL Intravenous Q12H Feng Vides DO   10 mL at 01/16/22 2058   • sodium chloride 0.9 % flush 10 mL  10 mL Intravenous PRN Feng Vides DO       • Tranexamic Acid 689 mg in sodium chloride 0.9 % 100 mL  10 mg/kg Intravenous Once Param Payne MD   Held at 01/14/22 1152       Past Medical History:  Past Medical History:   Diagnosis Date   • Arthritis     LEFT SHOULDER   • CHF (congestive heart failure) ([facility])    • Chronic pericarditis 4/22/2021   • Collar bone fracture 12/2020    SLING PLACED TO HEAL   • Coronary artery disease     LEFT MAIN AND 3 OTHER AREAS--CABG SCHEDULED FOR APRIL 22, 2021   • Dyslipidemia 9/28/2020   • Elevated cholesterol    • End stage kidney disease ([facility])     currently HD (5/19/2021), hopes to return to [facility]    • Essential hypertension 11/2/2018   • Gastroesophageal reflux disease without esophagitis 4/28/2021   • GERD (gastroesophageal reflux disease)    • Gout    • History of transfusion    • Hyperlipidemia    • Hypertension    • Overweight (BMI 25.0-29.9) 4/22/2021   • Peritoneal dialysis status ([facility])     EVERY NIGHT FOR 10 HOURS, (5/19/2021 currently on hold)   • PONV (postoperative nausea and vomiting)    • Sleep apnea     RESOLVED   • Status post gastric bypass for obesity 4/22/2021   • Type 2 diabetes mellitus with kidney complication, with long-term current use of insulin ([facility]) 11/2/2018       Past Surgical History:  Past Surgical History:   Procedure Laterality Date   • ABOVE KNEE AMPUTATION Left 1/6/2022    Procedure: LEFT LOWER EXTREMITY AMPUTATION ABOVE KNEE;  Surgeon: Simon Coates MD;  Location:  [facility] HYBRID OR 12;  Service: Vascular;  Laterality: Left;   • BELOW KNEE AMPUTATION Left 12/13/2021    Procedure: LEFT LOWER EXTREMITY AMPUTATION BELOW KNEE;  Surgeon: Simon Coates MD;  Location: [facility] OR;  Service: Vascular;  Laterality: Left;   • BREAST BIOPSY Right     FATTY TUMOR   • CARDIAC CATHETERIZATION N/A  10/1/2019    Procedure: Left Heart Cath;  Surgeon: Srikanth Coker MD;  Location:  [facility] CATH INVASIVE LOCATION;  Service: Cardiology   • CARDIAC CATHETERIZATION N/A 7/14/2020    Procedure: Left Heart Cath;  Surgeon: Srikanth Coker MD;  Location:  [facility] CATH INVASIVE LOCATION;  Service: Cardiology;  Laterality: N/A;   • CARDIAC CATHETERIZATION N/A 3/23/2021    Procedure: Left Heart Cath;  Surgeon: Srikanth Coker MD;  Location:  [facility] CATH INVASIVE LOCATION;  Service: Cardiology;  Laterality: N/A;   • CHEST TUBE INSERTION Right 6/7/2021    Procedure: CHEST TUBE INSERTION;  Surgeon: Antony Wood MD;  Location:  [facility] OR;  Service: Cardiothoracic;  Laterality: Right;   • CORONARY ANGIOPLASTY WITH STENT PLACEMENT  2016    X 2   • CORONARY ARTERY BYPASS GRAFT N/A 4/22/2021    Procedure: CORONARY ARTERY BYPASS GRAFT X 3 WITH LEFT INTERNAL MAMMARY ARTERY, RIGHT LOWER EXTREMITY ENDOSCOPIC VEIN HARVEST, AND PERFUSION; TRANSESOPHAGEAL ECHOCARDIOGRAM;  Surgeon: Antony Wood MD;  Location: [facility] OR;  Service: Cardiothoracic;  Laterality: N/A;   • EYE SURGERY Bilateral     IOC LENS IMPLANTS   • EYE SURGERY Bilateral     RETINAL SURGERY   • GALLBLADDER SURGERY     • GASTRIC BYPASS     • HYSTERECTOMY     • INSERTION HEMODIALYSIS CATHETER Right 4/1/2021    Procedure: HEMODIALYSIS CATHETER INSERTION;  Surgeon: Simon Coates MD;  Location: [facility] HYBRID OR 12;  Service: Vascular;  Laterality: Right;   • INSERTION HEMODIALYSIS CATHETER Right 12/20/2021    Procedure: HEMODIALYSIS CATHETER INSERTION;  Surgeon: Simon Coates MD;  Location: [facility] HYBRID OR 12;  Service: Vascular;  Laterality: Right;   • INSERTION PERITONEAL DIALYSIS CATHETER     • OOPHORECTOMY Bilateral    • SINUS SURGERY     • SINUS SURGERY  07/1980   • TUBAL ABDOMINAL LIGATION         Family History  Family History   Problem Relation Age of Onset   • Hypertension Mother    • Lung disease Mother    • Heart disease Father    • Diabetes  Father    • Abnormal EKG Father    • Breast cancer Neg Hx        Social History  Social History     Socioeconomic History   • Marital status:    Tobacco Use   • Smoking status: Former Smoker     Packs/day: 0.00     Years: 2.00     Pack years: 0.00     Types: Cigarettes     Quit date:      Years since quittin.0   • Smokeless tobacco: Never Used   Vaping Use   • Vaping Use: Never used   Substance and Sexual Activity   • Alcohol use: Not Currently     Comment: haven't drank in 3 years ( 2018)    • Drug use: No   • Sexual activity: Defer       Review of Systems:  History obtained from chart review and the patient  General ROS: No fever or chills  Respiratory ROS: No cough, shortness of breath, wheezing  Cardiovascular ROS: No chest pain or palpitations  Gastrointestinal ROS: No abdominal pain or melena  Genito-Urinary ROS: No dysuria or hematuria  Psych ROS: No anxiety and depression  14 point ROS reviewed with the patient and negative except as noted above and in the HPI unless unable to obtain.    Objective:  Patient Vitals for the past 24 hrs:   BP Temp Temp src Pulse Resp SpO2   22 0729 155/68 98.2 °F (36.8 °C) Axillary 68 18 100 %   22 0446 152/72 97.9 °F (36.6 °C) Axillary 77 18 99 %   22 0002 135/58 98.2 °F (36.8 °C) Axillary 79 16 99 %   22 2124 128/47 97.9 °F (36.6 °C) Axillary 67 16 98 %   22 1543 146/56 97.7 °F (36.5 °C) Axillary 66 16 100 %   22 1147 141/56 97.8 °F (36.6 °C) Axillary 69 16 99 %     No intake or output data in the 24 hours ending 22 0857  General: awake/alert   Chest:  clear to auscultation bilaterally without respiratory distress  CVS: regular rate and rhythm  Abdominal: soft, nontender, positive bowel sounds  Extremities: left AKA and no cyanosis or edema  Skin: warm and dry without rash   Neuro: no focal motor deficits      Labs:  Results from last 7 days   Lab Units 22  0036 22  1944 22  1536 22  0501  01/16/22  0501 01/15/22  0818 01/15/22  0639   WBC 10*3/mm3 6.14  --   --   --  7.18  --  7.83   HEMOGLOBIN g/dL 7.5* 8.4* 9.0*   < > 8.9*   < > 8.5*   HEMATOCRIT % 24.4* 25.3* 26.5*   < > 27.1*   < > 26.8*   PLATELETS 10*3/mm3 253  --   --   --  243  --  265    < > = values in this interval not displayed.         Results from last 7 days   Lab Units 01/17/22  0036 01/16/22  0501 01/15/22  0639 01/14/22  1100 01/14/22  1100   SODIUM mmol/L 131* 137 134*   < > 134*   POTASSIUM mmol/L 3.7 3.2* 3.4*   < > 3.3*   CHLORIDE mmol/L 100 101 96*   < > 93*   CO2 mmol/L 24.0 29.0 28.0   < > 27.0   BUN mg/dL 14 8 16   < > 12   CREATININE mg/dL 3.19* 2.21* 3.61*   < > 3.01*   CALCIUM mg/dL 7.7* 7.9* 8.0*   < > 8.3*   BILIRUBIN mg/dL 0.3 0.3  --   --  0.2   ALK PHOS U/L 80 87  --   --  133*   ALT (SGPT) U/L <5 <5  --   --  <5   AST (SGOT) U/L 16 14  --   --  17   GLUCOSE mg/dL 93 90 120*   < > 127*    < > = values in this interval not displayed.       Radiology:   Imaging Results (Last 72 Hours)     Procedure Component Value Units Date/Time    CT Head Without Contrast [[id number]] Collected: 01/16/22 0738     Updated: 01/16/22 0744    Narrative:      CT HEAD WO CONTRAST- 1/15/2022 10:48 PM CST     HISTORY: Head trauma, minor (Age >= 65y); K92.2-Gastrointestinal  hemorrhage, unspecified; N18.9-Chronic kidney disease, unspecified;  R57.9-Shock, unspecified     COMPARISON: None      DLP: 752 mGy cm. All CT scans are performed using dose optimization  techniques as appropriate to the performed exam and including at least  one of the following: Automated exposure control, adjustment of the mA  and/or kV according to size, and the use of the iterative reconstruction  technique.     TECHNIQUE: Serial axial tomographic images of the brain were obtained  without the use of intravenous contrast.      FINDINGS:   The midline structures are nondisplaced. There is mild cerebral and  cerebellar atrophy, with an associated increase in the  prominence of the  ventricles and sulci. The basilar cisterns are normal in size and  configuration. There is no evidence of intracranial hemorrhage or  mass-effect. There is low attenuation in the periventricular white  matter, consistent with chronic ischemic change. There are no abnormal  extra-axial fluid collections. There is no evidence of tonsillar  herniation.      The included orbits and their contents are unremarkable. A 1.9 cm mucous  retention cyst or polyp is noted within the right maxillary sinus. There  is mucoperiosteal thickening within the left maxillary sinus. An empty  sella is present.. The visualized osseous structures and overlying soft  tissues of the skull and face are intact.        Impression:         1. Mild cerebral and cerebellar atrophy with chronic microvascular  disease but no evidence of acute intracranial process.  2. Chronic maxillary sinus disease.        This report was finalized on 01/16/2022 07:41 by Dr. Je Lyn MD.    XR Knee 1 or 2 View Right [[id number]] Collected: 01/15/22 2119     Updated: 01/15/22 2122    Narrative:      EXAMINATION: XR KNEE 1 OR 2 VW RIGHT-     1/15/2022 9:17 PM CST     HISTORY: fall; K92.2-Gastrointestinal hemorrhage, unspecified;  N18.9-Chronic kidney disease, unspecified; R57.9-Shock, unspecified     Right knee, 2 views.     Osteopenia.  Diffuse arterial calcification.     Intact distal femur and proximal tibia and fibula.     Summary:  1. No acute fracture.  This report was finalized on 01/15/2022 21:19 by Dr. Fracisco Leblanc MD.    XR Femur 2 View Left [[id number]] Collected: 01/15/22 2118     Updated: 01/15/22 2121    Narrative:      EXAMINATION: XR FEMUR 2 VW LEFT-     1/15/2022 9:16 PM CST     HISTORY: fall; K92.2-Gastrointestinal hemorrhage, unspecified;  N18.9-Chronic kidney disease, unspecified; R57.9-Shock, unspecified     Left femur, 2 views.     Distal femur amputation.  Skin clips noted at the stump.     No fracture or soft tissue  foreign body.     Summary:  1. No acute bony abnormality is seen.  This report was finalized on 01/15/2022 21:18 by Dr. Fracisco Leblanc MD.    CT Head Without Contrast [[id number]] Collected: 01/14/22 2105     Updated: 01/14/22 2109    Narrative:      EXAMINATION: CT HEAD WO CONTRAST-      1/14/2022 8:48 PM CST     HISTORY: Mental status change, unknown cause; K92.2-Gastrointestinal  hemorrhage, unspecified; N18.9-Chronic kidney disease, unspecified;  R57.9-Shock, unspecified     In order to have a CT radiation dose as low as reasonably achievable  Automated Exposure Control was utilized for adjustment of the mA and/or  KV according to patient size.     DLP in mGycm= 729.     Axial, sagittal, and coronal noncontrast CT imaging of the head.     The visualized paranasal sinuses are clear.     The brain and ventricles have an age appropriate appearance.   There is no hemorrhage or mass-effect.   No acute infarction is seen.     No calvarial abnormality.       Impression:      1. No acute intracranial abnormality is seen.                                         This report was finalized on 01/14/2022 21:05 by Dr. Fracisco Leblanc MD.    XR Chest 1 View [[id number]] Collected: 01/14/22 1416     Updated: 01/14/22 1421    Narrative:      EXAMINATION:  XR CHEST 1 VW-  1/14/2022 1:50 PM CST     HISTORY: Central line placement. GI hemorrhage. Shock.     COMPARISON: 1/14/2022.     FINDINGS:  There is a new left IJ central venous catheter in good  position with no evidence of pneumothorax. The catheter tip is near the  junction of the brachiocephalic vein and superior vena cava. There is a  right IJ double lumen dialysis catheter in place. The lungs are expanded  and clear. Heart size is within normal limits. Prior median sternotomy.       Impression:      1. New left IJ central venous catheter with no evidence of pneumothorax.  2. No infiltrate or effusion.        This report was finalized on 01/14/2022 14:18 by Dr. Sukhi Ramirez,  MD.    XR Chest 1 View [[id number]] Collected: 01/14/22 1205     Updated: 01/14/22 1209    Narrative:      EXAMINATION: XR CHEST 1 VW-     1/14/2022 11:43 AM CST     HISTORY: Hypotension     1 view chest x-ray.     Comparison is made with December 20, 2021.     Stable heart size.  CABG changes.     Unchanged right jugular permacath.     Well-expanded lungs with no pneumonia, pneumothorax, or congestive  failure.     Summary:  1. No acute disease.  This report was finalized on 01/14/2022 12:06 by Dr. Fracisco Leblanc MD.          Culture:  No results found for: BLOODCX, URINECX, WOUNDCX, MRSACX, RESPCX, STOOLCX      Assessment   1.  End stage renal disease on HD TTS  2.  Type 2 diabetes with nephropathy  3.  Hyponatremia  4.  Hypokalemia--resolved  5.  GI bleed  6.  Recent left AKA    Plan:  1.  Dialysis next due on 1/18  2.  Monitor labs      Dick Fisher, APRN  1/17/2022  08:57 CST

## 2022-01-18 NOTE — CASE MANAGEMENT/SOCIAL WORK
Continued Stay Note   [facility]     Patient Name: Jennifer Salcido  MRN: [id number]  Today's Date: 1/18/2022    Admit Date: 1/14/2022     Discharge Plan     Row Name 01/18/22 0810       Plan    Plan Unclear/ SNF - [facility]    Plan Comments SW received call at 7:30 pm the evening of 1/17/22 advising patient had discharge orders but was now COVID positive.  BIANCA advised it would be likely that [facility] would not be able to accept patient this late in the evening now that she is COVID positive.  SW attempted to reach [facility] several times and was placed on hold for several minutes then upon calling back received no answer.  Now patient is in ICU.               Discharge Codes    No documentation.               Expected Discharge Date and Time     Expected Discharge Date Expected Discharge Time    Jan 17, 2022             AUDI Scott

## 2022-01-18 NOTE — NURSING NOTE
"Entered pt room at 0520 because pt was yelling, \"Help me!  Somebody help me!\"  Found pt lying in bed with bright red blood underneath her from her waist down to the footboard.  Paper chux under her saturated with bright red blood and large black/red clots.   Pt confused to place and situation.  Vitals taken and pt BP was 60/34.  Dr. Toure notified.  Order to infuse 500ml normal saline bolus, give 1 unit type o negative blood emergently, stat H&H and type and screen and move to ICU.    Bolus and lab work completed on floor, then pt moved to ICU bed 6.  Report given at bedside.    "

## 2022-01-18 NOTE — PROGRESS NOTES
Nephrology ([facility]) Progress Note      Patient:  Jennifer Salcido  YOB: 1955  Date of Service: 1/18/2022  MRN: [id number]   Acct: [id number]   Primary Care Physician: Akhil Golden DO  Advance Directive:   Code Status and Medical Interventions:   Ordered at: 01/14/22 1445     Medical Intervention Limits:    NO intubation (DNI)     Level Of Support Discussed With:    Patient    Health Care Surrogate     Code Status (Patient has no pulse and is not breathing):    No CPR (Do Not Attempt to Resuscitate)     Medical Interventions (Patient has pulse or is breathing):    Limited Support     Admit Date: 1/14/2022       Hospital Day: 4  Referring Provider: No Known Provider      Patient personally seen and examined.  Complete chart including Consults, Notes, Operative Reports, Labs, Cardiology, and Radiology studies reviewed as able.        Subjective:  Jennifer Salcido is a 66 y.o. female for whom we were consulted for evaluation and treatment of end stage renal disease on hemodialysis. Recently converted from peritoneal dialysis. Outpatient HD on Tuesday Thursday Saturday at [facility]. History of peripheral vascular disease, CAD with prior CABG, CHF.  Recently had left AKA. Shortly after discharge she developed lower GI bleeding and returned to hospital. Hemoglobin 6.7 on arrival. Received PRBC transfusion. Initially on Levophed but this was weaned off and she moved to medical floor. HD tolerated well on 1/15.    Today is lethargic.  Seen at start of dialysis treatment. Overnight had episode of large amount BRBPR and significant hypotension, altered mental status. Moved to ICU and given IV fluid bolus and one unit PRBC. Currently on Levophed drip. Patient also tested COVID positive yesterday.  Limited physical exam due to COVID isolation. Discussed with nursing staff.  Dialysis   Pt was seen on RRT. Seen at beginning of treatment  Modality:  Hemodialysis  Access: Catheter  Location: right IJ  QB: 400  QD: 600  UF: up to 2500 as tolerated. to maintain SBP >90    Allergies:  Codeine, Demerol [meperidine], Levaquin [levofloxacin], Lisinopril, Morphine, Sulfasalazine, Ultram [tramadol], and Trental [pentoxifylline]    Home Meds:  Medications Prior to Admission   Medication Sig Dispense Refill Last Dose   • acetaminophen (TYLENOL) 325 MG tablet Take 650 mg by mouth Every 4 (Four) Hours As Needed for Mild Pain .      • allopurinol (ZYLOPRIM) 100 MG tablet Take 100 mg by mouth Daily.      • aluminum-magnesium hydroxide-simethicone (MAALOX MAX) 400-400-40 MG/5ML suspension Take 15 mL by mouth Every 6 (Six) Hours As Needed for Heartburn.      • ascorbic acid (VITAMIN C) 500 MG tablet Take 500 mg by mouth Daily.      • aspirin 81 MG EC tablet Take 81 mg by mouth Daily.      • atorvastatin (LIPITOR) 20 MG tablet Take 1 tablet by mouth Every Night. 90 tablet 4    • bisacodyl (Dulcolax) 10 MG suppository Insert 10 mg into the rectum Daily As Needed for Constipation.      • calcium carbonate (TUMS) 500 MG chewable tablet Chew 2 tablets Every Night.      • citalopram (CeleXA) 40 MG tablet Take 40 mg by mouth Daily.      • clopidogrel (PLAVIX) 75 MG tablet Take 75 mg by mouth Daily.      • cyclobenzaprine (FLEXERIL) 5 MG tablet Take 1 tablet by mouth 3 (Three) Times a Day As Needed for Muscle Spasms.      • docusate sodium (COLACE) 100 MG capsule Take 200 mg by mouth Daily As Needed for Constipation.      • famotidine (PEPCID) 20 MG tablet Take 1 tablet by mouth Daily. 30 tablet 0    • gabapentin (NEURONTIN) 300 MG capsule Take 1 capsule by mouth Every 12 (Twelve) Hours. 24 capsule 0    • gentamicin (GARAMYCIN) 0.1 % cream Apply 1 application topically to the appropriate area as directed As Needed (for exit site).      • [] HYDROcodone-acetaminophen (NORCO) 5-325 MG per tablet Take 1 tablet by mouth Every 4 (Four) Hours As Needed for Moderate Pain  for up to 5  days. 30 tablet 0    • Melatonin 5 MG capsule Take 5 mg by mouth Every Night.      • midodrine (PROAMATINE) 10 MG tablet Take 1 tablet by mouth 3 (Three) Times a Day Before Meals.      • Multiple Vitamins-Minerals (MULTIVITAMIN ADULT PO) Take 1 tablet by mouth Daily.      • nitroglycerin (NITROSTAT) 0.4 MG SL tablet 1 under the tongue as needed for angina, may repeat q5mins for up three doses 25 tablet 3    • sucralfate (CARAFATE) 1 g tablet Take 1 g by mouth 4 (Four) Times a Day Before Meals & at Bedtime. Dissolve 1 tablet in water to create a slurry and drink      • Sucroferric Oxyhydroxide (Velphoro) 500 MG chewable tablet Chew 2 tablets 3 (Three) Times a Day Before Meals. (Patient not taking: Reported on 1/14/2022)   Not Taking at Unknown time   • vitamin D3 125 MCG (5000 UT) capsule capsule Take 1 tablet by mouth Daily.          Medicines:  Current Facility-Administered Medications   Medication Dose Route Frequency Provider Last Rate Last Admin   • acetaminophen (TYLENOL) tablet 650 mg  650 mg Oral Q4H PRN Feng Vides DO   650 mg at 01/18/22 0150    Or   • acetaminophen (TYLENOL) 160 MG/5ML solution 650 mg  650 mg Oral Q4H PRN Feng Vides DO        Or   • acetaminophen (TYLENOL) suppository 650 mg  650 mg Rectal Q4H PRN Feng Vides DO       • allopurinol (ZYLOPRIM) tablet 100 mg  100 mg Oral Daily Feng Vides DO   100 mg at 01/17/22 1018   • aspirin EC tablet 81 mg  81 mg Oral Daily Bob Carrizales MD   81 mg at 01/17/22 1753   • atorvastatin (LIPITOR) tablet 20 mg  20 mg Oral Nightly Feng Vides DO   20 mg at 01/17/22 2139   • citalopram (CeleXA) tablet 10 mg  10 mg Oral Daily Feng Vides DO   10 mg at 01/17/22 1019   • cyclobenzaprine (FLEXERIL) tablet 5 mg  5 mg Oral TID PRN Feng Vides DO   5 mg at 01/15/22 2053   • epoetin jani-epbx (RETACRIT) injection 10,000 Units  10,000 Units Subcutaneous Once per day on Mon Wed Fri Cristian Hernandez MD   10,000 Units at 01/17/22 1220   •  heparin (porcine) injection 3,600 Units  3,600 Units Intracatheter PRN Cristian Hernandez MD   3,600 Units at 01/15/22 1846   • hydrocortisone (ANUSOL-HC) suppository 25 mg  25 mg Rectal BID Bob Carrizales MD   25 mg at 01/17/22 2139   • midodrine (PROAMATINE) tablet 5 mg  5 mg Oral TID AC Bob Carrizales MD   5 mg at 01/17/22 1753   • norepinephrine (LEVOPHED) 8 mg in 250 mL NS infusion (premix)  0.02-0.3 mcg/kg/min Intravenous Titrated Rob Toure MD 2.58 mL/hr at 01/18/22 0655 0.02 mcg/kg/min at 01/18/22 0655   • ondansetron (ZOFRAN) injection 4 mg  4 mg Intravenous Q6H PRN Feng Vides DO       • pantoprazole (PROTONIX) injection 40 mg  40 mg Intravenous Q12H Feng Vides DO   40 mg at 01/17/22 2139   • sodium chloride 0.9 % flush 10 mL  10 mL Intravenous PRN Param Payne MD       • sodium chloride 0.9 % flush 10 mL  10 mL Intravenous Q12H Feng Vides DO   10 mL at 01/17/22 2139   • sodium chloride 0.9 % flush 10 mL  10 mL Intravenous PRN Feng Vides DO       • Tranexamic Acid 689 mg in sodium chloride 0.9 % 100 mL  10 mg/kg Intravenous Once Param Payne MD   Held at 01/14/22 1152       Past Medical History:  Past Medical History:   Diagnosis Date   • Arthritis     LEFT SHOULDER   • CHF (congestive heart failure) (HCC)    • Chronic pericarditis 4/22/2021   • Collar bone fracture 12/2020    SLING PLACED TO HEAL   • Coronary artery disease     LEFT MAIN AND 3 OTHER AREAS--CABG SCHEDULED FOR APRIL 22, 2021   • Dyslipidemia 9/28/2020   • Elevated cholesterol    • End stage kidney disease (HCC)     currently HD (5/19/2021), hopes to return to [facility]    • Essential hypertension 11/2/2018   • Gastroesophageal reflux disease without esophagitis 4/28/2021   • GERD (gastroesophageal reflux disease)    • Gout    • History of transfusion    • Hyperlipidemia    • Hypertension    • Overweight (BMI 25.0-29.9) 4/22/2021   • Peritoneal dialysis status (HCC)     EVERY NIGHT FOR 10 HOURS, (5/19/2021 currently  on hold)   • PONV (postoperative nausea and vomiting)    • Sleep apnea     RESOLVED   • Status post gastric bypass for obesity 4/22/2021   • Type 2 diabetes mellitus with kidney complication, with long-term current use of insulin ([facility]) 11/2/2018       Past Surgical History:  Past Surgical History:   Procedure Laterality Date   • ABOVE KNEE AMPUTATION Left 1/6/2022    Procedure: LEFT LOWER EXTREMITY AMPUTATION ABOVE KNEE;  Surgeon: Simon Coates MD;  Location:  [facility] HYBRID OR 12;  Service: Vascular;  Laterality: Left;   • BELOW KNEE AMPUTATION Left 12/13/2021    Procedure: LEFT LOWER EXTREMITY AMPUTATION BELOW KNEE;  Surgeon: Simon Coates MD;  Location:  [facility] OR;  Service: Vascular;  Laterality: Left;   • BREAST BIOPSY Right     FATTY TUMOR   • CARDIAC CATHETERIZATION N/A 10/1/2019    Procedure: Left Heart Cath;  Surgeon: Srikanth Coker MD;  Location:  [facility] CATH INVASIVE LOCATION;  Service: Cardiology   • CARDIAC CATHETERIZATION N/A 7/14/2020    Procedure: Left Heart Cath;  Surgeon: Srikanth Coker MD;  Location:  [facility] CATH INVASIVE LOCATION;  Service: Cardiology;  Laterality: N/A;   • CARDIAC CATHETERIZATION N/A 3/23/2021    Procedure: Left Heart Cath;  Surgeon: Srikanth Coker MD;  Location:  [facility] CATH INVASIVE LOCATION;  Service: Cardiology;  Laterality: N/A;   • CHEST TUBE INSERTION Right 6/7/2021    Procedure: CHEST TUBE INSERTION;  Surgeon: Antony Wood MD;  Location:  [facility] OR;  Service: Cardiothoracic;  Laterality: Right;   • CORONARY ANGIOPLASTY WITH STENT PLACEMENT  2016    X 2   • CORONARY ARTERY BYPASS GRAFT N/A 4/22/2021    Procedure: CORONARY ARTERY BYPASS GRAFT X 3 WITH LEFT INTERNAL MAMMARY ARTERY, RIGHT LOWER EXTREMITY ENDOSCOPIC VEIN HARVEST, AND PERFUSION; TRANSESOPHAGEAL ECHOCARDIOGRAM;  Surgeon: Antony Wood MD;  Location:  [facility] OR;  Service: Cardiothoracic;  Laterality: N/A;   • EYE SURGERY Bilateral     IOC LENS IMPLANTS   • EYE SURGERY Bilateral      RETINAL SURGERY   • GALLBLADDER SURGERY     • GASTRIC BYPASS     • HYSTERECTOMY     • INSERTION HEMODIALYSIS CATHETER Right 2021    Procedure: HEMODIALYSIS CATHETER INSERTION;  Surgeon: Simon Coates MD;  Location:  [facility] HYBRID OR 12;  Service: Vascular;  Laterality: Right;   • INSERTION HEMODIALYSIS CATHETER Right 2021    Procedure: HEMODIALYSIS CATHETER INSERTION;  Surgeon: Simon Coates MD;  Location:  [facility] HYBRID OR 12;  Service: Vascular;  Laterality: Right;   • INSERTION PERITONEAL DIALYSIS CATHETER     • OOPHORECTOMY Bilateral    • SINUS SURGERY     • SINUS SURGERY  1980   • TUBAL ABDOMINAL LIGATION         Family History  Family History   Problem Relation Age of Onset   • Hypertension Mother    • Lung disease Mother    • Heart disease Father    • Diabetes Father    • Abnormal EKG Father    • Breast cancer Neg Hx        Social History  Social History     Socioeconomic History   • Marital status:    Tobacco Use   • Smoking status: Former Smoker     Packs/day: 0.00     Years: 2.00     Pack years: 0.00     Types: Cigarettes     Quit date:      Years since quittin.0   • Smokeless tobacco: Never Used   Vaping Use   • Vaping Use: Never used   Substance and Sexual Activity   • Alcohol use: Not Currently     Comment: haven't drank in 3 years ( 2018)    • Drug use: No   • Sexual activity: Defer       Review of Systems:  History obtained from chart review and the patient  General ROS: No fever or chills  Respiratory ROS: No cough, shortness of breath, wheezing  Cardiovascular ROS: No chest pain or palpitations  Gastrointestinal ROS: No abdominal pain or melena  Genito-Urinary ROS: No dysuria or hematuria  Psych ROS: No anxiety and depression  14 point ROS reviewed with the patient and negative except as noted above and in the HPI unless unable to obtain.    Objective:  Patient Vitals for the past 24 hrs:   BP Temp Temp src Pulse Resp SpO2   22 0720 103/50 -- -- 73  -- 100 %   01/18/22 0715 103/52 -- -- 73 -- 99 %   01/18/22 0710 105/54 -- -- 73 -- 99 %   01/18/22 0705 106/51 -- -- 70 -- --   01/18/22 0700 103/56 -- -- 69 -- --   01/18/22 0655 121/57 -- -- 74 -- --   01/18/22 0650 123/56 -- -- 73 -- 100 %   01/18/22 0645 109/53 -- -- 77 -- --   01/18/22 0643 101/51 -- -- 79 -- --   01/18/22 0640 (!) 88/44 -- -- 79 -- 100 %   01/18/22 0637 (!) 85/45 -- -- 79 -- --   01/18/22 0634 (!) 81/44 -- -- 81 -- --   01/18/22 0600 (!) 84/51 -- -- 79 -- 99 %   01/18/22 0546 (!) 64/35 -- -- 84 -- 100 %   01/18/22 0533 (!) 60/34 96 °F (35.6 °C) Oral 88 18 99 %   01/18/22 0010 154/88 98.6 °F (37 °C) Oral 77 16 99 %   01/17/22 2046 162/68 98.7 °F (37.1 °C) Oral 70 16 100 %   01/17/22 1547 152/77 98.3 °F (36.8 °C) Axillary 77 16 100 %   01/17/22 1106 148/73 98.4 °F (36.9 °C) Axillary 70 16 100 %     No intake or output data in the 24 hours ending 01/18/22 0832  General: lethargic  Chest:  equal chest rise  CVS: regular rate and rhythm  Abdominal: soft, nontender, positive bowel sounds  Extremities: left AKA and no cyanosis or edema  Skin: warm and dry without rash   Neuro: no focal motor deficits      Labs:  Results from last 7 days   Lab Units 01/18/22  0600 01/17/22  2341 01/17/22  1648 01/17/22  0832 01/17/22  0036 01/16/22  1536 01/16/22  0501   WBC 10*3/mm3  --  5.97  --   --  6.14  --  7.18   HEMOGLOBIN g/dL 6.7* 7.3* 7.8*   < > 7.5*   < > 8.9*   HEMATOCRIT % 22.2* 23.4* 24.7*   < > 24.4*   < > 27.1*   PLATELETS 10*3/mm3  --  263  --   --  253  --  243    < > = values in this interval not displayed.         Results from last 7 days   Lab Units 01/17/22  2340 01/17/22  0036 01/16/22  0501   SODIUM mmol/L 133* 131* 137   POTASSIUM mmol/L 3.7 3.7 3.2*   CHLORIDE mmol/L 98 100 101   CO2 mmol/L 28.0 24.0 29.0   BUN mg/dL 21 14 8   CREATININE mg/dL 4.49* 3.19* 2.21*   CALCIUM mg/dL 7.8* 7.7* 7.9*   BILIRUBIN mg/dL 0.3 0.3 0.3   ALK PHOS U/L 92 80 87   ALT (SGPT) U/L <5 <5 <5   AST (SGOT) U/L 20  16 14   GLUCOSE mg/dL 99 93 90       Radiology:   Imaging Results (Last 72 Hours)     Procedure Component Value Units Date/Time    CT Head Without Contrast [[id number]] Collected: 01/16/22 0738     Updated: 01/16/22 0744    Narrative:      CT HEAD WO CONTRAST- 1/15/2022 10:48 PM CST     HISTORY: Head trauma, minor (Age >= 65y); K92.2-Gastrointestinal  hemorrhage, unspecified; N18.9-Chronic kidney disease, unspecified;  R57.9-Shock, unspecified     COMPARISON: None      DLP: 752 mGy cm. All CT scans are performed using dose optimization  techniques as appropriate to the performed exam and including at least  one of the following: Automated exposure control, adjustment of the mA  and/or kV according to size, and the use of the iterative reconstruction  technique.     TECHNIQUE: Serial axial tomographic images of the brain were obtained  without the use of intravenous contrast.      FINDINGS:   The midline structures are nondisplaced. There is mild cerebral and  cerebellar atrophy, with an associated increase in the prominence of the  ventricles and sulci. The basilar cisterns are normal in size and  configuration. There is no evidence of intracranial hemorrhage or  mass-effect. There is low attenuation in the periventricular white  matter, consistent with chronic ischemic change. There are no abnormal  extra-axial fluid collections. There is no evidence of tonsillar  herniation.      The included orbits and their contents are unremarkable. A 1.9 cm mucous  retention cyst or polyp is noted within the right maxillary sinus. There  is mucoperiosteal thickening within the left maxillary sinus. An empty  sella is present.. The visualized osseous structures and overlying soft  tissues of the skull and face are intact.        Impression:         1. Mild cerebral and cerebellar atrophy with chronic microvascular  disease but no evidence of acute intracranial process.  2. Chronic maxillary sinus disease.        This report was  finalized on 01/16/2022 07:41 by Dr. Je Lyn MD.    XR Knee 1 or 2 View Right [[id number]] Collected: 01/15/22 2119     Updated: 01/15/22 2122    Narrative:      EXAMINATION: XR KNEE 1 OR 2 VW RIGHT-     1/15/2022 9:17 PM CST     HISTORY: fall; K92.2-Gastrointestinal hemorrhage, unspecified;  N18.9-Chronic kidney disease, unspecified; R57.9-Shock, unspecified     Right knee, 2 views.     Osteopenia.  Diffuse arterial calcification.     Intact distal femur and proximal tibia and fibula.     Summary:  1. No acute fracture.  This report was finalized on 01/15/2022 21:19 by Dr. Fracisco Leblanc MD.    XR Femur 2 View Left [[id number]] Collected: 01/15/22 2118     Updated: 01/15/22 2121    Narrative:      EXAMINATION: XR FEMUR 2 VW LEFT-     1/15/2022 9:16 PM CST     HISTORY: fall; K92.2-Gastrointestinal hemorrhage, unspecified;  N18.9-Chronic kidney disease, unspecified; R57.9-Shock, unspecified     Left femur, 2 views.     Distal femur amputation.  Skin clips noted at the stump.     No fracture or soft tissue foreign body.     Summary:  1. No acute bony abnormality is seen.  This report was finalized on 01/15/2022 21:18 by Dr. Fracisco Leblanc MD.          Culture:  No results found for: BLOODCX, URINECX, WOUNDCX, MRSACX, RESPCX, STOOLCX      Assessment   1.  End stage renal disease on HD TTS  2.  Type 2 diabetes with nephropathy  3.  Hyponatremia  4.  Hypokalemia--resolved  5.  GI bleed  6.  Recent left AKA  7.  COVID-19 positive    Plan:  1.  Dialysis today. UF as tolerated.  2.  Wean Levophed as able  3.  Repeat Hgb/Hct now so that PRBC can be administered during HD if needed. Transfuse PRBC to keep Hgb above 7  4.  Monitor labs      Dick Fisher, JESSIE  1/18/2022  08:32 CST

## 2022-01-18 NOTE — CONSULTS
"Palliative Care Initial Consult   Attending Physician: Bob Carrizales MD  Referring Provider: Sigrid Cleaning RN/Bob Carrizales MD    Reason for Referral: assistance with clarification of goals of care and pain  Family/Support: sons-Jerome and Troy    Code Status and Medical Interventions:   Ordered at: 01/14/22 1445     Medical Intervention Limits:    NO intubation (DNI)     Level Of Support Discussed With:    Patient    Health Care Surrogate     Code Status (Patient has no pulse and is not breathing):    No CPR (Do Not Attempt to Resuscitate)     Medical Interventions (Patient has pulse or is breathing):    Limited Support     Goals of Care: TBD.    HPI:   66 y.o. female with end-stage kidney disease on hemodialysis previously on peritoneal dialysis, coronary artery disease s/p CABG in April 2021, peripheral vascular disease, congestive heart failure, chronic pericarditis, hyperlipidemia, GERD, hypertension, sleep apnea, below the knee amputation-.  Previous gastric bypass surgery due to obesity and GI bleeds. Patient presented to [facility] on 1/14/2022 related to rectal bleeding and hypotension.  Hemoglobin 6.7.  He received 2 units PRBCs in the ED. Plavix has been discontinued.  She required vasopressor support.  Surgery evaluated for concerns of significant bleeding from hemorrhoidal source. Family elected conservative medical interventions and CODE STATUS changes No CPR/No intubation.  She received routine hemodialysis on 1/15.  Plans were for patient to discharge to [facility] however she was found COVID-positive on 1/17.  Events a.m. and patient found lying in bed with bright red blood \"from her waist down to the footboard\".  Patient with significant hypotension.  She received IV fluid bolusing and 1 unit packed red blood cells emergently, and transferred to the critical care unit.  Vasopressor support was initiated.  She received RRT today.  Laying in bed without apparent " "distress.  Palliative Care Spoke With: patient and family family report ongoing health issues over the last several years and poor quality of life and most recently significant decline in functional status.  Due to the Palliative Care Topics Discussed: palliative care, goals of care, care options and resuscitation status we will establish an advance care plan.   Advance Care Planning   Advance Care Planning Discussion: Care conference with patient initially followed by 2 sons, Troy and Jerome via telephone.  Discussed poor long-term prognosis secondary to end-stage kidney disease, acute blood loss anemia, hemorrhagic shock, rectal bleeding, and multiple comorbidities.  Explored current hospitalization.  Patient has difficulty with word finding and repeats same conversation elements over and over.  Attempts multiple times to explain that she had a suspicious nodule in her breast in the process of evaluation.  Tells me multiple times \"I am just about done\".  She does verbalize plans for endoscopy and \"if they find something I am done if they do not find something I am done\".  Verbalizes if they do not do endoscopy and she continues to bleed \"then I am done and bleed out\".  Asked for further clarification to her statement and states \"that I am dead\".  Fixated on having something to drink and we refocused that she is supposed to be having an endoscopy procedure and oral intake is limited though she seems to forget this readily and ask for something to drink again.  Discussed conversation as above with patient's 2 sons.  They are perplexed that patient's wishes to continue with aggressive care interventions.  Sons \"do not see any reason to do it\".  They reflect on years of overall decline and health issues.  They both agree that if patient is no longer able to make medical care decisions on her own behalf they feel at this point interventions are \"dragging her down\" and feel that transition to comfort care would be " more appropriate.  Sons will honor patient's wishes to pursue endoscopy procedure at this juncture though they do not necessarily feel that this will add to patient's longevity or quality of life given her history of GI bleeds and decline.  We discussed further care conference via Zoom as patient is currently COVID-positive to assist with family and patient decision making. Family agreeable to this option. Questions encouraged and answered to family's satisfaction.     Review of Systems   Constitutional: Positive for malaise/fatigue.   Cardiovascular: Negative for leg swelling.   Respiratory: Negative for shortness of breath.    Hematologic/Lymphatic: Positive for bleeding problem.   Skin: Negative for rash.   Musculoskeletal: Positive for muscle weakness.   Gastrointestinal: Positive for hematochezia.   Genitourinary: Negative for dysuria.   Neurological: Positive for weakness.   Psychiatric/Behavioral: Positive for altered mental status. The patient is not nervous/anxious.      1- Pain Assessment  Nonverbal Indicators of Pain: nonverbal indicators absent  CPOT Facial Expression: 0-->relaxed, neutral  CPOT Body Movements: 0-->absence of movements  CPOT Muscle Tension: 0-->relaxed  Ventilator Compliance/Vocalization: 0-->talking in normal tone or no sound  CPOT Score: 0  Pain Location: extremity  Pain Description: incisional    Past Medical History:   Diagnosis Date   • Arthritis     LEFT SHOULDER   • CHF (congestive heart failure) (HCC)    • Chronic pericarditis 4/22/2021   • Collar bone fracture 12/2020    SLING PLACED TO HEAL   • Coronary artery disease     LEFT MAIN AND 3 OTHER AREAS--CABG SCHEDULED FOR APRIL 22, 2021   • Dyslipidemia 9/28/2020   • Elevated cholesterol    • End stage kidney disease (HCC)     currently HD (5/19/2021), hopes to return to [facility]    • Essential hypertension 11/2/2018   • Gastroesophageal reflux disease without esophagitis 4/28/2021   • GERD (gastroesophageal reflux disease)    • Gout     • History of transfusion    • Hyperlipidemia    • Hypertension    • Overweight (BMI 25.0-29.9) 4/22/2021   • Peritoneal dialysis status ([facility])     EVERY NIGHT FOR 10 HOURS, (5/19/2021 currently on hold)   • PONV (postoperative nausea and vomiting)    • Sleep apnea     RESOLVED   • Status post gastric bypass for obesity 4/22/2021   • Type 2 diabetes mellitus with kidney complication, with long-term current use of insulin ([facility]) 11/2/2018     Past Surgical History:   Procedure Laterality Date   • ABOVE KNEE AMPUTATION Left 1/6/2022    Procedure: LEFT LOWER EXTREMITY AMPUTATION ABOVE KNEE;  Surgeon: Simon Coates MD;  Location:  [facility] HYBRID OR 12;  Service: Vascular;  Laterality: Left;   • BELOW KNEE AMPUTATION Left 12/13/2021    Procedure: LEFT LOWER EXTREMITY AMPUTATION BELOW KNEE;  Surgeon: Simon Coates MD;  Location:  [facility] OR;  Service: Vascular;  Laterality: Left;   • BREAST BIOPSY Right     FATTY TUMOR   • CARDIAC CATHETERIZATION N/A 10/1/2019    Procedure: Left Heart Cath;  Surgeon: Srikanth Coker MD;  Location:  [facility] CATH INVASIVE LOCATION;  Service: Cardiology   • CARDIAC CATHETERIZATION N/A 7/14/2020    Procedure: Left Heart Cath;  Surgeon: Srikanth Coker MD;  Location:  [facility] CATH INVASIVE LOCATION;  Service: Cardiology;  Laterality: N/A;   • CARDIAC CATHETERIZATION N/A 3/23/2021    Procedure: Left Heart Cath;  Surgeon: Srikanth Coker MD;  Location:  [facility] CATH INVASIVE LOCATION;  Service: Cardiology;  Laterality: N/A;   • CHEST TUBE INSERTION Right 6/7/2021    Procedure: CHEST TUBE INSERTION;  Surgeon: Antony Wood MD;  Location:  [facility] OR;  Service: Cardiothoracic;  Laterality: Right;   • CORONARY ANGIOPLASTY WITH STENT PLACEMENT  2016    X 2   • CORONARY ARTERY BYPASS GRAFT N/A 4/22/2021    Procedure: CORONARY ARTERY BYPASS GRAFT X 3 WITH LEFT INTERNAL MAMMARY ARTERY, RIGHT LOWER EXTREMITY ENDOSCOPIC VEIN HARVEST, AND PERFUSION; TRANSESOPHAGEAL ECHOCARDIOGRAM;  Surgeon:  Antony Wood MD;  Location: [facility] OR;  Service: Cardiothoracic;  Laterality: N/A;   • EYE SURGERY Bilateral     IOC LENS IMPLANTS   • EYE SURGERY Bilateral     RETINAL SURGERY   • GALLBLADDER SURGERY     • GASTRIC BYPASS     • HYSTERECTOMY     • INSERTION HEMODIALYSIS CATHETER Right 2021    Procedure: HEMODIALYSIS CATHETER INSERTION;  Surgeon: Simon Coates MD;  Location: [facility] HYBRID OR 12;  Service: Vascular;  Laterality: Right;   • INSERTION HEMODIALYSIS CATHETER Right 2021    Procedure: HEMODIALYSIS CATHETER INSERTION;  Surgeon: Simon Coates MD;  Location: [facility] HYBRID OR 12;  Service: Vascular;  Laterality: Right;   • INSERTION PERITONEAL DIALYSIS CATHETER     • OOPHORECTOMY Bilateral    • SINUS SURGERY     • SINUS SURGERY  1980   • TUBAL ABDOMINAL LIGATION       Social History     Socioeconomic History   • Marital status:    Tobacco Use   • Smoking status: Former Smoker     Packs/day: 0.00     Years: 2.00     Pack years: 0.00     Types: Cigarettes     Quit date:      Years since quittin.0   • Smokeless tobacco: Never Used   Vaping Use   • Vaping Use: Never used   Substance and Sexual Activity   • Alcohol use: Not Currently     Comment: haven't drank in 3 years ( 2018)    • Drug use: No   • Sexual activity: Defer       Current Facility-Administered Medications   Medication Dose Route Frequency Provider Last Rate Last Admin   • acetaminophen (TYLENOL) tablet 650 mg  650 mg Oral Q4H PRN Feng Vides DO   650 mg at 22 0150    Or   • acetaminophen (TYLENOL) 160 MG/5ML solution 650 mg  650 mg Oral Q4H PRN Feng Vides DO        Or   • acetaminophen (TYLENOL) suppository 650 mg  650 mg Rectal Q4H PRN Feng Vides DO       • allopurinol (ZYLOPRIM) tablet 100 mg  100 mg Oral Daily Feng Vides DO   100 mg at 22 1018   • aspirin EC tablet 81 mg  81 mg Oral Daily Bob Carrizales MD   81 mg at 22 1753   • atorvastatin (LIPITOR)  tablet 20 mg  20 mg Oral Nightly Feng Vides, DO   20 mg at 01/17/22 2139   • citalopram (CeleXA) tablet 10 mg  10 mg Oral Daily Feng Vides DO   10 mg at 01/17/22 1019   • cyclobenzaprine (FLEXERIL) tablet 5 mg  5 mg Oral TID PRN Feng Vides DO   5 mg at 01/15/22 2053   • epoetin jani-epbx (RETACRIT) injection 10,000 Units  10,000 Units Subcutaneous Once per day on Mon Wed Fri Cristian Hernandez MD   10,000 Units at 01/17/22 1220   • heparin (porcine) injection 3,600 Units  3,600 Units Intracatheter PRN Cristian Hernandez MD   3,600 Units at 01/15/22 1846   • hydrocortisone (ANUSOL-HC) suppository 25 mg  25 mg Rectal BID Bob Carrizales MD   25 mg at 01/17/22 2139   • midodrine (PROAMATINE) tablet 10 mg  10 mg Oral TID AC Bob Carrizales MD       • norepinephrine (LEVOPHED) 8 mg in 250 mL NS infusion (premix)  0.02-0.3 mcg/kg/min Intravenous Titrated Rob Toure MD 36.1 mL/hr at 01/18/22 1400 0.28 mcg/kg/min at 01/18/22 1400   • ondansetron (ZOFRAN) injection 4 mg  4 mg Intravenous Q6H PRN Feng Vides DO       • oxyCODONE-acetaminophen (PERCOCET) 5-325 MG per tablet 1 tablet  1 tablet Oral Q4H PRN Bob Carrizales MD   1 tablet at 01/18/22 1459   • pantoprazole (PROTONIX) injection 40 mg  40 mg Intravenous Q12H Feng Vides DO   40 mg at 01/17/22 2139   • sodium chloride 0.9 % flush 10 mL  10 mL Intravenous PRN Param Payne MD       • sodium chloride 0.9 % flush 10 mL  10 mL Intravenous Q12H Feng Vides, DO   10 mL at 01/17/22 2139   • sodium chloride 0.9 % flush 10 mL  10 mL Intravenous PRN Feng Vides DO       • Tranexamic Acid 689 mg in sodium chloride 0.9 % 100 mL  10 mg/kg Intravenous Once Param Payne MD   Held at 01/14/22 1152   • Vasopressin (PITRESSIN) 20 Units in sodium chloride 0.9 % 100 mL infusion  0.03 Units/min Intravenous Continuous Bob Carrizales MD 9 mL/hr at 01/18/22 1306 0.03 Units/min at 01/18/22 1306     norepinephrine, 0.02-0.3 mcg/kg/min, Last Rate: 0.28  "mcg/kg/min (01/18/22 1400)  vasopressin, 0.03 Units/min, Last Rate: 0.03 Units/min (01/18/22 1306)      •  acetaminophen **OR** acetaminophen **OR** acetaminophen  •  cyclobenzaprine  •  heparin (porcine)  •  ondansetron  •  oxyCODONE-acetaminophen  •  [COMPLETED] Insert peripheral IV **AND** sodium chloride  •  sodium chloride    Allergies   Allergen Reactions   • Codeine Nausea And Vomiting   • Demerol [Meperidine] Nausea Only   • Levaquin [Levofloxacin] Nausea And Vomiting   • Lisinopril Swelling   • Morphine Nausea And Vomiting   • Sulfasalazine Nausea And Vomiting   • Ultram [Tramadol] Mental Status Change   • Trental [Pentoxifylline] GI Intolerance     Current medication reviewed for route, type, dose and frequency and are current per MAR at time of dictation.    No intake or output data in the 24 hours ending 01/18/22 1525    Physical Exam:    Diagnostics: Reviewed  BP (!) 84/54   Pulse 105   Temp 96 °F (35.6 °C) (Oral)   Resp 18   Ht 160 cm (63\")   Wt 68.8 kg (151 lb 11.2 oz)   SpO2 100%   BMI 26.87 kg/m²     Vitals and nursing note reviewed.   Constitutional:       Appearance: Ill-appearing and acutely ill-appearing.   Eyes:      General: Lids are normal.      Pupils: Pupils are equal, round, and reactive to light.   HENT:      Head: Normocephalic.   Neck:      Comments: Left IJ  Pulmonary:      Effort: Pulmonary effort is normal.   Cardiovascular:      Tachycardia present.   Edema:     Peripheral edema absent.   Abdominal:      Palpations: Abdomen is soft.   Musculoskeletal:      Cervical back: Neck supple.      Left Lower Extremity: Left leg is amputated above knee. Skin:     General: Skin is warm and dry.      Coloration: Skin is pale.   Genitourinary:     Comments: No documented urine output  Neurological:      Mental Status: Alert.      Comments: Circular conversation and repetitive   Psychiatric:      Comments: Appears calm     Patient status: Disease state: Controlled with current " "treatments.  Functional status: Palliative Performance Scale Score: Performance 10% based on the following measures: Ambulation: Totally bed bound, Activity and Evidence of Disease: Unable to do any work, extensive evidence of disease, Self-Care: Total care required,  Intake: Mouth care only, LOC: Drowsy or comatose   Nutritional status: Albumin 2.10. Body mass index is 26.87 kg/m².         Family support: The patient receives support from her children..  Advance Directives: Advance Directive Status: Patient does not have advance directive   POA/Healthcare surrogate-sons-Next of kin.    Impression/Problem List:    1.  End-stage kidney disease on hemodialysis previously on peritoneal dialysis  2.  Acute blood loss anemia  3.  Hemorrhagic shock superimposed on chronic hypotension  4.  Rectal bleeding  5.  S/p AKA, left  6.  Peripheral vascular disease  7.   Coronary artery disease  8.  Congestive heart failure  9.  Chronic pericarditis  10.  Hyperlipidemia  11.  GERD  12.  Hypertension  13.  Sleep apnea    Recommendations/Plan:  1. plan: Goals of care include CODE STATUS no CPR/limited support interventions.    2.  Palliative care encounter  -Poor long-term prognosis secondary to end-stage kidney disease, acute blood loss anemia, hemorrhagic shock, rectal bleeding, and multiple comorbidities.    -Patient has elected to pursue endoscopy.     -Family struggling with patient's decision and perplexed that patient's wishes to continue with aggressive care interventions.  Pamela \"do not see any reason to do it\".  They reflect on years of overall decline and health issues.  They both agree that if patient is no longer able to make medical care decisions on her own behalf they feel at this point interventions are \"dragging her down\" and feel that transition to comfort care would be more appropriate.  Pamela will honor patient's wishes to pursue endoscopy procedure at this juncture though they do not necessarily feel that this " will add to patient's longevity or quality of life given her history of GI bleeds and decline.  We discussed further care conference via Zoom as patient is currently COVID-positive to assist with family and patient decision making.       Thank you for this consult and allowing us to participate in patient's plan of care. Palliative Care Team will continue to follow patient.     Time spent: 89 minutes spent reviewing medical and medication records, assessing and examining patient, discussing with family, answering questions, providing some guidance about a plan and documentation of care, and coordinating care with other healthcare members, with > 50% time spent face to face.   20 minutes spent on advance care planning.    Brooke Finnegan, APRN  1/18/2022

## 2022-01-18 NOTE — NURSING NOTE
Pt arrived to ICU around 0630 from 3A for GI bleed. Systolic BP was in the 50's upon arrival. Pt was given 1 unit of blood STAT per Dr. Toure and levophed started.

## 2022-01-18 NOTE — PROGRESS NOTES
[facility] Medicine Services  INPATIENT PROGRESS NOTE    Length of Stay: 4  Date of Admission: 1/14/2022  Primary Care Physician: Akhil Golden DO    Subjective   Chief Complaint: GI bleed.  Failure to thrive.  Dialysis patient . CAD.    HPI   Patient denies any chest pain.  Patient not requiring oxygen.  Blood . pressure is stable.  Hemoglobin is also stable.  Small amount of bright red blood per nurses, possible hemorrhoids, start Anusol suppository.  Afebrile.    Review of Systems   Constitutional: Positive for activity change and appetite change. Negative for chills and fever.   HENT: Negative for hearing loss, nosebleeds, tinnitus and trouble swallowing.    Eyes: Negative for visual disturbance.   Respiratory: Negative for cough, chest tightness, shortness of breath and wheezing.    Cardiovascular: Negative for chest pain, palpitations and leg swelling.   Gastrointestinal: Negative for abdominal distention, abdominal pain, blood in stool, constipation, diarrhea, nausea and vomiting.   Endocrine: Negative for cold intolerance, heat intolerance, polydipsia, polyphagia and polyuria.   Genitourinary: Negative for decreased urine volume, difficulty urinating, dysuria, flank pain, frequency and hematuria.   Musculoskeletal: Positive for arthralgias, gait problem and myalgias. Negative for joint swelling.   Skin: Negative for rash.   Allergic/Immunologic: Negative for immunocompromised state.   Neurological: Positive for weakness. Negative for dizziness, syncope, light-headedness and headaches.   Hematological: Negative for adenopathy. Does not bruise/bleed easily.   Psychiatric/Behavioral: Negative for confusion and sleep disturbance. The patient is not nervous/anxious.           All pertinent negatives and positives are as above. All other systems have been reviewed and are negative unless otherwise stated.     Objective    Temp:  [96 °F (35.6 °C)-98.7 °F (37.1 °C)] 96  °F (35.6 °C)  Heart Rate:  [69-88] 73  Resp:  [16-18] 18  BP: ()/(34-88) 103/50  No intake or output data in the 24 hours ending 01/18/22 0907  Physical Exam  Vitals and nursing note reviewed.   Constitutional:       Appearance: She is well-developed.   HENT:      Head: Normocephalic.   Eyes:      Conjunctiva/sclera: Conjunctivae normal.      Pupils: Pupils are equal, round, and reactive to light.   Neck:      Vascular: No JVD.   Cardiovascular:      Rate and Rhythm: Normal rate and regular rhythm.      Heart sounds: Normal heart sounds. No murmur heard.  No friction rub. No gallop.    Pulmonary:      Effort: No respiratory distress.      Breath sounds: No wheezing or rales.      Comments: Not on oxygen.  Diminished breath sound bilateral, clear.  Chest:      Chest wall: No tenderness.   Abdominal:      General: Bowel sounds are normal. There is no distension.      Palpations: Abdomen is soft.      Tenderness: There is no abdominal tenderness. There is no guarding or rebound.   Musculoskeletal:         General: AKA of the left leg . suture in place, clean dry intact.     Cervical back: Neck supple.   Skin:     General: Skin is warm and dry.      Capillary Refill: Capillary refill takes 2 to 3 seconds.      Findings: No rash.   Neurological:      Mental Status: She is alert.      Cranial Nerves: No cranial nerve deficit.      Motor: Weakness present. No abnormal muscle tone.      Coordination: Coordination abnormal.      Gait: Gait abnormal.      Deep Tendon Reflexes: Reflexes normal.   Psychiatric:         Behavior: Behavior normal.         Thought Content: Thought content normal.      Results Review:  Lab Results (last 24 hours)     Procedure Component Value Units Date/Time    Hemoglobin & Hematocrit, Blood [[id number]]  (Abnormal) Collected: 01/18/22 0600    Specimen: Blood Updated: 01/18/22 0612     Hemoglobin 6.7 g/dL      Hematocrit 22.2 %     Comprehensive Metabolic Panel [[id number]]  (Abnormal)  Collected: 01/17/22 2340    Specimen: Blood Updated: 01/18/22 0050     Glucose 99 mg/dL      BUN 21 mg/dL      Creatinine 4.49 mg/dL      Sodium 133 mmol/L      Potassium 3.7 mmol/L      Chloride 98 mmol/L      CO2 28.0 mmol/L      Calcium 7.8 mg/dL      Total Protein 4.4 g/dL      Albumin 1.90 g/dL      ALT (SGPT) <5 U/L      AST (SGOT) 20 U/L      Alkaline Phosphatase 92 U/L      Total Bilirubin 0.3 mg/dL      eGFR Non African Amer 10 mL/min/1.73      Comment: <15 Indicative of kidney failure.        eGFR   Amer --     Comment: <15 Indicative of kidney failure.        Globulin 2.5 gm/dL      A/G Ratio 0.8 g/dL      BUN/Creatinine Ratio 4.7     Anion Gap 7.0 mmol/L     Narrative:      GFR Normal >60  Chronic Kidney Disease <60  Kidney Failure <15      CBC & Differential [[id number]]  (Abnormal) Collected: 01/17/22 2341    Specimen: Blood Updated: 01/18/22 0026    Narrative:      The following orders were created for panel order CBC & Differential.  Procedure                               Abnormality         Status                     ---------                               -----------         ------                     CBC Auto Differential[[id number]]        Abnormal            Final result                 Please view results for these tests on the individual orders.    CBC Auto Differential [[id number]]  (Abnormal) Collected: 01/17/22 2341    Specimen: Blood Updated: 01/18/22 0026     WBC 5.97 10*3/mm3      RBC 2.42 10*6/mm3      Hemoglobin 7.3 g/dL      Hematocrit 23.4 %      MCV 96.7 fL      MCH 30.2 pg      MCHC 31.2 g/dL      RDW 18.7 %      RDW-SD 63.5 fl      MPV 10.3 fL      Platelets 263 10*3/mm3      Neutrophil % 75.6 %      Lymphocyte % 9.5 %      Monocyte % 9.0 %      Eosinophil % 4.9 %      Basophil % 0.2 %      Immature Grans % 0.8 %      Neutrophils, Absolute 4.51 10*3/mm3      Lymphocytes, Absolute 0.57 10*3/mm3      Monocytes, Absolute 0.54 10*3/mm3      Eosinophils, Absolute 0.29 10*3/mm3       Basophils, Absolute 0.01 10*3/mm3      Immature Grans, Absolute 0.05 10*3/mm3      nRBC 0.0 /100 WBC     COVID PRE-OP / PRE-PROCEDURE SCREENING ORDER (NO ISOLATION) - Swab, Nasal Cavity [[id number]]  (Abnormal) Collected: 01/17/22 1758    Specimen: Swab from Nasal Cavity Updated: 01/17/22 1857    Narrative:      The following orders were created for panel order COVID PRE-OP / PRE-PROCEDURE SCREENING ORDER (NO ISOLATION) - Swab, Nasal Cavity.  Procedure                               Abnormality         Status                     ---------                               -----------         ------                     COVID-19,[employer] IN-HELLEN...[[id number]]  Abnormal            Final result                 Please view results for these tests on the individual orders.    COVID-19,[employer] IN-HOUSE,Nasal Swab No Transport Media 3-4 HR TAT - Swab, Nasal Cavity [[id number]]  (Abnormal) Collected: 01/17/22 1758    Specimen: Swab from Nasal Cavity Updated: 01/17/22 1857     COVID19 Detected    Narrative:      Fact sheet for providers: https://www.fda.gov/media/748714/download     Fact sheet for patients: https://www.fda.gov/media/831681/download    Test performed by PCR.    Consider negative results in combination with clinical observations, patient history, and epidemiological information.    Hemoglobin & Hematocrit, Blood [[id number]]  (Abnormal) Collected: 01/17/22 1648    Specimen: Blood Updated: 01/17/22 1656     Hemoglobin 7.8 g/dL      Hematocrit 24.7 %     Hemoglobin & Hematocrit, Blood [[id number]]  (Abnormal) Collected: 01/17/22 0832    Specimen: Blood Updated: 01/17/22 0910     Hemoglobin 8.4 g/dL      Hematocrit 26.6 %            Cultures:  No results found for: BLOODCX, URINECX, WOUNDCX, MRSACX, RESPCX, STOOLCX    Radiology Data:    Imaging Results (Last 24 Hours)     ** No results found for the last 24 hours. **          Allergies   Allergen Reactions   • Codeine Nausea And Vomiting   • Demerol [Meperidine] Nausea  Only   • Levaquin [Levofloxacin] Nausea And Vomiting   • Lisinopril Swelling   • Morphine Nausea And Vomiting   • Sulfasalazine Nausea And Vomiting   • Ultram [Tramadol] Mental Status Change   • Trental [Pentoxifylline] GI Intolerance       Scheduled meds:   allopurinol, 100 mg, Oral, Daily  aspirin, 81 mg, Oral, Daily  atorvastatin, 20 mg, Oral, Nightly  citalopram, 10 mg, Oral, Daily  epoetin jani/jani-epbx, 10,000 Units, Subcutaneous, Once per day on Mon Wed Fri  hydrocortisone, 25 mg, Rectal, BID  midodrine, 10 mg, Oral, TID AC  pantoprazole, 40 mg, Intravenous, Q12H  sodium chloride, 10 mL, Intravenous, Q12H  tranexamic acid, 10 mg/kg, Intravenous, Once        PRN meds:  •  acetaminophen **OR** acetaminophen **OR** acetaminophen  •  cyclobenzaprine  •  heparin (porcine)  •  ondansetron  •  [COMPLETED] Insert peripheral IV **AND** sodium chloride  •  sodium chloride    Assessment/Plan       Rectal bleeding    Coronary artery disease involving native coronary artery of native heart without angina pectoris    Diet-controlled diabetes mellitus (HCC)    Peripheral vascular disease (HCC)    ESRD on hemodialysis (previously on PD)    Acute blood loss anemia    S/P AKA (above knee amputation), left (HCC)    Hemorrhagic shock superimposed on chronic hypotension      Plan:   HPI.The patient was admitted to my service here at [facility] on 1/14. She presented to the emergency department with weakness, low blood pressure, and rectal bleeding.     GI bleed. Hemoglobin was 8.4 on 1/11.  6.7 at presentation today.  Ordered 2 units packed red blood cells by the emergency department.  Blood was infusing at the time that I saw her on 1/14.  Hemoglobin went to 9.3 posttransfusion and has equilibrated to 8.5 this morning.  Continue to monitor H&H every 8 hours.Monitor for further bleeding of which there has been very little.  IV Protonix. Status 2 unit of blood transfusion 1/14/2022.  Hemoglobin has been  stable.  Patient still have a little bit of bright red blood per rectum per nursing staff, possible hemorrhoid.  We will start Anusol suppository twice daily.  Protonix twice daily.  Zofran as needed.  Hemoglobin stable.  No sign of acute bleed.      Hypotension.  Levophed drip.     Anemia.  Erythropoietin .     CAD/hyperlipidemia.  Status post CABG April 2021.  History of stent placement 2016.  Stop Plavix.  We will continue continue aspirin.   Aspirin.  Lipitor.     Dialysis/end-stage renal disease.Consulted nephrology for ongoing dialysis.  She has been dialyzing on Tuesday, Thursday, and Saturday recently.  She was previously on peritoneal dialysis.       Hypokalemia.  Per nephrology.     Hypotension.  On midodrine.     Hemorrhoid.The ER consulted Dr. Ruano with general surgery to assess for possible brisk bleeding or hemorrhoidal bleeding in need of surgical intervention.  Nothing of the sort is seen at this time.  The patient and family also do not wish for any significant intervention.  She has had panendoscopy multiple times in the past according to her and her family.  I have no records on this.  They state that no bleeding sources ever been found. No plans to consult gastroenterology at this time.  Continue transfusion and supportive care.  Allowed clear liquids at admission and will now advance diet as tolerated.      Peripheral vascular disease. 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  All of her vascular interventions recently have been performed by Dr. Coates.  [place] are still in place from her intervention on 1/6.  I do not see any specific needs to consult him at this point in time, but will if the need arises.  I will at least let him know that she is in the hospital.     Depression/anxiety . Celexa.  Xanax as needed.     Gout.  Allopurinol.     Chronic pain.  Flexeril as needed.     SCD right lower extremity for DVT  prophylaxis.     Nutrition.  Regular/consistent carb diet.     Deconditioning.  PT and OT consult.     Discharge Planning: Patient will be transferred back to [facility].    Electronically signed by Bob Carrizales MD, 01/17/22, 18:07 PM CST.

## 2022-01-19 PROBLEM — U07.1 REAL TIME REVERSE TRANSCRIPTASE PCR POSITIVE FOR COVID-19 VIRUS: Status: ACTIVE | Noted: 2020-12-01

## 2022-01-19 NOTE — PLAN OF CARE
Goal Outcome Evaluation:  Plan of Care Reviewed With: patient           Outcome Summary: PT eval complete. She is alert and oriented to person and place. Was initially lethargic but woke with conversation and movement. She needs mod/max assist x 2 for supine <> sit bed mobility. Able to sit at EOB approx 10 minutes. Upon initially sitting up required Min A to maintain static sitting balance d/t posterior lean, progressing to SBA. She refused attempt to stand due to weakness. PT will cont with mobility, strengthening and activity tolerance. Recommend d.c to SNF

## 2022-01-19 NOTE — PLAN OF CARE
Goal Outcome Evaluation:  Plan of Care Reviewed With: patient           Outcome Summary: OT eval completed. Pt lethargic but oriented x2. She was recently underwent a L AKA on 1/6/22. She demonstrates decreased strength, balance, activity tolerance and increased pain. She was able to come to sitting at EOB with Mod A. Posterior lean upon sitting up EOB, progressing to SBA to maintain static sitting balance. She was able to tolerate approx 8-10 minutes of EOB activity. She declined attempt for t/f at this time. She would benefit from skilled OT services to address these deficits. Recommend d/c to SNF

## 2022-01-19 NOTE — THERAPY EVALUATION
Patient Name: Jennifer Salcido  : 1955    MRN: [id number]                              Today's Date: 2022       Admit Date: 2022    Visit Dx:     ICD-10-CM ICD-9-CM   1. Gastrointestinal hemorrhage, unspecified gastrointestinal hemorrhage type  K92.2 578.9   2. Chronic kidney disease, unspecified CKD stage  N18.9 585.9   3. Shock (HCC)  R57.9 785.50   4. Decreased activities of daily living (ADL)  Z78.9 V49.89   5. Impaired mobility  Z74.09 799.89     Patient Active Problem List   Diagnosis   • ESRD on peritoneal dialysis (HCC)   • Coronary artery disease involving native coronary artery of native heart without angina pectoris   • Diet-controlled diabetes mellitus (HCC)   • Chronic anemia   • Dyslipidemia   • Essential hypertension   • Hypokalemia   • Ischemic left leg   • Peripheral vascular disease (HCC)   • Small vessel arterial disease due to type 2 diabetes mellitus (HCC)   • Hyponatremia   • Abdominal pain   • Real time reverse transcriptase PCR positive for COVID-19 virus   • Acidosis, metabolic   • Allergy, unspecified, initial encounter   • Anaphylactic shock, unspecified, initial encounter   • At risk for fluid volume imbalance   • Atherosclerosis of native artery of both lower extremities with intermittent claudication (HCC)   • Dependence on renal dialysis (HCC)   • Encounter for adequacy testing for peritoneal dialysis (HCC)   • ESRD on hemodialysis (previously on PD)   • Gas gangrene (HCC)   • Gastroesophageal reflux disease   • Hyperlipidemia   • Iron deficiency anemia   • Loss of consciousness (HCC)   • Moderate protein-calorie malnutrition (HCC)   • Hematemesis with nausea   • Obstructive sleep apnea syndrome   • Osteoarthritis   • Disorder of phosphorus metabolism, unspecified   • Pain, unspecified   • Dialysis-associated peritonitis (HCC)   • Pleural effusion, not elsewhere classified   • Renal osteodystrophy   • Secondary hyperparathyroidism of renal origin (HCC)   • Seizure  disorder ([facility])   • Shortness of breath   • Sinus infection   • Type 2 diabetes mellitus with diabetic neuropathy, unspecified ([facility])   • Pre-op evaluation   • Amputation stump necrosis ([facility])   • Rectal bleeding   • Acute blood loss anemia   • S/P AKA (above knee amputation), left ([facility])   • Hemorrhagic shock superimposed on chronic hypotension     Past Medical History:   Diagnosis Date   • Arthritis     LEFT SHOULDER   • CHF (congestive heart failure) ([facility])    • Chronic pericarditis 4/22/2021   • Collar bone fracture 12/2020    SLING PLACED TO HEAL   • Coronary artery disease     LEFT MAIN AND 3 OTHER AREAS--CABG SCHEDULED FOR APRIL 22, 2021   • Dyslipidemia 9/28/2020   • Elevated cholesterol    • End stage kidney disease ([facility])     currently HD (5/19/2021), hopes to return to PD    • Essential hypertension 11/2/2018   • Gastroesophageal reflux disease without esophagitis 4/28/2021   • GERD (gastroesophageal reflux disease)    • Gout    • History of transfusion    • Hyperlipidemia    • Hypertension    • Overweight (BMI 25.0-29.9) 4/22/2021   • Peritoneal dialysis status ([facility])     EVERY NIGHT FOR 10 HOURS, (5/19/2021 currently on hold)   • PONV (postoperative nausea and vomiting)    • Sleep apnea     RESOLVED   • Status post gastric bypass for obesity 4/22/2021   • Type 2 diabetes mellitus with kidney complication, with long-term current use of insulin ([facility]) 11/2/2018     Past Surgical History:   Procedure Laterality Date   • ABOVE KNEE AMPUTATION Left 1/6/2022    Procedure: LEFT LOWER EXTREMITY AMPUTATION ABOVE KNEE;  Surgeon: Simon Coates MD;  [facility]:  [facility] HYBRID OR 12;  Service: Vascular;  Laterality: Left;   • BELOW KNEE AMPUTATION Left 12/13/2021    Procedure: LEFT LOWER EXTREMITY AMPUTATION BELOW KNEE;  Surgeon: Simon Coates MD;  Location:  [facility] OR;  Service: Vascular;  Laterality: Left;   • BREAST BIOPSY Right     FATTY TUMOR   • CARDIAC CATHETERIZATION N/A 10/1/2019    Procedure: Left  Heart Cath;  Surgeon: Srikanth Coker MD;  Location:  [facility] CATH INVASIVE LOCATION;  Service: Cardiology   • CARDIAC CATHETERIZATION N/A 7/14/2020    Procedure: Left Heart Cath;  Surgeon: Srikanth Coker MD;  Location:  [facility] CATH INVASIVE LOC[facility];  Service: Cardiology;  Laterality: N/A;   • CARDIAC CATHETERIZATION N/A 3/23/2021    Procedure: Left Heart Cath;  Surgeon: Srikanth Coker MD;  Location:  [facility] CATH INVASIVE LOC[facility];  Service: Cardiology;  Laterality: N/A;   • CHEST TUBE INSERTION Right 6/7/2021    Procedure: CHEST TUBE INSERTION;  Surgeon: Antony Wood MD;  Location:  [facility] OR;  Service: Cardiothoracic;  Laterality: Right;   • CORONARY ANGIOPLASTY WITH STENT PLACEMENT  2016    X 2   • CORONARY ARTERY BYPASS GRAFT N/A 4/22/2021    Procedure: CORONARY ARTERY BYPASS GRAFT X 3 WITH LEFT INTERNAL MAMMARY ARTERY, RIGHT LOWER EXTREMITY ENDOSCOPIC VEIN HARVEST, AND PERFUSION; TRANSESOPHAGEAL ECHOCARDIOGRAM;  Surgeon: Antony Wood MD;  Location:  [facility] OR;  Service: Cardiothoracic;  Laterality: N/A;   • EYE SURGERY Bilateral     IOC LENS IMPLANTS   • EYE SURGERY Bilateral     RETINAL SURGERY   • GALLBLADDER SURGERY     • GASTRIC BYPASS     • HYSTERECTOMY     • INSERTION HEMODIALYSIS CATHETER Right 4/1/2021    Procedure: HEMODIALYSIS CATHETER INSERTION;  Surgeon: Simon Coates MD;  Location:  [facility] HYBRID OR 12;  Service: Vascular;  Laterality: Right;   • INSERTION HEMODIALYSIS CATHETER Right 12/20/2021    Procedure: HEMODIALYSIS CATHETER INSERTION;  Surgeon: Simon Coates MD;  Location:  [facility] HYBRID OR 12;  Service: Vascular;  Laterality: Right;   • INSERTION PERITONEAL DIALYSIS CATHETER     • OOPHORECTOMY Bilateral    • SINUS SURGERY     • SINUS SURGERY  07/1980   • TUBAL ABDOMINAL LIGATION        General Information     Row Name 01/19/22 0784          Physical Therapy Time and Intention    Document Type evaluation  Rectal bleeding, low BP. Dx: rectal bleed, anemia,  hemorrhagic shock, GI bleed, COVID, hx L AKA, dialysis  -JAMARI     Mode of Treatment physical therapy  -JAMARI     Row Name 01/19/22 0730          General Information    Patient Profile Reviewed yes  -JAMARI     Prior Level of Function --  unsure of PLOF. Pt recently d/c to [facility], she reports occasionally sitting at EOB with assist and but no OOB activity since d/c.  -JAMARI     Existing Precautions/Restrictions fall; oxygen therapy device and L/min  L AKA  -JAMARI     Barriers to Rehab medically complex; previous functional deficit; physical barrier  -JAMARI     Row Name 01/19/22 0730          Living Environment    Lives With facility resident  -JAMARI     Row Name 01/19/22 0730          Cognition    Orientation Status (Cognition) oriented to; person; place  -JAMARI     Row Name 01/19/22 0730          Safety Issues, Functional Mobility    Impairments Affecting Function (Mobility) balance; endurance/activity tolerance; strength; pain  -JAMARI     Row Name 01/19/22 0730          Relationship/Environment    Name(s) of Who Lives With Patient [facility]  -JAMARI           User Key  (r) = Recorded By, (t) = Taken By, (c) = Cosigned By    Initials Name Provider Type    Etienne Layton, PT DPT Physical Therapist               Mobility     Row Name 01/19/22 0730          Bed Mobility    Bed Mobility supine-sit; sit-supine  -JAMARI     Supine-Sit [place] (Bed Mobility) moderate assist (50% patient effort); maximum assist (25% patient effort); verbal cues; 2 person assist  -JAMARI     Sit-Supine [place] (Bed Mobility) moderate assist (50% patient effort); maximum assist (25% patient effort); verbal cues; 2 person assist  -JAMARI     Assistive Device (Bed Mobility) head of bed elevated; draw sheet  -JAMARI     Comment (Bed Mobility) Able to sit at EOB approx 10 minutes. Upon initially sitting up required Min A to maintain static sitting balance d/t posterior lean, graduating to SBA to maintain balance at EOB  -JAMARI     Row Name 01/19/22 0730          Transfers     Comment (Transfers) pt refused attempt to stand  -JAMARI           User Key  (r) = Recorded By, (t) = Taken By, (c) = Cosigned By    Initials Name Provider Type    Etienne Layton PT DPT Physical Therapist               Obj/Interventions     [facility] Name 01/19/22 0730          Range of Motion Comprehensive    Comment, General Range of Motion R LE AROM WFL, L hip AROM impaired 50% (pain)  -JAMARI     [facility] Name 01/19/22 0730          Strength Comprehensive (MMT)    Comment, General Manual Muscle Testing (MMT) Assessment R LE grossly 3/5.  -JAMARI     Row Name 01/19/22 0730          Balance    Balance Assessment sitting static balance  -JAMARI     Static Sitting Balance WFL; mild impairment; supported; unsupported; sitting, edge of bed  -JAMARI     [facility] Name 01/19/22 0730          Sensory Assessment (Somatosensory)    Sensory Assessment (Somatosensory) LE sensation intact  per pt  -JAMARI           User Key  (r) = Recorded By, (t) = Taken By, (c) = Cosigned By    Initials Name Provider Type    Etienne Layton PT DPT Physical Therapist               Goals/Plan     Row Name 01/19/22 0730          Bed Mobility Goal 1 (PT)    Activity/Assistive Device (Bed Mobility Goal 1, PT) sit to supine/supine to sit  -JAMARI     Divide Level/Cues Needed (Bed Mobility Goal 1, PT) minimum assist (75% or more patient effort); moderate assist (50-74% patient effort); 1 person assist  -JAMARI     Time Frame (Bed Mobility Goal 1, PT) long term goal (LTG); 10 days  -JAMARI     Progress/Outcomes (Bed Mobility Goal 1, PT) goal ongoing  -JAMARI     Row Name 01/19/22 0730          Transfer Goal 1 (PT)    Activity/Assistive Device (Transfer Goal 1, PT) sit-to-stand/stand-to-sit; bed-to-chair/chair-to-bed  -JAMARI     Divide Level/Cues Needed (Transfer Goal 1, PT) moderate assist (50-74% patient effort)  -JAMARI     Time Frame (Transfer Goal 1, PT) long term goal (LTG); 10 days  -JAMARI     Progress/Outcome (Transfer Goal 1, PT) goal ongoing  -JAMARI           User [place]  (r) = Recorded  By, (t) = Taken By, (c) = Cosigned By    Initials Name Provider Type    Etienne Layton, PT DPT Physical Therapist               Clinical Impression     Row Name 01/19/22 0730          Pain    Additional Documentation Pain Scale: FACES Pre/Post-Treatment (Group)  -JAMARI     Row Name 01/19/22 0730          Pain Scale: Numbers Pre/Post-Treatment    Pain Intervention(s) Repositioned  -JAMARI     Row Name 01/19/22 0730          Pain Scale: FACES Pre/Post-Treatment    Pain: FACES Scale, Pretreatment 2-->hurts little bit  -JAMARI     Pain Location - Side Left  -JAMARI     Pain Location - Orientation generalized  -JAMARI     Pain Location hip  -JAMARI     Row Name 01/19/22 0730          Plan of Care Review    Plan of Care Reviewed With patient  -JAMARI     Outcome Summary PT eval complete. She is alert and oriented to person and place. Was initially lethargic but woke with conversation and movement. She needs mod/max assist x 2 for supine <> sit bed mobility. Able to sit at EOB approx 10 minutes. Upon initially sitting up required Min A to maintain static sitting balance d/t posterior lean, progressing to SBA. She refused attempt to stand due to weakness. PT will cont with mobility, strengthening and activity tolerance. Recommend d.c to [facility]  -JAMARI     Row Name 01/19/22 0730          Therapy Assessment/Plan (PT)    Patient/Family Therapy Goals Statement (PT) return to [facility]     Rehab Potential (PT) fair, will monitor progress closely  -JAMARI     Criteria for Skilled Interventions Met (PT) yes; meets criteria  -JAMARI     Predicted Duration of Therapy Intervention (PT) unti ld.c  -JAMARI     Row Name 01/19/22 0730          Positioning and Restraints    Pre-Treatment Position in bed  -JAMARI     Post Treatment Position bed  -JAMARI     In Bed notified nsg; fowlers; call light within reach; encouraged to call for assist; exit alarm on; patient within staff view; side lying left  -JAMARI           User Key  (r) = Recorded By, (t) = Taken By, (c) = Cosigned By     Initials Name Provider Type    Etienne Layton, PT DPT Physical Therapist               Outcome Measures     Row Name 01/19/22 0730          How much help from another person do you currently need...    Turning from your back to your side while in flat bed without using bedrails? 2  -JAMARI     Moving from lying on back to sitting on the side of a flat bed without bedrails? 2  -JAMARI     Moving to and from a bed to a chair (including a wheelchair)? 1  -JAMARI     Standing up from a chair using your arms (e.g., wheelchair, bedside chair)? 1  -JAMARI     Climbing 3-5 steps with a railing? 1  -JAMARI     To walk in hospital room? 1  -JAMARI     AM-PAC 6 Clicks Score (PT) 8  -JAMARI     Row Name 01/19/22 0807 01/19/22 0730       Functional Assessment    Outcome Measure Options AM-PAC 6 Clicks Daily Activity (OT)  -MIKE AM-PAC 6 Clicks Basic Mobility (PT)  -JAMARI          User Key  (r) = Recorded By, (t) = Taken By, (c) = Cosigned By    Initials Name Provider Type    Etienne Layton, PT DPT Physical Therapist    Kimberlee Gill, OTR/L, CSRS Occupational Therapist                             Physical Therapy Education                 Title: PT OT SLP Therapies (In Progress)     Topic: Physical Therapy (In Progress)     Point: Mobility training (In Progress)     Learning Progress Summary           Patient Acceptance, E, NR by JAMARI at 1/19/2022 0730    Comment: benefits of activity, progression of PT POC                   Point: Home exercise program (Not Started)     Learner Progress:  Not documented in this visit.          Point: Body mechanics (Not Started)     Learner Progress:  Not documented in this visit.          Point: Precautions (Not Started)     Learner Progress:  Not documented in this visit.                      User Key     Initials Effective Dates Name Provider Type Discipline    JAMARI 08/02/16 -  Etienne Marion, PT DPT Physical Therapist PT              PT Recommendation and Plan  Planned Therapy Interventions (PT): bed  mobility training, transfer training, balance training, home exercise program, patient/family education, postural re-education, ROM (range of motion), strengthening  Plan of Care Reviewed With: patient  Outcome Summary: PT jermaine complete. She is alert and oriented to person and place. Was initially lethargic but woke with conversation and movement. She needs mod/max assist x 2 for supine <> sit bed mobility. Able to sit at EOB approx 10 minutes. Upon initially sitting up required Min A to maintain static sitting balance d/t posterior lean, progressing to SBA. She refused attempt to stand due to weakness. PT will cont with mobility, strengthening and activity tolerance. Recommend d.c to SNF     Time Calculation:    PT Charges     Row Name 01/19/22 1315             Time Calculation    Start Time 0730  -JAMARI      Stop Time 0830  -JAMARI      Time Calculation (min) 60 min  -JAMARI      PT Received On 01/19/22  -JAMARI      PT Goal Re-Cert Due Date 01/29/22  -JAMARI            User Key  (r) = Recorded By, (t) = Taken By, (c) = Cosigned By    Initials Name Provider Type    Etienne Layton, PT DPT Physical Therapist              Therapy Charges for Today     Code Description Service Date Service Provider Modifiers Qty    [id number] HC PT JERMAINE MOD COMPLEXITY 4 1/19/2022 Etienne Marion PT DPT GP 1          PT G-Codes  Outcome Measure Options: AM-PAC 6 Clicks Daily Activity (OT)  AM-PAC 6 Clicks Score (PT): 8  AM-PAC 6 Clicks Score (OT): 11    Etienne Marion PT DPT  1/19/2022

## 2022-01-19 NOTE — CONSULTS
Inpatient Gastroenterology Service    Consultation    Ender Martínez MD, FACP    1/18/2022  18:24 CST    Consultation received from:    Nursing staff: routine      Consult received:  2022.02.18@0836        Patient referred for evaluation of hematochezia    History and physical exam information is obtained from the EMR and nursing staff  Contact is minimized in light of active SARS-2 infection        Chief complaint  -hematochezia        History of Present Illness  -patient had bright red hematochezia last night, and associated hypotension.  She was moved to ICU.  In the recent past, she has declined endoscopy.  She is DNR.  She is on chronic hemodialysis.  She required pressors starting last night.  She has had no further bleeding during the day today.  Her BP and pulse have gradually improved.  Her Hb increased disproportionately to the volume of PRBCs she received.  Her pressor requirements have lessened.    -patient was also found to be SARS-2 positive    -no report of hematemesis, melena, nausea, emesis, diarrhea, constipation, pyrosis, regurgitation, dysphagia, odynophagia, altered bowel habit, change in stool, choluria, acholic stool, or jaundice    Past Medical History:   Diagnosis Date   • Arthritis       LEFT SHOULDER   • CHF (congestive heart failure) ([facility])     • Chronic pericarditis 4/22/2021   • Collar bone fracture 12/2020     SLING PLACED TO HEAL   • Coronary artery disease       LEFT MAIN AND 3 OTHER AREAS--CABG SCHEDULED FOR APRIL 22, 2021   • Dyslipidemia 9/28/2020   • Elevated cholesterol     • End stage kidney disease (HCC)       currently HD (5/19/2021), hopes to return to PD    • Essential hypertension 11/2/2018   • Gastroesophageal reflux disease without esophagitis 4/28/2021   • GERD (gastroesophageal reflux disease)     • Gout     • History of transfusion     • Hyperlipidemia     • Hypertension     • Overweight (BMI 25.0-29.9) 4/22/2021   • Peritoneal dialysis status (HCC)       EVERY  NIGHT FOR 10 HOURS, (5/19/2021 currently on hold)   • PONV (postoperative nausea and vomiting)     • Sleep apnea       RESOLVED   • Status post gastric bypass for obesity 4/22/2021   • Type 2 diabetes mellitus with kidney complication, with long-term current use of insulin ([facility]) 11/2/2018             Past Surgical History:  Surgical History         Past Surgical History:   Procedure Laterality Date   • ABOVE KNEE AMPUTATION Left 1/6/2022     Procedure: LEFT LOWER EXTREMITY AMPUTATION ABOVE KNEE;  Surgeon: Simon Coates MD;  Location:  [facility] HYBRID OR 12;  Service: Vascular;  Laterality: Left;   • BELOW KNEE AMPUTATION Left 12/13/2021     Procedure: LEFT LOWER EXTREMITY AMPUTATION BELOW KNEE;  Surgeon: Simon Coates MD;  Location:  [facility] OR;  Service: Vascular;  Laterality: Left;   • BREAST BIOPSY Right       FATTY TUMOR   • CARDIAC CATHETERIZATION N/A 10/1/2019     Procedure: Left Heart Cath;  Surgeon: Srikanth Coker MD;  Location:  [facility] CATH INVASIVE LOCATION;  Service: Cardiology   • CARDIAC CATHETERIZATION N/A 7/14/2020     Procedure: Left Heart Cath;  Surgeon: Srikanth Coker MD;  Location:  [facility] CATH INVASIVE LOCATION;  Service: Cardiology;  Laterality: N/A;   • CARDIAC CATHETERIZATION N/A 3/23/2021     Procedure: Left Heart Cath;  Surgeon: Srikanth Coker MD;  Location:  [facility] CATH INVASIVE LOCATION;  Service: Cardiology;  Laterality: N/A;   • CHEST TUBE INSERTION Right 6/7/2021     Procedure: CHEST TUBE INSERTION;  Surgeon: Antony Wood MD;  Location: [facility] OR;  Service: Cardiothoracic;  Laterality: Right;   • CORONARY ANGIOPLASTY WITH STENT PLACEMENT   2016     X 2   • CORONARY ARTERY BYPASS GRAFT N/A 4/22/2021     Procedure: CORONARY ARTERY BYPASS GRAFT X 3 WITH LEFT INTERNAL MAMMARY ARTERY, RIGHT LOWER EXTREMITY ENDOSCOPIC VEIN HARVEST, AND PERFUSION; TRANSESOPHAGEAL ECHOCARDIOGRAM;  Surgeon: Antony Wood MD;  Location: [facility] OR;  Service: Cardiothoracic;  Laterality:  N/A;   • EYE SURGERY Bilateral       IOC LENS IMPLANTS   • EYE SURGERY Bilateral       RETINAL SURGERY   • GALLBLADDER SURGERY       • GASTRIC BYPASS       • HYSTERECTOMY       • INSERTION HEMODIALYSIS CATHETER Right 4/1/2021     Procedure: HEMODIALYSIS CATHETER INSERTION;  Surgeon: Simon Coates MD;  Location: [facility] HYBRID OR 12;  Service: Vascular;  Laterality: Right;   • INSERTION HEMODIALYSIS CATHETER Right 12/20/2021     Procedure: HEMODIALYSIS CATHETER INSERTION;  Surgeon: Simon Coates MD;  Location: [facility] HYBRID OR 12;  Service: Vascular;  Laterality: Right;   • INSERTION PERITONEAL DIALYSIS CATHETER       • OOPHORECTOMY Bilateral     • SINUS SURGERY       • SINUS SURGERY   07/1980   • TUBAL ABDOMINAL LIGATION                 Family History  -non-contributory.  No report of family history of: gastric cancer, pancreatic cancer, colorectal cancer, breast cancer, ovarian cancer, uterine cancer, colorectal polyps, inflammatory bowel disease, Crohn's disease, ulcerative colitis, celiac disease, peptic ulcer disease, pancreatitis, hepatitis, cirrhosis        Social History  -  -tobacco use: Quit x 14 y  -ethanol use: Quit   -illicit/recreational substance and THC use: denies        Medications  -see EMR lists        Physical Exam  General  -appears stated age  -no acute distress    HEENT  -normocephalic  -atraumatic     Neurological  -alert  -oriented per nurse report at this time, but this has apparently varied quite a bit    Psychiatric  -normal mood    Pulmonary  -normal respiratory effort  -no respiratory distress  -no stridor    Cardiovascular  -rapid rate  -regular rhythm    Abdomen  -benign  -soft  -protuberant          Laboratory of Note  -see EMR        Imaging of Note  -see EMR        Assessment  -hematochezia, spontaneous cessation.  No indication that this originated from foregut.  -continued requirement for pressors  -Hb increased more polanco expected after transfusion,  raising the question of errant lab value  -new diagnosis of SARS-2        Recommendations  -agree with ICU admission  -agree with your excellent care: supportive measures at present from GI standpoint  -tagged RBC scan if re-bleeding develops.  Lower GI bleeding sites are typically not amenable to endoscopic interventions, and localization would potentially aid surgical or interventional radiology efforts at control of hemorrhage.  -acid suppression  -avoid warfarin, DAOAC, ASA, Plavix, etc. as clinically able  -follow H&H, transfuse as clinically appropriate, including FFP and platelets as appropriate  -avoid agents toxic to GI tract mucosa, anticoagulant/antiplatelet agents, as clinically practicable  -type and hold at least two units ahead to be rapidly available should brisk bleeding develop  -diet as desired by primary inpatient care team  -endoscopic studies with colonoscopy are likely to be low yield in this situation, so are not advised  -no urgent indication for gastrointestinal endoscopy at this time          Thank you for allowing us to participate in the care of this patient.    Sincerely,    ALPHONSE Martínez MD, FACP  [facility] Inpatient Gastroenterology Service

## 2022-01-19 NOTE — PROGRESS NOTES
"Palliative Care Daily Progress Note   Chief complaint-follow up goals of care/advanced care planning, support for patient/family, hospice referral/discussion, withdrawal of interventions and comfort care    Code Status:   Code Status and Medical Interventions:   Ordered at: 01/19/22 1209     Level Of Support Discussed With:    Patient    Health Care Surrogate     Code Status (Patient has no pulse and is not breathing):    No CPR (Do Not Attempt to Resuscitate)     Medical Interventions (Patient has pulse or is breathing):    Comfort Measures      Advanced Directives: Advance Directive Status: Patient does not have advance directive   Goals of Care: Ongoing.     S: Medical record reviewed. Events noted.  Laying in bed without apparent distress.  Remains on low-dose Levophed.  Continued decline in hemoglobin from 9.1-7.5 this morning.  She received 3 units packed blood cells yesterday.  No urgent indication for endoscopy per GI. Due to the Palliative Care Topics Discussed: palliative care, goals of care, care options, resuscitation status and [facility] we will establish an advance care plan.   Advance Care Planning   Advance Care Planning Discussion: Spoke with patient's 2 sons Troy and Jerome.  Again discussed their concerns with regard to patient's continued aggressive care interventions.  States that they have made their wishes known and feel patient has no quality of life with continued efforts. Sons have encouraged comfort measures.  Also spoke with patient in room with regard to previous conversations with family and continued efforts.  She verbalizes multiple times \"I am so tired\".  Discussed if hemoglobin continues to decline will need additional blood transfusions.  Again discussed options to include continued aggressive care versus transition.  Patient has elected we transition with focus of comfort \"just keep me comfortable, I've had a good life\".  I have also relayed patient wishes to family and they " feel relieved with patient's decision.  We discussed expectations at length.        Review of Systems   Constitutional: Positive for malaise/fatigue.   Cardiovascular: Negative for leg swelling.   Respiratory: Negative for shortness of breath.    Hematologic/Lymphatic: Positive for bleeding problem.   Skin: Negative for rash.   Musculoskeletal: Positive for muscle weakness.   Gastrointestinal: Positive for hematochezia.   Genitourinary: Negative for dysuria.   Neurological: Positive for weakness. .    Pain Assessment  Nonverbal Indicators of Pain: crying  CPOT and PAINAD Scales: CPOT (Critical-Care Pain Observation Tool)  CPOT Facial Expression: 0-->relaxed, neutral  CPOT Body Movements: 0-->absence of movements  CPOT Muscle Tension: 0-->relaxed  Ventilator Compliance/Vocalization: 0-->talking in normal tone or no sound  CPOT Score: 0  Pain Location: extremity  Pain Description: incisional    O:     Intake/Output Summary (Last 24 hours) at 1/19/2022 1214  Last data filed at 1/19/2022 0830  Gross per 24 hour   Intake 863.23 ml   Output --   Net 863.23 ml       Diagnostics: Reviewed    Current Facility-Administered Medications   Medication Dose Route Frequency Provider Last Rate Last Admin   • acetaminophen (TYLENOL) tablet 650 mg  650 mg Oral Q4H PRN Feng Vides DO   650 mg at 01/18/22 0150    Or   • acetaminophen (TYLENOL) 160 MG/5ML solution 650 mg  650 mg Oral Q4H PRN Feng Vides DO        Or   • acetaminophen (TYLENOL) suppository 650 mg  650 mg Rectal Q4H PRN Feng Vides DO       • albumin human 25 % IV SOLN 12.5 g  12.5 g Intravenous PRN Cristian Hernandez MD       • allopurinol (ZYLOPRIM) tablet 100 mg  100 mg Oral Daily Feng Vides DO   100 mg at 01/19/22 0832   • aspirin EC tablet 81 mg  81 mg Oral Daily Bob Carrizales MD   81 mg at 01/19/22 0831   • atorvastatin (LIPITOR) tablet 20 mg  20 mg Oral Nightly Feng Vides DO   20 mg at 01/18/22 2136   • citalopram (CeleXA) tablet 10 mg  10 mg  Oral Daily Feng Vides DO   10 mg at 01/19/22 0832   • cyclobenzaprine (FLEXERIL) tablet 5 mg  5 mg Oral TID PRN Feng Vides DO   5 mg at 01/15/22 2053   • diphenhydrAMINE (BENADRYL) capsule 25 mg  25 mg Oral Q4H PRN Cristian Hernandez MD        Or   • diphenhydrAMINE (BENADRYL) injection 25 mg  25 mg Intravenous Q4H PRN Cristian Hernandez MD       • epoetin jani-epbx (RETACRIT) injection 10,000 Units  10,000 Units Subcutaneous Once per day on Mon Wed Fri Cristian Hernandez MD   10,000 Units at 01/19/22 0831   • heparin (porcine) injection 3,600 Units  3,600 Units Intracatheter PRN Cristian Hernandez MD   3,600 Units at 01/15/22 1846   • hydrocortisone (ANUSOL-HC) suppository 25 mg  25 mg Rectal BID Bob Carrizales MD   25 mg at 01/19/22 0831   • midodrine (PROAMATINE) tablet 10 mg  10 mg Oral TID AC Bob Carrizales MD   10 mg at 01/19/22 0831   • norepinephrine (LEVOPHED) 8 mg in 250 mL NS infusion (premix)  0.02-0.3 mcg/kg/min Intravenous Titrated Rob Toure MD 5.16 mL/hr at 01/19/22 0545 0.04 mcg/kg/min at 01/19/22 0545   • ondansetron (ZOFRAN) injection 4 mg  4 mg Intravenous Q6H PRN Feng Vides DO       • ondansetron (ZOFRAN) injection 4 mg  4 mg Intravenous Q2H PRN Cristian Hernandez MD       • oxyCODONE-acetaminophen (PERCOCET) 5-325 MG per tablet 1 tablet  1 tablet Oral Q4H PRN Bob Carrizales MD   1 tablet at 01/19/22 0427   • pantoprazole (PROTONIX) injection 40 mg  40 mg Intravenous Q12H Feng Vides DO   40 mg at 01/19/22 0831   • sodium chloride 0.9 % bolus 100 mL  100 mL Intravenous PRN Cristian Hernandez MD       • sodium chloride 0.9 % flush 10 mL  10 mL Intravenous PRN Param Payne MD       • sodium chloride 0.9 % flush 10 mL  10 mL Intravenous Q12H Feng Vides DO   10 mL at 01/19/22 0832   • sodium chloride 0.9 % flush 10 mL  10 mL Intravenous PRN Feng Vides DO       • Tranexamic Acid 689 mg in sodium chloride 0.9 % 100 mL  10 mg/kg Intravenous Once Param Payne MD   Held at 01/14/22  "1152     norepinephrine, 0.02-0.3 mcg/kg/min, Last Rate: 0.04 mcg/kg/min (01/19/22 0545)      •  acetaminophen **OR** acetaminophen **OR** acetaminophen  •  albumin human  •  cyclobenzaprine  •  diphenhydrAMINE **OR** diphenhydrAMINE  •  heparin (porcine)  •  ondansetron  •  ondansetron  •  oxyCODONE-acetaminophen  •  sodium chloride  •  [COMPLETED] Insert peripheral IV **AND** sodium chloride  •  sodium chloride    A:    /51   Pulse 76   Temp 97.2 °F (36.2 °C) (Oral)   Resp 18   Ht 160 cm (63\")   Wt 68.7 kg (151 lb 7.3 oz)   SpO2 100%   BMI 26.83 kg/m²     Vitals and nursing note reviewed.   Constitutional:       Appearance: Ill-appearing and acutely ill-appearing.   Eyes:      General: Lids are normal.      Pupils: Pupils are equal, round, and reactive to light.   HENT:      Head: Normocephalic.   Neck:      Comments: Left IJ  Pulmonary:      Effort: Pulmonary effort is normal.   Cardiovascular:      Tachycardia present.   Edema:     Peripheral edema absent.   Abdominal:      Palpations: Abdomen is soft.   Musculoskeletal:      Cervical back: Neck supple.      Left Lower Extremity: Left leg is amputated above knee. Skin:     General: Skin is warm and dry.      Coloration: Skin is pale.   Genitourinary:     Comments: No documented urine output  Neurological:      Mental Status: Alert.      Comments: Circular conversation and repetitive   Psychiatric:      Comments: Appears calm      Patient status: Disease state: Controlled with current treatments.  Functional status: Palliative Performance Scale Score: Performance 10% based on the following measures: Ambulation: Totally bed bound, Activity and Evidence of Disease: Unable to do any work, extensive evidence of disease, Self-Care: Total care required,  Intake: Mouth care only, LOC: Drowsy or comatose   Nutritional status: Albumin 2.10. Body mass index is 26.87 kg/m².      Family support: The patient receives support from her children..  Advance " "Directives: Advance Directive Status: Patient does not have advance directive   POA/Healthcare surrogate-sons-Next of kin.     Impression/Problem List:     1.  End-stage kidney disease on hemodialysis previously on peritoneal dialysis  2.  Acute blood loss anemia  3.  Hemorrhagic shock superimposed on chronic hypotension  4.  Rectal bleeding  5.  S/p AKA, left  6.  Peripheral vascular disease  7.   Coronary artery disease  8.  Congestive heart failure  9.  Chronic pericarditis  10.  Hyperlipidemia  11.  GERD  12.  Hypertension  13.  Sleep apnea     Recommendations/Plan:  1. plan: Goals of care include CODE STATUS no CPR/limited support interventions.     2.  Palliative care encounter  -Poor long-term prognosis secondary to end-stage kidney disease, acute blood loss anemia, hemorrhagic shock, rectal bleeding, and multiple comorbidities.     -Bloody BM x 1 today.  Further decline in hemoglobin from 9.1-7.5.  No plans for endoscopy per GI, low yield.     1/19-Spoke with patient's 2 sons Troy and Jerome.  Again discussed their concerns with regard to patient's continued aggressive care interventions.  States that they have made their wishes known and feel patient has no quality of life with continued efforts. Sons have encouraged patient to transition to comfort measures.  Also spoke with patient in room with regard to previous conversations with family and continued efforts.  She verbalizes multiple times \"I am so tired\".  Discussed if hemoglobin continues to decline will need additional blood transfusions.  Again discussed options to include continued aggressive care versus transition.  Patient has elected we transition with focus of comfort \"just keep me comfortable, I've had a good life\".  I have also relayed patient wishes to family and they feel relieved with patient's decision.  We discussed expectations at length.      3. comfort care  -We will discontinue any treatments that is not in line with comfort " including dialysis and labwork.  -D/C vasopressor  -Scheduled Ativan low dose every 8 hours and as needed.  -Opiates as needed.  -Palliative admission order set adjuvants as needed       Thank you for allowing us to participate in patient's plan of care. Palliative Care Team will continue to follow patient.     Time spent: 60 minutes spent reviewing medical and medication records, assessing and examining patient, discussing with family, answering questions, providing some guidance about a plan and documentation of care, and coordinating care with other healthcare members, with > 50% time spent face to face.   20 minutes spent on advance care planning.    Brooke Finnegan, APRN  1/19/2022

## 2022-01-19 NOTE — PROGRESS NOTES
Nephrology ([facility]) Progress Note      Patient:  Jennifer Salcido  YOB: 1955  Date of Service: 1/19/2022  MRN: [id number]   Acct: [id number]   Primary Care Physician: Akhil Golden DO  Advance Directive:   Code Status and Medical Interventions:   Ordered at: 01/14/22 1445     Medical Intervention Limits:    NO intubation (DNI)     Level Of Support Discussed With:    Patient    Health Care Surrogate     Code Status (Patient has no pulse and is not breathing):    No CPR (Do Not Attempt to Resuscitate)     Medical Interventions (Patient has pulse or is breathing):    Limited Support     Admit Date: 1/14/2022       Hospital Day: 5  Referring Provider: No Known Provider      Patient personally seen and examined.  Complete chart including Consults, Notes, Operative Reports, Labs, Cardiology, and Radiology studies reviewed as able.        Subjective:  Jennifer Salcido is a 66 y.o. female for whom we were consulted for evaluation and treatment of end stage renal disease on hemodialysis. Recently converted from peritoneal dialysis. Outpatient HD on Tuesday Thursday Saturday at [facility]. History of peripheral vascular disease, CAD with prior CABG, CHF.  Recently had left AKA. Shortly after discharge she developed lower GI bleeding and returned to hospital. Hemoglobin 6.7 on arrival. Received PRBC transfusion. Initially on Levophed but this was weaned off and she moved to medical floor. HD tolerated well on 1/15.  On 1/17 had episode of large amount BRBPR and significant hypotension, altered mental status. Moved to ICU and given IV fluid bolus and one unit PRBC. Patient also tested COVID positive. Dialysis was stopped early on 1/18 due to hypotension.    Today is awake, alert. Feeling a little better.  No new overnight issues. Remains on Levophed drip.   Limited physical exam due to COVID isolation. Discussed with nursing staff.      Allergies:  Codeine,  Demerol [meperidine], Levaquin [levofloxacin], Lisinopril, Morphine, Sulfasalazine, Ultram [tramadol], and Trental [pentoxifylline]    Home Meds:  Medications Prior to Admission   Medication Sig Dispense Refill Last Dose   • acetaminophen (TYLENOL) 325 MG tablet Take 650 mg by mouth Every 4 (Four) Hours As Needed for Mild Pain .      • allopurinol (ZYLOPRIM) 100 MG tablet Take 100 mg by mouth Daily.      • aluminum-magnesium hydroxide-simethicone (MAALOX MAX) 400-400-40 MG/5ML suspension Take 15 mL by mouth Every 6 (Six) Hours As Needed for Heartburn.      • ascorbic acid (VITAMIN C) 500 MG tablet Take 500 mg by mouth Daily.      • aspirin 81 MG EC tablet Take 81 mg by mouth Daily.      • atorvastatin (LIPITOR) 20 MG tablet Take 1 tablet by mouth Every Night. 90 tablet 4    • bisacodyl (Dulcolax) 10 MG suppository Insert 10 mg into the rectum Daily As Needed for Constipation.      • calcium carbonate (TUMS) 500 MG chewable tablet Chew 2 tablets Every Night.      • citalopram (CeleXA) 40 MG tablet Take 40 mg by mouth Daily.      • clopidogrel (PLAVIX) 75 MG tablet Take 75 mg by mouth Daily.      • cyclobenzaprine (FLEXERIL) 5 MG tablet Take 1 tablet by mouth 3 (Three) Times a Day As Needed for Muscle Spasms.      • docusate sodium (COLACE) 100 MG capsule Take 200 mg by mouth Daily As Needed for Constipation.      • famotidine (PEPCID) 20 MG tablet Take 1 tablet by mouth Daily. 30 tablet 0    • gabapentin (NEURONTIN) 300 MG capsule Take 1 capsule by mouth Every 12 (Twelve) Hours. 24 capsule 0    • gentamicin (GARAMYCIN) 0.1 % cream Apply 1 application topically to the appropriate area as directed As Needed (for exit site).      • [] HYDROcodone-acetaminophen (NORCO) 5-325 MG per tablet Take 1 tablet by mouth Every 4 (Four) Hours As Needed for Moderate Pain  for up to 5 days. 30 tablet 0    • Melatonin 5 MG capsule Take 5 mg by mouth Every Night.      • midodrine (PROAMATINE) 10 MG tablet Take 1 tablet by mouth  3 (Three) Times a Day Before Meals.      • Multiple Vitamins-Minerals (MULTIVITAMIN ADULT PO) Take 1 tablet by mouth Daily.      • nitroglycerin (NITROSTAT) 0.4 MG SL tablet 1 under the tongue as needed for angina, may repeat q5mins for up three doses 25 tablet 3    • sucralfate (CARAFATE) 1 g tablet Take 1 g by mouth 4 (Four) Times a Day Before Meals & at Bedtime. Dissolve 1 tablet in water to create a slurry and drink      • Sucroferric Oxyhydroxide (Velphoro) 500 MG chewable tablet Chew 2 tablets 3 (Three) Times a Day Before Meals. (Patient not taking: Reported on 1/14/2022)   Not Taking at Unknown time   • vitamin D3 125 MCG (5000 UT) capsule capsule Take 1 tablet by mouth Daily.          Medicines:  Current Facility-Administered Medications   Medication Dose Route Frequency Provider Last Rate Last Admin   • acetaminophen (TYLENOL) tablet 650 mg  650 mg Oral Q4H PRN Feng Vides DO   650 mg at 01/18/22 0150    Or   • acetaminophen (TYLENOL) 160 MG/5ML solution 650 mg  650 mg Oral Q4H PRN Feng Vides DO        Or   • acetaminophen (TYLENOL) suppository 650 mg  650 mg Rectal Q4H PRN eFng Vides DO       • albumin human 25 % IV SOLN 12.5 g  12.5 g Intravenous PRN Cristian Hernandez MD       • allopurinol (ZYLOPRIM) tablet 100 mg  100 mg Oral Daily Feng Vides DO   100 mg at 01/19/22 0832   • aspirin EC tablet 81 mg  81 mg Oral Daily Bob Carrizales MD   81 mg at 01/19/22 0831   • atorvastatin (LIPITOR) tablet 20 mg  20 mg Oral Nightly Feng Vides DO   20 mg at 01/18/22 2136   • citalopram (CeleXA) tablet 10 mg  10 mg Oral Daily Feng Vides DO   10 mg at 01/19/22 0832   • cyclobenzaprine (FLEXERIL) tablet 5 mg  5 mg Oral TID PRN Feng Vides DO   5 mg at 01/15/22 2053   • diphenhydrAMINE (BENADRYL) capsule 25 mg  25 mg Oral Q4H PRN Cristian Hernandez MD        Or   • diphenhydrAMINE (BENADRYL) injection 25 mg  25 mg Intravenous Q4H PRN Cristian Hernandez MD       • epoetin jani-epbx (RETACRIT)  injection 10,000 Units  10,000 Units Subcutaneous Once per day on Mon Wed Fri Cristian Hernandez MD   10,000 Units at 01/19/22 0831   • heparin (porcine) injection 3,600 Units  3,600 Units Intracatheter PRN Cristian Hernandez MD   3,600 Units at 01/15/22 1846   • hydrocortisone (ANUSOL-HC) suppository 25 mg  25 mg Rectal BID Bob Carrizales MD   25 mg at 01/19/22 0831   • midodrine (PROAMATINE) tablet 10 mg  10 mg Oral TID AC Bob Carrizales MD   10 mg at 01/19/22 0831   • norepinephrine (LEVOPHED) 8 mg in 250 mL NS infusion (premix)  0.02-0.3 mcg/kg/min Intravenous Titrated Rob Toure MD 5.16 mL/hr at 01/19/22 0545 0.04 mcg/kg/min at 01/19/22 0545   • ondansetron (ZOFRAN) injection 4 mg  4 mg Intravenous Q6H PRN Feng Vides DO       • ondansetron (ZOFRAN) injection 4 mg  4 mg Intravenous Q2H PRN Cristian Hernandez MD       • oxyCODONE-acetaminophen (PERCOCET) 5-325 MG per tablet 1 tablet  1 tablet Oral Q4H PRN Bob Carrizales MD   1 tablet at 01/19/22 0427   • pantoprazole (PROTONIX) injection 40 mg  40 mg Intravenous Q12H Feng Vides DO   40 mg at 01/19/22 0831   • sodium chloride 0.9 % bolus 100 mL  100 mL Intravenous PRN Cristian Hernandez MD       • sodium chloride 0.9 % flush 10 mL  10 mL Intravenous PRN Param Payne MD       • sodium chloride 0.9 % flush 10 mL  10 mL Intravenous Q12H Feng Vides DO   10 mL at 01/19/22 0832   • sodium chloride 0.9 % flush 10 mL  10 mL Intravenous PRN Feng Vides DO       • Tranexamic Acid 689 mg in sodium chloride 0.9 % 100 mL  10 mg/kg Intravenous Once Param Payne MD   Held at 01/14/22 1152       Past Medical History:  Past Medical History:   Diagnosis Date   • Arthritis     LEFT SHOULDER   • CHF (congestive heart failure) (HCC)    • Chronic pericarditis 4/22/2021   • Collar bone fracture 12/2020    SLING PLACED TO HEAL   • Coronary artery disease     LEFT MAIN AND 3 OTHER AREAS--CABG SCHEDULED FOR APRIL 22, 2021   • Dyslipidemia 9/28/2020   • Elevated  cholesterol    • End stage kidney disease (HCC)     currently HD (5/19/2021), hopes to return to [facility]    • Essential hypertension 11/2/2018   • Gastroesophageal reflux disease without esophagitis 4/28/2021   • GERD (gastroesophageal reflux disease)    • Gout    • History of transfusion    • Hyperlipidemia    • Hypertension    • Overweight (BMI 25.0-29.9) 4/22/2021   • Peritoneal dialysis status (HCC)     EVERY NIGHT FOR 10 HOURS, (5/19/2021 currently on hold)   • PONV (postoperative nausea and vomiting)    • Sleep apnea     RESOLVED   • Status post gastric bypass for obesity 4/22/2021   • Type 2 diabetes mellitus with kidney complication, with long-term current use of insulin ([facility]) 11/2/2018       Past Surgical History:  Past Surgical History:   Procedure Laterality Date   • ABOVE KNEE AMPUTATION Left 1/6/2022    Procedure: LEFT LOWER EXTREMITY AMPUTATION ABOVE KNEE;  Surgeon: Simon Coates MD;  Location:  [facility] HYBRID OR 12;  Service: Vascular;  Laterality: Left;   • BELOW KNEE AMPUTATION Left 12/13/2021    Procedure: LEFT LOWER EXTREMITY AMPUTATION BELOW KNEE;  Surgeon: Simon Coates MD;  Location:  [facility] OR;  Service: Vascular;  Laterality: Left;   • BREAST BIOPSY Right     FATTY TUMOR   • CARDIAC CATHETERIZATION N/A 10/1/2019    Procedure: Left Heart Cath;  Surgeon: Srikanth Coker MD;  Location:  [facility] CATH INVASIVE LOCATION;  Service: Cardiology   • CARDIAC CATHETERIZATION N/A 7/14/2020    Procedure: Left Heart Cath;  Surgeon: Srikanth Coker MD;  Location:  [facility] CATH INVASIVE LOCATION;  Service: Cardiology;  Laterality: N/A;   • CARDIAC CATHETERIZATION N/A 3/23/2021    Procedure: Left Heart Cath;  Surgeon: Srikanth Coker MD;  Location:  [facility] CATH INVASIVE LOCATION;  Service: Cardiology;  Laterality: N/A;   • CHEST TUBE INSERTION Right 6/7/2021    Procedure: CHEST TUBE INSERTION;  Surgeon: Antony Wood MD;  Location:  [facility] OR;  Service: Cardiothoracic;  Laterality: Right;   •  CORONARY ANGIOPLASTY WITH STENT PLACEMENT  2016    X 2   • CORONARY ARTERY BYPASS GRAFT N/A 2021    Procedure: CORONARY ARTERY BYPASS GRAFT X 3 WITH LEFT INTERNAL MAMMARY ARTERY, RIGHT LOWER EXTREMITY ENDOSCOPIC VEIN HARVEST, AND PERFUSION; TRANSESOPHAGEAL ECHOCARDIOGRAM;  Surgeon: Antony Wood MD;  Location: [facility] OR;  Service: Cardiothoracic;  Laterality: N/A;   • EYE SURGERY Bilateral     IOC LENS IMPLANTS   • EYE SURGERY Bilateral     RETINAL SURGERY   • GALLBLADDER SURGERY     • GASTRIC BYPASS     • HYSTERECTOMY     • INSERTION HEMODIALYSIS CATHETER Right 2021    Procedure: HEMODIALYSIS CATHETER INSERTION;  Surgeon: Simon Coates MD;  Location: [facility] HYBRID OR 12;  Service: Vascular;  Laterality: Right;   • INSERTION HEMODIALYSIS CATHETER Right 2021    Procedure: HEMODIALYSIS CATHETER INSERTION;  Surgeon: Simon Coates MD;  Location: [facility] HYBRID OR 12;  Service: Vascular;  Laterality: Right;   • INSERTION PERITONEAL DIALYSIS CATHETER     • OOPHORECTOMY Bilateral    • SINUS SURGERY     • SINUS SURGERY  1980   • TUBAL ABDOMINAL LIGATION         Family History  Family History   Problem Relation Age of Onset   • Hypertension Mother    • Lung disease Mother    • Heart disease Father    • Diabetes Father    • Abnormal EKG Father    • Breast cancer Neg Hx        Social History  Social History     Socioeconomic History   • Marital status:    Tobacco Use   • Smoking status: Former Smoker     Packs/day: 0.00     Years: 2.00     Pack years: 0.00     Types: Cigarettes     Quit date:      Years since quittin.0   • Smokeless tobacco: Never Used   Vaping Use   • Vaping Use: Never used   Substance and Sexual Activity   • Alcohol use: Not Currently     Comment: haven't drank in 3 years ( 2018)    • Drug use: No   • Sexual activity: Defer       Review of Systems:  History obtained from chart review and the patient  General ROS: No fever or chills  Respiratory ROS: No  cough, shortness of breath, wheezing  Cardiovascular ROS: No chest pain or palpitations  Gastrointestinal ROS: No abdominal pain or melena  Genito-Urinary ROS: No dysuria or hematuria  Psych ROS: No anxiety and depression  14 point ROS reviewed with the patient and negative except as noted above and in the HPI unless unable to obtain.    Objective:  Patient Vitals for the past 24 hrs:   BP Temp Temp src Pulse SpO2 Weight   01/19/22 1030 105/47 -- -- 68 100 % --   01/19/22 1015 109/45 -- -- 74 100 % --   01/19/22 1000 111/49 -- -- 74 100 % --   01/19/22 0945 102/49 -- -- 74 100 % --   01/19/22 0930 100/49 -- -- 74 100 % --   01/19/22 0915 103/48 -- -- 75 100 % --   01/19/22 0900 100/49 -- -- 77 100 % --   01/19/22 0845 -- -- -- 93 100 % --   01/19/22 0830 111/47 -- -- 74 99 % --   01/19/22 0815 98/41 -- -- 71 99 % --   01/19/22 0800 107/44 97.2 °F (36.2 °C) Oral 76 100 % --   01/19/22 0745 110/50 -- -- 71 100 % --   01/19/22 0730 102/75 -- -- 72 100 % --   01/19/22 0715 (!) 87/70 -- -- 74 100 % --   01/19/22 0700 109/52 -- -- 72 100 % --   01/19/22 0645 101/47 -- -- 74 100 % --   01/19/22 0630 102/49 -- -- 79 100 % --   01/19/22 0615 102/49 -- -- 74 100 % --   01/19/22 0600 100/48 -- -- 77 100 % --   01/19/22 0545 114/51 -- -- 73 100 % --   01/19/22 0530 109/52 -- -- 76 100 % --   01/19/22 0515 112/51 -- -- 74 100 % --   01/19/22 0500 113/49 -- -- 73 100 % --   01/19/22 0445 117/55 -- -- 73 100 % --   01/19/22 0430 128/56 -- -- 79 100 % 68.7 kg (151 lb 7.3 oz)   01/19/22 0415 116/51 -- -- 81 100 % --   01/19/22 0400 114/50 -- -- 82 100 % --   01/19/22 0345 111/49 -- -- 78 100 % --   01/19/22 0330 110/49 -- -- 79 100 % --   01/19/22 0315 115/47 -- -- 79 100 % --   01/19/22 0300 116/52 -- -- 92 100 % --   01/19/22 0245 114/50 -- -- 91 100 % --   01/19/22 0230 106/50 -- -- 89 100 % --   01/19/22 0215 97/58 -- -- 89 100 % --   01/19/22 0200 95/44 -- -- 88 100 % --   01/19/22 0145 99/46 -- -- 85 100 % --   01/19/22 0130  109/43 -- -- 94 100 % --   01/19/22 0115 107/50 -- -- 89 100 % --   01/19/22 0100 101/49 -- -- 87 100 % --   01/19/22 0045 103/45 -- -- 87 100 % --   01/19/22 0030 96/44 -- -- 85 100 % --   01/19/22 0015 (!) 85/46 -- -- 86 99 % --   01/19/22 0000 96/50 97 °F (36.1 °C) Oral 92 100 % --   01/18/22 2345 97/47 -- -- 90 99 % --   01/18/22 2330 99/50 -- -- 94 100 % --   01/18/22 2315 104/50 -- -- 82 100 % --   01/18/22 2300 101/43 -- -- 80 100 % --   01/18/22 2245 104/51 -- -- 83 100 % --   01/18/22 2230 105/48 -- -- 79 100 % --   01/18/22 2215 111/49 -- -- 81 100 % --   01/18/22 2200 112/53 -- -- 78 100 % --   01/18/22 2145 98/76 -- -- 77 100 % --   01/18/22 2130 98/47 -- -- 77 100 % --   01/18/22 2115 104/52 -- -- 80 100 % --   01/18/22 2100 110/53 -- -- 75 100 % --   01/18/22 2045 119/60 -- -- 81 100 % --   01/18/22 2030 120/53 -- -- 72 100 % --   01/18/22 2015 128/54 -- -- 79 100 % --   01/18/22 2000 127/56 97.2 °F (36.2 °C) Oral 68 100 % --   01/18/22 1945 123/58 -- -- 68 100 % --   01/18/22 1930 127/58 -- -- 72 100 % --   01/18/22 1915 123/57 -- -- 69 100 % --   01/18/22 1900 122/60 -- -- 74 100 % --   01/18/22 1845 117/57 -- -- 74 100 % --   01/18/22 1830 111/56 -- -- 81 100 % --   01/18/22 1815 104/52 -- -- 80 100 % --   01/18/22 1800 104/57 -- -- 84 100 % --   01/18/22 1745 105/55 -- -- 83 100 % --   01/18/22 1730 152/60 -- -- 75 100 % --   01/18/22 1715 142/56 -- -- 73 100 % --   01/18/22 1700 132/56 -- -- 85 100 % --   01/18/22 1645 139/61 -- -- 81 100 % --   01/18/22 1630 147/62 -- -- 83 100 % --   01/18/22 1615 141/63 -- -- 87 100 % --   01/18/22 1600 133/49 97.3 °F (36.3 °C) Oral 91 100 % --   01/18/22 1545 134/66 -- -- 89 100 % --   01/18/22 1530 148/66 -- -- 86 100 % --   01/18/22 1515 129/62 -- -- 94 100 % --   01/18/22 1500 130/64 -- -- 92 100 % --   01/18/22 1445 135/60 -- -- 102 100 % --   01/18/22 1430 139/63 -- -- 99 100 % --   01/18/22 1415 107/59 -- -- 101 100 % --   01/18/22 1400 133/71 -- -- 113  98 % --   01/18/22 1345 130/67 -- -- 118 100 % --   01/18/22 1330 134/66 -- -- 103 100 % --   01/18/22 1315 113/62 -- -- 101 100 % --   01/18/22 1300 (!) 74/46 -- -- 93 100 % --   01/18/22 1245 97/59 -- -- 108 100 % --   01/18/22 1230 (!) 91/28 -- -- 105 100 % --   01/18/22 1215 (!) 78/62 -- -- 101 100 % --   01/18/22 1200 (!) 55/25 -- -- 105 100 % --   01/18/22 1145 96/49 -- -- 110 100 % --   01/18/22 1130 102/50 -- -- 106 100 % --   01/18/22 1115 92/41 -- -- 103 100 % --       Intake/Output Summary (Last 24 hours) at 1/19/2022 1106  Last data filed at 1/19/2022 0830  Gross per 24 hour   Intake 863.23 ml   Output --   Net 863.23 ml     General: awake/alert   Chest:  equal chest rise  CVS: regular rate and rhythm  Abdominal: soft, nontender, positive bowel sounds  Extremities: left AKA and no cyanosis or edema  Skin: warm and dry without rash   Neuro: no focal motor deficits      Labs:  Results from last 7 days   Lab Units 01/19/22  0803 01/19/22  0626 01/19/22  0018 01/18/22  0600 01/17/22  2341 01/17/22  0832 01/17/22  0036   WBC 10*3/mm3  --   --  14.43*  --  5.97  --  6.14   HEMOGLOBIN g/dL 7.5* 7.6* 8.2*   < > 7.3*   < > 7.5*   HEMATOCRIT % 23.7* 24.0* 24.5*   < > 23.4*   < > 24.4*   PLATELETS 10*3/mm3  --   --  147  --  263  --  253    < > = values in this interval not displayed.         Results from last 7 days   Lab Units 01/19/22  0018 01/17/22  2340 01/17/22  0036   SODIUM mmol/L 136 133* 131*   POTASSIUM mmol/L 4.2 3.7 3.7   CHLORIDE mmol/L 100 98 100   CO2 mmol/L 22.0 28.0 24.0   BUN mg/dL 17 21 14   CREATININE mg/dL 3.06* 4.49* 3.19*   CALCIUM mg/dL 7.3* 7.8* 7.7*   BILIRUBIN mg/dL 0.3 0.3 0.3   ALK PHOS U/L 84 92 80   ALT (SGPT) U/L 7 <5 <5   AST (SGOT) U/L 25 20 16   GLUCOSE mg/dL 139* 99 93       Radiology:   Imaging Results (Last 72 Hours)     ** No results found for the last 72 hours. **          Culture:  No results found for: BLOODCX, URINECX, WOUNDCX, MRSACX, RESPCX, STOOLCX      Assessment   1.   End stage renal disease on HD TTS  2.  Type 2 diabetes with nephropathy  3.  Hyponatremia--improved  4.  Hypokalemia--resolved  5.  GI bleed  6.  Recent left AKA  7.  COVID-19 positive    Plan:  1.  Dialysis next due 1/20  2.  Wean Levophed as able  3.  Monitor labs      Dick Fisher, APRN  1/19/2022  11:06 CST

## 2022-01-19 NOTE — THERAPY EVALUATION
Patient Name: Jennifer Salcido  : 1955    MRN: [id number]                              Today's Date: 2022       Admit Date: 2022    Visit Dx:     ICD-10-CM ICD-9-CM   1. Gastrointestinal hemorrhage, unspecified gastrointestinal hemorrhage type  K92.2 578.9   2. Chronic kidney disease, unspecified CKD stage  N18.9 585.9   3. Shock (HCC)  R57.9 785.50   4. Decreased activities of daily living (ADL)  Z78.9 V49.89     Patient Active Problem List   Diagnosis   • ESRD on peritoneal dialysis (HCC)   • Coronary artery disease involving native coronary artery of native heart without angina pectoris   • Diet-controlled diabetes mellitus (HCC)   • Chronic anemia   • Dyslipidemia   • Essential hypertension   • Hypokalemia   • Ischemic left leg   • Peripheral vascular disease (HCC)   • Small vessel arterial disease due to type 2 diabetes mellitus (HCC)   • Hyponatremia   • Abdominal pain   • Real time reverse transcriptase PCR positive for COVID-19 virus   • Acidosis, metabolic   • Allergy, unspecified, initial encounter   • Anaphylactic shock, unspecified, initial encounter   • At risk for fluid volume imbalance   • Atherosclerosis of native artery of both lower extremities with intermittent claudication (HCC)   • Dependence on renal dialysis (HCC)   • Encounter for adequacy testing for peritoneal dialysis (HCC)   • ESRD on hemodialysis (previously on PD)   • Gas gangrene (HCC)   • Gastroesophageal reflux disease   • Hyperlipidemia   • Iron deficiency anemia   • Loss of consciousness (HCC)   • Moderate protein-calorie malnutrition (HCC)   • Hematemesis with nausea   • Obstructive sleep apnea syndrome   • Osteoarthritis   • Disorder of phosphorus metabolism, unspecified   • Pain, unspecified   • Dialysis-associated peritonitis (HCC)   • Pleural effusion, not elsewhere classified   • Renal osteodystrophy   • Secondary hyperparathyroidism of renal origin (HCC)   • Seizure disorder (HCC)   • Shortness of breath    • Sinus infection   • Type 2 diabetes mellitus with diabetic neuropathy, unspecified ([facility]ly [facility])   • Pre-op evaluation   • Amputation stump necrosis ([facility])   • Rectal bleeding   • Acute blood loss anemia   • S/P AKA (above knee amputation), left ([facility])   • Hemorrhagic shock superimposed on chronic hypotension     Past Medical History:   Diagnosis Date   • Arthritis     LEFT SHOULDER   • CHF (congestive heart failure) ([facility])    • Chronic pericarditis 4/22/2021   • Collar bone fracture 12/2020    SLING PLACED TO HEAL   • Coronary artery disease     LEFT MAIN AND 3 OTHER AREAS--CABG SCHEDULED FOR APRIL 22, 2021   • Dyslipidemia 9/28/2020   • Elevated cholesterol    • End stage kidney disease ([facility])     currently HD (5/19/2021), hopes to return to [facility]    • Essential hypertension 11/2/2018   • Gastroesophageal reflux disease without esophagitis 4/28/2021   • GERD (gastroesophageal reflux disease)    • Gout    • History of transfusion    • Hyperlipidemia    • Hypertension    • Overweight (BMI 25.0-29.9) 4/22/2021   • Peritoneal dialysis status ([facility])     EVERY NIGHT FOR 10 HOURS, (5/19/2021 currently on hold)   • PONV (postoperative nausea and vomiting)    • Sleep apnea     RESOLVED   • Status post gastric bypass for obesity 4/22/2021   • Type 2 diabetes mellitus with kidney complication, with long-term current use of insulin ([facility]) 11/2/2018     Past Surgical History:   Procedure Laterality Date   • ABOVE KNEE AMPUTATION Left 1/6/2022    Procedure: LEFT LOWER EXTREMITY AMPUTATION ABOVE KNEE;  Surgeon: Simon Coates MD;  Location: [facility] HYBRID OR 12;  Service: Vascular;  Laterality: Left;   • BELOW KNEE AMPUTATION Left 12/13/2021    Procedure: LEFT LOWER EXTREMITY AMPUTATION BELOW KNEE;  Surgeon: Simon Coates MD;  Location: [facility] OR;  Service: Vascular;  Laterality: Left;   • BREAST BIOPSY Right     FATTY TUMOR   • CARDIAC CATHETERIZATION N/A 10/1/2019    Procedure: Left Heart Cath;  Surgeon: Srikanth Coker,  MD;  Location:  [facility] CATH INVASIVE LOCATION;  Service: Cardiology   • CARDIAC CATHETERIZATION N/A 7/14/2020    Procedure: Left Heart Cath;  Surgeon: Srikanth Coker MD;  Location:  [facility] CATH INVASIVE LOC[facility];  Service: Cardiology;  Laterality: N/A;   • CARDIAC CATHETERIZATION N/A 3/23/2021    Procedure: Left Heart Cath;  Surgeon: Srikanth Coker MD;  Location:  [facility] CATH INVASIVE LOC[facility];  Service: Cardiology;  Laterality: N/A;   • CHEST TUBE INSERTION Right 6/7/2021    Procedure: CHEST TUBE INSERTION;  Surgeon: Antony Wood MD;  Location:  [facility] OR;  Service: Cardiothoracic;  Laterality: Right;   • CORONARY ANGIOPLASTY WITH STENT PLACEMENT  2016    X 2   • CORONARY ARTERY BYPASS GRAFT N/A 4/22/2021    Procedure: CORONARY ARTERY BYPASS GRAFT X 3 WITH LEFT INTERNAL MAMMARY ARTERY, RIGHT LOWER EXTREMITY ENDOSCOPIC VEIN HARVEST, AND PERFUSION; TRANSESOPHAGEAL ECHOCARDIOGRAM;  Surgeon: Antony Wood MD;  Location:  [facility] OR;  Service: Cardiothoracic;  Laterality: N/A;   • EYE SURGERY Bilateral     IOC LENS IMPLANTS   • EYE SURGERY Bilateral     RETINAL SURGERY   • GALLBLADDER SURGERY     • GASTRIC BYPASS     • HYSTERECTOMY     • INSERTION HEMODIALYSIS CATHETER Right 4/1/2021    Procedure: HEMODIALYSIS CATHETER INSERTION;  Surgeon: Simon Coates MD;  Location:  [facility] HYBRID OR 12;  Service: Vascular;  Laterality: Right;   • INSERTION HEMODIALYSIS CATHETER Right 12/20/2021    Procedure: HEMODIALYSIS CATHETER INSERTION;  Surgeon: Simon Caotes MD;  Location:  [facility] HYBRID OR 12;  Service: Vascular;  Laterality: Right;   • INSERTION PERITONEAL DIALYSIS CATHETER     • OOPHORECTOMY Bilateral    • SINUS SURGERY     • SINUS SURGERY  07/1980   • TUBAL ABDOMINAL LIGATION        General Information     Row Name 01/19/22 0807          OT Time and Intention    Document Type evaluation  Rectal bleeding, low BP. Dx: rectal bleed, anemia, hemorrhagic shock, GI bleed, COVID, hx L AKA, dialysis  -JJ      Mode of Treatment occupational therapy  -     Row Name 01/19/22 0807          General Information    Patient Profile Reviewed yes  -     Prior Level of Function --  unsure of PLOF. Pt recently d/c to [facility], she reports occasionally sitting at EOB with assist and but no OOB activity since d/c.  -     Existing Precautions/Restrictions fall; oxygen therapy device and L/min  -     Barriers to Rehab medically complex; physical barrier; previous functional deficit  -     Row Name 01/19/22 0807          Living Environment    Lives With facility resident  -     Row Name 01/19/22 0807          Cognition    Orientation Status (Cognition) oriented to; person; place  -     Row Name 01/19/22 0807          Safety Issues, Functional Mobility    Impairments Affecting Function (Mobility) balance; endurance/activity tolerance; strength; pain  -           User Key  (r) = Recorded By, (t) = Taken By, (c) = Cosigned By    Initials Name Provider Type    Kimbrelee Gill, OTR/L, CSRS Occupational Therapist                 Mobility/ADL's     Row Name 01/19/22 0807          Bed Mobility    Bed Mobility supine-sit; sit-supine  -     Supine-Sit [place] (Bed Mobility) moderate assist (50% patient effort); maximum assist (25% patient effort); verbal cues; 2 person assist  -J     Sit-Supine [place] (Bed Mobility) moderate assist (50% patient effort); maximum assist (25% patient effort); verbal cues; 2 person assist  -J     Bed Mobility, Safety Issues decreased use of legs for bridging/pushing  -     Assistive Device (Bed Mobility) head of bed elevated; draw sheet  -     Comment (Bed Mobility) Able to sit at EOB approx 10 minutes. Upon initially sitting up required Min A to maintain static sitting balance d/t posterior lean, graduating to SBA to maintain balance at EOB.  -     Row Name 01/19/22 0807          Transfers    Comment (Transfers) pt declined attempt d/t weakness. Will continue to progress.   -     Row Name 01/19/22 08          Activities of Daily Living    BADL Assessment/Intervention toileting  -[facility] Name 01/19/22 08          Toileting Assessment/Training    [place] Level (Toileting) change pad/brief; perform perineal hygiene; maximum assist (25% patient effort)  -     Position (Toileting) supine  -     Comment (Toileting) incontinent of bloody stool  -           User Key  (r) = Recorded By, (t) = Taken By, (c) = Cosigned By    Initials Name Provider Type     Kimberlee Dias, OTR/L, CSRS Occupational Therapist               Obj/Interventions     [facility] Name 01/19/22 0807          Sensory Assessment (Somatosensory)    Sensory Assessment (Somatosensory) UE sensation intact  -JJ     Row Name 01/19/22 08          Vision Assessment/Intervention    Visual Impairment/Limitations WFL  -[facility] Name 01/19/22 0807          Range of Motion Comprehensive    General Range of Motion bilateral upper extremity ROM WFL  -JJ     Row Name 01/19/22 0807          Strength Comprehensive (MMT)    Comment, General Manual Muscle Testing (MMT) Assessment B UE strength grossly 4-/5  -[facility] Name 01/19/22 08          Balance    Balance Assessment sitting static balance; sitting dynamic balance  -     Static Sitting Balance WFL; mild impairment; supported; sitting, edge of bed  -     Dynamic Sitting Balance mild impairment; supported; sitting, edge of bed  -           User Key  (r) = Recorded By, (t) = Taken By, (c) = Cosigned By    Initials Name Provider Type     Kimberlee Dias, OTR/L, CSRS Occupational Therapist               Goals/Plan     Row Name 01/19/22 0807          Transfer Goal 1 (OT)    Activity/Assistive Device (Transfer Goal 1, OT) commode, bedside with drop arms; toilet  -     [place] Level/Cues Needed (Transfer Goal 1, OT) moderate assist (50-74% patient effort)  -     Time Frame (Transfer Goal 1, OT) long term goal (LTG); by discharge  -      Progress/Outcome (Transfer Goal 1, OT) goal ongoing  -     Row Name 01/19/22 0807          Dressing Goal 1 (OT)    Activity/Device (Dressing Goal 1, OT) lower body dressing  -JJ     [place]/Cues Needed (Dressing Goal 1, OT) minimum assist (75% or more patient effort)  -JJ     Time Frame (Dressing Goal 1, OT) long term goal (LTG); by discharge  -JJ     Progress/Outcome (Dressing Goal 1, OT) goal ongoing  -     Row Name 01/19/22 0807          Toileting Goal 1 (OT)    Activity/Device (Toileting Goal 1, OT) toileting skills, all  -JJ     [place] Level/Cues Needed (Toileting Goal 1, OT) minimum assist (75% or more patient effort)  -JJ     Time Frame (Toileting Goal 1, OT) long term goal (LTG); by discharge  -JJ     Progress/Outcome (Toileting Goal 1, OT) goal ongoing  -     Row Name 01/19/22 0807          Therapy Assessment/Plan (OT)    Planned Therapy Interventions (OT) activity tolerance training; adaptive equipment training; BADL retraining; functional balance retraining; occupation/activity based interventions; strengthening exercise; transfer/mobility retraining; patient/caregiver education/training  -           User Key  (r) = Recorded By, (t) = Taken By, (c) = Cosigned By    Initials Name Provider Type    Kimberlee Gill, OTR/L, CSRS Occupational Therapist               Clinical Impression     Row Name 01/19/22 0807          Pain Assessment    Additional Documentation Pain Scale: FACES Pre/Post-Treatment (Group)  -     Row Name 01/19/22 0807          Pain Scale: FACES Pre/Post-Treatment    Pain: FACES Scale, Pretreatment 2-->hurts little bit  -     Posttreatment Pain Rating 2-->hurts little bit  -     Row Name 01/19/22 0807          Plan of Care Review    Plan of Care Reviewed With patient  -     Outcome Summary OT eval completed. Pt lethargic but oriented x2. She was recently underwent a L AKA on 1/6/22. She demonstrates decreased strength, balance, activity tolerance and increased  pain. She was able to come to sitting at EOB with Mod A. Posterior lean upon sitting up EOB, progressing to SBA to maintain static sitting balance. She was able to tolerate approx 8-10 minutes of EOB activity. She declined attempt for t/f at this time. She would benefit from skilled OT services to address these deficits. Recommend d/c to SNF  -     Row Name 01/19/22 0807          Therapy Assessment/Plan (OT)    Patient/Family Therapy Goal Statement (OT) not stated.  -     Rehab Potential (OT) good, to achieve stated therapy goals  -     Criteria for Skilled Therapeutic Interventions Met (OT) yes; skilled treatment is necessary  -     Therapy Frequency (OT) 5 times/wk  -     Predicted Duration of Therapy Intervention (OT) 10 days  -     Row Name 01/19/22 0807          Therapy Plan Review/Discharge Plan (OT)    Anticipated Discharge Disposition (OT) [facility] nursing facility  -     Row Name 01/19/22 0807          Vital Signs    Pre Patient Position Supine  -     Intra Patient Position Sitting  -J     Post Patient Position Supine  -     Row Name 01/19/22 0807          Positioning and Restraints    Pre-Treatment Position in bed  -JJ     Post Treatment Position bed  -JJ     In Bed notified nsg; fowlers; call light within reach; encouraged to call for assist; exit alarm on; side rails up x3; SCD pump applied  -           User Key  (r) = Recorded By, (t) = Taken By, (c) = Cosigned By    Initials Name Provider Type    Kimberlee Gill, OTR/L, CSRS Occupational Therapist               Outcome Measures     Row Name 01/19/22 0807          How much help from another is currently needed...    Putting on and taking off regular lower body clothing? 1  -JJ     Bathing (including washing, rinsing, and drying) 2  -JJ     Toileting (which includes using toilet bed pan or urinal) 2  -JJ     Putting on and taking off regular upper body clothing 2  -JJ     Taking care of personal grooming (such as brushing teeth)  2  -     Eating meals 2  -     AM-PAC 6 Clicks Score (OT) 11  -     Row Name 01/19/22 0807          Functional Assessment    Outcome Measure Options AM-PAC 6 Clicks Daily Activity (OT)  -           User Key  (r) = Recorded By, (t) = Taken By, (c) = Cosigned By    Initials Name Provider Type     Kimberlee Dias, OTR/L, CSRS Occupational Therapist                Occupational Therapy Education                 Title: PT OT SLP Therapies (In Progress)     Topic: Occupational Therapy (In Progress)     Point: ADL training (Done)     Description:   Instruct learner(s) on proper safety adaptation and remediation techniques during self care or transfers.   Instruct in proper use of assistive devices.              Learning Progress Summary           Patient Acceptance, E, VU by  at 1/19/2022 1249                   Point: Home exercise program (Not Started)     Description:   Instruct learner(s) on appropriate technique for monitoring, assisting and/or progressing therapeutic exercises/activities.              Learner Progress:  Not documented in this visit.          Point: Precautions (Done)     Description:   Instruct learner(s) on prescribed precautions during self-care and functional transfers.              Learning Progress Summary           Patient Acceptance, E, VU by  at 1/19/2022 1249                   Point: Body mechanics (Not Started)     Description:   Instruct learner(s) on proper positioning and spine alignment during self-care, functional mobility activities and/or exercises.              Learner Progress:  Not documented in this visit.                      User Key     Initials Effective Dates Name Provider Type Discipline     11/10/21 -  Kimberlee Dias OTR/L, BERNADINES Occupational Therapist OT              OT Recommendation and Plan  Planned Therapy Interventions (OT): activity tolerance training, adaptive equipment training, BADL retraining, functional balance retraining,  occupation/activity based interventions, strengthening exercise, transfer/mobility retraining, patient/caregiver education/training  Therapy Frequency (OT): 5 times/wk  Plan of Care Review  Plan of Care Reviewed With: patient  Outcome Summary: OT jermaine completed. Pt lethargic but oriented x2. She was recently underwent a L AKA on 1/6/22. She demonstrates decreased strength, balance, activity tolerance and increased pain. She was able to come to sitting at EOB with Mod A. Posterior lean upon sitting up EOB, progressing to SBA to maintain static sitting balance. She was able to tolerate approx 8-10 minutes of EOB activity. She declined attempt for t/f at this time. She would benefit from skilled OT services to address these deficits. Recommend d/c to SNF     Time Calculation:    Time Calculation- OT     Row Name 01/19/22 1249             Time Calculation- OT    OT Start Time 0828  add chart review time from [id number]  -      OT Stop Time 0854  -      OT Time Calculation (min) 26 min  -      OT Received On 01/19/22  -      OT Goal Re-Cert Due Date 01/29/22  -            User Key  (r) = Recorded By, (t) = Taken By, (c) = Cosigned By    Initials Name Provider Type    Kimberlee Gill OTR/L, CSRS Occupational Therapist              Therapy Charges for Today     Code Description Service Date Service Provider Modifiers Qty    [id number]  OT JERMAINE MOD COMPLEXITY 3 1/19/2022 Kimberlee Dias OTR/L, CSRS GO 1               DAKOTA Nieto, CSRS  1/19/2022

## 2022-01-19 NOTE — PROGRESS NOTES
[facility] Medicine Services  INPATIENT PROGRESS NOTE    Length of Stay: 5  Date of Admission: 1/14/2022  Primary Care Physician: Akhil Golden DO    Subjective   Chief Complaint: GI bleed.  Failure to thrive.  Dialysis patient . CAD.    HPI   Bleeding has decreased/mixture of dark and bright red per nursing staff.  Still slight decrease in hemoglobin.  Patient denies any chest pain.  Patient denies any shortness of breath.  Patient remains extremely weak.  Patient denies any abdomen pain.    Review of Systems   Constitutional: Positive for activity change, appetite change and fatigue. Negative for chills and fever.   HENT: Negative for hearing loss, nosebleeds, tinnitus and trouble swallowing.    Eyes: Negative for visual disturbance.   Respiratory: Negative for cough, chest tightness, shortness of breath and wheezing.    Cardiovascular: Negative for chest pain, palpitations and leg swelling.   Gastrointestinal: Positive for blood in stool. Negative for abdominal distention, abdominal pain, constipation, diarrhea, nausea and vomiting.   Endocrine: Negative for cold intolerance, heat intolerance, polydipsia, polyphagia and polyuria.   Genitourinary: Negative for decreased urine volume, difficulty urinating, dysuria, flank pain, frequency and hematuria.   Musculoskeletal: Negative for arthralgias, joint swelling and myalgias.   Skin: Negative for rash.   Allergic/Immunologic: Negative for immunocompromised state.   Neurological: Positive for weakness. Negative for dizziness, syncope, light-headedness and headaches.   Hematological: Negative for adenopathy. Does not bruise/bleed easily.   Psychiatric/Behavioral: Negative for confusion and sleep disturbance. The patient is not nervous/anxious.      All pertinent negatives and positives are as above. All other systems have been reviewed and are negative unless otherwise stated.     Objective    Temp:  [97 °F (36.1 °C)-97.3  °F (36.3 °C)] 97.2 °F (36.2 °C)  Heart Rate:  [] 77  BP: ()/(25-76) 100/49    Intake/Output Summary (Last 24 hours) at 1/19/2022 1015  Last data filed at 1/19/2022 0830  Gross per 24 hour   Intake 863.23 ml   Output --   Net 863.23 ml     Physical Exam  Vitals and nursing note reviewed.   Constitutional:       Appearance: She is well-developed.   HENT:      Head: Normocephalic.   Eyes:      Conjunctiva/sclera: Conjunctivae normal.      Pupils: Pupils are equal, round, and reactive to light.   Neck:      Vascular: No JVD.   Cardiovascular:      Rate and Rhythm: Normal rate and regular rhythm.      Heart sounds: Normal heart sounds. No murmur heard.  No friction rub. No gallop.    Pulmonary:      Effort: No respiratory distress.      Breath sounds: No wheezing or rales.      Comments: Not on oxygen.  Diminished breath sound bilateral, clear.  Chest:      Chest wall: No tenderness.   Abdominal:      General: Bowel sounds are normal. There is no distension.      Palpations: Abdomen is soft.      Tenderness: There is no abdominal tenderness. There is no guarding or rebound.   Musculoskeletal:         General: AKA of the left leg . suture in place, clean dry intact.     Cervical back: Neck supple.   Skin:     General: Skin is warm and dry.      Capillary Refill: Capillary refill takes 2 to 3 seconds.      Findings: No rash.   Neurological:      Mental Status: She is alert.      Cranial Nerves: No cranial nerve deficit.      Motor: Weakness present. No abnormal muscle tone.      Coordination: Coordination abnormal.      Gait: Gait abnormal.      Deep Tendon Reflexes: Reflexes normal.   Psychiatric:         Behavior: Behavior normal.         Thought Content: Thought content normal.   Results Review:  Lab Results (last 24 hours)     Procedure Component Value Units Date/Time    Hemoglobin & Hematocrit, Blood [[id number]]  (Abnormal) Collected: 01/19/22 0803    Specimen: Blood Updated: 01/19/22 0814     Hemoglobin  7.5 g/dL      Hematocrit 23.7 %     Hemoglobin & Hematocrit, Blood [[id number]]  (Abnormal) Collected: 01/19/22 0626    Specimen: Blood Updated: 01/19/22 0634     Hemoglobin 7.6 g/dL      Hematocrit 24.0 %     Comprehensive Metabolic Panel [[id number]]  (Abnormal) Collected: 01/19/22 0018    Specimen: Blood Updated: 01/19/22 0046     Glucose 139 mg/dL      BUN 17 mg/dL      Creatinine 3.06 mg/dL      Sodium 136 mmol/L      Potassium 4.2 mmol/L      Chloride 100 mmol/L      CO2 22.0 mmol/L      Calcium 7.3 mg/dL      Total Protein 3.8 g/dL      Albumin 1.70 g/dL      ALT (SGPT) 7 U/L      AST (SGOT) 25 U/L      Alkaline Phosphatase 84 U/L      Total Bilirubin 0.3 mg/dL      eGFR Non African Amer 15 mL/min/1.73      Globulin 2.1 gm/dL      A/G Ratio 0.8 g/dL      BUN/Creatinine Ratio 5.6     Anion Gap 14.0 mmol/L     Narrative:      GFR Normal >60  Chronic Kidney Disease <60  Kidney Failure <15      CBC & Differential [[id number]]  (Abnormal) Collected: 01/19/22 0018    Specimen: Blood Updated: 01/19/22 0034    Narrative:      The following orders were created for panel order CBC & Differential.  Procedure                               Abnormality         Status                     ---------                               -----------         ------                     CBC Auto Differential[[id number]]        Abnormal            Final result                 Please view results for these tests on the individual orders.    CBC Auto Differential [[id number]]  (Abnormal) Collected: 01/19/22 0018    Specimen: Blood Updated: 01/19/22 0034     WBC 14.43 10*3/mm3      RBC 2.58 10*6/mm3      Hemoglobin 8.2 g/dL      Hematocrit 24.5 %      MCV 95.0 fL      MCH 31.8 pg      MCHC 33.5 g/dL      RDW 18.2 %      RDW-SD 57.4 fl      MPV 11.2 fL      Platelets 147 10*3/mm3      Neutrophil % 84.3 %      Lymphocyte % 8.9 %      Monocyte % 5.1 %      Eosinophil % 0.0 %      Basophil % 0.2 %      Immature Grans % 1.5 %      Neutrophils,  Absolute 12.18 10*3/mm3      Lymphocytes, Absolute 1.28 10*3/mm3      Monocytes, Absolute 0.73 10*3/mm3      Eosinophils, Absolute 0.00 10*3/mm3      Basophils, Absolute 0.03 10*3/mm3      Immature Grans, Absolute 0.21 10*3/mm3      nRBC 0.1 /100 WBC     Hemoglobin & Hematocrit, Blood [[id number]]  (Abnormal) Collected: 01/18/22 1551    Specimen: Blood Updated: 01/18/22 1608     Hemoglobin 9.1 g/dL      Hematocrit 28.3 %     Blood Gas, Arterial - [[id number]]  (Abnormal) Collected: 01/18/22 1015    Specimen: Arterial Blood Updated: 01/18/22 1023     Site Right Brachial     Black's Test N/A     pH, Arterial 7.508 pH units      Comment: 83 Value above reference range        pCO2, Arterial 35.0 mm Hg      Comment: 84 Value below reference range        pO2, Arterial 79.9 mm Hg      Comment: 84 Value below reference range        HCO3, Arterial 27.7 mmol/L      Comment: 83 Value above reference range        Base Excess, Arterial 4.5 mmol/L      Comment: 83 Value above reference range        O2 Saturation, Arterial 97.8 %      Temperature 37.0 C      Barometric Pressure for Blood Gas 753 mmHg      Modality Room Air     Ventilator Mode NA     Collected by [id number]     Comment: Meter: [id number]     :  [id number]        pCO2, Temperature Corrected 35.0 mm Hg      pH, Temp Corrected 7.508 pH Units      pO2, Temperature Corrected 79.9 mm Hg            Cultures:  No results found for: BLOODCX, URINECX, WOUNDCX, MRSACX, RESPCX, STOOLCX    Radiology Data:    Imaging Results (Last 24 Hours)     ** No results found for the last 24 hours. **          Allergies   Allergen Reactions   • Codeine Nausea And Vomiting   • Demerol [Meperidine] Nausea Only   • Levaquin [Levofloxacin] Nausea And Vomiting   • Lisinopril Swelling   • Morphine Nausea And Vomiting   • Sulfasalazine Nausea And Vomiting   • Ultram [Tramadol] Mental Status Change   • Trental [Pentoxifylline] GI Intolerance       Scheduled meds:   allopurinol, 100 mg, Oral,  Daily  aspirin, 81 mg, Oral, Daily  atorvastatin, 20 mg, Oral, Nightly  citalopram, 10 mg, Oral, Daily  epoetin jani/jani-epbx, 10,000 Units, Subcutaneous, Once per day on Mon Wed Fri  hydrocortisone, 25 mg, Rectal, BID  midodrine, 10 mg, Oral, TID AC  pantoprazole, 40 mg, Intravenous, Q12H  sodium chloride, 10 mL, Intravenous, Q12H  tranexamic acid, 10 mg/kg, Intravenous, Once        PRN meds:  •  acetaminophen **OR** acetaminophen **OR** acetaminophen  •  albumin human  •  cyclobenzaprine  •  diphenhydrAMINE **OR** diphenhydrAMINE  •  heparin (porcine)  •  ondansetron  •  ondansetron  •  oxyCODONE-acetaminophen  •  sodium chloride  •  [COMPLETED] Insert peripheral IV **AND** sodium chloride  •  sodium chloride    Assessment/Plan       Real time reverse transcriptase PCR positive for COVID-19 virus    Coronary artery disease involving native coronary artery of native heart without angina pectoris    Diet-controlled diabetes mellitus (HCC)    Peripheral vascular disease (HCC)    ESRD on hemodialysis (previously on PD)    Rectal bleeding    Acute blood loss anemia    S/P AKA (above knee amputation), left (HCC)    Hemorrhagic shock superimposed on chronic hypotension      Plan:  HPI.The patient was admitted to my service here at [facility] on 1/14. She presented to the emergency department with weakness, low blood pressure, and rectal bleeding.     GI bleed. Hemoglobin was 8.4 on 1/11.  Status post 3 units blood transfusion.  Continue to monitor H&H every 8 hours. Patient was sent to the unit yesterday due to GI bleed.  Reconsult GI.  Protonix IV. BID.  Zofran as needed.  Hemoglobin 6.7.    Plan for another transfusion is transfusion today.    COVID positive.  Not requiring oxygen. Pt has not been vaccinated for covid.      Anemia.  Erythropoietin .     CAD/hyperlipidemia.  Status post CABG April 2021.  History of stent placement 2016.  Stop Plavix previously.  Aspirin.  Lipitor.     Dialysis/end-stage  renal disease.Consulted nephrology for ongoing dialysis.  She has been dialyzing on Tuesday, Thursday, and Saturday recently.  She was previously on peritoneal dialysis.       Hypokalemia.  Per nephrology.     Hypotension.  On midodrine.  Levophed drip.     Hemorrhoid.The ER consulted Dr. Ruano with general surgery to assess for possible brisk bleeding or hemorrhoidal bleeding in need of surgical intervention.  Nothing of the sort is seen at this time.  The patient and family also do not wish for any significant intervention.  She has had panendoscopy multiple times in the past according to her and her family.  I have no records on this.  They state that no bleeding sources ever been found. No plans to consult gastroenterology at this time.  Continue transfusion and supportive care.  Allowed clear liquids at admission and will now advance diet as tolerated.   Anusol suppository twice daily.     Peripheral vascular disease. 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  All of her vascular interventions recently have been performed by Dr. Coates.  [place] are still in place from her intervention on 1/6.  I do not see any specific needs to consult him at this point in time, but will if the need arises.  I will at least let him know that she is in the hospital.     Depression/anxiety . Celexa.  Xanax as needed.     Gout.  Allopurinol.     Chronic pain.  Flexeril as needed.     SCD right lower extremity for DVT prophylaxis.     Nutrition. Previously was on regular/consistent carb diet.  N.p.o. for now discussed with nursing staff.    COVID positive.     Deconditioning.  PT and OT consult.     Discharge Planning: Patient will be transferred back to [facility].  DNR.    Electronically signed by Bob Carrizales MD, 01/19/22, 9:26 AM CST.

## 2022-01-20 NOTE — PROGRESS NOTES
Palliative Care Daily Progress Note   Chief complaint-follow up comfort care    Code Status:   Code Status and Medical Interventions:   Ordered at: 22 1209     Level Of Support Discussed With:    Patient    Health Care Surrogate     Code Status (Patient has no pulse and is not breathing):    No CPR (Do Not Attempt to Resuscitate)     Medical Interventions (Patient has pulse or is breathing):    Comfort Measures      Advanced Directives: Advance Directive Status: Patient does not have advance directive   Goals of Care: Ongoing.     S: Medical record reviewed. Events noted.  Laying in bed without apparent distress, lethargic.  Nursing reports large bloody BM this morning and throughout the night.  Advance Care Planning   Advance Care Planning Discussion: Spoke with patient's son, Troy and updated on patient's status.  Discussed patient death likely imminent given continued GI bleed.  Appreciative for phone call     Review of Systems   Unable due to acuity of condition    Pain Assessment  Nonverbal Indicators of Pain: crying  CPOT and PAINAD Scales: CPOT (Critical-Care Pain Observation Tool)  CPOT Facial Expression: 0-->relaxed, neutral  CPOT Body Movements: 0-->absence of movements  CPOT Muscle Tension: 0-->relaxed  Ventilator Compliance/Vocalization: 0-->talking in normal tone or no sound  CPOT Score: 0  PAINAD Breathin-->normal  PAINAD Negative Vocalization: 2-->repeated troubled calling out, loud moaning/groaning, crying (repeating about stomach pain/burning)  PAINAD Facial Expression: 2-->facial grimacing  PAINAD Body Language: 1-->tense, distressed pacing, fidgeting  PAINAD Consolability: 1-->distracted or reassured by voice/touch  PAINAD Score: 6  Pain Location: extremity  Pain Description: incisional    O:   No intake or output data in the 24 hours ending 22 1639    Diagnostics: Reviewed    Current Facility-Administered Medications   Medication Dose Route Frequency Provider Last Rate Last  Admin   • acetaminophen (TYLENOL) tablet 650 mg  650 mg Oral Q4H PRN Brooke Finnegan APRN        Or   • acetaminophen (TYLENOL) 160 MG/5ML solution 650 mg  650 mg Oral Q4H PRN Brooke Finnegan APRN        Or   • acetaminophen (TYLENOL) suppository 650 mg  650 mg Rectal Q4H PRN Brooke Finnegan APRN       • atropine 1 % ophthalmic solution 2 drop  2 drop Sublingual BID PRN Brooke Finnegan APRN       • citalopram (CeleXA) tablet 10 mg  10 mg Oral Daily Feng Vides, DO   10 mg at 01/19/22 0832   • cyclobenzaprine (FLEXERIL) tablet 5 mg  5 mg Oral TID PRN Feng Vides, DO   5 mg at 01/15/22 2053   • diphenhydrAMINE (BENADRYL) capsule 25 mg  25 mg Oral Q4H PRN Cristian Hernandez MD        Or   • diphenhydrAMINE (BENADRYL) injection 25 mg  25 mg Intravenous Q4H PRN Cristian Hernandez MD       • diphenoxylate-atropine (LOMOTIL) 2.5-0.025 MG per tablet 1 tablet  1 tablet Oral Q2H PRN Brooke Finnegan APRN       • Glycerin-Hypromellose- (ARTIFICIAL TEARS) 0.2-0.2-1 % ophthalmic solution solution 1 drop  1 drop Both Eyes Q30 Min PRN Brooke Finnegan APRN       • glycopyrrolate (ROBINUL) injection 0.2 mg  0.2 mg Intravenous Q2H PRN Brooke Finnegan APRN        Or   • glycopyrrolate (ROBINUL) injection 0.2 mg  0.2 mg Subcutaneous Q2H PRN Brooke Finnegan APRN        Or   • glycopyrrolate (ROBINUL) injection 0.4 mg  0.4 mg Intravenous Q2H PRN Brooke Finnegan APRN        Or   • glycopyrrolate (ROBINUL) injection 0.4 mg  0.4 mg Subcutaneous Q2H PRN Brooke Finnegan APRN       • haloperidol (HALDOL) 2 MG/ML solution 2 mg  2 mg Sublingual Q4H PRN Brooke Finnegan APRN        Or   • haloperidol lactate (HALDOL) injection 2 mg  2 mg Intravenous Q4H PRN Brooke Finnegan APRN       • heparin (porcine) injection 3,600 Units  3,600 Units Intracatheter PRN Cristian Hernandez MD   3,600 Units at 01/15/22 1846   • hydrocortisone (ANUSOL-HC) suppository 25 mg  25 mg Rectal BID Bob Carrizales,  MD   25 mg at 01/20/22 1032   • HYDROmorphone (DILAUDID) injection 1.5 mg  1.5 mg Intravenous Q2H PRN Brooke Finnegan APRANASTASIYA   1.5 mg at 01/20/22 1414    Or   • HYDROmorphone (DILAUDID) injection 1.5 mg  1.5 mg Subcutaneous Q2H PRN Brooke Finnegan APRN       • ipratropium-albuterol (DUO-NEB) nebulizer solution 3 mL  3 mL Nebulization Q4H PRN Brooke Finnegan, APRN       • LORazepam (ATIVAN) 2 MG/ML concentrated solution 0.26 mg  0.26 mg Oral Q8H Brooke Finnegan APRANASTASIYA   0.26 mg at 01/20/22 1405   • LORazepam (ATIVAN) injection 0.5 mg  0.5 mg Intravenous Q1H PRN Brooke Finnegan APRN        Or   • LORazepam (ATIVAN) 2 MG/ML concentrated solution 0.5 mg  0.5 mg Sublingual Q1H PRN Brooke Finnegan APRN       • LORazepam (ATIVAN) injection 1 mg  1 mg Intravenous Q1H PRN Brooke Finnegan APRANASTASIYA        Or   • LORazepam (ATIVAN) 2 MG/ML concentrated solution 1 mg  1 mg Sublingual Q1H PRN Brooke Finnegan, APRN       • LORazepam (ATIVAN) injection 2 mg  2 mg Intravenous Q1H PRN Brooke Finnegan APRANASTASIYA        Or   • LORazepam (ATIVAN) 2 MG/ML concentrated solution 2 mg  2 mg Sublingual Q1H PRN Brooke Finnegan APRN       • ondansetron (ZOFRAN) injection 4 mg  4 mg Intravenous Q6H PRN Feng Vides DO       • ondansetron (ZOFRAN) injection 4 mg  4 mg Intravenous Q2H PRN Cristian Hernandez MD       • oxyCODONE-acetaminophen (PERCOCET) 5-325 MG per tablet 1 tablet  1 tablet Oral Q4H PRN Bob Carrizales MD   1 tablet at 01/19/22 2677   • pantoprazole (PROTONIX) injection 40 mg  40 mg Intravenous Q12H Feng Vides DO   40 mg at 01/20/22 1032   • prochlorperazine (COMPAZINE) injection 5 mg  5 mg Intravenous Q6H PRN Brooke Finnegan APRN        Or   • prochlorperazine (COMPAZINE) tablet 5 mg  5 mg Oral Q6H PRN Brooke Finnegan APRN        Or   • prochlorperazine (COMPAZINE) suppository 25 mg  25 mg Rectal Q12H PRN Brooke Finnegan APRN       • scopolamine patch 1 mg/72 hr  1 patch  "Transdermal Q72H PRN Brooke Finnegan APRN       • sodium chloride 0.9 % bolus 100 mL  100 mL Intravenous PRN Cristian Hernandez MD       • sodium chloride 0.9 % flush 10 mL  10 mL Intravenous PRN Param Payne MD       • sodium chloride 0.9 % flush 10 mL  10 mL Intravenous Q12H Feng Vides, DO   10 mL at 01/20/22 1032   • sodium chloride 0.9 % flush 10 mL  10 mL Intravenous PRN Feng Vides, DO       • Tranexamic Acid 689 mg in sodium chloride 0.9 % 100 mL  10 mg/kg Intravenous Once Param Payne MD   Held at 01/14/22 1152        •  acetaminophen **OR** acetaminophen **OR** acetaminophen  •  atropine  •  cyclobenzaprine  •  diphenhydrAMINE **OR** diphenhydrAMINE  •  diphenoxylate-atropine  •  Glycerin-Hypromellose-  •  glycopyrrolate **OR** glycopyrrolate **OR** glycopyrrolate **OR** glycopyrrolate  •  haloperidol **OR** haloperidol lactate  •  heparin (porcine)  •  HYDROmorphone **OR** HYDROmorphone  •  ipratropium-albuterol  •  LORazepam **OR** LORazepam  •  LORazepam **OR** LORazepam  •  LORazepam **OR** LORazepam  •  ondansetron  •  ondansetron  •  oxyCODONE-acetaminophen  •  prochlorperazine **OR** prochlorperazine **OR** prochlorperazine  •  Scopolamine  •  sodium chloride  •  [COMPLETED] Insert peripheral IV **AND** sodium chloride  •  sodium chloride    A:    BP (!) 80/48 (BP Location: Left arm, Patient Position: Lying)   Pulse 102   Temp 98.3 °F (36.8 °C) (Axillary)   Resp 14   Ht 160 cm (63\")   Wt 68.7 kg (151 lb 7.3 oz)   SpO2 100%   BMI 26.83 kg/m²     Vitals and nursing note reviewed.   Constitutional:       Appearance: Ill-appearing and acutely ill-appearing.   Eyes:      General: Lids are normal.   HENT:      Head: Normocephalic.   Neck:      Comments: Left IJ  Pulmonary:      Effort: Pulmonary effort is normal.   Cardiovascular:      Normal rate present.   Edema:     Peripheral edema absent.   Abdominal:      Palpations: Abdomen is soft.   Skin:     General: Skin is warm and " dry.  Left above-the-knee amputation     Coloration: Skin is pale.   Neurological:      Mental Status: Lethargic  Psychiatric:      Comments: Appears calm      Patient status: Disease state: Controlled with current treatments.  Functional status: Palliative Performance Scale Score: Performance 10% based on the following measures: Ambulation: Totally bed bound, Activity and Evidence of Disease: Unable to do any work, extensive evidence of disease, Self-Care: Total care required,  Intake: Mouth care only, LOC: Drowsy or comatose   Nutritional status: Albumin 2.10. Body mass index is 26.87 kg/m².      Family support: The patient receives support from her children..  Advance Directives: Advance Directive Status: Patient does not have advance directive   POA/Healthcare surrogate-sons-Next of kin.     Impression/Problem List:     1.  End-stage kidney disease on hemodialysis previously on peritoneal dialysis  2.  Acute blood loss anemia  3.  Hemorrhagic shock superimposed on chronic hypotension  4.  Rectal bleeding  5.  S/p AKA, left  6.  Peripheral vascular disease  7.   Coronary artery disease  8.  Congestive heart failure  9.  Chronic pericarditis  10.  Hyperlipidemia  11.  GERD  12.  Hypertension  13.  Sleep apnea     Recommendations/Plan:  1. plan: Goals of care include CODE STATUS no CPR/comfort measures    2.  Palliative care encounter  -Poor long-term prognosis secondary to end-stage kidney disease, acute blood loss anemia, hemorrhagic shock, rectal bleeding, and multiple comorbidities.     -Large bloody BM x 1 today per nursing.      -Anticipate death imminent.  Family updated    3. comfort care  -Continue scheduled Ativan low dose every 8 hours and as needed.  -Opiates as needed.  -Palliative admission order set adjuvants as needed       Thank you for allowing us to participate in patient's plan of care. Palliative Care Team will continue to follow patient.     Time spent: 40 minutes spent reviewing medical and  medication records, assessing and examining patient, discussing with family, answering questions, providing some guidance about a plan and documentation of care, and coordinating care with other healthcare members, with > 50% time spent face to face.     Brooke Finnegan, APRN  1/20/2022

## 2022-01-20 NOTE — PROGRESS NOTES
[facility]  INPATIENT PROGRESS NOTE    Length of Stay: 6  Date of Admission: 1/14/2022  Primary Care Physician: Akhil Golden DO    Subjective   Chief Complaint: Comfort measure.    HPI   Patient seems comfortable.  . Blood pressure is low, tacky.  Patient is afebrile.    Review of Systems   Unable to obtain due to altered ental status.  Patient does not seem to be in acute distress.    All pertinent negatives and positives are as above. All other systems have been reviewed and are negative unless otherwise stated.     Objective    Temp:  [95.6 °F (35.3 °C)-98.3 °F (36.8 °C)] 98.3 °F (36.8 °C)  Heart Rate:  [] 102  Resp:  [14-16] 14  BP: ()/(34-67) 80/48  No intake or output data in the 24 hours ending 01/20/22 1514  Physical Exam  Vitals and nursing note reviewed.   Constitutional:       Appearance: She is well-developed.   HENT:      Head: Normocephalic.   Eyes:      Conjunctiva/sclera: Conjunctivae normal.      Pupils: Pupils are equal, round, and reactive to light.   Neck:      Vascular: No JVD.   Cardiovascular:      Rate and Rhythm: Normal rate and regular rhythm.      Heart sounds: Normal heart sounds. No murmur heard.  No friction rub. No gallop.    Pulmonary:      Effort: No respiratory distress.      Breath sounds: No wheezing or rales.      Comments: Not on oxygen.  Diminished breath sound bilateral, clear.  Chest:      Chest wall: No tenderness.   Abdominal:      General: Bowel sounds are normal. There is no distension.      Palpations: Abdomen is soft.      Tenderness: There is no abdominal tenderness. There is no guarding or rebound.   Musculoskeletal:         General: AKA of the left leg . suture in place, clean dry intact.     Cervical back: Neck supple.   Skin:     General: Skin is warm and dry.      Capillary Refill: Capillary refill takes 2 to 3 seconds.      Findings: No rash.   Neurological:      Cranial Nerves: No  cranial nerve deficit.      Motor: Weakness present. No abnormal muscle tone.      Coordination: Coordination abnormal.      Gait: Gait abnormal.   Results Review:  Lab Results (last 24 hours)     ** No results found for the last 24 hours. **           Cultures:  No results found for: BLOODCX, URINECX, WOUNDCX, MRSACX, RESPCX, STOOLCX    Radiology Data:    Imaging Results (Last 24 Hours)     ** No results found for the last 24 hours. **          Allergies   Allergen Reactions   • Codeine Nausea And Vomiting   • Demerol [Meperidine] Nausea Only   • Levaquin [Levofloxacin] Nausea And Vomiting   • Lisinopril Swelling   • Morphine Nausea And Vomiting   • Sulfasalazine Nausea And Vomiting   • Ultram [Tramadol] Mental Status Change   • Trental [Pentoxifylline] GI Intolerance       Scheduled meds:   citalopram, 10 mg, Oral, Daily  hydrocortisone, 25 mg, Rectal, BID  LORazepam, 0.26 mg, Oral, Q8H  pantoprazole, 40 mg, Intravenous, Q12H  sodium chloride, 10 mL, Intravenous, Q12H  tranexamic acid, 10 mg/kg, Intravenous, Once        PRN meds:  •  acetaminophen **OR** acetaminophen **OR** acetaminophen  •  atropine  •  cyclobenzaprine  •  diphenhydrAMINE **OR** diphenhydrAMINE  •  diphenoxylate-atropine  •  Glycerin-Hypromellose-  •  glycopyrrolate **OR** glycopyrrolate **OR** glycopyrrolate **OR** glycopyrrolate  •  haloperidol **OR** haloperidol lactate  •  heparin (porcine)  •  HYDROmorphone **OR** HYDROmorphone  •  ipratropium-albuterol  •  LORazepam **OR** LORazepam  •  LORazepam **OR** LORazepam  •  LORazepam **OR** LORazepam  •  ondansetron  •  ondansetron  •  oxyCODONE-acetaminophen  •  prochlorperazine **OR** prochlorperazine **OR** prochlorperazine  •  Scopolamine  •  sodium chloride  •  [COMPLETED] Insert peripheral IV **AND** sodium chloride  •  sodium chloride    Assessment/Plan       Real time reverse transcriptase PCR positive for COVID-19 virus    Coronary artery disease involving native coronary artery  of native heart without angina pectoris    Diet-controlled diabetes mellitus (HCC)    Peripheral vascular disease (HCC)    ESRD on hemodialysis (previously on PD)    Rectal bleeding    Acute blood loss anemia    S/P AKA (above knee amputation), left (HCC)    Hemorrhagic shock superimposed on chronic hypotension      Plan:  HPI.The patient was admitted to my service here at [facility] on 1/14. She presented to the emergency department with weakness, low blood pressure, and rectal bleeding.     GI bleed. Hemoglobin was 8.4 on 1/11.  Status post 3 units blood transfusion.  Continue to monitor H&H every 8 hours. Patient was sent to the unit yesterday due to GI bleed.  Reconsult GI.  Protonix IV. BID.  Zofran as needed.  Hemoglobin 6.7.    Plan for another transfusion is transfusion today.     COVID positive.  Not requiring oxygen. Pt has not been vaccinated for covid.      Anemia.  Erythropoietin .     CAD/hyperlipidemia.  Status post CABG April 2021.  History of stent placement 2016.  Stop Plavix previously.  Aspirin.  Lipitor.     Dialysis/end-stage renal disease.Consulted nephrology for ongoing dialysis.  She has been dialyzing on Tuesday, Thursday, and Saturday recently.  She was previously on peritoneal dialysis.       Hypokalemia.  Per nephrology.     Hypotension.  On midodrine.  Levophed drip.     Hemorrhoid.The ER consulted Dr. Ruano with general surgery to assess for possible brisk bleeding or hemorrhoidal bleeding in need of surgical intervention.  Nothing of the sort is seen at this time.  The patient and family also do not wish for any significant intervention.  She has had panendoscopy multiple times in the past according to her and her family.  I have no records on this.  They state that no bleeding sources ever been found. No plans to consult gastroenterology at this time.  Continue transfusion and supportive care.  Allowed clear liquids at admission and will now advance diet as tolerated.    Anusol suppository twice daily.     Peripheral vascular disease. 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  All of her vascular interventions recently have been performed by Dr. Coates.  [place] are still in place from her intervention on 1/6.  I do not see any specific needs to consult him at this point in time, but will if the need arises.  I will at least let him know that she is in the hospital.     Depression/anxiety . Celexa.  Xanax as needed.     Gout.  Allopurinol.     Chronic pain.  Flexeril as needed.     SCD right lower extremity for DVT prophylaxis.     Nutrition. Previously was on regular/consistent carb diet.  N.p.o. for now discussed with nursing staff.     COVID positive.     Deconditioning.  PT and OT consult.     Discharge Planning: Patient is from [facility].  Comfort measure.  DC all labs and medications not related to comfort measure.  Scheduled Ativan and as needed.  Dilaudid as needed.  Scopolamine patch.    Electronically signed by Bob Carrizales MD, 01/20/22, 3:14 PM CST.

## 2022-01-20 NOTE — PLAN OF CARE
Goal Outcome Evaluation:               Transfer from ICU to bed 341 this shift. Comfort care.  Pt nonverbal/sleeping so far this this shift.  Responds to movement. AKA lt leg, CDI/staples  RA/.  Bleeding from rectum.  Zinc cream applied to bottom and sacral area.  Scheduled Ativan given as ordered.  Video monitoring.  Isolation precautions maintained.  Permacath to rt clavicle CDI.  CVC triple lumen to rt jugular CDI and capped.  CHG this shift. Will continue to monitor.

## 2022-01-20 NOTE — THERAPY DISCHARGE NOTE
Acute Care - Occupational Therapy Discharge Summary  [facility]     Patient Name: Jennifer Salcido  : 1955  MRN: [id number]    Today's Date: 2022                 Admit Date: 2022        OT Recommendation and Plan    Visit Dx:    ICD-10-CM ICD-9-CM   1. Gastrointestinal hemorrhage, unspecified gastrointestinal hemorrhage type  K92.2 578.9   2. Chronic kidney disease, unspecified CKD stage  N18.9 585.9   3. Shock (HCC)  R57.9 785.50   4. Decreased activities of daily living (ADL)  Z78.9 V49.89   5. Impaired mobility  Z74.09 799.89                OT Rehab Goals     Row Name 22 0700             Transfer Goal 1 (OT)    Activity/Assistive Device (Transfer Goal 1, OT) commode, bedside with drop arms; toilet  -JJ      [place] Level/Cues Needed (Transfer Goal 1, OT) moderate assist (50-74% patient effort)  -JJ      Time Frame (Transfer Goal 1, OT) long term goal (LTG); by discharge  -JJ      Progress/Outcome (Transfer Goal 1, OT) goal not met  comfort care  -JJ              Dressing Goal 1 (OT)    Activity/Device (Dressing Goal 1, OT) lower body dressing  -JJ      [place]/Cues Needed (Dressing Goal 1, OT) minimum assist (75% or more patient effort)  -JJ      Time Frame (Dressing Goal 1, OT) long term goal (LTG); by discharge  -JJ      Progress/Outcome (Dressing Goal 1, OT) goal not met  comfort care  -JJ              Toileting Goal 1 (OT)    Activity/Device (Toileting Goal 1, OT) toileting skills, all  -JJ      [place] Level/Cues Needed (Toileting Goal 1, OT) minimum assist (75% or more patient effort)  -JJ      Time Frame (Toileting Goal 1, OT) long term goal (LTG); by discharge  -JJ      Progress/Outcome (Toileting Goal 1, OT) goal not met  comfort care  -JJ            User Key  (r) = Recorded By, (t) = Taken By, (c) = Cosigned By    Initials Name Provider Type Discipline    Kimberlee Gill, OTR/L, CSRS Occupational Therapist OT                        Therapy Charges for Today      Code Description Service Date Service Provider Modifiers Qty    [id number] HC OT EVAL MOD COMPLEXITY 3 1/19/2022 Kimberlee Dias OTR/L, CSRS GO 1          OT Discharge Summary  Anticipated Discharge Disposition (OT): other (see comments) (comfort)  Reason for Discharge: other (comment) (comfort care)  Outcomes Achieved: Other (comfort care)  Discharge Destination: other (comment) (comfort care)      GUILLERMO Nieto/MARTHA, CSRS  1/20/2022

## 2022-01-21 NOTE — DISCHARGE SUMMARY
[facility] Medicine Services  DEATH SUMMARY       Date of Admission: 1/14/2022  Date of Death:  1/20/2022 at approximately 1840  Primary Care Physician: Akhil Golden DO    Presenting Problem/History of Present Illness:  Gastrointestinal hemorrhage, unspecified gastrointestinal hemorrhage type [K92.2]     Final Death Diagnoses:  Active Hospital Problems    Diagnosis    • **Real time reverse transcriptase PCR positive for COVID-19 virus    • Rectal bleeding    • Acute blood loss anemia    • S/P AKA (above knee amputation), left (HCC)    • Hemorrhagic shock superimposed on chronic hypotension    • ESRD on hemodialysis (previously on PD)    • Peripheral vascular disease (HCC)    • Diet-controlled diabetes mellitus (HCC)    • Coronary artery disease involving native coronary artery of native heart without angina pectoris        Hospital Course:  The patient is a 66 y.o. female who presented to [facility] with GI bleed/COVID-positive/anemia/CAD/dialysis/hypotension.      HPI.The patient was admitted to my service here at [facility] on 1/14. She presented to the emergency department with weakness, low blood pressure, and rectal bleeding.     GI bleed. Hemoglobin was 8.4 on 1/11.  Status post 3 units blood transfusion.  Continue to monitor H&H every 8 hours. Patient was sent to the unit yesterday due to GI bleed.  Reconsult GI.  Protonix IV. BID.  Zofran as needed.  Hemoglobin 6.7.    Plan for another transfusion is transfusion today.     COVID positive.  Not requiring oxygen. Pt has not been vaccinated for covid.      Anemia.  Erythropoietin .     CAD/hyperlipidemia.  Status post CABG April 2021.  History of stent placement 2016.  Stop Plavix previously.  Aspirin.  Lipitor.     Dialysis/end-stage renal disease.Consulted nephrology for ongoing dialysis.  She has been dialyzing on Tuesday, Thursday, and Saturday recently.  She was previously on peritoneal  dialysis.       Hypokalemia.  Per nephrology.     Hypotension.  On midodrine.  Levophed drip.     Hemorrhoid.The ER consulted Dr. Ruano with general surgery to assess for possible brisk bleeding or hemorrhoidal bleeding in need of surgical intervention.  Nothing of the sort is seen at this time.  The patient and family also do not wish for any significant intervention.  She has had panendoscopy multiple times in the past according to her and her family.  I have no records on this.  They state that no bleeding sources ever been found. No plans to consult gastroenterology at this time.  Continue transfusion and supportive care.  Allowed clear liquids at admission and will now advance diet as tolerated.   Anusol suppository twice daily.     Peripheral vascular disease. She recently underwent recent left BKA and then had to be revised to an AKA secondary to stump necrosis.  All of her vascular interventions recently have been performed by Dr. Coates.  [place] are still in place from her intervention on 1/6.  I do not see any specific needs to consult him at this point in time, but will if the need arises.  I will at least let him know that she is in the hospital.     Depression/anxiety . Celexa.  Xanax as needed.     Gout.  Allopurinol.     Chronic pain.  Flexeril as needed.     SCD right lower extremity for DVT prophylaxis.     Nutrition. Previously was on regular/consistent carb diet.  N.p.o. for now discussed with nursing staff.     COVID positive.     Deconditioning.  PT and OT consult.     Patient is from [facility].  Comfort measure.  DC all labs and medications not related to comfort measure.  Scheduled Ativan and as needed.  Dilaudid as needed.  Scopolamine patch.  Patient passed away on 1/20/2022 at 1840.       Electronically signed by Bob Carrizales MD, 01/20/22, 19:15 CST.

## 2022-01-21 NOTE — THERAPY DISCHARGE NOTE
Acute Care - Physical Therapy Discharge Summary  [facility]       Patient Name: Jennifer Salcido  : 1955  MRN: [id number]    Today's Date: 2022                 Admit Date: 2022      PT Recommendation and Plan    Visit Dx:    ICD-10-CM ICD-9-CM   1. Gastrointestinal hemorrhage, unspecified gastrointestinal hemorrhage type  K92.2 578.9   2. Chronic kidney disease, unspecified CKD stage  N18.9 585.9   3. Shock (HCC)  R57.9 785.50   4. Decreased activities of daily living (ADL)  Z78.9 V49.89   5. Impaired mobility  Z74.09 799.89                PT Rehab Goals     Row Name 22 0718             Bed Mobility Goal 1 (PT)    Activity/Assistive Device (Bed Mobility Goal 1, PT) sit to supine/supine to sit  -      [place] Level/Cues Needed (Bed Mobility Goal 1, PT) minimum assist (75% or more patient effort); moderate assist (50-74% patient effort); 1 person assist  -      Time Frame (Bed Mobility Goal 1, PT) long term goal (LTG); 10 days  -      Progress/Outcomes (Bed Mobility Goal 1, PT) goal not met  -              Transfer Goal 1 (PT)    Activity/Assistive Device (Transfer Goal 1, PT) sit-to-stand/stand-to-sit; bed-to-chair/chair-to-bed  -AH      [place] Level/Cues Needed (Transfer Goal 1, PT) moderate assist (50-74% patient effort)  -      Time Frame (Transfer Goal 1, PT) long term goal (LTG); 10 days  -      Progress/Outcome (Transfer Goal 1, PT) goal not met  -            User Key  (r) = Recorded By, (t) = Taken By, (c) = Cosigned By    Initials Name Provider Type Discipline    Veronica Brooks PTA Physical Therapy Assistant PT                    PT Discharge Summary  Reason for Discharge: Patient   Outcomes Achieved: Other ()  Discharge Destination: other (comment) ()      Veronica Velasco PTA   2022

## 2022-01-22 LAB
BH BB BLOOD EXPIRATION DATE: NORMAL
BH BB BLOOD TYPE BARCODE: 6200
BH BB DISPENSE STATUS: NORMAL
BH BB PRODUCT CODE: NORMAL
BH BB UNIT NUMBER: NORMAL
CROSSMATCH INTERPRETATION: NORMAL
UNIT  ABO: NORMAL
UNIT  RH: NORMAL

## 2022-01-25 ENCOUNTER — TELEPHONE (OUTPATIENT)
Dept: VASCULAR SURGERY | Facility: CLINIC | Age: 67
End: 2022-01-25

## 2025-07-06 NOTE — PROGRESS NOTES
[facility]  INPATIENT PROGRESS NOTE    Length of Stay: 4  Date of Admission: 1/14/2022  Primary Care Physician: Akhil Golden DO    Subjective   Chief Complaint: GI bleed.  Failure to thrive.  Dialysis patient . CAD.    HPI   Patient denies any chest pain.  Patient negative abdomen pain.  Bright red bloody bowel movement last night.  We will consult GI for possible colonoscopy.  Patient does agree with colonoscopy.  Patient is afebrile.  Blood pressure is low side, increase midodrine.  Patient is needing also Levophed.  Hemoglobin this morning was 6.7.  Patient will get another unit of blood today.    Review of Systems   Constitutional: Positive for activity change, appetite change and fatigue. Negative for chills and fever.   HENT: Negative for hearing loss, nosebleeds, tinnitus and trouble swallowing.    Eyes: Negative for visual disturbance.   Respiratory: Negative for cough, chest tightness, shortness of breath and wheezing.    Cardiovascular: Negative for chest pain, palpitations and leg swelling.   Gastrointestinal: Positive for blood in stool. Negative for abdominal distention, abdominal pain, constipation, diarrhea, nausea and vomiting.   Endocrine: Negative for cold intolerance, heat intolerance, polydipsia, polyphagia and polyuria.   Genitourinary: Negative for decreased urine volume, difficulty urinating, dysuria, flank pain, frequency and hematuria.   Musculoskeletal: Negative for arthralgias, joint swelling and myalgias.   Skin: Negative for rash.   Allergic/Immunologic: Negative for immunocompromised state.   Neurological: Positive for weakness. Negative for dizziness, syncope, light-headedness and headaches.   Hematological: Negative for adenopathy. Does not bruise/bleed easily.   Psychiatric/Behavioral: Negative for confusion and sleep disturbance. The patient is not nervous/anxious.           All pertinent negatives and positives are as  Patient reports feeling less manic, thinks its related to taking Abilify. Denies SI HIAVH at present, reports wanting to be outside. Denies anxiety and depression at this time. BP elevated rice scheduled lisinopril 10mg. Physical assessment WNL   above. All other systems have been reviewed and are negative unless otherwise stated.     Objective    Temp:  [96 °F (35.6 °C)-98.7 °F (37.1 °C)] 96 °F (35.6 °C)  Heart Rate:  [69-88] 73  Resp:  [16-18] 18  BP: ()/(34-88) 103/50  No intake or output data in the 24 hours ending 01/18/22 0841  Physical Exam  Vitals and nursing note reviewed.   Constitutional:       Appearance: She is well-developed.   HENT:      Head: Normocephalic.   Eyes:      Conjunctiva/sclera: Conjunctivae normal.      Pupils: Pupils are equal, round, and reactive to light.   Neck:      Vascular: No JVD.   Cardiovascular:      Rate and Rhythm: Normal rate and regular rhythm.      Heart sounds: Normal heart sounds. No murmur heard.  No friction rub. No gallop.    Pulmonary:      Effort: No respiratory distress.      Breath sounds: No wheezing or rales.      Comments: Not on oxygen.  Diminished breath sound bilateral, clear.  Chest:      Chest wall: No tenderness.   Abdominal:      General: Bowel sounds are normal. There is no distension.      Palpations: Abdomen is soft.      Tenderness: There is no abdominal tenderness. There is no guarding or rebound.   Musculoskeletal:         General: AKA of the left leg . suture in place, clean dry intact.     Cervical back: Neck supple.   Skin:     General: Skin is warm and dry.      Capillary Refill: Capillary refill takes 2 to 3 seconds.      Findings: No rash.   Neurological:      Mental Status: She is alert.      Cranial Nerves: No cranial nerve deficit.      Motor: Weakness present. No abnormal muscle tone.      Coordination: Coordination abnormal.      Gait: Gait abnormal.      Deep Tendon Reflexes: Reflexes normal.   Psychiatric:         Behavior: Behavior normal.         Thought Content: Thought content normal.      Results Review:  Lab Results (last 24 hours)     Procedure Component Value Units Date/Time    Hemoglobin & Hematocrit, Blood [[id number]]  (Abnormal) Collected: 01/18/22 0600     Specimen: Blood Updated: 01/18/22 0612     Hemoglobin 6.7 g/dL      Hematocrit 22.2 %     Comprehensive Metabolic Panel [[id number]]  (Abnormal) Collected: 01/17/22 2340    Specimen: Blood Updated: 01/18/22 0050     Glucose 99 mg/dL      BUN 21 mg/dL      Creatinine 4.49 mg/dL      Sodium 133 mmol/L      Potassium 3.7 mmol/L      Chloride 98 mmol/L      CO2 28.0 mmol/L      Calcium 7.8 mg/dL      Total Protein 4.4 g/dL      Albumin 1.90 g/dL      ALT (SGPT) <5 U/L      AST (SGOT) 20 U/L      Alkaline Phosphatase 92 U/L      Total Bilirubin 0.3 mg/dL      eGFR Non African Amer 10 mL/min/1.73      Comment: <15 Indicative of kidney failure.        eGFR   Amer --     Comment: <15 Indicative of kidney failure.        Globulin 2.5 gm/dL      A/G Ratio 0.8 g/dL      BUN/Creatinine Ratio 4.7     Anion Gap 7.0 mmol/L     Narrative:      GFR Normal >60  Chronic Kidney Disease <60  Kidney Failure <15      CBC & Differential [[id number]]  (Abnormal) Collected: 01/17/22 2341    Specimen: Blood Updated: 01/18/22 0026    Narrative:      The following orders were created for panel order CBC & Differential.  Procedure                               Abnormality         Status                     ---------                               -----------         ------                     CBC Auto Differential[[id number]]        Abnormal            Final result                 Please view results for these tests on the individual orders.    CBC Auto Differential [[id number]]  (Abnormal) Collected: 01/17/22 2341    Specimen: Blood Updated: 01/18/22 0026     WBC 5.97 10*3/mm3      RBC 2.42 10*6/mm3      Hemoglobin 7.3 g/dL      Hematocrit 23.4 %      MCV 96.7 fL      MCH 30.2 pg      MCHC 31.2 g/dL      RDW 18.7 %      RDW-SD 63.5 fl      MPV 10.3 fL      Platelets 263 10*3/mm3      Neutrophil % 75.6 %      Lymphocyte % 9.5 %      Monocyte % 9.0 %      Eosinophil % 4.9 %      Basophil % 0.2 %      Immature Grans % 0.8 %      Neutrophils,  Absolute 4.51 10*3/mm3      Lymphocytes, Absolute 0.57 10*3/mm3      Monocytes, Absolute 0.54 10*3/mm3      Eosinophils, Absolute 0.29 10*3/mm3      Basophils, Absolute 0.01 10*3/mm3      Immature Grans, Absolute 0.05 10*3/mm3      nRBC 0.0 /100 WBC     COVID PRE-OP / PRE-PROCEDURE SCREENING ORDER (NO ISOLATION) - Swab, Nasal Cavity [[id number]]  (Abnormal) Collected: 01/17/22 1758    Specimen: Swab from Nasal Cavity Updated: 01/17/22 1857    Narrative:      The following orders were created for panel order COVID PRE-OP / PRE-PROCEDURE SCREENING ORDER (NO ISOLATION) - Swab, Nasal Cavity.  Procedure                               Abnormality         Status                     ---------                               -----------         ------                     COVID-19,[employer] IN-HELLEN...[[id number]]  Abnormal            Final result                 Please view results for these tests on the individual orders.    COVID-19,[employer] IN-HOUSE,Nasal Swab No Transport Media 3-4 HR TAT - Swab, Nasal Cavity [[id number]]  (Abnormal) Collected: 01/17/22 1758    Specimen: Swab from Nasal Cavity Updated: 01/17/22 1857     COVID19 Detected    Narrative:      Fact sheet for providers: https://www.fda.gov/media/070294/download     Fact sheet for patients: https://www.fda.gov/media/448542/download    Test performed by PCR.    Consider negative results in combination with clinical observations, patient history, and epidemiological information.    Hemoglobin & Hematocrit, Blood [[id number]]  (Abnormal) Collected: 01/17/22 1648    Specimen: Blood Updated: 01/17/22 1656     Hemoglobin 7.8 g/dL      Hematocrit 24.7 %     Hemoglobin & Hematocrit, Blood [[id number]]  (Abnormal) Collected: 01/17/22 0832    Specimen: Blood Updated: 01/17/22 0910     Hemoglobin 8.4 g/dL      Hematocrit 26.6 %            Cultures:  No results found for: BLOODCX, URINECX, WOUNDCX, MRSACX, RESPCX, STOOLCX    Radiology Data:    Imaging Results (Last 24 Hours)     **  No results found for the last 24 hours. **          Allergies   Allergen Reactions   • Codeine Nausea And Vomiting   • Demerol [Meperidine] Nausea Only   • Levaquin [Levofloxacin] Nausea And Vomiting   • Lisinopril Swelling   • Morphine Nausea And Vomiting   • Sulfasalazine Nausea And Vomiting   • Ultram [Tramadol] Mental Status Change   • Trental [Pentoxifylline] GI Intolerance       Scheduled meds:   allopurinol, 100 mg, Oral, Daily  aspirin, 81 mg, Oral, Daily  atorvastatin, 20 mg, Oral, Nightly  citalopram, 10 mg, Oral, Daily  epoetin jani/jani-epbx, 10,000 Units, Subcutaneous, Once per day on Mon Wed Fri  hydrocortisone, 25 mg, Rectal, BID  midodrine, 5 mg, Oral, TID AC  pantoprazole, 40 mg, Intravenous, Q12H  sodium chloride, 10 mL, Intravenous, Q12H  tranexamic acid, 10 mg/kg, Intravenous, Once        PRN meds:  •  acetaminophen **OR** acetaminophen **OR** acetaminophen  •  cyclobenzaprine  •  heparin (porcine)  •  ondansetron  •  [COMPLETED] Insert peripheral IV **AND** sodium chloride  •  sodium chloride    Assessment/Plan       Rectal bleeding    Coronary artery disease involving native coronary artery of native heart without angina pectoris    Diet-controlled diabetes mellitus (HCC)    Peripheral vascular disease (HCC)    ESRD on hemodialysis (previously on PD)    Acute blood loss anemia    S/P AKA (above knee amputation), left (HCC)    Hemorrhagic shock superimposed on chronic hypotension      Plan:  HPI.The patient was admitted to my service here at [facility] on 1/14. She presented to the emergency department with weakness, low blood pressure, and rectal bleeding.     GI bleed. Hemoglobin was 8.4 on 1/11.  Status post 3 units blood transfusion.  Continue to monitor H&H every 8 hours. Patient was sent to the unit yesterday due to GI bleed.  Reconsult GI.  Protonix IV. BID.  Zofran as needed.  Hemoglobin 6.7.    Plan for another transfusion is transfusion today.     Anemia.  Erythropoietin  .     CAD/hyperlipidemia.  Status post CABG April 2021.  History of stent placement 2016.  Stop Plavix previously.  Aspirin.  Lipitor.     Dialysis/end-stage renal disease.Consulted nephrology for ongoing dialysis.  She has been dialyzing on Tuesday, Thursday, and Saturday recently.  She was previously on peritoneal dialysis.       Hypokalemia.  Per nephrology.     Hypotension.  On midodrine.     Hemorrhoid.The ER consulted Dr. Ruano with general surgery to assess for possible brisk bleeding or hemorrhoidal bleeding in need of surgical intervention.  Nothing of the sort is seen at this time.  The patient and family also do not wish for any significant intervention.  She has had panendoscopy multiple times in the past according to her and her family.  I have no records on this.  They state that no bleeding sources ever been found. No plans to consult gastroenterology at this time.  Continue transfusion and supportive care.  Allowed clear liquids at admission and will now advance diet as tolerated.   Anasarca suppository twice daily.     Peripheral vascular disease. 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  All of her vascular interventions recently have been performed by Dr. Coates.  [place] are still in place from her intervention on 1/6.  I do not see any specific needs to consult him at this point in time, but will if the need arises.  I will at least let him know that she is in the hospital.     Depression/anxiety . Celexa.  Xanax as needed.     Gout.  Allopurinol.     Chronic pain.  Flexeril as needed.     SCD right lower extremity for DVT prophylaxis.     Nutrition. Previously was on regular/consistent carb diet.  N.p.o. for now discussed with nursing staff.     Deconditioning.  PT and OT consult.     Discharge Planning: Patient will be transferred back to [facility].  DNR.    Electronically signed by Bob Carrizales MD, 01/18/22,  8:41 AM CST.

## (undated) DEVICE — PK CATH CARD 30 CA/4

## (undated) DEVICE — RADIFOCUS OPTITORQUE ANGIOGRAPHIC CATHETER: Brand: OPTITORQUE

## (undated) DEVICE — PROXIMATE RH ROTATING HEAD SKIN STAPLERS (35 WIDE) CONTAINS 35 STAINLESS STEEL STAPLES: Brand: PROXIMATE

## (undated) DEVICE — ANTIBACTERIAL UNDYED BRAIDED (POLYGLACTIN 910), SYNTHETIC ABSORBABLE SUTURE: Brand: COATED VICRYL

## (undated) DEVICE — SOL IRR NACL 0.9PCT BT 1000ML

## (undated) DEVICE — INFLATION DEVICE: Brand: ENCORE™ 26

## (undated) DEVICE — DRSNG SURESITE WNDW 4X4.5

## (undated) DEVICE — GLV SURG BIOGEL LTX PF 7 1/2

## (undated) DEVICE — TIBURON BRACHIAL ANGIOGRAPHY DRAPE: Brand: CONVERTORS

## (undated) DEVICE — SUT SILK 2/0 SUTUPAK TIES 24IN SA75H

## (undated) DEVICE — CANN CO2/O2 NASL A/

## (undated) DEVICE — SUT SILK 2/0 SH 30IN K833H

## (undated) DEVICE — PAD MAJOR: Brand: MEDLINE INDUSTRIES, INC.

## (undated) DEVICE — BNDG ELAS CO-FLEX SLF ADHR 6IN 5YD LF STRL

## (undated) DEVICE — ELECTRD PAD DEFIB A/

## (undated) DEVICE — PATIENT RETURN ELECTRODE, SINGLE-USE, CONTACT QUALITY MONITORING, ADULT, WITH 9FT CORD, FOR PATIENTS WEIGING OVER 33LBS. (15KG): Brand: MEGADYNE

## (undated) DEVICE — TOWEL,OR,DSP,ST,BLUE,DLX,4/PK,20PK/CS: Brand: MEDLINE

## (undated) DEVICE — PAD,PREPPING,CUFFED,24X48,7",NONSTERILE: Brand: MEDLINE

## (undated) DEVICE — PK SET UP ANES OPN HEART 30

## (undated) DEVICE — SUT SILK 2/0 FS BLK 18IN 685G

## (undated) DEVICE — TR BAND RADIAL ARTERY COMPRESSION DEVICE: Brand: TR BAND

## (undated) DEVICE — SYRINGE MED 30ML STD CLR PLAS LUERLOCK TIP N CTRL DISP

## (undated) DEVICE — SYR LUERLOK 20CC BX/50

## (undated) DEVICE — SYR SLP TP 10ML DISP

## (undated) DEVICE — SYRINGE MED 5ML STD CLR PLAS LUERLOCK TIP N CTRL DISP

## (undated) DEVICE — SYRINGE MED 10ML LUERLOCK TIP W/O SFTY DISP

## (undated) DEVICE — MODEL AT P65, P/N 701554-001KIT CONTENTS: HAND CONTROLLER, 3-WAY HIGH-PRESSURE STOPCOCK WITH ROTATING END AND PREMIUM HIGH-PRESSURE TUBING: Brand: ANGIOTOUCH® KIT

## (undated) DEVICE — CATHETER PTCA 5FR L130CM BLLN L60MM DIA6MM GWIRE 0.035IN

## (undated) DEVICE — GW STARTER FXD CORE J .035 3X150CM 3MM

## (undated) DEVICE — SUT MNCRYL 4/0 PS2 27IN UD MCP426H

## (undated) DEVICE — SOLUTION IV 1000ML 0.9% SOD CHL PH 5 INJ USP VIAFLX PLAS

## (undated) DEVICE — SPNG GZ 2S 2X2 8PLY STRL PK/2

## (undated) DEVICE — GAUZE,SPONGE,FLUFF,6"X6.75",STRL,10/TRAY: Brand: MEDLINE

## (undated) DEVICE — E-PACK PROCEDURE KIT: Brand: E-PACK

## (undated) DEVICE — STAPLER SKIN L39MM DIA0.53MM CRWN 5.7MM S STL FIX HD PROX

## (undated) DEVICE — PROVE COVER: Brand: UNBRANDED

## (undated) DEVICE — CXI SUPPORT CATHETER: Brand: CXI

## (undated) DEVICE — APPL CHLORAPREP HI/LITE 26ML ORNG

## (undated) DEVICE — SOLIDIFIER LIQUI LOC PLUS 2000CC

## (undated) DEVICE — PENCL ES MEGADINE EZ/CLEAN BUTN W/HOLSTR 10FT

## (undated) DEVICE — DRSNG WND GZ CURAD OIL EMULSION 3X8IN STRL PK/3

## (undated) DEVICE — FLEXOR, CHECK-FLO, INTRODUCER RAABE MODIFICATION: Brand: FLEXOR

## (undated) DEVICE — SKIN PREP TRAY W/CHG: Brand: MEDLINE INDUSTRIES, INC.

## (undated) DEVICE — GLOVE SURG SZ 7 L12IN FNGR THK79MIL GRN LTX FREE

## (undated) DEVICE — A2000 MULTI-USE SYRINGE KIT, P/N 701277-003KIT CONTENTS: 100ML CONTRAST RESERVOIR AND TUBING WITH CONTRAST SPIKE AND CLAMP: Brand: A2000 MULTI-USE SYRINGE KIT

## (undated) DEVICE — SNAP KOVER: Brand: UNBRANDED

## (undated) DEVICE — 2108 SERIES SAGITTAL BLADE FLARED (48.1 X 0.64 X 61.7MM)

## (undated) DEVICE — SUT ETHLN 3/0 FS1 30IN 669H

## (undated) DEVICE — PK TURNOVER RM ADV

## (undated) DEVICE — GLV SURG SENSICARE W/ALOE PF LF 7.5 STRL

## (undated) DEVICE — DIAMONDBACK PERIPHERAL, SOLID CROWN, 1.25MM, 145CM SHAFT: Brand: DIAMONDBACK PERIPHERAL

## (undated) DEVICE — DRSNG GZ CURAD XEROFORM NONADHS 5X9IN STRL

## (undated) DEVICE — SPNG LAP PREWSH SFTPK 18X18IN STRL PK/5

## (undated) DEVICE — CATHETER KIT 5 FR 21 GAX7 CM MICROINTRODUCER GUIDEWIRE STIFF

## (undated) DEVICE — SUP ARMBRD ART/LINE BLU

## (undated) DEVICE — DRAIN,WOUND,ROUND,24FR,5/16",FULL-FLUTED: Brand: MEDLINE

## (undated) DEVICE — COVER LT HNDL PLAS RIG 1 PER PK

## (undated) DEVICE — RESERVOIR,SUCTION,100CC,SILICONE: Brand: MEDLINE

## (undated) DEVICE — SOLUTION INJ VISIPAQUE 150ML

## (undated) DEVICE — RADIFOCUS GLIDEWIRE: Brand: GLIDEWIRE

## (undated) DEVICE — IMMOB KN 3PNL DLX CANVS 19IN BLU

## (undated) DEVICE — SUT SILK 3/0 SUTUPAK TIES 24IN SA74H

## (undated) DEVICE — SYS PUMP PREVENA125 INCSN MNGT PP/DRSNG 45ML 1P/U

## (undated) DEVICE — SOLUTION IV IRRIG POUR BRL 0.9% SODIUM CHL 2F7124

## (undated) DEVICE — PACK,UNIVERSAL,NO GOWNS: Brand: MEDLINE

## (undated) DEVICE — INTENDED FOR TISSUE SEPARATION, AND OTHER PROCEDURES THAT REQUIRE A SHARP SURGICAL BLADE TO PUNCTURE OR CUT.: Brand: BARD-PARKER ® STAINLESS STEEL BLADES

## (undated) DEVICE — STCKNT IMPERV 12IN STRL

## (undated) DEVICE — GLIDESHEATH SLENDER STAINLESS STEEL KIT: Brand: GLIDESHEATH SLENDER

## (undated) DEVICE — DRP SURG UNIV BASIC BILAMINATE 53X77IN DISP

## (undated) DEVICE — C-ARM: Brand: UNBRANDED

## (undated) DEVICE — DRP SURG UTIL W/TPE 2/LAYR 15X26IN DISP

## (undated) DEVICE — GLV SURG BIOGEL LTX PF 6 1/2

## (undated) DEVICE — GLOVE ORTHO 7 1/2   MSG9475

## (undated) DEVICE — CVR PROB GEN PURP W ISOSILK 6X48

## (undated) DEVICE — SYS VASOVIEW HEMOPRO ENDOSCOPIC HARVST VESL

## (undated) DEVICE — DEVICE VASC CLSR 5FR GRY W/ INTEGR SEAL 10ML LOK SYR

## (undated) DEVICE — DRAPE SHEET: Brand: UNBRANDED

## (undated) DEVICE — HI-TORQUE COMMAND ES GUIDE WIRE .014" 300 CM: Brand: HI-TORQUE COMMAND

## (undated) DEVICE — SUT SILK 3/0 SH 30IN K832H

## (undated) DEVICE — SOLUTION IV 500ML 0.9% SOD CHL PH 5 INJ USP VIAFLX PLAS

## (undated) DEVICE — GAUZE,SPONGE,4"X4",16PLY,XRAY,STRL,LF: Brand: MEDLINE

## (undated) DEVICE — CATH F5 INF JR 4 100CM: Brand: INFINITI

## (undated) DEVICE — TOWL OR PREWSH 36X36IN XL BLU DISP STRL

## (undated) DEVICE — BANDAGE,GAUZE,BULKEE II,4.5"X4.1YD,STRL: Brand: MEDLINE

## (undated) DEVICE — OASIS DRAIN, SINGLE, INLINE & ATS COMPATIBLE: Brand: OASIS

## (undated) DEVICE — GLV SURG DERMASSURE GRN LF PF 8.0

## (undated) DEVICE — 28 FR RIGHT ANGLE – SOFT PVC CATHETER: Brand: PVC THORACIC CATHETERS

## (undated) DEVICE — BNDG ELAS W/CLIP 6IN 10YD LF STRL

## (undated) DEVICE — GLIDESHEATH BASIC HYDROPHILIC COATED INTRODUCER SHEATH: Brand: GLIDESHEATH

## (undated) DEVICE — CATH F5 INF PIG 110CM 6SH: Brand: INFINITI

## (undated) DEVICE — KT ANTI FOG W/FLD AND SPNG

## (undated) DEVICE — MODEL BT2000 P/N 700287-012KIT CONTENTS: MANIFOLD WITH SALINE AND CONTRAST PORTS, SALINE TUBING WITH SPIKE AND HAND SYRINGE, TRANSDUCER: Brand: BT2000 AUTOMATED MANIFOLD KIT

## (undated) DEVICE — VIPERSLIDE, 100ML BAG, 10 PK: Brand: VIPERSLIDE

## (undated) DEVICE — SYR PRECISIONGLIDE LL 5CC 20X1 1/2IN

## (undated) DEVICE — BLAKE SILICONE DRAINS CARDIO CONNECTOR 2:1: Brand: BLAKE

## (undated) DEVICE — PAD MINOR UNIVERSAL: Brand: MEDLINE INDUSTRIES, INC.

## (undated) DEVICE — RADIFOCUS GLIDECATH: Brand: GLIDECATH

## (undated) DEVICE — SOLUTION IV 250ML 0.9% SOD CHL PH 5 INJ USP VIAFLX PLAS

## (undated) DEVICE — BLANKET WRM W29.9XL79.1IN UP BODY FORC AIR MISTRAL-AIR

## (undated) DEVICE — TUBE ET 7MM NSL ORAL BASIC CUF INTMED MURPHY EYE RADPQ MRK

## (undated) DEVICE — AORTIC PUNCHES ARE USED TO CREATE A UNIFORM OPENING IN BLOOD VESSELS DURING CARDIOVASCULAR SURGERY. THE VESSEL GRAFT IS INSERTED INTO THE CREATED OPENING AND SUTURED TO THE VESSEL WALL. AORTIC LANCETS ARE USED TO MAKE A SMALL UNIFORM CUT IN A BLOOD VESSEL TO FACILITATE INSERTION OF AN AORTIC PUNCH.  PUNCHES COME IN VARIOUS LENGTHS, DIAMETERS AND TIP CONFIGURATIONS.: Brand: CLEANCUT ROTATING AORTIC PUNCH

## (undated) DEVICE — LARYNGOSCOPE BLDE MAC HNDL M SZ 35 ST CURAPLEX CURAVIEW LED

## (undated) DEVICE — GLV SURG TRIUMPH MICRO PF LTX 8.5 STRL

## (undated) DEVICE — DRAPE KEYBOARD W26XL36IN ADH

## (undated) DEVICE — TOTAL TRAY, 16FR 10ML SIL FOLEY, URN: Brand: MEDLINE

## (undated) DEVICE — NG KIT, 21 GA, 10 PACK: Brand: SITE-RITE

## (undated) DEVICE — Device

## (undated) DEVICE — KT CATH TAL VENATRAC PALINDROM 14.5F 19CM

## (undated) DEVICE — CLTH CLENS READYCLEANSE PERI CARE PK/5

## (undated) DEVICE — YANKAUER,BULB TIP WITH VENT: Brand: ARGYLE

## (undated) DEVICE — DRESSING TRNSPAR W5XL4.5IN FLM SHT SEMIPERMEABLE WIND

## (undated) DEVICE — BNDG ELAS CO-FLEX SLF ADHR 4IN5YD LF STRL

## (undated) DEVICE — SYS DISTNTION VEIN BONCHEK 300MMHG

## (undated) DEVICE — STERNUM BLADE, OFFSET (32.0 X 0.8 X 6.3MM)

## (undated) DEVICE — VIPERWIRE, ADVANCE FLEXTIP PERIPHERAL, .014" DIA SPRING TIP, 335CM, 5 PACK: Brand: VIPERWIRE ADVANCE FLEXTIP

## (undated) DEVICE — PTA BALLOON DILATATION CATHETER: Brand: COYOTE™

## (undated) DEVICE — GOWN,PREVENTION PLUS,XL,ST,24/CS: Brand: MEDLINE

## (undated) DEVICE — PK OPN HEART 30

## (undated) DEVICE — ADHS SKIN PREMIERPRO EXOFIN TOPICAL HI/VISC .5ML

## (undated) DEVICE — MEDIA CONTRAST INJ VISIPAQUE 150ML 320MG

## (undated) DEVICE — CATH F5 INF JL 3.5 100CM: Brand: INFINITI

## (undated) DEVICE — SKIN AFFIX SURG ADHESIVE 72/CS 0.55ML: Brand: MEDLINE

## (undated) DEVICE — IMMOB KN 3PNL DLX CANVS 22IN BLU

## (undated) DEVICE — BLAKE SILICONE DRAIN, 19 FR ROUND, HUBLESS WITH 1/4" BENDABLE TROCAR: Brand: BLAKE

## (undated) DEVICE — ANGIOGRAPHY PK

## (undated) DEVICE — 1/2 FORCE SURGICAL SPRING CLIP: Brand: STEALTH® SPRING CLIP